# Patient Record
Sex: MALE | Race: WHITE | Employment: OTHER | URBAN - METROPOLITAN AREA
[De-identification: names, ages, dates, MRNs, and addresses within clinical notes are randomized per-mention and may not be internally consistent; named-entity substitution may affect disease eponyms.]

---

## 2017-01-03 DIAGNOSIS — E78.5 HYPERLIPIDEMIA LDL GOAL <70: Primary | ICD-10-CM

## 2017-01-03 RX ORDER — LISINOPRIL 5 MG/1
5 TABLET ORAL DAILY
Qty: 90 TABLET | Refills: 1 | Status: SHIPPED | OUTPATIENT
Start: 2017-01-03 | End: 2018-02-21

## 2017-01-03 RX ORDER — LISINOPRIL 5 MG/1
5 TABLET ORAL DAILY
Qty: 90 TABLET | Refills: 1 | Status: SHIPPED | OUTPATIENT
Start: 2017-01-03 | End: 2017-01-03

## 2017-01-03 NOTE — TELEPHONE ENCOUNTER
Pt says he would like to get his Lisinopril as 90 day supply so he doesn't have to constantly run to the pharmacy. He is going there this afternoon and would like this sent ASAP please.  He says the pharmacy was suppose to contact us yesterday for refill but no record that this was done.    Lisinopril 5 mg tab      Last Written Prescription Date: 10/27/16  Last Fill Quantity: 30, # refills: 1  Last Office Visit with [facility], [facility] or [facility] prescribing provider: 11/7/16       POTASSIUM   Date Value Ref Range Status   11/09/2016 3.9 3.4 - 5.3 mmol/L Final     CREATININE   Date Value Ref Range Status   11/09/2016 1.16 0.66 - 1.25 mg/dL Final   11/05/2005 1.3 0.6 - 1.3 mg/dL      BP Readings from Last 3 Encounters:   12/15/16 148/85   11/15/16 150/75   11/09/16 125/64

## 2017-01-03 NOTE — TELEPHONE ENCOUNTER
Lisinopril      Last Written Prescription Date: 10/27/16  Last Fill Quantity: 30 ,  # refills: 1   Last Office Visit with G, [facility] or [facility] prescribing provider: 11/7/16

## 2017-01-09 ENCOUNTER — OFFICE VISIT (OUTPATIENT)
Dept: FAMILY MEDICINE | Facility: CLINIC | Age: 77
End: 2017-01-09
Payer: COMMERCIAL

## 2017-01-09 ENCOUNTER — RADIANT APPOINTMENT (OUTPATIENT)
Dept: GENERAL RADIOLOGY | Facility: CLINIC | Age: 77
End: 2017-01-09
Attending: NURSE PRACTITIONER
Payer: COMMERCIAL

## 2017-01-09 VITALS
TEMPERATURE: 97.4 F | RESPIRATION RATE: 16 BRPM | BODY MASS INDEX: 29.6 KG/M2 | HEART RATE: 74 BPM | SYSTOLIC BLOOD PRESSURE: 119 MMHG | DIASTOLIC BLOOD PRESSURE: 71 MMHG | WEIGHT: 194.6 LBS

## 2017-01-09 DIAGNOSIS — M25.531 RIGHT WRIST PAIN: ICD-10-CM

## 2017-01-09 DIAGNOSIS — L03.113 CELLULITIS OF RIGHT UPPER EXTREMITY: Primary | ICD-10-CM

## 2017-01-09 PROCEDURE — 99213 OFFICE O/P EST LOW 20 MIN: CPT

## 2017-01-09 PROCEDURE — 73110 X-RAY EXAM OF WRIST: CPT | Mod: RT

## 2017-01-09 RX ORDER — CEPHALEXIN 500 MG/1
500 CAPSULE ORAL 3 TIMES DAILY
Qty: 30 CAPSULE | Refills: 0 | Status: SHIPPED | OUTPATIENT
Start: 2017-01-09 | End: 2017-01-19

## 2017-01-09 NOTE — NURSING NOTE
"Chief Complaint   Patient presents with     Musculoskeletal Problem     Patient heard wrist make a pop sound the day after helping son lift a TV. Had pain on friday after lifting and pain subsided some on Saturday after he heard a pop.       Initial /71 mmHg  Pulse 74  Temp(Src) 97.4  F (36.3  C) (Tympanic)  Resp 16  Wt 194 lb 9.6 oz (88.27 kg) Estimated body mass index is 29.6 kg/(m^2) as calculated from the following:    Height as of 12/15/16: 5' 8\" (1.727 m).    Weight as of this encounter: 194 lb 9.6 oz (88.27 kg).  BP completed using cuff size: regular    "

## 2017-01-09 NOTE — MR AVS SNAPSHOT
After Visit Summary   1/9/2017    Alvaro Horton    MRN: [id number]           Patient Information     Date Of Birth          1940        Visit Information        Provider Department      1/9/2017 1:00 PM KODAK SAME DAY PROVIDER [facility]        Today's Diagnoses     Cellulitis of right upper extremity    -  1     Right wrist pain           Care Instructions    Take antibiotics as directed.    Follow up in 4 days and sooner if needed with primary care provider.    Follow-up with your primary care provider next week and as needed.    Indications for emergent return to emergency department discussed with patient, who verbalized good understanding and agreement.  Patient understands the limitations of today's evaluation.         Cellulitis  Cellulitis is an infection of the deep layers of skin. A break in the skin, such as a cut or scratch, can let bacteria under the skin. If the bacteria get to deep layers of the skin, it can be serious. If not treated, cellulitis can get into the bloodstream and lymph nodes. The infection can then spread throughout the body. This causes serious illness.  Cellulitis causes the affected skin to become red, swollen, warm, and sore. The reddened areas have a visible border. An open sore may leak fluid (pus). You may have a fever, chills, and pain.  Cellulitis is treated with antibiotics taken for 7 to 10 days. An open sore may be cleaned and covered with cool wet gauze. Symptoms should get better 1 to 2 days after treatment is started. Make sure to take all the antibiotics for the full number of days until they are gone. Keep taking the medicine even if your symptoms go away.  Home care  Follow these tips:    Limit the use of the part of your body with cellulitis. Movement can cause the infection to spread.    If the infection is on your leg, walk as little as possible in the first few days of the treatment. Keep your leg raised while sitting.  This will help to reduce swelling.    Take all of the antibiotic medicine exactly as directed until it is gone. Do not miss any doses, especially during the first 7 days. Don t stop taking the medicine when your symptoms get better.    Keep the affected area clean and dry.    Wash your hands with soap and warm water before and after touching your skin. Anyone else who touches your skin should also wash his or her hands. Don't share towels.  Follow-up care  Follow up with your healthcare provider. If your infection does not go away on 1 antibiotic, your healthcare provider will prescribe a different one.  When to seek medical advice  Call your healthcare provider right away if any of these occur:    Red areas that spread    Swelling or pain that gets worse    Fluid leaking from the skin (pus)    Fever higher of 100.4  F (38.0  C) or higher after 2 days on antibiotics    1570-5174 [employer]. [address] [place] [address]. All rights reserved. This information is not intended as a substitute for professional medical care. Always follow your healthcare professional's instructions.              Follow-ups after your visit        Follow-up notes from your care team     See patient instructions section of the AVS Return in about 4 weeks (around 2/6/2017), or if symptoms worsen or fail to improve, for Follow up with your primary care provider.      Who to contact     If you have questions or need follow up information about today's clinic visit or your schedule please contact [facility] directly at [phone].  Normal or non-critical lab and imaging results will be communicated to you by MyChart, letter or phone within 4 business days after the clinic has received the results. If you do not hear from us within 7 days, please contact the clinic through MyChart or phone. If you have a critical or abnormal lab result, we will notify you by phone as soon as possible.  Submit  "refill requests through [facility] or call your pharmacy and they will forward the refill request to us. Please allow 3 business days for your refill to be completed.          Additional Information About Your Visit        Front Desk [employer] Information     DLC lets you send messages to your doctor, view your test results, renew your prescriptions, schedule appointments and more. To sign up, go to [email or web address] . Click on \"Log in\" on the left side of the screen, which will take you to the Welcome page. Then click on \"Sign up Now\" on the right side of the page.     You will be asked to enter the access code listed below, as well as some personal information. Please follow the directions to create your username and password.     Your access code is: [email or web address]  Expires: 3/15/2017  4:02 PM     Your access code will  in 90 days. If you need help or a new code, please call your [place] clinic or [phone].        Care EveryWhere ID     This is your Care EveryWhere ID. This could be used by other organizations to access your [place] medical records  [id number]        Your Vitals Were     Pulse Temperature Respirations             74 97.4  F (36.3  C) (Tympanic) 16          Blood Pressure from Last 3 Encounters:   17 119/71   12/15/16 148/85   11/15/16 150/75    Weight from Last 3 Encounters:   17 194 lb 9.6 oz (88.27 kg)   12/15/16 198 lb (89.812 kg)   16 198 lb (89.812 kg)                 Today's Medication Changes          These changes are accurate as of: 17  1:57 PM.  If you have any questions, ask your nurse or doctor.               Start taking these medicines.        Dose/Directions    cephALEXin 500 MG capsule   Commonly known as:  KEFLEX   Used for:  Cellulitis of right upper extremity        Dose:  500 mg   Take 1 capsule (500 mg) by mouth 3 times daily for 10 days   Quantity:  30 capsule   Refills:  0         These medicines have changed or have updated prescriptions.        " Dose/Directions    cyanocobalamin 500 MCG Tabs   Commonly known as:  BL VITAMIN B-12   This may have changed:  how much to take   Used for:  Memory loss        Dose:  500 mcg   Take 500 mcg by mouth daily.   Quantity:  90 tablet   Refills:  4            Where to get your medicines      These medications were sent to [address] [address]     Phone:  [phone]    - cephALEXin 500 MG capsule             Primary Care Provider Office Phone # Fax #    Be Carreno [phone]       [facility] [address] [place] [address]        Thank you!     Thank you for choosing [facility]  for your care. Our goal is always to provide you with excellent care. Hearing back from our patients is one way we can continue to improve our services. Please take a few minutes to complete the written survey that you may receive in the mail after your visit with us. Thank you!             Your Updated Medication List - Protect others around you: Learn how to safely use, store and throw away your medicines at www.disposemymeds.org.          This list is accurate as of: 1/9/17  1:57 PM.  Always use your most recent med list.                   Brand Name Dispense Instructions for use    albuterol (2.5 MG/3ML) 0.083% neb solution     1 Box    Take 1 vial (2.5 mg) by nebulization every 4 hours as needed for shortness of breath / dyspnea or wheezing       arformoterol 15 MCG/2ML Nebu neb solution    BROVANA    120 mL    Take 2 mLs (15 mcg) by nebulization 2 times daily       aspirin 81 MG tablet      Take by mouth daily       atorvastatin 80 MG tablet    LIPITOR    30 tablet    Take 1 tablet (80 mg) by mouth daily       blood glucose monitoring meter device kit     1 kit    Use to test blood sugars 4 times daily or as directed.       blood glucose monitoring test strip    no brand specified    150  "each    1 strip by In Vitro route 3 times daily Diagnosis:  250.00.       budesonide 0.5 MG/2ML neb solution    PULMICORT    60 ampule    Take 2 mLs (0.5 mg) by nebulization 2 times daily       cephALEXin 500 MG capsule    KEFLEX    30 capsule    Take 1 capsule (500 mg) by mouth 3 times daily for 10 days       cholecalciferol 2000 UNITS tablet     90 tablet    Take 1 tablet by mouth daily.       cyanocobalamin 500 MCG Tabs    BL VITAMIN B-12    90 tablet    Take 500 mcg by mouth daily.       finasteride 5 MG tablet    PROSCAR    60 tablet    Take 1 tablet (5 mg) by mouth daily       fish oil-omega-3 fatty acids 1000 MG capsule     180 capsule    Take 1 capsule by mouth 2 times daily.       insulin aspart 100 UNITS/ML injection    NovoLOG VIAL    3 vial    11 units three times daily       insulin detemir 100 UNIT/ML injection    LEVEMIR VIAL    1 Month    30 units at bedtime       insulin syringe-needle U-100 31G X 5/16\" 1 ML    BD insulin syringe ULTRAFINE    100 each    Use one syringe 4 times daily or as directed.       lisinopril 5 MG tablet    PRINIVIL/ZESTRIL    90 tablet    Take 1 tablet (5 mg) by mouth daily       magnesium oxide 400 MG tablet    MAG-OX     Take 400 mg by mouth daily       metFORMIN 1000 MG tablet    GLUCOPHAGE    60 tablet    Take 1 tablet (1,000 mg) by mouth 2 times daily (with meals)       metoprolol 25 MG tablet    LOPRESSOR    60 tablet    Take 1 tablet (25 mg) by mouth 2 times daily       nitroglycerin 0.4 MG sublingual tablet    NITROSTAT    25 tablet    Place 1 tablet (0.4 mg) under the tongue every 5 minutes as needed for chest pain (CALL 911 IF NOT IMPROVED AFTER THREE CONSECUTIVE DOSES)       omeprazole 20 MG CR capsule    priLOSEC    30 capsule    Take 1 capsule (20 mg) by mouth daily       order for DME      Equipment being ordered: CPAP AIRSENSE 10 11-15 CM H2O QUATTRO AIR SIZE MED # [id number]  DN# [id number]       * order for DME     1 Device    Equipment being ordered: CPAP " machine       * order for DME     1 Box    Equipment being ordered: diabetic shoes       * order for DME     1 Device    Equipment being ordered: Nebulizer       tamsulosin 0.4 MG capsule    FLOMAX    90 capsule    Take 1 capsule (0.4 mg) by mouth daily (Needs follow-up appointment for this medication)       * Notice:  This list has 3 medication(s) that are the same as other medications prescribed for you. Read the directions carefully, and ask your doctor or other care provider to review them with you.

## 2017-01-09 NOTE — PROGRESS NOTES
SUBJECTIVE:                                                    Alvaro Horton is a 76 year old male who presents to clinic today for the following health issues:      Joint Pain     Onset: 3 days     Description:   Location: right wrist  Character: Sharp and Fullness    Intensity: 4-5/10    Progression of Symptoms: improved    Accompanying Signs & Symptoms:  Other symptoms: radiation of pain to hand and forearm   History:   Previous similar pain: no       Precipitating factors:   Trauma or overuse: YES- helped son lift a TV    Alleviating factors:  Improved by: ice       Therapies Tried and outcome: ice, symptoms improved          Problem list and histories reviewed & adjusted, as indicated.  Additional history: as documented    Patient Active Problem List   Diagnosis     Chronic ischemic heart disease     Moderate Persistent Asthma     PSORIASIS     Hypertrophy of prostate without urinary obstruction     OVERACTIVE BLADDER     BAKERS LUNG     Hypertension goal BP (blood pressure) < 140/90     Esophageal reflux     Sensorineural hearing loss, bilateral     Obstructive sleep apnea     Benign paroxysmal vertigo     CVA (cerebral vascular accident) (H)     Atrial fibrillation (H)     Memory loss     CTS (carpal tunnel syndrome)     Dyslexia     Advanced care planning/counseling discussion     Hyperlipidemia LDL goal <70     COPD (chronic obstructive pulmonary disease) (H)     CKD (chronic kidney disease) stage 3, GFR 30-59 ml/min     Health Care Home     Altered mental status     Type 2 diabetes mellitus with diabetic chronic kidney disease (H)     EDEN (obstructive sleep apnea) AHI 11.2     Past Surgical History   Procedure Laterality Date     C anesth,dx arthroscopic proc knee joint  1994, 1998      Left     Surgical history of -   1998     Hernia repair     Surgical history of -   2004     NormalColonoscopy      Surgical history of -   2006     Normal Colonoscopy     Cardiac surgery  11/09     CABG x2 -  LIMA-D1, SVG to OM1, OM2.     Genitourinary surgery  1990     ESWL and percutaneous nephrolithotomy     Orthopedic surgery  8/12     right CTR       Social History   Substance Use Topics     Smoking status: Never Smoker      Smokeless tobacco: Never Used     Alcohol Use: No     Family History   Problem Relation Age of Onset     C.A.D. Father      40s     Hypertension Father      C.A.D. Paternal Grandfather      HEART DISEASE Paternal Grandfather      DIABETES Brother      C.A.D. Brother      HEART DISEASE Brother      Hypertension Brother      Cancer - colorectal No family hx of      GASTROINTESTINAL DISEASE Son      Crohn's disease     GASTROINTESTINAL DISEASE Other      2 grandaughters with Crohn's disease         Current Outpatient Prescriptions   Medication Sig Dispense Refill     cephALEXin (KEFLEX) 500 MG capsule Take 1 capsule (500 mg) by mouth 3 times daily for 10 days 30 capsule 0     lisinopril (PRINIVIL/ZESTRIL) 5 MG tablet Take 1 tablet (5 mg) by mouth daily 90 tablet 1     insulin aspart (NOVOLOG VIAL) 100 UNITS/ML injection 11 units three times daily 3 vial 1     insulin detemir (LEVEMIR VIAL) 100 UNIT/ML injection 30 units at bedtime 1 Month 0     metFORMIN (GLUCOPHAGE) 1000 MG tablet Take 1 tablet (1,000 mg) by mouth 2 times daily (with meals) 60 tablet 0     metoprolol (LOPRESSOR) 25 MG tablet Take 1 tablet (25 mg) by mouth 2 times daily 60 tablet 1     finasteride (PROSCAR) 5 MG tablet Take 1 tablet (5 mg) by mouth daily 60 tablet 3     tamsulosin (FLOMAX) 0.4 MG 24 hr capsule Take 1 capsule (0.4 mg) by mouth daily (Needs follow-up appointment for this medication) 90 capsule 3     atorvastatin (LIPITOR) 80 MG tablet Take 1 tablet (80 mg) by mouth daily 30 tablet 11     omeprazole (PRILOSEC) 20 MG capsule Take 1 capsule (20 mg) by mouth daily 30 capsule 11     order for DME Equipment being ordered: CPAP  AIRSENSE 10  11-15 CM H2O  QUATTRO AIR SIZE MED  SN# [id number]  DN# [id number]       budesonide  "(PULMICORT) 0.5 MG/2ML nebulizer solution Take 2 mLs (0.5 mg) by nebulization 2 times daily 60 ampule 11     blood glucose monitoring (NO BRAND SPECIFIED) test strip 1 strip by In Vitro route 3 times daily Diagnosis:  250.00. 150 each 11     albuterol (2.5 MG/3ML) 0.083% nebulizer solution Take 1 vial (2.5 mg) by nebulization every 4 hours as needed for shortness of breath / dyspnea or wheezing 1 Box 11     order for DME Equipment being ordered: Nebulizer 1 Device 0     blood glucose monitoring (ONE TOUCH ULTRA 2) meter device kit Use to test blood sugars 4 times daily or as directed. 1 kit 0     arformoterol (BROVANA) 15 MCG/2ML NEBU Take 2 mLs (15 mcg) by nebulization 2 times daily 120 mL 11     order for DME Equipment being ordered: diabetic shoes 1 Box 0     nitroglycerin (NITROSTAT) 0.4 MG SL tablet Place 1 tablet (0.4 mg) under the tongue every 5 minutes as needed for chest pain (CALL 911 IF NOT IMPROVED AFTER THREE CONSECUTIVE DOSES) 25 tablet 0     order for DME Equipment being ordered: CPAP machine 1 Device 1     insulin syringe-needle U-100 (BD INSULIN SYRINGE ULTRAFINE) 31G X 5/16\" 1 ML Use one syringe 4 times daily or as directed. 100 each prn     aspirin 81 MG tablet Take by mouth daily       magnesium oxide (MAG-OX) 400 MG tablet Take 400 mg by mouth daily       cholecalciferol 2000 UNITS tablet Take 1 tablet by mouth daily. 90 tablet 3     fish oil-omega-3 fatty acids (FISH OIL) 1000 MG capsule Take 1 capsule by mouth 2 times daily. 180 capsule 3     cyanocobalamin (BL VITAMIN B-12) 500 MCG TABS Take 500 mcg by mouth daily. (Patient taking differently: Take 2,500 mcg by mouth daily ) 90 tablet 4     [DISCONTINUED] metoprolol (TOPROL-XL) 25 MG 24 hr tablet Take 1 tablet (25 mg) by mouth 2 times daily 180 tablet 3     [DISCONTINUED] LANTUS SOLOSTAR SOLN 100 UNIT/ML 26 units at bedtime 3 Month 0     Allergies   Allergen Reactions     Prednisone      Severe interaction with DM     Problem list, Medication " list, Allergies, and Medical/Social/Surgical histories reviewed in [facility] and updated as appropriate.    ROS:  Constitutional, HEENT, cardiovascular, pulmonary, GI, , musculoskeletal, neuro, skin, endocrine and psych systems are negative, except as otherwise noted.    OBJECTIVE:                                                    /71 mmHg  Pulse 74  Temp(Src) 97.4  F (36.3  C) (Tympanic)  Resp 16  Wt 194 lb 9.6 oz (88.27 kg)  Body mass index is 29.6 kg/(m^2).  GENERAL: healthy, alert and no distress, nontoxic in appearance  EYES: Eyes grossly normal to inspection,  conjunctivae and sclerae normal  HENT:normocephalic  NECK: supple with full ROM  MS: right wrist is mildly red and swollen. Has small scab on top of hand from playing with puppy. Has been icing it and it feels better.     Diagnostic Test Results: Degenerative changes. No fx noted. Will follow up with over read as indicated.    No results found for this or any previous visit (from the past 24 hour(s)).     ASSESSMENT/PLAN:                                                      Problem List Items Addressed This Visit     None      Visit Diagnoses     Cellulitis of right upper extremity    -  Primary     Relevant Medications     cephALEXin (KEFLEX) 500 MG capsule     Right wrist pain         Relevant Orders     XR Wrist Right G/E 3 Views                Patient Instructions   Take antibiotics as directed.    Follow up in 4 days and sooner if needed with primary care provider.    Follow-up with your primary care provider next week and as needed.    Indications for emergent return to emergency department discussed with patient, who verbalized good understanding and agreement.  Patient understands the limitations of today's evaluation.         Cellulitis  Cellulitis is an infection of the deep layers of skin. A break in the skin, such as a cut or scratch, can let bacteria under the skin. If the bacteria get to deep layers of the skin, it can be serious.  If not treated, cellulitis can get into the bloodstream and lymph nodes. The infection can then spread throughout the body. This causes serious illness.  Cellulitis causes the affected skin to become red, swollen, warm, and sore. The reddened areas have a visible border. An open sore may leak fluid (pus). You may have a fever, chills, and pain.  Cellulitis is treated with antibiotics taken for 7 to 10 days. An open sore may be cleaned and covered with cool wet gauze. Symptoms should get better 1 to 2 days after treatment is started. Make sure to take all the antibiotics for the full number of days until they are gone. Keep taking the medicine even if your symptoms go away.  Home care  Follow these tips:    Limit the use of the part of your body with cellulitis. Movement can cause the infection to spread.    If the infection is on your leg, walk as little as possible in the first few days of the treatment. Keep your leg raised while sitting. This will help to reduce swelling.    Take all of the antibiotic medicine exactly as directed until it is gone. Do not miss any doses, especially during the first 7 days. Don t stop taking the medicine when your symptoms get better.    Keep the affected area clean and dry.    Wash your hands with soap and warm water before and after touching your skin. Anyone else who touches your skin should also wash his or her hands. Don't share towels.  Follow-up care  Follow up with your healthcare provider. If your infection does not go away on 1 antibiotic, your healthcare provider will prescribe a different one.  When to seek medical advice  Call your healthcare provider right away if any of these occur:    Red areas that spread    Swelling or pain that gets worse    Fluid leaking from the skin (pus)    Fever higher of 100.4  F (38.0  C) or higher after 2 days on antibiotics    8241-3261 [employer]. [address] [place] [address], [place] [address]. All rights reserved. This  information is not intended as a substitute for professional medical care. Always follow your healthcare professional's instructions.            SERGIO Franco SAME DAY PROVIDER  [facility]

## 2017-01-11 DIAGNOSIS — N18.30 TYPE 2 DIABETES MELLITUS WITH STAGE 3 CHRONIC KIDNEY DISEASE, WITH LONG-TERM CURRENT USE OF INSULIN (H): Primary | ICD-10-CM

## 2017-01-11 DIAGNOSIS — E11.22 TYPE 2 DIABETES MELLITUS WITH STAGE 3 CHRONIC KIDNEY DISEASE, WITH LONG-TERM CURRENT USE OF INSULIN (H): Primary | ICD-10-CM

## 2017-01-11 DIAGNOSIS — Z79.4 TYPE 2 DIABETES MELLITUS WITH STAGE 3 CHRONIC KIDNEY DISEASE, WITH LONG-TERM CURRENT USE OF INSULIN (H): Primary | ICD-10-CM

## 2017-01-11 NOTE — TELEPHONE ENCOUNTER
Patient is out and needs refill today -     Metformin         Last Written Prescription Date: 12/12/16  Last Fill Quantity: 60, # refills: 0  Last Office Visit with G, P or [facility] prescribing provider:  11/05/16        BP Readings from Last 3 Encounters:   01/09/17 119/71   12/15/16 148/85   11/15/16 150/75     MICROL       29   11/7/2016  No results found for this basename: microalbumin  CREATININE   Date Value Ref Range Status   11/09/2016 1.16 0.66 - 1.25 mg/dL Final   11/05/2005 1.3 0.6 - 1.3 mg/dL    ]  GFR ESTIMATE   Date Value Ref Range Status   11/09/2016 61 >60 mL/min/1.7m2 Final     Comment:     Non  GFR Calc   11/07/2016 65 >60 mL/min/1.7m2 Final     Comment:     Non  GFR Calc   12/17/2015 58* >60 mL/min/1.7m2 Final     Comment:     Non  GFR Calc     GFR ESTIMATE IF BLACK   Date Value Ref Range Status   11/09/2016 74 >60 mL/min/1.7m2 Final     Comment:      GFR Calc   11/07/2016 78 >60 mL/min/1.7m2 Final     Comment:      GFR Calc   12/17/2015 70 >60 mL/min/1.7m2 Final     Comment:      GFR Calc     CHOL      116   11/7/2016  HDL       40   11/7/2016  LDL       32   11/7/2016  LDL       93   5/5/2014  TRIG      221   11/7/2016  CHOLHDLRATIO      4.0   2/23/2015  AST       24   11/9/2016  ALT       31   11/9/2016  A1C      7.5   11/7/2016  A1C      8.2   4/20/2016  A1C      8.1   9/9/2015  A1C      8.1   1/29/2015  A1C      8.1   9/5/2014  POTASSIUM   Date Value Ref Range Status   11/09/2016 3.9 3.4 - 5.3 mmol/L Final     '

## 2017-01-13 ENCOUNTER — TELEPHONE (OUTPATIENT)
Dept: FAMILY MEDICINE | Facility: CLINIC | Age: 77
End: 2017-01-13

## 2017-01-16 ENCOUNTER — MEDICAL CORRESPONDENCE (OUTPATIENT)
Dept: HEALTH INFORMATION MANAGEMENT | Facility: CLINIC | Age: 77
End: 2017-01-16

## 2017-01-26 ENCOUNTER — OFFICE VISIT (OUTPATIENT)
Dept: FAMILY MEDICINE | Facility: CLINIC | Age: 77
End: 2017-01-26
Payer: COMMERCIAL

## 2017-01-26 VITALS
HEIGHT: 68 IN | SYSTOLIC BLOOD PRESSURE: 122 MMHG | DIASTOLIC BLOOD PRESSURE: 74 MMHG | HEART RATE: 70 BPM | WEIGHT: 194 LBS | TEMPERATURE: 97.2 F | RESPIRATION RATE: 18 BRPM | BODY MASS INDEX: 29.4 KG/M2

## 2017-01-26 DIAGNOSIS — J44.9 CHRONIC OBSTRUCTIVE PULMONARY DISEASE, UNSPECIFIED COPD TYPE (H): ICD-10-CM

## 2017-01-26 DIAGNOSIS — Z79.4 TYPE 2 DIABETES MELLITUS WITH STAGE 3 CHRONIC KIDNEY DISEASE, WITH LONG-TERM CURRENT USE OF INSULIN (H): Primary | ICD-10-CM

## 2017-01-26 DIAGNOSIS — I25.9 CHRONIC ISCHEMIC HEART DISEASE, UNSPECIFIED: ICD-10-CM

## 2017-01-26 DIAGNOSIS — E11.22 TYPE 2 DIABETES MELLITUS WITH STAGE 3 CHRONIC KIDNEY DISEASE, WITH LONG-TERM CURRENT USE OF INSULIN (H): Primary | ICD-10-CM

## 2017-01-26 DIAGNOSIS — N18.30 TYPE 2 DIABETES MELLITUS WITH STAGE 3 CHRONIC KIDNEY DISEASE, WITH LONG-TERM CURRENT USE OF INSULIN (H): Primary | ICD-10-CM

## 2017-01-26 PROCEDURE — 99213 OFFICE O/P EST LOW 20 MIN: CPT | Performed by: FAMILY MEDICINE

## 2017-01-26 RX ORDER — METOPROLOL TARTRATE 25 MG/1
25 TABLET, FILM COATED ORAL 2 TIMES DAILY
Qty: 60 TABLET | Refills: 11 | Status: SHIPPED | OUTPATIENT
Start: 2017-01-26 | End: 2018-02-05

## 2017-01-26 NOTE — MR AVS SNAPSHOT
After Visit Summary   1/26/2017    Alvaro Horton    MRN: [id number]           Patient Information     Date Of Birth          1940        Visit Information        Provider Department      1/26/2017 11:00 AM Be Carreno MD [facility]        Today's Diagnoses     Type 2 diabetes mellitus with stage 3 chronic kidney disease, with long-term current use of insulin (H)    -  1     CORONARY ARTERY DISEASE         Chronic obstructive pulmonary disease, unspecified COPD type (H)           Care Instructions    ASSESSMENT/PLAN:                                                      (E11.22,  N18.3,  Z79.4) Type 2 diabetes mellitus with stage 3 chronic kidney disease, with long-term current use of insulin (H)  (primary encounter diagnosis)  Comment: doing OK  Plan: metFORMIN (GLUCOPHAGE) 1000 MG tablet        REcheck in 3 months .    (I25.9) CORONARY ARTERY DISEASE  Comment: stable  Plan: metoprolol (LOPRESSOR) 25 MG tablet        No change in current treatment plan.     (J44.9) Chronic obstructive pulmonary disease, unspecified COPD type (H)  Comment: stable  Plan: No change in current treatment plan.         Follow-ups after your visit        Who to contact     If you have questions or need follow up information about today's clinic visit or your schedule please contact [facility] directly at [phone].  Normal or non-critical lab and imaging results will be communicated to you by Vir-Sechart, letter or phone within 4 business days after the clinic has received the results. If you do not hear from us within 7 days, please contact the clinic through Vir-Sechart or phone. If you have a critical or abnormal lab result, we will notify you by phone as soon as possible.  Submit refill requests through BlogRadio or call your pharmacy and they will forward the refill request to us. Please allow 3 business days for your refill to be completed.          Additional Information  "About Your Visit        [employer] Information     BasisCode lets you send messages to your doctor, view your test results, renew your prescriptions, schedule appointments and more. To sign up, go to [email or web address] . Click on \"Log in\" on the left side of the screen, which will take you to the Welcome page. Then click on \"Sign up Now\" on the right side of the page.     You will be asked to enter the access code listed below, as well as some personal information. Please follow the directions to create your username and password.     Your access code is: [email or web address]  Expires: 3/15/2017  4:02 PM     Your access code will  in 90 days. If you need help or a new code, please call your [place] clinic or [phone].        Care EveryWhere ID     This is your Care EveryWhere ID. This could be used by other organizations to access your [place] medical records  [id number]        Your Vitals Were     Pulse Temperature Respirations Height BMI (Body Mass Index)       70 97.2  F (36.2  C) (Tympanic) 18 5' 8\" (1.727 m) 29.50 kg/m2        Blood Pressure from Last 3 Encounters:   17 122/74   17 119/71   12/15/16 148/85    Weight from Last 3 Encounters:   17 194 lb (87.998 kg)   17 194 lb 9.6 oz (88.27 kg)   12/15/16 198 lb (89.812 kg)              Today, you had the following     No orders found for display         Today's Medication Changes          These changes are accurate as of: 17 12:13 PM.  If you have any questions, ask your nurse or doctor.               These medicines have changed or have updated prescriptions.        Dose/Directions    cyanocobalamin 500 MCG Tabs   Commonly known as:  BL VITAMIN B-12   This may have changed:  how much to take   Used for:  Memory loss        Dose:  500 mcg   Take 500 mcg by mouth daily.   Quantity:  90 tablet   Refills:  4            Where to get your medicines      These medications were sent to [address]  [address] [facility] [address]     Phone:  [phone]    - metFORMIN 1000 MG tablet  - metoprolol 25 MG tablet             Primary Care Provider Office Phone # Fax #    Be Carreno [phone]       [facility] [address] [facility] [address]        Thank you!     Thank you for choosing [facility]  for your care. Our goal is always to provide you with excellent care. Hearing back from our patients is one way we can continue to improve our services. Please take a few minutes to complete the written survey that you may receive in the mail after your visit with us. Thank you!             Your Updated Medication List - Protect others around you: Learn how to safely use, store and throw away your medicines at www.disposemymeds.org.          This list is accurate as of: 1/26/17 12:13 PM.  Always use your most recent med list.                   Brand Name Dispense Instructions for use    albuterol (2.5 MG/3ML) 0.083% neb solution     1 Box    Take 1 vial (2.5 mg) by nebulization every 4 hours as needed for shortness of breath / dyspnea or wheezing       arformoterol 15 MCG/2ML Nebu neb solution    BROVANA    120 mL    Take 2 mLs (15 mcg) by nebulization 2 times daily       aspirin 81 MG tablet      Take by mouth daily       atorvastatin 80 MG tablet    LIPITOR    30 tablet    Take 1 tablet (80 mg) by mouth daily       blood glucose monitoring meter device kit     1 kit    Use to test blood sugars 4 times daily or as directed.       blood glucose monitoring test strip    no brand specified    150 each    1 strip by In Vitro route 3 times daily Diagnosis:  250.00.       budesonide 0.5 MG/2ML neb solution    PULMICORT    60 ampule    Take 2 mLs (0.5 mg) by nebulization 2 times daily       cholecalciferol 2000 UNITS tablet     90 tablet    Take 1 tablet by mouth daily.       cyanocobalamin 500 MCG Tabs    BL VITAMIN B-12    90 tablet    Take 500  "mcg by mouth daily.       finasteride 5 MG tablet    PROSCAR    60 tablet    Take 1 tablet (5 mg) by mouth daily       fish oil-omega-3 fatty acids 1000 MG capsule     180 capsule    Take 1 capsule by mouth 2 times daily.       insulin aspart 100 UNITS/ML injection    NovoLOG VIAL    3 vial    11 units three times daily       insulin detemir 100 UNIT/ML injection    LEVEMIR VIAL    1 Month    30 units at bedtime       insulin syringe-needle U-100 31G X 5/16\" 1 ML    BD insulin syringe ULTRAFINE    100 each    Use one syringe 4 times daily or as directed.       lisinopril 5 MG tablet    PRINIVIL/ZESTRIL    90 tablet    Take 1 tablet (5 mg) by mouth daily       magnesium oxide 400 MG tablet    MAG-OX     Take 400 mg by mouth daily       metFORMIN 1000 MG tablet    GLUCOPHAGE    60 tablet    Take 1 tablet (1,000 mg) by mouth 2 times daily (with meals)       metoprolol 25 MG tablet    LOPRESSOR    60 tablet    Take 1 tablet (25 mg) by mouth 2 times daily       nitroglycerin 0.4 MG sublingual tablet    NITROSTAT    25 tablet    Place 1 tablet (0.4 mg) under the tongue every 5 minutes as needed for chest pain (CALL 911 IF NOT IMPROVED AFTER THREE CONSECUTIVE DOSES)       omeprazole 20 MG CR capsule    priLOSEC    30 capsule    Take 1 capsule (20 mg) by mouth daily       order for DME      Equipment being ordered: CPAP AIRSENSE 10 11-15 CM H2O QUATTRO AIR SIZE MED # [id number]  DN# [id number]       * order for DME     1 Device    Equipment being ordered: CPAP machine       * order for DME     1 Box    Equipment being ordered: diabetic shoes       * order for DME     1 Device    Equipment being ordered: Nebulizer       tamsulosin 0.4 MG capsule    FLOMAX    90 capsule    Take 1 capsule (0.4 mg) by mouth daily (Needs follow-up appointment for this medication)       * Notice:  This list has 3 medication(s) that are the same as other medications prescribed for you. Read the directions carefully, and ask your doctor or other care " provider to review them with you.

## 2017-01-26 NOTE — PATIENT INSTRUCTIONS
ASSESSMENT/PLAN:                                                      (E11.22,  N18.3,  Z79.4) Type 2 diabetes mellitus with stage 3 chronic kidney disease, with long-term current use of insulin (H)  (primary encounter diagnosis)  Comment: doing OK  Plan: metFORMIN (GLUCOPHAGE) 1000 MG tablet        REcheck in 3 months .    (I25.9) CORONARY ARTERY DISEASE  Comment: stable  Plan: metoprolol (LOPRESSOR) 25 MG tablet        No change in current treatment plan.     (J44.9) Chronic obstructive pulmonary disease, unspecified COPD type (H)  Comment: stable  Plan: No change in current treatment plan.

## 2017-01-26 NOTE — PROGRESS NOTES
"  SUBJECTIVE:                                                    Alvaro Horton is a 76 year old male who presents to clinic today for the following health issues:  Chief Complaint   Patient presents with     Diabetes     02/02/2017 has appointment to get his diabetic shoes. [phone] phone number and [phone] is the FAX.  [facility].      Needs form for diabetic shoes.     Diabetes Follow-up    Patient is checking blood sugars: twice daily.  Results are as follows:Range=39 once (did not eat that day) to 220.  Can be higher when he goes out to eat.          -170       PM: 130-180    Diabetic concerns: None    Taking levemir 20units at night.  Novolog 11 units three times daily.      Symptoms of hypoglycemia (low blood sugar): light headed     Paresthesias (numbness or burning in feet) or sores: numbness and tingling     Date of last diabetic eye exam: 12/2016       Amount of exercise or physical activity: not at this time    Problems taking medications regularly: No    Medication side effects: none    Last clinic visit:4/20/2016  Medication or other changes at last visit:take long acting insulin more regularly.   Weight since last visit? 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 RAST for bakers yeast negative 2002. Has occupational exposures with related worsening asthmatic symptoms. CT 2/08- no fibrosis.       Moderate Persistent Asthma      Priority: High     PFTS 1997 " "- FEV1- 3.39 (64%), ratio 0.74, 24% change jog45-45% with bronchodilators,  TLC 86%, %, DLCO 78%. Methacholine challenge positive at level 7 2001.  PFTS 2004 - FEV1- 1.93 (63%), ratio 0.77. PFTS 4/08 - FEV1- 2.13 (72%), ratio 0.71, no change with bronchodilators,  TLC 78%, RV 96%, DLCO 64%              Chronic ischemic heart disease      Priority: High     atypical chest pain 2001 with GXT echo- decreased LVF, angiogram 2001- nonobstructing 3 vessel disease.   Repeat angio 2004- prox RCA 50-60%,  OM2- 40%, D1 30-40%, LAD 30-40%.   Cardiolite 2005 during hospitalization for SOB in [place] reported to reveal no ischemia.   Adenosine cardiolite 1/08- small slightly reversible inferior defect, most likely consistent with diaphragm attenuation with bowel uptake artifact. USA, Angio 11/09- Severe LM disease, severe subtotal occlusion of a large branch OM1.    S/p CABG x2 11/09- LIMA-D1, SVG to OM1, OM2. GXT 8/10- 5.8 mets, 121 (80%) HR, normal ekg, cardiolite- small fixed inferior/basal defect with small area khurram-infarct ischemia extending into the mid ventricle. EF 67%.  4/25/13:adenosine cardiolyte stress test through [facility] with normal EF and no ischemia.   Problem list name updated by automated process. Provider to review       EDEN (obstructive sleep apnea) AHI 11.2 09/06/2016     Priority: Medium     PSH at [facility] 4/8/2008       Type 2 diabetes mellitus with diabetic chronic kidney disease (H) 10/30/2015     Priority: Medium     Altered mental status 12/31/2014     Priority: Medium     12/27/2014:episode confusion for 20 minutes.  Seen at [facility]/-hospitalized.  Head CT, MRI/MRA all normal.  PET cardiac perfusion stress test normal.  Neurology consult-diagnosis= possible hypoglycemia, migraine variant.  \"Acute ischemic cerebral event was excluded\".       CKD (chronic kidney disease) stage 3, GFR 30-59 ml/min 05/06/2014     Priority: Medium     9/9/2015:He has had two abnormal MAC in the past.        COPD " (chronic obstructive pulmonary disease) (H) 11/18/2013     Priority: Medium     PFT 11/15/13 results:FVC=59%, FEV1=54%, FEV1/FVC=92%.  His DLCO was 58%1. Spirometry: FEV1 moderately reduced. FVC moderately reduced. FEV1/FVC ratio reduced. 2. Bronchodilator Response: A 14% improvement is seen in FEV1 with bronchodilators. 3. Lung Volumes: Total lung capacity normal. Residual volume increased. Residual volume total lung capacity ratio increased. 4. DLCO: Moderately reduced.   INTERPRETATION: Moderate obstructive lung disease. Reversibility with bronchodilators is seen on testing today. Lung volumes show evidence for air trapping. DLCO is reduced, consistent with loss of capillary-alveolar exchange as in emphysema, pulmonary hypertension, chronic pulmonary embolus, pulmonary fibrosis or other pulmonary vascular disease. A concomitant restrictive lung disease process may be suspected on the basis of moderate reductions in FEV1 and FVC, with a fairly well-preserved FEV1/FVC ratio and a borderline low normal total lung capacity.   11/15/13:exercise oximetry did not show significant desaturation below 91%.  PFT February, 2014 results:FVC=54%, FEV1=48%, FEV1/FVC=64%.  DLCO=59%         Hyperlipidemia LDL goal <70 04/17/2013     Priority: Medium     Atrial fibrillation (H) 12/14/2009     Priority: Medium     After CABG 11/09       CVA (cerebral vascular accident) (H) 11/27/2009     Priority: Medium     Mild Broca's aphasia, confusion, right hand dysesthesia after angio. MRA new small area of restricted diffusion in the white matter of the medial right temporal lobe,  consistent with a recent small infarct. Speech problems resolved, still mild right hand problems.         Obstructive sleep apnea 04/14/2008     Priority: Medium     ESS 20/24. Sleep study [facility]: 4/8/08. total sleep time was 423.0 minutes. The sleep latency was normal at 9.7 minutes.  REM sleep latency was 161.5 minutes. Sleep efficiency was normal at 82.7%.  The sleep architecture was disrupted with frequent sleep stage changes and arousals. Snoring: Was reported as moderately loud. Lowest O2 saturation was 87.0%. This study is suggestive of mild sleep apnea, severe during REM sleep (21% TST).  PLM index was 0.0. EKG:  No significant cardiac arrhythmias were noted.  Problem list name updated by automated process. Provider to review       Hypertrophy of prostate without urinary obstruction      Priority: Medium     Elevated PSA. Bx negative 2004.  Problem list name updated by automated process. Provider to review       Health Care Home 08/11/2014     Priority: Low     State Tier Level:    Status:  Unable to re-connect  Care Coordinator:  Kecia Mills    See Letters for [facility] Care Plan  Date:  August 11, 2014             Advanced care planning/counseling discussion 09/24/2012     Priority: Low     Does not have a directive 9/12       Dyslexia 11/11/2010     Priority: Low     essentially unable to read       CTS (carpal tunnel syndrome) 05/12/2010     Priority: Low     EMG 5/10-  median neuropathy at the right wrist, moderate in severity electrically, right ulnar neuropathy localizing to the elbow, mild chronic right C6 radiculopathy without evidence for acute denervation.        Memory loss 05/03/2010     Priority: Low     MMSE 27/30 (0/3 attention recall, ?history of dylexia), PHQ9-16 7/09. Recommended neurocogntive testing, did not complete.  MMSE 23/30 6/10 (attention, county, if/and/buts).Neurocogntive evaluation  1/10- not suspicious for emerging dementia. Felt likely due hearing loss/dyslexia.        Benign paroxysmal vertigo 02/03/2009     Priority: Low     Admit 2/09. MRI/MRA head and neck negative.       Sensorineural hearing loss, bilateral 10/08/2007     Priority: Low     Esophageal reflux 11/06/2006     Priority: Low     PSORIASIS      Priority: Low     OVERACTIVE BLADDER      Priority: Low     PVR 84 2004.       Current Outpatient Prescriptions   Medication  Sig Dispense Refill     metFORMIN (GLUCOPHAGE) 1000 MG tablet Take 1 tablet (1,000 mg) by mouth 2 times daily (with meals) 60 tablet 0     lisinopril (PRINIVIL/ZESTRIL) 5 MG tablet Take 1 tablet (5 mg) by mouth daily 90 tablet 1     insulin aspart (NOVOLOG VIAL) 100 UNITS/ML injection 11 units three times daily 3 vial 1     insulin detemir (LEVEMIR VIAL) 100 UNIT/ML injection 30 units at bedtime 1 Month 0     metoprolol (LOPRESSOR) 25 MG tablet Take 1 tablet (25 mg) by mouth 2 times daily 60 tablet 1     finasteride (PROSCAR) 5 MG tablet Take 1 tablet (5 mg) by mouth daily 60 tablet 3     tamsulosin (FLOMAX) 0.4 MG 24 hr capsule Take 1 capsule (0.4 mg) by mouth daily (Needs follow-up appointment for this medication) 90 capsule 3     atorvastatin (LIPITOR) 80 MG tablet Take 1 tablet (80 mg) by mouth daily 30 tablet 11     omeprazole (PRILOSEC) 20 MG capsule Take 1 capsule (20 mg) by mouth daily 30 capsule 11     order for DME Equipment being ordered: CPAP  AIRSENSE 10  11-15 CM H2O  QUATTRO AIR SIZE MED  SN# [id number]  DN# [id number]       budesonide (PULMICORT) 0.5 MG/2ML nebulizer solution Take 2 mLs (0.5 mg) by nebulization 2 times daily 60 ampule 11     blood glucose monitoring (NO BRAND SPECIFIED) test strip 1 strip by In Vitro route 3 times daily Diagnosis:  250.00. 150 each 11     albuterol (2.5 MG/3ML) 0.083% nebulizer solution Take 1 vial (2.5 mg) by nebulization every 4 hours as needed for shortness of breath / dyspnea or wheezing 1 Box 11     order for DME Equipment being ordered: Nebulizer 1 Device 0     blood glucose monitoring (ONE TOUCH ULTRA 2) meter device kit Use to test blood sugars 4 times daily or as directed. 1 kit 0     arformoterol (BROVANA) 15 MCG/2ML NEBU Take 2 mLs (15 mcg) by nebulization 2 times daily 120 mL 11     order for DME Equipment being ordered: diabetic shoes 1 Box 0     nitroglycerin (NITROSTAT) 0.4 MG SL tablet Place 1 tablet (0.4 mg) under the tongue every 5 minutes as needed for  "chest pain (CALL 911 IF NOT IMPROVED AFTER THREE CONSECUTIVE DOSES) 25 tablet 0     order for DME Equipment being ordered: CPAP machine 1 Device 1     insulin syringe-needle U-100 (BD INSULIN SYRINGE ULTRAFINE) 31G X 5/16\" 1 ML Use one syringe 4 times daily or as directed. 100 each prn     aspirin 81 MG tablet Take by mouth daily       magnesium oxide (MAG-OX) 400 MG tablet Take 400 mg by mouth daily       cholecalciferol 2000 UNITS tablet Take 1 tablet by mouth daily. 90 tablet 3     fish oil-omega-3 fatty acids (FISH OIL) 1000 MG capsule Take 1 capsule by mouth 2 times daily. 180 capsule 3     cyanocobalamin (BL VITAMIN B-12) 500 MCG TABS Take 500 mcg by mouth daily. (Patient taking differently: Take 2,500 mcg by mouth daily ) 90 tablet 4     [DISCONTINUED] metoprolol (TOPROL-XL) 25 MG 24 hr tablet Take 1 tablet (25 mg) by mouth 2 times daily 180 tablet 3     [DISCONTINUED] LANTUS SOLOSTAR SOLN 100 UNIT/ML 26 units at bedtime 3 Month 0     ROS:General: POSITIVE for:,low energy.   Resp: POSITIVE for:, Hx COPD, using combo nebs twice daily I thinks is arformoterol plus budesonide.  Does not use albuteral inhaler very often.   CV: No chest pains or palpitations  GI: No nausea, vomiting,  heartburn, abdominal pain, diarrhea, constipation or change in bowel habits  : No urinary frequency or dysuria, bladder or kidney problems  Musculoskeletal: No significant muscle or joint pains  Psychiatric: No problems with anxiety, depression or mental health  He has had some numbness in both feet.      OBJECTIVE:Blood pressure 122/74, pulse 70, temperature 97.2  F (36.2  C), temperature source Tympanic, resp. rate 18, height 5' 8\" (1.727 m), weight 194 lb (87.998 kg). BMI=Body mass index is 29.5 kg/(m^2).  GENERAL APPEARANCE ADULT: Alert, no acute distress  NECK: No adenopathy,masses or thyromegaly  RESP: lungs clear to auscultation   CV: normal rate, regular rhythm, no murmur or gallop  ABDOMEN: soft, no organomegaly, masses or " tenderness  MS: extremities normal, no peripheral edema   Foot exam:normal DP and PT pulses, no trophic changes or ulcerative lesions.  He reports decreased sensation in toes but can feel the monofilament.  He also reports some numbness feelings.   Hammer toe left foot second toe. onychomycosis Great toe right foot.      A1C      7.5   11/7/2016  LDL       32   11/7/2016  LDL       93   5/5/2014  HDL       40   11/7/2016  TRIG      221   11/7/2016  CHOL      116   11/7/2016  MICROL       29   11/7/2016  No results found for this basename: microalbumin  TSH     1.78   11/7/2016  CR     1.16   11/9/2016  CR      1.3   11/5/2005    ASSESSMENT/PLAN:                                                      (E11.22,  N18.3,  Z79.4) Type 2 diabetes mellitus with stage 3 chronic kidney disease, with long-term current use of insulin (H)  (primary encounter diagnosis)  Comment: doing OK  Plan: metFORMIN (GLUCOPHAGE) 1000 MG tablet        REcheck in 3 months .    (I25.9) CORONARY ARTERY DISEASE  Comment: stable  Plan: metoprolol (LOPRESSOR) 25 MG tablet        No change in current treatment plan.     (J44.9) Chronic obstructive pulmonary disease, unspecified COPD type (H)  Comment: stable  Plan: No change in current treatment plan.        Diabetes Type II, A1c Controlled, insulin dependent   Associated with the following complications    Renal Complications:  CKD

## 2017-01-26 NOTE — NURSING NOTE
"Chief Complaint   Patient presents with     Diabetes     02/02/2017 has appointment to get his diabetic shoes. [phone] phone number and [phone] is the FAX.  [facility].     /74 mmHg  Pulse 70  Temp(Src) 97.2  F (36.2  C) (Tympanic)  Resp 18  Ht 5' 8\" (1.727 m)  Wt 194 lb (87.998 kg)  BMI 29.50 kg/m2 Estimated body mass index is 29.5 kg/(m^2) as calculated from the following:    Height as of this encounter: 5' 8\" (1.727 m).    Weight as of this encounter: 194 lb (87.998 kg).  bp completed using cuff size: regular      Health Maintenance that is potentially due pending provider review:          "

## 2017-02-02 ENCOUNTER — MEDICAL CORRESPONDENCE (OUTPATIENT)
Dept: HEALTH INFORMATION MANAGEMENT | Facility: CLINIC | Age: 77
End: 2017-02-02

## 2017-02-02 ENCOUNTER — TELEPHONE (OUTPATIENT)
Dept: FAMILY MEDICINE | Facility: CLINIC | Age: 77
End: 2017-02-02

## 2017-02-02 NOTE — TELEPHONE ENCOUNTER
Form signed faxed and sent to scanning.  [facility]  [facility] [place]

## 2017-02-06 DIAGNOSIS — J44.1 CHRONIC OBSTRUCTIVE PULMONARY DISEASE WITH ACUTE EXACERBATION (H): ICD-10-CM

## 2017-02-06 DIAGNOSIS — J44.9 CHRONIC OBSTRUCTIVE PULMONARY DISEASE, UNSPECIFIED COPD TYPE (H): ICD-10-CM

## 2017-02-06 DIAGNOSIS — J44.9 COPD (CHRONIC OBSTRUCTIVE PULMONARY DISEASE) (H): Primary | ICD-10-CM

## 2017-02-07 ENCOUNTER — TELEPHONE (OUTPATIENT)
Dept: FAMILY MEDICINE | Facility: CLINIC | Age: 77
End: 2017-02-07

## 2017-02-07 DIAGNOSIS — N18.30 TYPE 2 DIABETES MELLITUS WITH STAGE 3 CHRONIC KIDNEY DISEASE, WITH LONG-TERM CURRENT USE OF INSULIN (H): Primary | ICD-10-CM

## 2017-02-07 DIAGNOSIS — Z79.4 TYPE 2 DIABETES MELLITUS WITH STAGE 3 CHRONIC KIDNEY DISEASE, WITH LONG-TERM CURRENT USE OF INSULIN (H): Primary | ICD-10-CM

## 2017-02-07 DIAGNOSIS — E11.22 TYPE 2 DIABETES MELLITUS WITH STAGE 3 CHRONIC KIDNEY DISEASE, WITH LONG-TERM CURRENT USE OF INSULIN (H): Primary | ICD-10-CM

## 2017-02-07 RX ORDER — ARFORMOTEROL TARTRATE 15 UG/2ML
15 SOLUTION RESPIRATORY (INHALATION) 2 TIMES DAILY
Qty: 120 ML | Refills: 11 | Status: SHIPPED | OUTPATIENT
Start: 2017-02-07 | End: 2018-02-23

## 2017-02-07 NOTE — TELEPHONE ENCOUNTER
[facility]         Last Written Prescription Date: 12/19/2016      Pt says that he would like pens instead of vials.  He will be out of insulin today.  Last Fill Quantity: 1 month, # refills: 0  Last Office Visit with [facility], [facility] or [facility] prescribing provider:  1/26/2017        BP Readings from Last 3 Encounters:   01/26/17 122/74   01/09/17 119/71   12/15/16 148/85     MICROL       29   11/7/2016  No results found for this basename: microalbumin  CREATININE   Date Value Ref Range Status   11/09/2016 1.16 0.66 - 1.25 mg/dL Final   11/05/2005 1.3 0.6 - 1.3 mg/dL    ]  GFR ESTIMATE   Date Value Ref Range Status   11/09/2016 61 >60 mL/min/1.7m2 Final     Comment:     Non  GFR Calc   11/07/2016 65 >60 mL/min/1.7m2 Final     Comment:     Non  GFR Calc   12/17/2015 58* >60 mL/min/1.7m2 Final     Comment:     Non  GFR Calc     GFR ESTIMATE IF BLACK   Date Value Ref Range Status   11/09/2016 74 >60 mL/min/1.7m2 Final     Comment:      GFR Calc   11/07/2016 78 >60 mL/min/1.7m2 Final     Comment:      GFR Calc   12/17/2015 70 >60 mL/min/1.7m2 Final     Comment:      GFR Calc     CHOL      116   11/7/2016  HDL       40   11/7/2016  LDL       32   11/7/2016  LDL       93   5/5/2014  TRIG      221   11/7/2016  CHOLHDLRATIO      4.0   2/23/2015  AST       24   11/9/2016  ALT       31   11/9/2016  A1C      7.5   11/7/2016  A1C      8.2   4/20/2016  A1C      8.1   9/9/2015  A1C      8.1   1/29/2015  A1C      8.1   9/5/2014  POTASSIUM   Date Value Ref Range Status   11/09/2016 3.9 3.4 - 5.3 mmol/L Final

## 2017-02-22 DIAGNOSIS — Z79.4 TYPE 2 DIABETES MELLITUS WITH STAGE 3 CHRONIC KIDNEY DISEASE, WITH LONG-TERM CURRENT USE OF INSULIN (H): ICD-10-CM

## 2017-02-22 DIAGNOSIS — E11.22 TYPE 2 DIABETES MELLITUS WITH STAGE 3 CHRONIC KIDNEY DISEASE, WITH LONG-TERM CURRENT USE OF INSULIN (H): ICD-10-CM

## 2017-02-22 DIAGNOSIS — N18.30 TYPE 2 DIABETES MELLITUS WITH STAGE 3 CHRONIC KIDNEY DISEASE, WITH LONG-TERM CURRENT USE OF INSULIN (H): ICD-10-CM

## 2017-02-22 NOTE — TELEPHONE ENCOUNTER
[facility] called. Patient is asking for his Novalog vials to be changed to Flex Pens. He has changed all of his other ones to pens and wants this changed too. He will need to  tomorrow at the pharamacy.

## 2017-03-01 ENCOUNTER — OFFICE VISIT (OUTPATIENT)
Dept: FAMILY MEDICINE | Facility: CLINIC | Age: 77
End: 2017-03-01
Payer: COMMERCIAL

## 2017-03-01 VITALS
DIASTOLIC BLOOD PRESSURE: 70 MMHG | HEART RATE: 70 BPM | BODY MASS INDEX: 29.7 KG/M2 | HEIGHT: 68 IN | WEIGHT: 196 LBS | SYSTOLIC BLOOD PRESSURE: 130 MMHG | TEMPERATURE: 98.2 F

## 2017-03-01 DIAGNOSIS — E11.22 TYPE 2 DIABETES MELLITUS WITH STAGE 3 CHRONIC KIDNEY DISEASE, WITH LONG-TERM CURRENT USE OF INSULIN (H): ICD-10-CM

## 2017-03-01 DIAGNOSIS — J45.40 MODERATE PERSISTENT ASTHMA WITHOUT COMPLICATION: ICD-10-CM

## 2017-03-01 DIAGNOSIS — N18.30 TYPE 2 DIABETES MELLITUS WITH STAGE 3 CHRONIC KIDNEY DISEASE, WITH LONG-TERM CURRENT USE OF INSULIN (H): ICD-10-CM

## 2017-03-01 DIAGNOSIS — K52.9 CHRONIC DIARRHEA: Primary | ICD-10-CM

## 2017-03-01 DIAGNOSIS — Z79.4 TYPE 2 DIABETES MELLITUS WITH STAGE 3 CHRONIC KIDNEY DISEASE, WITH LONG-TERM CURRENT USE OF INSULIN (H): ICD-10-CM

## 2017-03-01 PROCEDURE — 99213 OFFICE O/P EST LOW 20 MIN: CPT | Performed by: FAMILY MEDICINE

## 2017-03-01 RX ORDER — BUDESONIDE 0.5 MG/2ML
0.5 INHALANT ORAL 2 TIMES DAILY
Qty: 60 AMPULE | Refills: 11 | Status: SHIPPED | OUTPATIENT
Start: 2017-03-01 | End: 2017-05-30

## 2017-03-01 NOTE — NURSING NOTE
"Chief Complaint   Patient presents with     Bowel Problems       Initial /70  Pulse 70  Temp 98.2  F (36.8  C) (Tympanic)  Ht 5' 8\" (1.727 m)  Wt 196 lb (88.9 kg)  BMI 29.8 kg/m2 Estimated body mass index is 29.8 kg/(m^2) as calculated from the following:    Height as of this encounter: 5' 8\" (1.727 m).    Weight as of this encounter: 196 lb (88.9 kg).  Medication Reconciliation: complete    Health Maintenance that is potentially due pending provider review:          "

## 2017-03-01 NOTE — PROGRESS NOTES
"  SUBJECTIVE:                                                    Alvaro Horton is a 76 year old male who presents to clinic today for the following health issues:  Chief Complaint   Patient presents with     Bowel Problems        Concern - Bowel problem     Onset:  On and off for past 1.5 years    Description:   Gets up in the AM may go to have BM up to 5 x in an hour. Will have episodes of stool leakage.     Intensity: mild, moderate    Progression of Symptoms:  worsening    Accompanying Signs & Symptoms:  Dark stools at times.        Previous history of similar problem:   no    Precipitating factors:   Worsened by: ?    Alleviating factors:  Improved by: ?       Therapies Tried and outcome: Pepto ???    Frequent stools every morning.  6-7 stools every day in AM.  Thin and dark colored stools.  Stools can be loose, not often watery.  Can get some stools later in the day as well.  Up to a dozen stools a day. Worries about losing control with passing gas.   This has occurred intermittently until the past 2-3 months when it has been most every day.   No abdominal pain.  Has a \"lot of gas\".  NO abdominal cramps.   No nausea or vomiting.   No blood in stools.  Stools can be dark.   Last colonoscopy in 2006  No foreign travel.  No raw milk.      Patient Active Problem List   Diagnosis     Chronic ischemic heart disease     Moderate Persistent Asthma     PSORIASIS     Hypertrophy of prostate without urinary obstruction     OVERACTIVE BLADDER     BAKERS LUNG     Hypertension goal BP (blood pressure) < 140/90     Esophageal reflux     Sensorineural hearing loss, bilateral     Obstructive sleep apnea     Benign paroxysmal vertigo     CVA (cerebral vascular accident) (H)     Atrial fibrillation (H)     Memory loss     CTS (carpal tunnel syndrome)     Dyslexia     Advanced care planning/counseling discussion     Hyperlipidemia LDL goal <70     COPD (chronic obstructive pulmonary disease) (H)     CKD (chronic kidney " "disease) stage 3, GFR 30-59 ml/min     Health Care Home     Altered mental status     Type 2 diabetes mellitus with diabetic chronic kidney disease (H)     EDEN (obstructive sleep apnea) AHI 11.2      ROS:General: POSITIVE for:, decreased appetite.  Energy has been OK.   Resp: POSITIVE for:, chronic lung disease has not changed.   CV: No chest pains or palpitations  : POSITIVE for:, urinary frequency, unchanged.   Musculoskeletal: POSITIVE  for:, arthritis in hands.   Lab Results   Component Value Date    A1C 7.5 11/07/2016          OBJECTIVE:Blood pressure 130/70, pulse 70, temperature 98.2  F (36.8  C), temperature source Tympanic, height 5' 8\" (1.727 m), weight 196 lb (88.9 kg). BMI=Body mass index is 29.8 kg/(m^2).  GENERAL APPEARANCE ADULT: Alert, no acute distress, overweight  NECK: No adenopathy,masses or thyromegaly  RESP: lungs clear to auscultation   CV: normal rate, regular rhythm, no murmur or gallop  ABDOMEN: soft, no organomegaly, masses or tenderness, bowel sounds normal     ASSESSMENT:   (K52.9) Chronic diarrhea  (primary encounter diagnosis)  Comment: uncertain cause.  An infection is possible from bacterial germ, parasite.  metformin can sometimes cause diarrhea.  There could be some inflammatory bowel problem like colitis.    Plan: Ova and Parasite Exam Routine, Enteric Bacteria        and Virus Panel by PARI Stool, Giardia antigen,         Clostridium difficile toxin B PCR        Check stool tests for infection.   Try stopping the metformin for 7-10 days.   If tests add negative and no change in the diarrhea with stopping metformin, I recommend another colonoscopy to see if there is some tumor or inflammatory condition.  We can help you schedule this exam.   Let me know if diarrhea is continuing in the next 1-2 weeks.   A coule things you can try for bowels;  Sometimes Metamucil can help.  One tablespoon of sugar free powder in liquid once or twice daily.   Try loperamide (Imodium-brand name) 2 " pills at first loose stool, then 1 after each loose stool up to maximum of 8 pills a day for symptom relief.  This over the counter medication is available without a prescription.        (J45.40) Moderate persistent asthma without complication  Comment:   Plan: budesonide (PULMICORT) 0.5 MG/2ML neb solution        refills    (E11.22,  N18.3,  Z79.4) Type 2 diabetes mellitus with stage 3 chronic kidney disease, with long-term current use of insulin (H)  Comment:   Plan: insulin detemir (LEVEMIR FLEXPEN/FLEXTOUCH) 100        UNIT/ML injection        Refills.

## 2017-03-01 NOTE — MR AVS SNAPSHOT
After Visit Summary   3/1/2017    Alvaro Horton    MRN: [id number]           Patient Information     Date Of Birth          1940        Visit Information        Provider Department      3/1/2017 9:40 AM Be Carreno MD [facility]        Today's Diagnoses     Chronic diarrhea    -  1    Moderate persistent asthma without complication        Type 2 diabetes mellitus with stage 3 chronic kidney disease, with long-term current use of insulin (H)          Care Instructions    [facility] [phone] check with insurance to see if they cover colonoscopy there.     ASSESSMENT:   (K52.9) Chronic diarrhea  (primary encounter diagnosis)  Comment: uncertain cause.  An infection is possible from bacterial germ, parasite.  metformin can sometimes cause diarrhea.  There could be some inflammatory bowel problem like colitis.    Plan: Ova and Parasite Exam Routine, Enteric Bacteria        and Virus Panel by PARI Stool, Giardia antigen,         Clostridium difficile toxin B PCR        Check stool tests for infection.   Try stopping the metformin for 7-10 days.   If tests add negative and no change in the diarrhea with stopping metformin, I recommend another colonoscopy to see if there is some tumor or inflammatory condition.  We can help you schedule this exam.   Let me know if diarrhea is continuing in the next 1-2 weeks.   A coule things you can try for bowels;  Sometimes Metamucil can help.  One tablespoon of sugar free powder in liquid once or twice daily.   Try loperamide (Imodium-brand name) 2 pills at first loose stool, then 1 after each loose stool up to maximum of 8 pills a day for symptom relief.  This over the counter medication is available without a prescription.        (J45.40) Moderate persistent asthma without complication  Comment:   Plan: budesonide (PULMICORT) 0.5 MG/2ML neb solution        refills    (E11.22,  N18.3,  Z79.4) Type 2 diabetes mellitus with  "stage 3 chronic kidney disease, with long-term current use of insulin (H)  Comment:   Plan: insulin detemir (LEVEMIR FLEXPEN/FLEXTOUCH) 100        UNIT/ML injection        Refills.         Follow-ups after your visit        Future tests that were ordered for you today     Open Future Orders        Priority Expected Expires Ordered    Ova and Parasite Exam Routine Routine  3/1/2018 3/1/2017    Enteric Bacteria and Virus Panel by PARI Stool Routine  3/1/2018 3/1/2017    Giardia antigen Routine  3/1/2018 3/1/2017    Clostridium difficile toxin B PCR Routine  4/1/2017 3/1/2017            Who to contact     If you have questions or need follow up information about today's clinic visit or your schedule please contact [facility] directly at [phone].  Normal or non-critical lab and imaging results will be communicated to you by MyChart, letter or phone within 4 business days after the clinic has received the results. If you do not hear from us within 7 days, please contact the clinic through mygolahart or phone. If you have a critical or abnormal lab result, we will notify you by phone as soon as possible.  Submit refill requests through Flasma or call your pharmacy and they will forward the refill request to us. Please allow 3 business days for your refill to be completed.          Additional Information About Your Visit        mygolahart Information     Flasma lets you send messages to your doctor, view your test results, renew your prescriptions, schedule appointments and more. To sign up, go to [email or web address] . Click on \"Log in\" on the left side of the screen, which will take you to the Welcome page. Then click on \"Sign up Now\" on the right side of the page.     You will be asked to enter the access code listed below, as well as some personal information. Please follow the directions to create your username and password.     Your access code is: [email or web address]  Expires: 3/15/2017  4:02 PM   " "  Your access code will  in 90 days. If you need help or a new code, please call your [place] clinic or [phone].        Care EveryWhere ID     This is your Care EveryWhere ID. This could be used by other organizations to access your [place] medical records  [id number]        Your Vitals Were     Pulse Temperature Height BMI (Body Mass Index)          70 98.2  F (36.8  C) (Tympanic) 5' 8\" (1.727 m) 29.8 kg/m2         Blood Pressure from Last 3 Encounters:   17 130/70   17 122/74   17 119/71    Weight from Last 3 Encounters:   17 196 lb (88.9 kg)   17 194 lb (88 kg)   17 194 lb 9.6 oz (88.3 kg)                 Today's Medication Changes          These changes are accurate as of: 3/1/17 10:19 AM.  If you have any questions, ask your nurse or doctor.               These medicines have changed or have updated prescriptions.        Dose/Directions    cyanocobalamin 500 MCG Tabs   Commonly known as:  BL VITAMIN B-12   This may have changed:  how much to take   Used for:  Memory loss        Dose:  500 mcg   Take 500 mcg by mouth daily.   Quantity:  90 tablet   Refills:  4         Stop taking these medicines if you haven't already. Please contact your care team if you have questions.     magnesium oxide 400 MG tablet   Commonly known as:  MAG-OX                Where to get your medicines      These medications were sent to [facility] [address] [place] [address] - [address]  [facility] [address]     Phone:  [phone]     budesonide 0.5 MG/2ML neb solution    insulin detemir 100 UNIT/ML injection                Primary Care Provider Office Phone # Fax #    Be Carreno [phone]       [facility] [id number] [facility] [address]        Thank you!     Thank you for choosing [facility]  for your care. Our goal is always to provide you with excellent care. Hearing back from our patients is " one way we can continue to improve our services. Please take a few minutes to complete the written survey that you may receive in the mail after your visit with us. Thank you!             Your Updated Medication List - Protect others around you: Learn how to safely use, store and throw away your medicines at www.disposemymeds.org.          This list is accurate as of: 3/1/17 10:19 AM.  Always use your most recent med list.                   Brand Name Dispense Instructions for use    albuterol (2.5 MG/3ML) 0.083% neb solution     1 Box    Take 1 vial (2.5 mg) by nebulization every 4 hours as needed for shortness of breath / dyspnea or wheezing       arformoterol 15 MCG/2ML Nebu neb solution    BROVANA    120 mL    Take 2 mLs (15 mcg) by nebulization 2 times daily       aspirin 81 MG tablet      Take by mouth daily       atorvastatin 80 MG tablet    LIPITOR    30 tablet    Take 1 tablet (80 mg) by mouth daily       blood glucose monitoring meter device kit     1 kit    Use to test blood sugars 4 times daily or as directed.       blood glucose monitoring test strip    no brand specified    150 each    1 strip by In Vitro route 3 times daily Diagnosis:  250.00.       budesonide 0.5 MG/2ML neb solution    PULMICORT    60 ampule    Take 2 mLs (0.5 mg) by nebulization 2 times daily       cholecalciferol 2000 UNITS tablet     90 tablet    Take 1 tablet by mouth daily.       cyanocobalamin 500 MCG Tabs    BL VITAMIN B-12    90 tablet    Take 500 mcg by mouth daily.       finasteride 5 MG tablet    PROSCAR    60 tablet    Take 1 tablet (5 mg) by mouth daily       fish oil-omega-3 fatty acids 1000 MG capsule     180 capsule    Take 1 capsule by mouth 2 times daily.       insulin aspart 100 UNIT/ML injection    NovoLOG FLEXPEN    30 mL    11 units before breakfast, 11 units before lunch, 11 units before dinner       insulin detemir 100 UNIT/ML injection    LEVEMIR FLEXPEN/FLEXTOUCH    15 mL    Inject 30 Units Subcutaneous At  "Bedtime       insulin syringe-needle U-100 31G X 5/16\" 1 ML    BD insulin syringe ULTRAFINE    100 each    Use one syringe 4 times daily or as directed.       lisinopril 5 MG tablet    PRINIVIL/ZESTRIL    90 tablet    Take 1 tablet (5 mg) by mouth daily       metFORMIN 1000 MG tablet    GLUCOPHAGE    60 tablet    Take 1 tablet (1,000 mg) by mouth 2 times daily (with meals)       metoprolol 25 MG tablet    LOPRESSOR    60 tablet    Take 1 tablet (25 mg) by mouth 2 times daily       nitroglycerin 0.4 MG sublingual tablet    NITROSTAT    25 tablet    Place 1 tablet (0.4 mg) under the tongue every 5 minutes as needed for chest pain (CALL 911 IF NOT IMPROVED AFTER THREE CONSECUTIVE DOSES)       omeprazole 20 MG CR capsule    priLOSEC    30 capsule    Take 1 capsule (20 mg) by mouth daily       order for DME      Equipment being ordered: CPAP AIRSENSE 10 11-15 CM H2O QUATTRO AIR SIZE MED SN# [id number]  DN# [id number]       * order for DME     1 Device    Equipment being ordered: CPAP machine       * order for DME     1 Box    Equipment being ordered: diabetic shoes       * order for DME     1 Device    Equipment being ordered: Nebulizer       tamsulosin 0.4 MG capsule    FLOMAX    90 capsule    Take 1 capsule (0.4 mg) by mouth daily (Needs follow-up appointment for this medication)       * Notice:  This list has 3 medication(s) that are the same as other medications prescribed for you. Read the directions carefully, and ask your doctor or other care provider to review them with you.      "

## 2017-03-01 NOTE — PATIENT INSTRUCTIONS
[facility] [phone] check with insurance to see if they cover colonoscopy there.     ASSESSMENT:   (K52.9) Chronic diarrhea  (primary encounter diagnosis)  Comment: uncertain cause.  An infection is possible from bacterial germ, parasite.  metformin can sometimes cause diarrhea.  There could be some inflammatory bowel problem like colitis.    Plan: Ova and Parasite Exam Routine, Enteric Bacteria        and Virus Panel by PARI Stool, Giardia antigen,         Clostridium difficile toxin B PCR        Check stool tests for infection.   Try stopping the metformin for 7-10 days.   If tests add negative and no change in the diarrhea with stopping metformin, I recommend another colonoscopy to see if there is some tumor or inflammatory condition.  We can help you schedule this exam.   Let me know if diarrhea is continuing in the next 1-2 weeks.   A coule things you can try for bowels;  Sometimes Metamucil can help.  One tablespoon of sugar free powder in liquid once or twice daily.   Try loperamide (Imodium-brand name) 2 pills at first loose stool, then 1 after each loose stool up to maximum of 8 pills a day for symptom relief.  This over the counter medication is available without a prescription.        (J45.40) Moderate persistent asthma without complication  Comment:   Plan: budesonide (PULMICORT) 0.5 MG/2ML neb solution        refills    (E11.22,  N18.3,  Z79.4) Type 2 diabetes mellitus with stage 3 chronic kidney disease, with long-term current use of insulin (H)  Comment:   Plan: insulin detemir (LEVEMIR FLEXPEN/FLEXTOUCH) 100        UNIT/ML injection        Refills.

## 2017-03-02 DIAGNOSIS — K52.9 CHRONIC DIARRHEA: ICD-10-CM

## 2017-03-02 LAB
C DIFF TOX B STL QL: NORMAL
CAMPYLOBACTER GROUP BY NAT: NOT DETECTED
ENTERIC PATHOGEN COMMENT: NORMAL
NOROVIRUS I AND II BY NAT: NOT DETECTED
ROTAVIRUS A BY NAT: NOT DETECTED
SALMONELLA SPECIES BY NAT: NOT DETECTED
SHIGA TOXIN 1 GENE BY NAT: NOT DETECTED
SHIGA TOXIN 2 GENE BY NAT: NOT DETECTED
SHIGELLA SP+EIEC IPAH STL QL NAA+PROBE: NOT DETECTED
SPECIMEN SOURCE: NORMAL
VIBRIO GROUP BY NAT: NOT DETECTED
YERSINIA ENTEROCOLITICA BY NAT: NOT DETECTED

## 2017-03-02 PROCEDURE — 87329 GIARDIA AG IA: CPT | Performed by: FAMILY MEDICINE

## 2017-03-02 PROCEDURE — 87506 IADNA-DNA/RNA PROBE TQ 6-11: CPT | Performed by: FAMILY MEDICINE

## 2017-03-02 PROCEDURE — 87493 C DIFF AMPLIFIED PROBE: CPT | Performed by: FAMILY MEDICINE

## 2017-03-02 PROCEDURE — 87177 OVA AND PARASITES SMEARS: CPT | Performed by: FAMILY MEDICINE

## 2017-03-02 PROCEDURE — 87209 SMEAR COMPLEX STAIN: CPT | Performed by: FAMILY MEDICINE

## 2017-03-02 NOTE — LETTER
[facility]  [address]  [facility] [address]  Phone: [phone]  Fax: [phone]    March 7, 2017    Alvaro Horton  [address] [facility] [place] [address]          Dear Mr. Horton,    The results of your recent lab tests: Tests for infection in bowels are negative.  PLAN: as we discussed in clinic.   Try stopping the metformin for 7-10 days.   If tests are negative and no change in the diarrhea with stopping metformin, I recommend another colonoscopy to see if there is some tumor or inflammatory condition. We can help you schedule this exam. Let me know if diarrhea is continuing in the next 1-2 weeks.   A coule things you can try for bowels;   Sometimes Metamucil can help. One tablespoon of sugar free powder in liquid once or twice daily.   Try loperamide (Imodium-brand name) 2 pills at first loose stool, then 1 after each loose stool up to maximum of 8 pills a day for symptom relief. This over the counter medication is available without a prescription. . Enclosed is a copy of these results.  If you have any further questions or problems, please contact our office.  Results for orders placed or performed in visit on 03/02/17   Ova and Parasite Exam Routine   Result Value Ref Range    Specimen Description Feces     Ova and Parasite Exam       Routine parasitology exam negative  Cryptosporidium, Cyclospora, and Microsporidia are not readily detected by this   method. A single negative specimen does not rule out parasitic infection.      Micro Report Status FINAL 03/03/2017    Enteric Bacteria and Virus Panel by PARI Stool   Result Value Ref Range    Campylobacter group by PARI Not Detected NDET    Salmonella species by PARI Not Detected NDET    Shigella species by PARI Not Detected NDET    Vibrio group by PARI Not Detected NDET    Rotavirus A by PARI Not Detected NDET    Shiga toxin 1 gene by PARI Not Detected NDET    Shiga toxin 2 gene by PARI Not Detected NDET    Norovirus  I and II by PARI Not Detected NDET    Yersinia enterocolitica by PARI Not Detected NDET    Enteric pathogen comment       Testing performed by multiplexed, qualitative PCR using the [employer]   Enteric Pathogens Nucleic Acid Test. Results should not be used as the sole   basis for diagnosis, treatment, or other patient management decisions.   Positive results do not rule out co-infection with other organisms that are not   detected by this test, and may not be the sole or definitive cause of patient   illness.   Negative results in the setting of clinical illness compatible with   gastroenteritis may be due to infection by pathogens that are not detected by   this test or non-infectious causes such as ulcerative colitis, irritable bowel   syndrome, or Crohn's disease.   Note: Shiga toxin producing E. coli (STEC) typically harbor one or both genes   that encode for Shiga toxins 1 and 2.     Giardia antigen   Result Value Ref Range    Specimen Description Feces     Giardia Antigen Test       Negative for Giardia lamblia specific antigen by immunoassay.    Micro Report Status FINAL 03/03/2017    Clostridium difficile toxin B PCR   Result Value Ref Range    Specimen Description Feces     C Diff Toxin B PCR  NEG     Negative  Negative: Clostridium difficile target DNA sequences NOT detected, presumed   negative for Clostridium difficile toxin B or the number of bacteria present   may be below the limit of detection for the test.   FDA approved assay performed using [employer] GeneXpert real-time PCR.   A negative result does not exclude actual disease due to Clostridium difficile   and may be due to improper collection, handling and storage of the specimen or   the number of organisms in the specimen is below the detection limit of the   assay.         Sincerely,      Be Carreno MD/ aldair

## 2017-03-03 LAB
G LAMBLIA AG STL QL IA: NORMAL
MICRO REPORT STATUS: NORMAL
MICRO REPORT STATUS: NORMAL
O+P STL MICRO: NORMAL
SPECIMEN SOURCE: NORMAL
SPECIMEN SOURCE: NORMAL

## 2017-03-03 NOTE — PROGRESS NOTES
Please call.  Tests for infection in bowels are negative.     PLAN: as we discussed.   Try stopping the metformin for 7-10 days.   If tests add negative and no change in the diarrhea with stopping metformin, I recommend another colonoscopy to see if there is some tumor or inflammatory condition. We can help you schedule this exam. Let me know if diarrhea is continuing in the next 1-2 weeks.   A coule things you can try for bowels;  Sometimes Metamucil can help. One tablespoon of sugar free powder in liquid once or twice daily.   Try loperamide (Imodium-brand name) 2 pills at first loose stool, then 1 after each loose stool up to maximum of 8 pills a day for symptom relief. This over the counter medication is available without a prescription.

## 2017-03-07 NOTE — PROGRESS NOTES
Patient called back for results.  Advised patient of results and recommendations.  Patient understood.  Lab results with recommendations mailed to patient.  Shavonne CENTENO MA

## 2017-04-10 ENCOUNTER — OFFICE VISIT (OUTPATIENT)
Dept: FAMILY MEDICINE | Facility: CLINIC | Age: 77
End: 2017-04-10
Payer: COMMERCIAL

## 2017-04-10 VITALS
TEMPERATURE: 97.6 F | BODY MASS INDEX: 29.4 KG/M2 | HEIGHT: 68 IN | DIASTOLIC BLOOD PRESSURE: 70 MMHG | SYSTOLIC BLOOD PRESSURE: 120 MMHG | WEIGHT: 194 LBS | HEART RATE: 60 BPM

## 2017-04-10 DIAGNOSIS — K52.9 CHRONIC DIARRHEA: Primary | ICD-10-CM

## 2017-04-10 PROCEDURE — 99213 OFFICE O/P EST LOW 20 MIN: CPT | Performed by: FAMILY MEDICINE

## 2017-04-10 RX ORDER — METRONIDAZOLE 500 MG/1
500 TABLET ORAL 3 TIMES DAILY
Qty: 30 TABLET | Refills: 0 | Status: SHIPPED | OUTPATIENT
Start: 2017-04-10 | End: 2017-04-20

## 2017-04-10 NOTE — PROGRESS NOTES
SUBJECTIVE:                                                    Alvaro Horton is a 76 year old male who presents to clinic today for the following health issues:  Chief Complaint   Patient presents with     Medication Problem     Medication Reconciliation     No Metformin since 03/01/2017      Last clinic visit: 3/1/2017.  Has questions regarding the medication, Metformin was stopped and hasn't seen much improvement in the BM's still needs depends. Still has a lot of gas.     He notes slight improvement in diarrhea since last visit.   Continues to have 4 stools right away in the AM and perhaps a dozen stools per day.  Can have mucous.    Difficulty holding stools at times.  Wearing Depends.   NO pain or cramps.  No nausea or vomiting   He stopped metformin.  Blood glucose increased.   Metamucil did not help.   He has not tried Imodium.   Onset of symptoms: in the past couple months.  He had intermittent problems in the past year with urgency and sometimes diarrhea.    No blood.    Stools range from formed to watery  He has been on omeprazole daily.     Patient Active Problem List   Diagnosis     Chronic ischemic heart disease     Moderate Persistent Asthma     PSORIASIS     Hypertrophy of prostate without urinary obstruction     OVERACTIVE BLADDER     BAKERS LUNG     Hypertension goal BP (blood pressure) < 140/90     Esophageal reflux     Sensorineural hearing loss, bilateral     Obstructive sleep apnea     Benign paroxysmal vertigo     CVA (cerebral vascular accident) (H)     Atrial fibrillation (H)     Memory loss     CTS (carpal tunnel syndrome)     Dyslexia     Advanced care planning/counseling discussion     Hyperlipidemia LDL goal <70     COPD (chronic obstructive pulmonary disease) (H)     CKD (chronic kidney disease) stage 3, GFR 30-59 ml/min     Health Care Home     Altered mental status     Type 2 diabetes mellitus with diabetic chronic kidney disease (H)     EDEN (obstructive sleep apnea) AHI 11.2  "     OBJECTIVE:Blood pressure 120/70, pulse 60, temperature 97.6  F (36.4  C), height 5' 8\" (1.727 m), weight 194 lb (88 kg). BMI=Body mass index is 29.5 kg/(m^2).  GENERAL APPEARANCE ADULT: Alert, no acute distress  CV: normal rate, regular rhythm, no murmur or gallop  ABDOMEN: soft, no organomegaly, masses or tenderness, bowel sounds normal     ASSESSMENT:   (K52.9) Chronic diarrhea  (primary encounter diagnosis)  Comment: persistent diarrhea.   Stool tests were negative.  Metamucil has not helped.  Doubt other med related cause.  Possible inflammatory bowel but he has not had in the past.   Plan: metroNIDAZOLE (FLAGYL) 500 MG tablet        Try a 10 day course of metronidazole in case there are bacterial C dif germs present.   Restart the metformin at 1000mg twice daily as you were taking in the past.     Try loperamide (Imodium-brand name) 2 pills at first loose stool, then 1 after each loose stool up to maximum of 8 pills a day for symptom relief.  This over the counter medication is available without a prescription.      If stools are not clearing up in the next 2-4 weeks, I would recommend colonoscopy to look at the lining of the colon and to look for infection or inflammatory bowel problem.                 ================================================  Clinic notes from 3/1/2017:  Frequent stools every morning. 6-7 stools every day in AM. Thin and dark colored stools. Stools can be loose, not often watery. Can get some stools later in the day as well. Up to a dozen stools a day. Worries about losing control with passing gas.   This has occurred intermittently until the past 2-3 months when it has been most every day.   No abdominal pain. Has a \"lot of gas\". NO abdominal cramps.   No nausea or vomiting.   No blood in stools. Stools can be dark.   Last colonoscopy in 2006  No foreign travel. No raw milk.     ASSESSMENT:   (K52.9) Chronic diarrhea (primary encounter diagnosis)  Comment: uncertain cause. An " infection is possible from bacterial germ, parasite. metformin can sometimes cause diarrhea. There could be some inflammatory bowel problem like colitis.   Plan: Ova and Parasite Exam Routine, Enteric Bacteria  and Virus Panel by PARI Stool, Giardia antigen,   Clostridium difficile toxin B PCR  Check stool tests for infection.   Try stopping the metformin for 7-10 days.   If tests add negative and no change in the diarrhea with stopping metformin, I recommend another colonoscopy to see if there is some tumor or inflammatory condition. We can help you schedule this exam. Let me know if diarrhea is continuing in the next 1-2 weeks.   A coule things you can try for bowels;  Sometimes Metamucil can help. One tablespoon of sugar free powder in liquid once or twice daily.   Try loperamide (Imodium-brand name) 2 pills at first loose stool, then 1 after each loose stool up to maximum of 8 pills a day for symptom relief. This over the counter medication is available without a prescription.

## 2017-04-10 NOTE — NURSING NOTE
"Chief Complaint   Patient presents with     Medication Problem     Medication Reconciliation     No Metformin since 03/01/2017       Initial /70  Pulse 60  Temp 97.6  F (36.4  C)  Ht 5' 8\" (1.727 m)  Wt 194 lb (88 kg)  BMI 29.5 kg/m2 Estimated body mass index is 29.5 kg/(m^2) as calculated from the following:    Height as of this encounter: 5' 8\" (1.727 m).    Weight as of this encounter: 194 lb (88 kg).  Medication Reconciliation: complete    Health Maintenance that is potentially due pending provider review:          "

## 2017-04-10 NOTE — MR AVS SNAPSHOT
After Visit Summary   4/10/2017    Alvaro Horton    MRN: [id number]           Patient Information     Date Of Birth          1940        Visit Information        Provider Department      4/10/2017 2:00 PM Be Carreno MD [facility]        Today's Diagnoses     Chronic diarrhea    -  1      Care Instructions    ASSESSMENT:   (K52.9) Chronic diarrhea  (primary encounter diagnosis)  Comment: persistent diarrhea.   Stool tests were negative.  Metamucil has not helped.   Plan: metroNIDAZOLE (FLAGYL) 500 MG tablet        Try a 10 day course of metronidazole in case there are bacterial C dif germs present.   Restart the metformin at 1000mg twice daily as you were taking in the past.     Try loperamide (Imodium-brand name) 2 pills at first loose stool, then 1 after each loose stool up to maximum of 8 pills a day for symptom relief.  This over the counter medication is available without a prescription.      If stools are not clearing up in the next 2-4 weeks, I would recommend colonoscopy to look at the lining of the colon and to look for infection or inflammatory bowel problem.         Follow-ups after your visit        Who to contact     If you have questions or need follow up information about today's clinic visit or your schedule please contact [facility] directly at [phone].  Normal or non-critical lab and imaging results will be communicated to you by MyChart, letter or phone within 4 business days after the clinic has received the results. If you do not hear from us within 7 days, please contact the clinic through MyChart or phone. If you have a critical or abnormal lab result, we will notify you by phone as soon as possible.  Submit refill requests through [employer] or call your pharmacy and they will forward the refill request to us. Please allow 3 business days for your refill to be completed.          Additional Information About Your Visit    "     MyChart Information     [employer] lets you send messages to your doctor, view your test results, renew your prescriptions, schedule appointments and more. To sign up, go to [email or web address] . Click on \"Log in\" on the left side of the screen, which will take you to the Welcome page. Then click on \"Sign up Now\" on the right side of the page.     You will be asked to enter the access code listed below, as well as some personal information. Please follow the directions to create your username and password.     Your access code is: [email or web address]  Expires: 2017  2:13 PM     Your access code will  in 90 days. If you need help or a new code, please call your [place] clinic or [phone].        Care EveryWhere ID     This is your Care EveryWhere ID. This could be used by other organizations to access your [place] medical records  [id number]        Your Vitals Were     Pulse Temperature Height BMI (Body Mass Index)          60 97.6  F (36.4  C) 5' 8\" (1.727 m) 29.5 kg/m2         Blood Pressure from Last 3 Encounters:   04/10/17 120/70   17 130/70   17 122/74    Weight from Last 3 Encounters:   04/10/17 194 lb (88 kg)   17 196 lb (88.9 kg)   17 194 lb (88 kg)              Today, you had the following     No orders found for display         Today's Medication Changes          These changes are accurate as of: 4/10/17  2:13 PM.  If you have any questions, ask your nurse or doctor.               Start taking these medicines.        Dose/Directions    metroNIDAZOLE 500 MG tablet   Commonly known as:  FLAGYL   Used for:  Chronic diarrhea   Started by:  Be Carreno MD        Dose:  500 mg   Take 1 tablet (500 mg) by mouth 3 times daily for 10 days   Quantity:  30 tablet   Refills:  0         These medicines have changed or have updated prescriptions.        Dose/Directions    cyanocobalamin 500 MCG Tabs   Commonly known as:  BL VITAMIN B-12   This may have changed:  how much " to take   Used for:  Memory loss        Dose:  500 mcg   Take 500 mcg by mouth daily.   Quantity:  90 tablet   Refills:  4            Where to get your medicines      Some of these will need a paper prescription and others can be bought over the counter.  Ask your nurse if you have questions.     Bring a paper prescription for each of these medications     metroNIDAZOLE 500 MG tablet                Primary Care Provider Office Phone # Fax #    Be Carreno [phone]       [facility] [address] [place] [id number]        Thank you!     Thank you for choosing [facility]  for your care. Our goal is always to provide you with excellent care. Hearing back from our patients is one way we can continue to improve our services. Please take a few minutes to complete the written survey that you may receive in the mail after your visit with us. Thank you!             Your Updated Medication List - Protect others around you: Learn how to safely use, store and throw away your medicines at www.disposemymeds.org.          This list is accurate as of: 4/10/17  2:13 PM.  Always use your most recent med list.                   Brand Name Dispense Instructions for use    albuterol (2.5 MG/3ML) 0.083% neb solution     1 Box    Take 1 vial (2.5 mg) by nebulization every 4 hours as needed for shortness of breath / dyspnea or wheezing       arformoterol 15 MCG/2ML Nebu neb solution    BROVANA    120 mL    Take 2 mLs (15 mcg) by nebulization 2 times daily       aspirin 81 MG tablet      Take by mouth daily       atorvastatin 80 MG tablet    LIPITOR    30 tablet    Take 1 tablet (80 mg) by mouth daily       blood glucose monitoring meter device kit     1 kit    Use to test blood sugars 4 times daily or as directed.       blood glucose monitoring test strip    no brand specified    150 each    1 strip by In Vitro route 3 times daily Diagnosis:  250.00.       budesonide 0.5 MG/2ML  "neb solution    PULMICORT    60 ampule    Take 2 mLs (0.5 mg) by nebulization 2 times daily       cholecalciferol 2000 UNITS tablet     90 tablet    Take 1 tablet by mouth daily.       cyanocobalamin 500 MCG Tabs    BL VITAMIN B-12    90 tablet    Take 500 mcg by mouth daily.       finasteride 5 MG tablet    PROSCAR    60 tablet    Take 1 tablet (5 mg) by mouth daily       fish oil-omega-3 fatty acids 1000 MG capsule     180 capsule    Take 1 capsule by mouth 2 times daily.       insulin aspart 100 UNIT/ML injection    NovoLOG FLEXPEN    30 mL    11 units before breakfast, 11 units before lunch, 11 units before dinner       insulin detemir 100 UNIT/ML injection    LEVEMIR FLEXPEN/FLEXTOUCH    15 mL    Inject 30 Units Subcutaneous At Bedtime       insulin syringe-needle U-100 31G X 5/16\" 1 ML    BD insulin syringe ULTRAFINE    100 each    Use one syringe 4 times daily or as directed.       lisinopril 5 MG tablet    PRINIVIL/ZESTRIL    90 tablet    Take 1 tablet (5 mg) by mouth daily       metoprolol 25 MG tablet    LOPRESSOR    60 tablet    Take 1 tablet (25 mg) by mouth 2 times daily       metroNIDAZOLE 500 MG tablet    FLAGYL    30 tablet    Take 1 tablet (500 mg) by mouth 3 times daily for 10 days       nitroglycerin 0.4 MG sublingual tablet    NITROSTAT    25 tablet    Place 1 tablet (0.4 mg) under the tongue every 5 minutes as needed for chest pain (CALL 911 IF NOT IMPROVED AFTER THREE CONSECUTIVE DOSES)       omeprazole 20 MG CR capsule    priLOSEC    30 capsule    Take 1 capsule (20 mg) by mouth daily       order for DME      Equipment being ordered: CPAP AIRSENSE 10 11-15 CM H2O RibbitO AIR [facility]# [id number]  DN# [id number]       * order for DME     1 Device    Equipment being ordered: CPAP machine       * order for DME     1 Box    Equipment being ordered: diabetic shoes       * order for DME     1 Device    Equipment being ordered: Nebulizer       tamsulosin 0.4 MG capsule    FLOMAX    90 capsule    Take 1 " capsule (0.4 mg) by mouth daily (Needs follow-up appointment for this medication)       * Notice:  This list has 3 medication(s) that are the same as other medications prescribed for you. Read the directions carefully, and ask your doctor or other care provider to review them with you.

## 2017-04-10 NOTE — PATIENT INSTRUCTIONS
ASSESSMENT:   (K52.9) Chronic diarrhea  (primary encounter diagnosis)  Comment: persistent diarrhea.   Stool tests were negative.  Metamucil has not helped.   Plan: metroNIDAZOLE (FLAGYL) 500 MG tablet        Try a 10 day course of metronidazole in case there are bacterial C dif germs present.   Restart the metformin at 1000mg twice daily as you were taking in the past.     Try loperamide (Imodium-brand name) 2 pills at first loose stool, then 1 after each loose stool up to maximum of 8 pills a day for symptom relief.  This over the counter medication is available without a prescription.      If stools are not clearing up in the next 2-4 weeks, I would recommend colonoscopy to look at the lining of the colon and to look for infection or inflammatory bowel problem.

## 2017-04-28 ENCOUNTER — TELEPHONE (OUTPATIENT)
Dept: FAMILY MEDICINE | Facility: CLINIC | Age: 77
End: 2017-04-28

## 2017-05-18 DIAGNOSIS — E11.22 TYPE 2 DIABETES MELLITUS WITH DIABETIC CHRONIC KIDNEY DISEASE (H): ICD-10-CM

## 2017-05-18 DIAGNOSIS — E11.9 TYPE 2 DIABETES, HBA1C GOAL < 8% (H): ICD-10-CM

## 2017-05-18 RX ORDER — BLOOD SUGAR DIAGNOSTIC
1 STRIP MISCELLANEOUS 3 TIMES DAILY
Qty: 90 EACH | Refills: 0 | Status: ON HOLD | OUTPATIENT
Start: 2017-05-18 | End: 2020-01-01

## 2017-05-18 NOTE — TELEPHONE ENCOUNTER
Pt says he needs his One Touch Ultra Blue test strips and also needs the needles that fit on his pens for injectiong Novolog and Levemir. He no longer uses the vials. He injects 3x daily  He wants to get these today.   One Touch Ultra Blue test strips      Last Written Prescription Date: 4/20/16  Last Fill Quantity: 150,  # refills: 11   Last Office Visit with [facility], [facility] or [facility] prescribing provider: 4/10/17                                             Insulin Pen Needles      Last Written Prescription Date: 5/14/14  Last Fill Quantity: 400,  # refills: 4   Last Office Visit with [facility] or [facility] prescribing provider: 4/10/17

## 2017-05-30 DIAGNOSIS — J45.40 MODERATE PERSISTENT ASTHMA WITHOUT COMPLICATION: ICD-10-CM

## 2017-05-30 RX ORDER — BUDESONIDE 0.5 MG/2ML
0.5 INHALANT ORAL 2 TIMES DAILY
Qty: 60 AMPULE | Refills: 11 | Status: SHIPPED | OUTPATIENT
Start: 2017-05-30 | End: 2018-06-28

## 2017-06-09 ENCOUNTER — OFFICE VISIT (OUTPATIENT)
Dept: FAMILY MEDICINE | Facility: CLINIC | Age: 77
End: 2017-06-09
Payer: COMMERCIAL

## 2017-06-09 VITALS
TEMPERATURE: 98 F | WEIGHT: 188 LBS | DIASTOLIC BLOOD PRESSURE: 70 MMHG | HEART RATE: 74 BPM | SYSTOLIC BLOOD PRESSURE: 120 MMHG | BODY MASS INDEX: 28.59 KG/M2

## 2017-06-09 DIAGNOSIS — Z79.4 TYPE 2 DIABETES MELLITUS WITH STAGE 3 CHRONIC KIDNEY DISEASE, WITH LONG-TERM CURRENT USE OF INSULIN (H): Primary | ICD-10-CM

## 2017-06-09 DIAGNOSIS — E11.22 TYPE 2 DIABETES MELLITUS WITH STAGE 3 CHRONIC KIDNEY DISEASE, WITH LONG-TERM CURRENT USE OF INSULIN (H): Primary | ICD-10-CM

## 2017-06-09 DIAGNOSIS — N18.30 TYPE 2 DIABETES MELLITUS WITH STAGE 3 CHRONIC KIDNEY DISEASE, WITH LONG-TERM CURRENT USE OF INSULIN (H): Primary | ICD-10-CM

## 2017-06-09 DIAGNOSIS — K52.9 CHRONIC DIARRHEA: ICD-10-CM

## 2017-06-09 LAB — HBA1C MFR BLD: 7.9 % (ref 4.3–6)

## 2017-06-09 PROCEDURE — 83036 HEMOGLOBIN GLYCOSYLATED A1C: CPT | Performed by: FAMILY MEDICINE

## 2017-06-09 PROCEDURE — 36415 COLL VENOUS BLD VENIPUNCTURE: CPT | Performed by: FAMILY MEDICINE

## 2017-06-09 PROCEDURE — 99214 OFFICE O/P EST MOD 30 MIN: CPT | Performed by: FAMILY MEDICINE

## 2017-06-09 ASSESSMENT — PAIN SCALES - GENERAL: PAINLEVEL: MODERATE PAIN (4)

## 2017-06-09 NOTE — NURSING NOTE
"Chief Complaint   Patient presents with     Diarrhea     Diabetes       Initial /70 (BP Location: Right arm, Patient Position: Chair, Cuff Size: Adult Large)  Pulse 74  Temp 98  F (36.7  C) (Tympanic)  Wt 188 lb (85.3 kg)  BMI 28.59 kg/m2 Estimated body mass index is 28.59 kg/(m^2) as calculated from the following:    Height as of 4/10/17: 5' 8\" (1.727 m).    Weight as of this encounter: 188 lb (85.3 kg).  Medication Reconciliation: complete    Health Maintenance that is potentially due pending provider review:  ACT and diabetes    Possibly completing today per provider review.  Zabrina Churchill,[facility]      "

## 2017-06-09 NOTE — MR AVS SNAPSHOT
After Visit Summary   6/9/2017    Alvaro Horton    MRN: [id number]           Patient Information     Date Of Birth          1940        Visit Information        Provider Department      6/9/2017 11:00 AM Be Carreno MD [facility]        Today's Diagnoses     Type 2 diabetes mellitus with stage 3 chronic kidney disease, with long-term current use of insulin (H)    -  1    Chronic diarrhea          Care Instructions    ASSESSMENT/PLAN:                                                    (E11.22,  N18.3,  Z79.4) Type 2 diabetes mellitus with stage 3 chronic kidney disease, with long-term current use of insulin (H)  (primary encounter diagnosis)  Comment: due for recheck  Plan: Hemoglobin A1c, metFORMIN (GLUCOPHAGE) 1000 MG         tablet, Hemoglobin A1c        Check Hgb A1C today.  IF OK, can recheck in 6 months.  If high or we change medication, recheck in 3 months.     (K52.9) Chronic diarrhea  Comment: uncertain cause.    Plan: GASTROENTEROLOGY ADULT REF CONSULT ONLY        Schedule an appointment with GI consultant.  You likely will need another colonoscopy.   Let us know if you have problems setting up an appointment.           Follow-ups after your visit        Additional Services     GASTROENTEROLOGY ADULT REF CONSULT ONLY       Preferred Location: [facility] [phone]  Chronic diarrhea.  Last colonoscopy in 2006.      Please be aware that coverage of these services is subject to the terms and limitations of your health insurance plan.  Call member services at your health plan with any benefit or coverage questions.  Any procedures must be performed at a [facility] facility OR coordinated by your clinic's referral office.    Please bring the following with you to your appointment:    (1) Any X-Rays, CTs or MRIs which have been performed.  Contact the facility where they were done to arrange for  prior to your scheduled appointment.    (2) List of current  medications   (3) This referral request   (4) Any documents/labs given to you for this referral                  Who to contact     If you have questions or need follow up information about today's clinic visit or your schedule please contact [facility] directly at [phone].  Normal or non-critical lab and imaging results will be communicated to you by MyChart, letter or phone within 4 business days after the clinic has received the results. If you do not hear from us within 7 days, please contact the clinic through MyChart or phone. If you have a critical or abnormal lab result, we will notify you by phone as soon as possible.  Submit refill requests through [employer] or call your pharmacy and they will forward the refill request to us. Please allow 3 business days for your refill to be completed.          Additional Information About Your Visit        Care EveryWhere ID     This is your Care EveryWhere ID. This could be used by other organizations to access your [place] medical records  [id number]        Your Vitals Were     Pulse Temperature BMI (Body Mass Index)             74 98  F (36.7  C) (Tympanic) 28.59 kg/m2          Blood Pressure from Last 3 Encounters:   06/09/17 120/70   04/10/17 120/70   03/01/17 130/70    Weight from Last 3 Encounters:   06/09/17 188 lb (85.3 kg)   04/10/17 194 lb (88 kg)   03/01/17 196 lb (88.9 kg)              We Performed the Following     GASTROENTEROLOGY ADULT REF CONSULT ONLY     Hemoglobin A1c          Today's Medication Changes          These changes are accurate as of: 6/9/17 11:54 AM.  If you have any questions, ask your nurse or doctor.               These medicines have changed or have updated prescriptions.        Dose/Directions    cyanocobalamin 500 MCG Tabs   Commonly known as:  BL VITAMIN B-12   This may have changed:  how much to take   Used for:  Memory loss        Dose:  500 mcg   Take 500 mcg by mouth daily.   Quantity:  90 tablet    Refills:  4                Primary Care Provider Office Phone # Fax #    Be Carreno [phone]       [facility] [address] [address]        Thank you!     Thank you for choosing [facility]  for your care. Our goal is always to provide you with excellent care. Hearing back from our patients is one way we can continue to improve our services. Please take a few minutes to complete the written survey that you may receive in the mail after your visit with us. Thank you!             Your Updated Medication List - Protect others around you: Learn how to safely use, store and throw away your medicines at www.disposemymeds.org.          This list is accurate as of: 6/9/17 11:54 AM.  Always use your most recent med list.                   Brand Name Dispense Instructions for use    albuterol (2.5 MG/3ML) 0.083% neb solution     1 Box    Take 1 vial (2.5 mg) by nebulization every 4 hours as needed for shortness of breath / dyspnea or wheezing       arformoterol 15 MCG/2ML Nebu neb solution    BROVANA    120 mL    Take 2 mLs (15 mcg) by nebulization 2 times daily       aspirin 81 MG tablet      Take by mouth daily       atorvastatin 80 MG tablet    LIPITOR    30 tablet    Take 1 tablet (80 mg) by mouth daily       blood glucose monitoring meter device kit     1 kit    Use to test blood sugars 4 times daily or as directed.       * blood glucose monitoring test strip    no brand specified    100 strip    1 strip by In Vitro route 3 times daily Diagnosis:  250.00.       * ONE TOUCH ULTRA test strip   Generic drug:  blood glucose monitoring     150 strip    USE ONE STRIP IN VITRO ROUTE 3 TIMES DAILY       budesonide 0.5 MG/2ML neb solution    PULMICORT    60 ampule    Take 2 mLs (0.5 mg) by nebulization 2 times daily       cholecalciferol 2000 UNITS tablet     90 tablet    Take 1 tablet by mouth daily.       cyanocobalamin 500 MCG Tabs    BL VITAMIN B-12     "90 tablet    Take 500 mcg by mouth daily.       finasteride 5 MG tablet    PROSCAR    60 tablet    Take 1 tablet (5 mg) by mouth daily       fish oil-omega-3 fatty acids 1000 MG capsule     180 capsule    Take 1 capsule by mouth 2 times daily.       GLUCOPHAGE 1000 MG tablet   Generic drug:  metFORMIN     60 tablet    Take 1 tablet (1,000 mg) by mouth 2 times daily (with meals)       insulin aspart 100 UNIT/ML injection    NovoLOG FLEXPEN    30 mL    11 units before breakfast, 11 units before lunch, 11 units before dinner       insulin detemir 100 UNIT/ML injection    LEVEMIR FLEXPEN/FLEXTOUCH    15 mL    Inject 30 Units Subcutaneous At Bedtime       insulin syringe-needle U-100 31G X 5/16\" 1 ML    BD insulin syringe ULTRAFINE    100 each    Use one syringe 4 times daily or as directed.       lisinopril 5 MG tablet    PRINIVIL/ZESTRIL    90 tablet    Take 1 tablet (5 mg) by mouth daily       metoprolol 25 MG tablet    LOPRESSOR    60 tablet    Take 1 tablet (25 mg) by mouth 2 times daily       nitroglycerin 0.4 MG sublingual tablet    NITROSTAT    25 tablet    Place 1 tablet (0.4 mg) under the tongue every 5 minutes as needed for chest pain (CALL 911 IF NOT IMPROVED AFTER THREE CONSECUTIVE DOSES)       omeprazole 20 MG CR capsule    priLOSEC    30 capsule    Take 1 capsule (20 mg) by mouth daily       order for DME      Equipment being ordered: CPAP AIRSENSE 10 11-15 CM H2O [employer] AIR SIZE MED # [id number]  DN# [id number]       * order for DME     1 Device    Equipment being ordered: CPAP machine       * order for DME     1 Box    Equipment being ordered: diabetic shoes       * order for DME     1 Device    Equipment being ordered: Nebulizer       pen needles 31G X 6 MM Misc     90 each    1 Units 3 times daily For novolog flexpen       tamsulosin 0.4 MG capsule    FLOMAX    90 capsule    Take 1 capsule (0.4 mg) by mouth daily (Needs follow-up appointment for this medication)       * Notice:  This list has 5 " medication(s) that are the same as other medications prescribed for you. Read the directions carefully, and ask your doctor or other care provider to review them with you.

## 2017-06-09 NOTE — PATIENT INSTRUCTIONS
ASSESSMENT/PLAN:                                                    (E11.22,  N18.3,  Z79.4) Type 2 diabetes mellitus with stage 3 chronic kidney disease, with long-term current use of insulin (H)  (primary encounter diagnosis)  Comment: due for recheck  Plan: Hemoglobin A1c, metFORMIN (GLUCOPHAGE) 1000 MG         tablet, Hemoglobin A1c        Check Hgb A1C today.  IF OK, can recheck in 6 months.  If high or we change medication, recheck in 3 months.     (K52.9) Chronic diarrhea  Comment: uncertain cause.    Plan: GASTROENTEROLOGY ADULT REF CONSULT ONLY        Schedule an appointment with GI consultant.  You likely will need another colonoscopy.   Let us know if you have problems setting up an appointment.

## 2017-06-09 NOTE — LETTER
[facility]  [address] [place] [id number]  Phone: [phone]  Fax: [phone]    June 12, 2017    Alvaro DESTINY Sol  [address] [facility] [place] [address]          Dear Mr. Horton,    The results of your recent lab tests: Hgb A1C is OK, just below goal of 8.   PLAN: No change in medications.  Weight loss, regular exercise with goal of 30 minutes most days of the week and eating a prudent diet (lots of fruits and vegetables, limiting unhealthy fats and excessive calories) can help.   Recheck in 6 months. Enclosed is a copy of these results.  If you have any further questions or problems, please contact our office.  Results for orders placed or performed in visit on 06/09/17   Hemoglobin A1c   Result Value Ref Range    Hemoglobin A1C 7.9 (H) 4.3 - 6.0 %       Sincerely,      Be Carreno MD/ aldair

## 2017-06-09 NOTE — PROGRESS NOTES
SUBJECTIVE:                                                    Alvaro Horton is a 76 year old male who presents to clinic today for the following health issues:  Chief Complaint   Patient presents with     Diarrhea     Diabetes      He has tried stopping metformin due to diarrhea but it did not change his symptoms so he restarted.    Diabetes Follow-up    Patient is checking blood sugars: three times daily.   Blood sugar testing frequency justification: Adjustment of medication(s)  Results are as follows:Range=<         am - 150-190         suppertime - depends on the day         bedtime -     Taking Levemir 20 units once daily.  Novolog 11 units three times daily.     Diabetic concerns: None     Symptoms of hypoglycemia (low blood sugar): very rarely     Paresthesias (numbness or burning in feet) or sores: Yes tingling     Date of last diabetic eye exam: due       Amount of exercise or physical activity: 6-7 days/week for an average of 30-45 minutes    Problems taking medications regularly: No    Medication side effects: chronic diarrhea x 1 year. Worse x 4 months    Diet: carbohydrate counting      Diarrhea      Duration: 1 yhear    Description:       Consistency of stool: watery, runny, loose and mucousy       Blood in stool: no        Number of loose stools past 24 hours: 4-6    Intensity:  severe    Accompanying signs and symptoms:       Fever: no        Nausea/vomitting: mild nausea       Abdominal pain: YES- mild       Weight loss: YES- but contributes that because of wife passed away JUly 2016    History (recent antibiotics or travel/ill contacts/med changes/testing done): [place]    Precipitating or alleviating factors: None    Therapies tried and outcome: PeptoBismol   He has had continuing diarrhea.   He has been having 5-7 stools in the AM small amounts.  Stool at Noon and several times later in the day.  Usually 10 stools a day.    He tried a course of metronidazole.  Seemed to help  "temporarily.  Stools were not back to normal.  Typical pattern in the past was once daily.  Metamucil did not help.  Stopping metformin did not help.    Now taking loperamide (Imodium) twice daily which helps some.       Last clinic visit:1/26/2017  Medication or other changes at last visit:none  Weight since last visit? 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 RAST for bakers yeast negative 2002. Has occupational exposures with related worsening asthmatic symptoms. CT 2/08- no fibrosis.       Moderate Persistent Asthma      Priority: High     PFTS 1997 - FEV1- 3.39 (64%), ratio 0.74, 24% change pev89-76% with bronchodilators,  TLC 86%, %, DLCO 78%. Methacholine challenge positive at level 7 2001.  PFTS 2004 - FEV1- 1.93 (63%), ratio 0.77. PFTS 4/08 - FEV1- 2.13 (72%), ratio 0.71, no change with bronchodilators,  TLC 78%, RV 96%, DLCO 64%              Chronic ischemic heart disease      Priority: High     atypical chest pain 2001 with GXT echo- decreased LVF, angiogram 2001- nonobstructing 3 vessel disease.   Repeat angio 2004- prox RCA 50-60%,  OM2- 40%, D1 30-40%, LAD 30-40%.   Cardiolite 2005 during hospitalization for SOB in [place] reported to reveal no ischemia.   Adenosine cardiolite 1/08- small slightly reversible inferior defect, most likely consistent with diaphragm attenuation with bowel uptake artifact. USA, Angio 11/09- Severe LM disease, severe subtotal occlusion of a large branch OM1.    S/p CABG x2 " "11/09- LIMA-D1, SVG to OM1, OM2. GXT 8/10- 5.8 mets, 121 (80%) HR, normal ekg, cardiolite- small fixed inferior/basal defect with small area khurram-infarct ischemia extending into the mid ventricle. EF 67%.  4/25/13:adenosine cardiolyte stress test through [facility] with normal EF and no ischemia.   Problem list name updated by automated process. Provider to review       EDEN (obstructive sleep apnea) AHI 11.2 09/06/2016     Priority: Medium     PSH at [facility] 4/8/2008       Type 2 diabetes mellitus with diabetic chronic kidney disease (H) 10/30/2015     Priority: Medium     Altered mental status 12/31/2014     Priority: Medium     12/27/2014:episode confusion for 20 minutes.  Seen at [facility]/-hospitalized.  Head CT, MRI/MRA all normal.  PET cardiac perfusion stress test normal.  Neurology consult-diagnosis= possible hypoglycemia, migraine variant.  \"Acute ischemic cerebral event was excluded\".       CKD (chronic kidney disease) stage 3, GFR 30-59 ml/min 05/06/2014     Priority: Medium     9/9/2015:He has had two abnormal MAC in the past.        COPD (chronic obstructive pulmonary disease) (H) 11/18/2013     Priority: Medium     PFT 11/15/13 results:FVC=59%, FEV1=54%, FEV1/FVC=92%.  His DLCO was 58%1. Spirometry: FEV1 moderately reduced. FVC moderately reduced. FEV1/FVC ratio reduced. 2. Bronchodilator Response: A 14% improvement is seen in FEV1 with bronchodilators. 3. Lung Volumes: Total lung capacity normal. Residual volume increased. Residual volume total lung capacity ratio increased. 4. DLCO: Moderately reduced.   INTERPRETATION: Moderate obstructive lung disease. Reversibility with bronchodilators is seen on testing today. Lung volumes show evidence for air trapping. DLCO is reduced, consistent with loss of capillary-alveolar exchange as in emphysema, pulmonary hypertension, chronic pulmonary embolus, pulmonary fibrosis or other pulmonary vascular disease. A concomitant restrictive lung disease process may be " suspected on the basis of moderate reductions in FEV1 and FVC, with a fairly well-preserved FEV1/FVC ratio and a borderline low normal total lung capacity.   11/15/13:exercise oximetry did not show significant desaturation below 91%.  PFT February, 2014 results:FVC=54%, FEV1=48%, FEV1/FVC=64%.  DLCO=59%         Hyperlipidemia LDL goal <70 04/17/2013     Priority: Medium     Atrial fibrillation (H) 12/14/2009     Priority: Medium     After CABG 11/09       CVA (cerebral vascular accident) (H) 11/27/2009     Priority: Medium     Mild Broca's aphasia, confusion, right hand dysesthesia after angio. MRA new small area of restricted diffusion in the white matter of the medial right temporal lobe,  consistent with a recent small infarct. Speech problems resolved, still mild right hand problems.         Obstructive sleep apnea 04/14/2008     Priority: Medium     ESS 20/24. Sleep study [facility]: 4/8/08. total sleep time was 423.0 minutes. The sleep latency was normal at 9.7 minutes.  REM sleep latency was 161.5 minutes. Sleep efficiency was normal at 82.7%. The sleep architecture was disrupted with frequent sleep stage changes and arousals. Snoring: Was reported as moderately loud. Lowest O2 saturation was 87.0%. This study is suggestive of mild sleep apnea, severe during REM sleep (21% TST).  PLM index was 0.0. EKG:  No significant cardiac arrhythmias were noted.  Problem list name updated by automated process. Provider to review       Hypertrophy of prostate without urinary obstruction      Priority: Medium     Elevated PSA. Bx negative 2004.  Problem list name updated by automated process. Provider to review       Health Care Home 08/11/2014     Priority: Low     State Tier Level:    Status:  Unable to re-connect  Care Coordinator:  Kecia Mills    See Letters for [facility] Care Plan  Date:  August 11, 2014             Advanced care planning/counseling discussion 09/24/2012     Priority: Low     Does not have a directive  9/12       Dyslexia 11/11/2010     Priority: Low     essentially unable to read       CTS (carpal tunnel syndrome) 05/12/2010     Priority: Low     EMG 5/10-  median neuropathy at the right wrist, moderate in severity electrically, right ulnar neuropathy localizing to the elbow, mild chronic right C6 radiculopathy without evidence for acute denervation.        Memory loss 05/03/2010     Priority: Low     MMSE 27/30 (0/3 attention recall, ?history of dylexia), PHQ9-16 7/09. Recommended neurocogntive testing, did not complete.  MMSE 23/30 6/10 (attention, county, if/and/buts).Neurocogntive evaluation  1/10- not suspicious for emerging dementia. Felt likely due hearing loss/dyslexia.        Benign paroxysmal vertigo 02/03/2009     Priority: Low     Admit 2/09. MRI/MRA head and neck negative.       Sensorineural hearing loss, bilateral 10/08/2007     Priority: Low     Esophageal reflux 11/06/2006     Priority: Low     PSORIASIS      Priority: Low     OVERACTIVE BLADDER      Priority: Low     PVR 84 2004.       Current Outpatient Prescriptions   Medication Sig Dispense Refill     budesonide (PULMICORT) 0.5 MG/2ML neb solution Take 2 mLs (0.5 mg) by nebulization 2 times daily 60 ampule 11     blood glucose monitoring (NO BRAND SPECIFIED) test strip 1 strip by In Vitro route 3 times daily Diagnosis:  250.00. 100 strip 1     Insulin Pen Needle (PEN NEEDLES) 31G X 6 MM MISC 1 Units 3 times daily For novolog flexpen 90 each 0     ONE TOUCH ULTRA test strip USE ONE STRIP IN VITRO ROUTE 3 TIMES DAILY 150 strip 0     insulin detemir (LEVEMIR FLEXPEN/FLEXTOUCH) 100 UNIT/ML injection Inject 30 Units Subcutaneous At Bedtime 15 mL 11     insulin aspart (NOVOLOG FLEXPEN) 100 UNIT/ML injection 11 units before breakfast, 11 units before lunch, 11 units before dinner 30 mL 3     arformoterol (BROVANA) 15 MCG/2ML NEBU neb solution Take 2 mLs (15 mcg) by nebulization 2 times daily 120 mL 11     metoprolol (LOPRESSOR) 25 MG tablet Take 1  "tablet (25 mg) by mouth 2 times daily 60 tablet 11     lisinopril (PRINIVIL/ZESTRIL) 5 MG tablet Take 1 tablet (5 mg) by mouth daily 90 tablet 1     finasteride (PROSCAR) 5 MG tablet Take 1 tablet (5 mg) by mouth daily 60 tablet 3     tamsulosin (FLOMAX) 0.4 MG 24 hr capsule Take 1 capsule (0.4 mg) by mouth daily (Needs follow-up appointment for this medication) 90 capsule 3     atorvastatin (LIPITOR) 80 MG tablet Take 1 tablet (80 mg) by mouth daily 30 tablet 11     omeprazole (PRILOSEC) 20 MG capsule Take 1 capsule (20 mg) by mouth daily 30 capsule 11     order for DME Equipment being ordered: CPAP  AIRSENSE 10  11-15 CM H2O  QUATTRO AIR SIZE MED  SN# [id number]  DN# [id number]       albuterol (2.5 MG/3ML) 0.083% nebulizer solution Take 1 vial (2.5 mg) by nebulization every 4 hours as needed for shortness of breath / dyspnea or wheezing 1 Box 11     order for DME Equipment being ordered: Nebulizer 1 Device 0     blood glucose monitoring (ONE TOUCH ULTRA 2) meter device kit Use to test blood sugars 4 times daily or as directed. 1 kit 0     order for DME Equipment being ordered: diabetic shoes 1 Box 0     order for DME Equipment being ordered: CPAP machine 1 Device 1     insulin syringe-needle U-100 (BD INSULIN SYRINGE ULTRAFINE) 31G X 5/16\" 1 ML Use one syringe 4 times daily or as directed. 100 each prn     aspirin 81 MG tablet Take by mouth daily       cholecalciferol 2000 UNITS tablet Take 1 tablet by mouth daily. 90 tablet 3     fish oil-omega-3 fatty acids (FISH OIL) 1000 MG capsule Take 1 capsule by mouth 2 times daily. 180 capsule 3     cyanocobalamin (BL VITAMIN B-12) 500 MCG TABS Take 500 mcg by mouth daily. (Patient taking differently: Take 2,500 mcg by mouth daily ) 90 tablet 4     nitroglycerin (NITROSTAT) 0.4 MG SL tablet Place 1 tablet (0.4 mg) under the tongue every 5 minutes as needed for chest pain (CALL 911 IF NOT IMPROVED AFTER THREE CONSECUTIVE DOSES) 25 tablet 0     [DISCONTINUED] metoprolol " (TOPROL-XL) 25 MG 24 hr tablet Take 1 tablet (25 mg) by mouth 2 times daily 180 tablet 3     [DISCONTINUED] LANTUS SOLOSTAR SOLN 100 UNIT/ML 26 units at bedtime 3 Month 0     ROS:General: POSITIVE for:, poor appetite.  Low energy.   Resp: POSITIVE for:, Hx asthma.  He feels lungs have been oK.  Taking Pulmicort and Brovana nebs twice daily.  Uses albuteral inhaler once in a while   CV: No chest pains or palpitations  GI: Negative for nausea, vomiting, bloody stools, melena  : positive for urinary frequency.  Musculoskeletal: POSITIVE  for:, aches in fingers.  Gets cramps in hands.   Psychiatric: No problems with anxiety, depression or mental health    OBJECTIVE:Blood pressure 120/70, pulse 74, temperature 98  F (36.7  C), temperature source Tympanic, weight 188 lb (85.3 kg). BMI=Body mass index is 28.59 kg/(m^2).  GENERAL APPEARANCE ADULT: Alert, no acute distress  NECK: No adenopathy,masses or thyromegaly  CV: normal rate, regular rhythm, no murmur or gallop  ABDOMEN: soft, no organomegaly, masses or tenderness  MS: extremities normal, no peripheral edema   Foot exam:normal DP and PT pulses, no trophic changes or ulcerative lesions, normal monofilament exam and hammer toe right second    ASSESSMENT/PLAN:                                                    (E11.22,  N18.3,  Z79.4) Type 2 diabetes mellitus with stage 3 chronic kidney disease, with long-term current use of insulin (H)  (primary encounter diagnosis)  Comment: due for recheck  Plan: Hemoglobin A1c, metFORMIN (GLUCOPHAGE) 1000 MG         tablet, Hemoglobin A1c        Check Hgb A1C today.  IF OK, can recheck in 6 months.  If high or we change medication, recheck in 3 months.     (K52.9) Chronic diarrhea  Comment: uncertain cause.    Plan: GASTROENTEROLOGY ADULT REF CONSULT ONLY        Schedule an appointment with GI consultant.  You likely will need another colonoscopy.   Let us know if you have problems setting up an appointment.        Diabetes Type  II, A1c Controlled, insulin dependent   Associated with the following complications    Renal Complications:  CKD    Ophthalmologic Complications: None    Neurologic Complications: None    Peripheral Vascular Complications:  ASCVD

## 2017-06-09 NOTE — PROGRESS NOTES
Please call.  Hgb A1C is OK, just below goal of 8.  PLAN: No change in medications.  Weight loss, regular exercise with goal of 30 minutes most days of the week and eating a prudent diet (lots of fruits and vegetables, limiting unhealthy fats and excessive calories) can help.   Recheck in 6 months.

## 2017-06-10 ASSESSMENT — ASTHMA QUESTIONNAIRES: ACT_TOTALSCORE: 21

## 2017-06-13 DIAGNOSIS — E78.5 HYPERLIPIDEMIA LDL GOAL <70: ICD-10-CM

## 2017-06-13 RX ORDER — ATORVASTATIN CALCIUM 80 MG/1
80 TABLET, FILM COATED ORAL DAILY
Qty: 30 TABLET | Refills: 5 | Status: SHIPPED | OUTPATIENT
Start: 2017-06-13 | End: 2018-02-05

## 2017-06-13 NOTE — TELEPHONE ENCOUNTER
Atorvastatin     Last Written Prescription Date: 9/28/2016  Last Fill Quantity: 30, # refills: 11  Last Office Visit with [facility] [place] prescribing provider: 6/9/2017       Lab Results   Component Value Date    CHOL 116 11/07/2016     Lab Results   Component Value Date    HDL 40 11/07/2016     Lab Results   Component Value Date    LDL 32 11/07/2016     Lab Results   Component Value Date    TRIG 221 11/07/2016     Lab Results   Component Value Date    CHOLHDLRATIO 4.0 02/23/2015       Per pharmacy:  Insurance would like 90 days for cholesterol ,eds, please send new Rx for 90 days

## 2017-06-22 ENCOUNTER — APPOINTMENT (OUTPATIENT)
Dept: GENERAL RADIOLOGY | Facility: CLINIC | Age: 77
End: 2017-06-22
Attending: PHYSICIAN ASSISTANT
Payer: MEDICARE

## 2017-06-22 ENCOUNTER — HOSPITAL ENCOUNTER (EMERGENCY)
Facility: CLINIC | Age: 77
Discharge: HOME | End: 2017-06-22
Attending: PHYSICIAN ASSISTANT | Admitting: PHYSICIAN ASSISTANT
Payer: MEDICARE

## 2017-06-22 VITALS
TEMPERATURE: 98 F | HEIGHT: 68 IN | WEIGHT: 180 LBS | OXYGEN SATURATION: 95 % | DIASTOLIC BLOOD PRESSURE: 77 MMHG | SYSTOLIC BLOOD PRESSURE: 163 MMHG | HEART RATE: 84 BPM | RESPIRATION RATE: 16 BRPM | BODY MASS INDEX: 27.28 KG/M2

## 2017-06-22 DIAGNOSIS — M25.532 LEFT WRIST PAIN: ICD-10-CM

## 2017-06-22 LAB
BASOPHILS # BLD AUTO: 0 10E9/L (ref 0–0.2)
BASOPHILS NFR BLD AUTO: 0.4 %
CRP SERPL-MCNC: 7.7 MG/L (ref 0–8)
DIFFERENTIAL METHOD BLD: ABNORMAL
EOSINOPHIL # BLD AUTO: 0.1 10E9/L (ref 0–0.7)
EOSINOPHIL NFR BLD AUTO: 0.6 %
ERYTHROCYTE [DISTWIDTH] IN BLOOD BY AUTOMATED COUNT: 13.3 % (ref 10–15)
HCT VFR BLD AUTO: 39.8 % (ref 40–53)
HGB BLD-MCNC: 13.7 G/DL (ref 13.3–17.7)
IMM GRANULOCYTES # BLD: 0 10E9/L (ref 0–0.4)
IMM GRANULOCYTES NFR BLD: 0.2 %
LYMPHOCYTES # BLD AUTO: 2.8 10E9/L (ref 0.8–5.3)
LYMPHOCYTES NFR BLD AUTO: 26.1 %
MCH RBC QN AUTO: 29.8 PG (ref 26.5–33)
MCHC RBC AUTO-ENTMCNC: 34.4 G/DL (ref 31.5–36.5)
MCV RBC AUTO: 87 FL (ref 78–100)
MONOCYTES # BLD AUTO: 1 10E9/L (ref 0–1.3)
MONOCYTES NFR BLD AUTO: 9.4 %
NEUTROPHILS # BLD AUTO: 6.9 10E9/L (ref 1.6–8.3)
NEUTROPHILS NFR BLD AUTO: 63.3 %
PLATELET # BLD AUTO: 194 10E9/L (ref 150–450)
RBC # BLD AUTO: 4.59 10E12/L (ref 4.4–5.9)
URATE SERPL-MCNC: 5 MG/DL (ref 3.5–7.2)
WBC # BLD AUTO: 10.8 10E9/L (ref 4–11)

## 2017-06-22 PROCEDURE — 99214 OFFICE O/P EST MOD 30 MIN: CPT

## 2017-06-22 PROCEDURE — 86618 LYME DISEASE ANTIBODY: CPT | Performed by: PHYSICIAN ASSISTANT

## 2017-06-22 PROCEDURE — 25000132 ZZH RX MED GY IP 250 OP 250 PS 637: Mod: GY | Performed by: PHYSICIAN ASSISTANT

## 2017-06-22 PROCEDURE — 99213 OFFICE O/P EST LOW 20 MIN: CPT | Performed by: PHYSICIAN ASSISTANT

## 2017-06-22 PROCEDURE — 85025 COMPLETE CBC W/AUTO DIFF WBC: CPT | Performed by: PHYSICIAN ASSISTANT

## 2017-06-22 PROCEDURE — A9270 NON-COVERED ITEM OR SERVICE: HCPCS | Mod: GY | Performed by: PHYSICIAN ASSISTANT

## 2017-06-22 PROCEDURE — 73110 X-RAY EXAM OF WRIST: CPT | Mod: LT

## 2017-06-22 PROCEDURE — 86140 C-REACTIVE PROTEIN: CPT | Performed by: PHYSICIAN ASSISTANT

## 2017-06-22 PROCEDURE — 84550 ASSAY OF BLOOD/URIC ACID: CPT | Performed by: PHYSICIAN ASSISTANT

## 2017-06-22 RX ORDER — ACETAMINOPHEN 325 MG/1
650 TABLET ORAL ONCE
Status: COMPLETED | OUTPATIENT
Start: 2017-06-22 | End: 2017-06-22

## 2017-06-22 RX ADMIN — ACETAMINOPHEN 650 MG: 325 TABLET, FILM COATED ORAL at 18:53

## 2017-06-22 NOTE — ED AVS SNAPSHOT
[facility]    [address] [facility] [address]    Phone:  [phone]    Fax:  [phone]                                       Alvaro Horton   MRN: [id number]    Department:  [facility]   Date of Visit:  6/22/2017           After Visit Summary Signature Page     I have received my discharge instructions, and my questions have been answered. I have discussed any challenges I see with this plan with the nurse or doctor.    ..........................................................................................................................................  Patient/Patient Representative Signature      ..........................................................................................................................................  Patient Representative Print Name and Relationship to Patient    ..................................................               ................................................  Date                                            Time    ..........................................................................................................................................  Reviewed by Signature/Title    ...................................................              ..............................................  Date                                                            Time

## 2017-06-22 NOTE — ED PROVIDER NOTES
"  History     Chief Complaint   Patient presents with     Wrist Pain     Pt c/o left wrist pain and swelling - states has hx of chronic diarrhea that he is being treated for - also c/o cramps in both his hands - states, \"[place] am going [place] tomorrow and I don't want this to take too long\" - is seeing Dr Carreno for chronic issues - here for eval of left wrist redness and pain     HPI  Alvaro Horton is a 76 year old male who claims of left wrist pain, swelling which he first noticed upon waking this morning.  He denies any instigating injury.  He also states that he has had ongoing pain in his hands bilaterally, neck, upper extremities for several months.  He has been evaluated several times for that pain previously by his primary care provider.  He denies any fever, chills, myalgias, cough, dyspnea, wheezing, numbness or paresthesias in his hands.  He has not attempted any OTC treatments.  He states concern that he is supposed to go out of town for a fishing trip tomorrow and doesn't know how he is going to be able to cast without pain.      I have reviewed the Medications, Allergies, Past Medical and Surgical History, and Social History in the Epic system.    Allergies:   Allergies   Allergen Reactions     Prednisone      Severe interaction with DM       No current facility-administered medications on file prior to encounter.   Current Outpatient Prescriptions on File Prior to Encounter:  atorvastatin (LIPITOR) 80 MG tablet Take 1 tablet (80 mg) by mouth daily   metFORMIN (GLUCOPHAGE) 1000 MG tablet Take 1 tablet (1,000 mg) by mouth 2 times daily (with meals)   budesonide (PULMICORT) 0.5 MG/2ML neb solution Take 2 mLs (0.5 mg) by nebulization 2 times daily   blood glucose monitoring (NO BRAND SPECIFIED) test strip 1 strip by In Vitro route 3 times daily Diagnosis:  250.00.   Insulin Pen Needle (PEN NEEDLES) 31G X 6 MM MISC 1 Units 3 times daily For novolog flexpen   ONE TOUCH ULTRA test strip USE ONE STRIP IN " "VITRO ROUTE 3 TIMES DAILY   insulin detemir (LEVEMIR FLEXPEN/FLEXTOUCH) 100 UNIT/ML injection Inject 30 Units Subcutaneous At Bedtime   insulin aspart (NOVOLOG FLEXPEN) 100 UNIT/ML injection 11 units before breakfast, 11 units before lunch, 11 units before dinner   arformoterol (BROVANA) 15 MCG/2ML NEBU neb solution Take 2 mLs (15 mcg) by nebulization 2 times daily   metoprolol (LOPRESSOR) 25 MG tablet Take 1 tablet (25 mg) by mouth 2 times daily   lisinopril (PRINIVIL/ZESTRIL) 5 MG tablet Take 1 tablet (5 mg) by mouth daily   finasteride (PROSCAR) 5 MG tablet Take 1 tablet (5 mg) by mouth daily   tamsulosin (FLOMAX) 0.4 MG 24 hr capsule Take 1 capsule (0.4 mg) by mouth daily (Needs follow-up appointment for this medication)   omeprazole (PRILOSEC) 20 MG capsule Take 1 capsule (20 mg) by mouth daily   order for DME Equipment being ordered: CPAPAIRSENSE 1011-15 CM [id number] AIR SIZE MEDSN# [id number]  DN# [id number]   albuterol (2.5 MG/3ML) 0.083% nebulizer solution Take 1 vial (2.5 mg) by nebulization every 4 hours as needed for shortness of breath / dyspnea or wheezing   order for DME Equipment being ordered: Nebulizer   blood glucose monitoring (ONE TOUCH ULTRA 2) meter device kit Use to test blood sugars 4 times daily or as directed.   order for DME Equipment being ordered: diabetic shoes   nitroglycerin (NITROSTAT) 0.4 MG SL tablet Place 1 tablet (0.4 mg) under the tongue every 5 minutes as needed for chest pain (CALL 911 IF NOT IMPROVED AFTER THREE CONSECUTIVE DOSES)   order for DME Equipment being ordered: CPAP machine   insulin syringe-needle U-100 (BD INSULIN SYRINGE ULTRAFINE) 31G X 5/16\" 1 ML Use one syringe 4 times daily or as directed.   aspirin 81 MG tablet Take by mouth daily   [DISCONTINUED] metoprolol (TOPROL-XL) 25 MG 24 hr tablet Take 1 tablet (25 mg) by mouth 2 times daily   [DISCONTINUED] LANTUS SOLOSTAR SOLN 100 UNIT/ML 26 units at bedtime   cholecalciferol 2000 UNITS tablet Take 1 tablet by mouth " daily.   fish oil-omega-3 fatty acids (FISH OIL) 1000 MG capsule Take 1 capsule by mouth 2 times daily.   cyanocobalamin (BL VITAMIN B-12) 500 MCG TABS Take 500 mcg by mouth daily. (Patient taking differently: Take 2,500 mcg by mouth daily )       Patient Active Problem List   Diagnosis     Chronic ischemic heart disease     Moderate Persistent Asthma     PSORIASIS     Hypertrophy of prostate without urinary obstruction     OVERACTIVE BLADDER     BAKERS LUNG     Hypertension goal BP (blood pressure) < 140/90     Esophageal reflux     Sensorineural hearing loss, bilateral     Obstructive sleep apnea     Benign paroxysmal vertigo     CVA (cerebral vascular accident) (H)     Atrial fibrillation (H)     Memory loss     CTS (carpal tunnel syndrome)     Dyslexia     Advanced care planning/counseling discussion     Hyperlipidemia LDL goal <70     COPD (chronic obstructive pulmonary disease) (H)     CKD (chronic kidney disease) stage 3, GFR 30-59 ml/min     Health Care Home     Altered mental status     Type 2 diabetes mellitus with diabetic chronic kidney disease (H)     EDEN (obstructive sleep apnea) AHI 11.2       Past Surgical History:   Procedure Laterality Date     C ANESTH,DX ARTHROSCOPIC PROC KNEE JOINT  1994, 1998     Left     CARDIAC SURGERY  11/09    CABG x2 - LIMA-D1, SVG to OM1, OM2.     GENITOURINARY SURGERY  1990    ESWL and percutaneous nephrolithotomy     ORTHOPEDIC SURGERY  8/12    right CTR     SURGICAL HISTORY OF -   1998    Hernia repair     SURGICAL HISTORY OF -   2004    NormalColonoscopy      SURGICAL HISTORY OF -   2006    Normal Colonoscopy       Social History   Substance Use Topics     Smoking status: Never Smoker     Smokeless tobacco: Never Used     Alcohol use No       Most Recent Immunizations   Administered Date(s) Administered     Hepatitis A Vac Ped/Adol-2 Dose 09/04/2008     Hepatitis B 09/04/2008     Influenza (High Dose) 3 valent vaccine 11/30/2016     Influenza (IIV3) 10/13/2014      "Pneumococcal (PCV 13) 09/14/2015     Pneumococcal 23 valent 04/25/2008     TD (ADULT, 7+) 10/09/2006     TDAP Vaccine (Adacel) 09/04/2012     BMI: Estimated body mass index is 27.37 kg/(m^2) as calculated from the following:    Height as of this encounter: 1.727 m (5' 8\").    Weight as of this encounter: 81.6 kg (180 lb).    Review of Systems  CONSTITUTIONAL:NEGATIVE for fever, chills, change in weight  INTEGUMENTARY/SKIN: NEGATIVE for worrisome rashes, erythema, lacerations, abrasions or ecchymosis   RESP:NEGATIVE for significant cough or SOB  MUSCULOSKELETAL: POSITIVE  for left wrist/hand pain and numerous chronic arthralgia, myalgias   NEURO: NEGATIVE for numbness or paresthesias   Physical Exam   /77  Pulse 84  Temp 98  F (36.7  C) (Oral)  Resp 16  Ht 1.727 m (5' 8\")  Wt 81.6 kg (180 lb)  SpO2 95%  BMI 27.37 kg/m2  Physical Exam   Constitutional: He is oriented to person, place, and time. He appears well-developed and well-nourished. No distress.   Cardiovascular:   Pulses:       Radial pulses are 2+ on the left side.   Musculoskeletal:        Left elbow: Normal.        Left wrist: He exhibits tenderness. He exhibits normal range of motion, no bony tenderness, no swelling, no effusion, no crepitus, no deformity and no laceration.        Left hand: He exhibits decreased range of motion (of left thumb actively due to pain) and tenderness. He exhibits no bony tenderness, normal capillary refill, no deformity, no laceration and no swelling. Normal sensation noted.   Neurological: He is alert and oriented to person, place, and time. No sensory deficit.   Skin: Skin is warm and dry. No abrasion, no ecchymosis, no laceration and no rash noted. No erythema.     ED Course     ED Course     Procedures        Critical Care time:  none            Labs Ordered and Resulted from Time of ED Arrival Up to the Time of Departure from the ED   CBC WITH PLATELETS DIFFERENTIAL - Abnormal; Notable for the following:      "   Result Value    Hematocrit 39.8 (*)     All other components within normal limits   CRP INFLAMMATION   URIC ACID     Results for orders placed or performed during the hospital encounter of 06/22/17   Wrist XR, G/E 3 views, left    Narrative    LEFT WRIST THREE OR MORE VIEWS   6/22/2017 7:13 PM     HISTORY: Pain, swelling, no known injury.    COMPARISON: None.    FINDINGS: There are degenerative changes in the radial aspect of the  left wrist involving carpocarpal and carpometacarpal articulations.  Tiny benign cystic area in the lunate. Small amount of  chondrocalcinosis. Arterial calcification. No fracture or acute  abnormality.      Impression    IMPRESSION: Degenerative changes left wrist as described. Nothing  clearly acute.     MARCIANO REEVES MD     Assessments & Plan (with Medical Decision Making)     I have reviewed the nursing notes.    I have reviewed the findings, diagnosis, plan and need for follow up with the patient.       Discharge Medication List as of 6/22/2017  7:37 PM        Final diagnoses:   Left wrist pain     6-year-old male with numerous chronic arthralgias presents to the urgent care concerns over left wrist pain and swelling eating earlier today.  He was tachycardic upon arrival, remainder of vital signs within normal limits.  Physical exam findings as described above.  As part of evaluation he did have x-ray of the left wrist which was negative for acute sugar, dislocation.  There was degenerative changes in the radial aspect of the left wrist involving the carpal and carpometacarpal articulations, cystic area of the lunate, small amount of chondrocalcinosis and artrial calcification.  Laboratory evaluation including CBC, CRP, uric acid were done concerning, testing for lyme disease pending at time of discharge.  differential for her pain includes osteoarthritis, tendonitis.  There was no evidence of cellulitis, septic arthritis or other infectious process.  I do not suspect upper  extremity DVT at this time.  He was  discharged home stable with thumb spica splint for comfort.  He was instructed follow up with primary care provider for recheck if no improvement within the next 7-10 days.  Worrisome reasons to return to the ER/UC or seek medical care sooner discussed    Disclaimer: This note consists of symbols derived from keyboarding, dictation, and/or voice recognition software. As a result, there may be errors in the script that have gone undetected.  Please consider this when interpreting information found in the chart.    6/22/2017   [facility] EMERGENCY DEPARTMENT     Tati Vera PA-C  06/27/17 1156

## 2017-06-22 NOTE — ED AVS SNAPSHOT
[facility]    [address] [facility] [address]    Phone:  [phone]    Fax:  [phone]                                       Alvaro Horton   MRN: [id number]    Department:  [facility]   Date of Visit:  6/22/2017           Patient Information     Date Of Birth          1940        Your diagnoses for this visit were:     Left wrist pain        You were seen by Tati Vera PA-C.      Follow-up Information     Follow up with Be Carreno MD Today.    Specialty:  Family Practice    Why:  As needed, If symptoms worsen    Contact information:    [facility]  [address] [facility] [address]  [phone]          Discharge Instructions         Arthralgia    Arthralgia is the term for pain in or around the joint. It is a symptom, not a disease. This pain may involve one or more joints. In some cases, the pain moves from joint to joint.  There are many causes for joint pain. These include:    Injury    Osteoarthritis (wearing out of the joint surface)    Gout (inflammation of the joint due to crystals in the joint fluid)    Infection inside the joint      Bursitis (inflammation of the fluid-filled sacs around the joint)    Autoimmune disorders such as rheumatoid arthritis or lupus    Tendonitis (inflamation of chords that attach muscle to bone)  Home care    Rest the involved joint(s) until your symptoms improve.     You may be prescribed pain medication. If none is prescribed, you may use acetaminophen or ibuprofen to control pain and inflammation.  Follow up  Follow up with your healthcare provider or our staff as advised.  When to seek medical care  Contact your healthcare provider right away if any of the following occurs:    Pain, swelling, or redness of joint increases    Pain worsens or recurs after a period of improvement    Pain moves to other joints    You cannot bear weight on the affected joint     You cannot  move the affected joint    Joint appears deformed    New rash appears    Fever of 101 F (38.8 C) or higher, or as directed by your healthcare provider  Date Last Reviewed: 4/26/2015 2000-2017 [employer]. [address] [place] [address], [place] [address]. All rights reserved. This information is not intended as a substitute for professional medical care. Always follow your healthcare professional's instructions.          24 Hour Appointment Hotline       To make an appointment at any [facility], call [phone] ([phone]). If you don't have a family doctor or clinic, we will help you find one. [facility] are conveniently located to serve the needs of you and your family.          ED Discharge Orders     Titan Wrist/Thumb Universal                    Review of your medicines      Our records show that you are taking the medicines listed below. If these are incorrect, please call your family doctor or clinic.        Dose / Directions Last dose taken    albuterol (2.5 MG/3ML) 0.083% neb solution   Dose:  1 vial   Quantity:  1 Box        Take 1 vial (2.5 mg) by nebulization every 4 hours as needed for shortness of breath / dyspnea or wheezing   Refills:  11        arformoterol 15 MCG/2ML Nebu neb solution   Commonly known as:  BROVANA   Dose:  15 mcg   Quantity:  120 mL        Take 2 mLs (15 mcg) by nebulization 2 times daily   Refills:  11        aspirin 81 MG tablet        Take by mouth daily   Refills:  0        atorvastatin 80 MG tablet   Commonly known as:  LIPITOR   Dose:  80 mg   Quantity:  30 tablet        Take 1 tablet (80 mg) by mouth daily   Refills:  5        blood glucose monitoring meter device kit   Quantity:  1 kit        Use to test blood sugars 4 times daily or as directed.   Refills:  0        * blood glucose monitoring test strip   Commonly known as:  no brand specified   Dose:  1 strip   Quantity:  100 strip        1 strip by In Vitro route 3 times daily Diagnosis:   "250.00.   Refills:  1        * ONE TOUCH ULTRA test strip   Quantity:  150 strip   Generic drug:  blood glucose monitoring        USE ONE STRIP IN VITRO ROUTE 3 TIMES DAILY   Refills:  0        budesonide 0.5 MG/2ML neb solution   Commonly known as:  PULMICORT   Dose:  0.5 mg   Quantity:  60 ampule        Take 2 mLs (0.5 mg) by nebulization 2 times daily   Refills:  11        cholecalciferol 2000 UNITS tablet   Dose:  1 tablet   Quantity:  90 tablet        Take 1 tablet by mouth daily.   Refills:  3        cyanocobalamin 500 MCG Tabs   Commonly known as:  BL VITAMIN B-12   Dose:  500 mcg   Quantity:  90 tablet        Take 500 mcg by mouth daily.   Refills:  4        finasteride 5 MG tablet   Commonly known as:  PROSCAR   Dose:  5 mg   Quantity:  60 tablet        Take 1 tablet (5 mg) by mouth daily   Refills:  3        fish oil-omega-3 fatty acids 1000 MG capsule   Dose:  1 g   Quantity:  180 capsule        Take 1 capsule by mouth 2 times daily.   Refills:  3        GLUCOPHAGE 1000 MG tablet   Dose:  1000 mg   Quantity:  60 tablet   Generic drug:  metFORMIN        Take 1 tablet (1,000 mg) by mouth 2 times daily (with meals)   Refills:  0        insulin aspart 100 UNIT/ML injection   Commonly known as:  NovoLOG FLEXPEN   Quantity:  30 mL        11 units before breakfast, 11 units before lunch, 11 units before dinner   Refills:  3        insulin detemir 100 UNIT/ML injection   Commonly known as:  LEVEMIR FLEXPEN/FLEXTOUCH   Dose:  30 Units   Quantity:  15 mL        Inject 30 Units Subcutaneous At Bedtime   Refills:  11        insulin syringe-needle U-100 31G X 5/16\" 1 ML   Commonly known as:  BD insulin syringe ULTRAFINE   Quantity:  100 each        Use one syringe 4 times daily or as directed.   Refills:  prn        lisinopril 5 MG tablet   Commonly known as:  PRINIVIL/ZESTRIL   Dose:  5 mg   Quantity:  90 tablet        Take 1 tablet (5 mg) by mouth daily   Refills:  1        metoprolol 25 MG tablet   Commonly known " as:  LOPRESSOR   Dose:  25 mg   Quantity:  60 tablet        Take 1 tablet (25 mg) by mouth 2 times daily   Refills:  11        nitroglycerin 0.4 MG sublingual tablet   Commonly known as:  NITROSTAT   Dose:  0.4 mg   Quantity:  25 tablet        Place 1 tablet (0.4 mg) under the tongue every 5 minutes as needed for chest pain (CALL 911 IF NOT IMPROVED AFTER THREE CONSECUTIVE DOSES)   Refills:  0        omeprazole 20 MG CR capsule   Commonly known as:  priLOSEC   Dose:  20 mg   Quantity:  30 capsule        Take 1 capsule (20 mg) by mouth daily   Refills:  11        order for DME        Equipment being ordered: CPAP AIRSENSE 10 11-15 CM H2O QUATTRO AIR SIZE MED SN# [id number]  DN# [id number]   Refills:  0        * order for DME   Quantity:  1 Device        Equipment being ordered: CPAP machine   Refills:  1        * order for DME   Quantity:  1 Box        Equipment being ordered: diabetic shoes   Refills:  0        * order for DME   Quantity:  1 Device        Equipment being ordered: Nebulizer   Refills:  0        pen needles 31G X 6 MM Misc   Dose:  1 Units   Quantity:  90 each        1 Units 3 times daily For novolog flexpen   Refills:  0        tamsulosin 0.4 MG capsule   Commonly known as:  FLOMAX   Dose:  0.4 mg   Quantity:  90 capsule        Take 1 capsule (0.4 mg) by mouth daily (Needs follow-up appointment for this medication)   Refills:  3        * Notice:  This list has 5 medication(s) that are the same as other medications prescribed for you. Read the directions carefully, and ask your doctor or other care provider to review them with you.            Procedures and tests performed during your visit     CBC with platelets, differential    CRP Inflammation    Lyme Disease Radha with reflex to WB Serum    Uric acid    Wrist XR, G/E 3 views, left      Orders Needing Specimen Collection     None      Pending Results     Date and Time Order Name Status Description    6/22/2017 1845 Lyme Disease Radha with reflex to WB Serum  In process             Pending Culture Results     No orders found from 6/20/2017 to 6/23/2017.            Pending Results Instructions     If you had any lab results that were not finalized at the time of your Discharge, you can call the ED Lab Result RN at [phone]. You will be contacted by this team for any positive Lab results or changes in treatment. The nurses are available 7 days a week from 10A to 6:30P.  You can leave a message 24 hours per day and they will return your call.        Test Results From Your Hospital Stay        6/22/2017  7:26 PM      Component Results     Component Value Ref Range & Units Status    WBC 10.8 4.0 - 11.0 10e9/L Final    RBC Count 4.59 4.4 - 5.9 10e12/L Final    Hemoglobin 13.7 13.3 - 17.7 g/dL Final    Hematocrit 39.8 (L) 40.0 - 53.0 % Final    MCV 87 78 - 100 fl Final    MCH 29.8 26.5 - 33.0 pg Final    MCHC 34.4 31.5 - 36.5 g/dL Final    RDW 13.3 10.0 - 15.0 % Final    Platelet Count 194 150 - 450 10e9/L Final    Diff Method Automated Method  Final    % Neutrophils 63.3 % Final    % Lymphocytes 26.1 % Final    % Monocytes 9.4 % Final    % Eosinophils 0.6 % Final    % Basophils 0.4 % Final    % Immature Granulocytes 0.2 % Final    Absolute Neutrophil 6.9 1.6 - 8.3 10e9/L Final    Absolute Lymphocytes 2.8 0.8 - 5.3 10e9/L Final    Absolute Monocytes 1.0 0.0 - 1.3 10e9/L Final    Absolute Eosinophils 0.1 0.0 - 0.7 10e9/L Final    Absolute Basophils 0.0 0.0 - 0.2 10e9/L Final    Abs Immature Granulocytes 0.0 0 - 0.4 10e9/L Final         6/22/2017  7:31 PM      Component Results     Component Value Ref Range & Units Status    CRP Inflammation 7.7 0.0 - 8.0 mg/L Final         6/22/2017  7:09 PM         6/22/2017  7:31 PM      Component Results     Component Value Ref Range & Units Status    Uric Acid 5.0 3.5 - 7.2 mg/dL Final         6/22/2017  7:26 PM      Narrative     LEFT WRIST THREE OR MORE VIEWS   6/22/2017 7:13 PM     HISTORY: Pain, swelling, no known  injury.    COMPARISON: None.    FINDINGS: There are degenerative changes in the radial aspect of the  left wrist involving carpocarpal and carpometacarpal articulations.  Tiny benign cystic area in the lunate. Small amount of  chondrocalcinosis. Arterial calcification. No fracture or acute  abnormality.        Impression     IMPRESSION: Degenerative changes left wrist as described. Nothing  clearly acute.     MARCIANO REEVES MD                Thank you for choosing [facility]       Thank you for choosing [facility] for your care. Our goal is always to provide you with excellent care. Hearing back from our patients is one way we can continue to improve our services. Please take a few minutes to complete the written survey that you may receive in the mail after you visit with us. Thank you!        Care EveryWhere ID     This is your Care EveryWhere ID. This could be used by other organizations to access your [facility] medical records  [id number]        Equal Access to Services     AUGUSTIN SCHWAB : Nel Lin, coty enamorado, ruth reyes, tory draper. So [facility] [phone].    ATENCIÓN: Si habla español, tiene a rahman disposición servicios gratuitos de asistencia lingüística. Llame al [phone].    We comply with applicable federal civil rights laws and [place] laws. We do not discriminate on the basis of race, color, national origin, age, disability sex, sexual orientation or gender identity.            After Visit Summary       This is your record. Keep this with you and show to your community pharmacist(s) and doctor(s) at your next visit.

## 2017-06-23 LAB — B BURGDOR IGG+IGM SER QL: NORMAL (ref 0–0.89)

## 2017-06-23 NOTE — DISCHARGE INSTRUCTIONS
Arthralgia    Arthralgia is the term for pain in or around the joint. It is a symptom, not a disease. This pain may involve one or more joints. In some cases, the pain moves from joint to joint.  There are many causes for joint pain. These include:    Injury    Osteoarthritis (wearing out of the joint surface)    Gout (inflammation of the joint due to crystals in the joint fluid)    Infection inside the joint      Bursitis (inflammation of the fluid-filled sacs around the joint)    Autoimmune disorders such as rheumatoid arthritis or lupus    Tendonitis (inflamation of chords that attach muscle to bone)  Home care    Rest the involved joint(s) until your symptoms improve.     You may be prescribed pain medication. If none is prescribed, you may use acetaminophen or ibuprofen to control pain and inflammation.  Follow up  Follow up with your healthcare provider or our staff as advised.  When to seek medical care  Contact your healthcare provider right away if any of the following occurs:    Pain, swelling, or redness of joint increases    Pain worsens or recurs after a period of improvement    Pain moves to other joints    You cannot bear weight on the affected joint     You cannot move the affected joint    Joint appears deformed    New rash appears    Fever of 101 F (38.8 C) or higher, or as directed by your healthcare provider  Date Last Reviewed: 4/26/2015 2000-2017 [employer]. [address] [place] [address], [place] [address]. All rights reserved. This information is not intended as a substitute for professional medical care. Always follow your healthcare professional's instructions.

## 2017-06-26 DIAGNOSIS — E11.22 TYPE 2 DIABETES MELLITUS WITH DIABETIC CHRONIC KIDNEY DISEASE (H): ICD-10-CM

## 2017-06-26 NOTE — TELEPHONE ENCOUNTER
ONE TOUCH ULTRA test strip      Last Written Prescription Date: 05/18/2017  Last Fill Quantity: 150 strip,  # refills: 0   Last Office Visit with G, [facility] or [facility] prescribing provider: 06/09/2017

## 2017-06-29 DIAGNOSIS — E78.5 HYPERLIPIDEMIA LDL GOAL <70: ICD-10-CM

## 2017-06-29 RX ORDER — LISINOPRIL 5 MG/1
TABLET ORAL
Qty: 90 TABLET | Refills: 3 | Status: SHIPPED | OUTPATIENT
Start: 2017-06-29 | End: 2018-06-26

## 2017-06-29 NOTE — TELEPHONE ENCOUNTER
Lisinopril 5 mg      Last Written Prescription Date: 1/3/17  Last Fill Quantity: 90, # refills: 1  Last Office Visit with G, [facility] or [facility] prescribing provider: 6/9/17       Potassium   Date Value Ref Range Status   11/09/2016 3.9 3.4 - 5.3 mmol/L Final     Creatinine   Date Value Ref Range Status   11/09/2016 1.16 0.66 - 1.25 mg/dL Final     BP Readings from Last 3 Encounters:   06/22/17 163/77   06/09/17 120/70   04/10/17 120/70

## 2017-07-11 DIAGNOSIS — R33.9 URINARY RETENTION: ICD-10-CM

## 2017-07-11 RX ORDER — FINASTERIDE 5 MG/1
5 TABLET, FILM COATED ORAL DAILY
Qty: 60 TABLET | Refills: 3 | Status: SHIPPED | OUTPATIENT
Start: 2017-07-11 | End: 2017-12-01

## 2017-07-11 NOTE — TELEPHONE ENCOUNTER
Called pt to get him scheduled for a follow up office visit and he reports that the medication is working very well and having no problems.  He does not feel it is necessary to make an office visit at this time.  I advised Franklin that he will need to see Dr. Stevens annually and his last ov was in Nov 2016 therefore will be due Nov 2017.    Gabrielle Chaidez  [facility] RN

## 2017-07-11 NOTE — TELEPHONE ENCOUNTER
Mario           Last Written Prescription Date: 11-15-16  Last Fill Quantity: 60, # refills: 3    Last Office Visit with G, P or [facility] prescribing provider:  06-09-17   Future Office Visit:  Not scheduled.  At last ov with Dr. Stevens on 11-15-16 he advised to schedule next follow up in 1 month.    BP Readings from Last 3 Encounters:   06/22/17 163/77   06/09/17 120/70   04/10/17 120/70     Gabrielle Chaidez  [facility] RN

## 2017-07-13 ENCOUNTER — TRANSFERRED RECORDS (OUTPATIENT)
Dept: HEALTH INFORMATION MANAGEMENT | Facility: CLINIC | Age: 77
End: 2017-07-13

## 2017-07-24 ENCOUNTER — OFFICE VISIT (OUTPATIENT)
Dept: FAMILY MEDICINE | Facility: CLINIC | Age: 77
End: 2017-07-24
Payer: COMMERCIAL

## 2017-07-24 VITALS
WEIGHT: 185.4 LBS | SYSTOLIC BLOOD PRESSURE: 130 MMHG | DIASTOLIC BLOOD PRESSURE: 75 MMHG | BODY MASS INDEX: 28.1 KG/M2 | HEART RATE: 71 BPM | TEMPERATURE: 97.9 F | HEIGHT: 68 IN | OXYGEN SATURATION: 98 %

## 2017-07-24 DIAGNOSIS — J44.9 CHRONIC OBSTRUCTIVE PULMONARY DISEASE, UNSPECIFIED COPD TYPE (H): ICD-10-CM

## 2017-07-24 DIAGNOSIS — N18.30 TYPE 2 DIABETES MELLITUS WITH STAGE 3 CHRONIC KIDNEY DISEASE, WITH LONG-TERM CURRENT USE OF INSULIN (H): ICD-10-CM

## 2017-07-24 DIAGNOSIS — I25.9 CHRONIC ISCHEMIC HEART DISEASE: ICD-10-CM

## 2017-07-24 DIAGNOSIS — Z01.818 PREOP GENERAL PHYSICAL EXAM: Primary | ICD-10-CM

## 2017-07-24 DIAGNOSIS — N18.30 CKD (CHRONIC KIDNEY DISEASE) STAGE 3, GFR 30-59 ML/MIN (H): ICD-10-CM

## 2017-07-24 DIAGNOSIS — E11.22 TYPE 2 DIABETES MELLITUS WITH STAGE 3 CHRONIC KIDNEY DISEASE, WITH LONG-TERM CURRENT USE OF INSULIN (H): ICD-10-CM

## 2017-07-24 DIAGNOSIS — Z79.4 TYPE 2 DIABETES MELLITUS WITH STAGE 3 CHRONIC KIDNEY DISEASE, WITH LONG-TERM CURRENT USE OF INSULIN (H): ICD-10-CM

## 2017-07-24 LAB
ANION GAP SERPL CALCULATED.3IONS-SCNC: 5 MMOL/L (ref 3–14)
BUN SERPL-MCNC: 17 MG/DL (ref 7–30)
CALCIUM SERPL-MCNC: 7.8 MG/DL (ref 8.5–10.1)
CHLORIDE SERPL-SCNC: 101 MMOL/L (ref 94–109)
CO2 SERPL-SCNC: 29 MMOL/L (ref 20–32)
CREAT SERPL-MCNC: 0.94 MG/DL (ref 0.66–1.25)
GFR SERPL CREATININE-BSD FRML MDRD: 78 ML/MIN/1.7M2
GLUCOSE SERPL-MCNC: 203 MG/DL (ref 70–99)
POTASSIUM SERPL-SCNC: 4 MMOL/L (ref 3.4–5.3)
SODIUM SERPL-SCNC: 135 MMOL/L (ref 133–144)

## 2017-07-24 PROCEDURE — 80048 BASIC METABOLIC PNL TOTAL CA: CPT | Performed by: FAMILY MEDICINE

## 2017-07-24 PROCEDURE — 99214 OFFICE O/P EST MOD 30 MIN: CPT | Performed by: FAMILY MEDICINE

## 2017-07-24 PROCEDURE — 93000 ELECTROCARDIOGRAM COMPLETE: CPT | Performed by: FAMILY MEDICINE

## 2017-07-24 PROCEDURE — 36415 COLL VENOUS BLD VENIPUNCTURE: CPT | Performed by: FAMILY MEDICINE

## 2017-07-24 NOTE — MR AVS SNAPSHOT
After Visit Summary   7/24/2017    Alvaro Horton    MRN: [id number]           Patient Information     Date Of Birth          1940        Visit Information        Provider Department      7/24/2017 9:00 AM Be Carreno MD [facility]        Today's Diagnoses     Preop general physical exam    -  1    Chronic ischemic heart disease        Chronic obstructive pulmonary disease, unspecified COPD type (H)        CKD (chronic kidney disease) stage 3, GFR 30-59 ml/min        Type 2 diabetes mellitus with stage 3 chronic kidney disease, with long-term current use of insulin (H)          Care Instructions      Before Your Surgery      Call your surgeon if there is any change in your health. This includes signs of a cold or flu (such as a sore throat, runny nose, cough, rash or fever).    Do not smoke, drink alcohol or take over the counter medicine (unless your surgeon or primary care doctor tells you to) for the 24 hours before and after surgery.    If you take prescribed drugs: Follow your doctor s orders about which medicines to take and which to stop until after surgery.    Eating and drinking prior to surgery: follow the instructions from your surgeon    Take a shower or bath the night before surgery. Use the soap your surgeon gave you to gently clean your skin. If you do not have soap from your surgeon, use your regular soap. Do not shave or scrub the surgery site.  Wear clean pajamas and have clean sheets on your bed.           Follow-ups after your visit        Who to contact     If you have questions or need follow up information about today's clinic visit or your schedule please contact [facility] directly at [phone].  Normal or non-critical lab and imaging results will be communicated to you by MyChart, letter or phone within 4 business days after the clinic has received the results. If you do not hear from us within 7 days, please contact  "the clinic through [employer] or phone. If you have a critical or abnormal lab result, we will notify you by phone as soon as possible.  Submit refill requests through [employer] or call your pharmacy and they will forward the refill request to us. Please allow 3 business days for your refill to be completed.          Additional Information About Your Visit        Care EveryWhere ID     This is your Care EveryWhere ID. This could be used by other organizations to access your [place] medical records  [id number]        Your Vitals Were     Pulse Temperature Height Pulse Oximetry BMI (Body Mass Index)       71 97.9  F (36.6  C) (Tympanic) 5' 8\" (1.727 m) 98% 28.19 kg/m2        Blood Pressure from Last 3 Encounters:   07/24/17 130/75   06/22/17 163/77   06/09/17 120/70    Weight from Last 3 Encounters:   07/24/17 185 lb 6.4 oz (84.1 kg)   06/22/17 180 lb (81.6 kg)   06/09/17 188 lb (85.3 kg)              We Performed the Following     EKG 12-lead complete w/read - Clinics          Today's Medication Changes          These changes are accurate as of: 7/24/17 10:17 AM.  If you have any questions, ask your nurse or doctor.               These medicines have changed or have updated prescriptions.        Dose/Directions    cyanocobalamin 500 MCG Tabs   Commonly known as:  BL VITAMIN B-12   This may have changed:  how much to take   Used for:  Memory loss        Dose:  500 mcg   Take 500 mcg by mouth daily.   Quantity:  90 tablet   Refills:  4                Primary Care Provider Office Phone # Fax #    Be Carreno [phone]       [facility] [id number] [facility] [address]        Equal Access to Services     [facility] : Hadii radha Lin, coty enamorado, qatoshia solaresaltory hale. So [facility] [phone].    ATENCIÓN: Si habla español, tiene a rahman disposición servicios gratuitos de asistencia lingüística. Llame al " [phone].    We comply with applicable federal civil rights laws and [place] laws. We do not discriminate on the basis of race, color, national origin, age, disability sex, sexual orientation or gender identity.            Thank you!     Thank you for choosing [facility]  for your care. Our goal is always to provide you with excellent care. Hearing back from our patients is one way we can continue to improve our services. Please take a few minutes to complete the written survey that you may receive in the mail after your visit with us. Thank you!             Your Updated Medication List - Protect others around you: Learn how to safely use, store and throw away your medicines at www.disposemymeds.org.          This list is accurate as of: 7/24/17 10:17 AM.  Always use your most recent med list.                   Brand Name Dispense Instructions for use Diagnosis    albuterol (2.5 MG/3ML) 0.083% neb solution     1 Box    Take 1 vial (2.5 mg) by nebulization every 4 hours as needed for shortness of breath / dyspnea or wheezing    Moderate persistent asthma without complication       arformoterol 15 MCG/2ML Nebu neb solution    BROVANA    120 mL    Take 2 mLs (15 mcg) by nebulization 2 times daily    Chronic obstructive pulmonary disease, unspecified COPD type (H)       aspirin 81 MG tablet      Take by mouth daily        atorvastatin 80 MG tablet    LIPITOR    30 tablet    Take 1 tablet (80 mg) by mouth daily    Hyperlipidemia LDL goal <70       blood glucose monitoring meter device kit     1 kit    Use to test blood sugars 4 times daily or as directed.    Type 2 diabetes mellitus with diabetic chronic kidney disease (H)       * blood glucose monitoring test strip    no brand specified    100 strip    1 strip by In Vitro route 3 times daily Diagnosis:  250.00.    Type 2 diabetes, HbA1C goal < 8% (H)       * ONE TOUCH ULTRA test strip   Generic drug:  blood glucose monitoring     150 strip     "USE ONE STRIP TO CHECK GLUCOSE THREE TIMES DAILY    Type 2 diabetes mellitus with diabetic chronic kidney disease (H)       budesonide 0.5 MG/2ML neb solution    PULMICORT    60 ampule    Take 2 mLs (0.5 mg) by nebulization 2 times daily    Moderate persistent asthma without complication       cholecalciferol 2000 UNITS tablet     90 tablet    Take 1 tablet by mouth daily.    Memory loss       cyanocobalamin 500 MCG Tabs    BL VITAMIN B-12    90 tablet    Take 500 mcg by mouth daily.    Memory loss       finasteride 5 MG tablet    PROSCAR    60 tablet    Take 1 tablet (5 mg) by mouth daily    Urinary retention       fish oil-omega-3 fatty acids 1000 MG capsule     180 capsule    Take 1 capsule by mouth 2 times daily.    Hyperlipidemia LDL goal <100       GLUCOPHAGE 1000 MG tablet   Generic drug:  metFORMIN     60 tablet    Take 1 tablet (1,000 mg) by mouth 2 times daily (with meals)    Type 2 diabetes mellitus with stage 3 chronic kidney disease, with long-term current use of insulin (H)       insulin aspart 100 UNIT/ML injection    NovoLOG FLEXPEN    30 mL    11 units before breakfast, 11 units before lunch, 11 units before dinner    Type 2 diabetes mellitus with stage 3 chronic kidney disease, with long-term current use of insulin (H)       insulin detemir 100 UNIT/ML injection    LEVEMIR FLEXPEN/FLEXTOUCH    15 mL    Inject 30 Units Subcutaneous At Bedtime    Type 2 diabetes mellitus with stage 3 chronic kidney disease, with long-term current use of insulin (H)       insulin syringe-needle U-100 31G X 5/16\" 1 ML    BD insulin syringe ULTRAFINE    100 each    Use one syringe 4 times daily or as directed.    Type 2 diabetes, HbA1C goal < 8% (H)       * lisinopril 5 MG tablet    PRINIVIL/ZESTRIL    90 tablet    Take 1 tablet (5 mg) by mouth daily    Hyperlipidemia LDL goal <70       * lisinopril 5 MG tablet    PRINIVIL/ZESTRIL    90 tablet    TAKE ONE TABLET BY MOUTH ONCE DAILY    Hyperlipidemia LDL goal <70       " metoprolol 25 MG tablet    LOPRESSOR    60 tablet    Take 1 tablet (25 mg) by mouth 2 times daily    Chronic ischemic heart disease, unspecified       nitroGLYcerin 0.4 MG sublingual tablet    NITROSTAT    25 tablet    Place 1 tablet (0.4 mg) under the tongue every 5 minutes as needed for chest pain (CALL 911 IF NOT IMPROVED AFTER THREE CONSECUTIVE DOSES)        omeprazole 20 MG CR capsule    priLOSEC    30 capsule    Take 1 capsule (20 mg) by mouth daily    Gastroesophageal reflux disease without esophagitis       order for DME      Equipment being ordered: CPAP AIRSENSE 10 11-15 CM H2O QUATTRO AIR SIZE MED SN# [id number]  DN# [id number]        * order for DME     1 Device    Equipment being ordered: CPAP machine    Obstructive sleep apnea (adult) (pediatric)       * order for DME     1 Box    Equipment being ordered: diabetic shoes    Type 2 diabetes mellitus with diabetic chronic kidney disease (H)       * order for DME     1 Device    Equipment being ordered: Nebulizer        pen needles 31G X 6 MM Misc     90 each    1 Units 3 times daily For novolog flexpen    Type 2 diabetes, HbA1C goal < 8% (H)       tamsulosin 0.4 MG capsule    FLOMAX    90 capsule    Take 1 capsule (0.4 mg) by mouth daily (Needs follow-up appointment for this medication)    Benign non-nodular prostatic hyperplasia with lower urinary tract symptoms       * Notice:  This list has 7 medication(s) that are the same as other medications prescribed for you. Read the directions carefully, and ask your doctor or other care provider to review them with you.

## 2017-07-24 NOTE — PROGRESS NOTES
[facility]  [address] [place] [id number]  [phone]  Dept: [phone]    PRE-OP EVALUATION:  Today's date: 2017    Alvaro Horton (: 1940) presents for pre-operative evaluation assessment as requested by Dr. Thornton.  He requires evaluation and anesthesia risk assessment prior to undergoing surgery/procedure for treatment of colonoscopy .  Proposed procedure: Colonoscopy     Date of Surgery/ Procedure: 2017  Time of Surgery/ Procedure: 2:30pm  Hospital/Surgical Facility: [facility]  Fax number for surgical facility: [phone]  Primary Physician: Be Carreno  Type of Anesthesia Anticipated: General    Patient has a Health Care Directive or Living Will:  YES     1. YES - Do you have a history of heart attack, stroke, stent, bypass or surgery on an artery in the head, neck, heart or legs?  2. NO - Do you ever have any pain or discomfort in your chest?  3. YES - Do you have a history of  Heart Failure?  4. YES - Are you troubled by shortness of breath when: walking on the level, up a slight hill or at night?  5. NO - Do you currently have a cold, bronchitis or other respiratory infection?  6. YES - Do you have a cough, shortness of breath or wheezing?  7. NO - Do you sometimes get pains in the calves of your legs when you walk?  8. NO - Do you or anyone in your family have previous history of blood clots?  9. NO - Do you or does anyone in your family have a serious bleeding problem such as prolonged bleeding following surgeries or cuts?  10. NO - Have you ever had problems with anemia or been told to take iron pills?  11. NO - Have you had any abnormal blood loss such as black, tarry or bloody stools, or abnormal vaginal bleeding?  12. NO - Have you ever had a blood transfusion?  13. NO - Have you or any of your relatives ever had problems with anesthesia?  14. YES - Do you have sleep apnea, excessive snoring or daytime  "drowsiness?  15. NO - Do you have any prosthetic heart valves?  16. NO - Do you have prosthetic joints?  17. N/A - Is there any chance that you may be pregnant?        HPI:                                                    He has had diarrhea for over a year.  Last colonoscopy was November, 2006.    He has consulted GI regarding the diarrhea and is now scheduled for a colonoscopy.    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 RAST for bakers yeast negative 2002. Has occupational exposures with related worsening asthmatic symptoms. CT 2/08- no fibrosis.       Moderate Persistent Asthma      Priority: High     PFTS 1997 - FEV1- 3.39 (64%), ratio 0.74, 24% change ejh21-60% with bronchodilators,  TLC 86%, %, DLCO 78%. Methacholine challenge positive at level 7 2001.  PFTS 2004 - FEV1- 1.93 (63%), ratio 0.77. PFTS 4/08 - FEV1- 2.13 (72%), ratio 0.71, no change with bronchodilators,  TLC 78%, RV 96%, DLCO 64%              Chronic ischemic heart disease      Priority: High     atypical chest pain 2001 with GXT echo- decreased LVF, angiogram 2001- nonobstructing 3 vessel disease.   Repeat angio 2004- prox RCA 50-60%,  OM2- 40%, D1 30-40%, LAD 30-40%.   Cardiolite 2005 during hospitalization for SOB in [place] reported to reveal no ischemia.   Adenosine cardiolite 1/08- small slightly reversible inferior defect, most likely consistent with diaphragm attenuation with bowel uptake artifact. USA, Angio 11/09- Severe LM disease, severe subtotal occlusion of a large branch OM1.    S/p CABG x2 11/09- LIMA-D1, SVG to OM1, OM2. GXT 8/10- 5.8 mets, 121 (80%) HR, normal ekg, cardiolite- small fixed inferior/basal defect with small area khurram-infarct ischemia extending into the mid " "ventricle. EF 67%.  4/25/13:adenosine cardiolyte stress test through [facility] with normal EF and no ischemia.   1/16/2016 nuclear stress test:1.  Myocardial perfusion imaging using single isotope technique demonstrated no evidence for myocardial ischemia. 2. Gated images demonstrated normal wall motion with a calculated ejection fraction of 61%.  3. Compared to the prior study from 2011 there are no significant changes .           Type 2 diabetes mellitus with diabetic chronic kidney disease (H) 10/30/2015     Priority: Medium     Altered mental status 12/31/2014     Priority: Medium     12/27/2014:episode confusion for 20 minutes.  Seen at [facility]/-hospitalized.  Head CT, MRI/MRA all normal.  PET cardiac perfusion stress test normal.  Neurology consult-diagnosis= possible hypoglycemia, migraine variant.  \"Acute ischemic cerebral event was excluded\".       CKD (chronic kidney disease) stage 3, GFR 30-59 ml/min 05/06/2014     Priority: Medium     9/9/2015:He has had two abnormal MAC in the past.        COPD (chronic obstructive pulmonary disease) (H) 11/18/2013     Priority: Medium     PFT 11/15/13 results:FVC=59%, FEV1=54%, FEV1/FVC=92%.  His DLCO was 58%1. Spirometry: FEV1 moderately reduced. FVC moderately reduced. FEV1/FVC ratio reduced. 2. Bronchodilator Response: A 14% improvement is seen in FEV1 with bronchodilators. 3. Lung Volumes: Total lung capacity normal. Residual volume increased. Residual volume total lung capacity ratio increased. 4. DLCO: Moderately reduced.   INTERPRETATION: Moderate obstructive lung disease. Reversibility with bronchodilators is seen on testing today. Lung volumes show evidence for air trapping. DLCO is reduced, consistent with loss of capillary-alveolar exchange as in emphysema, pulmonary hypertension, chronic pulmonary embolus, pulmonary fibrosis or other pulmonary vascular disease. A concomitant restrictive lung disease process may be suspected on the basis of moderate reductions " in FEV1 and FVC, with a fairly well-preserved FEV1/FVC ratio and a borderline low normal total lung capacity.   11/15/13:exercise oximetry did not show significant desaturation below 91%.  PFT February, 2014 results:FVC=54%, FEV1=48%, FEV1/FVC=64%.  DLCO=59%         Hyperlipidemia LDL goal <70 04/17/2013     Priority: Medium     Atrial fibrillation (H) 12/14/2009     Priority: Medium     After CABG 11/09       CVA (cerebral vascular accident) (H) 11/27/2009     Priority: Medium     Mild Broca's aphasia, confusion, right hand dysesthesia after angio. MRA new small area of restricted diffusion in the white matter of the medial right temporal lobe,  consistent with a recent small infarct. Speech problems resolved, still mild right hand problems.         Obstructive sleep apnea 04/14/2008     Priority: Medium     ESS 20/24. Sleep study [facility]: 4/8/08. total sleep time was 423.0 minutes. The sleep latency was normal at 9.7 minutes.  REM sleep latency was 161.5 minutes. Sleep efficiency was normal at 82.7%. The sleep architecture was disrupted with frequent sleep stage changes and arousals. Snoring: Was reported as moderately loud. Lowest O2 saturation was 87.0%. This study is suggestive of mild sleep apnea, severe during REM sleep (21% TST).  PLM index was 0.0. EKG:  No significant cardiac arrhythmias were noted.         Hypertrophy of prostate without urinary obstruction      Priority: Medium     Elevated PSA. Bx negative 2004.  Problem list name updated by automated process. Provider to review       Health Care Home 08/11/2014     Priority: Low     State Tier Level:    Status:  Unable to re-connect  Care Coordinator:  Kecia Mills    See Letters for [facility] Care Plan  Date:  August 11, 2014             Advanced care planning/counseling discussion 09/24/2012     Priority: Low     Does not have a directive 9/12       Dyslexia 11/11/2010     Priority: Low     essentially unable to read       CTS (carpal tunnel  syndrome) 05/12/2010     Priority: Low     EMG 5/10-  median neuropathy at the right wrist, moderate in severity electrically, right ulnar neuropathy localizing to the elbow, mild chronic right C6 radiculopathy without evidence for acute denervation.        Memory loss 05/03/2010     Priority: Low     MMSE 27/30 (0/3 attention recall, ?history of dylexia), PHQ9-16 7/09. Recommended neurocogntive testing, did not complete.  MMSE 23/30 6/10 (attention, county, if/and/buts).Neurocogntive evaluation  1/10- not suspicious for emerging dementia. Felt likely due hearing loss/dyslexia.        Benign paroxysmal vertigo 02/03/2009     Priority: Low     Admit 2/09. MRI/MRA head and neck negative.       Sensorineural hearing loss, bilateral 10/08/2007     Priority: Low     Esophageal reflux 11/06/2006     Priority: Low     PSORIASIS      Priority: Low     OVERACTIVE BLADDER      Priority: Low     PVR 84 2004.        Past Medical History:   Diagnosis Date     Calculus of kidney     1990. s/p ESWL and percutaneous nephrolithotomy     Depression     Hospitalized 1979     Left hand weakness 12/14/2009    Afterbypass surgery, CVA, improved.      PNEUMONIA, ORGANISM NOS 2/14/2008    CT 2/08-  Pulmonary atelectasis and infiltrate in the posterior left lower lung base.      Sensorineural hearing loss, unspecified 9/30/2005     Past Surgical History:   Procedure Laterality Date     C ANESTH,DX ARTHROSCOPIC PROC KNEE JOINT  1994, 1998     Left     CARDIAC SURGERY  11/09    CABG x2 - LIMA-D1, SVG to OM1, OM2.     GENITOURINARY SURGERY  1990    ESWL and percutaneous nephrolithotomy     ORTHOPEDIC SURGERY  8/12    right CTR     SURGICAL HISTORY OF -   1998    Hernia repair     SURGICAL HISTORY OF -   2004    NormalColonoscopy      SURGICAL HISTORY OF -   2006    Normal Colonoscopy     Current Outpatient Prescriptions   Medication Sig Dispense Refill     finasteride (PROSCAR) 5 MG tablet Take 1 tablet (5 mg) by mouth daily 60 tablet 3      lisinopril (PRINIVIL/ZESTRIL) 5 MG tablet TAKE ONE TABLET BY MOUTH ONCE DAILY 90 tablet 3     ONE TOUCH ULTRA test strip USE ONE STRIP TO CHECK GLUCOSE THREE TIMES DAILY 150 strip 0     atorvastatin (LIPITOR) 80 MG tablet Take 1 tablet (80 mg) by mouth daily 30 tablet 5     metFORMIN (GLUCOPHAGE) 1000 MG tablet Take 1 tablet (1,000 mg) by mouth 2 times daily (with meals) 60 tablet 0     budesonide (PULMICORT) 0.5 MG/2ML neb solution Take 2 mLs (0.5 mg) by nebulization 2 times daily 60 ampule 11     blood glucose monitoring (NO BRAND SPECIFIED) test strip 1 strip by In Vitro route 3 times daily Diagnosis:  250.00. 100 strip 1     Insulin Pen Needle (PEN NEEDLES) 31G X 6 MM MISC 1 Units 3 times daily For novolog flexpen 90 each 0     insulin detemir (LEVEMIR FLEXPEN/FLEXTOUCH) 100 UNIT/ML injection Inject 30 Units Subcutaneous At Bedtime 15 mL 11     insulin aspart (NOVOLOG FLEXPEN) 100 UNIT/ML injection 11 units before breakfast, 11 units before lunch, 11 units before dinner 30 mL 3     arformoterol (BROVANA) 15 MCG/2ML NEBU neb solution Take 2 mLs (15 mcg) by nebulization 2 times daily 120 mL 11     metoprolol (LOPRESSOR) 25 MG tablet Take 1 tablet (25 mg) by mouth 2 times daily 60 tablet 11     lisinopril (PRINIVIL/ZESTRIL) 5 MG tablet Take 1 tablet (5 mg) by mouth daily 90 tablet 1     tamsulosin (FLOMAX) 0.4 MG 24 hr capsule Take 1 capsule (0.4 mg) by mouth daily (Needs follow-up appointment for this medication) 90 capsule 3     omeprazole (PRILOSEC) 20 MG capsule Take 1 capsule (20 mg) by mouth daily 30 capsule 11     order for DME Equipment being ordered: CPAP  AIRSENSE 10  11-15 CM H2O  [employer] AIR SIZE MED  SN# [id number]  DN# [id number]       albuterol (2.5 MG/3ML) 0.083% nebulizer solution Take 1 vial (2.5 mg) by nebulization every 4 hours as needed for shortness of breath / dyspnea or wheezing 1 Box 11     order for DME Equipment being ordered: Nebulizer 1 Device 0     blood glucose monitoring (ONE TOUCH ULTRA 2)  "meter device kit Use to test blood sugars 4 times daily or as directed. 1 kit 0     order for DME Equipment being ordered: diabetic shoes 1 Box 0     nitroglycerin (NITROSTAT) 0.4 MG SL tablet Place 1 tablet (0.4 mg) under the tongue every 5 minutes as needed for chest pain (CALL 911 IF NOT IMPROVED AFTER THREE CONSECUTIVE DOSES) 25 tablet 0     order for DME Equipment being ordered: CPAP machine 1 Device 1     insulin syringe-needle U-100 (BD INSULIN SYRINGE ULTRAFINE) 31G X 5/16\" 1 ML Use one syringe 4 times daily or as directed. 100 each prn     aspirin 81 MG tablet Take by mouth daily       [DISCONTINUED] metoprolol (TOPROL-XL) 25 MG 24 hr tablet Take 1 tablet (25 mg) by mouth 2 times daily 180 tablet 3     [DISCONTINUED] LANTUS SOLOSTAR SOLN 100 UNIT/ML 26 units at bedtime 3 Month 0     cholecalciferol 2000 UNITS tablet Take 1 tablet by mouth daily. 90 tablet 3     fish oil-omega-3 fatty acids (FISH OIL) 1000 MG capsule Take 1 capsule by mouth 2 times daily. 180 capsule 3     cyanocobalamin (BL VITAMIN B-12) 500 MCG TABS Take 500 mcg by mouth daily. (Patient taking differently: Take 2,500 mcg by mouth daily ) 90 tablet 4     OTC products: lomotil    Allergies   Allergen Reactions     Prednisone      Severe interaction with DM      Latex Allergy: NO    Social History   Substance Use Topics     Smoking status: Never Smoker     Smokeless tobacco: Never Used     Alcohol use No     History   Drug Use No       REVIEW OF SYSTEMS:                                                    ROS:  General: POSITIVE for:, loss of weight (11# in the past year) and poor appetite since wife .   Eyes: Negative for vision changes or eye problems, he has eye appointment in August.   ENT: POSITIVE for:, hearing loss, hearing aids bilateral.   Resp: POSITIVE for:, SOB/dyspnea, doing OK lately.  Can walk up a flight of stairs without stopping.   CV: No chest pains or palpitations  GI: POSITIVE for:, diarrhea  : POSITIVE for:, urinary " "frequency, in the AM.  Some urgency.   Musculoskeletal: POSITIVE  for:, neck pain  Neurologic: No headaches, numbness, tingling, weakness, problems with balance or coordination  Psychiatric: POSITIVE for:, depression, doing well.  Heme/immune/allergy: No history of bleeding or clotting problems or anemia.  No allergies or immune system problems  Endocrine: POSITIVE for:, diabetes mellitus.    Lab Results   Component Value Date    A1C 7.9 06/09/2017     Skin: No rashes,worrisome lesions or skin problems     EXAM:                                                    OBJECTIVE:Blood pressure 130/75, pulse 71, temperature 97.9  F (36.6  C), temperature source Tympanic, height 5' 8\" (1.727 m), weight 185 lb 6.4 oz (84.1 kg), SpO2 98 %. BMI=Body mass index is 28.19 kg/(m^2).  GENERAL APPEARANCE ADULT: Alert, no acute distress  EYES: PERRL, EOM normal, conjunctiva and lids normal  HENT: Ears and TMs normal, oral mucosa and posterior oropharynx normal, hearing aids bilateral.   NECK: No adenopathy,masses or thyromegaly  RESP: lungs clear to auscultation   CV: normal rate, regular rhythm, no murmur or gallop  ABDOMEN: soft, no organomegaly, masses or tenderness  MS: extremities normal, no peripheral edema     DIAGNOSTICS:                                                    EKG: appears normal, NSR, first degree AV block, normal axis, normal intervals, no acute ST/T changes c/w ischemia, no LVH by voltage criteria, Right Bundle Branch Block, occasional PVC.  unchanged from previous tracings  Chem8 pending.     Recent Labs   Lab Test  06/22/17   1859  06/09/17   1204  11/09/16   0635  11/07/16   1634   12/26/14   1535   HGB  13.7   --   14.3  13.3   < >  14.2   PLT  194   --   181  163   < >  193   INR   --    --   0.94   --    --   0.91   NA   --    --   141  141   < >  140   POTASSIUM   --    --   3.9  3.9   < >  4.0   CR   --    --   1.16  1.10   < >  1.32*   A1C   --   7.9*   --   7.5*   < >   --     < > = values in this " interval not displayed.        IMPRESSION:                                                    Reason for surgery/procedure: colonoscopy for diarrhea    The proposed surgical procedure is considered LOW risk.    REVISED CARDIAC RISK INDEX  The patient has the following serious cardiovascular risks for perioperative complications such as (MI, PE, VFib and 3  AV Block):  Coronary Artery Disease (MI, positive stress test, angina, Qs on EKG) which has been stable  INTERPRETATION: 1 risks: Class II (low risk - 0.9% complication rate)    The patient has the following additional risks for perioperative complications:  No identified additional risks      ICD-10-CM    1. Preop general physical exam Z01.818    2. Chronic ischemic heart disease I25.9    3. Chronic obstructive pulmonary disease, unspecified COPD type (H) J44.9    4. CKD (chronic kidney disease) stage 3, GFR 30-59 ml/min N18.3    5. Type 2 diabetes mellitus with stage 3 chronic kidney disease, with long-term current use of insulin (H) E11.22     N18.3     Z79.4        RECOMMENDATIONS:                                                        Cardiovascular Risk  Performs 4 METs exercise without symptoms (Climb a flight of stairs) .   Patient is already on a Beta Blocker. Continue Betablocker therapy after surgery, using Beta blocker order set as necessary for NPO status.        --Patient is to take all scheduled medications on the day of surgery EXCEPT for modifications listed below.    Diabetes Medication Use  -----Hold usual  oral diabetic meds (e.g. Metformin, Actos, Glipizide) while NPO.   -----Take 80% of long acting insulin (e.g. Lantus, NPH) while NPO (fasting)  -----Hold short acting insulin (e.g. Novolog, Humalog) while NPO (fasting)      Levemir insulin dose the night before surgery-decrease to 24 units.   No metformin the day of surgery.  You can restart when eating and drinking normally.   No short acting insulin (Novolog) the morning of surgery.  You  can resume Novolog when eating and drinking normally.     APPROVAL GIVEN to proceed with proposed procedure, without further diagnostic evaluation       Signed Electronically by: Be Carreno MD    Copy of this evaluation report is provided to requesting physician.    [place] Preop Guidelines

## 2017-07-24 NOTE — NURSING NOTE
"Chief Complaint   Patient presents with     Pre-Op Exam       Initial LMP 02/24/2017 (Exact Date) Estimated body mass index is 23.49 kg/(m^2) as calculated from the following:    Height as of 4/4/17: 5' 7\" (1.702 m).    Weight as of 4/4/17: 150 lb (68 kg).  Medication Reconciliation: complete     Shea Jerry CMA   "

## 2017-08-01 ENCOUNTER — TRANSFERRED RECORDS (OUTPATIENT)
Dept: HEALTH INFORMATION MANAGEMENT | Facility: CLINIC | Age: 77
End: 2017-08-01

## 2017-09-02 DIAGNOSIS — E11.22 TYPE 2 DIABETES MELLITUS WITH DIABETIC CHRONIC KIDNEY DISEASE (H): ICD-10-CM

## 2017-09-03 NOTE — TELEPHONE ENCOUNTER
ONE TOUCH ULTRA test strip      Last Written Prescription Date: 6/26/17  Last Fill Quantity: 150,  # refills: 0   Last Office Visit with G, P or [facility] prescribing provider: 7/24/17                                           Brenda BRUMFIELD)

## 2017-09-12 ENCOUNTER — TRANSFERRED RECORDS (OUTPATIENT)
Dept: HEALTH INFORMATION MANAGEMENT | Facility: CLINIC | Age: 77
End: 2017-09-12

## 2017-09-25 ENCOUNTER — OFFICE VISIT (OUTPATIENT)
Dept: FAMILY MEDICINE | Facility: CLINIC | Age: 77
End: 2017-09-25
Payer: COMMERCIAL

## 2017-09-25 VITALS
BODY MASS INDEX: 28.04 KG/M2 | WEIGHT: 185 LBS | TEMPERATURE: 97.6 F | DIASTOLIC BLOOD PRESSURE: 70 MMHG | HEART RATE: 72 BPM | SYSTOLIC BLOOD PRESSURE: 120 MMHG | HEIGHT: 68 IN | RESPIRATION RATE: 16 BRPM

## 2017-09-25 DIAGNOSIS — M54.2 NECK PAIN: Primary | ICD-10-CM

## 2017-09-25 PROCEDURE — 99213 OFFICE O/P EST LOW 20 MIN: CPT | Performed by: FAMILY MEDICINE

## 2017-09-25 NOTE — PROGRESS NOTES
SUBJECTIVE:   Alvaro Horton is a 77 year old male who presents to clinic today for the following health issues:  Chief Complaint   Patient presents with     Neck Pain      Neck Pain      Duration: Electric sensation 1/2 a year  Old injury 20 years ago.    Description (location/character/radiation): Electric sensation down from neck into the left arm.    Intensity:  moderate    Accompanying signs and symptoms: see above    History (similar episodes/previous evaluation): yes but years ago    Precipitating or alleviating factors: None    Therapies tried and outcome: Ibuprofen occ helps some     Gets electric pains often base of left posterior neck to anterior upper left chest and upper left arm not below elbow.   Some neck pain for 1 and 1/2 years.   Neck feels tired after bending head forward like reading a book.   Some ache.   Some numbness and weakness in both hands he feels is from arthritis.   No shoulder pain.   No headaches but pain can go up to head.   Alleviating factors: ibuprofen helps some.     Work injury 20 years ago with intermittent pain since.     Patient Active Problem List   Diagnosis     Chronic ischemic heart disease     Moderate Persistent Asthma     PSORIASIS     Hypertrophy of prostate without urinary obstruction     OVERACTIVE BLADDER     BAKERS LUNG     Hypertension goal BP (blood pressure) < 140/90     Esophageal reflux     Sensorineural hearing loss, bilateral     Obstructive sleep apnea     Benign paroxysmal vertigo     CVA (cerebral vascular accident) (H)     Atrial fibrillation (H)     Memory loss     CTS (carpal tunnel syndrome)     Dyslexia     Advanced care planning/counseling discussion     Hyperlipidemia LDL goal <70     COPD (chronic obstructive pulmonary disease) (H)     CKD (chronic kidney disease) stage 3, GFR 30-59 ml/min     Health Care Home     Altered mental status     Type 2 diabetes mellitus with diabetic chronic kidney disease (H)      OBJECTIVE:Blood pressure  "120/70, pulse 72, temperature 97.6  F (36.4  C), temperature source Tympanic, resp. rate 16, height 5' 8\" (1.727 m), weight 185 lb (83.9 kg). BMI=Body mass index is 28.13 kg/(m^2).  GENERAL APPEARANCE ADULT: Alert, no acute distress  NECK: No adenopathy,masses or thyromegaly  MS: neck exam: appears to move neck normally, normal neck posture, range of motion: good flexion and extension.  He has pain with lateral flexion and rotation to either side, normal neurological exam of arms; normal DTR's, motor, sensory exam     ASSESSMENT:   (M54.2) Neck pain  (primary encounter diagnosis)  Comment: muscular-ligamentous   Plan: PHYSICAL THERAPY REFERRAL          Heat or ice is OK.   Schedule an appointment with physical therapy.   OK for acetaminophen.     Acetaminophen (Tylenol) may be taken up to 4000mg daily which would be 350mg, 2 pills every 4 hours or 500mg (extra strength) 2 pills 4 times a day.   Watch out for acetaminophen in other OTC or prescription medications.    Too much acetaminophen can lead to serious liver damage.     Limit use of ibuprofen and Aleve as they can interfere with kidney functioning.     We could try muscle relaxer medication like Flexeril if you want to try something else.            "

## 2017-09-25 NOTE — PATIENT INSTRUCTIONS
ASSESSMENT:   (M54.2) Neck pain  (primary encounter diagnosis)  Comment: muscular-ligamentous   Plan: PHYSICAL THERAPY REFERRAL          Heat or ice is OK.   Schedule an appointment with physical therapy.   OK for acetaminophen.     Acetaminophen (Tylenol) may be taken up to 4000mg daily which would be 350mg, 2 pills every 4 hours or 500mg (extra strength) 2 pills 4 times a day.   Watch out for acetaminophen in other OTC or prescription medications.    Too much acetaminophen can lead to serious liver damage.     Limit use of ibuprofen and Aleve as they can interfere with kidney functioning.     We could try muscle relaxer medication like Flexeril if you want to try something else.

## 2017-09-25 NOTE — NURSING NOTE
"Chief Complaint   Patient presents with     Neck Pain       Initial /70  Pulse 72  Temp 97.6  F (36.4  C) (Tympanic)  Resp 16  Ht 5' 8\" (1.727 m)  Wt 185 lb (83.9 kg)  BMI 28.13 kg/m2 Estimated body mass index is 28.13 kg/(m^2) as calculated from the following:    Height as of this encounter: 5' 8\" (1.727 m).    Weight as of this encounter: 185 lb (83.9 kg).  Medication Reconciliation: complete    Health Maintenance that is potentially due pending provider review:          Is there anyone who you would like to be able to receive your results?   If yes have patient fill out THE[facility]    "

## 2017-09-25 NOTE — MR AVS SNAPSHOT
After Visit Summary   9/25/2017    Alvaro Horton    MRN: [id number]           Patient Information     Date Of Birth          1940        Visit Information        Provider Department      9/25/2017 10:00 AM Be Carreno MD [facility]        Today's Diagnoses     Neck pain    -  1      Care Instructions    ASSESSMENT:   (M54.2) Neck pain  (primary encounter diagnosis)  Comment: muscular-ligamentous   Plan: PHYSICAL THERAPY REFERRAL          Heat or ice is OK.   Schedule an appointment with physical therapy.   OK for acetaminophen.     Acetaminophen (Tylenol) may be taken up to 4000mg daily which would be 350mg, 2 pills every 4 hours or 500mg (extra strength) 2 pills 4 times a day.   Watch out for acetaminophen in other OTC or prescription medications.    Too much acetaminophen can lead to serious liver damage.     Limit use of ibuprofen and Aleve as they can interfere with kidney functioning.     We could try muscle relaxer medication like Flexeril if you want to try something else.          Follow-ups after your visit        Additional Services     PHYSICAL THERAPY REFERRAL       *This therapy referral will be filtered to a centralized scheduling office at [facility] and the patient will receive a call to schedule an appointment at a [place] location most convenient for them. *     [facility] provides Physical Therapy evaluation and treatment and many specialty services across the [place] system.  If requesting a specialty program, please choose from the list below.    If you have not heard from the scheduling office within 2 business days, please call [phone] for all locations, with the exception of [place], please call [phone].  Treatment: Evaluation & Treatment  Special Instructions/Modalities:   Special Programs:   Persistent neck pain.     Please be aware that coverage of these services is subject to the terms  "and limitations of your health insurance plan.  Call member services at your health plan with any benefit or coverage questions.      **Note to Provider:  If you are referring outside of [place] for the therapy appointment, please list the name of the location in the \"special instructions\" above, print the referral and give to the patient to schedule the appointment.                  Who to contact     If you have questions or need follow up information about today's clinic visit or your schedule please contact [facility] directly at [phone].  Normal or non-critical lab and imaging results will be communicated to you by shopphart, letter or phone within 4 business days after the clinic has received the results. If you do not hear from us within 7 days, please contact the clinic through shopphart or phone. If you have a critical or abnormal lab result, we will notify you by phone as soon as possible.  Submit refill requests through Schoo or call your pharmacy and they will forward the refill request to us. Please allow 3 business days for your refill to be completed.          Additional Information About Your Visit        Schoo Information     Schoo lets you send messages to your doctor, view your test results, renew your prescriptions, schedule appointments and more. To sign up, go to [email or web address] . Click on \"Log in\" on the left side of the screen, which will take you to the Welcome page. Then click on \"Sign up Now\" on the right side of the page.     You will be asked to enter the access code listed below, as well as some personal information. Please follow the directions to create your username and password.     Your access code is: [email or web address]  Expires: 2017 11:16 AM     Your access code will  in 90 days. If you need help or a new code, please call your [facility] or [phone].        Care EveryWhere ID     This is your Care EveryWhere ID. This could be " "used by other organizations to access your [facility] medical records  [id number]        Your Vitals Were     Pulse Temperature Respirations Height BMI (Body Mass Index)       72 97.6  F (36.4  C) (Tympanic) 16 5' 8\" (1.727 m) 28.13 kg/m2        Blood Pressure from Last 3 Encounters:   09/25/17 120/70   07/24/17 130/75   06/22/17 163/77    Weight from Last 3 Encounters:   09/25/17 185 lb (83.9 kg)   07/24/17 185 lb 6.4 oz (84.1 kg)   06/22/17 180 lb (81.6 kg)              We Performed the Following     Colonoscopy - HIM Scan     PHYSICAL THERAPY REFERRAL          Today's Medication Changes          These changes are accurate as of: 9/25/17 11:16 AM.  If you have any questions, ask your nurse or doctor.               These medicines have changed or have updated prescriptions.        Dose/Directions    cyanocobalamin 500 MCG Tabs   Commonly known as:  BL VITAMIN B-12   This may have changed:  how much to take   Used for:  Memory loss        Dose:  500 mcg   Take 500 mcg by mouth daily.   Quantity:  90 tablet   Refills:  4                Primary Care Provider Office Phone # Fax #    Be Carreno [phone] [address] [place], [place] [address]        Equal Access to Services     AUGUSTIN SCHWAB : Nel haddado Sothais, waaxda luqadaha, qaybta kaalmada adeegyada, tory draper. So [facility] [phone].    ATENCIÓN: Si habla español, tiene a rahman disposición servicios gratuitos de asistencia lingüística. Llame al [phone].    We comply with applicable federal civil rights laws and [place] laws. We do not discriminate on the basis of race, color, national origin, age, disability sex, sexual orientation or gender identity.            Thank you!     Thank you for choosing [facility]  for your care. Our goal is always to provide you with excellent care. Hearing back from our patients is one way we can continue to improve our services. Please " take a few minutes to complete the written survey that you may receive in the mail after your visit with us. Thank you!             Your Updated Medication List - Protect others around you: Learn how to safely use, store and throw away your medicines at www.disposemymeds.org.          This list is accurate as of: 9/25/17 11:16 AM.  Always use your most recent med list.                   Brand Name Dispense Instructions for use Diagnosis    albuterol (2.5 MG/3ML) 0.083% neb solution     1 Box    Take 1 vial (2.5 mg) by nebulization every 4 hours as needed for shortness of breath / dyspnea or wheezing    Moderate persistent asthma without complication       arformoterol 15 MCG/2ML Nebu neb solution    BROVANA    120 mL    Take 2 mLs (15 mcg) by nebulization 2 times daily    Chronic obstructive pulmonary disease, unspecified COPD type (H)       aspirin 81 MG tablet      Take by mouth daily        atorvastatin 80 MG tablet    LIPITOR    30 tablet    Take 1 tablet (80 mg) by mouth daily    Hyperlipidemia LDL goal <70       blood glucose monitoring meter device kit     1 kit    Use to test blood sugars 4 times daily or as directed.    Type 2 diabetes mellitus with diabetic chronic kidney disease (H)       * blood glucose monitoring test strip    no brand specified    100 strip    1 strip by In Vitro route 3 times daily Diagnosis:  250.00.    Type 2 diabetes, HbA1C goal < 8% (H)       * ONE TOUCH ULTRA test strip   Generic drug:  blood glucose monitoring     300 strip    USE ONE STRIP TO CHECK GLUCOSE THREE TIMES DAILY    Type 2 diabetes mellitus with diabetic chronic kidney disease (H)       budesonide 0.5 MG/2ML neb solution    PULMICORT    60 ampule    Take 2 mLs (0.5 mg) by nebulization 2 times daily    Moderate persistent asthma without complication       cholecalciferol 2000 UNITS tablet     90 tablet    Take 1 tablet by mouth daily.    Memory loss       cyanocobalamin 500 MCG Tabs    BL VITAMIN B-12    90 tablet     "Take 500 mcg by mouth daily.    Memory loss       finasteride 5 MG tablet    PROSCAR    60 tablet    Take 1 tablet (5 mg) by mouth daily    Urinary retention       fish oil-omega-3 fatty acids 1000 MG capsule     180 capsule    Take 1 capsule by mouth 2 times daily.    Hyperlipidemia LDL goal <100       GLUCOPHAGE 1000 MG tablet   Generic drug:  metFORMIN     60 tablet    Take 1 tablet (1,000 mg) by mouth 2 times daily (with meals)    Type 2 diabetes mellitus with stage 3 chronic kidney disease, with long-term current use of insulin (H)       insulin aspart 100 UNIT/ML injection    NovoLOG FLEXPEN    30 mL    11 units before breakfast, 11 units before lunch, 11 units before dinner    Type 2 diabetes mellitus with stage 3 chronic kidney disease, with long-term current use of insulin (H)       insulin detemir 100 UNIT/ML injection    LEVEMIR FLEXPEN/FLEXTOUCH    15 mL    Inject 30 Units Subcutaneous At Bedtime    Type 2 diabetes mellitus with stage 3 chronic kidney disease, with long-term current use of insulin (H)       insulin syringe-needle U-100 31G X 5/16\" 1 ML    BD insulin syringe ULTRAFINE    100 each    Use one syringe 4 times daily or as directed.    Type 2 diabetes, HbA1C goal < 8% (H)       * lisinopril 5 MG tablet    PRINIVIL/ZESTRIL    90 tablet    Take 1 tablet (5 mg) by mouth daily    Hyperlipidemia LDL goal <70       * lisinopril 5 MG tablet    PRINIVIL/ZESTRIL    90 tablet    TAKE ONE TABLET BY MOUTH ONCE DAILY    Hyperlipidemia LDL goal <70       metoprolol 25 MG tablet    LOPRESSOR    60 tablet    Take 1 tablet (25 mg) by mouth 2 times daily    Chronic ischemic heart disease, unspecified       nitroGLYcerin 0.4 MG sublingual tablet    NITROSTAT    25 tablet    Place 1 tablet (0.4 mg) under the tongue every 5 minutes as needed for chest pain (CALL 911 IF NOT IMPROVED AFTER THREE CONSECUTIVE DOSES)        omeprazole 20 MG CR capsule    priLOSEC    30 capsule    Take 1 capsule (20 mg) by mouth daily "    Gastroesophageal reflux disease without esophagitis       order for DME      Equipment being ordered: CPAP AIRSENSE 10 11-15 CM H2O QUATTRO AIR SIZE MED SN# [id number]  DN# [id number]        * order for DME     1 Device    Equipment being ordered: CPAP machine    Obstructive sleep apnea (adult) (pediatric)       * order for DME     1 Box    Equipment being ordered: diabetic shoes    Type 2 diabetes mellitus with diabetic chronic kidney disease (H)       * order for DME     1 Device    Equipment being ordered: Nebulizer        pen needles 31G X 6 MM Misc     90 each    1 Units 3 times daily For novolog flexpen    Type 2 diabetes, HbA1C goal < 8% (H)       tamsulosin 0.4 MG capsule    FLOMAX    90 capsule    Take 1 capsule (0.4 mg) by mouth daily (Needs follow-up appointment for this medication)    Benign non-nodular prostatic hyperplasia with lower urinary tract symptoms       * Notice:  This list has 7 medication(s) that are the same as other medications prescribed for you. Read the directions carefully, and ask your doctor or other care provider to review them with you.

## 2017-09-29 DIAGNOSIS — K21.9 GASTROESOPHAGEAL REFLUX DISEASE WITHOUT ESOPHAGITIS: ICD-10-CM

## 2017-09-29 NOTE — TELEPHONE ENCOUNTER
omeprazole (PRILOSEC) 20 MG capsule      Last Written Prescription Date: 09/20/2016  Last Fill Quantity: 30 capsule,  # refills: 11   Last Office Visit with [facility], [facility] or [facility] prescribing provider: 09/25/2017

## 2017-10-04 ENCOUNTER — HOSPITAL ENCOUNTER (OUTPATIENT)
Dept: PHYSICAL THERAPY | Facility: CLINIC | Age: 77
Setting detail: THERAPIES SERIES
End: 2017-10-04
Attending: FAMILY MEDICINE
Payer: MEDICARE

## 2017-10-04 PROCEDURE — 97162 PT EVAL MOD COMPLEX 30 MIN: CPT | Mod: GP

## 2017-10-04 PROCEDURE — 97110 THERAPEUTIC EXERCISES: CPT | Mod: GP

## 2017-10-04 PROCEDURE — G8981 BODY POS CURRENT STATUS: HCPCS | Mod: GP,CJ

## 2017-10-04 PROCEDURE — 40000718 ZZHC STATISTIC PT DEPARTMENT ORTHO VISIT

## 2017-10-04 PROCEDURE — G8982 BODY POS GOAL STATUS: HCPCS | Mod: GP,CI

## 2017-10-04 NOTE — PROGRESS NOTES
[facility]          OUTPATIENT PHYSICAL THERAPY ORTHOPEDIC EVALUATION  PLAN OF TREATMENT FOR OUTPATIENT REHABILITATION  (COMPLETE FOR INITIAL CLAIMS ONLY)  Patient's Last Name, First Name, M.I.  YOB: 1940  Alvaro Horton    Provider s Name:  [facility]   Medical Record No.  [id number]   Start of Care Date:  10/04/17   Onset Date:  04/04/17   Type:     _X__PT   ___OT   ___SLP Medical Diagnosis:  Neck pain     PT Diagnosis:  Cervical radiculopathy   Visits from SOC:  1      _________________________________________________________________________________  Plan of Treatment/Functional Goals:  joint mobilization, manual therapy, neuromuscular re-education, ROM, strengthening, stretching     Traction, Ultrasound, TENS, Hot packs, Cryotherapy     Goals  Goal Identifier: driving with LUE  Goal Description: Following PT interventions, pt will maintain good posture for age during PT tx session for less pain when driving with LUE  Target Date: 10/18/17    Goal Identifier: reading  Goal Description: Following PT interventions, pt will increase cervical L SB to 15* to have less pain when reading.  Target Date: 11/01/17    Goal Identifier:  going back to sleep after waking  Goal Description: Following PT interventions, pt will increase L shoulder ER MMT to 4/5 grade for ease of  going back to sleep after waking  Target Date: 11/15/17    Goal Identifier: fishing more  Goal Description: Following PT interventions, pt will score 20% on NDI to decrease disability from moderate to minimal and go fishing more often.  Target Date: 11/29/17                              Therapy Frequency:  2 times/Week  Predicted Duration of Therapy Intervention:  8 weeks    Thomas Clemente, PT                 I CERTIFY THE NEED FOR THESE SERVICES FURNISHED UNDER        THIS PLAN OF TREATMENT AND WHILE UNDER MY CARE     (Physician co-signature of this document indicates review and  certification of the therapy plan).                         Certification Date From:  10/04/17   Certification Date To:  11/29/17    Referring Provider:  Be Carreno MD     Initial Assessment        See Epic Evaluation Start of Care Date: 10/04/17             Thank You,    Thomas Clemente, PT, DPT  Doctor of Physical Therapy  [facility]  [phone]
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
## 2017-10-06 DIAGNOSIS — I25.9 CHRONIC ISCHEMIC HEART DISEASE: ICD-10-CM

## 2017-10-06 RX ORDER — FUROSEMIDE 20 MG
TABLET ORAL
Qty: 30 TABLET | Refills: 11 | Status: SHIPPED | OUTPATIENT
Start: 2017-10-06 | End: 2018-09-12 | Stop reason: SINTOL

## 2017-10-06 NOTE — TELEPHONE ENCOUNTER
Furosemide 20 mg      Last Written Prescription Date: 9/20/16  Last Fill Quantity: 30, # refills: 11  Last Office Visit with [facility], [facility] or [facility] prescribing provider: 9/25/17       Potassium   Date Value Ref [place] Status   07/24/2017 4.0 3.4 - 5.3 mmol/L Final     Creatinine   Date Value Ref [place] Status   07/24/2017 0.94 0.66 - 1.25 mg/dL Final     BP Readings from Last 3 Encounters:   09/25/17 120/70   07/24/17 130/75   06/22/17 163/77

## 2017-10-10 ENCOUNTER — HOSPITAL ENCOUNTER (OUTPATIENT)
Dept: PHYSICAL THERAPY | Facility: CLINIC | Age: 77
Setting detail: THERAPIES SERIES
End: 2017-10-10
Attending: FAMILY MEDICINE
Payer: MEDICARE

## 2017-10-10 PROCEDURE — 40000718 ZZHC STATISTIC PT DEPARTMENT ORTHO VISIT

## 2017-10-10 PROCEDURE — 97140 MANUAL THERAPY 1/> REGIONS: CPT | Mod: GP

## 2017-10-10 PROCEDURE — 97110 THERAPEUTIC EXERCISES: CPT | Mod: GP

## 2017-10-12 ENCOUNTER — HOSPITAL ENCOUNTER (OUTPATIENT)
Dept: PHYSICAL THERAPY | Facility: CLINIC | Age: 77
Setting detail: THERAPIES SERIES
End: 2017-10-12
Attending: FAMILY MEDICINE
Payer: MEDICARE

## 2017-10-12 PROCEDURE — 40000718 ZZHC STATISTIC PT DEPARTMENT ORTHO VISIT

## 2017-10-12 PROCEDURE — 97140 MANUAL THERAPY 1/> REGIONS: CPT | Mod: GP

## 2017-11-09 ENCOUNTER — TELEPHONE (OUTPATIENT)
Dept: FAMILY MEDICINE | Facility: CLINIC | Age: 77
End: 2017-11-09

## 2017-11-09 DIAGNOSIS — E11.9 TYPE 2 DIABETES, HBA1C GOAL < 8% (H): ICD-10-CM

## 2017-11-09 NOTE — TELEPHONE ENCOUNTER
Franklin says his One Touch Ultra meter broke so is asking if we could sent a Rx for a new one to [facility]. He would like to pick it up this afternoon.

## 2017-12-01 DIAGNOSIS — R33.9 URINARY RETENTION: ICD-10-CM

## 2017-12-01 RX ORDER — FINASTERIDE 5 MG/1
5 TABLET, FILM COATED ORAL DAILY
Qty: 60 TABLET | Refills: 1 | Status: SHIPPED | OUTPATIENT
Start: 2017-12-01 | End: 2018-01-09

## 2017-12-04 DIAGNOSIS — N40.1 BENIGN NON-NODULAR PROSTATIC HYPERPLASIA WITH LOWER URINARY TRACT SYMPTOMS: ICD-10-CM

## 2017-12-04 RX ORDER — TAMSULOSIN HYDROCHLORIDE 0.4 MG/1
0.4 CAPSULE ORAL DAILY
Qty: 90 CAPSULE | Refills: 0 | Status: SHIPPED | OUTPATIENT
Start: 2017-12-04 | End: 2018-01-09

## 2017-12-04 NOTE — TELEPHONE ENCOUNTER
Pt calling and inquiring about his refill request for Flomax.  He has a future appt on 01-19-18 with Dr. Stevens.  Will authorize 1 rasheed refill.    Gabrielle Chaidez  [facility] RN

## 2017-12-04 NOTE — TELEPHONE ENCOUNTER
Flomax           Last Written Prescription Date: 11-15-16  Last Fill Quantity: 90, # refills: 3    Last Office Visit with [facility], [facility] or [facility] prescribing provider:  11-15-16   Future Office Visit:    Next 5 appointments (look out 90 days)     Jan 09, 2018 11:30 AM CST   [facility]   [facility]    [address] [address]   [phone]                  BP Readings from Last 3 Encounters:   09/25/17 120/70   07/24/17 130/75   06/22/17 163/77     Gabrielle Chaidez  [facility] RN

## 2017-12-14 NOTE — DISCHARGE SUMMARY
Outpatient Physical Therapy Discharge Note     Patient: Alvaro Horton  : 1940    Beginning/End Dates of Reporting Period:  10/4/17 to 10/12/2017    Referring Provider: Be Carreno MD     Therapy Diagnosis: Cervical radiculopathy     Client Self Report: Pt not seeing benefit of PT at this point and would like to DC to [facility].    Objective Measurements:  Objective Measure: Cervical ROM  Details: Pre tx: flex=ctc, ext=31, R rot=47, L rot=39, RSB=20, LSB=20. ERSL C3-2, C5 FRSR, C6 FRSL. Post tx: flex=ctc, ext=37, R rot=50, L rot=40, RSB=22, LSB=23  Objective Measure: Mobility  Details:  hypomobile R SCJ            Outcome Measures (most recent score):  Patient did not attend follow up visit, status unknown at this time.      Goals:  Goal Identifier driving with LUE   Goal Description Following PT interventions, pt will maintain good posture for age during PT tx session for less pain when driving with LUE   Target Date 10/18/17   Date Met   (chin tucks)   Progress:     Goal Identifier reading   Goal Description Following PT interventions, pt will increase cervical L SB to 15* to have less pain when reading.   Target Date 17   Date Met   (manual traction)   Progress:     Goal Identifier  going back to sleep after waking   Goal Description Following PT interventions, pt will increase L shoulder ER MMT to 4/5 grade for ease of  going back to sleep after waking   Target Date 11/15/17   Date Met   (GreenTB)   Progress:     Goal Identifier fishing more   Goal Description Following PT interventions, pt will score 20% on NDI to decrease disability from moderate to minimal and go fishing more often.   Target Date 17   Date Met      Progress:     Goal Identifier     Goal Description     Target Date     Date Met      Progress:     Goal Identifier     Goal Description     Target Date     Date Met      Progress:     Goal Identifier     Goal Description     Target Date     Date Met      Progress:      Goal Identifier     Goal Description     Target Date     Date Met      Progress:     Progress Toward Goals:   Progress this reporting period: Patient participated in 3 skilled PT treatment sessions over 3 weeks with progress made towards 75% of goals, progressed resistance training, decreased pain with manual traction. Minimal progress noted by pt. Pt was provided the option to return to skilled PT within 30 days if flair up occurred.     Patient did not return for follow up treatments as directed. Last skilled PT treatment visit was more than 30 days ago. Goal status and current objective information is therefore unknown.  Discharge from PT services at this time for this episode of treatment. Please see attached documentation under this episode of care for further information including dates of service, start of care date, referring physician, diagnosis, treatment plan, treatments, etc.    Please contact me with any questions or concerns.    Thank you for your referral.    Thomas Clemente, PT, DPT  Doctor of Physical Therapy  [facility]  [phone]              Plan:  Discharge from therapy.    Discharge:    Reason for Discharge: No further expectation of progress, per pt report    Equipment Issued: t-band    Discharge Plan: Patient to continue home program.

## 2018-01-03 DIAGNOSIS — E11.22 TYPE 2 DIABETES MELLITUS WITH DIABETIC CHRONIC KIDNEY DISEASE (H): ICD-10-CM

## 2018-01-09 ENCOUNTER — OFFICE VISIT (OUTPATIENT)
Dept: UROLOGY | Facility: CLINIC | Age: 78
End: 2018-01-09
Payer: COMMERCIAL

## 2018-01-09 VITALS — RESPIRATION RATE: 18 BRPM | HEART RATE: 68 BPM | DIASTOLIC BLOOD PRESSURE: 78 MMHG | SYSTOLIC BLOOD PRESSURE: 147 MMHG

## 2018-01-09 DIAGNOSIS — R33.9 URINARY RETENTION: ICD-10-CM

## 2018-01-09 DIAGNOSIS — N40.1 BENIGN NON-NODULAR PROSTATIC HYPERPLASIA WITH LOWER URINARY TRACT SYMPTOMS: ICD-10-CM

## 2018-01-09 PROCEDURE — 99213 OFFICE O/P EST LOW 20 MIN: CPT | Mod: 25 | Performed by: UROLOGY

## 2018-01-09 PROCEDURE — 51798 US URINE CAPACITY MEASURE: CPT | Performed by: UROLOGY

## 2018-01-09 PROCEDURE — 87086 URINE CULTURE/COLONY COUNT: CPT | Performed by: UROLOGY

## 2018-01-09 RX ORDER — TAMSULOSIN HYDROCHLORIDE 0.4 MG/1
0.4 CAPSULE ORAL DAILY
Qty: 90 CAPSULE | Refills: 3 | Status: SHIPPED | OUTPATIENT
Start: 2018-01-09 | End: 2019-03-13

## 2018-01-09 RX ORDER — FINASTERIDE 5 MG/1
5 TABLET, FILM COATED ORAL DAILY
Qty: 90 TABLET | Refills: 1 | Status: SHIPPED | OUTPATIENT
Start: 2018-01-09 | End: 2019-01-31

## 2018-01-09 NOTE — NURSING NOTE
Active order to obtain bladder scan? Yes   Name of ordering provider:  Dr Stevens  Bladder scan preformed post void Yes.  Bladder scan reveled 99ML  Provider notified?  Yes    Anais England CMA

## 2018-01-09 NOTE — MR AVS SNAPSHOT
"              After Visit Summary   2018    Alvaro Horton    MRN: [id number]           Patient Information     Date Of Birth          1940        Visit Information        Provider Department      2018 11:30 AM TARAH Stevens MD [facility]        Today's Diagnoses     Benign non-nodular prostatic hyperplasia with lower urinary tract symptoms        Urinary retention          Care Instructions    Per Physician's instructions            Follow-ups after your visit        Who to contact     If you have questions or need follow up information about today's clinic visit or your schedule please contact [facility] directly at [phone].  Normal or non-critical lab and imaging results will be communicated to you by MyChart, letter or phone within 4 business days after the clinic has received the results. If you do not hear from us within 7 days, please contact the clinic through MyChart or phone. If you have a critical or abnormal lab result, we will notify you by phone as soon as possible.  Submit refill requests through [employer] or call your pharmacy and they will forward the refill request to us. Please allow 3 business days for your refill to be completed.          Additional Information About Your Visit        MyChart Information     [employer] lets you send messages to your doctor, view your test results, renew your prescriptions, schedule appointments and more. To sign up, go to [email or web address] . Click on \"Log in\" on the left side of the screen, which will take you to the Welcome page. Then click on \"Sign up Now\" on the right side of the page.     You will be asked to enter the access code listed below, as well as some personal information. Please follow the directions to create your username and password.     Your access code is: [email or web address]  Expires: 4/10/2018 12:54 PM     Your access code will  in 90 days. If you need help or a new code, please call your " [facility] or [phone].        Care EveryWhere ID     This is your Care EveryWhere ID. This could be used by other organizations to access your [facility] medical records  [id number]        Your Vitals Were     Pulse Respirations                68 18           Blood Pressure from Last 3 Encounters:   01/09/18 147/78   09/25/17 120/70   07/24/17 130/75    Weight from Last 3 Encounters:   09/25/17 83.9 kg (185 lb)   07/24/17 84.1 kg (185 lb 6.4 oz)   06/22/17 81.6 kg (180 lb)              We Performed the Following     MEASURE POST-VOID RESIDUAL URINE/BLADDER CAPACITY, US NON-IMAGING     Urine Culture Aerobic Bacterial          Today's Medication Changes          These changes are accurate as of: 1/9/18 11:59 PM.  If you have any questions, ask your nurse or doctor.               These medicines have changed or have updated prescriptions.        Dose/Directions    cyanocobalamin 500 MCG Tabs   Commonly known as:  BL VITAMIN B-12   This may have changed:  how much to take   Used for:  Memory loss        Dose:  500 mcg   Take 500 mcg by mouth daily.   Quantity:  90 tablet   Refills:  4            Where to get your medicines      These medications were sent to [facility] [address]  [address] [facility] [address]     Phone:  [phone]     finasteride 5 MG tablet    tamsulosin 0.4 MG capsule                Primary Care Provider Office Phone # Fax #    Be Carreno [phone] [address] [place] [address]        Equal Access to Services     [facility] : Hadii radha haddado Sothais, waaxda luqadaha, qaybta kaalmada eric, tory draper. So [facility] [phone].    ATENCIÓN: Si habla español, tiene a rahman disposición servicios gratuitos de asistencia lingüística. Llame al [phone].    We comply with applicable federal civil rights laws and [place] laws. We do not discriminate on the basis of  race, color, national origin, age, disability, sex, sexual orientation, or gender identity.            Thank you!     Thank you for choosing [facility]  for your care. Our goal is always to provide you with excellent care. Hearing back from our patients is one way we can continue to improve our services. Please take a few minutes to complete the written survey that you may receive in the mail after your visit with us. Thank you!             Your Updated Medication List - Protect others around you: Learn how to safely use, store and throw away your medicines at www.disposemymeds.org.          This list is accurate as of: 1/9/18 11:59 PM.  Always use your most recent med list.                   Brand Name Dispense Instructions for use Diagnosis    albuterol (2.5 MG/3ML) 0.083% neb solution     1 Box    Take 1 vial (2.5 mg) by nebulization every 4 hours as needed for shortness of breath / dyspnea or wheezing    Moderate persistent asthma without complication       arformoterol 15 MCG/2ML Nebu neb solution    BROVANA    120 mL    Take 2 mLs (15 mcg) by nebulization 2 times daily    Chronic obstructive pulmonary disease, unspecified COPD type (H)       aspirin 81 MG tablet      Take by mouth daily        atorvastatin 80 MG tablet    LIPITOR    30 tablet    Take 1 tablet (80 mg) by mouth daily    Hyperlipidemia LDL goal <70       * blood glucose monitoring meter device kit     1 kit    Use to test blood sugars 4 times daily or as directed.    Type 2 diabetes mellitus with diabetic chronic kidney disease (H)       * blood glucose monitoring meter device kit    no brand specified    1 kit    Use to test blood sugar 3 times daily or as directed.    Type 2 diabetes, HbA1C goal < 8% (H)       budesonide 0.5 MG/2ML neb solution    PULMICORT    60 ampule    Take 2 mLs (0.5 mg) by nebulization 2 times daily    Moderate persistent asthma without complication       cholecalciferol 2000 UNITS tablet     90 tablet     "Take 1 tablet by mouth daily.    Memory loss       cyanocobalamin 500 MCG Tabs    BL VITAMIN B-12    90 tablet    Take 500 mcg by mouth daily.    Memory loss       finasteride 5 MG tablet    PROSCAR    90 tablet    Take 1 tablet (5 mg) by mouth daily MUST KEEP UPCOMING APPT FOR FURTHER REFILLS    Urinary retention       fish oil-omega-3 fatty acids 1000 MG capsule     180 capsule    Take 1 capsule by mouth 2 times daily.    Hyperlipidemia LDL goal <100       furosemide 20 MG tablet    LASIX    30 tablet    TAKE ONE TABLET BY MOUTH ONCE DAILY IN THE MORNING    Chronic ischemic heart disease       GLUCOPHAGE 1000 MG tablet   Generic drug:  metFORMIN     60 tablet    Take 1 tablet (1,000 mg) by mouth 2 times daily (with meals)    Type 2 diabetes mellitus with stage 3 chronic kidney disease, with long-term current use of insulin (H)       insulin aspart 100 UNIT/ML injection    NovoLOG FLEXPEN    30 mL    11 units before breakfast, 11 units before lunch, 11 units before dinner    Type 2 diabetes mellitus with stage 3 chronic kidney disease, with long-term current use of insulin (H)       insulin detemir 100 UNIT/ML injection    LEVEMIR FLEXPEN/FLEXTOUCH    15 mL    Inject 30 Units Subcutaneous At Bedtime    Type 2 diabetes mellitus with stage 3 chronic kidney disease, with long-term current use of insulin (H)       insulin syringe-needle U-100 31G X 5/16\" 1 ML    BD insulin syringe ULTRAFINE    100 each    Use one syringe 4 times daily or as directed.    Type 2 diabetes, HbA1C goal < 8% (H)       * lisinopril 5 MG tablet    PRINIVIL/ZESTRIL    90 tablet    Take 1 tablet (5 mg) by mouth daily    Hyperlipidemia LDL goal <70       * lisinopril 5 MG tablet    PRINIVIL/ZESTRIL    90 tablet    TAKE ONE TABLET BY MOUTH ONCE DAILY    Hyperlipidemia LDL goal <70       metoprolol 25 MG tablet    LOPRESSOR    60 tablet    Take 1 tablet (25 mg) by mouth 2 times daily    Chronic ischemic heart disease, unspecified       " nitroGLYcerin 0.4 MG sublingual tablet    NITROSTAT    25 tablet    Place 1 tablet (0.4 mg) under the tongue every 5 minutes as needed for chest pain (CALL 911 IF NOT IMPROVED AFTER THREE CONSECUTIVE DOSES)        omeprazole 20 MG CR capsule    priLOSEC    30 capsule    TAKE ONE CAPSULE BY MOUTH ONCE DAILY    Gastroesophageal reflux disease without esophagitis       ONETOUCH ULTRA test strip   Generic drug:  blood glucose monitoring     300 strip    USE  TO CHECK GLUCOSE THREE TIMES DAILY    Type 2 diabetes mellitus with diabetic chronic kidney disease (H)       order for DME      Equipment being ordered: CPAP AIRSENSE 10 11-15 CM H2O QUATTRO AIR SIZE MED SN# [id number]  DN# [id number]        * order for DME     1 Device    Equipment being ordered: CPAP machine    Obstructive sleep apnea (adult) (pediatric)       * order for DME     1 Box    Equipment being ordered: diabetic shoes    Type 2 diabetes mellitus with diabetic chronic kidney disease (H)       * order for DME     1 Device    Equipment being ordered: Nebulizer        pen needles 31G X 6 MM Misc     90 each    1 Units 3 times daily For novolog flexpen    Type 2 diabetes, HbA1C goal < 8% (H)       tamsulosin 0.4 MG capsule    FLOMAX    90 capsule    Take 1 capsule (0.4 mg) by mouth daily (Needs follow-up appointment for this medication)    Benign non-nodular prostatic hyperplasia with lower urinary tract symptoms       * Notice:  This list has 7 medication(s) that are the same as other medications prescribed for you. Read the directions carefully, and ask your doctor or other care provider to review them with you.

## 2018-01-09 NOTE — NURSING NOTE
"Chief Complaint   Patient presents with     RECHECK     Retention      Refill Request     tamsulosin, finasteride        Initial /78 (BP Location: Right arm, Patient Position: Chair, Cuff Size: Adult Regular)  Pulse 68  Resp 18 Estimated body mass index is 28.13 kg/(m^2) as calculated from the following:    Height as of 9/25/17: 1.727 m (5' 8\").    Weight as of 9/25/17: 83.9 kg (185 lb).  BP completed using cuff size: regular hedy England CMA     "

## 2018-01-10 LAB
BACTERIA SPEC CULT: NO GROWTH
Lab: NORMAL
SPECIMEN SOURCE: NORMAL

## 2018-01-10 NOTE — PROGRESS NOTES
Appointment source: Established Patient  Patient name: Alvaro Horton  Urology Staff: Victorino Stevens MD    Subjective: This is a 77 year old year old male returning for follow up of symptoms of bladder outlet obstruction.    Objective:  Doing very well. Happy with his pattern of urination.    Taking finasteride and tamsulosin.    Prostate benign to palpation    Assessment:  Doing very well.    Plan:  Continue tamsulosin and finasteride and return in one year.    Total time 15 minutes, counseling 10 minutes discussing BPH

## 2018-02-05 ENCOUNTER — OFFICE VISIT (OUTPATIENT)
Dept: FAMILY MEDICINE | Facility: CLINIC | Age: 78
End: 2018-02-05
Payer: COMMERCIAL

## 2018-02-05 VITALS
WEIGHT: 191 LBS | HEIGHT: 68 IN | DIASTOLIC BLOOD PRESSURE: 70 MMHG | BODY MASS INDEX: 28.95 KG/M2 | TEMPERATURE: 98.2 F | HEART RATE: 70 BPM | RESPIRATION RATE: 18 BRPM | SYSTOLIC BLOOD PRESSURE: 120 MMHG

## 2018-02-05 DIAGNOSIS — I25.9 CHRONIC ISCHEMIC HEART DISEASE: ICD-10-CM

## 2018-02-05 DIAGNOSIS — N40.0 HYPERTROPHY OF PROSTATE WITHOUT URINARY OBSTRUCTION: ICD-10-CM

## 2018-02-05 DIAGNOSIS — Z79.4 TYPE 2 DIABETES MELLITUS WITH STAGE 3 CHRONIC KIDNEY DISEASE, WITH LONG-TERM CURRENT USE OF INSULIN (H): Primary | ICD-10-CM

## 2018-02-05 DIAGNOSIS — J44.9 CHRONIC OBSTRUCTIVE PULMONARY DISEASE, UNSPECIFIED COPD TYPE (H): ICD-10-CM

## 2018-02-05 DIAGNOSIS — J45.40 MODERATE PERSISTENT ASTHMA WITHOUT COMPLICATION: ICD-10-CM

## 2018-02-05 DIAGNOSIS — E11.22 TYPE 2 DIABETES MELLITUS WITH STAGE 3 CHRONIC KIDNEY DISEASE, WITH LONG-TERM CURRENT USE OF INSULIN (H): Primary | ICD-10-CM

## 2018-02-05 DIAGNOSIS — E78.5 HYPERLIPIDEMIA LDL GOAL <70: ICD-10-CM

## 2018-02-05 DIAGNOSIS — N18.30 TYPE 2 DIABETES MELLITUS WITH STAGE 3 CHRONIC KIDNEY DISEASE, WITH LONG-TERM CURRENT USE OF INSULIN (H): Primary | ICD-10-CM

## 2018-02-05 DIAGNOSIS — Z13.89 SCREENING FOR DIABETIC PERIPHERAL NEUROPATHY: ICD-10-CM

## 2018-02-05 LAB — HBA1C MFR BLD: 7.5 % (ref 4.3–6)

## 2018-02-05 PROCEDURE — 36415 COLL VENOUS BLD VENIPUNCTURE: CPT | Performed by: FAMILY MEDICINE

## 2018-02-05 PROCEDURE — 99207 C FOOT EXAM  NO CHARGE: CPT | Performed by: FAMILY MEDICINE

## 2018-02-05 PROCEDURE — 82043 UR ALBUMIN QUANTITATIVE: CPT | Performed by: FAMILY MEDICINE

## 2018-02-05 PROCEDURE — 99214 OFFICE O/P EST MOD 30 MIN: CPT | Performed by: FAMILY MEDICINE

## 2018-02-05 PROCEDURE — 80061 LIPID PANEL: CPT | Performed by: FAMILY MEDICINE

## 2018-02-05 PROCEDURE — 83036 HEMOGLOBIN GLYCOSYLATED A1C: CPT | Performed by: FAMILY MEDICINE

## 2018-02-05 RX ORDER — METOPROLOL TARTRATE 25 MG/1
TABLET, FILM COATED ORAL
Qty: 60 TABLET | Refills: 0 | Status: SHIPPED | OUTPATIENT
Start: 2018-02-05 | End: 2018-02-05

## 2018-02-05 RX ORDER — METOPROLOL TARTRATE 25 MG/1
25 TABLET, FILM COATED ORAL 2 TIMES DAILY
Qty: 60 TABLET | Refills: 11 | Status: SHIPPED | OUTPATIENT
Start: 2018-02-05 | End: 2019-02-14

## 2018-02-05 RX ORDER — ATORVASTATIN CALCIUM 80 MG/1
80 TABLET, FILM COATED ORAL DAILY
Qty: 30 TABLET | Refills: 11 | Status: SHIPPED | OUTPATIENT
Start: 2018-02-05 | End: 2019-01-31

## 2018-02-05 RX ORDER — NITROGLYCERIN 0.4 MG/1
0.4 TABLET SUBLINGUAL EVERY 5 MIN PRN
Qty: 25 TABLET | Refills: 0 | Status: SHIPPED | OUTPATIENT
Start: 2018-02-05 | End: 2020-03-06

## 2018-02-05 RX ORDER — BUDESONIDE 0.5 MG/2ML
0.5 INHALANT ORAL 2 TIMES DAILY
Qty: 60 AMPULE | Refills: 11 | Status: CANCELLED | OUTPATIENT
Start: 2018-02-05

## 2018-02-05 NOTE — TELEPHONE ENCOUNTER
"Requested Prescriptions   Pending Prescriptions Disp Refills     metoprolol tartrate (LOPRESSOR) 25 MG tablet [Pharmacy Med Name: METOPROL TAR 25MG   TAB] 60 tablet 11     Sig: TAKE ONE TABLET BY MOUTH TWICE DAILY    Beta-Blockers Protocol Failed    2/5/2018  9:24 AM       Failed - Blood pressure under 140/90    BP Readings from Last 3 Encounters:   01/09/18 147/78   09/25/17 120/70   07/24/17 130/75                Passed - Patient is age 6 or older       Passed - Recent or future visit with authorizing provider's specialty    Patient had office visit in the last year or has a visit in the next 30 days with authorizing provider.  See \"Patient Info\" tab in inbasket, or \"Choose Columns\" in Meds & Orders section of the refill encounter.             metoprolol (LOPRESSOR) 25 MG tablet  Last Written Prescription Date:  01/26/2017  Last Fill Quantity: 60 tablet,  # refills: 11   [place] [facility] [place]:  09/25/2017   Future Office Visit:    Next 5 appointments (look out 90 days)     Feb 05, 2018  1:40 PM CST   SHORT with Be Carreno MD   [facility]    [address] [place] [id number] [phone]                 Edwige LEVY (R) (M)    "

## 2018-02-05 NOTE — MR AVS SNAPSHOT
After Visit Summary   2/5/2018    Alvaro Horton    MRN: [id number]           Patient Information     Date Of Birth          1940        Visit Information        Provider Department      2/5/2018 1:40 PM Be Carreno MD [facility]        Today's Diagnoses     Type 2 diabetes mellitus with stage 3 chronic kidney disease, with long-term current use of insulin (H)    -  1    Screening for diabetic peripheral neuropathy        Hyperlipidemia LDL goal <70        Moderate persistent asthma without complication        Chronic ischemic heart disease        Hypertrophy of prostate without urinary obstruction        Chronic obstructive pulmonary disease, unspecified COPD type (H)          Care Instructions    ASSESSMENT/PLAN:                                                    Lifestyle recommendations:regular exercise, at least 150 minutes per week (average 30 minutes 5 times a week)  weight loss recommended (ideal BMI-body mass index is <25)  Diabetic recommendations:-return for follow-up visit in 6 month(s)    (E11.22,  N18.3,  Z79.4) Type 2 diabetes mellitus with stage 3 chronic kidney disease, with long-term current use of insulin (H)  (primary encounter diagnosis)  Comment: doing well  Plan: insulin aspart (NOVOLOG FLEXPEN) 100 UNIT/ML         injection, insulin detemir (LEVEMIR         FLEXPEN/FLEXTOUCH) 100 UNIT/ML injection        No change in current treatment plan.     (Z13.89) Screening for diabetic peripheral neuropathy  Comment:   Plan: FOOT EXAM  NO CHARGE [29839.114]        Forms for diabetic shoes.     (E78.5) Hyperlipidemia LDL goal <70  Comment:   Plan: Lipid panel reflex to direct LDL Non-fasting,         atorvastatin (LIPITOR) 80 MG tablet          (J45.40) Moderate persistent asthma without complication  Comment: stable  Plan: No change in current treatment plan.     (I25.9) Chronic ischemic heart disease  Comment: stable  Plan: metoprolol tartrate  "(LOPRESSOR) 25 MG tablet,         nitroGLYcerin (NITROSTAT) 0.4 MG sublingual         tablet        No change in current treatment plan.     (N40.0) Hypertrophy of prostate without urinary obstruction  Comment: doing OK  Plan: No change in current treatment plan.     (J44.9) Chronic obstructive pulmonary disease, unspecified COPD type (H)  Comment:   Plan: Asthma Action Plan (AAP)          You can wear the gloves or get hands in warm water to help for cramps.            Follow-ups after your visit        Who to contact     If you have questions or need follow up information about today's clinic visit or your schedule please contact [facility] directly at [phone].  Normal or non-critical lab and imaging results will be communicated to you by [employer], letter or phone within 4 business days after the clinic has received the results. If you do not hear from us within 7 days, please contact the clinic through [employer] or phone. If you have a critical or abnormal lab result, we will notify you by phone as soon as possible.  Submit refill requests through [employer] or call your pharmacy and they will forward the refill request to us. Please allow 3 business days for your refill to be completed.          Additional Information About Your Visit        [employer] Information     [employer] lets you send messages to your doctor, view your test results, renew your prescriptions, schedule appointments and more. To sign up, go to [email or web address] . Click on \"Log in\" on the left side of the screen, which will take you to the Welcome page. Then click on \"Sign up Now\" on the right side of the page.     You will be asked to enter the access code listed below, as well as some personal information. Please follow the directions to create your username and password.     Your access code is: [email or web address]  Expires: 4/10/2018 12:54 PM     Your access code will  in 90 days. If you need help or a new code, please " "call your [place] clinic or [phone].        Care EveryWhere ID     This is your Care EveryWhere ID. This could be used by other organizations to access your [place] medical records  [id number]        Your Vitals Were     Pulse Temperature Respirations Height BMI (Body Mass Index)       70 98.2  F (36.8  C) (Tympanic) 18 5' 8\" (1.727 m) 29.04 kg/m2        Blood Pressure from Last 3 Encounters:   02/05/18 120/70   01/09/18 147/78   09/25/17 120/70    Weight from Last 3 Encounters:   02/05/18 191 lb (86.6 kg)   09/25/17 185 lb (83.9 kg)   07/24/17 185 lb 6.4 oz (84.1 kg)              We Performed the Following     Albumin Random Urine Quantitative with Creat Ratio     Asthma Action Plan (AAP)     FOOT EXAM  NO CHARGE [56234.114]     HEMOGLOBIN A1C     Lipid panel reflex to direct LDL Non-fasting          Today's Medication Changes          These changes are accurate as of 2/5/18  2:23 PM.  If you have any questions, ask your nurse or doctor.               Start taking these medicines.        Dose/Directions    metoprolol tartrate 25 MG tablet   Commonly known as:  LOPRESSOR   Used for:  Chronic ischemic heart disease   Started by:  Be Carreno MD        Dose:  25 mg   Take 1 tablet (25 mg) by mouth 2 times daily   Quantity:  60 tablet   Refills:  11         These medicines have changed or have updated prescriptions.        Dose/Directions    cyanocobalamin 500 MCG Tabs   Commonly known as:  BL VITAMIN B-12   This may have changed:  how much to take   Used for:  Memory loss        Dose:  500 mcg   Take 500 mcg by mouth daily.   Quantity:  90 tablet   Refills:  4            Where to get your medicines      These medications were sent to [facility] [address] [place] [address] - [address]   [address] [facility] [address]     Phone:  [phone]     atorvastatin 80 MG tablet    insulin aspart 100 UNIT/ML injection    insulin detemir 100 UNIT/ML injection    metoprolol tartrate 25 MG tablet    " nitroGLYcerin 0.4 MG sublingual tablet                Primary Care Provider Office Phone # Fax #    Be Carreno [phone] [address] [place] [address]        Equal Access to Services     AUGUSTIN SCHWAB : Nel garcia kaylin john Sothais, waaxda luqadaha, qaybta kaalmada ademichi, tory hewitt laRobertoradha draper. So [facility] [phone].    ATENCIÓN: Si habla español, tiene a rahman disposición servicios gratuitos de asistencia lingüística. Llame al [phone].    We comply with applicable federal civil rights laws and [place] laws. We do not discriminate on the basis of race, color, national origin, age, disability, sex, sexual orientation, or gender identity.            Thank you!     Thank you for choosing [facility]  for your care. Our goal is always to provide you with excellent care. Hearing back from our patients is one way we can continue to improve our services. Please take a few minutes to complete the written survey that you may receive in the mail after your visit with us. Thank you!             Your Updated Medication List - Protect others around you: Learn how to safely use, store and throw away your medicines at www.disposemymeds.org.          This list is accurate as of 2/5/18  2:23 PM.  Always use your most recent med list.                   Brand Name Dispense Instructions for use Diagnosis    albuterol (2.5 MG/3ML) 0.083% neb solution     1 Box    Take 1 vial (2.5 mg) by nebulization every 4 hours as needed for shortness of breath / dyspnea or wheezing    Moderate persistent asthma without complication       arformoterol 15 MCG/2ML Nebu neb solution    BROVANA    120 mL    Take 2 mLs (15 mcg) by nebulization 2 times daily    Chronic obstructive pulmonary disease, unspecified COPD type (H)       aspirin 81 MG tablet      Take by mouth daily        atorvastatin 80 MG tablet    LIPITOR    30 tablet    Take 1 tablet (80 mg) by mouth daily     Hyperlipidemia LDL goal <70       * blood glucose monitoring meter device kit     1 kit    Use to test blood sugars 4 times daily or as directed.    Type 2 diabetes mellitus with diabetic chronic kidney disease (H)       * blood glucose monitoring meter device kit    no brand specified    1 kit    Use to test blood sugar 3 times daily or as directed.    Type 2 diabetes, HbA1C goal < 8% (H)       budesonide 0.5 MG/2ML neb solution    PULMICORT    60 ampule    Take 2 mLs (0.5 mg) by nebulization 2 times daily    Moderate persistent asthma without complication       cholecalciferol 2000 UNITS tablet     90 tablet    Take 1 tablet by mouth daily.    Memory loss       cyanocobalamin 500 MCG Tabs    BL VITAMIN B-12    90 tablet    Take 500 mcg by mouth daily.    Memory loss       finasteride 5 MG tablet    PROSCAR    90 tablet    Take 1 tablet (5 mg) by mouth daily MUST KEEP UPCOMING APPT FOR FURTHER REFILLS    Urinary retention       fish oil-omega-3 fatty acids 1000 MG capsule     180 capsule    Take 1 capsule by mouth 2 times daily.    Hyperlipidemia LDL goal <100       furosemide 20 MG tablet    LASIX    30 tablet    TAKE ONE TABLET BY MOUTH ONCE DAILY IN THE MORNING    Chronic ischemic heart disease       GLUCOPHAGE 1000 MG tablet   Generic drug:  metFORMIN     60 tablet    Take 1 tablet (1,000 mg) by mouth 2 times daily (with meals)    Type 2 diabetes mellitus with stage 3 chronic kidney disease, with long-term current use of insulin (H)       insulin aspart 100 UNIT/ML injection    NovoLOG FLEXPEN    30 mL    11 units before breakfast, 11 units before lunch, 11 units before dinner    Type 2 diabetes mellitus with stage 3 chronic kidney disease, with long-term current use of insulin (H)       insulin detemir 100 UNIT/ML injection    LEVEMIR FLEXPEN/FLEXTOUCH    15 mL    Inject 30 Units Subcutaneous At Bedtime    Type 2 diabetes mellitus with stage 3 chronic kidney disease, with long-term current use of insulin (H)  "      insulin syringe-needle U-100 31G X 5/16\" 1 ML    BD insulin syringe ULTRAFINE    100 each    Use one syringe 4 times daily or as directed.    Type 2 diabetes, HbA1C goal < 8% (H)       * lisinopril 5 MG tablet    PRINIVIL/ZESTRIL    90 tablet    Take 1 tablet (5 mg) by mouth daily    Hyperlipidemia LDL goal <70       * lisinopril 5 MG tablet    PRINIVIL/ZESTRIL    90 tablet    TAKE ONE TABLET BY MOUTH ONCE DAILY    Hyperlipidemia LDL goal <70       metoprolol tartrate 25 MG tablet    LOPRESSOR    60 tablet    Take 1 tablet (25 mg) by mouth 2 times daily    Chronic ischemic heart disease       nitroGLYcerin 0.4 MG sublingual tablet    NITROSTAT    25 tablet    Place 1 tablet (0.4 mg) under the tongue every 5 minutes as needed for chest pain (CALL 911 IF NOT IMPROVED AFTER THREE CONSECUTIVE DOSES)    Chronic ischemic heart disease       omeprazole 20 MG CR capsule    priLOSEC    30 capsule    TAKE ONE CAPSULE BY MOUTH ONCE DAILY    Gastroesophageal reflux disease without esophagitis       ONETOUCH ULTRA test strip   Generic drug:  blood glucose monitoring     300 strip    USE  TO CHECK GLUCOSE THREE TIMES DAILY    Type 2 diabetes mellitus with diabetic chronic kidney disease (H)       order for DME      Equipment being ordered: CPAP AIRSENSE 10 11-15 CM H2O QUATTRO AIR SIZE MED SN# [id number]  DN# [id number]        * order for DME     1 Device    Equipment being ordered: CPAP machine    Obstructive sleep apnea (adult) (pediatric)       * order for DME     1 Box    Equipment being ordered: diabetic shoes    Type 2 diabetes mellitus with diabetic chronic kidney disease (H)       * order for DME     1 Device    Equipment being ordered: Nebulizer        pen needles 31G X 6 MM Misc     90 each    1 Units 3 times daily For novolog flexpen    Type 2 diabetes, HbA1C goal < 8% (H)       tamsulosin 0.4 MG capsule    FLOMAX    90 capsule    Take 1 capsule (0.4 mg) by mouth daily (Needs follow-up appointment for this medication) "    Benign non-nodular prostatic hyperplasia with lower urinary tract symptoms       * Notice:  This list has 7 medication(s) that are the same as other medications prescribed for you. Read the directions carefully, and ask your doctor or other care provider to review them with you.

## 2018-02-05 NOTE — NURSING NOTE
"Chief Complaint   Patient presents with     Diabetes     Medication Reconciliation     Levemir 20 units at bedtime.       Initial /70  Pulse 70  Temp 98.2  F (36.8  C) (Tympanic)  Resp 18  Ht 5' 8\" (1.727 m)  Wt 191 lb (86.6 kg)  BMI 29.04 kg/m2 Estimated body mass index is 29.04 kg/(m^2) as calculated from the following:    Height as of this encounter: 5' 8\" (1.727 m).    Weight as of this encounter: 191 lb (86.6 kg).      Health Maintenance that is potentially due pending provider review:          Is there anyone who you would like to be able to receive your results? If yes have patient fill out THE[facility]    "

## 2018-02-05 NOTE — PATIENT INSTRUCTIONS
ASSESSMENT/PLAN:                                                    Lifestyle recommendations:regular exercise, at least 150 minutes per week (average 30 minutes 5 times a week)  weight loss recommended (ideal BMI-body mass index is <25)  Diabetic recommendations:-return for follow-up visit in 6 month(s)    (E11.22,  N18.3,  Z79.4) Type 2 diabetes mellitus with stage 3 chronic kidney disease, with long-term current use of insulin (H)  (primary encounter diagnosis)  Comment: doing well  Plan: insulin aspart (NOVOLOG FLEXPEN) 100 UNIT/ML         injection, insulin detemir (LEVEMIR         FLEXPEN/FLEXTOUCH) 100 UNIT/ML injection        No change in current treatment plan.     (Z13.89) Screening for diabetic peripheral neuropathy  Comment:   Plan: FOOT EXAM  NO CHARGE [67976.114]        Forms for diabetic shoes.     (E78.5) Hyperlipidemia LDL goal <70  Comment:   Plan: Lipid panel reflex to direct LDL Non-fasting,         atorvastatin (LIPITOR) 80 MG tablet          (J45.40) Moderate persistent asthma without complication  Comment: stable  Plan: No change in current treatment plan.     (I25.9) Chronic ischemic heart disease  Comment: stable  Plan: metoprolol tartrate (LOPRESSOR) 25 MG tablet,         nitroGLYcerin (NITROSTAT) 0.4 MG sublingual         tablet        No change in current treatment plan.     (N40.0) Hypertrophy of prostate without urinary obstruction  Comment: doing OK  Plan: No change in current treatment plan.     (J44.9) Chronic obstructive pulmonary disease, unspecified COPD type (H)  Comment:   Plan: Asthma Action Plan (AAP)          You can wear the gloves or get hands in warm water to help for cramps.

## 2018-02-05 NOTE — PROGRESS NOTES
SUBJECTIVE:   Alvaro Horton is a 77 year old male who presents to clinic today for the following health issues:  Chief Complaint   Patient presents with     Diabetes     Medication Reconciliation     Levemir 20 units at bedtime.      Seen for neck pain in September and pre-op in July.     He has been getting cramping in hands at night, sitting and relaxing.   Wearing gloves seems to help.  His hands feel cold.  Location: thumb CMC and MCP joints and index MCP.  Has not taken medication.  No swelling or Derm Physical Exam  erythema     Diabetes Follow-up    Patient is checking blood sugars: three times daily.  Range has been   Blood sugar testing frequency justification: Uncontrolled diabetes  Results are as follows:    Three times daily he states he varies    Diabetic concerns: None    HE has been taking detemir insulin 20 units daily.  Taking 11 units three times daily short acting insulin-Novolog, metformin 1000mg twice daily.      Symptoms of hypoglycemia (low blood sugar): hot sensation     Paresthesias (numbness or burning in feet) or sores: none     Date of last diabetic eye exam: up coming next week    BP Readings from Last 2 Encounters:   02/05/18 120/70   01/09/18 147/78     Hemoglobin A1C (%)   Date Value   02/05/2018 7.5 (H)   06/09/2017 7.9 (H)     LDL Cholesterol Calculated (mg/dL)   Date Value   11/07/2016 32   02/23/2015 72       Amount of exercise or physical activity: not much in the winter    Problems taking medications regularly: No    Medication side effects: none    Bilateral hand cramping, worse at night.    Last clinic visit:6/9/2017  Medication or other changes at last visit:none  Weight since last visit?   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 RAST for bakers yeast negative 2002. Has occupational exposures with related worsening asthmatic symptoms. CT 2/08- no fibrosis.       Moderate Persistent Asthma      Priority: High     PFTS 1997 - FEV1- 3.39 (64%), ratio 0.74, 24% change dbh48-65% with bronchodilators,  TLC 86%, %, DLCO 78%. Methacholine challenge positive at level 7 2001.  PFTS 2004 - FEV1- 1.93 (63%), ratio 0.77. PFTS 4/08 - FEV1- 2.13 (72%), ratio 0.71, no change with bronchodilators,  TLC 78%, RV 96%, DLCO 64%              Chronic ischemic heart disease      Priority: High     atypical chest pain 2001 with GXT echo- decreased LVF, angiogram 2001- nonobstructing 3 vessel disease.   Repeat angio 2004- prox RCA 50-60%,  OM2- 40%, D1 30-40%, LAD 30-40%.   Cardiolite 2005 during hospitalization for SOB in [place] reported to reveal no ischemia.   Adenosine cardiolite 1/08- small slightly reversible inferior defect, most likely consistent with diaphragm attenuation with bowel uptake artifact. USA, Angio 11/09- Severe LM disease, severe subtotal occlusion of a large branch OM1.    S/p CABG x2 11/09- LIMA-D1, SVG to OM1, OM2. GXT 8/10- 5.8 mets, 121 (80%) HR, normal ekg, cardiolite- small fixed inferior/basal defect with small area khurram-infarct ischemia extending into the mid ventricle. EF 67%.  4/25/13:adenosine cardiolyte stress test through [facility] with normal EF and no ischemia.   1/16/2016 nuclear stress test:1.  Myocardial perfusion imaging using single isotope technique demonstrated no evidence for myocardial ischemia. 2. Gated images demonstrated normal wall motion with a calculated ejection fraction of 61%.  3. Compared to the prior study from 2011 there are no significant changes " ".           Type 2 diabetes mellitus with diabetic chronic kidney disease (H) 10/30/2015     Priority: Medium     Altered mental status 12/31/2014     Priority: Medium     12/27/2014:episode confusion for 20 minutes.  Seen at [facility]/-hospitalized.  Head CT, MRI/MRA all normal.  PET cardiac perfusion stress test normal.  Neurology consult-diagnosis= possible hypoglycemia, migraine variant.  \"Acute ischemic cerebral event was excluded\".       CKD (chronic kidney disease) stage 3, GFR 30-59 ml/min 05/06/2014     Priority: Medium     9/9/2015:He has had two abnormal MAC in the past.        COPD (chronic obstructive pulmonary disease) (H) 11/18/2013     Priority: Medium     PFT 11/15/13 results:FVC=59%, FEV1=54%, FEV1/FVC=92%.  His DLCO was 58%1. Spirometry: FEV1 moderately reduced. FVC moderately reduced. FEV1/FVC ratio reduced. 2. Bronchodilator Response: A 14% improvement is seen in FEV1 with bronchodilators. 3. Lung Volumes: Total lung capacity normal. Residual volume increased. Residual volume total lung capacity ratio increased. 4. DLCO: Moderately reduced.   INTERPRETATION: Moderate obstructive lung disease. Reversibility with bronchodilators is seen on testing today. Lung volumes show evidence for air trapping. DLCO is reduced, consistent with loss of capillary-alveolar exchange as in emphysema, pulmonary hypertension, chronic pulmonary embolus, pulmonary fibrosis or other pulmonary vascular disease. A concomitant restrictive lung disease process may be suspected on the basis of moderate reductions in FEV1 and FVC, with a fairly well-preserved FEV1/FVC ratio and a borderline low normal total lung capacity.   11/15/13:exercise oximetry did not show significant desaturation below 91%.  PFT February, 2014 results:FVC=54%, FEV1=48%, FEV1/FVC=64%.  DLCO=59%         Hyperlipidemia LDL goal <70 04/17/2013     Priority: Medium     Atrial fibrillation (H) 12/14/2009     Priority: Medium     After CABG 11/09       CVA " (cerebral vascular accident) (H) 11/27/2009     Priority: Medium     Mild Broca's aphasia, confusion, right hand dysesthesia after angio. MRA new small area of restricted diffusion in the white matter of the medial right temporal lobe,  consistent with a recent small infarct. Speech problems resolved, still mild right hand problems.         Obstructive sleep apnea 04/14/2008     Priority: Medium     ESS 20/24. Sleep study [facility]: 4/8/08. total sleep time was 423.0 minutes. The sleep latency was normal at 9.7 minutes.  REM sleep latency was 161.5 minutes. Sleep efficiency was normal at 82.7%. The sleep architecture was disrupted with frequent sleep stage changes and arousals. Snoring: Was reported as moderately loud. Lowest O2 saturation was 87.0%. This study is suggestive of mild sleep apnea, severe during REM sleep (21% TST).  PLM index was 0.0. EKG:  No significant cardiac arrhythmias were noted.         Hypertrophy of prostate without urinary obstruction      Priority: Medium     Elevated PSA. Bx negative 2004.       Health Care Home 08/11/2014     Priority: Low     State Tier Level:    Status:  Unable to re-connect  Care Coordinator:  Kecia Mills    See Letters for [facility] Care Plan  Date:  August 11, 2014             Advanced care planning/counseling discussion 09/24/2012     Priority: Low     Does not have a directive 9/12       Dyslexia 11/11/2010     Priority: Low     essentially unable to read       CTS (carpal tunnel syndrome) 05/12/2010     Priority: Low     EMG 5/10-  median neuropathy at the right wrist, moderate in severity electrically, right ulnar neuropathy localizing to the elbow, mild chronic right C6 radiculopathy without evidence for acute denervation.        Memory loss 05/03/2010     Priority: Low     MMSE 27/30 (0/3 attention recall, ?history of dylexia), PHQ9-16 7/09. Recommended neurocogntive testing, did not complete.  MMSE 23/30 6/10 (attention, county, if/and/buts).Neurocogntive  evaluation  1/10- not suspicious for emerging dementia. Felt likely due hearing loss/dyslexia.        Benign paroxysmal vertigo 02/03/2009     Priority: Low     Admit 2/09. MRI/MRA head and neck negative.       Sensorineural hearing loss, bilateral 10/08/2007     Priority: Low     Esophageal reflux 11/06/2006     Priority: Low     PSORIASIS      Priority: Low     OVERACTIVE BLADDER      Priority: Low     PVR 84 2004.       Current Outpatient Prescriptions   Medication Sig Dispense Refill     metoprolol tartrate (LOPRESSOR) 25 MG tablet TAKE ONE TABLET BY MOUTH TWICE DAILY 60 tablet 0     tamsulosin (FLOMAX) 0.4 MG capsule Take 1 capsule (0.4 mg) by mouth daily (Needs follow-up appointment for this medication) 90 capsule 3     finasteride (PROSCAR) 5 MG tablet Take 1 tablet (5 mg) by mouth daily MUST KEEP UPCOMING APPT FOR FURTHER REFILLS 90 tablet 1     ONETOUCH ULTRA test strip USE  TO CHECK GLUCOSE THREE TIMES DAILY 300 strip 1     blood glucose monitoring (NO BRAND SPECIFIED) meter device kit Use to test blood sugar 3 times daily or as directed. 1 kit 0     furosemide (LASIX) 20 MG tablet TAKE ONE TABLET BY MOUTH ONCE DAILY IN THE MORNING 30 tablet 11     omeprazole (PRILOSEC) 20 MG CR capsule TAKE ONE CAPSULE BY MOUTH ONCE DAILY 30 capsule 11     lisinopril (PRINIVIL/ZESTRIL) 5 MG tablet TAKE ONE TABLET BY MOUTH ONCE DAILY 90 tablet 3     atorvastatin (LIPITOR) 80 MG tablet Take 1 tablet (80 mg) by mouth daily 30 tablet 5     metFORMIN (GLUCOPHAGE) 1000 MG tablet Take 1 tablet (1,000 mg) by mouth 2 times daily (with meals) 60 tablet 0     budesonide (PULMICORT) 0.5 MG/2ML neb solution Take 2 mLs (0.5 mg) by nebulization 2 times daily 60 ampule 11     Insulin Pen Needle (PEN NEEDLES) 31G X 6 MM MISC 1 Units 3 times daily For novolog flexpen 90 each 0     insulin detemir (LEVEMIR FLEXPEN/FLEXTOUCH) 100 UNIT/ML injection Inject 30 Units Subcutaneous At Bedtime 15 mL 11     insulin aspart (NOVOLOG FLEXPEN) 100  "UNIT/ML injection 11 units before breakfast, 11 units before lunch, 11 units before dinner 30 mL 3     arformoterol (BROVANA) 15 MCG/2ML NEBU neb solution Take 2 mLs (15 mcg) by nebulization 2 times daily 120 mL 11     lisinopril (PRINIVIL/ZESTRIL) 5 MG tablet Take 1 tablet (5 mg) by mouth daily 90 tablet 1     order for DME Equipment being ordered: CPAP  AIRSENSE 10  11-15 CM H2O  QUATTRO AIR SIZE MED  # [id number]  DN# [id number]       order for DME Equipment being ordered: Nebulizer 1 Device 0     blood glucose monitoring (ONE TOUCH ULTRA 2) meter device kit Use to test blood sugars 4 times daily or as directed. 1 kit 0     order for DME Equipment being ordered: diabetic shoes 1 Box 0     nitroglycerin (NITROSTAT) 0.4 MG SL tablet Place 1 tablet (0.4 mg) under the tongue every 5 minutes as needed for chest pain (CALL 911 IF NOT IMPROVED AFTER THREE CONSECUTIVE DOSES) 25 tablet 0     order for DME Equipment being ordered: CPAP machine 1 Device 1     insulin syringe-needle U-100 (BD INSULIN SYRINGE ULTRAFINE) 31G X 5/16\" 1 ML Use one syringe 4 times daily or as directed. 100 each prn     aspirin 81 MG tablet Take by mouth daily       cholecalciferol 2000 UNITS tablet Take 1 tablet by mouth daily. 90 tablet 3     fish oil-omega-3 fatty acids (FISH OIL) 1000 MG capsule Take 1 capsule by mouth 2 times daily. 180 capsule 3     cyanocobalamin (BL VITAMIN B-12) 500 MCG TABS Take 500 mcg by mouth daily. (Patient taking differently: Take 2,500 mcg by mouth daily ) 90 tablet 4     [DISCONTINUED] metoprolol (LOPRESSOR) 25 MG tablet Take 1 tablet (25 mg) by mouth 2 times daily 60 tablet 11     albuterol (2.5 MG/3ML) 0.083% nebulizer solution Take 1 vial (2.5 mg) by nebulization every 4 hours as needed for shortness of breath / dyspnea or wheezing 1 Box 11     [DISCONTINUED] metoprolol (TOPROL-XL) 25 MG 24 hr tablet Take 1 tablet (25 mg) by mouth 2 times daily 180 tablet 3     [DISCONTINUED] LANTUS SOLOSTAR SOLN 100 UNIT/ML 26 " "units at bedtime 3 Month 0     ROS:General: No change in weight, sleep or appetite.  Normal energy.  No fever or chills.  Using CPAP at night.   Resp: POSITIVE for:, Hx COPD, not using albuteral inhaler.  He has been doing pulmicort and arformoterol nebs twice daily on a regular basis.   CV: No chest pains or palpitations  GI: POSITIVE for:, diarrhea, several stools in the AM.  No blood.   : POSITIVE for:, urinary frequency.  ON two meds for prostate.  NocturiaX3  Musculoskeletal: POSITIVE  for:, pain in hands.   Psychiatric: No problems with anxiety, depression or mental health    OBJECTIVE:Blood pressure 120/70, pulse 70, temperature 98.2  F (36.8  C), temperature source Tympanic, resp. rate 18, height 5' 8\" (1.727 m), weight 191 lb (86.6 kg). BMI=Body mass index is 29.04 kg/(m^2).  GENERAL APPEARANCE ADULT: Alert, no acute distress  MS: extremities normal, no peripheral edema   Foot exam:normal DP and PT pulses, no trophic changes or ulcerative lesions, normal monofilament exam and hammer toe left second toe.     ASSESSMENT/PLAN:                                                    Lifestyle recommendations:regular exercise, at least 150 minutes per week (average 30 minutes 5 times a week)  weight loss recommended (ideal BMI-body mass index is <25)  Diabetic recommendations:-return for follow-up visit in 6 month(s)    (E11.22,  N18.3,  Z79.4) Type 2 diabetes mellitus with stage 3 chronic kidney disease, with long-term current use of insulin (H)  (primary encounter diagnosis)  Comment: doing well  Plan: insulin aspart (NOVOLOG FLEXPEN) 100 UNIT/ML         injection, insulin detemir (LEVEMIR         FLEXPEN/FLEXTOUCH) 100 UNIT/ML injection        No change in current treatment plan.     (Z13.89) Screening for diabetic peripheral neuropathy  Comment:   Plan: FOOT EXAM  NO CHARGE [01083.114]        Forms for diabetic shoes.     (E78.5) Hyperlipidemia LDL goal <70  Comment:   Plan: Lipid panel reflex to direct LDL " Non-fasting,         atorvastatin (LIPITOR) 80 MG tablet          (J45.40) Moderate persistent asthma without complication  Comment: stable  Plan: No change in current treatment plan.     (I25.9) Chronic ischemic heart disease  Comment: stable  Plan: metoprolol tartrate (LOPRESSOR) 25 MG tablet,         nitroGLYcerin (NITROSTAT) 0.4 MG sublingual         tablet        No change in current treatment plan.     (N40.0) Hypertrophy of prostate without urinary obstruction  Comment: doing OK  Plan: No change in current treatment plan.     (J44.9) Chronic obstructive pulmonary disease, unspecified COPD type (H)  Comment:   Plan: Asthma Action Plan (AAP)          You can wear the gloves or get hands in warm water to help for cramps.      Lab Results   Component Value Date    A1C 7.5 02/05/2018        Diabetes Type II, A1c Controlled, insulin dependent   Associated with the following complications    Renal Complications:  CKD    Ophthalmologic Complications: None    Neurologic Complications: None    Peripheral Vascular Complications:  ASCVD

## 2018-02-06 LAB
CHOLEST SERPL-MCNC: 143 MG/DL
CREAT UR-MCNC: 105 MG/DL
HDLC SERPL-MCNC: 45 MG/DL
LDLC SERPL CALC-MCNC: 50 MG/DL
MICROALBUMIN UR-MCNC: 64 MG/L
MICROALBUMIN/CREAT UR: 60.86 MG/G CR (ref 0–17)
NONHDLC SERPL-MCNC: 98 MG/DL
TRIGL SERPL-MCNC: 238 MG/DL

## 2018-02-06 ASSESSMENT — ASTHMA QUESTIONNAIRES: ACT_TOTALSCORE: 25

## 2018-02-06 NOTE — PROGRESS NOTES
Please call.  Urine tests show positive MAC (microalbumin/creatinine ratio) meaning protein leaking from the kidneys into the urine. This is a sign of some kidney damage from the diabetes mellitus.  This has not changed.   Lipids normal except triglycerides high.  Overall OK.   HgbA1C is OK indicating reasonable diabetic control.   PLAN: No new changes in treatment recommended.

## 2018-02-18 DIAGNOSIS — Z79.4 TYPE 2 DIABETES MELLITUS WITH STAGE 3 CHRONIC KIDNEY DISEASE, WITH LONG-TERM CURRENT USE OF INSULIN (H): ICD-10-CM

## 2018-02-18 DIAGNOSIS — E11.22 TYPE 2 DIABETES MELLITUS WITH STAGE 3 CHRONIC KIDNEY DISEASE, WITH LONG-TERM CURRENT USE OF INSULIN (H): ICD-10-CM

## 2018-02-18 DIAGNOSIS — N18.30 TYPE 2 DIABETES MELLITUS WITH STAGE 3 CHRONIC KIDNEY DISEASE, WITH LONG-TERM CURRENT USE OF INSULIN (H): ICD-10-CM

## 2018-02-19 NOTE — TELEPHONE ENCOUNTER
"Requested Prescriptions   Pending Prescriptions Disp Refills     metFORMIN (GLUCOPHAGE) 1000 MG tablet [Pharmacy Med Name: METFORMIN 1000MG TAB] 60 tablet 11     Sig: TAKE ONE TABLET BY MOUTH TWICE DAILY WITH MEALS    Biguanide Agents Passed    2/18/2018  3:44 PM       Passed - Blood pressure less than 140/90 in past 6 months    BP Readings from Last 3 Encounters:   02/05/18 120/70   01/09/18 147/78   09/25/17 120/70                Passed - Patient has documented LDL within the past 12 mos.    Recent Labs   Lab Test  02/05/18   1313   LDL  50            Passed - Patient has had a Microalbumin in the past 12 mos.    Recent Labs   Lab Test  02/05/18   1323   MICROL  64   UMALCR  60.86*            Passed - Patient is age 10 or older       Passed - Patient has documented A1c within the specified period of time.    Recent Labs   Lab Test  02/05/18   1313   A1C  7.5*            Passed - Patient's CR is NOT>1.4 OR Patient's EGFR is NOT<45 within past 12 mos.    Recent Labs   Lab Test  07/24/17   0930   GFRESTIMATED  78   GFRESTBLACK  >90   GFR Calc         Recent Labs   Lab Test  07/24/17   0930   CR  0.94            Passed - Patient does NOT have a diagnosis of CHF.       Passed - Recent (6 mos) or future visit with authorizing provider's specialty    Patient had office visit in the last 6 months or has a visit in the next 30 days with authorizing provider.  See \"Patient Info\" tab in inbasket, or \"Choose Columns\" in Meds & Orders section of the refill encounter.            metFORMIN (GLUCOPHAGE) 1000 MG tablet  Last Written Prescription Date:  06/09/2017  Last Fill Quantity: 60 tablet,  # refills: 0   Last office visit: 2/5/2018 with prescribing provider:  02/05/2018 RUBY Villegas   Future Office Visit:   Next 5 appointments (look out 90 days)     Feb 21, 2018  9:00 AM CST   Pre-Op physical with Be Carreno MD   [facility] ([facility])    [address] [place] [address] " [facility] [id number]   [phone]                 Edwige LEVY (R) (M)

## 2018-02-21 ENCOUNTER — OFFICE VISIT (OUTPATIENT)
Dept: FAMILY MEDICINE | Facility: CLINIC | Age: 78
End: 2018-02-21
Payer: COMMERCIAL

## 2018-02-21 VITALS
SYSTOLIC BLOOD PRESSURE: 118 MMHG | WEIGHT: 191 LBS | RESPIRATION RATE: 18 BRPM | HEIGHT: 68 IN | BODY MASS INDEX: 28.95 KG/M2 | OXYGEN SATURATION: 96 % | DIASTOLIC BLOOD PRESSURE: 72 MMHG | HEART RATE: 78 BPM | TEMPERATURE: 97.5 F

## 2018-02-21 DIAGNOSIS — I10 HYPERTENSION GOAL BP (BLOOD PRESSURE) < 140/90: ICD-10-CM

## 2018-02-21 DIAGNOSIS — G47.33 OBSTRUCTIVE SLEEP APNEA: ICD-10-CM

## 2018-02-21 DIAGNOSIS — I25.9 CHRONIC ISCHEMIC HEART DISEASE: ICD-10-CM

## 2018-02-21 DIAGNOSIS — J44.9 CHRONIC OBSTRUCTIVE PULMONARY DISEASE, UNSPECIFIED COPD TYPE (H): ICD-10-CM

## 2018-02-21 DIAGNOSIS — N18.30 CKD (CHRONIC KIDNEY DISEASE) STAGE 3, GFR 30-59 ML/MIN (H): ICD-10-CM

## 2018-02-21 DIAGNOSIS — Z01.818 PREOP GENERAL PHYSICAL EXAM: Primary | ICD-10-CM

## 2018-02-21 PROCEDURE — 99215 OFFICE O/P EST HI 40 MIN: CPT | Performed by: FAMILY MEDICINE

## 2018-02-21 ASSESSMENT — PAIN SCALES - GENERAL: PAINLEVEL: NO PAIN (0)

## 2018-02-21 NOTE — NURSING NOTE
"Chief Complaint   Patient presents with     Pre-Op Exam       Initial /72  Pulse 78  Temp 97.5  F (36.4  C) (Tympanic)  Resp 18  Ht 5' 8\" (1.727 m)  Wt 191 lb (86.6 kg)  SpO2 96%  BMI 29.04 kg/m2 Estimated body mass index is 29.04 kg/(m^2) as calculated from the following:    Height as of this encounter: 5' 8\" (1.727 m).    Weight as of this encounter: 191 lb (86.6 kg).      Health Maintenance that is potentially due pending provider review:  NONE    n/a    Is there anyone who you would like to be able to receive your results? No  If yes have patient fill out THE[facility]A    "

## 2018-02-21 NOTE — PATIENT INSTRUCTIONS
Before Your Surgery      Call your surgeon if there is any change in your health. This includes signs of a cold or flu (such as a sore throat, runny nose, cough, rash or fever).    Do not smoke, drink alcohol or take over the counter medicine (unless your surgeon or primary care doctor tells you to) for the 24 hours before and after surgery.    If you take prescribed drugs: Follow your doctor s orders about which medicines to take and which to stop until after surgery.    Eating and drinking prior to surgery: follow the instructions from your surgeon    Take a shower or bath the night before surgery. Use the soap your surgeon gave you to gently clean your skin. If you do not have soap from your surgeon, use your regular soap. Do not shave or scrub the surgery site.  Wear clean pajamas and have clean sheets on your bed.     Before Your Surgery      Call your surgeon if there is any change in your health. This includes signs of a cold or flu (such as a sore throat, runny nose, cough, rash or fever).    Do not smoke, drink alcohol or take over the counter medicine (unless your surgeon or primary care doctor tells you to) for the 24 hours before and after surgery.    If you take prescribed drugs: Follow your doctor s orders about which medicines to take and which to stop until after surgery.    Eating and drinking prior to surgery: follow the instructions from your surgeon    Take a shower or bath the night before surgery. Use the soap your surgeon gave you to gently clean your skin. If you do not have soap from your surgeon, use your regular soap. Do not shave or scrub the surgery site.  Wear clean pajamas and have clean sheets on your bed.     RECOMMENDATIONS:     --Patient is to take all scheduled medications on the day of surgery EXCEPT for modifications listed below.    Diabetes Medication Use  -----Hold usual oral and non-insulin diabetic meds (e.g. Metformin, Actos, Glipizide) while NPO.   -----Take 80% of long  acting insulin (e.g. Lantus, NPH) while NPO (fasting)  -----Hold short acting insulin (e.g. Novolog, Humalog) while NPO (fasting)    Take only 24 units detemir long acting insulin the night before surgery.   No Novolog the morning of surgery  No metformin the morning of surgery.    OK to take all your other medications, particularly inhalers the morning of surgery.     APPROVAL GIVEN to proceed with proposed procedure, without further diagnostic evaluation

## 2018-02-21 NOTE — PROGRESS NOTES
[facility]  [address] [place] [id number]  [phone]  Dept: [phone]    PRE-OP EVALUATION:  Today's date: 2018    Alvaro Horton (: 1940) presents for pre-operative evaluation assessment as requested by Dr. Yee.  He requires evaluation and anesthesia risk assessment prior to undergoing surgery/procedure for treatment of Laser Treatment to bilateral eyes .    Proposed Surgery/ Procedure: Eye laser procedure  Date of Surgery/ Procedure: 2018   Second date 2018 approx  Time of Surgery/ Procedure:   Hospital/Surgical Facility: [facility]  Fax number for surgical facility: [phone]  Primary Physician: Be Carreno  Type of Anesthesia Anticipated: to be determined    Patient has a Health Care Directive or Living Will:  At home    1. yes- Do you have a history of heart attack, stroke, stent, bypass or surgery on an artery in the head, neck, heart or legs?  2. NO - Do you ever have any pain or discomfort in your chest?  3. NO - Do you have a history of  Heart Failure?  4. yes- Are you troubled by shortness of breath when: walking on the level, up a slight hill or at night?  5. NO - Do you currently have a cold, bronchitis or other respiratory infection?  6. NO - Do you have a cough, shortness of breath or wheezing?  7. NO - Do you sometimes get pains in the calves of your legs when you walk?  8. NO - Do you or anyone in your family have previous history of blood clots?  9. NO - Do you or does anyone in your family have a serious bleeding problem such as prolonged bleeding following surgeries or cuts?  10. NO - Have you ever had problems with anemia or been told to take iron pills?  11. NO - Have you had any abnormal blood loss such as black, tarry or bloody stools, or abnormal vaginal bleeding?  12. NO - Have you ever had a blood transfusion?  13. NO - Have you or any of your relatives ever had problems with  "anesthesia?  14.yes - Do you have sleep apnea, excessive snoring or daytime drowsiness?  15.? - Do you have any prosthetic heart valves?  16. NO - Do you have prosthetic joints?  17. NO - Is there any chance that you may be pregnant?      HPI:   He has noted decreased visual acuity for the past couple years.  Difficulty seeing at night and with bright lights and bright sun.  He has bilateral cataracts.   He has seen Ophthalmology and is having cataract removal in both eyes, starting with one eye and two weeks later for the other eye.     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 RAST for bakers yeast negative 2002. Has occupational exposures with related worsening asthmatic symptoms. CT 2/08- no fibrosis.       Moderate Persistent Asthma      Priority: High     PFTS 1997 - FEV1- 3.39 (64%), ratio 0.74, 24% change [id number]% with bronchodilators,  TLC 86%, %, DLCO 78%. Methacholine challenge positive at level 7 2001.  PFTS 2004 - FEV1- 1.93 (63%), ratio 0.77. PFTS 4/08 - FEV1- 2.13 (72%), ratio 0.71, no change with bronchodilators,  TLC 78%, RV 96%, DLCO 64%              Chronic ischemic heart disease      Priority: High     atypical chest pain 2001 with GXT echo- decreased LVF, angiogram 2001- nonobstructing 3 vessel disease.   Repeat angio 2004- prox RCA 50-60%,  OM2- 40%, D1 30-40%, LAD 30-40%.   Cardiolite 2005 during hospitalization for SOB in [place] reported to reveal no ischemia.   Adenosine cardiolite 1/08- small slightly reversible inferior defect, most likely consistent with diaphragm attenuation with bowel uptake artifact. USA, Angio 11/09- Severe LM disease, severe subtotal occlusion of a large branch OM1.    S/p CABG x2 11/09- LIMA-D1, SVG to OM1, OM2. GXT 8/10- 5.8 mets, 121 (80%) HR, normal ekg, " "cardiolite- small fixed inferior/basal defect with small area khurram-infarct ischemia extending into the mid ventricle. EF 67%.  4/25/13:adenosine cardiolyte stress test through [facility] with normal EF and no ischemia.   1/16/2016 nuclear stress test:1.  Myocardial perfusion imaging using single isotope technique demonstrated no evidence for myocardial ischemia. 2. Gated images demonstrated normal wall motion with a calculated ejection fraction of 61%.  3. Compared to the prior study from 2011 there are no significant changes .           Type 2 diabetes mellitus with diabetic chronic kidney disease (H) 10/30/2015     Priority: Medium     Altered mental status 12/31/2014     Priority: Medium     12/27/2014:episode confusion for 20 minutes.  Seen at [facility]/-hospitalized.  Head CT, MRI/MRA all normal.  PET cardiac perfusion stress test normal.  Neurology consult-diagnosis= possible hypoglycemia, migraine variant.  \"Acute ischemic cerebral event was excluded\".       CKD (chronic kidney disease) stage 3, GFR 30-59 ml/min 05/06/2014     Priority: Medium     9/9/2015:He has had two abnormal MAC in the past.        COPD (chronic obstructive pulmonary disease) (H) 11/18/2013     Priority: Medium     PFT 11/15/13 results:FVC=59%, FEV1=54%, FEV1/FVC=92%.  His DLCO was 58%1. Spirometry: FEV1 moderately reduced. FVC moderately reduced. FEV1/FVC ratio reduced. 2. Bronchodilator Response: A 14% improvement is seen in FEV1 with bronchodilators. 3. Lung Volumes: Total lung capacity normal. Residual volume increased. Residual volume total lung capacity ratio increased. 4. DLCO: Moderately reduced.   INTERPRETATION: Moderate obstructive lung disease. Reversibility with bronchodilators is seen on testing today. Lung volumes show evidence for air trapping. DLCO is reduced, consistent with loss of capillary-alveolar exchange as in emphysema, pulmonary hypertension, chronic pulmonary embolus, pulmonary fibrosis or other pulmonary vascular " disease. A concomitant restrictive lung disease process may be suspected on the basis of moderate reductions in FEV1 and FVC, with a fairly well-preserved FEV1/FVC ratio and a borderline low normal total lung capacity.   11/15/13:exercise oximetry did not show significant desaturation below 91%.  PFT February, 2014 results:FVC=54%, FEV1=48%, FEV1/FVC=64%.  DLCO=59%         Hyperlipidemia LDL goal <70 04/17/2013     Priority: Medium     Atrial fibrillation (H) 12/14/2009     Priority: Medium     After CABG 11/09       CVA (cerebral vascular accident) (H) 11/27/2009     Priority: Medium     Mild Broca's aphasia, confusion, right hand dysesthesia after angio. MRA new small area of restricted diffusion in the white matter of the medial right temporal lobe,  consistent with a recent small infarct. Speech problems resolved, still mild right hand problems.         Obstructive sleep apnea 04/14/2008     Priority: Medium     ESS 20/24. Sleep study [facility]: 4/8/08. total sleep time was 423.0 minutes. The sleep latency was normal at 9.7 minutes.  REM sleep latency was 161.5 minutes. Sleep efficiency was normal at 82.7%. The sleep architecture was disrupted with frequent sleep stage changes and arousals. Snoring: Was reported as moderately loud. Lowest O2 saturation was 87.0%. This study is suggestive of mild sleep apnea, severe during REM sleep (21% TST).  PLM index was 0.0. EKG:  No significant cardiac arrhythmias were noted.         Hypertrophy of prostate without urinary obstruction      Priority: Medium     Elevated PSA. Bx negative 2004.       Health Care Home 08/11/2014     Priority: Low     State Tier Level:    Status:  Unable to re-connect  Care Coordinator:  Kecia Mills    See Letters for [facility] Care Plan  Date:  August 11, 2014             Advanced care planning/counseling discussion 09/24/2012     Priority: Low     Does not have a directive 9/12       Dyslexia 11/11/2010     Priority: Low     essentially unable  to read       CTS (carpal tunnel syndrome) 05/12/2010     Priority: Low     EMG 5/10-  median neuropathy at the right wrist, moderate in severity electrically, right ulnar neuropathy localizing to the elbow, mild chronic right C6 radiculopathy without evidence for acute denervation.        Memory loss 05/03/2010     Priority: Low     MMSE 27/30 (0/3 attention recall, ?history of dylexia), PHQ9-16 7/09. Recommended neurocogntive testing, did not complete.  MMSE 23/30 6/10 (attention, county, if/and/buts).Neurocogntive evaluation  1/10- not suspicious for emerging dementia. Felt likely due hearing loss/dyslexia.        Benign paroxysmal vertigo 02/03/2009     Priority: Low     Admit 2/09. MRI/MRA head and neck negative.       Sensorineural hearing loss, bilateral 10/08/2007     Priority: Low     Esophageal reflux 11/06/2006     Priority: Low     PSORIASIS      Priority: Low     OVERACTIVE BLADDER      Priority: Low     PVR 84 2004.        Past Medical History:   Diagnosis Date     Calculus of kidney     1990. s/p ESWL and percutaneous nephrolithotomy     Depression     Hospitalized 1979     Left hand weakness 12/14/2009    Afterbypass surgery, CVA, improved.      PNEUMONIA, ORGANISM NOS 2/14/2008    CT 2/08-  Pulmonary atelectasis and infiltrate in the posterior left lower lung base.      Past Surgical History:   Procedure Laterality Date     C ANESTH,DX ARTHROSCOPIC PROC KNEE JOINT  1994, 1998     Left     CARDIAC SURGERY  11/09    CABG x2 - LIMA-D1, SVG to OM1, OM2.     GENITOURINARY SURGERY  1990    ESWL and percutaneous nephrolithotomy     ORTHOPEDIC SURGERY  8/12    right CTR     SURGICAL HISTORY OF -   1998    Hernia repair     SURGICAL HISTORY OF -   2004    NormalColonoscopy      SURGICAL HISTORY OF -   2006    Normal Colonoscopy     Current Outpatient Prescriptions   Medication Sig Dispense Refill     metFORMIN (GLUCOPHAGE) 1000 MG tablet TAKE ONE TABLET BY MOUTH TWICE DAILY WITH MEALS 60 tablet 11      atorvastatin (LIPITOR) 80 MG tablet Take 1 tablet (80 mg) by mouth daily 30 tablet 11     insulin aspart (NOVOLOG FLEXPEN) 100 UNIT/ML injection 11 units before breakfast, 11 units before lunch, 11 units before dinner 30 mL 11     insulin detemir (LEVEMIR FLEXPEN/FLEXTOUCH) 100 UNIT/ML injection Inject 30 Units Subcutaneous At Bedtime 15 mL 11     metoprolol tartrate (LOPRESSOR) 25 MG tablet Take 1 tablet (25 mg) by mouth 2 times daily 60 tablet 11     nitroGLYcerin (NITROSTAT) 0.4 MG sublingual tablet Place 1 tablet (0.4 mg) under the tongue every 5 minutes as needed for chest pain (CALL 911 IF NOT IMPROVED AFTER THREE CONSECUTIVE DOSES) 25 tablet 0     tamsulosin (FLOMAX) 0.4 MG capsule Take 1 capsule (0.4 mg) by mouth daily (Needs follow-up appointment for this medication) 90 capsule 3     finasteride (PROSCAR) 5 MG tablet Take 1 tablet (5 mg) by mouth daily MUST KEEP UPCOMING APPT FOR FURTHER REFILLS 90 tablet 1     blood glucose monitoring (NO BRAND SPECIFIED) meter device kit Use to test blood sugar 3 times daily or as directed. 1 kit 0     furosemide (LASIX) 20 MG tablet TAKE ONE TABLET BY MOUTH ONCE DAILY IN THE MORNING 30 tablet 11     omeprazole (PRILOSEC) 20 MG CR capsule TAKE ONE CAPSULE BY MOUTH ONCE DAILY 30 capsule 11     lisinopril (PRINIVIL/ZESTRIL) 5 MG tablet TAKE ONE TABLET BY MOUTH ONCE DAILY 90 tablet 3     budesonide (PULMICORT) 0.5 MG/2ML neb solution Take 2 mLs (0.5 mg) by nebulization 2 times daily 60 ampule 11     Insulin Pen Needle (PEN NEEDLES) 31G X 6 MM MISC 1 Units 3 times daily For novolog flexpen 90 each 0     arformoterol (BROVANA) 15 MCG/2ML NEBU neb solution Take 2 mLs (15 mcg) by nebulization 2 times daily 120 mL 11     albuterol (2.5 MG/3ML) 0.083% nebulizer solution Take 1 vial (2.5 mg) by nebulization every 4 hours as needed for shortness of breath / dyspnea or wheezing 1 Box 11     blood glucose monitoring (ONE TOUCH ULTRA 2) meter device kit Use to test blood sugars 4 times  "daily or as directed. 1 kit 0     insulin syringe-needle U-100 (BD INSULIN SYRINGE ULTRAFINE) 31G X 5/16\" 1 ML Use one syringe 4 times daily or as directed. 100 each prn     aspirin 81 MG tablet Take by mouth daily       cholecalciferol 2000 UNITS tablet Take 1 tablet by mouth daily. 90 tablet 3     fish oil-omega-3 fatty acids (FISH OIL) 1000 MG capsule Take 1 capsule by mouth 2 times daily. 180 capsule 3     cyanocobalamin (BL VITAMIN B-12) 500 MCG TABS Take 500 mcg by mouth daily. (Patient taking differently: Take 2,500 mcg by mouth daily ) 90 tablet 4     ONETOUCH ULTRA test strip USE  TO CHECK GLUCOSE THREE TIMES DAILY 300 strip 1     [DISCONTINUED] metFORMIN (GLUCOPHAGE) 1000 MG tablet Take 1 tablet (1,000 mg) by mouth 2 times daily (with meals) 60 tablet 0     [DISCONTINUED] lisinopril (PRINIVIL/ZESTRIL) 5 MG tablet Take 1 tablet (5 mg) by mouth daily 90 tablet 1     order for DME Equipment being ordered: CPAP  AIRSENSE 10  11-15 CM H2O  QUATTR[employer] AIR SIZE MED  SN# [id number]  DN# [id number]       order for DME Equipment being ordered: Nebulizer 1 Device 0     order for DME Equipment being ordered: diabetic shoes 1 Box 0     order for DME Equipment being ordered: CPAP machine 1 Device 1     [DISCONTINUED] metoprolol (TOPROL-XL) 25 MG 24 hr tablet Take 1 tablet (25 mg) by mouth 2 times daily 180 tablet 3     [DISCONTINUED] LANTUS SOLOSTAR SOLN 100 UNIT/ML 26 units at bedtime 3 Month 0     OTC products: None, except as noted above    Allergies   Allergen Reactions     Prednisone      Severe interaction with DM      Latex Allergy: NO    Social History   Substance Use Topics     Smoking status: Never Smoker     Smokeless tobacco: Never Used     Alcohol use No     History   Drug Use No     Family History :  ============  No family history of bleeding or anesthesia problems.     REVIEW OF SYSTEMS:   ROS:  General: No change in weight, sleep or appetite.  Normal energy.  No fever or chills  Eyes: POSITIVE for:, vision " "changes see above   ENT: No problems with ears, nose or throat.  No difficulty swallowing.  Resp: POSITIVE for:, Hx COPD, has been stable.   CV: No chest pains or palpitations, POSITIVE for:, history of heart disease has been stable.  Never uses nitro pills.   GI: POSITIVE for:, diarrhea, currently doing OK.   : No urinary frequency or dysuria, bladder or kidney problems  Musculoskeletal: POSITIVE  for:, pain arthritis in hands.   Neurologic: No headaches, numbness, tingling, weakness, problems with balance or coordination  Psychiatric: No problems with anxiety, depression or mental health  Heme/immune/allergy: No history of bleeding or clotting problems or anemia.  No allergies or immune system problems  Endocrine: POSITIVE for:, diabetes mellitus.   Glucometers have been checked three times daily.  Range has been 105-220 in the past month.   Lab Results   Component Value Date    A1C 7.5 02/05/2018       Skin: No rashes,worrisome lesions or skin problems     EXAM:   OBJECTIVE:Blood pressure 118/72, pulse 78, temperature 97.5  F (36.4  C), temperature source Tympanic, resp. rate 18, height 5' 8\" (1.727 m), weight 191 lb (86.6 kg), SpO2 96 %. BMI=Body mass index is 29.04 kg/(m^2).  GENERAL APPEARANCE ADULT: Alert, no acute distress  EYES: PERRL, EOM normal, conjunctiva and lids normal, cataract present bilateral  HENT: right TM not visualized secondary to cerumen, left TM normal, throat/mouth:normal, mucous membranes moist  NECK: No adenopathy,masses or thyromegaly  RESP: lungs clear to auscultation   CV: normal rate, regular rhythm, no murmur or gallop  ABDOMEN: soft, no organomegaly, masses or tenderness  MS: extremities normal, no peripheral edema     DIAGNOSTICS:   EKG done on 7/24/2017: appears normal, NSR, first degree AV block, normal axis, normal intervals, no acute ST/T changes c/w ischemia, no LVH by voltage criteria, Right Bundle Branch Block, unchanged from previous tracings    Recent Labs   Lab Test  " 02/05/18   1313  07/24/17   0930  06/22/17   1859  06/09/17   1204  11/09/16   0635   12/26/14   1535   HGB   --    --   13.7   --   14.3   < >  14.2   PLT   --    --   194   --   181   < >  193   INR   --    --    --    --   0.94   --   0.91   NA   --   135   --    --   141   < >  140   POTASSIUM   --   4.0   --    --   3.9   < >  4.0   CR   --   0.94   --    --   1.16   < >  1.32*   A1C  7.5*   --    --   7.9*   --    < >   --     < > = values in this interval not displayed.        IMPRESSION:   Reason for surgery/procedure: bilateral eye cataracts    The proposed surgical procedure is considered LOW risk.    REVISED CARDIAC RISK INDEX  The patient has the following serious cardiovascular risks for perioperative complications such as (MI, PE, VFib and 3  AV Block):  Coronary Artery Disease (MI, positive stress test, angina, Qs on EKG).  This has been stable.   INTERPRETATION: 1 risks: Class II (low risk - 0.9% complication rate)    The patient has the following additional risks for perioperative complications:  No identified additional risks      ICD-10-CM    1. Preop general physical exam Z01.818    2. Chronic ischemic heart disease I25.9    3. Hypertension goal BP (blood pressure) < 140/90 I10    4. Chronic obstructive pulmonary disease, unspecified COPD type (H) J44.9    5. Obstructive sleep apnea G47.33    6. CKD (chronic kidney disease) stage 3, GFR 30-59 ml/min N18.3      His chronic illnesses have been stable.     RECOMMENDATIONS:     --Patient is to take all scheduled medications on the day of surgery EXCEPT for modifications listed below.    Diabetes Medication Use  -----Hold usual oral and non-insulin diabetic meds (e.g. Metformin, Actos, Glipizide) while NPO.   -----Take 80% of long acting insulin (e.g. Lantus, NPH) while NPO (fasting)  -----Hold short acting insulin (e.g. Novolog, Humalog) while NPO (fasting)    Take only 24 units detemir long acting insulin the night before surgery.   No Novolog the  morning of surgery  No metformin the morning of surgery.    OK to take all your other medications, particularly inhalers the morning of surgery.     APPROVAL GIVEN to proceed with proposed procedure, without further diagnostic evaluation       Signed Electronically by: Be Carreno MD    Copy of this evaluation report is provided to requesting physician.    [place] Preop Guidelines

## 2018-02-21 NOTE — MR AVS SNAPSHOT
After Visit Summary   2/21/2018    Alvaro Horton    MRN: [id number]           Patient Information     Date Of Birth          1940        Visit Information        Provider Department      2/21/2018 9:00 AM Be Carreno MD [facility]        Today's Diagnoses     Preop general physical exam    -  1    Chronic ischemic heart disease        Hypertension goal BP (blood pressure) < 140/90        Chronic obstructive pulmonary disease, unspecified COPD type (H)        Obstructive sleep apnea        CKD (chronic kidney disease) stage 3, GFR 30-59 ml/min          Care Instructions      Before Your Surgery      Call your surgeon if there is any change in your health. This includes signs of a cold or flu (such as a sore throat, runny nose, cough, rash or fever).    Do not smoke, drink alcohol or take over the counter medicine (unless your surgeon or primary care doctor tells you to) for the 24 hours before and after surgery.    If you take prescribed drugs: Follow your doctor s orders about which medicines to take and which to stop until after surgery.    Eating and drinking prior to surgery: follow the instructions from your surgeon    Take a shower or bath the night before surgery. Use the soap your surgeon gave you to gently clean your skin. If you do not have soap from your surgeon, use your regular soap. Do not shave or scrub the surgery site.  Wear clean pajamas and have clean sheets on your bed.     Before Your Surgery      Call your surgeon if there is any change in your health. This includes signs of a cold or flu (such as a sore throat, runny nose, cough, rash or fever).    Do not smoke, drink alcohol or take over the counter medicine (unless your surgeon or primary care doctor tells you to) for the 24 hours before and after surgery.    If you take prescribed drugs: Follow your doctor s orders about which medicines to take and which to stop until after  surgery.    Eating and drinking prior to surgery: follow the instructions from your surgeon    Take a shower or bath the night before surgery. Use the soap your surgeon gave you to gently clean your skin. If you do not have soap from your surgeon, use your regular soap. Do not shave or scrub the surgery site.  Wear clean pajamas and have clean sheets on your bed.     RECOMMENDATIONS:     --Patient is to take all scheduled medications on the day of surgery EXCEPT for modifications listed below.    Diabetes Medication Use  -----Hold usual oral and non-insulin diabetic meds (e.g. Metformin, Actos, Glipizide) while NPO.   -----Take 80% of long acting insulin (e.g. Lantus, NPH) while NPO (fasting)  -----Hold short acting insulin (e.g. Novolog, Humalog) while NPO (fasting)    Take only 24 units detemir long acting insulin the night before surgery.   No Novolog the morning of surgery  No metformin the morning of surgery.    OK to take all your other medications, particularly inhalers the morning of surgery.     APPROVAL GIVEN to proceed with proposed procedure, without further diagnostic evaluation          Follow-ups after your visit        Who to contact     If you have questions or need follow up information about today's clinic visit or your schedule please contact [facility] directly at [phone].  Normal or non-critical lab and imaging results will be communicated to you by [employer], letter or phone within 4 business days after the clinic has received the results. If you do not hear from us within 7 days, please contact the clinic through [employer] or phone. If you have a critical or abnormal lab result, we will notify you by phone as soon as possible.  Submit refill requests through [employer] or call your pharmacy and they will forward the refill request to us. Please allow 3 business days for your refill to be completed.          Additional Information About Your Visit        [employer] Information   "   Pump! lets you send messages to your doctor, view your test results, renew your prescriptions, schedule appointments and more. To sign up, go to [email or web address] . Click on \"Log in\" on the left side of the screen, which will take you to the Welcome page. Then click on \"Sign up Now\" on the right side of the page.     You will be asked to enter the access code listed below, as well as some personal information. Please follow the directions to create your username and password.     Your access code is: [email or web address]  Expires: 4/10/2018 12:54 PM     Your access code will  in 90 days. If you need help or a new code, please call your [place] [facility] clinic or [phone].        Care EveryWhere ID     This is your Care EveryWhere ID. This could be used by other organizations to access your [facility] medical records  [id number]        Your Vitals Were     Pulse Temperature Respirations Height Pulse Oximetry BMI (Body Mass Index)    78 97.5  F (36.4  C) (Tympanic) 18 5' 8\" (1.727 m) 96% 29.04 kg/m2       Blood Pressure from Last 3 Encounters:   18 118/72   18 120/70   18 147/78    Weight from Last 3 Encounters:   18 191 lb (86.6 kg)   18 191 lb (86.6 kg)   17 185 lb (83.9 kg)              Today, you had the following     No orders found for display         Today's Medication Changes          These changes are accurate as of 18 10:09 AM.  If you have any questions, ask your nurse or doctor.               These medicines have changed or have updated prescriptions.        Dose/Directions    cyanocobalamin 500 MCG Tabs   Commonly known as:  BL VITAMIN B-12   This may have changed:  how much to take   Used for:  Memory loss        Dose:  500 mcg   Take 500 mcg by mouth daily.   Quantity:  90 tablet   Refills:  4                Primary Care Provider Office Phone # Fax #    Be Carreno [phone] [address] [place] [address]        Equal " Access to Services     [facility] CenterJESSICA : Hadii radha ewing john Lin, wasladeda luqadaha, qaybta kamagdalenatory mcmillan. So [facility] [phone].    ATENCIÓN: Si habla español, tiene a rahman disposición servicios gratuitos de asistencia lingüística. Llame al [phone].    We comply with applicable federal civil rights laws and [place] laws. We do not discriminate on the basis of race, color, national origin, age, disability, sex, sexual orientation, or gender identity.            Thank you!     Thank you for choosing [facility]  for your care. Our goal is always to provide you with excellent care. Hearing back from our patients is one way we can continue to improve our services. Please take a few minutes to complete the written survey that you may receive in the mail after your visit with us. Thank you!             Your Updated Medication List - Protect others around you: Learn how to safely use, store and throw away your medicines at www.disposemymeds.org.          This list is accurate as of 2/21/18 10:09 AM.  Always use your most recent med list.                   Brand Name Dispense Instructions for use Diagnosis    albuterol (2.5 MG/3ML) 0.083% neb solution     1 Box    Take 1 vial (2.5 mg) by nebulization every 4 hours as needed for shortness of breath / dyspnea or wheezing    Moderate persistent asthma without complication       arformoterol 15 MCG/2ML Nebu neb solution    BROVANA    120 mL    Take 2 mLs (15 mcg) by nebulization 2 times daily    Chronic obstructive pulmonary disease, unspecified COPD type (H)       aspirin 81 MG tablet      Take by mouth daily        atorvastatin 80 MG tablet    LIPITOR    30 tablet    Take 1 tablet (80 mg) by mouth daily    Hyperlipidemia LDL goal <70       * blood glucose monitoring meter device kit     1 kit    Use to test blood sugars 4 times daily or as directed.    Type 2 diabetes mellitus with diabetic chronic kidney  "disease (H)       * blood glucose monitoring meter device kit    no brand specified    1 kit    Use to test blood sugar 3 times daily or as directed.    Type 2 diabetes, HbA1C goal < 8% (H)       budesonide 0.5 MG/2ML neb solution    PULMICORT    60 ampule    Take 2 mLs (0.5 mg) by nebulization 2 times daily    Moderate persistent asthma without complication       cholecalciferol 2000 UNITS tablet     90 tablet    Take 1 tablet by mouth daily.    Memory loss       cyanocobalamin 500 MCG Tabs    BL VITAMIN B-12    90 tablet    Take 500 mcg by mouth daily.    Memory loss       finasteride 5 MG tablet    PROSCAR    90 tablet    Take 1 tablet (5 mg) by mouth daily MUST KEEP UPCOMING APPT FOR FURTHER REFILLS    Urinary retention       fish oil-omega-3 fatty acids 1000 MG capsule     180 capsule    Take 1 capsule by mouth 2 times daily.    Hyperlipidemia LDL goal <100       furosemide 20 MG tablet    LASIX    30 tablet    TAKE ONE TABLET BY MOUTH ONCE DAILY IN THE MORNING    Chronic ischemic heart disease       insulin aspart 100 UNIT/ML injection    NovoLOG FLEXPEN    30 mL    11 units before breakfast, 11 units before lunch, 11 units before dinner    Type 2 diabetes mellitus with stage 3 chronic kidney disease, with long-term current use of insulin (H)       insulin detemir 100 UNIT/ML injection    LEVEMIR FLEXPEN/FLEXTOUCH    15 mL    Inject 30 Units Subcutaneous At Bedtime    Type 2 diabetes mellitus with stage 3 chronic kidney disease, with long-term current use of insulin (H)       insulin syringe-needle U-100 31G X 5/16\" 1 ML    BD insulin syringe ULTRAFINE    100 each    Use one syringe 4 times daily or as directed.    Type 2 diabetes, HbA1C goal < 8% (H)       lisinopril 5 MG tablet    PRINIVIL/ZESTRIL    90 tablet    TAKE ONE TABLET BY MOUTH ONCE DAILY    Hyperlipidemia LDL goal <70       metFORMIN 1000 MG tablet    GLUCOPHAGE    60 tablet    TAKE ONE TABLET BY MOUTH TWICE DAILY WITH MEALS    Type 2 diabetes " mellitus with stage 3 chronic kidney disease, with long-term current use of insulin (H)       metoprolol tartrate 25 MG tablet    LOPRESSOR    60 tablet    Take 1 tablet (25 mg) by mouth 2 times daily    Chronic ischemic heart disease       nitroGLYcerin 0.4 MG sublingual tablet    NITROSTAT    25 tablet    Place 1 tablet (0.4 mg) under the tongue every 5 minutes as needed for chest pain (CALL 911 IF NOT IMPROVED AFTER THREE CONSECUTIVE DOSES)    Chronic ischemic heart disease       omeprazole 20 MG CR capsule    priLOSEC    30 capsule    TAKE ONE CAPSULE BY MOUTH ONCE DAILY    Gastroesophageal reflux disease without esophagitis       ONETOUCH ULTRA test strip   Generic drug:  blood glucose monitoring     300 strip    USE  TO CHECK GLUCOSE THREE TIMES DAILY    Type 2 diabetes mellitus with diabetic chronic kidney disease (H)       order for DME      Equipment being ordered: CPAP AIRSENSE 10 11-15 CM H2O QUATTRO AIR SIZE MED SN# [id number]  DN# [id number]        * order for DME     1 Device    Equipment being ordered: CPAP machine    Obstructive sleep apnea (adult) (pediatric)       * order for DME     1 Box    Equipment being ordered: diabetic shoes    Type 2 diabetes mellitus with diabetic chronic kidney disease (H)       * order for DME     1 Device    Equipment being ordered: Nebulizer        pen needles 31G X 6 MM Misc     90 each    1 Units 3 times daily For novolog flexpen    Type 2 diabetes, HbA1C goal < 8% (H)       tamsulosin 0.4 MG capsule    FLOMAX    90 capsule    Take 1 capsule (0.4 mg) by mouth daily (Needs follow-up appointment for this medication)    Benign non-nodular prostatic hyperplasia with lower urinary tract symptoms       * Notice:  This list has 5 medication(s) that are the same as other medications prescribed for you. Read the directions carefully, and ask your doctor or other care provider to review them with you.

## 2018-02-23 DIAGNOSIS — J44.9 CHRONIC OBSTRUCTIVE PULMONARY DISEASE, UNSPECIFIED COPD TYPE (H): ICD-10-CM

## 2018-02-24 ENCOUNTER — NURSE TRIAGE (OUTPATIENT)
Dept: NURSING | Facility: CLINIC | Age: 78
End: 2018-02-24

## 2018-02-24 RX ORDER — ARFORMOTEROL TARTRATE 15 UG/2ML
15 SOLUTION RESPIRATORY (INHALATION) 2 TIMES DAILY
Qty: 120 ML | Refills: 3 | Status: SHIPPED | OUTPATIENT
Start: 2018-02-24 | End: 2018-06-28

## 2018-02-24 NOTE — TELEPHONE ENCOUNTER
Alvaro is requesting a refill for Mateusz antione.  Alvaro will be out Sunday and is requesting to speak with on call MD.  FNA paged on call pulmonologist MD Amira Price to   Phone FNA back at 10:58am.  FNA repaged at 11:14am through page .  MD ordered medication and FNA phoned patient back.

## 2018-02-26 ENCOUNTER — TELEPHONE (OUTPATIENT)
Dept: FAMILY MEDICINE | Facility: CLINIC | Age: 78
End: 2018-02-26

## 2018-02-26 DIAGNOSIS — Z79.4 TYPE 2 DIABETES MELLITUS WITH STAGE 3 CHRONIC KIDNEY DISEASE, WITH LONG-TERM CURRENT USE OF INSULIN (H): Primary | ICD-10-CM

## 2018-02-26 DIAGNOSIS — N18.30 TYPE 2 DIABETES MELLITUS WITH STAGE 3 CHRONIC KIDNEY DISEASE, WITH LONG-TERM CURRENT USE OF INSULIN (H): Primary | ICD-10-CM

## 2018-02-26 DIAGNOSIS — E11.22 TYPE 2 DIABETES MELLITUS WITH STAGE 3 CHRONIC KIDNEY DISEASE, WITH LONG-TERM CURRENT USE OF INSULIN (H): Primary | ICD-10-CM

## 2018-02-26 NOTE — TELEPHONE ENCOUNTER
[facility]   Requesting Order 1) one pair Diabetic Shoes, 2) 3 pairs custom diabetic inserts.  Fax [phone]  [facility]

## 2018-02-27 DIAGNOSIS — J44.9 CHRONIC OBSTRUCTIVE PULMONARY DISEASE, UNSPECIFIED COPD TYPE (H): ICD-10-CM

## 2018-02-27 RX ORDER — ARFORMOTEROL TARTRATE 15 UG/2ML
15 SOLUTION RESPIRATORY (INHALATION) 2 TIMES DAILY
Qty: 120 ML | Refills: 3 | Status: CANCELLED | OUTPATIENT
Start: 2018-02-27

## 2018-02-27 NOTE — TELEPHONE ENCOUNTER
Faxed refill request received from [facility].   Mediation Requested:   arformoterol (BROVANA) 15 MCG/2ML NEBU neb solution 120 mL 3 2/24/2018  No      Sig: Take 2 mLs (15 mcg) by nebulization 2 times daily         Last Office Visit: 9/6/2016  Next Appointment Scheduled for: PRABHAKAR

## 2018-02-27 NOTE — TELEPHONE ENCOUNTER
"Per Dr. Casanova's LOV note on 9/6/16: \"His pulmonary function testing has been stable for years now as his symptoms.  No need for further in Pulmonary Clinic. He can follow up as needed. He is agreeable with this plan.\" Called  Pharmacy in [place] to tell them to forward refill request to patient's PCP, as pt does not have a follow up scheduled at this time. Pharmacy staff member stated that his new provider has already responded to the refill request. Rx refill request no longer needed.     Kelsea GA, RN, BSN  Care Coordinator       "

## 2018-03-28 ENCOUNTER — TELEPHONE (OUTPATIENT)
Dept: PULMONOLOGY | Facility: CLINIC | Age: 78
End: 2018-03-28

## 2018-03-28 DIAGNOSIS — J44.9 CHRONIC OBSTRUCTIVE PULMONARY DISEASE, UNSPECIFIED COPD TYPE (H): Primary | ICD-10-CM

## 2018-03-28 NOTE — TELEPHONE ENCOUNTER
Reason for call:  Order   Order or referral being requested: PFT order  Reason for request: appt in May with Jocelyne, needs PFT  Date needed: as soon as possible  Has the patient been seen by the PCP for this problem? YES    Additional comments: will need PFT, none available in . Patient stated he would do that in [place].  Need order from [facility] for PFT.  Not scheduled yet.     Phone number to reach patient:  Cell number on file:    Telephone Information:   Mobile [phone]       Best Time:  any    Can we leave a detailed message on this number?  YES

## 2018-04-02 ENCOUNTER — OFFICE VISIT (OUTPATIENT)
Dept: FAMILY MEDICINE | Facility: CLINIC | Age: 78
End: 2018-04-02
Payer: COMMERCIAL

## 2018-04-02 VITALS
BODY MASS INDEX: 28.74 KG/M2 | DIASTOLIC BLOOD PRESSURE: 74 MMHG | HEART RATE: 77 BPM | WEIGHT: 189 LBS | SYSTOLIC BLOOD PRESSURE: 136 MMHG | RESPIRATION RATE: 28 BRPM | TEMPERATURE: 98.6 F | OXYGEN SATURATION: 94 %

## 2018-04-02 DIAGNOSIS — J06.9 UPPER RESPIRATORY TRACT INFECTION, UNSPECIFIED TYPE: Primary | ICD-10-CM

## 2018-04-02 DIAGNOSIS — E11.22 TYPE 2 DIABETES MELLITUS WITH STAGE 3 CHRONIC KIDNEY DISEASE, WITH LONG-TERM CURRENT USE OF INSULIN (H): ICD-10-CM

## 2018-04-02 DIAGNOSIS — J45.40 MODERATE PERSISTENT ASTHMA WITHOUT COMPLICATION: ICD-10-CM

## 2018-04-02 DIAGNOSIS — N18.30 TYPE 2 DIABETES MELLITUS WITH STAGE 3 CHRONIC KIDNEY DISEASE, WITH LONG-TERM CURRENT USE OF INSULIN (H): ICD-10-CM

## 2018-04-02 DIAGNOSIS — Z79.4 TYPE 2 DIABETES MELLITUS WITH STAGE 3 CHRONIC KIDNEY DISEASE, WITH LONG-TERM CURRENT USE OF INSULIN (H): ICD-10-CM

## 2018-04-02 PROCEDURE — 99213 OFFICE O/P EST LOW 20 MIN: CPT | Performed by: FAMILY MEDICINE

## 2018-04-02 RX ORDER — PREDNISONE 20 MG/1
20 TABLET ORAL DAILY
Qty: 6 TABLET | Refills: 0 | Status: SHIPPED | OUTPATIENT
Start: 2018-04-02 | End: 2018-04-08

## 2018-04-02 ASSESSMENT — PAIN SCALES - GENERAL: PAINLEVEL: NO PAIN (0)

## 2018-04-02 NOTE — MR AVS SNAPSHOT
After Visit Summary   4/2/2018    Alvaro Horton    MRN: [id number]           Patient Information     Date Of Birth          1940        Visit Information        Provider Department      4/2/2018 1:20 PM Be Carreno MD [facility]        Today's Diagnoses     Upper respiratory tract infection, unspecified type    -  1    Moderate persistent asthma without complication        Type 2 diabetes mellitus with stage 3 chronic kidney disease, with long-term current use of insulin (H)          Care Instructions    ASSESSMENT:     ICD-10-CM    1. Upper respiratory tract infection, unspecified type J06.9 predniSONE (DELTASONE) 20 MG tablet   2. Moderate persistent asthma without complication J45.40 predniSONE (DELTASONE) 20 MG tablet   3. Type 2 diabetes mellitus with stage 3 chronic kidney disease, with long-term current use of insulin (H) E11.22 EYE EXAM (SIMPLE-NONBILLABLE)    N18.3     Z79.4       I think you have a viral upper respiratory infection.    This has triggered your asthma.   There is no sign of pneumonia at this time.     Continue the Brovana plus pulmicort nebs twice daily as you have been.   Albuteral inhaler can be used taking 2 inhalations every 4 hours as needed for coughing, wheezing or shortness of breath.   Add prednisone 20mg once daily for 6 days.   If you are not better in 2 days, call my staff.   We would like to know if increasing shortness of breath, fever or feeling worse.           Follow-ups after your visit        Your next 10 appointments already scheduled     Apr 05, 2018  9:00 AM CDT   Pulmonary Function with [place] [facility]MON[facility] FUNCTION   [facility] ([facility])    [address] [facility] [address]   [phone]           No Inhalers for 6 hours prior to test No Smoking 2 hours prior to test            May 15, 2018  4:30 PM CDT   Return Visit with Digna Casanova MD   [facility]  [facility] ([facility])    [address] [address] [place] [id number] [phone]              Who to contact     If you have questions or need follow up information about today's clinic visit or your schedule please contact [facility] directly at [phone].  Normal or non-critical lab and imaging results will be communicated to you by MyChart, letter or phone within 4 business days after the clinic has received the results. If you do not hear from us within 7 days, please contact the clinic through MyChart or phone. If you have a critical or abnormal lab result, we will notify you by phone as soon as possible.  Submit refill requests through [employer] or call your pharmacy and they will forward the refill request to us. Please allow 3 business days for your refill to be completed.          Additional Information About Your Visit        Care EveryWhere ID     This is your Care EveryWhere ID. This could be used by other organizations to access your [facility] medical records  [id number]        Your Vitals Were     Pulse Temperature Respirations Pulse Oximetry BMI (Body Mass Index)       77 98.6  F (37  C) (Tympanic) 28 94% 28.74 kg/m2        Blood Pressure from Last 3 Encounters:   04/02/18 136/74   02/21/18 118/72   02/05/18 120/70    Weight from Last 3 Encounters:   04/02/18 189 lb (85.7 kg)   02/21/18 191 lb (86.6 kg)   02/05/18 191 lb (86.6 kg)              We Performed the Following     EYE EXAM (SIMPLE-NONBILLABLE)          Today's Medication Changes          These changes are accurate as of 4/2/18  1:43 PM.  If you have any questions, ask your nurse or doctor.               Start taking these medicines.        Dose/Directions    predniSONE 20 MG tablet   Commonly known as:  DELTASONE   Used for:  Moderate persistent asthma without complication, Upper respiratory tract infection, unspecified type   Started by:  Be Carreno MD        Dose:  20 mg   Take 1 tablet  (20 mg) by mouth daily for 6 days Take prednisone 20mg 2 pills daily for 6 days, then 1 pill daily for another 4 days.   Quantity:  6 tablet   Refills:  0         These medicines have changed or have updated prescriptions.        Dose/Directions    cyanocobalamin 500 MCG Tabs   Commonly known as:  BL VITAMIN B-12   This may have changed:  how much to take   Used for:  Memory loss        Dose:  500 mcg   Take 500 mcg by mouth daily.   Quantity:  90 tablet   Refills:  4       * order for DME   This may have changed:  Another medication with the same name was removed. Continue taking this medication, and follow the directions you see here.   Changed by:  Be Carreno MD        Equipment being ordered: Nebulizer   Quantity:  1 Device   Refills:  0       * order for DME   This may have changed:  Another medication with the same name was removed. Continue taking this medication, and follow the directions you see here.   Used for:  Type 2 diabetes mellitus with stage 3 chronic kidney disease, with long-term current use of insulin (H)   Changed by:  Be Carreno MD        Equipment being ordered: diabetic shoes   Quantity:  1 Package   Refills:  0       * order for DME   This may have changed:  Another medication with the same name was removed. Continue taking this medication, and follow the directions you see here.   Used for:  Type 2 diabetes mellitus with stage 3 chronic kidney disease, with long-term current use of insulin (H)   Changed by:  Be Carreno MD        Equipment being ordered: 3 pair of custome shoe inserts   Quantity:  3 Package   Refills:  0       * Notice:  This list has 3 medication(s) that are the same as other medications prescribed for you. Read the directions carefully, and ask your doctor or other care provider to review them with you.         Where to get your medicines      These medications were sent to [facility] [id number]  [address] [place] [address]     Phone:  [phone]     predniSONE 20 MG tablet                Primary Care Provider Office Phone # Fax #    Be Carreno [phone] [address] [place] [address]        Equal Access to Services     AUGUSTIN SCHWAB : Nel ewing hadasho Soomaali, waaxda luqadaha, qaybta kaalmada adeegyada, waxary rene triciashamika holttimchencho draper. So [facility] [phone].    ATENCIÓN: Si habla español, tiene a rahman disposición servicios gratuitos de asistencia lingüística. Ll[facility] [phone].    We comply with applicable federal civil rights laws and [place] laws. We do not discriminate on the basis of race, color, national origin, age, disability, sex, sexual orientation, or gender identity.            Thank you!     Thank you for choosing [facility]  for your care. Our goal is always to provide you with excellent care. Hearing back from our patients is one way we can continue to improve our services. Please take a few minutes to complete the written survey that you may receive in the mail after your visit with us. Thank you!             Your Updated Medication List - Protect others around you: Learn how to safely use, store and throw away your medicines at www.disposemymeds.org.          This list is accurate as of 4/2/18  1:43 PM.  Always use your most recent med list.                   Brand Name Dispense Instructions for use Diagnosis    albuterol (2.5 MG/3ML) 0.083% neb solution     1 Box    Take 1 vial (2.5 mg) by nebulization every 4 hours as needed for shortness of breath / dyspnea or wheezing    Moderate persistent asthma without complication       arformoterol 15 MCG/2ML Nebu neb solution    BROVANA    120 mL    Take 2 mLs (15 mcg) by nebulization 2 times daily    Chronic obstructive pulmonary disease, unspecified COPD type (H)       aspirin 81 MG tablet      Take by mouth daily        atorvastatin 80 MG tablet    LIPITOR    30  "tablet    Take 1 tablet (80 mg) by mouth daily    Hyperlipidemia LDL goal <70       * blood glucose monitoring meter device kit     1 kit    Use to test blood sugars 4 times daily or as directed.    Type 2 diabetes mellitus with diabetic chronic kidney disease (H)       * blood glucose monitoring meter device kit    no brand specified    1 kit    Use to test blood sugar 3 times daily or as directed.    Type 2 diabetes, HbA1C goal < 8% (H)       budesonide 0.5 MG/2ML neb solution    PULMICORT    60 ampule    Take 2 mLs (0.5 mg) by nebulization 2 times daily    Moderate persistent asthma without complication       cholecalciferol 2000 UNITS tablet     90 tablet    Take 1 tablet by mouth daily.    Memory loss       cyanocobalamin 500 MCG Tabs    BL VITAMIN B-12    90 tablet    Take 500 mcg by mouth daily.    Memory loss       finasteride 5 MG tablet    PROSCAR    90 tablet    Take 1 tablet (5 mg) by mouth daily MUST KEEP UPCOMING APPT FOR FURTHER REFILLS    Urinary retention       fish oil-omega-3 fatty acids 1000 MG capsule     180 capsule    Take 1 capsule by mouth 2 times daily.    Hyperlipidemia LDL goal <100       furosemide 20 MG tablet    LASIX    30 tablet    TAKE ONE TABLET BY MOUTH ONCE DAILY IN THE MORNING    Chronic ischemic heart disease       insulin aspart 100 UNIT/ML injection    NovoLOG FLEXPEN    30 mL    11 units before breakfast, 11 units before lunch, 11 units before dinner    Type 2 diabetes mellitus with stage 3 chronic kidney disease, with long-term current use of insulin (H)       insulin detemir 100 UNIT/ML injection    LEVEMIR FLEXPEN/FLEXTOUCH    15 mL    Inject 30 Units Subcutaneous At Bedtime    Type 2 diabetes mellitus with stage 3 chronic kidney disease, with long-term current use of insulin (H)       insulin syringe-needle U-100 31G X 5/16\" 1 ML    BD insulin syringe ULTRAFINE    100 each    Use one syringe 4 times daily or as directed.    Type 2 diabetes, HbA1C goal < 8% (H)       " lisinopril 5 MG tablet    PRINIVIL/ZESTRIL    90 tablet    TAKE ONE TABLET BY MOUTH ONCE DAILY    Hyperlipidemia LDL goal <70       metFORMIN 1000 MG tablet    GLUCOPHAGE    60 tablet    TAKE ONE TABLET BY MOUTH TWICE DAILY WITH MEALS    Type 2 diabetes mellitus with stage 3 chronic kidney disease, with long-term current use of insulin (H)       metoprolol tartrate 25 MG tablet    LOPRESSOR    60 tablet    Take 1 tablet (25 mg) by mouth 2 times daily    Chronic ischemic heart disease       nitroGLYcerin 0.4 MG sublingual tablet    NITROSTAT    25 tablet    Place 1 tablet (0.4 mg) under the tongue every 5 minutes as needed for chest pain (CALL 911 IF NOT IMPROVED AFTER THREE CONSECUTIVE DOSES)    Chronic ischemic heart disease       omeprazole 20 MG CR capsule    priLOSEC    30 capsule    TAKE ONE CAPSULE BY MOUTH ONCE DAILY    Gastroesophageal reflux disease without esophagitis       ONETOUCH ULTRA test strip   Generic drug:  blood glucose monitoring     300 strip    USE  TO CHECK GLUCOSE THREE TIMES DAILY    Type 2 diabetes mellitus with diabetic chronic kidney disease (H)       order for DME      Equipment being ordered: CPAP AIRSENSE 10 11-15 CM H2O QUATTRO AIR SIZE MED SN# [id number]  DN# [id number]        order for DME     1 Device    Equipment being ordered: CPAP machine    Obstructive sleep apnea (adult) (pediatric)       * order for DME     1 Device    Equipment being ordered: Nebulizer        * order for DME     1 Package    Equipment being ordered: diabetic shoes    Type 2 diabetes mellitus with stage 3 chronic kidney disease, with long-term current use of insulin (H)       * order for DME     3 Package    Equipment being ordered: 3 pair of custome shoe inserts    Type 2 diabetes mellitus with stage 3 chronic kidney disease, with long-term current use of insulin (H)       pen needles 31G X 6 MM Misc     90 each    1 Units 3 times daily For novolog flexpen    Type 2 diabetes, HbA1C goal < 8% (H)       predniSONE  20 MG tablet    DELTASONE    6 tablet    Take 1 tablet (20 mg) by mouth daily for 6 days Take prednisone 20mg 2 pills daily for 6 days, then 1 pill daily for another 4 days.    Moderate persistent asthma without complication, Upper respiratory tract infection, unspecified type       tamsulosin 0.4 MG capsule    FLOMAX    90 capsule    Take 1 capsule (0.4 mg) by mouth daily (Needs follow-up appointment for this medication)    Benign non-nodular prostatic hyperplasia with lower urinary tract symptoms       * Notice:  This list has 5 medication(s) that are the same as other medications prescribed for you. Read the directions carefully, and ask your doctor or other care provider to review them with you.

## 2018-04-02 NOTE — PROGRESS NOTES
"  SUBJECTIVE:   Alvaro Horton is a 77 year old male who presents to clinic today for the following health issues:  Chief Complaint   Patient presents with     URI     3 days- also has COPD.      RESPIRATORY SYMPTOMS      Duration: 3 days    Description  rhinorrhea, sore throat, cough, wheezing, chills and sweats,  headache, fatigue/malaise, hoarse voice and poor appetite    Severity: severe last night    Accompanying signs and symptoms: HX of COPD    History (predisposing factors):  asthma and COPD    Precipitating or alleviating factors: None    Therapies tried and outcome:  Inhalers/ nebs for COPD and asthma    Onset of symptoms: \"a little cold\" symptoms starting on 3/30.    Worse on 3/31.  Today with cough.  Lots of phlegm-worse in AM, yellow.   Possible fever last night-sweats.    Breathing \"lousy\".  Feeling shortness of breath walking up stairs.   No chest pain.   He has been on twice daily nebs with Brovana plus pulmicort.   Using albuteral inhaler a couple times in the past couple days.  Usually not needing.     Patient Active Problem List   Diagnosis     Chronic ischemic heart disease     Moderate Persistent Asthma     PSORIASIS     Hypertrophy of prostate without urinary obstruction     OVERACTIVE BLADDER     BAKERS LUNG     Hypertension goal BP (blood pressure) < 140/90     Esophageal reflux     Sensorineural hearing loss, bilateral     Obstructive sleep apnea     Benign paroxysmal vertigo     CVA (cerebral vascular accident) (H)     Atrial fibrillation (H)     Memory loss     CTS (carpal tunnel syndrome)     Dyslexia     Advanced care planning/counseling discussion     Hyperlipidemia LDL goal <70     COPD (chronic obstructive pulmonary disease) (H)     CKD (chronic kidney disease) stage 3, GFR 30-59 ml/min     Health Care Home     Altered mental status     Type 2 diabetes mellitus with diabetic chronic kidney disease (H)      ROS:General: POSITIVE for:, low appetite and low energy.   ENT: POSITIVE " for:, nasal congestion, rhinorrhea, some sore throat this AM.  Ears OK.   GI: POSITIVE for:, frequent stools from his colitis.   No aching.     OBJECTIVE:Blood pressure 136/74, pulse 77, temperature 98.6  F (37  C), temperature source Tympanic, resp. rate 28, weight 189 lb (85.7 kg), SpO2 94 %. BMI=Body mass index is 28.74 kg/(m^2).  GENERAL APPEARANCE ADULT: Alert, no acute distress  EYES: PERRL, EOM normal, conjunctiva and lids normal  HENT: right TM not visualized secondary to cerumen, left TM not visualized secondary to cerumen, oral mucous membranes moist, oropharynx clear, hearing aids bilateral.   NECK: No adenopathy,masses or thyromegaly  RESP: lungs clear to auscultation   CV: normal rate, regular rhythm, no murmur or gallop    ASSESSMENT:     ICD-10-CM    1. Upper respiratory tract infection, unspecified type J06.9 predniSONE (DELTASONE) 20 MG tablet   2. Moderate persistent asthma without complication J45.40 predniSONE (DELTASONE) 20 MG tablet   3. Type 2 diabetes mellitus with stage 3 chronic kidney disease, with long-term current use of insulin (H) E11.22 EYE EXAM (SIMPLE-NONBILLABLE)    N18.3     Z79.4       I think you have a viral upper respiratory infection.    This has triggered your asthma.   There is no sign of pneumonia at this time.     Continue the Brovana plus pulmicort nebs twice daily as you have been.   Albuteral inhaler can be used taking 2 inhalations every 4 hours as needed for coughing, wheezing or shortness of breath.   Add prednisone 20mg once daily for 6 days.   If you are not better in 2 days, call my staff.   We would like to know if increasing shortness of breath, fever or feeling worse.

## 2018-04-02 NOTE — PATIENT INSTRUCTIONS
ASSESSMENT:     ICD-10-CM    1. Upper respiratory tract infection, unspecified type J06.9 predniSONE (DELTASONE) 20 MG tablet   2. Moderate persistent asthma without complication J45.40 predniSONE (DELTASONE) 20 MG tablet   3. Type 2 diabetes mellitus with stage 3 chronic kidney disease, with long-term current use of insulin (H) E11.22 EYE EXAM (SIMPLE-NONBILLABLE)    N18.3     Z79.4       I think you have a viral upper respiratory infection.    This has triggered your asthma.   There is no sign of pneumonia at this time.     Continue the Brovana plus pulmicort nebs twice daily as you have been.   Albuteral inhaler can be used taking 2 inhalations every 4 hours as needed for coughing, wheezing or shortness of breath.   Add prednisone 20mg once daily for 6 days.   If you are not better in 2 days, call my staff.   We would like to know if increasing shortness of breath, fever or feeling worse.

## 2018-04-23 ENCOUNTER — HOSPITAL ENCOUNTER (OUTPATIENT)
Dept: RESPIRATORY THERAPY | Facility: CLINIC | Age: 78
Discharge: HOME | End: 2018-04-23
Attending: FAMILY MEDICINE | Admitting: FAMILY MEDICINE
Payer: MEDICARE

## 2018-04-23 DIAGNOSIS — J44.9 CHRONIC OBSTRUCTIVE PULMONARY DISEASE, UNSPECIFIED COPD TYPE (H): ICD-10-CM

## 2018-04-23 PROCEDURE — 94729 DIFFUSING CAPACITY: CPT

## 2018-04-23 PROCEDURE — 94010 BREATHING CAPACITY TEST: CPT | Mod: 26 | Performed by: INTERNAL MEDICINE

## 2018-04-23 PROCEDURE — 94729 DIFFUSING CAPACITY: CPT | Mod: 26 | Performed by: INTERNAL MEDICINE

## 2018-04-23 PROCEDURE — 94375 RESPIRATORY FLOW VOLUME LOOP: CPT

## 2018-04-24 LAB
DLCOUNC-%PRED-PRE: 55 %
DLCOUNC-PRE: 12.82 ML/MIN/MMHG
DLCOUNC-PRED: 23.09 ML/MIN/MMHG
ERV-%PRED-PRE: 106 %
ERV-PRE: 0.7 L
ERV-PRED: 0.66 L
EXPTIME-PRE: 6.61 SEC
FEF2575-%PRED-PRE: 34 %
FEF2575-PRE: 0.66 L/SEC
FEF2575-PRED: 1.95 L/SEC
FEFMAX-%PRED-PRE: 60 %
FEFMAX-PRE: 4.14 L/SEC
FEFMAX-PRED: 6.83 L/SEC
FEV1-%PRED-PRE: 47 %
FEV1-PRE: 1.27 L
FEV1FEV6-PRE: 66 %
FEV1FEV6-PRED: 76 %
FEV1FVC-PRE: 65 %
FEV1FVC-PRED: 72 %
FEV1SVC-PRE: 63 %
FEV1SVC-PRED: 65 %
FIFMAX-PRE: 4.05 L/SEC
FVC-%PRED-PRE: 54 %
FVC-PRE: 1.96 L
FVC-PRED: 3.6 L
IC-%PRED-PRE: 37 %
IC-PRE: 1.31 L
IC-PRED: 3.46 L
VA-%PRED-PRE: 56 %
VA-PRE: 3.55 L
VC-%PRED-PRE: 48 %
VC-PRE: 2.01 L
VC-PRED: 4.12 L

## 2018-05-15 ENCOUNTER — TELEPHONE (OUTPATIENT)
Dept: FAMILY MEDICINE | Facility: CLINIC | Age: 78
End: 2018-05-15

## 2018-05-15 DIAGNOSIS — J45.40 MODERATE PERSISTENT ASTHMA WITHOUT COMPLICATION: Primary | ICD-10-CM

## 2018-05-15 DIAGNOSIS — J44.9 CHRONIC OBSTRUCTIVE PULMONARY DISEASE, UNSPECIFIED COPD TYPE (H): ICD-10-CM

## 2018-05-15 NOTE — TELEPHONE ENCOUNTER
Reason for Call: Request for an order or referral:    Order or referral being requested: Alvaro says he has been seeing Digna Casanova MD , Pulmonologist at [facility] for several years but she is leaving and they don't have a replacement for her. He did have an appointment with her today but says his son took his car to go fishing so now he can't go there. He wonders if Dr Carreno knows of another lung specialist he can see.     Has the patient been seen by the PCP for this problem? YES    Additional comments: Alvaro is aware that Dr Carreno is not in the office today    Phone number Patient can be reached at:  Home number on file [phone] (home)    Best Time:  anytime    Can we leave a detailed message on this number?  YES    Call taken on 5/15/2018 at 9:17 AM by Gabriela Jean-Baptiste

## 2018-05-16 NOTE — TELEPHONE ENCOUNTER
Please call.  There are pulmonologists at the [facility] and some who go to [facility].  They are all affiliated with the [facility].    We can do referral for him and get phone numbers.    It often takes a couple months to get in.   OWEN AMATO MD

## 2018-05-17 ENCOUNTER — OFFICE VISIT (OUTPATIENT)
Dept: FAMILY MEDICINE | Facility: CLINIC | Age: 78
End: 2018-05-17
Payer: COMMERCIAL

## 2018-05-17 ENCOUNTER — RADIANT APPOINTMENT (OUTPATIENT)
Dept: GENERAL RADIOLOGY | Facility: CLINIC | Age: 78
End: 2018-05-17
Attending: FAMILY MEDICINE
Payer: COMMERCIAL

## 2018-05-17 ENCOUNTER — TELEPHONE (OUTPATIENT)
Dept: FAMILY MEDICINE | Facility: CLINIC | Age: 78
End: 2018-05-17

## 2018-05-17 VITALS
SYSTOLIC BLOOD PRESSURE: 120 MMHG | OXYGEN SATURATION: 97 % | HEART RATE: 72 BPM | BODY MASS INDEX: 28.64 KG/M2 | TEMPERATURE: 98.3 F | HEIGHT: 68 IN | DIASTOLIC BLOOD PRESSURE: 70 MMHG | RESPIRATION RATE: 20 BRPM | WEIGHT: 189 LBS

## 2018-05-17 DIAGNOSIS — J44.9 CHRONIC OBSTRUCTIVE PULMONARY DISEASE, UNSPECIFIED COPD TYPE (H): ICD-10-CM

## 2018-05-17 DIAGNOSIS — Z79.4 TYPE 2 DIABETES MELLITUS WITH STAGE 3 CHRONIC KIDNEY DISEASE, WITH LONG-TERM CURRENT USE OF INSULIN (H): ICD-10-CM

## 2018-05-17 DIAGNOSIS — J44.9 CHRONIC OBSTRUCTIVE PULMONARY DISEASE, UNSPECIFIED COPD TYPE (H): Primary | ICD-10-CM

## 2018-05-17 DIAGNOSIS — E11.22 TYPE 2 DIABETES MELLITUS WITH STAGE 3 CHRONIC KIDNEY DISEASE, WITH LONG-TERM CURRENT USE OF INSULIN (H): ICD-10-CM

## 2018-05-17 DIAGNOSIS — I10 HYPERTENSION GOAL BP (BLOOD PRESSURE) < 140/90: ICD-10-CM

## 2018-05-17 DIAGNOSIS — N18.30 TYPE 2 DIABETES MELLITUS WITH STAGE 3 CHRONIC KIDNEY DISEASE, WITH LONG-TERM CURRENT USE OF INSULIN (H): ICD-10-CM

## 2018-05-17 DIAGNOSIS — R42 DIZZINESS: ICD-10-CM

## 2018-05-17 DIAGNOSIS — E83.51 HYPOCALCEMIA: ICD-10-CM

## 2018-05-17 DIAGNOSIS — N18.30 CKD (CHRONIC KIDNEY DISEASE) STAGE 3, GFR 30-59 ML/MIN (H): ICD-10-CM

## 2018-05-17 DIAGNOSIS — R53.83 FATIGUE, UNSPECIFIED TYPE: Primary | ICD-10-CM

## 2018-05-17 LAB
ALBUMIN SERPL-MCNC: 3.6 G/DL (ref 3.4–5)
ALP SERPL-CCNC: 99 U/L (ref 40–150)
ALT SERPL W P-5'-P-CCNC: 25 U/L (ref 0–70)
ANION GAP SERPL CALCULATED.3IONS-SCNC: 8 MMOL/L (ref 3–14)
AST SERPL W P-5'-P-CCNC: 17 U/L (ref 0–45)
BILIRUB SERPL-MCNC: 0.6 MG/DL (ref 0.2–1.3)
BUN SERPL-MCNC: 18 MG/DL (ref 7–30)
CALCIUM SERPL-MCNC: 6.6 MG/DL (ref 8.5–10.1)
CHLORIDE SERPL-SCNC: 100 MMOL/L (ref 94–109)
CO2 SERPL-SCNC: 30 MMOL/L (ref 20–32)
CREAT SERPL-MCNC: 1.09 MG/DL (ref 0.66–1.25)
ERYTHROCYTE [DISTWIDTH] IN BLOOD BY AUTOMATED COUNT: 14.2 % (ref 10–15)
GFR SERPL CREATININE-BSD FRML MDRD: 65 ML/MIN/1.7M2
GLUCOSE SERPL-MCNC: 118 MG/DL (ref 70–99)
HBA1C MFR BLD: 8 % (ref 0–5.6)
HCT VFR BLD AUTO: 39.7 % (ref 40–53)
HGB BLD-MCNC: 13.6 G/DL (ref 13.3–17.7)
MCH RBC QN AUTO: 30.3 PG (ref 26.5–33)
MCHC RBC AUTO-ENTMCNC: 34.3 G/DL (ref 31.5–36.5)
MCV RBC AUTO: 88 FL (ref 78–100)
PLATELET # BLD AUTO: 204 10E9/L (ref 150–450)
POTASSIUM SERPL-SCNC: 3.7 MMOL/L (ref 3.4–5.3)
PROT SERPL-MCNC: 7.3 G/DL (ref 6.8–8.8)
RBC # BLD AUTO: 4.49 10E12/L (ref 4.4–5.9)
SODIUM SERPL-SCNC: 138 MMOL/L (ref 133–144)
WBC # BLD AUTO: 10.9 10E9/L (ref 4–11)

## 2018-05-17 PROCEDURE — 99214 OFFICE O/P EST MOD 30 MIN: CPT | Performed by: FAMILY MEDICINE

## 2018-05-17 PROCEDURE — 84443 ASSAY THYROID STIM HORMONE: CPT | Performed by: FAMILY MEDICINE

## 2018-05-17 PROCEDURE — 85027 COMPLETE CBC AUTOMATED: CPT | Performed by: FAMILY MEDICINE

## 2018-05-17 PROCEDURE — 36415 COLL VENOUS BLD VENIPUNCTURE: CPT | Performed by: FAMILY MEDICINE

## 2018-05-17 PROCEDURE — 83036 HEMOGLOBIN GLYCOSYLATED A1C: CPT | Performed by: FAMILY MEDICINE

## 2018-05-17 PROCEDURE — 71046 X-RAY EXAM CHEST 2 VIEWS: CPT | Mod: FY

## 2018-05-17 PROCEDURE — 80053 COMPREHEN METABOLIC PANEL: CPT | Performed by: FAMILY MEDICINE

## 2018-05-17 RX ORDER — TIOTROPIUM BROMIDE 18 UG/1
CAPSULE ORAL; RESPIRATORY (INHALATION)
Qty: 30 CAPSULE | Refills: 11 | Status: SHIPPED | OUTPATIENT
Start: 2018-05-17 | End: 2018-05-17

## 2018-05-17 ASSESSMENT — PAIN SCALES - GENERAL: PAINLEVEL: NO PAIN (0)

## 2018-05-17 NOTE — NURSING NOTE
"Chief Complaint   Patient presents with     Fatigue       Initial /70  Pulse 72  Temp 98.3  F (36.8  C) (Tympanic)  Resp 20  Ht 5' 8\" (1.727 m)  Wt 189 lb (85.7 kg)  SpO2 97%  BMI 28.74 kg/m2 Estimated body mass index is 28.74 kg/(m^2) as calculated from the following:    Height as of this encounter: 5' 8\" (1.727 m).    Weight as of this encounter: 189 lb (85.7 kg).      Health Maintenance that is potentially due pending provider review:          Is there anyone who you would like to be able to receive your results? f yes have patient fill out [facility]    "

## 2018-05-17 NOTE — PROGRESS NOTES
SUBJECTIVE:   Alvaro Horton is a 77 year old male who presents to clinic today for the following health issues:  Chief Complaint   Patient presents with     Fatigue      Fatigue and SOB      Duration: Months    Description (location/character/radiation): Doesn't feel well, fatigue and SOB. Feels as if he could pass out. Feels light headed and has noted a shaky sensation.    Intensity:  moderate    Accompanying signs and symptoms: see above   Denies fever or chills.    History (similar episodes/previous evaluation): Yes with breathing    Precipitating or alleviating factors:     Therapies tried and outcome: Albuterol Neb, Bovana , Pulmicort helps some with the breathing.      Not feeling well for most of the winter-several months.  Course of symptoms over time: worse over time.   Feeling faint.   Seems most of the time.  Aggravating factors: bending over, getting in bed at night.  Some problems getting up.  Sometimes can get vertigo sensation.    Energy is low.  Plans on not putting in his garden this year.   Gets tired walking a mile.   Sometimes feels lightheaded just sitting in hs reclliner.   Hands can get shaky.  Can get cramping in hands when relaxing.     His pulmonologist is moving.  Was going to [place] to see her.   He has shortness of breath off and on.  Seems worse over time.   Using Brovana nebs twice daily. Pulmicort nebs twice daily.  Uses albuteral infrequently.  He does not have albuteral inhaler.  Never used it much.   HE had pulmonary function tests 4/23/2018:PFT  results:FVC=54%, FEV1=47%, FEV1/FVC=65%, DLOC=55%.  IMPRESSION:  Severe Airflow Obstruction   The decrease in the FVC may represent restrictive lung physiology v. air trapping.  Lung volumes would be necessary to confirm if clinically required.  Moderate diffusion defect.  The diffusing capacity was not corrected for the patient's hemoglobin.  Compared with testing from 9/6/2016 there has been no significant change in the FEV1  "or FVC.    Mood OK, denies depression.  No loss of interest.   Appetite decreased.  Weight unchanged.     Patient Active Problem List   Diagnosis     Chronic ischemic heart disease     Moderate persistent asthma     PSORIASIS     Hypertrophy of prostate without urinary obstruction     OVERACTIVE BLADDER     BAKERS LUNG     Hypertension goal BP (blood pressure) < 140/90     Esophageal reflux     Sensorineural hearing loss, bilateral     Obstructive sleep apnea     Benign paroxysmal vertigo     CVA (cerebral vascular accident) (H)     Atrial fibrillation (H)     Memory loss     CTS (carpal tunnel syndrome)     Dyslexia     Advanced care planning/counseling discussion     Hyperlipidemia LDL goal <70     COPD (chronic obstructive pulmonary disease) (H)     CKD (chronic kidney disease) stage 3, GFR 30-59 ml/min     Health Care Home     Altered mental status     Type 2 diabetes mellitus with diabetic chronic kidney disease (H)      ROS:CV: No chest pains or palpitations  GI: POSITIVE for:, diarrhea, Some stool urgency.  About 6 stools a day.  Sometimes pains in stomach he thinks is from colitis.   : POSITIVE for:, urinary frequency, no dysuria.   Musculoskeletal: POSITIVE  for:, pain cramps in hands and feet, more often at night.   Neurologic: No headaches, numbness, tingling, weakness, problems with balance or coordination  Endocrine: POSITIVE for:, diabetes mellitus, has been doing well.    Psychiatric: see above     OBJECTIVE:Blood pressure 120/70, pulse 72, temperature 98.3  F (36.8  C), temperature source Tympanic, resp. rate 20, height 5' 8\" (1.727 m), weight 189 lb (85.7 kg), SpO2 97 %. BMI=Body mass index is 28.74 kg/(m^2).  GENERAL APPEARANCE ADULT: Alert, no acute distress  EYES: PERRL, EOM normal, conjunctiva and lids normal  NECK: No adenopathy,masses or thyromegaly  RESP: lungs clear to auscultation , diminished breath sounds left base  CV: normal rate, regular rhythm, no murmur or gallop  ABDOMEN: soft, no " organomegaly, masses or tenderness  MS: extremities normal, no peripheral edema   CXR: Sutures in sternum from old surgery, no acute infiltrate and unchanged by my interpretation, I independently visualized the xrays.  He was unable to lie down to do orthostatics-felt too dizzy.  There were no changes sitting to standing.     ASSESSMENT:     ICD-10-CM    1. Fatigue, unspecified type R53.83 CBC with platelets     Comprehensive metabolic panel (BMP + Alb, Alk Phos, ALT, AST, Total. Bili, TP)     TSH   2. Dizziness R42    3. Chronic obstructive pulmonary disease, unspecified COPD type (H) J44.9 XR Chest 2 Views     tiotropium (SPIRIVA HANDIHALER) 18 MCG capsule   4. Hypertension goal BP (blood pressure) < 140/90 I10    5. CKD (chronic kidney disease) stage 3, GFR 30-59 ml/min N18.3    6. Type 2 diabetes mellitus with stage 3 chronic kidney disease, with long-term current use of insulin (H) E11.22 Hemoglobin A1c    N18.3     Z79.4       Start Spiriva inhaler once daily for breathing.   Blood tests today.   I may cut back on blood pressure meds depending upon lab reports.

## 2018-05-17 NOTE — MR AVS SNAPSHOT
After Visit Summary   5/17/2018    Alvaro Horton    MRN: [id number]           Patient Information     Date Of Birth          1940        Visit Information        Provider Department      5/17/2018 11:20 AM Be Carreno MD [facility]        Today's Diagnoses     Fatigue, unspecified type    -  1    Dizziness        Chronic obstructive pulmonary disease, unspecified COPD type (H)        Hypertension goal BP (blood pressure) < 140/90        CKD (chronic kidney disease) stage 3, GFR 30-59 ml/min        Type 2 diabetes mellitus with stage 3 chronic kidney disease, with long-term current use of insulin (H)          Care Instructions    ASSESSMENT:     ICD-10-CM    1. Fatigue, unspecified type R53.83 CBC with platelets     Comprehensive metabolic panel (BMP + Alb, Alk Phos, ALT, AST, Total. Bili, TP)     TSH   2. Dizziness R42    3. Chronic obstructive pulmonary disease, unspecified COPD type (H) J44.9 XR Chest 2 Views     tiotropium (SPIRIVA HANDIHALER) 18 MCG capsule   4. Hypertension goal BP (blood pressure) < 140/90 I10    5. CKD (chronic kidney disease) stage 3, GFR 30-59 ml/min N18.3    6. Type 2 diabetes mellitus with stage 3 chronic kidney disease, with long-term current use of insulin (H) E11.22 Hemoglobin A1c    N18.3     Z79.4       Start Spiriva inhaler once daily for breathing.   Blood tests today.   I may cut back on blood pressure meds depending upon lab reports.           Follow-ups after your visit        Who to contact     If you have questions or need follow up information about today's clinic visit or your schedule please contact [facility] directly at [phone].  Normal or non-critical lab and imaging results will be communicated to you by MyChart, letter or phone within 4 business days after the clinic has received the results. If you do not hear from us within 7 days, please contact the clinic through MyChart or phone. If you  "have a critical or abnormal lab result, we will notify you by phone as soon as possible.  Submit refill requests through [employer] or call your pharmacy and they will forward the refill request to us. Please allow 3 business days for your refill to be completed.          Additional Information About Your Visit        Your Vitals Were     Pulse Temperature Respirations Height Pulse Oximetry BMI (Body Mass Index)    72 98.3  F (36.8  C) (Tympanic) 20 5' 8\" (1.727 m) 97% 28.74 kg/m2       Blood Pressure from Last 3 Encounters:   05/17/18 120/70   04/02/18 136/74   02/21/18 118/72    Weight from Last 3 Encounters:   05/17/18 189 lb (85.7 kg)   04/02/18 189 lb (85.7 kg)   02/21/18 191 lb (86.6 kg)              We Performed the Following     CBC with platelets     Comprehensive metabolic panel (BMP + Alb, Alk Phos, ALT, AST, Total. Bili, TP)     Hemoglobin A1c     TSH          Today's Medication Changes          These changes are accurate as of 5/17/18 12:59 PM.  If you have any questions, ask your nurse or doctor.               Start taking these medicines.        Dose/Directions    tiotropium 18 MCG capsule   Commonly known as:  SPIRIVA HANDIHALER   Used for:  Chronic obstructive pulmonary disease, unspecified COPD type (H)   Started by:  Be Carreno MD        Inhale contents of one capsule daily.   Quantity:  30 capsule   Refills:  11         These medicines have changed or have updated prescriptions.        Dose/Directions    cyanocobalamin 500 MCG Tabs   Commonly known as:  BL VITAMIN B-12   This may have changed:  how much to take   Used for:  Memory loss        Dose:  500 mcg   Take 500 mcg by mouth daily.   Quantity:  90 tablet   Refills:  4            Where to get your medicines      These medications were sent to [facility] [address] [place] [id number]     Phone:  [phone]     tiotropium 18 MCG capsule                Primary Care " Provider Office Phone # Fax #    Be Carreno [phone] [address] [place] [address]        Equal Access to Services     AUGUSTIN SCHWAB : Nel radha ewing catielionel Annathais, coty mercedsharon, yaritzata kanirmal parvezmichi, tory clarkeshamika hannonkalani hewitt lajustinshamika draper. [facility] [phone].    ATENCIÓN: Si habla español, tiene a rahman disposición servicios gratuitos de asistencia lingüística. Llame al [phone].    We comply with applicable federal civil rights laws and [place] laws. We do not discriminate on the basis of race, color, national origin, age, disability, sex, sexual orientation, or gender identity.            Thank you!     Thank you for choosing [facility]  for your care. Our goal is always to provide you with excellent care. Hearing back from our patients is one way we can continue to improve our services. Please take a few minutes to complete the written survey that you may receive in the mail after your visit with us. Thank you!             Your Updated Medication List - Protect others around you: Learn how to safely use, store and throw away your medicines at www.disposemymeds.org.          This list is accurate as of 5/17/18 12:59 PM.  Always use your most recent med list.                   Brand Name Dispense Instructions for use Diagnosis    albuterol (2.5 MG/3ML) 0.083% neb solution     1 Box    Take 1 vial (2.5 mg) by nebulization every 4 hours as needed for shortness of breath / dyspnea or wheezing    Moderate persistent asthma without complication       arformoterol 15 MCG/2ML Nebu neb solution    BROVANA    120 mL    Take 2 mLs (15 mcg) by nebulization 2 times daily    Chronic obstructive pulmonary disease, unspecified COPD type (H)       aspirin 81 MG tablet      Take by mouth daily        atorvastatin 80 MG tablet    LIPITOR    30 tablet    Take 1 tablet (80 mg) by mouth daily    Hyperlipidemia LDL goal <70       * blood glucose monitoring meter  "device kit     1 kit    Use to test blood sugars 4 times daily or as directed.    Type 2 diabetes mellitus with diabetic chronic kidney disease (H)       * blood glucose monitoring meter device kit    no brand specified    1 kit    Use to test blood sugar 3 times daily or as directed.    Type 2 diabetes, HbA1C goal < 8% (H)       budesonide 0.5 MG/2ML neb solution    PULMICORT    60 ampule    Take 2 mLs (0.5 mg) by nebulization 2 times daily    Moderate persistent asthma without complication       cholecalciferol 2000 units tablet     90 tablet    Take 1 tablet by mouth daily.    Memory loss       cyanocobalamin 500 MCG Tabs    BL VITAMIN B-12    90 tablet    Take 500 mcg by mouth daily.    Memory loss       finasteride 5 MG tablet    PROSCAR    90 tablet    Take 1 tablet (5 mg) by mouth daily MUST KEEP UPCOMING APPT FOR FURTHER REFILLS    Urinary retention       fish oil-omega-3 fatty acids 1000 MG capsule     180 capsule    Take 1 capsule by mouth 2 times daily.    Hyperlipidemia LDL goal <100       furosemide 20 MG tablet    LASIX    30 tablet    TAKE ONE TABLET BY MOUTH ONCE DAILY IN THE MORNING    Chronic ischemic heart disease       insulin aspart 100 UNIT/ML injection    NovoLOG FLEXPEN    30 mL    11 units before breakfast, 11 units before lunch, 11 units before dinner    Type 2 diabetes mellitus with stage 3 chronic kidney disease, with long-term current use of insulin (H)       insulin detemir 100 UNIT/ML injection    LEVEMIR FLEXPEN/FLEXTOUCH    15 mL    Inject 30 Units Subcutaneous At Bedtime    Type 2 diabetes mellitus with stage 3 chronic kidney disease, with long-term current use of insulin (H)       insulin syringe-needle U-100 31G X 5/16\" 1 ML    BD insulin syringe ULTRAFINE    100 each    Use one syringe 4 times daily or as directed.    Type 2 diabetes, HbA1C goal < 8% (H)       lisinopril 5 MG tablet    PRINIVIL/ZESTRIL    90 tablet    TAKE ONE TABLET BY MOUTH ONCE DAILY    Hyperlipidemia LDL goal " <70       metFORMIN 1000 MG tablet    GLUCOPHAGE    60 tablet    TAKE ONE TABLET BY MOUTH TWICE DAILY WITH MEALS    Type 2 diabetes mellitus with stage 3 chronic kidney disease, with long-term current use of insulin (H)       metoprolol tartrate 25 MG tablet    LOPRESSOR    60 tablet    Take 1 tablet (25 mg) by mouth 2 times daily    Chronic ischemic heart disease       nitroGLYcerin 0.4 MG sublingual tablet    NITROSTAT    25 tablet    Place 1 tablet (0.4 mg) under the tongue every 5 minutes as needed for chest pain (CALL 911 IF NOT IMPROVED AFTER THREE CONSECUTIVE DOSES)    Chronic ischemic heart disease       omeprazole 20 MG CR capsule    priLOSEC    30 capsule    TAKE ONE CAPSULE BY MOUTH ONCE DAILY    Gastroesophageal reflux disease without esophagitis       ONETOUCH ULTRA test strip   Generic drug:  blood glucose monitoring     300 strip    USE  TO CHECK GLUCOSE THREE TIMES DAILY    Type 2 diabetes mellitus with diabetic chronic kidney disease (H)       order for DME      Equipment being ordered: CPAP AIRSENSE 10 11-15 CM H2O QUATTRO AIR SIZE MED SN# [id number]  DN# [id number]        pen needles 31G X 6 MM Misc     90 each    1 Units 3 times daily For novolog flexpen    Type 2 diabetes, HbA1C goal < 8% (H)       tamsulosin 0.4 MG capsule    FLOMAX    90 capsule    Take 1 capsule (0.4 mg) by mouth daily (Needs follow-up appointment for this medication)    Benign non-nodular prostatic hyperplasia with lower urinary tract symptoms       tiotropium 18 MCG capsule    SPIRIVA HANDIHALER    30 capsule    Inhale contents of one capsule daily.    Chronic obstructive pulmonary disease, unspecified COPD type (H)       * Notice:  This list has 2 medication(s) that are the same as other medications prescribed for you. Read the directions carefully, and ask your doctor or other care provider to review them with you.

## 2018-05-17 NOTE — PATIENT INSTRUCTIONS
ASSESSMENT:     ICD-10-CM    1. Fatigue, unspecified type R53.83 CBC with platelets     Comprehensive metabolic panel (BMP + Alb, Alk Phos, ALT, AST, Total. Bili, TP)     TSH   2. Dizziness R42    3. Chronic obstructive pulmonary disease, unspecified COPD type (H) J44.9 XR Chest 2 Views     tiotropium (SPIRIVA HANDIHALER) 18 MCG capsule   4. Hypertension goal BP (blood pressure) < 140/90 I10    5. CKD (chronic kidney disease) stage 3, GFR 30-59 ml/min N18.3    6. Type 2 diabetes mellitus with stage 3 chronic kidney disease, with long-term current use of insulin (H) E11.22 Hemoglobin A1c    N18.3     Z79.4       Start Spiriva inhaler once daily for breathing.   Blood tests today.   I may cut back on blood pressure meds depending upon lab reports.

## 2018-05-17 NOTE — TELEPHONE ENCOUNTER
Per refill authorization protocol - therapeutic substitution.     Medication: Spiriva is not covered by insurance.  . Reviewed pharmacy records, and dicussed therapeutic option with patient.     Sent new prescription for Incruse.      Routing to the prescriber as an  FYI to inform of change.      Updated medication list in EPIC.    Sukhdeep Jaquez, Pharm D  [facility]  [phone]

## 2018-05-18 LAB — TSH SERPL DL<=0.005 MIU/L-ACNC: 1.64 MU/L (ref 0.4–4)

## 2018-05-21 DIAGNOSIS — E11.22 TYPE 2 DIABETES MELLITUS WITH STAGE 3 CHRONIC KIDNEY DISEASE, WITH LONG-TERM CURRENT USE OF INSULIN (H): Primary | ICD-10-CM

## 2018-05-21 DIAGNOSIS — N18.30 TYPE 2 DIABETES MELLITUS WITH STAGE 3 CHRONIC KIDNEY DISEASE, WITH LONG-TERM CURRENT USE OF INSULIN (H): Primary | ICD-10-CM

## 2018-05-21 DIAGNOSIS — Z79.4 TYPE 2 DIABETES MELLITUS WITH STAGE 3 CHRONIC KIDNEY DISEASE, WITH LONG-TERM CURRENT USE OF INSULIN (H): Primary | ICD-10-CM

## 2018-05-21 NOTE — PROGRESS NOTES
Please call.  TSH is normal indicating that thyroid function is normal.   Glucose is high.  Calcium is low.  Other liver and kidney tests are all OK.   Low calcium could be contributing to not feeling well.   Hgb A1C has increased suggesting diabetes mellitus not as well controlled as it could be.   Your blood counts including the white blood cells, red blood cells and platelets are all normal.    PLAN: Come in for additional blood tests;  Repeat chem8, PTH, 25-OH Vit D and magnesium levels.    Bring in glucometers to see diabetic educator for adjusting insulin.   I ordered labs.  Please place diabetic educator order.     OWEN AMATO MD

## 2018-05-29 ENCOUNTER — TELEPHONE (OUTPATIENT)
Dept: EDUCATION SERVICES | Facility: CLINIC | Age: 78
End: 2018-05-29

## 2018-05-29 NOTE — TELEPHONE ENCOUNTER
"CDE called patient: \"My blood sugars are rarely above 200.  I think my blood sugars are good and I don't think there's anything that I can learn at diabetes education because I know it's the food.  I wouldn't gain a thing from coming in.\"      TAYLOR discussed with patient that we wouldn't necessarily try to change his eating habits more help him adjust his insulin doses to limit high blood sugars which can cause further fatigue.    Patient declined, plans to discuss with PCP or call if he continues to feel tired and BGs remain elevated    Kelley Watkins MS, RD, LD, CDE    "

## 2018-05-29 NOTE — TELEPHONE ENCOUNTER
Diabetes Education Scheduling Outreach #1:    Call to patient to schedule. Patient declined to schedule.    Sandra Trevizo, [facility]  Diabetes and Nutrition Scheduling

## 2018-06-04 DIAGNOSIS — E83.51 HYPOCALCEMIA: ICD-10-CM

## 2018-06-04 LAB
ANION GAP SERPL CALCULATED.3IONS-SCNC: 9 MMOL/L (ref 3–14)
BUN SERPL-MCNC: 19 MG/DL (ref 7–30)
CALCIUM SERPL-MCNC: 6.2 MG/DL (ref 8.5–10.1)
CHLORIDE SERPL-SCNC: 102 MMOL/L (ref 94–109)
CO2 SERPL-SCNC: 29 MMOL/L (ref 20–32)
CREAT SERPL-MCNC: 1.1 MG/DL (ref 0.66–1.25)
DEPRECATED CALCIDIOL+CALCIFEROL SERPL-MC: 47 UG/L (ref 20–75)
GFR SERPL CREATININE-BSD FRML MDRD: 65 ML/MIN/1.7M2
GLUCOSE SERPL-MCNC: 240 MG/DL (ref 70–99)
MAGNESIUM SERPL-MCNC: 0.7 MG/DL (ref 1.6–2.3)
POTASSIUM SERPL-SCNC: 3.8 MMOL/L (ref 3.4–5.3)
PTH-INTACT SERPL-MCNC: 26 PG/ML (ref 18–80)
SODIUM SERPL-SCNC: 140 MMOL/L (ref 133–144)

## 2018-06-04 PROCEDURE — 82306 VITAMIN D 25 HYDROXY: CPT | Performed by: FAMILY MEDICINE

## 2018-06-04 PROCEDURE — 83970 ASSAY OF PARATHORMONE: CPT | Performed by: FAMILY MEDICINE

## 2018-06-04 PROCEDURE — 80048 BASIC METABOLIC PNL TOTAL CA: CPT | Performed by: FAMILY MEDICINE

## 2018-06-04 PROCEDURE — 83735 ASSAY OF MAGNESIUM: CPT | Performed by: FAMILY MEDICINE

## 2018-06-04 PROCEDURE — 36415 COLL VENOUS BLD VENIPUNCTURE: CPT | Performed by: FAMILY MEDICINE

## 2018-06-05 DIAGNOSIS — E83.51 LOW CALCIUM LEVELS: Primary | ICD-10-CM

## 2018-06-05 DIAGNOSIS — R79.0 LOW MAGNESIUM LEVEL: ICD-10-CM

## 2018-06-12 DIAGNOSIS — E83.51 LOW CALCIUM LEVELS: Primary | ICD-10-CM

## 2018-06-12 DIAGNOSIS — R79.0 LOW MAGNESIUM LEVELS: ICD-10-CM

## 2018-06-15 DIAGNOSIS — R79.0 LOW MAGNESIUM LEVELS: ICD-10-CM

## 2018-06-15 DIAGNOSIS — R79.0 LOW MAGNESIUM LEVEL: ICD-10-CM

## 2018-06-15 DIAGNOSIS — E83.51 LOW CALCIUM LEVELS: ICD-10-CM

## 2018-06-15 LAB
ANION GAP SERPL CALCULATED.3IONS-SCNC: 6 MMOL/L (ref 3–14)
BUN SERPL-MCNC: 20 MG/DL (ref 7–30)
CALCIUM SERPL-MCNC: 9 MG/DL (ref 8.5–10.1)
CHLORIDE SERPL-SCNC: 101 MMOL/L (ref 94–109)
CO2 SERPL-SCNC: 31 MMOL/L (ref 20–32)
CREAT SERPL-MCNC: 1.03 MG/DL (ref 0.66–1.25)
GFR SERPL CREATININE-BSD FRML MDRD: 70 ML/MIN/1.7M2
GLUCOSE SERPL-MCNC: 219 MG/DL (ref 70–99)
MAGNESIUM SERPL-MCNC: 1.9 MG/DL (ref 1.6–2.3)
PHOSPHATE SERPL-MCNC: 2.7 MG/DL (ref 2.5–4.5)
POTASSIUM SERPL-SCNC: 4.4 MMOL/L (ref 3.4–5.3)
SODIUM SERPL-SCNC: 138 MMOL/L (ref 133–144)

## 2018-06-15 PROCEDURE — 80048 BASIC METABOLIC PNL TOTAL CA: CPT | Performed by: FAMILY MEDICINE

## 2018-06-15 PROCEDURE — 83735 ASSAY OF MAGNESIUM: CPT | Performed by: FAMILY MEDICINE

## 2018-06-15 PROCEDURE — 36415 COLL VENOUS BLD VENIPUNCTURE: CPT | Performed by: FAMILY MEDICINE

## 2018-06-15 PROCEDURE — 84100 ASSAY OF PHOSPHORUS: CPT | Performed by: FAMILY MEDICINE

## 2018-06-15 NOTE — PROGRESS NOTES
Please call.  Calcium is back to normal.  Phosphorus and magnesium also normal.   He has been off omeprazole and furosemide.  One or both of these medications likely caused the problems.  PLAN: I suggest staying off both meds.  If he has problems with being off, we will need to look for alternatives.   OWEN AMATO MD

## 2018-06-19 NOTE — PROGRESS NOTES
Please call.  Yes the Tums are OK to continue as needed  Up to two tabs, several times a day if needed.   OWEN AMATO MD

## 2018-06-26 DIAGNOSIS — E78.5 HYPERLIPIDEMIA LDL GOAL <70: ICD-10-CM

## 2018-06-26 RX ORDER — LISINOPRIL 5 MG/1
TABLET ORAL
Qty: 90 TABLET | Refills: 3 | Status: SHIPPED | OUTPATIENT
Start: 2018-06-26 | End: 2019-06-13

## 2018-06-28 DIAGNOSIS — J44.9 CHRONIC OBSTRUCTIVE PULMONARY DISEASE, UNSPECIFIED COPD TYPE (H): ICD-10-CM

## 2018-06-28 DIAGNOSIS — J45.40 MODERATE PERSISTENT ASTHMA WITHOUT COMPLICATION: ICD-10-CM

## 2018-06-28 RX ORDER — ARFORMOTEROL TARTRATE 15 UG/2ML
15 SOLUTION RESPIRATORY (INHALATION) 2 TIMES DAILY
Qty: 120 ML | Refills: 3 | Status: SHIPPED | OUTPATIENT
Start: 2018-06-28 | End: 2018-11-26

## 2018-06-28 RX ORDER — BUDESONIDE 0.5 MG/2ML
0.5 INHALANT ORAL 2 TIMES DAILY
Qty: 60 AMPULE | Refills: 11 | Status: SHIPPED | OUTPATIENT
Start: 2018-06-28 | End: 2019-07-12

## 2018-06-28 NOTE — TELEPHONE ENCOUNTER
Thank you,  Marleni Fung - Pharmacy Technician  [facility]  [phone]

## 2018-06-28 NOTE — TELEPHONE ENCOUNTER
"Alvaro says his lung specialist has moved so is asking if Dr Carreno can approve these refills.    Requested Prescriptions   Pending Prescriptions Disp Refills     budesonide (PULMICORT) 0.5 MG/2ML neb solution 60 ampule 11     Sig: Take 2 mLs (0.5 mg) by nebulization 2 times daily    Inhaled Steroids Protocol Passed    6/28/2018 10:49 AM       Passed - Patient is age 12 or older       Passed - Asthma control assessment score within normal limits in last 6 months    Please review ACT score.          Passed - Recent (6 mo) or future (30 days) visit within the authorizing provider's specialty    Patient had office visit in the last 6 months or has a visit in the next 30 days with authorizing provider or within the authorizing provider's specialty.  See \"Patient Info\" tab in inbasket, or \"Choose Columns\" in Meds & Orders section of the refill encounter.            arformoterol (BROVANA) 15 MCG/2ML NEBU neb solution 120 mL 3     Sig: Take 2 mLs (15 mcg) by nebulization 2 times daily    There is no refill protocol information for this order        Pulmicort 0.5      Last Written Prescription Date:  8/30/17  Last Fill Quantity: 60,   # refills: 11  Last Office Visit: 5/17/18  Dr Carreno  Future Office visit:       Brovana 15mcg/2ml      Last Written Prescription Date:  2/24/18  Last Fill Quantity: 120 ml,   # refills: 3  Last Office Visit: 5/17/18  Dr Carerno  Future Office visit:         "

## 2018-07-18 ENCOUNTER — HOSPITAL ENCOUNTER (EMERGENCY)
Facility: CLINIC | Age: 78
Discharge: HOME | End: 2018-07-18
Attending: EMERGENCY MEDICINE | Admitting: EMERGENCY MEDICINE
Payer: MEDICARE

## 2018-07-18 ENCOUNTER — APPOINTMENT (OUTPATIENT)
Dept: GENERAL RADIOLOGY | Facility: CLINIC | Age: 78
End: 2018-07-18
Attending: EMERGENCY MEDICINE
Payer: MEDICARE

## 2018-07-18 VITALS
OXYGEN SATURATION: 96 % | SYSTOLIC BLOOD PRESSURE: 170 MMHG | TEMPERATURE: 98.3 F | DIASTOLIC BLOOD PRESSURE: 90 MMHG | RESPIRATION RATE: 13 BRPM

## 2018-07-18 DIAGNOSIS — Z87.09 HISTORY OF CHRONIC OBSTRUCTIVE PULMONARY DISEASE: ICD-10-CM

## 2018-07-18 DIAGNOSIS — R06.02 SOB (SHORTNESS OF BREATH): ICD-10-CM

## 2018-07-18 LAB
ANION GAP SERPL CALCULATED.3IONS-SCNC: 8 MMOL/L (ref 3–14)
BASE EXCESS BLDV CALC-SCNC: 2.8 MMOL/L
BASOPHILS # BLD AUTO: 0.1 10E9/L (ref 0–0.2)
BASOPHILS NFR BLD AUTO: 0.5 %
BUN SERPL-MCNC: 26 MG/DL (ref 7–30)
CALCIUM SERPL-MCNC: 9.5 MG/DL (ref 8.5–10.1)
CHLORIDE SERPL-SCNC: 101 MMOL/L (ref 94–109)
CO2 SERPL-SCNC: 27 MMOL/L (ref 20–32)
CREAT SERPL-MCNC: 1.22 MG/DL (ref 0.66–1.25)
DIFFERENTIAL METHOD BLD: NORMAL
EOSINOPHIL # BLD AUTO: 0 10E9/L (ref 0–0.7)
EOSINOPHIL NFR BLD AUTO: 0.4 %
ERYTHROCYTE [DISTWIDTH] IN BLOOD BY AUTOMATED COUNT: 13.2 % (ref 10–15)
GFR SERPL CREATININE-BSD FRML MDRD: 57 ML/MIN/1.7M2
GLUCOSE SERPL-MCNC: 163 MG/DL (ref 70–99)
HCO3 BLDV-SCNC: 27 MMOL/L (ref 21–28)
HCT VFR BLD AUTO: 42.3 % (ref 40–53)
HGB BLD-MCNC: 14.4 G/DL (ref 13.3–17.7)
IMM GRANULOCYTES # BLD: 0 10E9/L (ref 0–0.4)
IMM GRANULOCYTES NFR BLD: 0.4 %
LYMPHOCYTES # BLD AUTO: 2.8 10E9/L (ref 0.8–5.3)
LYMPHOCYTES NFR BLD AUTO: 26.1 %
MCH RBC QN AUTO: 30.7 PG (ref 26.5–33)
MCHC RBC AUTO-ENTMCNC: 34 G/DL (ref 31.5–36.5)
MCV RBC AUTO: 90 FL (ref 78–100)
MONOCYTES # BLD AUTO: 0.5 10E9/L (ref 0–1.3)
MONOCYTES NFR BLD AUTO: 4.9 %
NEUTROPHILS # BLD AUTO: 7.4 10E9/L (ref 1.6–8.3)
NEUTROPHILS NFR BLD AUTO: 67.7 %
NRBC # BLD AUTO: 0 10*3/UL
NRBC BLD AUTO-RTO: 0 /100
NT-PROBNP SERPL-MCNC: 728 PG/ML (ref 0–1800)
PCO2 BLDV: 41 MM HG (ref 40–50)
PH BLDV: 7.43 PH (ref 7.32–7.43)
PLATELET # BLD AUTO: 184 10E9/L (ref 150–450)
PO2 BLDV: 37 MM HG (ref 25–47)
POTASSIUM SERPL-SCNC: 4.4 MMOL/L (ref 3.4–5.3)
RBC # BLD AUTO: 4.69 10E12/L (ref 4.4–5.9)
SODIUM SERPL-SCNC: 136 MMOL/L (ref 133–144)
TROPONIN I SERPL-MCNC: <0.015 UG/L (ref 0–0.04)
WBC # BLD AUTO: 10.9 10E9/L (ref 4–11)

## 2018-07-18 PROCEDURE — 93010 ELECTROCARDIOGRAM REPORT: CPT | Mod: Z6 | Performed by: EMERGENCY MEDICINE

## 2018-07-18 PROCEDURE — 25000132 ZZH RX MED GY IP 250 OP 250 PS 637: Mod: GY | Performed by: EMERGENCY MEDICINE

## 2018-07-18 PROCEDURE — 84484 ASSAY OF TROPONIN QUANT: CPT | Performed by: EMERGENCY MEDICINE

## 2018-07-18 PROCEDURE — 82803 BLOOD GASES ANY COMBINATION: CPT | Performed by: EMERGENCY MEDICINE

## 2018-07-18 PROCEDURE — 99285 EMERGENCY DEPT VISIT HI MDM: CPT | Mod: 25 | Performed by: EMERGENCY MEDICINE

## 2018-07-18 PROCEDURE — 85025 COMPLETE CBC W/AUTO DIFF WBC: CPT | Performed by: EMERGENCY MEDICINE

## 2018-07-18 PROCEDURE — 93005 ELECTROCARDIOGRAM TRACING: CPT | Performed by: EMERGENCY MEDICINE

## 2018-07-18 PROCEDURE — 25000125 ZZHC RX 250: Performed by: EMERGENCY MEDICINE

## 2018-07-18 PROCEDURE — 83880 ASSAY OF NATRIURETIC PEPTIDE: CPT | Performed by: EMERGENCY MEDICINE

## 2018-07-18 PROCEDURE — 71046 X-RAY EXAM CHEST 2 VIEWS: CPT

## 2018-07-18 PROCEDURE — 80048 BASIC METABOLIC PNL TOTAL CA: CPT | Performed by: EMERGENCY MEDICINE

## 2018-07-18 PROCEDURE — 94640 AIRWAY INHALATION TREATMENT: CPT

## 2018-07-18 PROCEDURE — A9270 NON-COVERED ITEM OR SERVICE: HCPCS | Mod: GY | Performed by: EMERGENCY MEDICINE

## 2018-07-18 RX ORDER — ALBUTEROL SULFATE 90 UG/1
2 AEROSOL, METERED RESPIRATORY (INHALATION) EVERY 6 HOURS PRN
Qty: 1 INHALER | Refills: 1 | Status: SHIPPED | OUTPATIENT
Start: 2018-07-18 | End: 2019-09-25

## 2018-07-18 RX ORDER — ACETAMINOPHEN 500 MG
1000 TABLET ORAL ONCE
Status: COMPLETED | OUTPATIENT
Start: 2018-07-18 | End: 2018-07-18

## 2018-07-18 RX ORDER — IPRATROPIUM BROMIDE AND ALBUTEROL SULFATE 2.5; .5 MG/3ML; MG/3ML
3 SOLUTION RESPIRATORY (INHALATION)
Status: DISCONTINUED | OUTPATIENT
Start: 2018-07-18 | End: 2018-07-18 | Stop reason: HOSPADM

## 2018-07-18 RX ADMIN — ACETAMINOPHEN 1000 MG: 500 TABLET ORAL at 19:57

## 2018-07-18 RX ADMIN — IPRATROPIUM BROMIDE AND ALBUTEROL SULFATE 3 ML: .5; 3 SOLUTION RESPIRATORY (INHALATION) at 18:56

## 2018-07-18 ASSESSMENT — ENCOUNTER SYMPTOMS
ALLERGIC/IMMUNOLOGIC NEGATIVE: 1
HEMATOLOGIC/LYMPHATIC NEGATIVE: 1
CARDIOVASCULAR NEGATIVE: 1
EYES NEGATIVE: 1
MUSCULOSKELETAL NEGATIVE: 1
PSYCHIATRIC NEGATIVE: 1
CONSTITUTIONAL NEGATIVE: 1
GASTROINTESTINAL NEGATIVE: 1
SHORTNESS OF BREATH: 1
NEUROLOGICAL NEGATIVE: 1
ENDOCRINE NEGATIVE: 1

## 2018-07-18 NOTE — ED AVS SNAPSHOT
[facility] Emergency Department    [address] [address]    Phone:  [phone]    Fax:  [phone]                                       Alvaro Horton   MRN: [id number]    Department:  [facility] Emergency Department   Date of Visit:  7/18/2018           After Visit Summary Signature Page     I have received my discharge instructions, and my questions have been answered. I have discussed any challenges I see with this plan with the nurse or doctor.    ..........................................................................................................................................  Patient/Patient Representative Signature      ..........................................................................................................................................  Patient Representative Print Name and Relationship to Patient    ..................................................               ................................................  Date                                            Time    ..........................................................................................................................................  Reviewed by Signature/Title    ...................................................              ..............................................  Date                                                            Time

## 2018-07-18 NOTE — ED AVS SNAPSHOT
[facility] Emergency Department    [address] [facility] [address]    Phone:  [phone]    Fax:  [phone]                                       Alvaro Horton   MRN: [id number]    Department:  [facility] Emergency Department   Date of Visit:  7/18/2018           Patient Information     Date Of Birth          1940        Your diagnoses for this visit were:     History of chronic obstructive pulmonary disease     SOB (shortness of breath) Since fishing yesterday on July 17, 2018.  The exact cause of your symptoms are not clear       You were seen by Dylan Dennison MD.      Follow-up Information     Follow up with [facility] Emergency Department.    Specialty:  EMERGENCY MEDICINE    Why:  The exact cause of your symptoms today are not clear.  You have requested a rescue inhaler which may help your symptoms of feeling short of breath.  Please return if you continue to feel worse    Contact information:    [address] [place] [id number] [phone]    Additional information:    The medical center is located at   [address] [place] [address] (between [facility] and   [facility] in [place], [place] miles north   of [place]).        Follow up with Be Carreno MD.    Specialty:  Family Practice    Why:  You may check in with your doctor in the next 2-3 days for follow-up on your symptoms    Contact information:    [address] [place] [id number] [phone]        Discharge References/Attachments     SHORTNESS OF BREATH (DYSPNEA) (ENGLISH)    (S) VENTOLIN HFA INHALER (ENGLISH)      24 Hour Appointment Hotline       To make an appointment at any [facility], call [phone] ([phone]). If you don't have a family doctor or clinic, we will help you find one. [place] clinics are conveniently located to serve the needs of you and your family.             Review of your medicines      START taking        Dose / Directions Last dose taken     albuterol 108 (90 Base) MCG/ACT Inhaler   Commonly known as:  PROAIR HFA/PROVENTIL HFA/VENTOLIN HFA   Dose:  2 puff   Quantity:  1 Inhaler   Replaces:  albuterol (2.5 MG/3ML) 0.083% neb solution        Inhale 2 puffs into the lungs every 6 hours as needed for shortness of breath / dyspnea or wheezing   Refills:  1          Our records show that you are taking the medicines listed below. If these are incorrect, please call your family doctor or clinic.        Dose / Directions Last dose taken    arformoterol 15 MCG/2ML Nebu neb solution   Commonly known as:  BROVANA   Dose:  15 mcg   Quantity:  120 mL        Take 2 mLs (15 mcg) by nebulization 2 times daily   Refills:  3        aspirin 81 MG tablet        Take by mouth daily   Refills:  0        atorvastatin 80 MG tablet   Commonly known as:  LIPITOR   Dose:  80 mg   Quantity:  30 tablet        Take 1 tablet (80 mg) by mouth daily   Refills:  11        * blood glucose monitoring meter device kit   Quantity:  1 kit        Use to test blood sugars 4 times daily or as directed.   Refills:  0        * blood glucose monitoring meter device kit   Commonly known as:  no brand specified   Quantity:  1 kit        Use to test blood sugar 3 times daily or as directed.   Refills:  0        budesonide 0.5 MG/2ML neb solution   Commonly known as:  PULMICORT   Dose:  0.5 mg   Quantity:  60 ampule        Take 2 mLs (0.5 mg) by nebulization 2 times daily   Refills:  11        cholecalciferol 2000 units tablet   Dose:  1 tablet   Quantity:  90 tablet        Take 1 tablet by mouth daily.   Refills:  3        cyanocobalamin 500 MCG Tabs   Commonly known as:  BL VITAMIN B-12   Dose:  500 mcg   Quantity:  90 tablet        Take 500 mcg by mouth daily.   Refills:  4        finasteride 5 MG tablet   Commonly known as:  PROSCAR   Dose:  5 mg   Quantity:  90 tablet        Take 1 tablet (5 mg) by mouth daily MUST KEEP UPCOMING APPT FOR FURTHER REFILLS   Refills:  1        fish oil-omega-3 fatty  "acids 1000 MG capsule   Dose:  1 g   Quantity:  180 capsule        Take 1 capsule by mouth 2 times daily.   Refills:  3        furosemide 20 MG tablet   Commonly known as:  LASIX   Quantity:  30 tablet        TAKE ONE TABLET BY MOUTH ONCE DAILY IN THE MORNING   Refills:  11        insulin aspart 100 UNIT/ML injection   Commonly known as:  NovoLOG FLEXPEN   Quantity:  30 mL        11 units before breakfast, 11 units before lunch, 11 units before dinner   Refills:  11        insulin detemir 100 UNIT/ML injection   Commonly known as:  LEVEMIR FLEXPEN/FLEXTOUCH   Dose:  30 Units   Quantity:  15 mL        Inject 30 Units Subcutaneous At Bedtime   Refills:  11        insulin syringe-needle U-100 31G X 5/16\" 1 ML   Commonly known as:  BD insulin syringe ULTRAFINE   Quantity:  100 each        Use one syringe 4 times daily or as directed.   Refills:  prn        lisinopril 5 MG tablet   Commonly known as:  PRINIVIL/ZESTRIL   Quantity:  90 tablet        TAKE ONE TABLET BY MOUTH ONCE DAILY   Refills:  3        metFORMIN 1000 MG tablet   Commonly known as:  GLUCOPHAGE   Quantity:  60 tablet        TAKE ONE TABLET BY MOUTH TWICE DAILY WITH MEALS   Refills:  11        metoprolol tartrate 25 MG tablet   Commonly known as:  LOPRESSOR   Dose:  25 mg   Quantity:  60 tablet        Take 1 tablet (25 mg) by mouth 2 times daily   Refills:  11        nitroGLYcerin 0.4 MG sublingual tablet   Commonly known as:  NITROSTAT   Dose:  0.4 mg   Quantity:  25 tablet        Place 1 tablet (0.4 mg) under the tongue every 5 minutes as needed for chest pain (CALL 911 IF NOT IMPROVED AFTER THREE CONSECUTIVE DOSES)   Refills:  0        omeprazole 20 MG CR capsule   Commonly known as:  priLOSEC   Quantity:  30 capsule        TAKE ONE CAPSULE BY MOUTH ONCE DAILY   Refills:  11        ONETOUCH ULTRA test strip   Quantity:  300 strip   Generic drug:  blood glucose monitoring        USE  TO CHECK GLUCOSE THREE TIMES DAILY   Refills:  1        order for DME "        Equipment being ordered: CPAP AIRSENSE 10 11-15 CM H2O QUATTRO AIR SIZE MED SN# [id number]  DN# [id number]   Refills:  0        pen needles 31G X 6 MM Misc   Dose:  1 Units   Quantity:  90 each        1 Units 3 times daily For novolog flexpen   Refills:  0        tamsulosin 0.4 MG capsule   Commonly known as:  FLOMAX   Dose:  0.4 mg   Quantity:  90 capsule        Take 1 capsule (0.4 mg) by mouth daily (Needs follow-up appointment for this medication)   Refills:  3        umeclidinium 62.5 MCG/INH oral inhaler   Commonly known as:  INCRUSE ELLIPTA   Dose:  1 puff   Quantity:  1 Inhaler        Inhale 1 puff into the lungs daily   Refills:  11        * Notice:  This list has 2 medication(s) that are the same as other medications prescribed for you. Read the directions carefully, and ask your doctor or other care provider to review them with you.      STOP taking        Dose Reason for stopping Comments    albuterol (2.5 MG/3ML) 0.083% neb solution   Replaced by:  albuterol 108 (90 Base) MCG/ACT Inhaler                      Prescriptions were sent or printed at these locations (1 Prescription)                   [facility] [address] [facility]   [address] [facility] [address]    Telephone:  [phone]   Fax:  [phone]   Hours:                  E-Prescribed (1 of 1)         albuterol (PROAIR HFA/PROVENTIL HFA/VENTOLIN HFA) 108 (90 Base) MCG/ACT Inhaler                Procedures and tests performed during your visit     Basic metabolic panel    Blood gas venous    CBC with platelets differential    Chest XR,  PA & LAT    EKG 12-lead, tracing only    Nt probnp inpatient (BNP)    Saline Lock IV    Troponin I      Orders Needing Specimen Collection     None      Pending Results     No orders found from 7/16/2018 to 7/19/2018.            Pending Culture Results     No orders found from 7/16/2018 to 7/19/2018.            Pending Results Instructions     If you had any lab results that  were not finalized at the time of your Discharge, you can call the ED Lab Result RN at [phone]. You will be contacted by this team for any positive Lab results or changes in treatment. The nurses are available 7 days a week from 10A to 6:30P.  You can leave a message 24 hours per day and they will return your call.        Test Results From Your Hospital Stay        7/18/2018  6:57 PM      Component Results     Component Value Ref Range & Units Status    WBC 10.9 4.0 - 11.0 10e9/L Final    RBC Count 4.69 4.4 - 5.9 10e12/L Final    Hemoglobin 14.4 13.3 - 17.7 g/dL Final    Hematocrit 42.3 40.0 - 53.0 % Final    MCV 90 78 - 100 fl Final    MCH 30.7 26.5 - 33.0 pg Final    MCHC 34.0 31.5 - 36.5 g/dL Final    RDW 13.2 10.0 - 15.0 % Final    Platelet Count 184 150 - 450 10e9/L Final    Diff Method Automated Method  Final    % Neutrophils 67.7 % Final    % Lymphocytes 26.1 % Final    % Monocytes 4.9 % Final    % Eosinophils 0.4 % Final    % Basophils 0.5 % Final    % Immature Granulocytes 0.4 % Final    Nucleated RBCs 0 0 /100 Final    Absolute Neutrophil 7.4 1.6 - 8.3 10e9/L Final    Absolute Lymphocytes 2.8 0.8 - 5.3 10e9/L Final    Absolute Monocytes 0.5 0.0 - 1.3 10e9/L Final    Absolute Eosinophils 0.0 0.0 - 0.7 10e9/L Final    Absolute Basophils 0.1 0.0 - 0.2 10e9/L Final    Abs Immature Granulocytes 0.0 0 - 0.4 10e9/L Final    Absolute Nucleated RBC 0.0  Final         7/18/2018  7:09 PM      Component Results     Component Value Ref Range & Units Status    Sodium 136 133 - 144 mmol/L Final    Potassium 4.4 3.4 - 5.3 mmol/L Final    Chloride 101 94 - 109 mmol/L Final    Carbon Dioxide 27 20 - 32 mmol/L Final    Anion Gap 8 3 - 14 mmol/L Final    Glucose 163 (H) 70 - 99 mg/dL Final    Urea Nitrogen 26 7 - 30 mg/dL Final    Creatinine 1.22 0.66 - 1.25 mg/dL Final    GFR Estimate 57 (L) >60 mL/min/1.7m2 Final    Non  GFR Calc    GFR Estimate If Black 69 >60 mL/min/1.7m2 Final    African American GFR  Calc    Calcium 9.5 8.5 - 10.1 mg/dL Final         7/18/2018  7:09 PM      Component Results     Component Value Ref Range & Units Status    N-Terminal Pro BNP Inpatient 728 0 - 1800 pg/mL Final       Reference range shown and results flagged as abnormal are suggested inpatient   cut points for confirming diagnosis if CHF in an acute setting. Establishing a   baseline value for each individual patient is useful for follow-up. An   inpatient or emergency department NT-proPBNP <300 pg/mL effectively rules out   acute CHF, with 99% negative predictive value.  The outpatient non-acute reference range for ruling out CHF is:   0-125 pg/mL (age 18 to less than 75)   0-450 pg/mL (age 75 yrs and older)           7/18/2018  6:55 PM      Component Results     Component Value Ref Range & Units Status    Ph Venous 7.43 7.32 - 7.43 pH Final    PCO2 Venous 41 40 - 50 mm Hg Final    PO2 Venous 37 25 - 47 mm Hg Final    Bicarbonate Venous 27 21 - 28 mmol/L Final    Base Excess Venous 2.8 mmol/L Final    Abnormal Result, Ref range: -7.7 to 1.9         7/18/2018  7:50 PM      Narrative     CHEST TWO VIEWS   7/18/2018 7:27 PM     HISTORY: History of chronic obstructive pulmonary disease. Shortness  of breath.    COMPARISON: 5/17/2018.    FINDINGS: Hypoinflated lungs. Linear opacities in bilateral lung bases  likely represent atelectasis or scarring. The lungs are otherwise  clear. Normal-sized cardiac silhouette. Prior median sternotomy.        Impression     IMPRESSION: No evidence of active cardiopulmonary disease.    CLARISA CASTRO MD         7/18/2018  7:09 PM      Component Results     Component Value Ref Range & Units Status    Troponin I ES <0.015 0.000 - 0.045 ug/L Final    The 99th percentile for upper reference range is 0.045 ug/L.  Troponin values   in the range of 0.045 - 0.120 ug/L may be associated with risks of adverse   clinical events.                  Thank you for choosing [facility]       Thank you for choosing  "[facility] for your care. Our goal is always to provide you with excellent care. Hearing back from our patients is one way we can continue to improve our services. Please take a few minutes to complete the written survey that you may receive in the mail after you visit with us. Thank you!        [facility] Information     [facility] lets you send messages to your doctor, view your test results, renew your prescriptions, schedule appointments and more. To sign up, go to [email or web address] . Click on \"Log in\" on the left side of the screen, which will take you to the Welcome page. Then click on \"Sign up Now\" on the right side of the page.     You will be asked to enter the access code listed below, as well as some personal information. Please follow the directions to create your username and password.     Your access code is: [email or web address]  Expires: 10/16/2018  8:36 PM     Your access code will  in 90 days. If you need help or a new code, please call your [facility] clinic or [phone].        Equal Access to Services     [facility]: Nel Lni, waamy enamorado, qatoshia reyes, tory terry . So [facility] [phone].    ATENCIÓN: Si habla español, tiene a rahman disposición servicios gratuitos de asistencia lingüística. Llame al [phone].    We comply with applicable federal civil rights laws and [place] laws. We do not discriminate on the basis of race, color, national origin, age, disability, sex, sexual orientation, or gender identity.            After Visit Summary       This is your record. Keep this with you and show to your community pharmacist(s) and doctor(s) at your next visit.                  "

## 2018-07-18 NOTE — ED NOTES
"Patient presents to ED with son c/o SOB. Denies pain. States he used an inhaler more than prescribed and \"wants to make sure that was okay\".   "

## 2018-07-18 NOTE — ED PROVIDER NOTES
"  History     Chief Complaint   Patient presents with     Shortness of Breath     has COPD, increased SOA today, was outside in heat all day yesterday     HPI  Alvaro Horton is a 78 year old male who with a history of COPD, not on home oxygen, hypertension, chronic ischemic heart disease, history of CABG, type 2 diabetes, chronic kidney disease, hyperlipidemia, atrial fibrillation, obstructive sleep apnea benign paroxysmal vertigo presented for evaluation for shortness of breath after finishing yesterday with his son.  Patient reports he is uses home inhalers without significant relief in his symptoms.  He does not report any chest pain or pressure.  He reports no palpitations.  No dyspnea on exertion and no lower extremity swelling.  He has had no fever no chills no cough.  Because his continue to feel short of breath after finishing hot and humid weather yesterday he is here with his son for further care and evaluation    Problem List:    Patient Active Problem List    Diagnosis Date Noted     Hypertension goal BP (blood pressure) < 140/90 10/09/2006     Priority: High     BAKERS LUNG      Priority: High     Dx possible \"Baker's Lung\" 2001 [facility]. RAST for bakers yeast negative 2002. Has occupational exposures with related worsening asthmatic symptoms. CT 2/08- no fibrosis.       Moderate persistent asthma      Priority: High     PFTS 1997 - FEV1- 3.39 (64%), ratio 0.74, 24% change xir92-33% with bronchodilators,  TLC 86%, %, DLCO 78%. Methacholine challenge positive at level 7 2001.  PFTS 2004 - FEV1- 1.93 (63%), ratio 0.77. PFTS 4/08 - FEV1- 2.13 (72%), ratio 0.71, no change with bronchodilators,  TLC 78%, RV 96%, DLCO 64%          Chronic ischemic heart disease      Priority: High     atypical chest pain 2001 with GXT echo- decreased LVF, angiogram 2001- nonobstructing 3 vessel disease.   Repeat angio 2004- prox RCA 50-60%,  OM2- 40%, D1 30-40%, LAD 30-40%.   Cardiolite 2005 during " "hospitalization for SOB in [place] reported to reveal no ischemia.   Adenosine cardiolite 1/08- small slightly reversible inferior defect, most likely consistent with diaphragm attenuation with bowel uptake artifact. USA, Angio 11/09- Severe LM disease, severe subtotal occlusion of a large branch OM1.    S/p CABG x2 11/09- LIMA-D1, SVG to OM1, OM2. GXT 8/10- 5.8 mets, 121 (80%) HR, normal ekg, cardiolite- small fixed inferior/basal defect with small area khurram-infarct ischemia extending into the mid ventricle. EF 67%.  4/25/13:adenosine cardiolyte stress test through [facility] with normal EF and no ischemia.   1/16/2016 nuclear stress test:1.  Myocardial perfusion imaging using single isotope technique demonstrated no evidence for myocardial ischemia. 2. Gated images demonstrated normal wall motion with a calculated ejection fraction of 61%.  3. Compared to the prior study from 2011 there are no significant changes .           Type 2 diabetes mellitus with diabetic chronic kidney disease (H) 10/30/2015     Priority: Medium     Altered mental status 12/31/2014     Priority: Medium     12/27/2014:episode confusion for 20 minutes.  Seen at [facility]/-hospitalized.  Head CT, MRI/MRA all normal.  PET cardiac perfusion stress test normal.  Neurology consult-diagnosis= possible hypoglycemia, migraine variant.  \"Acute ischemic cerebral event was excluded\".       Health Care Home 08/11/2014     Priority: Medium     State Tier Level:    Status:  Unable to re-connect  Care Coordinator:  Kecia Mills    See Letters for [facility] Care Plan  Date:  August 11, 2014             CKD (chronic kidney disease) stage 3, GFR 30-59 ml/min 05/06/2014     Priority: Medium     9/9/2015:He has had two abnormal MAC in the past.        COPD (chronic obstructive pulmonary disease) (H) 11/18/2013     Priority: Medium     PFT 11/15/13 results:FVC=59%, FEV1=54%, FEV1/FVC=92%.  His DLCO was 58%1. Spirometry: FEV1 moderately reduced. FVC moderately reduced. " FEV1/FVC ratio reduced. 2. Bronchodilator Response: A 14% improvement is seen in FEV1 with bronchodilators. 3. Lung Volumes: Total lung capacity normal. Residual volume increased. Residual volume total lung capacity ratio increased. 4. DLCO: Moderately reduced.   INTERPRETATION: Moderate obstructive lung disease. Reversibility with bronchodilators is seen on testing today. Lung volumes show evidence for air trapping. DLCO is reduced, consistent with loss of capillary-alveolar exchange as in emphysema, pulmonary hypertension, chronic pulmonary embolus, pulmonary fibrosis or other pulmonary vascular disease. A concomitant restrictive lung disease process may be suspected on the basis of moderate reductions in FEV1 and FVC, with a fairly well-preserved FEV1/FVC ratio and a borderline low normal total lung capacity.   11/15/13:exercise oximetry did not show significant desaturation below 91%.  PFT February, 2014 results:FVC=54%, FEV1=48%, FEV1/FVC=64%.  DLCO=59%         Hyperlipidemia LDL goal <70 04/17/2013     Priority: Medium     Atrial fibrillation (H) 12/14/2009     Priority: Medium     After CABG 11/09       CVA (cerebral vascular accident) (H) 11/27/2009     Priority: Medium     Mild Broca's aphasia, confusion, right hand dysesthesia after angio. MRA new small area of restricted diffusion in the white matter of the medial right temporal lobe,  consistent with a recent small infarct. Speech problems resolved, still mild right hand problems.         Obstructive sleep apnea 04/14/2008     Priority: Medium     ESS 20/24. Sleep study [facility]: 4/8/08. total sleep time was 423.0 minutes. The sleep latency was normal at 9.7 minutes.  REM sleep latency was 161.5 minutes. Sleep efficiency was normal at 82.7%. The sleep architecture was disrupted with frequent sleep stage changes and arousals. Snoring: Was reported as moderately loud. Lowest O2 saturation was 87.0%. This study is suggestive of mild sleep apnea, severe  during REM sleep (21% TST).  PLM index was 0.0. EKG:  No significant cardiac arrhythmias were noted.         Hypertrophy of prostate without urinary obstruction      Priority: Medium     Elevated PSA. Bx negative 2004.       Advanced care planning/counseling discussion 09/24/2012     Priority: Low     Does not have a directive 9/12       Dyslexia 11/11/2010     Priority: Low     essentially unable to read       CTS (carpal tunnel syndrome) 05/12/2010     Priority: Low     EMG 5/10-  median neuropathy at the right wrist, moderate in severity electrically, right ulnar neuropathy localizing to the elbow, mild chronic right C6 radiculopathy without evidence for acute denervation.        Memory loss 05/03/2010     Priority: Low     MMSE 27/30 (0/3 attention recall, ?history of dylexia), PHQ9-16 7/09. Recommended neurocogntive testing, did not complete.  MMSE 23/30 6/10 (attention, county, if/and/buts).Neurocogntive evaluation  1/10- not suspicious for emerging dementia. Felt likely due hearing loss/dyslexia.        Benign paroxysmal vertigo 02/03/2009     Priority: Low     Admit 2/09. MRI/MRA head and neck negative.       Sensorineural hearing loss, bilateral 10/08/2007     Priority: Low     Esophageal reflux 11/06/2006     Priority: Low     PSORIASIS      Priority: Low     OVERACTIVE BLADDER      Priority: Low     PVR 84 2004.          Past Medical History:    Past Medical History:   Diagnosis Date     Calculus of kidney      Depression      Left hand weakness 12/14/2009     PNEUMONIA, ORGANISM NOS 2/14/2008       Past Surgical History:    Past Surgical History:   Procedure Laterality Date     C ANESTH,DX ARTHROSCOPIC PROC KNEE JOINT  1994, 1998     Left     CARDIAC SURGERY  11/09    CABG x2 - LIMA-D1, SVG to OM1, OM2.     GENITOURINARY SURGERY  1990    ESWL and percutaneous nephrolithotomy     ORTHOPEDIC SURGERY  8/12    right CTR     SURGICAL HISTORY OF -   1998    Hernia repair     SURGICAL HISTORY OF -   2004     "NormalColonoscopy      SURGICAL HISTORY OF -   2006    Normal Colonoscopy       Family History:    Family History   Problem Relation Age of Onset     C.A.VIRI. Father      40s     Hypertension Father      C.A.D. Paternal Grandfather      HEART DISEASE Paternal Grandfather      Diabetes Brother      C.A.D. Brother      HEART DISEASE Brother      Hypertension Brother      GASTROINTESTINAL DISEASE Son      Crohn's disease     GASTROINTESTINAL DISEASE Other      2 grandaughters with Crohn's disease     Cancer - colorectal No family hx of        Social History:  Marital Status:   [5]  Social History   Substance Use Topics     Smoking status: Never Smoker     Smokeless tobacco: Never Used     Alcohol use No        Medications:      albuterol (PROAIR HFA/PROVENTIL HFA/VENTOLIN HFA) 108 (90 Base) MCG/ACT Inhaler   arformoterol (BROVANA) 15 MCG/2ML NEBU neb solution   aspirin 81 MG tablet   atorvastatin (LIPITOR) 80 MG tablet   blood glucose monitoring (NO BRAND SPECIFIED) meter device kit   blood glucose monitoring (ONE TOUCH ULTRA 2) meter device kit   budesonide (PULMICORT) 0.5 MG/2ML neb solution   cholecalciferol 2000 UNITS tablet   cyanocobalamin (BL VITAMIN B-12) 500 MCG TABS   finasteride (PROSCAR) 5 MG tablet   fish oil-omega-3 fatty acids (FISH OIL) 1000 MG capsule   furosemide (LASIX) 20 MG tablet   insulin aspart (NOVOLOG FLEXPEN) 100 UNIT/ML injection   insulin detemir (LEVEMIR FLEXPEN/FLEXTOUCH) 100 UNIT/ML injection   Insulin Pen Needle (PEN NEEDLES) 31G X 6 MM MISC   insulin syringe-needle U-100 (BD INSULIN SYRINGE ULTRAFINE) 31G X 5/16\" 1 ML   lisinopril (PRINIVIL/ZESTRIL) 5 MG tablet   metFORMIN (GLUCOPHAGE) 1000 MG tablet   metoprolol tartrate (LOPRESSOR) 25 MG tablet   nitroGLYcerin (NITROSTAT) 0.4 MG sublingual tablet   omeprazole (PRILOSEC) 20 MG CR capsule   ONETOUCH ULTRA test strip   order for DME   tamsulosin (FLOMAX) 0.4 MG capsule   umeclidinium (INCRUSE ELLIPTA) 62.5 MCG/INH oral inhaler "   [DISCONTINUED] albuterol (2.5 MG/3ML) 0.083% nebulizer solution   [DISCONTINUED] LANTUS SOLOSTAR SOLN 100 UNIT/ML   [DISCONTINUED] metoprolol (TOPROL-XL) 25 MG 24 hr tablet         Review of Systems   Constitutional: Negative.    HENT: Negative.    Eyes: Negative.    Respiratory: Positive for shortness of breath.    Cardiovascular: Negative.    Gastrointestinal: Negative.    Endocrine: Negative.    Genitourinary: Negative.    Musculoskeletal: Negative.    Skin: Negative.    Allergic/Immunologic: Negative.    Neurological: Negative.    Hematological: Negative.    Psychiatric/Behavioral: Negative.    All other systems reviewed and are negative.      Physical Exam   BP: (!) 161/91  Heart Rate: 78  Temp: 98.3  F (36.8  C)  Resp: 24  SpO2: 95 %      Physical Exam   Constitutional: He is oriented to person, place, and time. He appears well-developed and well-nourished. No distress.   HENT:   Head: Normocephalic.   Eyes: Conjunctivae and EOM are normal. Pupils are equal, round, and reactive to light. Right eye exhibits no discharge. No scleral icterus.   Neck: Normal range of motion. Neck supple. No JVD present. No tracheal deviation present. No thyromegaly present.   Cardiovascular: Normal rate and regular rhythm.  Exam reveals no gallop and no friction rub.    Murmur heard.  Pulmonary/Chest: Effort normal. No stridor. No respiratory distress. He has no wheezes.   Lymphadenopathy:     He has no cervical adenopathy.   Neurological: He is alert and oriented to person, place, and time.   Skin: No rash noted. He is not diaphoretic. No erythema. No pallor.   Psychiatric: He has a normal mood and affect. His behavior is normal. Judgment and thought content normal.       ED Course     ED Course     Procedures               EKG Interpretation:      Interpreted by Dylan Dennison  Time reviewed: 18:55  Symptoms at time of EKG: shortness of breath   Rhythm: normal sinus   Rate: Normal  Axis: Normal  Ectopy:  none  Conduction: right bundle branch block (complete)  ST Segments/ T Waves: Non-specific ST-T wave changes  Q Waves: nonspecific  Comparison to prior: when compared with EKG dated July 24, 2017 no acute changes appreciated per    Clinical Impression: no acute changes          Critical Care time:  none                   ED medications:  Medications   ipratropium - albuterol 0.5 mg/2.5 mg/3 mL (DUONEB) neb solution 3 mL (3 mLs Nebulization Given 7/18/18 2846)   acetaminophen (TYLENOL) tablet 1,000 mg (1,000 mg Oral Given 7/18/18 1957)     ED labs and imaging:  Results for orders placed or performed during the hospital encounter of 07/18/18   Chest XR,  PA & LAT    Narrative    CHEST TWO VIEWS   7/18/2018 7:27 PM     HISTORY: History of chronic obstructive pulmonary disease. Shortness  of breath.    COMPARISON: 5/17/2018.    FINDINGS: Hypoinflated lungs. Linear opacities in bilateral lung bases  likely represent atelectasis or scarring. The lungs are otherwise  clear. Normal-sized cardiac silhouette. Prior median sternotomy.      Impression    IMPRESSION: No evidence of active cardiopulmonary disease.    CLARISA CASTRO MD   CBC with platelets differential   Result Value Ref Range    WBC 10.9 4.0 - 11.0 10e9/L    RBC Count 4.69 4.4 - 5.9 10e12/L    Hemoglobin 14.4 13.3 - 17.7 g/dL    Hematocrit 42.3 40.0 - 53.0 %    MCV 90 78 - 100 fl    MCH 30.7 26.5 - 33.0 pg    MCHC 34.0 31.5 - 36.5 g/dL    RDW 13.2 10.0 - 15.0 %    Platelet Count 184 150 - 450 10e9/L    Diff Method Automated Method     % Neutrophils 67.7 %    % Lymphocytes 26.1 %    % Monocytes 4.9 %    % Eosinophils 0.4 %    % Basophils 0.5 %    % Immature Granulocytes 0.4 %    Nucleated RBCs 0 0 /100    Absolute Neutrophil 7.4 1.6 - 8.3 10e9/L    Absolute Lymphocytes 2.8 0.8 - 5.3 10e9/L    Absolute Monocytes 0.5 0.0 - 1.3 10e9/L    Absolute Eosinophils 0.0 0.0 - 0.7 10e9/L    Absolute Basophils 0.1 0.0 - 0.2 10e9/L    Abs Immature Granulocytes 0.0 0 - 0.4  10e9/L    Absolute Nucleated RBC 0.0    Basic metabolic panel   Result Value Ref Range    Sodium 136 133 - 144 mmol/L    Potassium 4.4 3.4 - 5.3 mmol/L    Chloride 101 94 - 109 mmol/L    Carbon Dioxide 27 20 - 32 mmol/L    Anion Gap 8 3 - 14 mmol/L    Glucose 163 (H) 70 - 99 mg/dL    Urea Nitrogen 26 7 - 30 mg/dL    Creatinine 1.22 0.66 - 1.25 mg/dL    GFR Estimate 57 (L) >60 mL/min/1.7m2    GFR Estimate If Black 69 >60 mL/min/1.7m2    Calcium 9.5 8.5 - 10.1 mg/dL   Nt probnp inpatient (BNP)   Result Value Ref Range    N-Terminal Pro BNP Inpatient 728 0 - 1800 pg/mL   Blood gas venous   Result Value Ref Range    Ph Venous 7.43 7.32 - 7.43 pH    PCO2 Venous 41 40 - 50 mm Hg    PO2 Venous 37 25 - 47 mm Hg    Bicarbonate Venous 27 21 - 28 mmol/L    Base Excess Venous 2.8 mmol/L   Troponin I   Result Value Ref Range    Troponin I ES <0.015 0.000 - 0.045 ug/L         ED Vitals:  Vitals:    07/18/18 1856 07/18/18 1900 07/18/18 1945 07/18/18 2000   BP:  (!) 150/92 (!) 181/94    Resp:  12  13   Temp:       TempSrc:       SpO2: 95% 94%  96%     Assessments & Plan (with Medical Decision Making)   Clinical impression:78 year old male with a history of COPD by report who is not oxygen dependent, history of sleep apnea with CPAP at home who presented by private car with his son for evaluation for shortness of breath.  Exact cause of his shortness of breath is unclear.  He had elevated blood pressure readings during his course of care in the emergency department.    Patient reports he was fishing with his son yesterday in the heat and humid weather.  Over the course of last 24 hours he has had progressive shortness of breath at rest.  He reports no chest pressure or heaviness and no chest discomfort. He also reports no palpitations.  Patient reports a history of cardiac disease with a CABG over 10 years ago at [facility].  He is not reporting any lower extremity swelling.  Because of progressive shortness of breath and  "discomfort he arrived by private car for further evaluation   On my exam he is in no obvious distress.  He is 95% on room air.  Blood pressure was 140 61/91.  He has some crackles and rhonchi in the bases.  He has a murmur.      ED course and Plan:  EKG was obtained given underlying history of coronary disease with CABG and shortness of breath at rest.  He has a known right bundle branch block with T-wave changes  which is unchanged from comparison EKG from July 24, 2017.He was monitored on telemetry.  He did not require supplemental oxygen.  Chest x-ray was obtained as well as blood work.  His blood work is normal today specifically normal BNP.  Normal troponin.  He is not acidotic or retaining CO2.  Chest x-ray on my independent review did not show any acute cardiothoracic process.  Please see the interpreting radiologist report above.     Patient was r-eexamined after x-ray imaging and blood work.  Patient was eager to be discharged from the emergency department. \"get me the hell out of here and give me a rescue inhaler\".  During his course of care in the emergency department he did not show any signs of respiratory distress.  Although the cause of his shortness of breath is not very clear it does not appear to be due to COPD exacerbation.  Patient requested a rescue inhaler. he was given albuterol inhaler to use as needed for home.  I discussed and reviewed reasons to return to the emergency department for care with both the patient and son who expressed understanding.  I considered a pulmonary embolism as a cause for shortness of breath.  Patient is not tachycardic.  He also does not have any abnormality on telemetry monitoring, and his risk for pulmonary embolism based on his clinical presentation is low.  Patient was hypertensive during his course of care in the ED. Low likelihood of hypertensive emergency is low.  Patient does have an underlying history of hypertension.        Disclaimer: This note consists " of symbols derived from keyboarding, dictation and/or voice recognition software. As a result, there may be errors in the script that have gone undetected. Please consider this when interpreting information found in this chart.  I have reviewed the nursing notes.    I have reviewed the findings, diagnosis, plan and need for follow up with the patient.       New Prescriptions    ALBUTEROL (PROAIR HFA/PROVENTIL HFA/VENTOLIN HFA) 108 (90 BASE) MCG/ACT INHALER    Inhale 2 puffs into the lungs every 6 hours as needed for shortness of breath / dyspnea or wheezing       Final diagnoses:   History of chronic obstructive pulmonary disease   SOB (shortness of breath) - Since fishing yesterday on July 17, 2018.  The exact cause of your symptoms are not clear       7/18/2018   [facility] EMERGENCY DEPARTMENT     Dylan Dennison MD  07/18/18 4492

## 2018-07-19 NOTE — ED NOTES
"Patient back from x-ray requesting water. Intermittently asks for the inhaler so \"I can get out of here\". Able to redirect him for a short time. Sitting in wheelchair as he feels more comfortable there.  "

## 2018-07-24 ENCOUNTER — APPOINTMENT (OUTPATIENT)
Dept: GENERAL RADIOLOGY | Facility: CLINIC | Age: 78
End: 2018-07-24
Attending: EMERGENCY MEDICINE
Payer: MEDICARE

## 2018-07-24 ENCOUNTER — HOSPITAL ENCOUNTER (EMERGENCY)
Facility: CLINIC | Age: 78
Discharge: HOME | End: 2018-07-24
Attending: EMERGENCY MEDICINE | Admitting: EMERGENCY MEDICINE
Payer: MEDICARE

## 2018-07-24 VITALS
TEMPERATURE: 97.6 F | BODY MASS INDEX: 27.37 KG/M2 | SYSTOLIC BLOOD PRESSURE: 167 MMHG | OXYGEN SATURATION: 97 % | DIASTOLIC BLOOD PRESSURE: 81 MMHG | RESPIRATION RATE: 22 BRPM | WEIGHT: 180 LBS

## 2018-07-24 DIAGNOSIS — R06.02 SOB (SHORTNESS OF BREATH): ICD-10-CM

## 2018-07-24 DIAGNOSIS — G47.33 OBSTRUCTIVE SLEEP APNEA: ICD-10-CM

## 2018-07-24 DIAGNOSIS — N18.30 CKD (CHRONIC KIDNEY DISEASE) STAGE 3, GFR 30-59 ML/MIN (H): ICD-10-CM

## 2018-07-24 DIAGNOSIS — I10 HYPERTENSION GOAL BP (BLOOD PRESSURE) < 140/90: ICD-10-CM

## 2018-07-24 LAB
ANION GAP SERPL CALCULATED.3IONS-SCNC: 6 MMOL/L (ref 3–14)
BASE EXCESS BLDV CALC-SCNC: 4.7 MMOL/L
BASOPHILS # BLD AUTO: 0.1 10E9/L (ref 0–0.2)
BASOPHILS NFR BLD AUTO: 0.5 %
BUN SERPL-MCNC: 24 MG/DL (ref 7–30)
CALCIUM SERPL-MCNC: 8.8 MG/DL (ref 8.5–10.1)
CHLORIDE SERPL-SCNC: 105 MMOL/L (ref 94–109)
CO2 SERPL-SCNC: 28 MMOL/L (ref 20–32)
CREAT SERPL-MCNC: 1.28 MG/DL (ref 0.66–1.25)
DIFFERENTIAL METHOD BLD: NORMAL
EOSINOPHIL # BLD AUTO: 0.1 10E9/L (ref 0–0.7)
EOSINOPHIL NFR BLD AUTO: 0.6 %
ERYTHROCYTE [DISTWIDTH] IN BLOOD BY AUTOMATED COUNT: 13.1 % (ref 10–15)
GFR SERPL CREATININE-BSD FRML MDRD: 54 ML/MIN/1.7M2
GLUCOSE SERPL-MCNC: 102 MG/DL (ref 70–99)
HCO3 BLDV-SCNC: 30 MMOL/L (ref 21–28)
HCT VFR BLD AUTO: 41.1 % (ref 40–53)
HGB BLD-MCNC: 13.9 G/DL (ref 13.3–17.7)
IMM GRANULOCYTES # BLD: 0 10E9/L (ref 0–0.4)
IMM GRANULOCYTES NFR BLD: 0.3 %
LYMPHOCYTES # BLD AUTO: 2.7 10E9/L (ref 0.8–5.3)
LYMPHOCYTES NFR BLD AUTO: 27.7 %
MCH RBC QN AUTO: 30.6 PG (ref 26.5–33)
MCHC RBC AUTO-ENTMCNC: 33.8 G/DL (ref 31.5–36.5)
MCV RBC AUTO: 91 FL (ref 78–100)
MONOCYTES # BLD AUTO: 0.7 10E9/L (ref 0–1.3)
MONOCYTES NFR BLD AUTO: 6.8 %
NEUTROPHILS # BLD AUTO: 6.1 10E9/L (ref 1.6–8.3)
NEUTROPHILS NFR BLD AUTO: 64.1 %
NRBC # BLD AUTO: 0 10*3/UL
NRBC BLD AUTO-RTO: 0 /100
NT-PROBNP SERPL-MCNC: 575 PG/ML (ref 0–1800)
PCO2 BLDV: 46 MM HG (ref 40–50)
PH BLDV: 7.43 PH (ref 7.32–7.43)
PLATELET # BLD AUTO: 173 10E9/L (ref 150–450)
PO2 BLDV: 26 MM HG (ref 25–47)
POTASSIUM SERPL-SCNC: 4.1 MMOL/L (ref 3.4–5.3)
RBC # BLD AUTO: 4.54 10E12/L (ref 4.4–5.9)
SODIUM SERPL-SCNC: 139 MMOL/L (ref 133–144)
TROPONIN I SERPL-MCNC: <0.015 UG/L (ref 0–0.04)
WBC # BLD AUTO: 9.6 10E9/L (ref 4–11)

## 2018-07-24 PROCEDURE — 80048 BASIC METABOLIC PNL TOTAL CA: CPT | Performed by: EMERGENCY MEDICINE

## 2018-07-24 PROCEDURE — 25000125 ZZHC RX 250: Performed by: EMERGENCY MEDICINE

## 2018-07-24 PROCEDURE — 99285 EMERGENCY DEPT VISIT HI MDM: CPT | Mod: 25 | Performed by: EMERGENCY MEDICINE

## 2018-07-24 PROCEDURE — A9270 NON-COVERED ITEM OR SERVICE: HCPCS | Mod: GY | Performed by: EMERGENCY MEDICINE

## 2018-07-24 PROCEDURE — 84484 ASSAY OF TROPONIN QUANT: CPT | Performed by: EMERGENCY MEDICINE

## 2018-07-24 PROCEDURE — 25000132 ZZH RX MED GY IP 250 OP 250 PS 637: Mod: GY | Performed by: EMERGENCY MEDICINE

## 2018-07-24 PROCEDURE — 71046 X-RAY EXAM CHEST 2 VIEWS: CPT

## 2018-07-24 PROCEDURE — 85025 COMPLETE CBC W/AUTO DIFF WBC: CPT | Performed by: EMERGENCY MEDICINE

## 2018-07-24 PROCEDURE — 83880 ASSAY OF NATRIURETIC PEPTIDE: CPT | Performed by: EMERGENCY MEDICINE

## 2018-07-24 PROCEDURE — 82803 BLOOD GASES ANY COMBINATION: CPT | Performed by: EMERGENCY MEDICINE

## 2018-07-24 PROCEDURE — 93010 ELECTROCARDIOGRAM REPORT: CPT | Mod: Z6 | Performed by: EMERGENCY MEDICINE

## 2018-07-24 PROCEDURE — 94640 AIRWAY INHALATION TREATMENT: CPT

## 2018-07-24 PROCEDURE — 93005 ELECTROCARDIOGRAM TRACING: CPT | Performed by: EMERGENCY MEDICINE

## 2018-07-24 RX ORDER — IPRATROPIUM BROMIDE AND ALBUTEROL SULFATE 2.5; .5 MG/3ML; MG/3ML
3 SOLUTION RESPIRATORY (INHALATION) ONCE
Status: COMPLETED | OUTPATIENT
Start: 2018-07-24 | End: 2018-07-24

## 2018-07-24 RX ORDER — ACETAMINOPHEN 500 MG
1000 TABLET ORAL ONCE
Status: COMPLETED | OUTPATIENT
Start: 2018-07-24 | End: 2018-07-24

## 2018-07-24 RX ADMIN — IPRATROPIUM BROMIDE AND ALBUTEROL SULFATE 3 ML: .5; 3 SOLUTION RESPIRATORY (INHALATION) at 02:08

## 2018-07-24 RX ADMIN — ACETAMINOPHEN 1000 MG: 500 TABLET ORAL at 02:27

## 2018-07-24 ASSESSMENT — ENCOUNTER SYMPTOMS
ABDOMINAL PAIN: 0
SHORTNESS OF BREATH: 1
FEVER: 0

## 2018-07-24 NOTE — DISCHARGE INSTRUCTIONS
Your labs were normal.   Follow up with primary doctor as needed.        Shortness of Breath (Dyspnea)  Shortness of breath is the feeling that you can't catch your breath or get enough air. It is also known as dyspnea.  Dyspnea can be caused by many different conditions. They include:    Acute asthma attack    Worsening of chronic lung diseases such as chronic bronchitis and emphysema    Heart failure. This is when weak heart muscle allows extra fluid to collect in the lungs.    Panic attacks or anxiety. Fear can cause rapid breathing (hyperventilation).    Pneumonia, or an infection in the lung tissue    Exposure to toxic substances, fumes, smoke, or certain medicines    Blood clot in the lung (pulmonary embolism). This is often from a piece of blood clot in a deep vein of the leg (deep vein thrombosis) that breaks off and travels to the lungs.    Heart attack or heart-related chest pain (angina)    Anemia    Collapsed lung (pneumothorax)    Dehydration    Pregnancy  Based on your visit today, the exact cause of your shortness of breath is not certain. Your tests don t show any of the serious causes of dyspnea. You may need other tests to find out if you have a serious problem. It s important to watch for any new symptoms or symptoms that get worse. Follow up with your healthcare provider as directed.  Home care  Follow these tips to take care of yourself at home:    When your symptoms are better, go back to your usual activities.    If you smoke, you should stop. Join a quit-smoking program or ask your healthcare provider for help.    Eat a healthy diet and get plenty of sleep.    Get regular exercise. Talk with your healthcare provider before starting to exercise, especially if you have other medical problems.    Cut down on the amount of caffeine and stimulants you consume.  Follow-up care  Follow up with your healthcare provider, or as advised.  If tests were done, you will be told if your treatment needs to  be changed. You can call as directed for the results.  If an X-ray was taken, a specialist will review it. You will be notified of any new findings that may affect your care.  Call 911  Shortness of breath may be a sign of a serious medical problem. For example, it may be a problem with your heart or lungs. Call 911 if you have worsening shortness of breath or trouble breathing, especially with any of the symptoms below:    You are confused or it s difficult to wake you.    You faint or lose consciousness.    You have a fast heartbeat, or your heartbeat is irregular.    You are coughing up blood.    You have pain in your chest, arm, shoulder, neck, or upper back.    You break out in a sweat.  When to seek medical advice  Call your healthcare provider right away if any of these occur:    Slight shortness of breath or wheezing    Redness, pain or swelling in your leg, arm, or other body area    Swelling in both legs or ankles    Fast weight gain    Dizziness or weakness    Fever of 100.4 F (38 C) or higher, or as directed by your healthcare provider  Date Last Reviewed: 9/13/2015 2000-2017 [employer]. [address] [place] [address], [place] [address]. All rights reserved. This information is not intended as a substitute for professional medical care. Always follow your healthcare professional's instructions.

## 2018-07-24 NOTE — ED AVS SNAPSHOT
[facility]    [address] [facility] [address]    Phone:  [phone]    Fax:  [phone]                                       Alvaro Horton   MRN: [id number]    Department:  [facility]   Date of Visit:  7/24/2018           After Visit Summary Signature Page     I have received my discharge instructions, and my questions have been answered. I have discussed any challenges I see with this plan with the nurse or doctor.    ..........................................................................................................................................  Patient/Patient Representative Signature      ..........................................................................................................................................  Patient Representative Print Name and Relationship to Patient    ..................................................               ................................................  Date                                            Time    ..........................................................................................................................................  Reviewed by Signature/Title    ...................................................              ..............................................  Date                                                            Time

## 2018-07-24 NOTE — ED AVS SNAPSHOT
[facility] Emergency Department    [address] [facility] [address]    Phone:  [phone]    Fax:  [phone]                                       Alvaro Horton   MRN: [id number]    Department:  [facility] Emergency Department   Date of Visit:  7/24/2018           Patient Information     Date Of Birth          1940        Your diagnoses for this visit were:     SOB (shortness of breath)     Hypertension goal BP (blood pressure) < 140/90     CKD (chronic kidney disease) stage 3, GFR 30-59 ml/min     Obstructive sleep apnea        You were seen by Burt Stevenson MD.        Discharge Instructions       Your labs were normal.   Follow up with primary doctor as needed.        Shortness of Breath (Dyspnea)  Shortness of breath is the feeling that you can't catch your breath or get enough air. It is also known as dyspnea.  Dyspnea can be caused by many different conditions. They include:    Acute asthma attack    Worsening of chronic lung diseases such as chronic bronchitis and emphysema    Heart failure. This is when weak heart muscle allows extra fluid to collect in the lungs.    Panic attacks or anxiety. Fear can cause rapid breathing (hyperventilation).    Pneumonia, or an infection in the lung tissue    Exposure to toxic substances, fumes, smoke, or certain medicines    Blood clot in the lung (pulmonary embolism). This is often from a piece of blood clot in a deep vein of the leg (deep vein thrombosis) that breaks off and travels to the lungs.    Heart attack or heart-related chest pain (angina)    Anemia    Collapsed lung (pneumothorax)    Dehydration    Pregnancy  Based on your visit today, the exact cause of your shortness of breath is not certain. Your tests don t show any of the serious causes of dyspnea. You may need other tests to find out if you have a serious problem. It s important to watch for any new symptoms or symptoms that get worse. Follow up with your  healthcare provider as directed.  Home care  Follow these tips to take care of yourself at home:    When your symptoms are better, go back to your usual activities.    If you smoke, you should stop. Join a quit-smoking program or ask your healthcare provider for help.    Eat a healthy diet and get plenty of sleep.    Get regular exercise. Talk with your healthcare provider before starting to exercise, especially if you have other medical problems.    Cut down on the amount of caffeine and stimulants you consume.  Follow-up care  Follow up with your healthcare provider, or as advised.  If tests were done, you will be told if your treatment needs to be changed. You can call as directed for the results.  If an X-ray was taken, a specialist will review it. You will be notified of any new findings that may affect your care.  Call 911  Shortness of breath may be a sign of a serious medical problem. For example, it may be a problem with your heart or lungs. Call 911 if you have worsening shortness of breath or trouble breathing, especially with any of the symptoms below:    You are confused or it s difficult to wake you.    You faint or lose consciousness.    You have a fast heartbeat, or your heartbeat is irregular.    You are coughing up blood.    You have pain in your chest, arm, shoulder, neck, or upper back.    You break out in a sweat.  When to seek medical advice  Call your healthcare provider right away if any of these occur:    Slight shortness of breath or wheezing    Redness, pain or swelling in your leg, arm, or other body area    Swelling in both legs or ankles    Fast weight gain    Dizziness or weakness    Fever of 100.4 F (38 C) or higher, or as directed by your healthcare provider  Date Last Reviewed: 9/13/2015 2000-2017 [employer]. [address] [place] [address], [place] [address]. All rights reserved. This information is not intended as a substitute for professional medical care. Always follow  your healthcare professional's instructions.          24 Hour Appointment Hotline       To make an appointment at any [facility], call [phone] ([phone]). If you don't have a family doctor or clinic, we will help you find one. [facility] clinics are conveniently located to serve the needs of you and your family.             Review of your medicines      Our records show that you are taking the medicines listed below. If these are incorrect, please call your family doctor or clinic.        Dose / Directions Last dose taken    albuterol 108 (90 Base) MCG/ACT Inhaler   Commonly known as:  PROAIR HFA/PROVENTIL HFA/VENTOLIN HFA   Dose:  2 puff   Quantity:  1 Inhaler        Inhale 2 puffs into the lungs every 6 hours as needed for shortness of breath / dyspnea or wheezing   Refills:  1        arformoterol 15 MCG/2ML Nebu neb solution   Commonly known as:  BROVANA   Dose:  15 mcg   Quantity:  120 mL        Take 2 mLs (15 mcg) by nebulization 2 times daily   Refills:  3        aspirin 81 MG tablet        Take by mouth daily   Refills:  0        atorvastatin 80 MG tablet   Commonly known as:  LIPITOR   Dose:  80 mg   Quantity:  30 tablet        Take 1 tablet (80 mg) by mouth daily   Refills:  11        * blood glucose monitoring meter device kit   Quantity:  1 kit        Use to test blood sugars 4 times daily or as directed.   Refills:  0        * blood glucose monitoring meter device kit   Commonly known as:  no brand specified   Quantity:  1 kit        Use to test blood sugar 3 times daily or as directed.   Refills:  0        budesonide 0.5 MG/2ML neb solution   Commonly known as:  PULMICORT   Dose:  0.5 mg   Quantity:  60 ampule        Take 2 mLs (0.5 mg) by nebulization 2 times daily   Refills:  11        cholecalciferol 2000 units tablet   Dose:  1 tablet   Quantity:  90 tablet        Take 1 tablet by mouth daily.   Refills:  3        cyanocobalamin 500 MCG Tabs   Commonly known as:  BL VITAMIN B-12  "  Dose:  500 mcg   Quantity:  90 tablet        Take 500 mcg by mouth daily.   Refills:  4        finasteride 5 MG tablet   Commonly known as:  PROSCAR   Dose:  5 mg   Quantity:  90 tablet        Take 1 tablet (5 mg) by mouth daily MUST KEEP UPCOMING APPT FOR FURTHER REFILLS   Refills:  1        fish oil-omega-3 fatty acids 1000 MG capsule   Dose:  1 g   Quantity:  180 capsule        Take 1 capsule by mouth 2 times daily.   Refills:  3        furosemide 20 MG tablet   Commonly known as:  LASIX   Quantity:  30 tablet        TAKE ONE TABLET BY MOUTH ONCE DAILY IN THE MORNING   Refills:  11        insulin aspart 100 UNIT/ML injection   Commonly known as:  NovoLOG FLEXPEN   Quantity:  30 mL        11 units before breakfast, 11 units before lunch, 11 units before dinner   Refills:  11        insulin detemir 100 UNIT/ML injection   Commonly known as:  LEVEMIR FLEXPEN/FLEXTOUCH   Dose:  30 Units   Quantity:  15 mL        Inject 30 Units Subcutaneous At Bedtime   Refills:  11        insulin syringe-needle U-100 31G X 5/16\" 1 ML   Commonly known as:  BD insulin syringe ULTRAFINE   Quantity:  100 each        Use one syringe 4 times daily or as directed.   Refills:  prn        lisinopril 5 MG tablet   Commonly known as:  PRINIVIL/ZESTRIL   Quantity:  90 tablet        TAKE ONE TABLET BY MOUTH ONCE DAILY   Refills:  3        metFORMIN 1000 MG tablet   Commonly known as:  GLUCOPHAGE   Quantity:  60 tablet        TAKE ONE TABLET BY MOUTH TWICE DAILY WITH MEALS   Refills:  11        metoprolol tartrate 25 MG tablet   Commonly known as:  LOPRESSOR   Dose:  25 mg   Quantity:  60 tablet        Take 1 tablet (25 mg) by mouth 2 times daily   Refills:  11        nitroGLYcerin 0.4 MG sublingual tablet   Commonly known as:  NITROSTAT   Dose:  0.4 mg   Quantity:  25 tablet        Place 1 tablet (0.4 mg) under the tongue every 5 minutes as needed for chest pain (CALL 911 IF NOT IMPROVED AFTER THREE CONSECUTIVE DOSES)   Refills:  0        " omeprazole 20 MG CR capsule   Commonly known as:  priLOSEC   Quantity:  30 capsule        TAKE ONE CAPSULE BY MOUTH ONCE DAILY   Refills:  11        ONETOUCH ULTRA test strip   Quantity:  300 strip   Generic drug:  blood glucose monitoring        USE  TO CHECK GLUCOSE THREE TIMES DAILY   Refills:  1        order for DME        Equipment being ordered: CPAP AIRSENSE 10 11-15 CM H2O QUATTRO AIR SIZE MED SN# [id number]  DN# [id number]   Refills:  0        pen needles 31G X 6 MM Misc   Dose:  1 Units   Quantity:  90 each        1 Units 3 times daily For novolog flexpen   Refills:  0        tamsulosin 0.4 MG capsule   Commonly known as:  FLOMAX   Dose:  0.4 mg   Quantity:  90 capsule        Take 1 capsule (0.4 mg) by mouth daily (Needs follow-up appointment for this medication)   Refills:  3        umeclidinium 62.5 MCG/INH oral inhaler   Commonly known as:  INCRUSE ELLIPTA   Dose:  1 puff   Quantity:  1 Inhaler        Inhale 1 puff into the lungs daily   Refills:  11        * Notice:  This list has 2 medication(s) that are the same as other medications prescribed for you. Read the directions carefully, and ask your doctor or other care provider to review them with you.            Procedures and tests performed during your visit     Basic metabolic panel    Blood gas venous    CBC with platelets differential    Chest XR,  PA & LAT    EKG 12-lead, tracing only    Nt probnp inpatient (BNP)    Peripheral IV catheter    Troponin I      Orders Needing Specimen Collection     None      Pending Results     No orders found from 7/22/2018 to 7/25/2018.            Pending Culture Results     No orders found from 7/22/2018 to 7/25/2018.            Pending Results Instructions     If you had any lab results that were not finalized at the time of your Discharge, you can call the ED Lab Result RN at [phone]. You will be contacted by this team for any positive Lab results or changes in treatment. The nurses are available 7 days a week  from 10A to 6:30P.  You can leave a message 24 hours per day and they will return your call.        Test Results From Your Hospital Stay        7/24/2018  2:10 AM      Component Results     Component Value Ref Range & Units Status    WBC 9.6 4.0 - 11.0 10e9/L Final    RBC Count 4.54 4.4 - 5.9 10e12/L Final    Hemoglobin 13.9 13.3 - 17.7 g/dL Final    Hematocrit 41.1 40.0 - 53.0 % Final    MCV 91 78 - 100 fl Final    MCH 30.6 26.5 - 33.0 pg Final    MCHC 33.8 31.5 - 36.5 g/dL Final    RDW 13.1 10.0 - 15.0 % Final    Platelet Count 173 150 - 450 10e9/L Final    Diff Method Automated Method  Final    % Neutrophils 64.1 % Final    % Lymphocytes 27.7 % Final    % Monocytes 6.8 % Final    % Eosinophils 0.6 % Final    % Basophils 0.5 % Final    % Immature Granulocytes 0.3 % Final    Nucleated RBCs 0 0 /100 Final    Absolute Neutrophil 6.1 1.6 - 8.3 10e9/L Final    Absolute Lymphocytes 2.7 0.8 - 5.3 10e9/L Final    Absolute Monocytes 0.7 0.0 - 1.3 10e9/L Final    Absolute Eosinophils 0.1 0.0 - 0.7 10e9/L Final    Absolute Basophils 0.1 0.0 - 0.2 10e9/L Final    Abs Immature Granulocytes 0.0 0 - 0.4 10e9/L Final    Absolute Nucleated RBC 0.0  Final         7/24/2018  2:26 AM      Component Results     Component Value Ref Range & Units Status    Sodium 139 133 - 144 mmol/L Final    Potassium 4.1 3.4 - 5.3 mmol/L Final    Chloride 105 94 - 109 mmol/L Final    Carbon Dioxide 28 20 - 32 mmol/L Final    Anion Gap 6 3 - 14 mmol/L Final    Glucose 102 (H) 70 - 99 mg/dL Final    Urea Nitrogen 24 7 - 30 mg/dL Final    Creatinine 1.28 (H) 0.66 - 1.25 mg/dL Final    GFR Estimate 54 (L) >60 mL/min/1.7m2 Final    Non  GFR Calc    GFR Estimate If Black 66 >60 mL/min/1.7m2 Final    African American GFR Calc    Calcium 8.8 8.5 - 10.1 mg/dL Final         7/24/2018  2:26 AM      Component Results     Component Value Ref Range & Units Status    Troponin I ES <0.015 0.000 - 0.045 ug/L Final    The 99th percentile for upper  reference range is 0.045 ug/L.  Troponin values   in the range of 0.045 - 0.120 ug/L may be associated with risks of adverse   clinical events.           7/24/2018  2:26 AM      Component Results     Component Value Ref Range & Units Status    N-Terminal Pro BNP Inpatient 575 0 - 1800 pg/mL Final       Reference range shown and results flagged as abnormal are suggested inpatient   cut points for confirming diagnosis if CHF in an acute setting. Establishing a   baseline value for each individual patient is useful for follow-up. An   inpatient or emergency department NT-proPBNP <300 pg/mL effectively rules out   acute CHF, with 99% negative predictive value.  The outpatient non-acute reference range for ruling out CHF is:   0-125 pg/mL (age 18 to less than 75)   0-450 pg/mL (age 75 yrs and older)           7/24/2018  2:21 AM      Component Results     Component Value Ref Range & Units Status    Ph Venous 7.43 7.32 - 7.43 pH Final    PCO2 Venous 46 40 - 50 mm Hg Final    PO2 Venous 26 25 - 47 mm Hg Final    Bicarbonate Venous 30 (H) 21 - 28 mmol/L Final    Base Excess Venous 4.7 mmol/L Final    Abnormal Result, Ref range: -7.7 to 1.9         7/24/2018  2:46 AM      Narrative     CHEST 2 VIEWS  7/24/2018 2:38 AM     HISTORY: Shortness of breath.    COMPARISON: 7/18/2018.    FINDINGS: Hypoinflated lungs. A few linear opacities in bilateral lung  bases likely represent scarring or atelectasis. The lungs are  otherwise clear. Normal-sized cardiac silhouette. Prior median  sternotomy.        Impression     IMPRESSION: No evidence of active cardiopulmonary disease.    CLARISA CASTRO MD                Thank you for choosing [facility]       Thank you for choosing [facility] for your care. Our goal is always to provide you with excellent care. Hearing back from our patients is one way we can continue to improve our services. Please take a few minutes to complete the written survey that you may receive in the mail after you visit  "with us. Thank you!        [employer] Information     [employer] lets you send messages to your doctor, view your test results, renew your prescriptions, schedule appointments and more. To sign up, go to [email or web address] . Click on \"Log in\" on the left side of the screen, which will take you to the Welcome page. Then click on \"Sign up Now\" on the right side of the page.     You will be asked to enter the access code listed below, as well as some personal information. Please follow the directions to create your username and password.     Your access code is: [email or web address]  Expires: 10/16/2018  8:36 PM     Your access code will  in 90 days. If you need help or a new code, please call your [facility] clinic or [phone].        Equal Access to Services     [facility]AN [facility]ESSICA : Nel Lin, coty enamorado, ruth reyes, tory terry . So [facility] [phone].    ATENCIÓN: Si habla español, tiene a rahman disposición servicios gratuitos de asistencia lingüística. Llame al [phone].    We comply with applicable federal civil rights laws and [place] laws. We do not discriminate on the basis of race, color, national origin, age, disability, sex, sexual orientation, or gender identity.            After Visit Summary       This is your record. Keep this with you and show to your community pharmacist(s) and doctor(s) at your next visit.                  "

## 2018-07-24 NOTE — ED PROVIDER NOTES
"  History     Chief Complaint   Patient presents with     Shortness of Breath     Seen 4 days ago- worse this afternoon     HPI  Alvaro Horton is a 78 year old male who has past medical history significant for multiple medical issues including history of COPD, sleep apnea with CPAP use, coronary artery disease status post CABG, presenting to the emergency department with complaints of ongoing shortness of breath.  Patient states he awoke this evening feeling symptoms of shortness of breath.  Denied any chest pain.  No cough, or fever.  No abdominal pain.  Patient was seen in the emergency department 4 days ago with similar types of symptoms.  He had extensive laboratory workup that showed no acute findings, and eventually was discharged home.  Patient presents with similar types of symptoms.  He states he  felt like he was unable to get full deep breaths, and he did not want to die at home this evening.  He denies any other changes in his health.  No lower extremity swelling.      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 RAST for bakers yeast negative 2002. Has occupational exposures with related worsening asthmatic symptoms. CT 2/08- no fibrosis.       Moderate persistent asthma      Priority: High     PFTS 1997 - FEV1- 3.39 (64%), ratio 0.74, 24% change jeg01-52% with bronchodilators,  TLC 86%, %, DLCO 78%. Methacholine challenge positive at level 7 2001.  PFTS 2004 - FEV1- 1.93 (63%), ratio 0.77. PFTS 4/08 - FEV1- 2.13 (72%), ratio 0.71, no change with bronchodilators,  TLC 78%, RV 96%, DLCO 64%          Chronic ischemic heart disease      Priority: High     atypical chest pain 2001 with GXT echo- decreased LVF, angiogram 2001- nonobstructing 3 vessel disease.   Repeat angio 2004- prox RCA 50-60%,  OM2- 40%, " "D1 30-40%, LAD 30-40%.   Cardiolite 2005 during hospitalization for SOB in [place] reported to reveal no ischemia.   Adenosine cardiolite 1/08- small slightly reversible inferior defect, most likely consistent with diaphragm attenuation with bowel uptake artifact. USA, Angio 11/09- Severe LM disease, severe subtotal occlusion of a large branch OM1.    S/p CABG x2 11/09- LIMA-D1, SVG to OM1, OM2. GXT 8/10- 5.8 mets, 121 (80%) HR, normal ekg, cardiolite- small fixed inferior/basal defect with small area khurram-infarct ischemia extending into the mid ventricle. EF 67%.  4/25/13:adenosine cardiolyte stress test through [facility] with normal EF and no ischemia.   1/16/2016 nuclear stress test:1.  Myocardial perfusion imaging using single isotope technique demonstrated no evidence for myocardial ischemia. 2. Gated images demonstrated normal wall motion with a calculated ejection fraction of 61%.  3. Compared to the prior study from 2011 there are no significant changes .           Type 2 diabetes mellitus with diabetic chronic kidney disease (H) 10/30/2015     Priority: Medium     Altered mental status 12/31/2014     Priority: Medium     12/27/2014:episode confusion for 20 minutes.  Seen at [facility]/-hospitalized.  Head CT, MRI/MRA all normal.  PET cardiac perfusion stress test normal.  Neurology consult-diagnosis= possible hypoglycemia, migraine variant.  \"Acute ischemic cerebral event was excluded\".       Health Care Home 08/11/2014     Priority: Medium     State Tier Level:    Status:  Unable to re-connect  Care Coordinator:  Kecia Mills    See Letters for [facility] Care Plan  Date:  August 11, 2014             CKD (chronic kidney disease) stage 3, GFR 30-59 ml/min 05/06/2014     Priority: Medium     9/9/2015:He has had two abnormal MAC in the past.        COPD (chronic obstructive pulmonary disease) (H) 11/18/2013     Priority: Medium     PFT 11/15/13 results:FVC=59%, FEV1=54%, FEV1/FVC=92%.  His DLCO was 58%1. Spirometry: " FEV1 moderately reduced. FVC moderately reduced. FEV1/FVC ratio reduced. 2. Bronchodilator Response: A 14% improvement is seen in FEV1 with bronchodilators. 3. Lung Volumes: Total lung capacity normal. Residual volume increased. Residual volume total lung capacity ratio increased. 4. DLCO: Moderately reduced.   INTERPRETATION: Moderate obstructive lung disease. Reversibility with bronchodilators is seen on testing today. Lung volumes show evidence for air trapping. DLCO is reduced, consistent with loss of capillary-alveolar exchange as in emphysema, pulmonary hypertension, chronic pulmonary embolus, pulmonary fibrosis or other pulmonary vascular disease. A concomitant restrictive lung disease process may be suspected on the basis of moderate reductions in FEV1 and FVC, with a fairly well-preserved FEV1/FVC ratio and a borderline low normal total lung capacity.   11/15/13:exercise oximetry did not show significant desaturation below 91%.  PFT February, 2014 results:FVC=54%, FEV1=48%, FEV1/FVC=64%.  DLCO=59%         Hyperlipidemia LDL goal <70 04/17/2013     Priority: Medium     Atrial fibrillation (H) 12/14/2009     Priority: Medium     After CABG 11/09       CVA (cerebral vascular accident) (H) 11/27/2009     Priority: Medium     Mild Broca's aphasia, confusion, right hand dysesthesia after angio. MRA new small area of restricted diffusion in the white matter of the medial right temporal lobe,  consistent with a recent small infarct. Speech problems resolved, still mild right hand problems.         Obstructive sleep apnea 04/14/2008     Priority: Medium     ESS 20/24. Sleep study [facility]: 4/8/08. total sleep time was 423.0 minutes. The sleep latency was normal at 9.7 minutes.  REM sleep latency was 161.5 minutes. Sleep efficiency was normal at 82.7%. The sleep architecture was disrupted with frequent sleep stage changes and arousals. Snoring: Was reported as moderately loud. Lowest O2 saturation was 87.0%. This  study is suggestive of mild sleep apnea, severe during REM sleep (21% TST).  PLM index was 0.0. EKG:  No significant cardiac arrhythmias were noted.         Hypertrophy of prostate without urinary obstruction      Priority: Medium     Elevated PSA. Bx negative 2004.       Advanced care planning/counseling discussion 09/24/2012     Priority: Low     Does not have a directive 9/12       Dyslexia 11/11/2010     Priority: Low     essentially unable to read       CTS (carpal tunnel syndrome) 05/12/2010     Priority: Low     EMG 5/10-  median neuropathy at the right wrist, moderate in severity electrically, right ulnar neuropathy localizing to the elbow, mild chronic right C6 radiculopathy without evidence for acute denervation.        Memory loss 05/03/2010     Priority: Low     MMSE 27/30 (0/3 attention recall, ?history of dylexia), PHQ9-16 7/09. Recommended neurocogntive testing, did not complete.  MMSE 23/30 6/10 (attention, county, if/and/buts).Neurocogntive evaluation  1/10- not suspicious for emerging dementia. Felt likely due hearing loss/dyslexia.        Benign paroxysmal vertigo 02/03/2009     Priority: Low     Admit 2/09. MRI/MRA head and neck negative.       Sensorineural hearing loss, bilateral 10/08/2007     Priority: Low     Esophageal reflux 11/06/2006     Priority: Low     PSORIASIS      Priority: Low     OVERACTIVE BLADDER      Priority: Low     PVR 84 2004.          Past Medical History:    Past Medical History:   Diagnosis Date     Calculus of kidney      Depression      Left hand weakness 12/14/2009     PNEUMONIA, ORGANISM NOS 2/14/2008       Past Surgical History:    Past Surgical History:   Procedure Laterality Date     C ANESTH,DX ARTHROSCOPIC PROC KNEE JOINT  1994, 1998     Left     CARDIAC SURGERY  11/09    CABG x2 - LIMA-D1, SVG to OM1, OM2.     GENITOURINARY SURGERY  1990    ESWL and percutaneous nephrolithotomy     ORTHOPEDIC SURGERY  8/12    right CTR     SURGICAL HISTORY OF -   1998     "Hernia repair     SURGICAL HISTORY OF -   2004    NormalColonoscopy      SURGICAL HISTORY OF -   2006    Normal Colonoscopy       Family History:    Family History   Problem Relation Age of Onset     C.A.VIRI. Father      40s     Hypertension Father      C.A.D. Paternal Grandfather      HEART DISEASE Paternal Grandfather      Diabetes Brother      C.A.VIRI. Brother      HEART DISEASE Brother      Hypertension Brother      GASTROINTESTINAL DISEASE Son      Crohn's disease     GASTROINTESTINAL DISEASE Other      2 grandaughters with Crohn's disease     Cancer - colorectal No family hx of        Social History:  Marital Status:   [5]  Social History   Substance Use Topics     Smoking status: Never Smoker     Smokeless tobacco: Never Used     Alcohol use No        Medications:      albuterol (PROAIR HFA/PROVENTIL HFA/VENTOLIN HFA) 108 (90 Base) MCG/ACT Inhaler   arformoterol (BROVANA) 15 MCG/2ML NEBU neb solution   aspirin 81 MG tablet   atorvastatin (LIPITOR) 80 MG tablet   blood glucose monitoring (NO BRAND SPECIFIED) meter device kit   blood glucose monitoring (ONE TOUCH ULTRA 2) meter device kit   budesonide (PULMICORT) 0.5 MG/2ML neb solution   cholecalciferol 2000 UNITS tablet   cyanocobalamin (BL VITAMIN B-12) 500 MCG TABS   finasteride (PROSCAR) 5 MG tablet   fish oil-omega-3 fatty acids (FISH OIL) 1000 MG capsule   furosemide (LASIX) 20 MG tablet   insulin aspart (NOVOLOG FLEXPEN) 100 UNIT/ML injection   insulin detemir (LEVEMIR FLEXPEN/FLEXTOUCH) 100 UNIT/ML injection   Insulin Pen Needle (PEN NEEDLES) 31G X 6 MM MISC   insulin syringe-needle U-100 (BD INSULIN SYRINGE ULTRAFINE) 31G X 5/16\" 1 ML   lisinopril (PRINIVIL/ZESTRIL) 5 MG tablet   metFORMIN (GLUCOPHAGE) 1000 MG tablet   metoprolol tartrate (LOPRESSOR) 25 MG tablet   nitroGLYcerin (NITROSTAT) 0.4 MG sublingual tablet   omeprazole (PRILOSEC) 20 MG CR capsule   ONETOUCH ULTRA test strip   order for DME   tamsulosin (FLOMAX) 0.4 MG capsule "   umeclidinium (INCRUSE ELLIPTA) 62.5 MCG/INH oral inhaler   [DISCONTINUED] LANTUS SOLOSTAR SOLN 100 UNIT/ML   [DISCONTINUED] metoprolol (TOPROL-XL) 25 MG 24 hr tablet         Review of Systems   Constitutional: Negative for fever.   Respiratory: Positive for shortness of breath.    Cardiovascular: Negative for chest pain.   Gastrointestinal: Negative for abdominal pain.   All other systems reviewed and are negative.      Physical Exam   BP: (!) 171/133  Heart Rate: 77  Temp: 97.6  F (36.4  C)  Resp: 22  Weight: 81.6 kg (180 lb)  SpO2: 97 %      Physical Exam  /81  Temp 97.6  F (36.4  C) (Oral)  Resp 22  Wt 81.6 kg (180 lb)  SpO2 97%  BMI 27.37 kg/m2  General: alert, interactive, in no apparent distress  Head: atraumatic  Nose: no rhinorrhea or epistaxis  Ears: no external auditory canal discharge or bleeding.    Eyes: Sclera nonicteric. Conjunctiva noninjected. PERRL, EOMI  Mouth: no tonsillar erythema, edema, or exudate  Neck: supple, no palp LAD  Lungs: CTAB  CV: RRR, S1/S2; peripheral pulses palpable and symmetric  Abdomen: soft, nt, nd, no guarding or rebound. Positive bowel sounds  Extremities: no cyanosis or edema  Skin: no rash or diaphoresis  Neuro: CN II-XII grossly intact, strength 5/5 in UE and LEs bilaterally, sensation intact to light touch in UE and LEs bilaterally;       ED Course     ED Course     Procedures               EKG Interpretation:      Interpreted by Burt Stevenson  Time reviewed: 0150  Symptoms at time of EKG: sob   Rhythm: normal sinus   Rate: Normal  Axis: Left Axis Deviation  Ectopy: none  Conduction: right bundle branch block (complete)  ST Segments/ T Waves: No acute ischemic changes  Comparison to prior: Unchanged from 7/24/17    Clinical Impression: no acute changes                Critical Care time:  none               Results for orders placed or performed during the hospital encounter of 07/24/18 (from the past 24 hour(s))   CBC with platelets differential    Result Value Ref Range    WBC 9.6 4.0 - 11.0 10e9/L    RBC Count 4.54 4.4 - 5.9 10e12/L    Hemoglobin 13.9 13.3 - 17.7 g/dL    Hematocrit 41.1 40.0 - 53.0 %    MCV 91 78 - 100 fl    MCH 30.6 26.5 - 33.0 pg    MCHC 33.8 31.5 - 36.5 g/dL    RDW 13.1 10.0 - 15.0 %    Platelet Count 173 150 - 450 10e9/L    Diff Method Automated Method     % Neutrophils 64.1 %    % Lymphocytes 27.7 %    % Monocytes 6.8 %    % Eosinophils 0.6 %    % Basophils 0.5 %    % Immature Granulocytes 0.3 %    Nucleated RBCs 0 0 /100    Absolute Neutrophil 6.1 1.6 - 8.3 10e9/L    Absolute Lymphocytes 2.7 0.8 - 5.3 10e9/L    Absolute Monocytes 0.7 0.0 - 1.3 10e9/L    Absolute Eosinophils 0.1 0.0 - 0.7 10e9/L    Absolute Basophils 0.1 0.0 - 0.2 10e9/L    Abs Immature Granulocytes 0.0 0 - 0.4 10e9/L    Absolute Nucleated RBC 0.0    Basic metabolic panel   Result Value Ref Range    Sodium 139 133 - 144 mmol/L    Potassium 4.1 3.4 - 5.3 mmol/L    Chloride 105 94 - 109 mmol/L    Carbon Dioxide 28 20 - 32 mmol/L    Anion Gap 6 3 - 14 mmol/L    Glucose 102 (H) 70 - 99 mg/dL    Urea Nitrogen 24 7 - 30 mg/dL    Creatinine 1.28 (H) 0.66 - 1.25 mg/dL    GFR Estimate 54 (L) >60 mL/min/1.7m2    GFR Estimate If Black 66 >60 mL/min/1.7m2    Calcium 8.8 8.5 - 10.1 mg/dL   Troponin I   Result Value Ref Range    Troponin I ES <0.015 0.000 - 0.045 ug/L   Nt probnp inpatient (BNP)   Result Value Ref Range    N-Terminal Pro BNP Inpatient 575 0 - 1800 pg/mL   Blood gas venous   Result Value Ref Range    Ph Venous 7.43 7.32 - 7.43 pH    PCO2 Venous 46 40 - 50 mm Hg    PO2 Venous 26 25 - 47 mm Hg    Bicarbonate Venous 30 (H) 21 - 28 mmol/L    Base Excess Venous 4.7 mmol/L   Chest XR,  PA & LAT    Narrative    CHEST 2 VIEWS  7/24/2018 2:38 AM     HISTORY: Shortness of breath.    COMPARISON: 7/18/2018.    FINDINGS: Hypoinflated lungs. A few linear opacities in bilateral lung  bases likely represent scarring or atelectasis. The lungs are  otherwise clear. Normal-sized  cardiac silhouette. Prior median  sternotomy.      Impression    IMPRESSION: No evidence of active cardiopulmonary disease.    CLARISA CASTRO MD       Medications   ipratropium - albuterol 0.5 mg/2.5 mg/3 mL (DUONEB) neb solution 3 mL (3 mLs Nebulization Given 7/24/18 0208)   acetaminophen (TYLENOL) tablet 1,000 mg (1,000 mg Oral Given 7/24/18 0227)       Assessments & Plan (with Medical Decision Making)  78 year old male, with past medical history significant for multiple medical issues including insulin-dependent diabetes, coronary artery disease status post CABG, hypertension, hyperlipidemia, COPD, sleep apnea with home CPAP use, presenting to the emergency department with complaints of shortness of breath.  I reviewed patient's medical record, and patient had recent ED visit 4 days ago for similar types of symptoms.  He was noted to be short of breath at that time, and had negative laboratory workup.  Patient arrives afebrile, slightly hypertensive, and otherwise normal vitals upon arrival.  He has normal oxygen saturation.  He has no adventitious lung sounds on examination and remainder of physical examination is normal.  EKG showing no acute ischemic appearing changes.  Laboratory workup with CBC normal, BMP with slight elevation of creatinine at 1.28 in patient with known chronic kidney disease.  Troponin is negative, and BNP and VBG are normal.  Chest x-ray without acute findings, reviewed by myself, in addition to radiology.    At this point, patient does seem to have significant amounts of anxiety, which I feel is contributing to his symptoms.  Based on review of his chart, and visit from 4 days ago, I feel his symptoms are similar, with no acute findings that warrant further workup or hospitalization at this point.  Patient was given duo nebulizer to see if this helped with his comfort.  Does not appear to have fluid overload.  I feel PE is very unlikely.  At this point, exact cause of his symptoms is  unknown, and patient encouraged to follow-up with primary care provider.      I have reviewed the nursing notes.    I have reviewed the findings, diagnosis, plan and need for follow up with the patient.       Discharge Medication List as of 7/24/2018  3:01 AM          Final diagnoses:   SOB (shortness of breath)   Hypertension goal BP (blood pressure) < 140/90   CKD (chronic kidney disease) stage 3, GFR 30-59 ml/min   Obstructive sleep apnea       7/24/2018   [facility] EMERGENCY DEPARTMENT     Burt Stevenson MD  07/24/18 0347

## 2018-07-25 ENCOUNTER — OFFICE VISIT (OUTPATIENT)
Dept: FAMILY MEDICINE | Facility: CLINIC | Age: 78
End: 2018-07-25
Payer: COMMERCIAL

## 2018-07-25 VITALS
TEMPERATURE: 98.4 F | HEART RATE: 74 BPM | WEIGHT: 191 LBS | BODY MASS INDEX: 28.95 KG/M2 | DIASTOLIC BLOOD PRESSURE: 70 MMHG | OXYGEN SATURATION: 95 % | HEIGHT: 68 IN | SYSTOLIC BLOOD PRESSURE: 120 MMHG | RESPIRATION RATE: 24 BRPM

## 2018-07-25 DIAGNOSIS — R06.00 DYSPNEA, UNSPECIFIED TYPE: Primary | ICD-10-CM

## 2018-07-25 DIAGNOSIS — K21.9 GASTROESOPHAGEAL REFLUX DISEASE WITHOUT ESOPHAGITIS: ICD-10-CM

## 2018-07-25 DIAGNOSIS — J44.9 CHRONIC OBSTRUCTIVE PULMONARY DISEASE, UNSPECIFIED COPD TYPE (H): ICD-10-CM

## 2018-07-25 DIAGNOSIS — I25.9 CHRONIC ISCHEMIC HEART DISEASE: ICD-10-CM

## 2018-07-25 PROCEDURE — 99213 OFFICE O/P EST LOW 20 MIN: CPT | Performed by: FAMILY MEDICINE

## 2018-07-25 ASSESSMENT — PAIN SCALES - GENERAL: PAINLEVEL: SEVERE PAIN (7)

## 2018-07-25 NOTE — PATIENT INSTRUCTIONS
ASSESSMENT:     ICD-10-CM    1. Dyspnea, unspecified type R06.00    2. Chronic obstructive pulmonary disease, unspecified COPD type (H) J44.9    3. Chronic ischemic heart disease I25.9    4. Gastroesophageal reflux disease without esophagitis K21.9 ranitidine (ZANTAC) 300 MG tablet      You have had a couple chest xrays and many lab tests.  No sign of lung infection.  No sign of congestive heart failure.  The shortness of breath could be from the chronic COPD.  Stomach acid might be contributing as he has been off omeprazole.   Also off furosemide but BNP OK and no edema today.    He has had problems with prednisone in the past causing cognitive changes.   Doubt anxiety as cause for the breathing symptoms but may be a result of the breathing symptoms.    PLAN:   Continue both the pulmicort and arformoterol  Nebs twice daily as you have been.   Start back on the umeclidinium (Incruse ellipta) inhaler once daily-this is anti-inflammatory medication and should help breathing.    Also Start ranitidine 300mg at bedtime for heartburn which could be contributing to breathing.    Let me know if not improving in the next couple days.

## 2018-07-25 NOTE — MR AVS SNAPSHOT
After Visit Summary   7/25/2018    Alvaro Horton    MRN: [id number]           Patient Information     Date Of Birth          1940        Visit Information        Provider Department      7/25/2018 9:20 AM Be Carreno MD [facility]        Today's Diagnoses     Dyspnea, unspecified type    -  1    Chronic obstructive pulmonary disease, unspecified COPD type (H)        Chronic ischemic heart disease        Gastroesophageal reflux disease without esophagitis          Care Instructions    ASSESSMENT:     ICD-10-CM    1. Dyspnea, unspecified type R06.00    2. Chronic obstructive pulmonary disease, unspecified COPD type (H) J44.9    3. Chronic ischemic heart disease I25.9    4. Gastroesophageal reflux disease without esophagitis K21.9 ranitidine (ZANTAC) 300 MG tablet      You have had a couple chest xrays and many lab tests.  No sign of lung infection.  No sign of congestive heart failure.  The shortness of breath could be from the chronic COPD.  Stomach acid might be contributing as he has been off omeprazole.   Also off furosemide but BNP OK and no edema today.    He has had problems with prednisone in the past causing cognitive changes.   Doubt anxiety as cause for the breathing symptoms but may be a result of the breathing symptoms.    PLAN:   Continue both the pulmicort and arformoterol  Nebs twice daily as you have been.   Start back on the umeclidinium (Incruse ellipta) inhaler once daily-this is anti-inflammatory medication and should help breathing.    Also Start ranitidine 300mg at bedtime for heartburn which could be contributing to breathing.    Let me know if not improving in the next couple days.             Follow-ups after your visit        Who to contact     If you have questions or need follow up information about today's clinic visit or your schedule please contact [facility] directly at [phone].  Normal or non-critical lab and  "imaging results will be communicated to you by MyChart, letter or phone within 4 business days after the clinic has received the results. If you do not hear from us within 7 days, please contact the clinic through [employer] or phone. If you have a critical or abnormal lab result, we will notify you by phone as soon as possible.  Submit refill requests through [employer] or call your pharmacy and they will forward the refill request to us. Please allow 3 business days for your refill to be completed.          Additional Information About Your Visit        [employer] Information     [employer] lets you send messages to your doctor, view your test results, renew your prescriptions, schedule appointments and more. To sign up, go to [email or web address] . Click on \"Log in\" on the left side of the screen, which will take you to the Welcome page. Then click on \"Sign up Now\" on the right side of the page.     You will be asked to enter the access code listed below, as well as some personal information. Please follow the directions to create your username and password.     Your access code is: [email or web address]  Expires: 10/16/2018  8:36 PM     Your access code will  in 90 days. If you need help or a new code, please call your [facility] clinic or [phone].        Your Vitals Were     Pulse Temperature Respirations Height Pulse Oximetry BMI (Body Mass Index)    74 98.4  F (36.9  C) (Tympanic) 24 5' 8\" (1.727 m) 95% 29.04 kg/m2       Blood Pressure from Last 3 Encounters:   18 120/70   18 167/81   18 170/90    Weight from Last 3 Encounters:   18 191 lb (86.6 kg)   18 180 lb (81.6 kg)   18 189 lb (85.7 kg)              Today, you had the following     No orders found for display         Today's Medication Changes          These changes are accurate as of 18 10:12 AM.  If you have any questions, ask your nurse or doctor.               Start taking these medicines.        Dose/Directions    " ranitidine 300 MG tablet   Commonly known as:  ZANTAC   Used for:  Gastroesophageal reflux disease without esophagitis   Started by:  Be Carreno MD        Dose:  300 mg   Take 1 tablet (300 mg) by mouth At Bedtime   Quantity:  30 tablet   Refills:  1         These medicines have changed or have updated prescriptions.        Dose/Directions    cyanocobalamin 500 MCG Tabs   Commonly known as:  BL VITAMIN B-12   This may have changed:  how much to take   Used for:  Memory loss        Dose:  500 mcg   Take 500 mcg by mouth daily.   Quantity:  90 tablet   Refills:  4         Stop taking these medicines if you haven't already. Please contact your care team if you have questions.     omeprazole 20 MG CR capsule   Commonly known as:  priLOSEC   Stopped by:  Be Carreno MD                Where to get your medicines      These medications were sent to [facility] [place], [place] - [address]   [address] [facility] [address]     Phone:  [phone]     ranitidine 300 MG tablet                Primary Care Provider Office Phone # Fax #    Be Carreno [phone] [address] [address]        Equal Access to Services     [facility]: Hadii radha ewing hadasho Sothais, waaxda luqadaha, qaybta kaalmada adeegyada, tory terry . [place] [phone].    ATENCIÓN: Si habla español, tiene a rahman disposición servicios gratuitos de asistencia lingüística. Llame al [phone].    We comply with applicable federal civil rights laws and [place] laws. We do not discriminate on the basis of race, color, national origin, age, disability, sex, sexual orientation, or gender identity.            Thank you!     Thank you for choosing [facility]  for your care. Our goal is always to provide you with excellent care. Hearing back from our patients is one way we can continue to improve our services. Please take a few  minutes to complete the written survey that you may receive in the mail after your visit with us. Thank you!             Your Updated Medication List - Protect others around you: Learn how to safely use, store and throw away your medicines at www.disposemymeds.org.          This list is accurate as of 7/25/18 10:12 AM.  Always use your most recent med list.                   Brand Name Dispense Instructions for use Diagnosis    albuterol 108 (90 Base) MCG/ACT Inhaler    PROAIR HFA/PROVENTIL HFA/VENTOLIN HFA    1 Inhaler    Inhale 2 puffs into the lungs every 6 hours as needed for shortness of breath / dyspnea or wheezing        arformoterol 15 MCG/2ML Nebu neb solution    BROVANA    120 mL    Take 2 mLs (15 mcg) by nebulization 2 times daily    Chronic obstructive pulmonary disease, unspecified COPD type (H)       aspirin 81 MG tablet      Take by mouth daily        atorvastatin 80 MG tablet    LIPITOR    30 tablet    Take 1 tablet (80 mg) by mouth daily    Hyperlipidemia LDL goal <70       * blood glucose monitoring meter device kit     1 kit    Use to test blood sugars 4 times daily or as directed.    Type 2 diabetes mellitus with diabetic chronic kidney disease (H)       * blood glucose monitoring meter device kit    no brand specified    1 kit    Use to test blood sugar 3 times daily or as directed.    Type 2 diabetes, HbA1C goal < 8% (H)       budesonide 0.5 MG/2ML neb solution    PULMICORT    60 ampule    Take 2 mLs (0.5 mg) by nebulization 2 times daily    Moderate persistent asthma without complication       cholecalciferol 2000 units tablet     90 tablet    Take 1 tablet by mouth daily.    Memory loss       cyanocobalamin 500 MCG Tabs    BL VITAMIN B-12    90 tablet    Take 500 mcg by mouth daily.    Memory loss       finasteride 5 MG tablet    PROSCAR    90 tablet    Take 1 tablet (5 mg) by mouth daily MUST KEEP UPCOMING APPT FOR FURTHER REFILLS    Urinary retention       fish oil-omega-3 fatty acids 1000  "MG capsule     180 capsule    Take 1 capsule by mouth 2 times daily.    Hyperlipidemia LDL goal <100       furosemide 20 MG tablet    LASIX    30 tablet    TAKE ONE TABLET BY MOUTH ONCE DAILY IN THE MORNING    Chronic ischemic heart disease       insulin aspart 100 UNIT/ML injection    NovoLOG FLEXPEN    30 mL    11 units before breakfast, 11 units before lunch, 11 units before dinner    Type 2 diabetes mellitus with stage 3 chronic kidney disease, with long-term current use of insulin (H)       insulin detemir 100 UNIT/ML injection    LEVEMIR FLEXPEN/FLEXTOUCH    15 mL    Inject 30 Units Subcutaneous At Bedtime    Type 2 diabetes mellitus with stage 3 chronic kidney disease, with long-term current use of insulin (H)       insulin syringe-needle U-100 31G X 5/16\" 1 ML    BD insulin syringe ULTRAFINE    100 each    Use one syringe 4 times daily or as directed.    Type 2 diabetes, HbA1C goal < 8% (H)       lisinopril 5 MG tablet    PRINIVIL/ZESTRIL    90 tablet    TAKE ONE TABLET BY MOUTH ONCE DAILY    Hyperlipidemia LDL goal <70       metFORMIN 1000 MG tablet    GLUCOPHAGE    60 tablet    TAKE ONE TABLET BY MOUTH TWICE DAILY WITH MEALS    Type 2 diabetes mellitus with stage 3 chronic kidney disease, with long-term current use of insulin (H)       metoprolol tartrate 25 MG tablet    LOPRESSOR    60 tablet    Take 1 tablet (25 mg) by mouth 2 times daily    Chronic ischemic heart disease       nitroGLYcerin 0.4 MG sublingual tablet    NITROSTAT    25 tablet    Place 1 tablet (0.4 mg) under the tongue every 5 minutes as needed for chest pain (CALL 911 IF NOT IMPROVED AFTER THREE CONSECUTIVE DOSES)    Chronic ischemic heart disease       ONETOUCH ULTRA test strip   Generic drug:  blood glucose monitoring     300 strip    USE  TO CHECK GLUCOSE THREE TIMES DAILY    Type 2 diabetes mellitus with diabetic chronic kidney disease (H)       order for DME      Equipment being ordered: CPAP AIRSENSE 10 11-15 CM H2O QUATTRO AIR " SIZE MED SN# [id number]  DN# [id number]        pen needles 31G X 6 MM Misc     90 each    1 Units 3 times daily For novolog flexpen    Type 2 diabetes, HbA1C goal < 8% (H)       ranitidine 300 MG tablet    ZANTAC    30 tablet    Take 1 tablet (300 mg) by mouth At Bedtime    Gastroesophageal reflux disease without esophagitis       tamsulosin 0.4 MG capsule    FLOMAX    90 capsule    Take 1 capsule (0.4 mg) by mouth daily (Needs follow-up appointment for this medication)    Benign non-nodular prostatic hyperplasia with lower urinary tract symptoms       umeclidinium 62.5 MCG/INH oral inhaler    INCRUSE ELLIPTA    1 Inhaler    Inhale 1 puff into the lungs daily    Chronic obstructive pulmonary disease, unspecified COPD type (H)       * Notice:  This list has 2 medication(s) that are the same as other medications prescribed for you. Read the directions carefully, and ask your doctor or other care provider to review them with you.

## 2018-07-25 NOTE — PROGRESS NOTES
SUBJECTIVE:   Alvaro Horton is a 78 year old male who presents to clinic today for the following health issues:  Chief Complaint   Patient presents with     ER F/U      ED/UC Followup:    Facility:  [facility]  Date of visit: 07/24/2018  Reason for visit: Breathing problem  Current Status: Feeling terrible today, headache and didn't sleep well last night.  Had to get up and sit in a chair at times.     Seen in ED on 7/18 and again yesterday.   He has had increasing shortness of breath.    On 7/18 he had EKG, troponins, BNP venous blood gas and chest x-ray.     IMPRESSION: No evidence of active cardiopulmonary disease.    Seen again 7/24 in ED.  Same labs done.   His chest x-ray did not show any changes.  Again, oxygen sats were normal.     He has noted shortness of breath chronically.  More difficulty sleeping, seems harder to breath.  Had to sit up last night after waking up short of breath.    No cough.    Some pressure in chest.   More like difficulty getting air in.  Has tried using more pillows at night.  Not sure if it helped.   He has noted dyspnea on exertion.  He can still walk a mile without stopping (not moving very fast).  Walks dog daily for a mile.   No exertional chest pains.   He has been taking arformoterol nebs twice daily, budesonide nebs twice daily.   He was prescribed albuteral inhaler.  NOt sure if it helps.   HE has been using umeclidinium (Incruse ellipta) only 1-2 times a month.        Chronic rhinorrhea, unchanged.  No congestion or postnasal drainage.   Minimal sneezing.      Stopped taking omeprazole and furosemide due to hypomagnesemia in early June.  He was having cramps in hands.  They improved after stopping those medications.     Heartburn worse since stopping omeprazole.  Taking Tums about 4 per day.  He has not noted much at night.     Patient Active Problem List   Diagnosis     Chronic ischemic heart disease     Moderate persistent asthma     PSORIASIS     Hypertrophy of  "prostate without urinary obstruction     OVERACTIVE BLADDER     BAKERS LUNG     Hypertension goal BP (blood pressure) < 140/90     Esophageal reflux     Sensorineural hearing loss, bilateral     Obstructive sleep apnea     Benign paroxysmal vertigo     CVA (cerebral vascular accident) (H)     Atrial fibrillation (H)     Memory loss     CTS (carpal tunnel syndrome)     Dyslexia     Advanced care planning/counseling discussion     Hyperlipidemia LDL goal <70     COPD (chronic obstructive pulmonary disease) (H)     CKD (chronic kidney disease) stage 3, GFR 30-59 ml/min     Health Care Home     Altered mental status     Type 2 diabetes mellitus with diabetic chronic kidney disease (H)      ROS:General: POSITIVE for:, low energy.  NO fever.   GI: No nausea, vomiting,  heartburn, abdominal pain, diarrhea, constipation or change in bowel habits  : No urinary frequency or dysuria, bladder or kidney problems  Musculoskeletal: No significant muscle or joint pains  Psychiatric: POSITIVE for:, anxiety, depressed mood, when having difficulty breathing.   No ankle edema.   Glucometers:RAnge has been 84 to >200.      OBJECTIVE:Blood pressure 120/70, pulse 74, temperature 98.4  F (36.9  C), temperature source Tympanic, resp. rate 24, height 5' 8\" (1.727 m), weight 191 lb (86.6 kg), SpO2 95 %. BMI=Body mass index is 29.04 kg/(m^2).  GENERAL APPEARANCE ADULT: Alert, no acute distress  EYES: PERRL, EOM normal, conjunctiva and lids normal  NECK: No adenopathy,masses or thyromegaly  RESP: lungs clear to auscultation.  HE has decreased breath sounds left lung base.    CV: normal rate, regular rhythm, no murmur or gallop  ABDOMEN: soft, no organomegaly, masses or tenderness  MS: extremities normal, no peripheral edema ;    ASSESSMENT:     ICD-10-CM    1. Dyspnea, unspecified type R06.00    2. Chronic obstructive pulmonary disease, unspecified COPD type (H) J44.9    3. Chronic ischemic heart disease I25.9    4. Gastroesophageal reflux " disease without esophagitis K21.9 ranitidine (ZANTAC) 300 MG tablet      You have had a couple chest xrays and many lab tests.  No sign of lung infection.  No sign of congestive heart failure.  The shortness of breath could be from the chronic COPD.  Stomach acid might be contributing as he has been off omeprazole.   Also off furosemide but BNP OK and no edema today.    He has had problems with prednisone in the past causing cognitive changes.   Doubt anxiety as cause for the breathing symptoms but may be a result of the breathing symptoms.    PLAN:   Continue both the pulmicort and arformoterol  Nebs twice daily as you have been.   Start back on the umeclidinium (Incruse ellipta) inhaler once daily-this is anti-inflammatory medication and should help breathing.    Also Start ranitidine 300mg at bedtime for heartburn which could be contributing to breathing.    Let me know if not improving in the next couple days.

## 2018-07-25 NOTE — NURSING NOTE
"Chief Complaint   Patient presents with     ER F/U       Initial /70  Pulse 74  Temp 98.4  F (36.9  C) (Tympanic)  Resp 24  Ht 5' 8\" (1.727 m)  Wt 191 lb (86.6 kg)  SpO2 95%  BMI 29.04 kg/m2 Estimated body mass index is 29.04 kg/(m^2) as calculated from the following:    Height as of this encounter: 5' 8\" (1.727 m).    Weight as of this encounter: 191 lb (86.6 kg).      Health Maintenance that is potentially due pending provider review:          Is there anyone who you would like to be able to receive your results? If yes have patient fill out [facility]    "

## 2018-09-03 ENCOUNTER — APPOINTMENT (OUTPATIENT)
Dept: GENERAL RADIOLOGY | Facility: CLINIC | Age: 78
End: 2018-09-03
Attending: EMERGENCY MEDICINE
Payer: MEDICARE

## 2018-09-03 ENCOUNTER — APPOINTMENT (OUTPATIENT)
Dept: CT IMAGING | Facility: CLINIC | Age: 78
End: 2018-09-03
Attending: EMERGENCY MEDICINE
Payer: MEDICARE

## 2018-09-03 ENCOUNTER — HOSPITAL ENCOUNTER (OUTPATIENT)
Facility: CLINIC | Age: 78
Setting detail: OBSERVATION
Discharge: HOME | End: 2018-09-04
Attending: EMERGENCY MEDICINE
Payer: MEDICARE

## 2018-09-03 DIAGNOSIS — R41.82 ALTERED MENTAL STATUS: ICD-10-CM

## 2018-09-03 DIAGNOSIS — J44.1 COPD EXACERBATION (H): ICD-10-CM

## 2018-09-03 DIAGNOSIS — R42 DIZZINESS AND GIDDINESS: ICD-10-CM

## 2018-09-03 DIAGNOSIS — G93.40 ENCEPHALOPATHY: Primary | ICD-10-CM

## 2018-09-03 DIAGNOSIS — R06.2 WHEEZING: ICD-10-CM

## 2018-09-03 DIAGNOSIS — R41.82 ALTERED MENTAL STATUS, UNSPECIFIED ALTERED MENTAL STATUS TYPE: ICD-10-CM

## 2018-09-03 LAB
ALBUMIN SERPL-MCNC: 4.1 G/DL (ref 3.4–5)
ALBUMIN UR-MCNC: NEGATIVE MG/DL
ALP SERPL-CCNC: 125 U/L (ref 40–150)
ALT SERPL W P-5'-P-CCNC: 32 U/L (ref 0–70)
ANION GAP SERPL CALCULATED.3IONS-SCNC: 8 MMOL/L (ref 3–14)
APPEARANCE UR: CLEAR
AST SERPL W P-5'-P-CCNC: 23 U/L (ref 0–45)
BASE DEFICIT BLDV-SCNC: 9.4 MMOL/L
BASE EXCESS BLDV CALC-SCNC: 1.7 MMOL/L
BASOPHILS # BLD AUTO: 0.1 10E9/L (ref 0–0.2)
BASOPHILS NFR BLD AUTO: 0.4 %
BILIRUB SERPL-MCNC: 0.5 MG/DL (ref 0.2–1.3)
BILIRUB UR QL STRIP: NEGATIVE
BUN SERPL-MCNC: 20 MG/DL (ref 7–30)
CALCIUM SERPL-MCNC: 9.2 MG/DL (ref 8.5–10.1)
CHLORIDE SERPL-SCNC: 103 MMOL/L (ref 94–109)
CO2 SERPL-SCNC: 28 MMOL/L (ref 20–32)
COLOR UR AUTO: ABNORMAL
CREAT SERPL-MCNC: 1.22 MG/DL (ref 0.66–1.25)
DIFFERENTIAL METHOD BLD: ABNORMAL
DIFFERENTIAL METHOD BLD: NORMAL
EOSINOPHIL # BLD AUTO: 0 10E9/L (ref 0–0.7)
EOSINOPHIL NFR BLD AUTO: 0.2 %
ERYTHROCYTE [DISTWIDTH] IN BLOOD BY AUTOMATED COUNT: 13 % (ref 10–15)
ERYTHROCYTE [DISTWIDTH] IN BLOOD BY AUTOMATED COUNT: NORMAL % (ref 10–15)
GFR SERPL CREATININE-BSD FRML MDRD: 57 ML/MIN/1.7M2
GLUCOSE BLDC GLUCOMTR-MCNC: 142 MG/DL (ref 70–99)
GLUCOSE BLDC GLUCOMTR-MCNC: 87 MG/DL (ref 70–99)
GLUCOSE BLDC GLUCOMTR-MCNC: 90 MG/DL (ref 70–99)
GLUCOSE SERPL-MCNC: 85 MG/DL (ref 70–99)
GLUCOSE UR STRIP-MCNC: NEGATIVE MG/DL
HCO3 BLDV-SCNC: 16 MMOL/L (ref 21–28)
HCO3 BLDV-SCNC: 27 MMOL/L (ref 21–28)
HCT VFR BLD AUTO: 41 % (ref 40–53)
HCT VFR BLD AUTO: NORMAL % (ref 40–53)
HGB BLD-MCNC: 13.7 G/DL (ref 13.3–17.7)
HGB BLD-MCNC: NORMAL G/DL (ref 13.3–17.7)
HGB UR QL STRIP: ABNORMAL
IMM GRANULOCYTES # BLD: 0.1 10E9/L (ref 0–0.4)
IMM GRANULOCYTES NFR BLD: 0.5 %
INR PPP: 0.9 (ref 0.86–1.14)
KETONES UR STRIP-MCNC: NEGATIVE MG/DL
LACTATE BLD-SCNC: 1.5 MMOL/L (ref 0.7–2)
LEUKOCYTE ESTERASE UR QL STRIP: NEGATIVE
LYMPHOCYTES # BLD AUTO: 2.3 10E9/L (ref 0.8–5.3)
LYMPHOCYTES NFR BLD AUTO: 19.3 %
MCH RBC QN AUTO: 30 PG (ref 26.5–33)
MCH RBC QN AUTO: NORMAL PG (ref 26.5–33)
MCHC RBC AUTO-ENTMCNC: 33.4 G/DL (ref 31.5–36.5)
MCHC RBC AUTO-ENTMCNC: NORMAL G/DL (ref 31.5–36.5)
MCV RBC AUTO: 90 FL (ref 78–100)
MCV RBC AUTO: NORMAL FL (ref 78–100)
MONOCYTES # BLD AUTO: 0.9 10E9/L (ref 0–1.3)
MONOCYTES NFR BLD AUTO: 7.2 %
NEUTROPHILS # BLD AUTO: 8.7 10E9/L (ref 1.6–8.3)
NEUTROPHILS NFR BLD AUTO: 72.4 %
NITRATE UR QL: NEGATIVE
NRBC # BLD AUTO: 0 10*3/UL
NRBC BLD AUTO-RTO: 0 /100
NT-PROBNP SERPL-MCNC: 953 PG/ML (ref 0–1800)
PCO2 BLDV: 34 MM HG (ref 40–50)
PCO2 BLDV: 45 MM HG (ref 40–50)
PH BLDV: 7.29 PH (ref 7.32–7.43)
PH BLDV: 7.39 PH (ref 7.32–7.43)
PH UR STRIP: 7 PH (ref 5–7)
PLATELET # BLD AUTO: 182 10E9/L (ref 150–450)
PLATELET # BLD AUTO: NORMAL 10E9/L (ref 150–450)
PO2 BLDV: 28 MM HG (ref 25–47)
PO2 BLDV: 32 MM HG (ref 25–47)
POTASSIUM SERPL-SCNC: 4 MMOL/L (ref 3.4–5.3)
PROT SERPL-MCNC: 7.8 G/DL (ref 6.8–8.8)
RBC # BLD AUTO: 4.56 10E12/L (ref 4.4–5.9)
RBC # BLD AUTO: NORMAL 10E12/L (ref 4.4–5.9)
RBC #/AREA URNS AUTO: 0 /HPF (ref 0–2)
SODIUM SERPL-SCNC: 139 MMOL/L (ref 133–144)
SOURCE: ABNORMAL
SP GR UR STRIP: 1.01 (ref 1–1.03)
TROPONIN I SERPL-MCNC: <0.015 UG/L (ref 0–0.04)
UROBILINOGEN UR STRIP-MCNC: 0 MG/DL (ref 0–2)
WBC # BLD AUTO: 12 10E9/L (ref 4–11)
WBC # BLD AUTO: NORMAL 10E9/L (ref 4–11)
WBC #/AREA URNS AUTO: 1 /HPF (ref 0–5)

## 2018-09-03 PROCEDURE — 71046 X-RAY EXAM CHEST 2 VIEWS: CPT

## 2018-09-03 PROCEDURE — 70496 CT ANGIOGRAPHY HEAD: CPT

## 2018-09-03 PROCEDURE — 99285 EMERGENCY DEPT VISIT HI MDM: CPT | Mod: 25 | Performed by: EMERGENCY MEDICINE

## 2018-09-03 PROCEDURE — 81001 URINALYSIS AUTO W/SCOPE: CPT | Performed by: EMERGENCY MEDICINE

## 2018-09-03 PROCEDURE — 25000128 H RX IP 250 OP 636: Performed by: EMERGENCY MEDICINE

## 2018-09-03 PROCEDURE — 70450 CT HEAD/BRAIN W/O DYE: CPT

## 2018-09-03 PROCEDURE — 82803 BLOOD GASES ANY COMBINATION: CPT | Mod: 91 | Performed by: EMERGENCY MEDICINE

## 2018-09-03 PROCEDURE — G0378 HOSPITAL OBSERVATION PER HR: HCPCS

## 2018-09-03 PROCEDURE — 99220 ZZC INITIAL OBSERVATION CARE,LEVL III: CPT | Performed by: PHYSICIAN ASSISTANT

## 2018-09-03 PROCEDURE — 80053 COMPREHEN METABOLIC PANEL: CPT | Performed by: EMERGENCY MEDICINE

## 2018-09-03 PROCEDURE — 85025 COMPLETE CBC W/AUTO DIFF WBC: CPT | Performed by: EMERGENCY MEDICINE

## 2018-09-03 PROCEDURE — 83605 ASSAY OF LACTIC ACID: CPT | Performed by: EMERGENCY MEDICINE

## 2018-09-03 PROCEDURE — 93005 ELECTROCARDIOGRAM TRACING: CPT | Performed by: EMERGENCY MEDICINE

## 2018-09-03 PROCEDURE — 84484 ASSAY OF TROPONIN QUANT: CPT | Performed by: EMERGENCY MEDICINE

## 2018-09-03 PROCEDURE — 25000125 ZZHC RX 250: Performed by: EMERGENCY MEDICINE

## 2018-09-03 PROCEDURE — 00000146 ZZHCL STATISTIC GLUCOSE BY METER IP

## 2018-09-03 PROCEDURE — 94640 AIRWAY INHALATION TREATMENT: CPT

## 2018-09-03 PROCEDURE — 99207 ZZC CDG-CODE CATEGORY CHANGED: CPT | Performed by: PHYSICIAN ASSISTANT

## 2018-09-03 PROCEDURE — 85610 PROTHROMBIN TIME: CPT | Performed by: EMERGENCY MEDICINE

## 2018-09-03 PROCEDURE — 40000275 ZZH STATISTIC RCP TIME EA 10 MIN

## 2018-09-03 PROCEDURE — 93010 ELECTROCARDIOGRAM REPORT: CPT | Mod: Z6 | Performed by: EMERGENCY MEDICINE

## 2018-09-03 PROCEDURE — 83880 ASSAY OF NATRIURETIC PEPTIDE: CPT | Performed by: EMERGENCY MEDICINE

## 2018-09-03 RX ORDER — PROCHLORPERAZINE MALEATE 5 MG
5 TABLET ORAL EVERY 6 HOURS PRN
Status: DISCONTINUED | OUTPATIENT
Start: 2018-09-03 | End: 2018-09-04 | Stop reason: HOSPADM

## 2018-09-03 RX ORDER — IOPAMIDOL 755 MG/ML
70 INJECTION, SOLUTION INTRAVASCULAR ONCE
Status: COMPLETED | OUTPATIENT
Start: 2018-09-03 | End: 2018-09-03

## 2018-09-03 RX ORDER — HYDROMORPHONE HCL/0.9% NACL/PF 0.2MG/0.2
0.2 SYRINGE (ML) INTRAVENOUS EVERY 4 HOURS PRN
Status: DISCONTINUED | OUTPATIENT
Start: 2018-09-03 | End: 2018-09-04 | Stop reason: HOSPADM

## 2018-09-03 RX ORDER — FINASTERIDE 5 MG/1
5 TABLET, FILM COATED ORAL DAILY
Status: DISCONTINUED | OUTPATIENT
Start: 2018-09-04 | End: 2018-09-04 | Stop reason: HOSPADM

## 2018-09-03 RX ORDER — TAMSULOSIN HYDROCHLORIDE 0.4 MG/1
0.4 CAPSULE ORAL DAILY
Status: DISCONTINUED | OUTPATIENT
Start: 2018-09-04 | End: 2018-09-04 | Stop reason: HOSPADM

## 2018-09-03 RX ORDER — ACETAMINOPHEN 325 MG/1
650 TABLET ORAL EVERY 4 HOURS PRN
Status: DISCONTINUED | OUTPATIENT
Start: 2018-09-03 | End: 2018-09-04 | Stop reason: HOSPADM

## 2018-09-03 RX ORDER — NALOXONE HYDROCHLORIDE 0.4 MG/ML
.1-.4 INJECTION, SOLUTION INTRAMUSCULAR; INTRAVENOUS; SUBCUTANEOUS
Status: DISCONTINUED | OUTPATIENT
Start: 2018-09-03 | End: 2018-09-04 | Stop reason: HOSPADM

## 2018-09-03 RX ORDER — ONDANSETRON 2 MG/ML
4 INJECTION INTRAMUSCULAR; INTRAVENOUS EVERY 6 HOURS PRN
Status: DISCONTINUED | OUTPATIENT
Start: 2018-09-03 | End: 2018-09-04 | Stop reason: HOSPADM

## 2018-09-03 RX ORDER — DEXTROSE MONOHYDRATE 25 G/50ML
25-50 INJECTION, SOLUTION INTRAVENOUS
Status: DISCONTINUED | OUTPATIENT
Start: 2018-09-03 | End: 2018-09-04 | Stop reason: HOSPADM

## 2018-09-03 RX ORDER — PROCHLORPERAZINE 25 MG
12.5 SUPPOSITORY, RECTAL RECTAL EVERY 12 HOURS PRN
Status: DISCONTINUED | OUTPATIENT
Start: 2018-09-03 | End: 2018-09-04 | Stop reason: HOSPADM

## 2018-09-03 RX ORDER — NICOTINE POLACRILEX 4 MG
15-30 LOZENGE BUCCAL
Status: DISCONTINUED | OUTPATIENT
Start: 2018-09-03 | End: 2018-09-04 | Stop reason: HOSPADM

## 2018-09-03 RX ORDER — ATORVASTATIN CALCIUM 80 MG/1
80 TABLET, FILM COATED ORAL DAILY
Status: DISCONTINUED | OUTPATIENT
Start: 2018-09-04 | End: 2018-09-04 | Stop reason: HOSPADM

## 2018-09-03 RX ORDER — HYDROCODONE BITARTRATE AND ACETAMINOPHEN 5; 325 MG/1; MG/1
1-2 TABLET ORAL EVERY 4 HOURS PRN
Status: DISCONTINUED | OUTPATIENT
Start: 2018-09-03 | End: 2018-09-04 | Stop reason: HOSPADM

## 2018-09-03 RX ORDER — METOPROLOL TARTRATE 25 MG/1
25 TABLET, FILM COATED ORAL 2 TIMES DAILY
Status: DISCONTINUED | OUTPATIENT
Start: 2018-09-04 | End: 2018-09-04 | Stop reason: HOSPADM

## 2018-09-03 RX ORDER — LISINOPRIL 5 MG/1
5 TABLET ORAL DAILY
Status: DISCONTINUED | OUTPATIENT
Start: 2018-09-04 | End: 2018-09-04 | Stop reason: HOSPADM

## 2018-09-03 RX ORDER — IBUPROFEN 600 MG/1
600 TABLET, FILM COATED ORAL EVERY 6 HOURS PRN
Status: DISCONTINUED | OUTPATIENT
Start: 2018-09-03 | End: 2018-09-04 | Stop reason: HOSPADM

## 2018-09-03 RX ORDER — ACETAMINOPHEN 650 MG/1
650 SUPPOSITORY RECTAL EVERY 4 HOURS PRN
Status: DISCONTINUED | OUTPATIENT
Start: 2018-09-03 | End: 2018-09-04 | Stop reason: HOSPADM

## 2018-09-03 RX ORDER — BUDESONIDE 0.5 MG/2ML
0.5 INHALANT ORAL 2 TIMES DAILY
Status: DISCONTINUED | OUTPATIENT
Start: 2018-09-04 | End: 2018-09-04 | Stop reason: HOSPADM

## 2018-09-03 RX ORDER — ONDANSETRON 4 MG/1
4 TABLET, ORALLY DISINTEGRATING ORAL EVERY 6 HOURS PRN
Status: DISCONTINUED | OUTPATIENT
Start: 2018-09-03 | End: 2018-09-04 | Stop reason: HOSPADM

## 2018-09-03 RX ORDER — IPRATROPIUM BROMIDE AND ALBUTEROL SULFATE 2.5; .5 MG/3ML; MG/3ML
3 SOLUTION RESPIRATORY (INHALATION) ONCE
Status: COMPLETED | OUTPATIENT
Start: 2018-09-03 | End: 2018-09-03

## 2018-09-03 RX ORDER — IPRATROPIUM BROMIDE AND ALBUTEROL SULFATE 2.5; .5 MG/3ML; MG/3ML
3 SOLUTION RESPIRATORY (INHALATION)
Status: DISCONTINUED | OUTPATIENT
Start: 2018-09-03 | End: 2018-09-04 | Stop reason: HOSPADM

## 2018-09-03 RX ORDER — SODIUM CHLORIDE 9 MG/ML
1000 INJECTION, SOLUTION INTRAVENOUS CONTINUOUS
Status: DISCONTINUED | OUTPATIENT
Start: 2018-09-03 | End: 2018-09-03

## 2018-09-03 RX ORDER — ASPIRIN 81 MG/1
81 TABLET, CHEWABLE ORAL DAILY
Status: DISCONTINUED | OUTPATIENT
Start: 2018-09-04 | End: 2018-09-04 | Stop reason: HOSPADM

## 2018-09-03 RX ORDER — SODIUM CHLORIDE 9 MG/ML
INJECTION, SOLUTION INTRAVENOUS CONTINUOUS
Status: DISCONTINUED | OUTPATIENT
Start: 2018-09-03 | End: 2018-09-04 | Stop reason: HOSPADM

## 2018-09-03 RX ORDER — ARFORMOTEROL TARTRATE 15 UG/2ML
15 SOLUTION RESPIRATORY (INHALATION) 2 TIMES DAILY
Status: DISCONTINUED | OUTPATIENT
Start: 2018-09-04 | End: 2018-09-04 | Stop reason: HOSPADM

## 2018-09-03 RX ORDER — FUROSEMIDE 20 MG
20 TABLET ORAL DAILY
Status: DISCONTINUED | OUTPATIENT
Start: 2018-09-04 | End: 2018-09-04 | Stop reason: HOSPADM

## 2018-09-03 RX ADMIN — SODIUM CHLORIDE 500 ML: 9 INJECTION, SOLUTION INTRAVENOUS at 20:16

## 2018-09-03 RX ADMIN — SODIUM CHLORIDE 100 ML: 9 INJECTION, SOLUTION INTRAVENOUS at 19:01

## 2018-09-03 RX ADMIN — IOPAMIDOL 70 ML: 755 INJECTION, SOLUTION INTRAVENOUS at 19:01

## 2018-09-03 RX ADMIN — IPRATROPIUM BROMIDE AND ALBUTEROL SULFATE 3 ML: .5; 3 SOLUTION RESPIRATORY (INHALATION) at 18:43

## 2018-09-03 ASSESSMENT — ENCOUNTER SYMPTOMS
WEAKNESS: 0
NAUSEA: 0
DYSURIA: 0
NECK PAIN: 0
TROUBLE SWALLOWING: 0
BRUISES/BLEEDS EASILY: 0
CHILLS: 0
ABDOMINAL PAIN: 0
CHEST TIGHTNESS: 0
LIGHT-HEADEDNESS: 1
FATIGUE: 1
SHORTNESS OF BREATH: 1
COUGH: 0
HEADACHES: 0
APPETITE CHANGE: 0
BACK PAIN: 0
VOMITING: 0
CONFUSION: 1
ACTIVITY CHANGE: 1
FEVER: 0
DIZZINESS: 0
WHEEZING: 1

## 2018-09-03 ASSESSMENT — VISUAL ACUITY: OU: NORMAL ACUITY

## 2018-09-03 NOTE — IP AVS SNAPSHOT
"                  MRN:[id number]                      After Visit Summary   9/3/2018    Alvaro Horton    MRN: [id number]           Thank you!     Thank you for choosing [facility] for your care. Our goal is always to provide you with excellent care. Hearing back from our patients is one way we can continue to improve our services. Please take a few minutes to complete the written survey that you may receive in the mail after you visit with us. Thank you!        Patient Information     Date Of Birth          1940        About your hospital stay     You were admitted on:  September 3, 2018 You last received care in the:  [facility]    You were discharged on:  September 4, 2018        Reason for your hospital stay       You had an episode yesterday during which you had difficulties thinking and weren't making sense to your son, which brought you into the hospital.  Your symptoms gradually went away on their own, and you're now feeling \"normal\".  You tell me that you'd prefer to go home and follow-up with a neurologist as an outpatient rather than continue evaluation here in-house, so we'll let you go home, but I do recommend you follow-up on this.                  Who to Call     For medical emergencies, please call 911.  For non-urgent questions about your medical care, please call your primary care provider or clinic, [phone]          Attending Provider     Provider Specialty    Burt Michael MD Emergency Medicine    Jordan, Thomas Limon MD Pulmonary       Primary Care Provider Office Phone # Fax #    Be Carreno [phone]       When to contact your care team       Call your primary doctor if you have any of the following: Additional episodes of confusion.                  After Care Instructions     Activity       Your activity upon discharge: activity as tolerated, but no driving today please.            Diet       Follow this diet upon discharge: " Orders Placed This Encounter      Consistent Carbohydrate Diet 1280-8087 Calories: Moderate Consistent CHO (4-6 CHO units/meal)                  Follow-up Appointments     Follow-up and recommended labs and tests        Follow up with primary care provider, Be Carreno, within 7 days for hospital follow- up.  No specific follow up labs or test are needed.                  Your next 10 appointments already scheduled     Sep 12, 2018  2:00 PM CDT   SHORT with Be Carreno MD   [facility]    [address] [place] [id number]   [phone]            Nov 19, 2018 11:00 AM CST   New Visit with Estelita Mace MD   [facility] ([facility]    [address] [facility] [address] [phone]              Additional Services     NEUROLOGY ADULT REFERRAL       Your provider has referred you for the following:   Consult at [facility]: [facility] [phone]   [email or web address]    REASON FOR CONSULTATION: Episode of confusion associated with scintillating scotomata on the left, symptoms resolved spontaneously within a few hours of onset, wonder if this could be complex migraine vs partial seizure?  Thanks.    Please be aware that coverage of these services is subject to the terms and limitations of your health insurance plan.  Call member services at your health plan with any benefit or coverage questions.      Please bring the following with you to your appointment:    (1) Any X-Rays, CTs or MRIs which have been performed.  Contact the facility where they were done to arrange for  prior to your scheduled appointment.    (2) List of current medications  (3) This referral request   (4) Any documents/labs given to you for this referral                  Pending Results     No orders found for last 3 day(s).            Statement of Approval     Ordered     "      18 0838  I have reviewed and agree with all the recommendations and orders detailed in this document.  EFFECTIVE NOW     Approved and electronically signed by:  Thomas England MD             Admission Information     Date & Time Provider Department Dept. Phone    9/3/2018 Thomas England MD [facility] [phone]      Your Vitals Were     Blood Pressure Pulse Temperature Respirations Height Weight    128/67 (BP Location: Left arm) 64 98.9  F (37.2  C) (Oral) 18 1.753 m (5' 9\") 86 kg (189 lb 9.5 oz)    Pulse Oximetry BMI (Body Mass Index)                94% 28 kg/m2          MyChart Information     [employer] lets you send messages to your doctor, view your test results, renew your prescriptions, schedule appointments and more. To sign up, go to [email or web address] . Click on \"Log in\" on the left side of the screen, which will take you to the Welcome page. Then click on \"Sign up Now\" on the right side of the page.     You will be asked to enter the access code listed below, as well as some personal information. Please follow the directions to create your username and password.     Your access code is: [email or web address]  Expires: 10/16/2018  8:36 PM     Your access code will  in 90 days. If you need help or a new code, please call your [facility] clinic or [phone].        Equal Access to Services     [facility]: Hadii radha haddado Sothais, waaxda luqadaha, qaybta kaalmada adeegyada, tory terry . So [facility] [phone].    ATENCIÓN: Si habla español, tiene a rahman disposición servicios gratuitos de asistencia lingüística. Llame al [phone].    We comply with applicable federal civil rights laws and [place] laws. We do not discriminate on the basis of race, color, national origin, age, disability, sex, sexual orientation, or gender identity.               Review of your medicines      CONTINUE these medicines which may have CHANGED, " or have new prescriptions. If we are uncertain of the size of tablets/capsules you have at home, strength may be listed as something that might have changed.        Dose / Directions    cyanocobalamin 500 MCG Tabs   Commonly known as:  BL VITAMIN B-12   This may have changed:  how much to take   Used for:  Memory loss        Dose:  500 mcg   Take 500 mcg by mouth daily.   Quantity:  90 tablet   Refills:  4       insulin aspart 100 UNIT/ML injection   Commonly known as:  NovoLOG FLEXPEN   This may have changed:    - how much to take  - how to take this  - when to take this  - additional instructions   Used for:  Type 2 diabetes mellitus with stage 3 chronic kidney disease, with long-term current use of insulin (H)        11 units before breakfast, 11 units before lunch, 11 units before dinner   Quantity:  30 mL   Refills:  11         CONTINUE these medicines which have NOT CHANGED        Dose / Directions    albuterol 108 (90 Base) MCG/ACT inhaler   Commonly known as:  PROAIR HFA/PROVENTIL HFA/VENTOLIN HFA        Dose:  2 puff   Inhale 2 puffs into the lungs every 6 hours as needed for shortness of breath / dyspnea or wheezing   Quantity:  1 Inhaler   Refills:  1       arformoterol 15 MCG/2ML Nebu neb solution   Commonly known as:  BROVANA   Used for:  Chronic obstructive pulmonary disease, unspecified COPD type (H)        Dose:  15 mcg   Take 2 mLs (15 mcg) by nebulization 2 times daily   Quantity:  120 mL   Refills:  3       aspirin 81 MG tablet        Dose:  81 mg   Take 81 mg by mouth daily   Refills:  0       atorvastatin 80 MG tablet   Commonly known as:  LIPITOR   Used for:  Hyperlipidemia LDL goal <70        Dose:  80 mg   Take 1 tablet (80 mg) by mouth daily   Quantity:  30 tablet   Refills:  11       * blood glucose monitoring meter device kit   Used for:  Type 2 diabetes mellitus with diabetic chronic kidney disease (H)        Use to test blood sugars 4 times daily or as directed.   Quantity:  1 kit  "  Refills:  0       * blood glucose monitoring meter device kit   Commonly known as:  no brand specified   Used for:  Type 2 diabetes, HbA1C goal < 8% (H)        Use to test blood sugar 3 times daily or as directed.   Quantity:  1 kit   Refills:  0       budesonide 0.5 MG/2ML neb solution   Commonly known as:  PULMICORT   Used for:  Moderate persistent asthma without complication        Dose:  0.5 mg   Take 2 mLs (0.5 mg) by nebulization 2 times daily   Quantity:  60 ampule   Refills:  11       cholecalciferol 2000 units tablet   Used for:  Memory loss        Dose:  1 tablet   Take 1 tablet by mouth daily.   Quantity:  90 tablet   Refills:  3       finasteride 5 MG tablet   Commonly known as:  PROSCAR   Used for:  Urinary retention        Dose:  5 mg   Take 1 tablet (5 mg) by mouth daily MUST KEEP UPCOMING APPT FOR FURTHER REFILLS   Quantity:  90 tablet   Refills:  1       fish oil-omega-3 fatty acids 1000 MG capsule   Used for:  Hyperlipidemia LDL goal <100        Dose:  1 g   Take 1 capsule by mouth 2 times daily.   Quantity:  180 capsule   Refills:  3       furosemide 20 MG tablet   Commonly known as:  LASIX   Used for:  Chronic ischemic heart disease        TAKE ONE TABLET BY MOUTH ONCE DAILY IN THE MORNING   Quantity:  30 tablet   Refills:  11       insulin detemir 100 UNIT/ML injection   Commonly known as:  LEVEMIR FLEXPEN/FLEXTOUCH   Used for:  Type 2 diabetes mellitus with stage 3 chronic kidney disease, with long-term current use of insulin (H)        Dose:  30 Units   Inject 30 Units Subcutaneous At Bedtime   Quantity:  15 mL   Refills:  11       insulin syringe-needle U-100 31G X 5/16\" 1 ML   Commonly known as:  BD insulin syringe ULTRAFINE   Used for:  Type 2 diabetes, HbA1C goal < 8% (H)        Use one syringe 4 times daily or as directed.   Quantity:  100 each   Refills:  prn       lisinopril 5 MG tablet   Commonly known as:  PRINIVIL/ZESTRIL   Used for:  Hyperlipidemia LDL goal <70        TAKE ONE " TABLET BY MOUTH ONCE DAILY   Quantity:  90 tablet   Refills:  3       metFORMIN 1000 MG tablet   Commonly known as:  GLUCOPHAGE   Used for:  Type 2 diabetes mellitus with stage 3 chronic kidney disease, with long-term current use of insulin (H)        TAKE ONE TABLET BY MOUTH TWICE DAILY WITH MEALS   Quantity:  60 tablet   Refills:  11       metoprolol tartrate 25 MG tablet   Commonly known as:  LOPRESSOR   Used for:  Chronic ischemic heart disease        Dose:  25 mg   Take 1 tablet (25 mg) by mouth 2 times daily   Quantity:  60 tablet   Refills:  11       nitroGLYcerin 0.4 MG sublingual tablet   Commonly known as:  NITROSTAT   Used for:  Chronic ischemic heart disease        Dose:  0.4 mg   Place 1 tablet (0.4 mg) under the tongue every 5 minutes as needed for chest pain (CALL 911 IF NOT IMPROVED AFTER THREE CONSECUTIVE DOSES)   Quantity:  25 tablet   Refills:  0       ONETOUCH ULTRA test strip   Used for:  Type 2 diabetes mellitus with diabetic chronic kidney disease (H)   Generic drug:  blood glucose monitoring        USE  TO CHECK GLUCOSE THREE TIMES DAILY   Quantity:  300 strip   Refills:  1       order for DME        Equipment being ordered: CPAP AIRSENSE 10 11-15 CM H2O QUATTRO AIR SIZE MED SN# [id number]  DN# [id number]   Refills:  0       pen needles 31G X 6 MM Misc   Used for:  Type 2 diabetes, HbA1C goal < 8% (H)        Dose:  1 Units   1 Units 3 times daily For novolog flexpen   Quantity:  90 each   Refills:  0       ranitidine 300 MG tablet   Commonly known as:  ZANTAC   Used for:  Gastroesophageal reflux disease without esophagitis        Dose:  300 mg   Take 1 tablet (300 mg) by mouth At Bedtime   Quantity:  30 tablet   Refills:  1       tamsulosin 0.4 MG capsule   Commonly known as:  FLOMAX   Used for:  Benign non-nodular prostatic hyperplasia with lower urinary tract symptoms        Dose:  0.4 mg   Take 1 capsule (0.4 mg) by mouth daily (Needs follow-up appointment for this medication)   Quantity:  90  capsule   Refills:  3       umeclidinium 62.5 MCG/INH inhaler   Commonly known as:  INCRUSE ELLIPTA   Used for:  Chronic obstructive pulmonary disease, unspecified COPD type (H)        Dose:  1 puff   Inhale 1 puff into the lungs daily   Quantity:  1 Inhaler   Refills:  11       * Notice:  This list has 2 medication(s) that are the same as other medications prescribed for you. Read the directions carefully, and ask your doctor or other care provider to review them with you.             Protect others around you: Learn how to safely use, store and throw away your medicines at [email or web address].             Medication List: This is a list of all your medications and when to take them. Check marks below indicate your daily home schedule. Keep this list as a reference.      Medications           Morning Afternoon Evening Bedtime As Needed    albuterol 108 (90 Base) MCG/ACT inhaler   Commonly known as:  PROAIR HFA/PROVENTIL HFA/VENTOLIN HFA   Inhale 2 puffs into the lungs every 6 hours as needed for shortness of breath / dyspnea or wheezing                                   arformoterol 15 MCG/2ML Nebu neb solution   Commonly known as:  BROVANA   Take 2 mLs (15 mcg) by nebulization 2 times daily   Last time this was given:  15 mcg on 9/4/2018  8:58 AM                                      aspirin 81 MG tablet   Take 81 mg by mouth daily                                   atorvastatin 80 MG tablet   Commonly known as:  LIPITOR   Take 1 tablet (80 mg) by mouth daily   Last time this was given:  80 mg on 9/4/2018  9:22 AM                                   * blood glucose monitoring meter device kit   Use to test blood sugars 4 times daily or as directed.                                            * blood glucose monitoring meter device kit   Commonly known as:  no brand specified   Use to test blood sugar 3 times daily or as directed.                                budesonide 0.5 MG/2ML neb solution   Commonly known as:   "PULMICORT   Take 2 mLs (0.5 mg) by nebulization 2 times daily   Last time this was given:  0.5 mg on 9/4/2018  8:58 AM                                      cholecalciferol 2000 units tablet   Take 1 tablet by mouth daily.                                   cyanocobalamin 500 MCG Tabs   Commonly known as:  BL VITAMIN B-12   Take 500 mcg by mouth daily.                                   finasteride 5 MG tablet   Commonly known as:  PROSCAR   Take 1 tablet (5 mg) by mouth daily MUST KEEP UPCOMING APPT FOR FURTHER REFILLS   Last time this was given:  5 mg on 9/4/2018  9:22 AM                                   fish oil-omega-3 fatty acids 1000 MG capsule   Take 1 capsule by mouth 2 times daily.                                      furosemide 20 MG tablet   Commonly known as:  LASIX   TAKE ONE TABLET BY MOUTH ONCE DAILY IN THE MORNING   Last time this was given:  20 mg on 9/4/2018  9:23 AM                                   insulin aspart 100 UNIT/ML injection   Commonly known as:  NovoLOG FLEXPEN   11 units before breakfast, 11 units before lunch, 11 units before dinner                                         insulin detemir 100 UNIT/ML injection   Commonly known as:  LEVEMIR FLEXPEN/FLEXTOUCH   Inject 30 Units Subcutaneous At Bedtime   Last time this was given:  20 Units on 9/4/2018  1:23 AM                                   insulin syringe-needle U-100 31G X 5/16\" 1 ML   Commonly known as:  BD insulin syringe ULTRAFINE   Use one syringe 4 times daily or as directed.                                lisinopril 5 MG tablet   Commonly known as:  PRINIVIL/ZESTRIL   TAKE ONE TABLET BY MOUTH ONCE DAILY   Last time this was given:  5 mg on 9/4/2018  9:22 AM                                   metFORMIN 1000 MG tablet   Commonly known as:  GLUCOPHAGE   TAKE ONE TABLET BY MOUTH TWICE DAILY WITH MEALS   Last time this was given:  1,000 mg on 9/4/2018  9:22 AM                                      metoprolol tartrate 25 MG tablet "   Commonly known as:  LOPRESSOR   Take 1 tablet (25 mg) by mouth 2 times daily   Last time this was given:  25 mg on 9/4/2018  9:22 AM                                      nitroGLYcerin 0.4 MG sublingual tablet   Commonly known as:  NITROSTAT   Place 1 tablet (0.4 mg) under the tongue every 5 minutes as needed for chest pain (CALL 911 IF NOT IMPROVED AFTER THREE CONSECUTIVE DOSES)                                   ONETOUCH ULTRA test strip   USE  TO CHECK GLUCOSE THREE TIMES DAILY   Generic drug:  blood glucose monitoring                                order for DME   Equipment being ordered: CPAP AIRSENSE 10 11-15 CM H2O QUATTRO AIR SIZE MED SN# [id number]  DN# [id number]                                pen needles 31G X 6 MM Misc   1 Units 3 times daily For novolog flexpen                                ranitidine 300 MG tablet   Commonly known as:  ZANTAC   Take 1 tablet (300 mg) by mouth At Bedtime   Last time this was given:  300 mg on 9/4/2018  1:21 AM                                   tamsulosin 0.4 MG capsule   Commonly known as:  FLOMAX   Take 1 capsule (0.4 mg) by mouth daily (Needs follow-up appointment for this medication)   Last time this was given:  0.4 mg on 9/4/2018  9:22 AM                                   umeclidinium 62.5 MCG/INH inhaler   Commonly known as:  INCRUSE ELLIPTA   Inhale 1 puff into the lungs daily                                   * Notice:  This list has 2 medication(s) that are the same as other medications prescribed for you. Read the directions carefully, and ask your doctor or other care provider to review them with you.

## 2018-09-03 NOTE — ED PROVIDER NOTES
"  History     Chief Complaint   Patient presents with     Altered Mental Status     acute confusion, sob feels exhausted. diabetic     HPI  Alvaro Horton is a 78 year old male who has a history of cerebral vascular accident, COPD, hypertension, atrial fibrillation, altered mental status, and chronic ischemic heart disease that presents to the ED with his son for evaluation of altered mental status. The patient states that he began feeling short of breath and confused approximately 3 hours ago. He states that he couldn't remember what channel his tv was on. He states that he began to feel light headed, difficulty recalling information, and that his head did not feel 100%. He states that he knows where he is, but could not go into any other details. He know that today is Monday, but does not know what month it is. The patient's son states that the patient became noticeably confused an hour prior to arrival and that all day the patient seemed fine and was able to go about his day just fine. His son states that the patient's diabetic numbers were high for the day. The patient is unsure if he used his inhaler today or not.    The patient denies any chest pain.    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 RAST for bakers yeast negative 2002. Has occupational exposures with related worsening asthmatic symptoms. CT 2/08- no fibrosis.       Moderate persistent asthma      Priority: High     PFTS 1997 - FEV1- 3.39 (64%), ratio 0.74, 24% change [id number]% with bronchodilators,  TLC 86%, %, DLCO 78%. Methacholine challenge positive at level 7 2001.  PFTS 2004 - FEV1- 1.93 (63%), ratio 0.77. PFTS 4/08 - FEV1- 2.13 (72%), ratio 0.71, no change with bronchodilators,  TLC 78%, RV 96%, DLCO 64%          Chronic ischemic heart " "disease      Priority: High     atypical chest pain 2001 with GXT echo- decreased LVF, angiogram 2001- nonobstructing 3 vessel disease.   Repeat angio 2004- prox RCA 50-60%,  OM2- 40%, D1 30-40%, LAD 30-40%.   Cardiolite 2005 during hospitalization for SOB in [place] reported to reveal no ischemia.   Adenosine cardiolite 1/08- small slightly reversible inferior defect, most likely consistent with diaphragm attenuation with bowel uptake artifact. USA, Angio 11/09- Severe LM disease, severe subtotal occlusion of a large branch OM1.    S/p CABG x2 11/09- LIMA-D1, SVG to OM1, OM2. GXT 8/10- 5.8 mets, 121 (80%) HR, normal ekg, cardiolite- small fixed inferior/basal defect with small area khurram-infarct ischemia extending into the mid ventricle. EF 67%.  4/25/13:adenosine cardiolyte stress test through [facility] with normal EF and no ischemia.   1/16/2016 nuclear stress test:1.  Myocardial perfusion imaging using single isotope technique demonstrated no evidence for myocardial ischemia. 2. Gated images demonstrated normal wall motion with a calculated ejection fraction of 61%.  3. Compared to the prior study from 2011 there are no significant changes .           Type 2 diabetes mellitus with diabetic chronic kidney disease (H) 10/30/2015     Priority: Medium     Altered mental status 12/31/2014     Priority: Medium     12/27/2014:episode confusion for 20 minutes.  Seen at [facility]/-hospitalized.  Head CT, MRI/MRA all normal.  PET cardiac perfusion stress test normal.  Neurology consult-diagnosis= possible hypoglycemia, migraine variant.  \"Acute ischemic cerebral event was excluded\".       Health Care Home 08/11/2014     Priority: Medium     State Tier Level:    Status:  Unable to re-connect  Care Coordinator:  Kecia Mills    See Letters for [facility] Care Plan  Date:  August 11, 2014             CKD (chronic kidney disease) stage 3, GFR 30-59 ml/min 05/06/2014     Priority: Medium     9/9/2015:He has had two abnormal MAC in the " past.        COPD (chronic obstructive pulmonary disease) (H) 11/18/2013     Priority: Medium     PFT 11/15/13 results:FVC=59%, FEV1=54%, FEV1/FVC=92%.  His DLCO was 58%1. Spirometry: FEV1 moderately reduced. FVC moderately reduced. FEV1/FVC ratio reduced. 2. Bronchodilator Response: A 14% improvement is seen in FEV1 with bronchodilators. 3. Lung Volumes: Total lung capacity normal. Residual volume increased. Residual volume total lung capacity ratio increased. 4. DLCO: Moderately reduced.   INTERPRETATION: Moderate obstructive lung disease. Reversibility with bronchodilators is seen on testing today. Lung volumes show evidence for air trapping. DLCO is reduced, consistent with loss of capillary-alveolar exchange as in emphysema, pulmonary hypertension, chronic pulmonary embolus, pulmonary fibrosis or other pulmonary vascular disease. A concomitant restrictive lung disease process may be suspected on the basis of moderate reductions in FEV1 and FVC, with a fairly well-preserved FEV1/FVC ratio and a borderline low normal total lung capacity.   11/15/13:exercise oximetry did not show significant desaturation below 91%.  PFT February, 2014 results:FVC=54%, FEV1=48%, FEV1/FVC=64%.  DLCO=59%         Hyperlipidemia LDL goal <70 04/17/2013     Priority: Medium     Atrial fibrillation (H) 12/14/2009     Priority: Medium     After CABG 11/09       CVA (cerebral vascular accident) (H) 11/27/2009     Priority: Medium     Mild Broca's aphasia, confusion, right hand dysesthesia after angio. MRA new small area of restricted diffusion in the white matter of the medial right temporal lobe,  consistent with a recent small infarct. Speech problems resolved, still mild right hand problems.         Obstructive sleep apnea 04/14/2008     Priority: Medium     ESS 20/24. Sleep study [facility]: 4/8/08. total sleep time was 423.0 minutes. The sleep latency was normal at 9.7 minutes.  REM sleep latency was 161.5 minutes. Sleep efficiency  was normal at 82.7%. The sleep architecture was disrupted with frequent sleep stage changes and arousals. Snoring: Was reported as moderately loud. Lowest O2 saturation was 87.0%. This study is suggestive of mild sleep apnea, severe during REM sleep (21% TST).  PLM index was 0.0. EKG:  No significant cardiac arrhythmias were noted.         Hypertrophy of prostate without urinary obstruction      Priority: Medium     Elevated PSA. Bx negative 2004.       Advanced care planning/counseling discussion 09/24/2012     Priority: Low     Does not have a directive 9/12       Dyslexia 11/11/2010     Priority: Low     essentially unable to read       CTS (carpal tunnel syndrome) 05/12/2010     Priority: Low     EMG 5/10-  median neuropathy at the right wrist, moderate in severity electrically, right ulnar neuropathy localizing to the elbow, mild chronic right C6 radiculopathy without evidence for acute denervation.        Memory loss 05/03/2010     Priority: Low     MMSE 27/30 (0/3 attention recall, ?history of dylexia), PHQ9-16 7/09. Recommended neurocogntive testing, did not complete.  MMSE 23/30 6/10 (attention, county, if/and/buts).Neurocogntive evaluation  1/10- not suspicious for emerging dementia. Felt likely due hearing loss/dyslexia.        Benign paroxysmal vertigo 02/03/2009     Priority: Low     Admit 2/09. MRI/MRA head and neck negative.       Sensorineural hearing loss, bilateral 10/08/2007     Priority: Low     Esophageal reflux 11/06/2006     Priority: Low     PSORIASIS      Priority: Low     OVERACTIVE BLADDER      Priority: Low     PVR 84 2004.          Past Medical History:    Past Medical History:   Diagnosis Date     Calculus of kidney      Depression      Left hand weakness 12/14/2009     PNEUMONIA, ORGANISM NOS 2/14/2008       Past Surgical History:    Past Surgical History:   Procedure Laterality Date     C ANESTH,DX ARTHROSCOPIC PROC KNEE JOINT  1994, 1998     Left     CARDIAC SURGERY  11/09    CABG x2  "- LIMA-D1, SVG to OM1, OM2.     GENITOURINARY SURGERY  1990    ESWL and percutaneous nephrolithotomy     ORTHOPEDIC SURGERY  8/12    right CTR     SURGICAL HISTORY OF -   1998    Hernia repair     SURGICAL HISTORY OF -   2004    NormalColonoscopy      SURGICAL HISTORY OF -   2006    Normal Colonoscopy       Family History:    Family History   Problem Relation Age of Onset     C.A.D. Father      40s     Hypertension Father      C.A.D. Paternal Grandfather      HEART DISEASE Paternal Grandfather      Diabetes Brother      C.A.D. Brother      HEART DISEASE Brother      Hypertension Brother      GASTROINTESTINAL DISEASE Son      Crohn's disease     GASTROINTESTINAL DISEASE Other      2 grandaughters with Crohn's disease     Cancer - colorectal No family hx of        Social History:  Marital Status:   [5]  Social History   Substance Use Topics     Smoking status: Never Smoker     Smokeless tobacco: Never Used     Alcohol use No        Medications:      albuterol (PROAIR HFA/PROVENTIL HFA/VENTOLIN HFA) 108 (90 Base) MCG/ACT Inhaler   arformoterol (BROVANA) 15 MCG/2ML NEBU neb solution   aspirin 81 MG tablet   atorvastatin (LIPITOR) 80 MG tablet   blood glucose monitoring (NO BRAND SPECIFIED) meter device kit   blood glucose monitoring (ONE TOUCH ULTRA 2) meter device kit   budesonide (PULMICORT) 0.5 MG/2ML neb solution   cholecalciferol 2000 UNITS tablet   cyanocobalamin (BL VITAMIN B-12) 500 MCG TABS   finasteride (PROSCAR) 5 MG tablet   fish oil-omega-3 fatty acids (FISH OIL) 1000 MG capsule   furosemide (LASIX) 20 MG tablet   insulin aspart (NOVOLOG FLEXPEN) 100 UNIT/ML injection   insulin detemir (LEVEMIR FLEXPEN/FLEXTOUCH) 100 UNIT/ML injection   Insulin Pen Needle (PEN NEEDLES) 31G X 6 MM MISC   insulin syringe-needle U-100 (BD INSULIN SYRINGE ULTRAFINE) 31G X 5/16\" 1 ML   lisinopril (PRINIVIL/ZESTRIL) 5 MG tablet   metFORMIN (GLUCOPHAGE) 1000 MG tablet   metoprolol tartrate (LOPRESSOR) 25 MG tablet "   nitroGLYcerin (NITROSTAT) 0.4 MG sublingual tablet   ONETOUCH ULTRA test strip   order for DME   ranitidine (ZANTAC) 300 MG tablet   tamsulosin (FLOMAX) 0.4 MG capsule   umeclidinium (INCRUSE ELLIPTA) 62.5 MCG/INH oral inhaler   [DISCONTINUED] LANTUS SOLOSTAR SOLN 100 UNIT/ML   [DISCONTINUED] metoprolol (TOPROL-XL) 25 MG 24 hr tablet         Review of Systems   Unable to perform ROS: Mental status change   Constitutional: Positive for activity change and fatigue. Negative for appetite change, chills and fever.   HENT: Negative for congestion and trouble swallowing.    Eyes: Negative for visual disturbance.   Respiratory: Positive for shortness of breath and wheezing. Negative for cough and chest tightness.    Cardiovascular: Negative for chest pain.   Gastrointestinal: Negative for abdominal pain, nausea and vomiting.   Genitourinary: Negative for decreased urine volume and dysuria.   Musculoskeletal: Negative for back pain and neck pain.   Skin: Negative for rash.   Neurological: Positive for light-headedness. Negative for dizziness, weakness and headaches.   Hematological: Does not bruise/bleed easily.   Psychiatric/Behavioral: Positive for confusion.       Physical Exam   BP: (!) 186/96  Pulse: 78  Temp: 97.9  F (36.6  C)  Resp: 18  Weight: 86.2 kg (190 lb)  SpO2: 96 %      Physical Exam   Constitutional: He appears well-developed and well-nourished. He appears distressed.   HENT:   Head: Normocephalic.   TMs clear bilaterally oral moist posterior pharynx no erythema edema.   Eyes: Conjunctivae and EOM are normal. Pupils are equal, round, and reactive to light.   Psychiatric:   Patient appears anxious   Nursing note and vitals reviewed.    HENT: TMs clear bilaterally oral mucosa moist. No lesions.  Neck: Supple, no JVD.  Pulmonary/Chest: Lungs faint upper wheeze noted with breath sounds decreased at bases.  Cardiovascular: Heart is regular rate and rhythm. No murmur.  Abdomen: Soft, non-distended, non-tender.    Musculoskeletal: Moving all extremities well. No peripheral edema.  Pulses and sensation are symmetrical.  Neurological: Alert patient answers questions appropriately but at times struggles answering them. no focal neurologic deficit. Cranial nerves intact. No drift in upper and lower extremities.  Reflexes symmetrical.  Skin: No rash.  National Institutes [employer] Stroke Scale  Exam Interval: Baseline   Score    Level of consciousness: (0)   Alert, keenly responsive    LOC questions: (1)   Answers one question correctly    LOC commands: (0)   Performs both tasks correctly    Best gaze: (0)   Normal    Visual: (0)   No visual loss    Facial palsy: (0)   Normal symmetrical movements    Motor arm (left): (0)   No drift    Motor arm (right): (0)   No drift    Motor leg (left): (0)   No drift    Motor leg (right): (0)   No drift    Limb ataxia: (0)   Absent    Sensory: (0)   Normal- no sensory loss    Best language: (0)   Normal- no aphasia    Dysarthria: (0)   Normal    Extinction and inattention: (0)   No abnormality        Total Score:  1     ED Course     ED Course     Procedures               EKG Interpretation:      Interpreted by Burt Michael  Rhythm: sinus rhythm with premature atrial beat vs. Atrial fibrillation  Rate: Normal  Axis: Left Axis Deviation  Ectopy: premature atrial contraction: possible  Conduction: bifasicular  ST Segments/ T Waves: Non-specific ST-T wave changes  Q Waves: none  Comparison to prior: no significant change from 7/18/18    Clinical Impression: sinus rhythm with rate variation       Critical Care time:  none            Results for orders placed or performed during the hospital encounter of 09/03/18 (from the past 24 hour(s))   UA reflex to Microscopic   Result Value Ref Range    Color Urine Straw     Appearance Urine Clear     Glucose Urine Negative NEG^Negative mg/dL    Bilirubin Urine Negative NEG^Negative    Ketones Urine Negative NEG^Negative mg/dL    Specific Gravity  Urine 1.009 1.003 - 1.035    Blood Urine Moderate (A) NEG^Negative    pH Urine 7.0 5.0 - 7.0 pH    Protein Albumin Urine Negative NEG^Negative mg/dL    Urobilinogen mg/dL 0.0 0.0 - 2.0 mg/dL    Nitrite Urine Negative NEG^Negative    Leukocyte Esterase Urine Negative NEG^Negative    Source Midstream Urine     RBC Urine 0 0 - 2 /HPF    WBC Urine 1 0 - 5 /HPF   Glucose by meter   Result Value Ref Range    Glucose 90 70 - 99 mg/dL   CBC with platelets differential   Result Value Ref Range    WBC Canceled, Test credited 4.0 - 11.0 10e9/L    RBC Count Canceled, Test credited 4.4 - 5.9 10e12/L    Hemoglobin Canceled, Test credited 13.3 - 17.7 g/dL    Hematocrit Canceled, Test credited 40.0 - 53.0 %    MCV Canceled, Test credited 78 - 100 fl    MCH Canceled, Test credited 26.5 - 33.0 pg    MCHC Canceled, Test credited 31.5 - 36.5 g/dL    RDW Canceled, Test credited 10.0 - 15.0 %    Platelet Count Canceled, Test credited 150 - 450 10e9/L    Diff Method Canceled, Test credited    Comprehensive metabolic panel   Result Value Ref Range    Sodium 139 133 - 144 mmol/L    Potassium 4.0 3.4 - 5.3 mmol/L    Chloride 103 94 - 109 mmol/L    Carbon Dioxide 28 20 - 32 mmol/L    Anion Gap 8 3 - 14 mmol/L    Glucose 85 70 - 99 mg/dL    Urea Nitrogen 20 7 - 30 mg/dL    Creatinine 1.22 0.66 - 1.25 mg/dL    GFR Estimate 57 (L) >60 mL/min/1.7m2    GFR Estimate If Black 69 >60 mL/min/1.7m2    Calcium 9.2 8.5 - 10.1 mg/dL    Bilirubin Total 0.5 0.2 - 1.3 mg/dL    Albumin 4.1 3.4 - 5.0 g/dL    Protein Total 7.8 6.8 - 8.8 g/dL    Alkaline Phosphatase 125 40 - 150 U/L    ALT 32 0 - 70 U/L    AST 23 0 - 45 U/L   INR   Result Value Ref Range    INR 0.90 0.86 - 1.14   Troponin I   Result Value Ref Range    Troponin I ES <0.015 0.000 - 0.045 ug/L   NT pro BNP   Result Value Ref Range    N-Terminal Pro BNP Inpatient 953 0 - 1800 pg/mL   CT Head w/o Contrast    Narrative    CT SCAN OF THE HEAD WITHOUT CONTRAST   9/3/2018 7:20 PM     HISTORY: confusion;      TECHNIQUE:  Axial images of the head and coronal reformations without  IV contrast material. Radiation dose for this scan was reduced using  automated exposure control, adjustment of the mA and/or kV according  to patient size, or iterative reconstruction technique.    COMPARISON: None.    FINDINGS:  There is generalized atrophy of the brain.  White matter  changes are present in the cerebral hemispheres that are consistent  with small vessel ischemic disease in this age patient. There is no  evidence of intracranial hemorrhage, mass, acute infarct or anomaly.  The visualized portions of the sinuses and mastoids appear normal.  There is no evidence of trauma.      Impression    IMPRESSION: No acute pathology. No bleed, mass, or infarcts are seen.      ROBIN NDIAYE MD   CTA Head Neck with Contrast    Narrative    CTA HEAD NECK WITH CONTRAST  9/3/2018 7:24 PM     HISTORY: confusion;;     TECHNIQUE:  Precontrast localizing scans were followed by CT  angiography with an injection of 70 ml isovue 370 IV contrast with  scans through the head and neck.  Images were transferred to a  separate 3-D workstation where multiplanar reformations and 3-D images  were created.  Estimates of carotid stenoses are made relative to the  distal internal carotid artery diameters except as noted. Radiation  dose for this scan was reduced using automated exposure control,  adjustment of the mA and/or kV according to patient size, or iterative  reconstruction technique.    COMPARISON: CT angiogram Exam dated 12/26/2014    FINDINGS: Estimates of stenosis at the carotid bifurcations are  relative to the distal internal carotids.    Arch: Calcified atherosclerotic plaque is seen in the arch of the  aorta.    Neck:  Right Carotid:  The right common carotid artery is normal.  Calcified  atherosclerotic plaque is seen at the right carotid bifurcation. No  significant.  The right internal carotid artery is negative.     Left Carotid:  The left  common carotid artery is unremarkable.   Calcified atherosclerotic plaque left carotid bifurcation. No  significant stenosis..  The left internal carotid is negative.      Vertebrals:  The vertebral arteries are normal.    Head:  Right Carotid:No occluded vessels are seen. .    Left Carotid:  No occluded vessels are identified. .    Basilar:  The basilar artery and its branches appear normal. There is  a fetal origin of the right posterior cerebral. This is a normal  variant.    Miscellaneous: None.      Impression    IMPRESSION:   1. No stenosis is seen at either carotid bifurcation.  2. No arterial dissection is identified.  3. No high-grade intracranial vascular stenosis is identified.  4. Venous sinuses appear patent    ROBIN NDIAYE MD   Blood gas venous   Result Value Ref Range    Ph Venous 7.29 (L) 7.32 - 7.43 pH    PCO2 Venous 34 (L) 40 - 50 mm Hg    PO2 Venous 28 25 - 47 mm Hg    Bicarbonate Venous 16 (L) 21 - 28 mmol/L    Base Deficit Venous 9.4 mmol/L   Lactic acid whole blood   Result Value Ref Range    Lactic Acid 1.5 0.7 - 2.0 mmol/L   Chest XR,  PA & LAT    Narrative    CHEST TWO VIEWS    9/3/2018 8:12 PM     HISTORY: Shortness of breath.     COMPARISON: 7/24/2018.      Impression    IMPRESSION: No acute cardiopulmonary disease. Hypoinflated lungs and  bibasilar atelectasis appears stable. Stable cardiac silhouette.     STEVIE MADERA MD   Blood gas venous   Result Value Ref Range    Ph Venous 7.39 7.32 - 7.43 pH    PCO2 Venous 45 40 - 50 mm Hg    PO2 Venous 32 25 - 47 mm Hg    Bicarbonate Venous 27 21 - 28 mmol/L    Base Excess Venous 1.7 mmol/L   CBC with platelets, differential   Result Value Ref Range    WBC 12.0 (H) 4.0 - 11.0 10e9/L    RBC Count 4.56 4.4 - 5.9 10e12/L    Hemoglobin 13.7 13.3 - 17.7 g/dL    Hematocrit 41.0 40.0 - 53.0 %    MCV 90 78 - 100 fl    MCH 30.0 26.5 - 33.0 pg    MCHC 33.4 31.5 - 36.5 g/dL    RDW 13.0 10.0 - 15.0 %    Platelet Count 182 150 - 450 10e9/L    Diff Method Automated  Method     % Neutrophils 72.4 %    % Lymphocytes 19.3 %    % Monocytes 7.2 %    % Eosinophils 0.2 %    % Basophils 0.4 %    % Immature Granulocytes 0.5 %    Nucleated RBCs 0 0 /100    Absolute Neutrophil 8.7 (H) 1.6 - 8.3 10e9/L    Absolute Lymphocytes 2.3 0.8 - 5.3 10e9/L    Absolute Monocytes 0.9 0.0 - 1.3 10e9/L    Absolute Eosinophils 0.0 0.0 - 0.7 10e9/L    Absolute Basophils 0.1 0.0 - 0.2 10e9/L    Abs Immature Granulocytes 0.1 0 - 0.4 10e9/L    Absolute Nucleated RBC 0.0        Medications   sodium chloride 0.9% infusion (not administered)   ipratropium - albuterol 0.5 mg/2.5 mg/3 mL (DUONEB) neb solution 3 mL (3 mLs Nebulization Given 9/3/18 1843)   iopamidol (ISOVUE-370) solution 70 mL (70 mLs Intravenous Given 9/3/18 1901)   sodium chloride 0.9 % bag 500mL for CT scan flush use (100 mLs Intravenous Given 9/3/18 1901)   0.9% sodium chloride BOLUS (500 mLs Intravenous New Bag 9/3/18 2016)     6:12 PM Patient assessed.  Assessments & Plan (with Medical Decision Making) records were reviewed.  Due to patient's confusion a CT scan of the head and CTA of the head and neck was ordered.  I did not call a stroke code as his symptoms are very mild and seemed to be improving per son.  He was also given a nebulizer treatment as he was wheezing.  Labs were obtained.  EKG was obtained.  EKG revealed a questionable atrial fibrillation versus sinus rhythm with premature atrial contraction no significant ST-T wave change present.  White count was slightly elevated 12.0 hemoglobin 13.7 neutrophil count 8.7.  Initial venous blood gas was low at 7.29 with a CO2 of 34 and a bicarbonate 16.  Comprehensive metabolic panel had a bicarbonate 28 no other acute abnormality.  Troponin was negative.  Lactic acid was 1.5.  ProBNP was 953 UA with moderate blood otherwise unremarkable.  CT scan of the head with no acute pathology no bleed mass or infarcts are seen.  CT a of the head and neck no stenosis is seen no arterial dissection no  high-grade intracranial vascular stenosis is identified.  Patient's chest x-ray revealed no acute cardiopulmonary disease.  Lungs were hypo-inflated with bibasilar atelectatic changes which appears stable.  Findings were discussed with patient and throughout the hospital stay.  I discussed the findings with Dr. England with stroke team at [facility].  He did not feel TPA was warranted and felt advanced imaging would be warranted but did not need to be done tonight therefore patient would be able to stay at [facility].  Patient's lungs are clear to auscultation on recheck and steroids were not given.  Repeat gas was a 7.39 pH CO2 45 O2 of 32 and bicarbonate 27.  I feel the initial venous blood gas may have been erroneous.  Findings discussed with Triny gates NP hospitalist service at [facility] and she is agree with admitting the patient hospital.  Findings again were discussed with patient in detail and he is comfortable with the plan.     I have reviewed the nursing notes.    I have reviewed the findings, diagnosis, plan and need for follow up with the patient.       New Prescriptions    No medications on file       Final diagnoses:   Altered mental status   COPD exacerbation (H)     This document serves as a record of the services and decisions personally performed and made by Burt Michael MD. It was created on his behalf by Nesha Mendez, a trained medical scribe. The creation of this document is based the provider's statements to the medical scribe.  Nesha Mendez 6:11 PM 9/3/2018    Provider:   The information in this document, created by the medical scribe for me, accurately reflects the services I personally performed and the decisions made by me. I have reviewed and approved this document for accuracy prior to leaving the patient care area.  Burt Michael MD 6:11 PM 9/3/2018    9/3/2018   [facility] EMERGENCY DEPARTMENT     Burt Michael,  MD  09/04/18 [id number]

## 2018-09-03 NOTE — IP AVS SNAPSHOT
[facility]    [address] [address]    Phone:  [phone]    Fax:  [phone]                                       After Visit Summary   9/3/2018    Alvaro Horton    MRN: [id number]           After Visit Summary Signature Page     I have received my discharge instructions, and my questions have been answered. I have discussed any challenges I see with this plan with the nurse or doctor.    ..........................................................................................................................................  Patient/Patient Representative Signature      ..........................................................................................................................................  Patient Representative Print Name and Relationship to Patient    ..................................................               ................................................  Date                                            Time    ..........................................................................................................................................  Reviewed by Signature/Title    ...................................................              ..............................................  Date                                                            Time          22EPIC Rev 08/18

## 2018-09-04 ENCOUNTER — NURSE TRIAGE (OUTPATIENT)
Dept: NURSING | Facility: CLINIC | Age: 78
End: 2018-09-04

## 2018-09-04 VITALS
HEIGHT: 69 IN | BODY MASS INDEX: 28.08 KG/M2 | OXYGEN SATURATION: 94 % | WEIGHT: 189.6 LBS | TEMPERATURE: 98.9 F | DIASTOLIC BLOOD PRESSURE: 67 MMHG | RESPIRATION RATE: 18 BRPM | HEART RATE: 68 BPM | SYSTOLIC BLOOD PRESSURE: 128 MMHG

## 2018-09-04 PROBLEM — R79.81: Status: ACTIVE | Noted: 2018-09-04

## 2018-09-04 LAB
ANION GAP SERPL CALCULATED.3IONS-SCNC: 7 MMOL/L (ref 3–14)
BASOPHILS # BLD AUTO: 0 10E9/L (ref 0–0.2)
BASOPHILS NFR BLD AUTO: 0.5 %
BUN SERPL-MCNC: 15 MG/DL (ref 7–30)
CALCIUM SERPL-MCNC: 8.3 MG/DL (ref 8.5–10.1)
CHLORIDE SERPL-SCNC: 105 MMOL/L (ref 94–109)
CO2 SERPL-SCNC: 27 MMOL/L (ref 20–32)
CREAT SERPL-MCNC: 1.15 MG/DL (ref 0.66–1.25)
DIFFERENTIAL METHOD BLD: ABNORMAL
EOSINOPHIL # BLD AUTO: 0.1 10E9/L (ref 0–0.7)
EOSINOPHIL NFR BLD AUTO: 0.6 %
ERYTHROCYTE [DISTWIDTH] IN BLOOD BY AUTOMATED COUNT: 13 % (ref 10–15)
GFR SERPL CREATININE-BSD FRML MDRD: 61 ML/MIN/1.7M2
GLUCOSE BLDC GLUCOMTR-MCNC: 165 MG/DL (ref 70–99)
GLUCOSE BLDC GLUCOMTR-MCNC: 218 MG/DL (ref 70–99)
GLUCOSE SERPL-MCNC: 125 MG/DL (ref 70–99)
HCT VFR BLD AUTO: 38.3 % (ref 40–53)
HGB BLD-MCNC: 13.1 G/DL (ref 13.3–17.7)
IMM GRANULOCYTES # BLD: 0 10E9/L (ref 0–0.4)
IMM GRANULOCYTES NFR BLD: 0.4 %
LYMPHOCYTES # BLD AUTO: 3.2 10E9/L (ref 0.8–5.3)
LYMPHOCYTES NFR BLD AUTO: 38.9 %
MCH RBC QN AUTO: 30.5 PG (ref 26.5–33)
MCHC RBC AUTO-ENTMCNC: 34.2 G/DL (ref 31.5–36.5)
MCV RBC AUTO: 89 FL (ref 78–100)
MONOCYTES # BLD AUTO: 0.6 10E9/L (ref 0–1.3)
MONOCYTES NFR BLD AUTO: 7.1 %
NEUTROPHILS # BLD AUTO: 4.4 10E9/L (ref 1.6–8.3)
NEUTROPHILS NFR BLD AUTO: 52.5 %
NRBC # BLD AUTO: 0 10*3/UL
NRBC BLD AUTO-RTO: 0 /100
PLATELET # BLD AUTO: 160 10E9/L (ref 150–450)
POTASSIUM SERPL-SCNC: 4 MMOL/L (ref 3.4–5.3)
RBC # BLD AUTO: 4.29 10E12/L (ref 4.4–5.9)
SODIUM SERPL-SCNC: 139 MMOL/L (ref 133–144)
WBC # BLD AUTO: 8.3 10E9/L (ref 4–11)

## 2018-09-04 PROCEDURE — A9270 NON-COVERED ITEM OR SERVICE: HCPCS | Mod: GY | Performed by: PHYSICIAN ASSISTANT

## 2018-09-04 PROCEDURE — 00000146 ZZHCL STATISTIC GLUCOSE BY METER IP

## 2018-09-04 PROCEDURE — 36415 COLL VENOUS BLD VENIPUNCTURE: CPT | Performed by: PHYSICIAN ASSISTANT

## 2018-09-04 PROCEDURE — 96372 THER/PROPH/DIAG INJ SC/IM: CPT

## 2018-09-04 PROCEDURE — 99217 ZZC OBSERVATION CARE DISCHARGE: CPT

## 2018-09-04 PROCEDURE — 25000132 ZZH RX MED GY IP 250 OP 250 PS 637: Mod: GY | Performed by: PHYSICIAN ASSISTANT

## 2018-09-04 PROCEDURE — 80048 BASIC METABOLIC PNL TOTAL CA: CPT | Performed by: PHYSICIAN ASSISTANT

## 2018-09-04 PROCEDURE — 94640 AIRWAY INHALATION TREATMENT: CPT

## 2018-09-04 PROCEDURE — 25000125 ZZHC RX 250: Performed by: PHYSICIAN ASSISTANT

## 2018-09-04 PROCEDURE — 85025 COMPLETE CBC W/AUTO DIFF WBC: CPT | Performed by: PHYSICIAN ASSISTANT

## 2018-09-04 PROCEDURE — 94640 AIRWAY INHALATION TREATMENT: CPT | Mod: 76

## 2018-09-04 PROCEDURE — G0378 HOSPITAL OBSERVATION PER HR: HCPCS

## 2018-09-04 PROCEDURE — 40000275 ZZH STATISTIC RCP TIME EA 10 MIN

## 2018-09-04 PROCEDURE — 25000131 ZZH RX MED GY IP 250 OP 636 PS 637: Mod: GY | Performed by: PHYSICIAN ASSISTANT

## 2018-09-04 RX ORDER — IPRATROPIUM BROMIDE AND ALBUTEROL SULFATE 2.5; .5 MG/3ML; MG/3ML
3 SOLUTION RESPIRATORY (INHALATION)
Status: DISCONTINUED | OUTPATIENT
Start: 2018-09-04 | End: 2018-09-04 | Stop reason: HOSPADM

## 2018-09-04 RX ORDER — ALBUTEROL SULFATE 0.83 MG/ML
2.5 SOLUTION RESPIRATORY (INHALATION) EVERY 6 HOURS PRN
Status: DISCONTINUED | OUTPATIENT
Start: 2018-09-04 | End: 2018-09-04 | Stop reason: HOSPADM

## 2018-09-04 RX ADMIN — FINASTERIDE 5 MG: 5 TABLET, FILM COATED ORAL at 09:22

## 2018-09-04 RX ADMIN — ALBUTEROL SULFATE 2.5 MG: 2.5 SOLUTION RESPIRATORY (INHALATION) at 01:02

## 2018-09-04 RX ADMIN — ARFORMOTEROL TARTRATE 15 MCG: 15 SOLUTION RESPIRATORY (INHALATION) at 01:03

## 2018-09-04 RX ADMIN — METFORMIN HYDROCHLORIDE 1000 MG: 500 TABLET, FILM COATED ORAL at 09:22

## 2018-09-04 RX ADMIN — LISINOPRIL 5 MG: 5 TABLET ORAL at 09:22

## 2018-09-04 RX ADMIN — BUDESONIDE 0.5 MG: 0.5 INHALANT RESPIRATORY (INHALATION) at 01:03

## 2018-09-04 RX ADMIN — RANITIDINE 300 MG: 150 TABLET ORAL at 01:21

## 2018-09-04 RX ADMIN — INSULIN DETEMIR 20 UNITS: 100 INJECTION, SOLUTION SUBCUTANEOUS at 01:23

## 2018-09-04 RX ADMIN — FUROSEMIDE 20 MG: 20 TABLET ORAL at 09:23

## 2018-09-04 RX ADMIN — ATORVASTATIN CALCIUM 80 MG: 80 TABLET, FILM COATED ORAL at 09:22

## 2018-09-04 RX ADMIN — METOPROLOL TARTRATE 25 MG: 25 TABLET, FILM COATED ORAL at 01:21

## 2018-09-04 RX ADMIN — TAMSULOSIN HYDROCHLORIDE 0.4 MG: 0.4 CAPSULE ORAL at 09:22

## 2018-09-04 RX ADMIN — IPRATROPIUM BROMIDE AND ALBUTEROL SULFATE 3 ML: .5; 2.5 SOLUTION RESPIRATORY (INHALATION) at 08:57

## 2018-09-04 RX ADMIN — BUDESONIDE 0.5 MG: 0.5 INHALANT RESPIRATORY (INHALATION) at 08:58

## 2018-09-04 RX ADMIN — ASPIRIN 81 MG 81 MG: 81 TABLET ORAL at 09:23

## 2018-09-04 RX ADMIN — ACETAMINOPHEN 650 MG: 325 TABLET, FILM COATED ORAL at 01:21

## 2018-09-04 RX ADMIN — METOPROLOL TARTRATE 25 MG: 25 TABLET, FILM COATED ORAL at 09:22

## 2018-09-04 RX ADMIN — ARFORMOTEROL TARTRATE 15 MCG: 15 SOLUTION RESPIRATORY (INHALATION) at 08:58

## 2018-09-04 NOTE — PLAN OF CARE
Problem: Patient Care Overview  Goal: Plan of Care/Patient Progress Review  Outcome: Improving  Patient alert to self and date and he knows he is in the hospital but thinks he is at [facility]. Reoriented him X 2 that he was at [facility] in [place]. He feels that he is more clear now then when he came in. Neuro checks within normal limits. Patient had slight headache and Tylenol was given, patient felt it was from the uncomfortable beds here. Improved with Tylenol. Patient up every 1-2 hours during night to void.   Steady on feet, but put bed alarm on because he is in a different place.  He does not use call light.   Wearing tele monitor.   Wants to go home today.

## 2018-09-04 NOTE — PROGRESS NOTES
[place] [facility] DISCHARGE NOTE    Patient discharged to home at 10:20 AM via ambulation. Cab voucher provided to patient.  Accompanied by staff. Discharge instructions reviewed with patient, opportunity offered to ask questions. Prescriptions - None ordered for discharge. All belongings sent with patient.    Aleida Gunderson RN

## 2018-09-04 NOTE — H&P
[facility]    History and Physical  Hospital Medicine       Date of Admission:  9/3/2018  Date of Service: 9/3/2018     CC: altered mental status    Assessment & Plan   Alvaro Horton is a 78 year old male with a past medical history significant for coronary artery disease s/p CABG, paroxysmal atrial fibrillation, hx CVA, type 2 diabetes mellitus, BPH, GERD, CKD, and obstructive sleep apnea who presents on 9/3/2018 with altered mental status.      Encephalopathy, now resolved  Altered mental status  Unclear cause, most likely metabolic encephalopathy vs TIA. Symptoms lasted 2-3 hours, now resolved. Per chart review, history of previous AMS episode in 2014 thought to possibly be due to hypoglycemia. Admit VBG showing partially compensated metabolic acidosis (see below). Head CT negative for acute intracranial abnormality, CTA head and neck without stenosis or dissection. Case discussed between emergency department provider and neuro stroke team who did not think MRI was needed tonight, recommended watching overnight and possible MRI in morning.  - Takes aspirin prior to admission, continue  - Telemetry  - Consider MRI in the morning - patient is refusing due to claustrophobia, no MRI ordered  - Address other problems as below      Partially compensated metabolic acidosis, non-anion gap  Admit VBG with pH 7.29, pCO2 34, pO2 28, bicarb 16 - partially compensated metabolic acidosis. VBG in 1 month ago was normal, as was the recheck VBG in the emergency department. Anion gap normal. Unclear cause of metabolic acidosis - no evidence of GI losses, no known ingestions. Possible posthypocapnia vs RTA?  - VBG recheck was normal, no further interventions      Type 2 diabetes mellitus with diabetic chronic kidney disease (H)  HgbA1c pending. Admit glucose = 85. Taking Levemir 20 U qhs, Novolog 11 U tid, and metformin prior to admission. Transient hypoglycemia as possible etiology for  "encephalopathy.  - Continue Levemir 20 U qhs  - Continue Novolog 11 U tid with meals  - Continue prior to admission metformin  - Medium dose sliding scale insulin available  - Hyper/hypoglycemia protocol    Moderate persistent asthma  COPD (chronic obstructive pulmonary disease) (H)  Wheezing but not hypoxic upon admission. See above regarding admit VBG. Received DuoNeb in emergency department with improvement. Takes Brovana, Pulmicort, Incruse, and albuterol prior to admission.  - Continue prior to admission Brovana and Pulmicort  - Hold prior to admission Incruse  - DuoNebs q 4 h while awake  - Prn albuterol nebs in place of inhaler      Chronic ischemic heart disease  Hyperlipidemia LDL goal <70  History of CABG in 2009. No current chest pain. Admit troponin negative. EKG with RBBB, no obvious ST changes.  - Takes aspirin, Lipitor, and metoprolol prior to admission, continue  - Hold prn nitroglycerine      Atrial fibrillation (H), paroxysmal  Developed 2009 after CABG. Admit EKG difficult to interpret whether p-waves are present, but possibly in atrial fibrillation. Rate controlled with metoprolol. Not on anticoagulation.  - Continue prior to admission metoprolol  - Continue prior to admission aspirin       CVA (cerebral vascular accident) (H)  Occurred in 2009 after CABG. Has minimal right sided hand problems, occasional \"clumsiness\" at baseline.  - Takes aspirin and Lipitor prior to admission, continue      Hypertension goal BP (blood pressure) < 140/90  Pressures reviewed, initially elevated but improving.   - Takes lasix, lisinopril, and metoprolol prior to admission, continue      CKD (chronic kidney disease) stage 3, GFR 30-59 ml/min  Admit creatinine = 1.22 (baseline between 1.0 - 1.2), GFR 57 (baseline 55 - 70). Minimally worse than baseline.  - Monitor      Hypertrophy of prostate without urinary obstruction  Takes Proscar and Flomax prior to admission, continue.      Obstructive sleep apnea  Uses CPAP " "at home, did not bring his machine with.  - Continue CPAP with home settings if able        Fluids: Oral  Electrolytes: Monitor  Nutrition: Consistent carbohydrate    DVT Prophylaxis: Low Risk/Ambulatory with no VTE prophylaxis indicated  Code Status: Full Code - discussed directly with patient    Lines: Peripheral  Olivo catheter: Not indicated    Disposition: Anticipate discharge in 1-2 day(s). Appropriate for observation care.    Discussion: Assessment & plan discussed with Dr. Thomas Leong PA-C  [facility]  Pager: [phone]          Primary Care Physician   Be Carreno [phone]    History is obtained from the patient and review of old records via the EMR.    Past Medical History      Past Medical History:   Diagnosis Date     Calculus of kidney     1990. s/p ESWL and percutaneous nephrolithotomy     Depression     Hospitalized 1979     Left hand weakness 12/14/2009    Afterbypass surgery, CVA, improved.      PNEUMONIA, ORGANISM NOS 2/14/2008    CT 2/08-  Pulmonary atelectasis and infiltrate in the posterior left lower lung base.          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 RAST for bakers yeast negative 2002. Has occupational exposures with related worsening asthmatic symptoms. CT 2/08- no fibrosis.       Moderate persistent asthma      Priority: High     PFTS 1997 - FEV1- 3.39 (64%), ratio 0.74, 24% change vcs74-14% with bronchodilators,  TLC 86%, %, DLCO 78%. Methacholine challenge positive at level 7 2001.  PFTS 2004 - FEV1- 1.93 (63%), ratio 0.77. PFTS 4/08 - FEV1- 2.13 (72%), ratio 0.71, no change with bronchodilators,  TLC 78%, RV 96%, DLCO 64%          Chronic ischemic heart disease      Priority: High     atypical chest pain 2001 with GXT echo- decreased LVF, angiogram 2001- nonobstructing " "3 vessel disease.   Repeat angio 2004- prox RCA 50-60%,  OM2- 40%, D1 30-40%, LAD 30-40%.   Cardiolite 2005 during hospitalization for SOB in [place] reported to reveal no ischemia.   Adenosine cardiolite 1/08- small slightly reversible inferior defect, most likely consistent with diaphragm attenuation with bowel uptake artifact. USA, Angio 11/09- Severe LM disease, severe subtotal occlusion of a large branch OM1.    S/p CABG x2 11/09- LIMA-D1, SVG to OM1, OM2. GXT 8/10- 5.8 mets, 121 (80%) HR, normal ekg, cardiolite- small fixed inferior/basal defect with small area khurram-infarct ischemia extending into the mid ventricle. EF 67%.  4/25/13:adenosine cardiolyte stress test through [facility] with normal EF and no ischemia.   1/16/2016 nuclear stress test:1.  Myocardial perfusion imaging using single isotope technique demonstrated no evidence for myocardial ischemia. 2. Gated images demonstrated normal wall motion with a calculated ejection fraction of 61%.  3. Compared to the prior study from 2011 there are no significant changes .           Type 2 diabetes mellitus with diabetic chronic kidney disease (H) 10/30/2015     Priority: Medium     Altered mental status 12/31/2014     Priority: Medium     12/27/2014:episode confusion for 20 minutes.  Seen at [facility]/-hospitalized.  Head CT, MRI/MRA all normal.  PET cardiac perfusion stress test normal.  Neurology consult-diagnosis= possible hypoglycemia, migraine variant.  \"Acute ischemic cerebral event was excluded\".       CKD (chronic kidney disease) stage 3, GFR 30-59 ml/min 05/06/2014     Priority: Medium     9/9/2015:He has had two abnormal MAC in the past.        COPD (chronic obstructive pulmonary disease) (H) 11/18/2013     Priority: Medium     PFT 11/15/13 results:FVC=59%, FEV1=54%, FEV1/FVC=92%.  His DLCO was 58%1. Spirometry: FEV1 moderately reduced. FVC moderately reduced. FEV1/FVC ratio reduced. 2. Bronchodilator Response: A 14% improvement is seen in FEV1 with " bronchodilators. 3. Lung Volumes: Total lung capacity normal. Residual volume increased. Residual volume total lung capacity ratio increased. 4. DLCO: Moderately reduced.   INTERPRETATION: Moderate obstructive lung disease. Reversibility with bronchodilators is seen on testing today. Lung volumes show evidence for air trapping. DLCO is reduced, consistent with loss of capillary-alveolar exchange as in emphysema, pulmonary hypertension, chronic pulmonary embolus, pulmonary fibrosis or other pulmonary vascular disease. A concomitant restrictive lung disease process may be suspected on the basis of moderate reductions in FEV1 and FVC, with a fairly well-preserved FEV1/FVC ratio and a borderline low normal total lung capacity.   11/15/13:exercise oximetry did not show significant desaturation below 91%.  PFT February, 2014 results:FVC=54%, FEV1=48%, FEV1/FVC=64%.  DLCO=59%         Hyperlipidemia LDL goal <70 04/17/2013     Priority: Medium     Atrial fibrillation (H) 12/14/2009     Priority: Medium     After CABG 11/09       CVA (cerebral vascular accident) (H) 11/27/2009     Priority: Medium     Mild Broca's aphasia, confusion, right hand dysesthesia after angio. MRA new small area of restricted diffusion in the white matter of the medial right temporal lobe,  consistent with a recent small infarct. Speech problems resolved, still mild right hand problems.         Obstructive sleep apnea 04/14/2008     Priority: Medium     ESS 20/24. Sleep study [facility]: 4/8/08. total sleep time was 423.0 minutes. The sleep latency was normal at 9.7 minutes.  REM sleep latency was 161.5 minutes. Sleep efficiency was normal at 82.7%. The sleep architecture was disrupted with frequent sleep stage changes and arousals. Snoring: Was reported as moderately loud. Lowest O2 saturation was 87.0%. This study is suggestive of mild sleep apnea, severe during REM sleep (21% TST).  PLM index was 0.0. EKG:  No significant cardiac arrhythmias were  noted.         Hypertrophy of prostate without urinary obstruction      Priority: Medium     Elevated PSA. Bx negative 2004.       Advanced care planning/counseling discussion 09/24/2012     Priority: Low     Does not have a directive 9/12       Dyslexia 11/11/2010     Priority: Low     essentially unable to read       CTS (carpal tunnel syndrome) 05/12/2010     Priority: Low     EMG 5/10-  median neuropathy at the right wrist, moderate in severity electrically, right ulnar neuropathy localizing to the elbow, mild chronic right C6 radiculopathy without evidence for acute denervation.        Memory loss 05/03/2010     Priority: Low     MMSE 27/30 (0/3 attention recall, ?history of dylexia), PHQ9-16 7/09. Recommended neurocogntive testing, did not complete.  MMSE 23/30 6/10 (attention, county, if/and/buts).Neurocogntive evaluation  1/10- not suspicious for emerging dementia. Felt likely due hearing loss/dyslexia.        Benign paroxysmal vertigo 02/03/2009     Priority: Low     Admit 2/09. MRI/MRA head and neck negative.       Sensorineural hearing loss, bilateral 10/08/2007     Priority: Low     Esophageal reflux 11/06/2006     Priority: Low     PSORIASIS      Priority: Low     OVERACTIVE BLADDER      Priority: Low     PVR 84 2004.          Past Surgical History     Past Surgical History:   Procedure Laterality Date     C ANESTH,DX ARTHROSCOPIC PROC KNEE JOINT  1994, 1998     Left     CARDIAC SURGERY  11/09    CABG x2 - LIMA-D1, SVG to OM1, OM2.     GENITOURINARY SURGERY  1990    ESWL and percutaneous nephrolithotomy     ORTHOPEDIC SURGERY  8/12    right CTR     SURGICAL HISTORY OF -   1998    Hernia repair     SURGICAL HISTORY OF -   2004    NormalColonoscopy      SURGICAL HISTORY OF -   2006    Normal Colonoscopy        History of Present Illness   Alvaro Horton is a 78 year old male with the above past medical history now presents on 9/3/2018 with ems / encephalopathy. Patient went for a 1 mile walk with his  "dog around 10 am and after felt very fatigued and had trouble catching his breath. He felt fine the rest of the day until after dinner.     He was sitting watching TV when he noticed a \"flicker\" in his left eye impairing his vision. He describes it as flickering spots in his left eye. This lasted for about 1 hour and has since self resolved and not recurred. At the same time, he began to have memory troubles, saying \"I couldn't remember how to work the TV remote.\" Unclear whether he had any speech changes at this time.    He notified his son that he wasn't feeling quite right, and his son brought him to the emergency department for evaluation. As he was in the car on the way to the emergency department he noticed his breathing was \"very deep\" but cannot remember if it was shallow or slow breathing.    No focal deficits were noticed once he got to the emergency department. No slurred speech, no confusion, no physical deficits. All symptoms had resolved and he currently feels back to baseline.    No recent falls, head trauma. No associated numbness, tingling, focal weakness, or headache.     On review of systems, he mentions having a decreased appetite and unintentional 30 pound weight loss over the past year. He has occasional diarrhea, but no more than usual lately. He has a faint rash on his left forearm that has been present for 6 months. The remainder review of systems is negative.        Prior to Admission Medications   Prior to Admission Medications   Prescriptions Last Dose Informant Patient Reported? Taking?   Insulin Pen Needle (PEN NEEDLES) 31G X 6 MM MISC  Self No No   Si Units 3 times daily For novolog flexpen   ONETOUCH ULTRA test strip 9/3/2018 at afternoon Self No Yes   Sig: USE  TO CHECK GLUCOSE THREE TIMES DAILY   albuterol (PROAIR HFA/PROVENTIL HFA/VENTOLIN HFA) 108 (90 Base) MCG/ACT Inhaler 9/3/2018 at am Self No Yes   Sig: Inhale 2 puffs into the lungs every 6 hours as needed for shortness of " breath / dyspnea or wheezing   arformoterol (BROVANA) 15 MCG/2ML NEBU neb solution 9/3/2018 at am Self No Yes   Sig: Take 2 mLs (15 mcg) by nebulization 2 times daily   aspirin 81 MG tablet 9/3/2018 at am Self Yes Yes   Sig: Take 81 mg by mouth daily    atorvastatin (LIPITOR) 80 MG tablet 9/3/2018 at am Self No Yes   Sig: Take 1 tablet (80 mg) by mouth daily   blood glucose monitoring (NO BRAND SPECIFIED) meter device kit  Self No No   Sig: Use to test blood sugar 3 times daily or as directed.   blood glucose monitoring (ONE TOUCH ULTRA 2) meter device kit  Self No No   Sig: Use to test blood sugars 4 times daily or as directed.   budesonide (PULMICORT) 0.5 MG/2ML neb solution 9/3/2018 at am Self No Yes   Sig: Take 2 mLs (0.5 mg) by nebulization 2 times daily   cholecalciferol 2000 UNITS tablet 9/3/2018 at am Self No Yes   Sig: Take 1 tablet by mouth daily.   cyanocobalamin (BL VITAMIN B-12) 500 MCG TABS 9/3/2018 at maybe Self No Yes   Sig: Take 500 mcg by mouth daily.   Patient taking differently: Take 2,500 mcg by mouth daily    finasteride (PROSCAR) 5 MG tablet 9/3/2018 at am Self No Yes   Sig: Take 1 tablet (5 mg) by mouth daily MUST KEEP UPCOMING APPT FOR FURTHER REFILLS   fish oil-omega-3 fatty acids (FISH OIL) 1000 MG capsule 9/3/2018 at am Self No Yes   Sig: Take 1 capsule by mouth 2 times daily.   furosemide (LASIX) 20 MG tablet 9/3/2018 at am Self No Yes   Sig: TAKE ONE TABLET BY MOUTH ONCE DAILY IN THE MORNING   insulin aspart (NOVOLOG FLEXPEN) 100 UNIT/ML injection 9/3/2018 at lunch Self No Yes   Si units before breakfast, 11 units before lunch, 11 units before dinner   Patient taking differently: Inject 11 Units Subcutaneous 3 times daily (before meals) 11 units before breakfast, 11 units before lunch, 11 units before dinner   insulin detemir (LEVEMIR FLEXPEN/FLEXTOUCH) 100 UNIT/ML injection 2018 at hs Self No Yes   Sig: Inject 30 Units Subcutaneous At Bedtime   insulin syringe-needle U-100 (BD  "INSULIN SYRINGE ULTRAFINE) 31G X 5/16\" 1 ML  Self No No   Sig: Use one syringe 4 times daily or as directed.   lisinopril (PRINIVIL/ZESTRIL) 5 MG tablet 9/3/2018 at am Self No Yes   Sig: TAKE ONE TABLET BY MOUTH ONCE DAILY   metFORMIN (GLUCOPHAGE) 1000 MG tablet 9/3/2018 at am Self No Yes   Sig: TAKE ONE TABLET BY MOUTH TWICE DAILY WITH MEALS   metoprolol tartrate (LOPRESSOR) 25 MG tablet 9/3/2018 at am Self No Yes   Sig: Take 1 tablet (25 mg) by mouth 2 times daily   nitroGLYcerin (NITROSTAT) 0.4 MG sublingual tablet More than a month at Unknown time Self No No   Sig: Place 1 tablet (0.4 mg) under the tongue every 5 minutes as needed for chest pain (CALL 911 IF NOT IMPROVED AFTER THREE CONSECUTIVE DOSES)   order for DME 9/2/2018 at hs Self Yes Yes   Sig: Equipment being ordered: CPAP  AIRSENSE 10  11-15 CM H2O  [employer] AIR SIZE MED  SN# [id number]  DN# [id number]   ranitidine (ZANTAC) 300 MG tablet 9/2/2018 at hs Self No Yes   Sig: Take 1 tablet (300 mg) by mouth At Bedtime   tamsulosin (FLOMAX) 0.4 MG capsule 9/3/2018 at am Self No Yes   Sig: Take 1 capsule (0.4 mg) by mouth daily (Needs follow-up appointment for this medication)   umeclidinium (INCRUSE ELLIPTA) 62.5 MCG/INH oral inhaler Past Week at Unknown time Self No Yes   Sig: Inhale 1 puff into the lungs daily      Facility-Administered Medications: None     Allergies   Allergies   Allergen Reactions     Prednisone Other (See Comments)     Severe interaction with DM       Family History    Family History   Problem Relation Age of Onset     C.A.D. Father      40s     Hypertension Father      C.A.D. Paternal Grandfather      HEART DISEASE Paternal Grandfather      Diabetes Brother      C.A.D. Brother      HEART DISEASE Brother      Hypertension Brother      GASTROINTESTINAL DISEASE Son      Crohn's disease     GASTROINTESTINAL DISEASE Other      2 grandaughters with Crohn's disease     Cancer - colorectal No family hx of        Social History   Social History " "    Social History     Marital status:      Spouse name: N/A     Number of children: N/A     Years of education: N/A     Occupational History      Retired           Social History Main Topics     Smoking status: Never Smoker     Smokeless tobacco: Never Used     Alcohol use No     Drug use: No     Sexual activity: No     Other Topics Concern     Parent/Sibling W/ Cabg, Mi Or Angioplasty Before 65f 55m? No     Social History Narrative       Review of Systems     A complete 10 point review of systems was negative except for items noted in the HPI/subjective.      Physical Exam   /61 (BP Location: Left arm)  Pulse 78  Temp 98.2  F (36.8  C) (Oral)  Resp 20  Ht 1.753 m (5' 9\")  Wt 86 kg (189 lb 9.5 oz)  SpO2 97%  BMI 28 kg/m2     Weight: 189 lbs 9.53 oz Body mass index is 28 kg/(m^2).     Constitutional: Alert, oriented, cooperative, no apparent distress, appears nontoxic, speaking in full sentences.     Eyes: Eyes are clear, pupils are reactive. No scleral icterus. Extroccular movements intact.     HEENT: Oropharynx is clear and moist, no lesions. Normocephalic, no evidence of cranial trauma.      Cardiovascular: Regular rhythm and rate, normal S1 and S2. No murmur, rubs, or gallops. Peripheral pulses in tact bilaterally. No lower extremity edema.    Respiratory: Lung sounds are clear to auscultation bilaterally without wheezes, rhonchi, or crackles.    GI: Soft, non-distended. Non-tender, no rebound or guarding. No hepatosplenomegaly or masses appreciated. Normal bowel sounds.     Musculoskeletal: Without obvious deformity, normal range of motion. Normal muscle bulk and tone.     Skin: Warm and dry, no rashes or ecchymoses. No mottling of skin.     Neurologic: Patient moves all extremities. Cranial nerves are grossly intact.  is symmetric. Gross strength and sensation are equal bilaterally.    Genitourinary: Deferred    Data   Data reviewed today:     Recent " Labs  Lab 09/03/18  2119 09/03/18  1810   WBC 12.0* Canceled, Test credited   HGB 13.7 Canceled, Test credited   MCV 90 Canceled, Test credited    Canceled, Test credited   INR  --  0.90   NA  --  139   POTASSIUM  --  4.0   CHLORIDE  --  103   CO2  --  28   BUN  --  20   CR  --  1.22   ANIONGAP  --  8   EVELINA  --  9.2   GLC  --  85   ALBUMIN  --  4.1   PROTTOTAL  --  7.8   BILITOTAL  --  0.5   ALKPHOS  --  125   ALT  --  32   AST  --  23   TROPI  --  <0.015       Recent Results (from the past 24 hour(s))   CT Head w/o Contrast    Narrative    CT SCAN OF THE HEAD WITHOUT CONTRAST   9/3/2018 7:20 PM     HISTORY: confusion;     TECHNIQUE:  Axial images of the head and coronal reformations without  IV contrast material. Radiation dose for this scan was reduced using  automated exposure control, adjustment of the mA and/or kV according  to patient size, or iterative reconstruction technique.    COMPARISON: None.    FINDINGS:  There is generalized atrophy of the brain.  White matter  changes are present in the cerebral hemispheres that are consistent  with small vessel ischemic disease in this age patient. There is no  evidence of intracranial hemorrhage, mass, acute infarct or anomaly.  The visualized portions of the sinuses and mastoids appear normal.  There is no evidence of trauma.      Impression    IMPRESSION: No acute pathology. No bleed, mass, or infarcts are seen.      ROBIN NDIAYE MD   CTA Head Neck with Contrast    Narrative    CTA HEAD NECK WITH CONTRAST  9/3/2018 7:24 PM     HISTORY: confusion;;     TECHNIQUE:  Precontrast localizing scans were followed by CT  angiography with an injection of 70 ml isovue 370 IV contrast with  scans through the head and neck.  Images were transferred to a  separate 3-D workstation where multiplanar reformations and 3-D images  were created.  Estimates of carotid stenoses are made relative to the  distal internal carotid artery diameters except as noted. Radiation  dose for  this scan was reduced using automated exposure control,  adjustment of the mA and/or kV according to patient size, or iterative  reconstruction technique.    COMPARISON: CT angiogram Exam dated 12/26/2014    FINDINGS: Estimates of stenosis at the carotid bifurcations are  relative to the distal internal carotids.    Arch: Calcified atherosclerotic plaque is seen in the arch of the  aorta.    Neck:  Right Carotid:  The right common carotid artery is normal.  Calcified  atherosclerotic plaque is seen at the right carotid bifurcation. No  significant.  The right internal carotid artery is negative.     Left Carotid:  The left common carotid artery is unremarkable.   Calcified atherosclerotic plaque left carotid bifurcation. No  significant stenosis..  The left internal carotid is negative.      Vertebrals:  The vertebral arteries are normal.    Head:  Right Carotid:No occluded vessels are seen. .    Left Carotid:  No occluded vessels are identified. .    Basilar:  The basilar artery and its branches appear normal. There is  a fetal origin of the right posterior cerebral. This is a normal  variant.    Miscellaneous: None.      Impression    IMPRESSION:   1. No stenosis is seen at either carotid bifurcation.  2. No arterial dissection is identified.  3. No high-grade intracranial vascular stenosis is identified.  4. Venous sinuses appear patent    ROBIN NDIAYE MD   Chest XR,  PA & LAT    Narrative    CHEST TWO VIEWS    9/3/2018 8:12 PM     HISTORY: Shortness of breath.     COMPARISON: 7/24/2018.      Impression    IMPRESSION: No acute cardiopulmonary disease. Hypoinflated lungs and  bibasilar atelectasis appears stable. Stable cardiac silhouette.     STEVIE MADERA MD       I personally reviewed the EKG tracing showing unclear if sinus rhythm vs afib - difficult to assess p-waves. RBBB present.    Xi Leong PA-C  [facility]  Pager: [phone]

## 2018-09-04 NOTE — ED NOTES
"Patient has  [facility] to Observation  order. Patient has been given the Observation brochure -  What does Observation mean to me.\"  Patient has been given the opportunity to ask questions about observation status and their plan of care.      Farhana Decker  "

## 2018-09-04 NOTE — PROGRESS NOTES
"[facility] [place] ADMISSION NOTE    Patient admitted to room 2308 at approximately 2220 via cart from emergency room. Patient was accompanied by transport tech.     Verbal SBAR report received from Francia prior to patient arrival.     Patient ambulated to bed independently. Patient alert and oriented X 2. The patient is not having any pain.  . Admission vital signs: Blood pressure 158/66, pulse 78, temperature 97.9  F (36.6  C), temperature source Oral, resp. rate 20, height 1.753 m (5' 9\"), weight 86 kg (189 lb 9.5 oz), SpO2 95 %. Patient was oriented to plan of care, call light, bed controls, tv, telephone, bathroom and visiting hours.     Risk Assessment    The following safety risks were identified during admission: none. Yellow risk band applied: NO.     Skin Initial Assessment    This writer admitted this patient and completed a full skin assessment and Ricardo score in the Adult PCS flowsheet. Appropriate interventions initiated as needed.     Secondary skin check refused by patient  .    Skin  Inspection of bony prominences: Full  Skin WDL: WDL  Skin Temperature: warm  Skin Moisture: dry  Skin Integrity: rash(es), scab(s), bruise(s) (left arm has bumpy area per patient )    Ricardo Risk Assessment  Sensory Perception: 4-->no impairment  Moisture: 4-->rarely moist  Activity: 4-->walks frequently  Mobility: 4-->no limitation  Nutrition: 3-->adequate  Friction and Shear: 3-->no apparent problem  Ricardo Score: 22  Bed Support Surface: Atmos Air mattress    Liza Lau    "

## 2018-09-04 NOTE — DISCHARGE SUMMARY
"[facility]    Discharge Summary  Hospital Medicine    Date of Admission:  9/3/2018  Date of Discharge:  9/4/2018   Discharging Provider: Thomas England  Date of Service: 9/4/2018      Primary Care     Be Carreno  [address] [place] [id number]      Identification and Chief Compaint: Alvaro Horton is a 78 year old male with a past medical history significant for coronary artery disease s/p CABG, paroxysmal atrial fibrillation, hx CVA, type 2 diabetes mellitus, BPH, GERD, CKD, and obstructive sleep apnea who presents on 9/3/2018 with altered mental status.    Discharge Diagnoses       Encephalopathy    Chronic ischemic heart disease    Moderate persistent asthma    Hypertension goal BP (blood pressure) < 140/90    Hypertrophy of prostate without urinary obstruction    Obstructive sleep apnea    CVA (cerebral vascular accident) (H)    Atrial fibrillation (H)    Hyperlipidemia LDL goal <70    COPD (chronic obstructive pulmonary disease) (H)    CKD (chronic kidney disease) stage 3, GFR 30-59 ml/min    Type 2 diabetes mellitus with diabetic chronic kidney disease (H)    Abnormal blood gas measurement    Discharge Disposition   Discharged to home    Discharge Orders     NEUROLOGY ADULT REFERRAL     Reason for your hospital stay   You had an episode yesterday during which you had difficulties thinking and weren't making sense to your son, which brought you into the hospital.  Your symptoms gradually went away on their own, and you're now feeling \"normal\".  You tell me that you'd prefer to go home and follow-up with a neurologist as an outpatient rather than continue evaluation here in-house, so we'll let you go home, but I do recommend you follow-up on this.     Follow-up and recommended labs and tests    Follow up with primary care provider, Be Carreno, within 7 days for hospital follow- up.  No specific follow up labs or test are needed.     Activity   Your activity " upon discharge: activity as tolerated, but no driving today please.     When to contact your care team   Call your primary doctor if you have any of the following: Additional episodes of confusion.     Full Code     Diet   Follow this diet upon discharge: Orders Placed This Encounter     Consistent Carbohydrate Diet [id number] Calories: Moderate Consistent CHO (4-6 CHO units/meal)          Discharge Medications   Current Discharge Medication List      CONTINUE these medications which have NOT CHANGED    Details   albuterol (PROAIR HFA/PROVENTIL HFA/VENTOLIN HFA) 108 (90 Base) MCG/ACT Inhaler Inhale 2 puffs into the lungs every 6 hours as needed for shortness of breath / dyspnea or wheezing  Qty: 1 Inhaler, Refills: 1      arformoterol (BROVANA) 15 MCG/2ML NEBU neb solution Take 2 mLs (15 mcg) by nebulization 2 times daily  Qty: 120 mL, Refills: 3    Associated Diagnoses: Chronic obstructive pulmonary disease, unspecified COPD type (H)      aspirin 81 MG tablet Take 81 mg by mouth daily       atorvastatin (LIPITOR) 80 MG tablet Take 1 tablet (80 mg) by mouth daily  Qty: 30 tablet, Refills: 11    Comments: Do not fill at this time, patient will call.  Associated Diagnoses: Hyperlipidemia LDL goal <70      budesonide (PULMICORT) 0.5 MG/2ML neb solution Take 2 mLs (0.5 mg) by nebulization 2 times daily  Qty: 60 ampule, Refills: 11    Associated Diagnoses: Moderate persistent asthma without complication      cholecalciferol 2000 UNITS tablet Take 1 tablet by mouth daily.  Qty: 90 tablet, Refills: 3    Associated Diagnoses: Memory loss      cyanocobalamin (BL VITAMIN B-12) 500 MCG TABS Take 500 mcg by mouth daily.  Qty: 90 tablet, Refills: 4    Associated Diagnoses: Memory loss      finasteride (PROSCAR) 5 MG tablet Take 1 tablet (5 mg) by mouth daily MUST KEEP UPCOMING APPT FOR FURTHER REFILLS  Qty: 90 tablet, Refills: 1    Associated Diagnoses: Urinary retention      fish oil-omega-3 fatty acids (FISH OIL) 1000 MG  capsule Take 1 capsule by mouth 2 times daily.  Qty: 180 capsule, Refills: 3    Associated Diagnoses: Hyperlipidemia LDL goal <100      furosemide (LASIX) 20 MG tablet TAKE ONE TABLET BY MOUTH ONCE DAILY IN THE MORNING  Qty: 30 tablet, Refills: 11    Associated Diagnoses: Chronic ischemic heart disease      insulin aspart (NOVOLOG FLEXPEN) 100 UNIT/ML injection 11 units before breakfast, 11 units before lunch, 11 units before dinner  Qty: 30 mL, Refills: 11    Comments: Do not fill at this time, patient will call.  Associated Diagnoses: Type 2 diabetes mellitus with stage 3 chronic kidney disease, with long-term current use of insulin (H)      insulin detemir (LEVEMIR FLEXPEN/FLEXTOUCH) 100 UNIT/ML injection Inject 30 Units Subcutaneous At Bedtime  Qty: 15 mL, Refills: 11    Comments: Do not fill at this time, patient will call.  Associated Diagnoses: Type 2 diabetes mellitus with stage 3 chronic kidney disease, with long-term current use of insulin (H)      lisinopril (PRINIVIL/ZESTRIL) 5 MG tablet TAKE ONE TABLET BY MOUTH ONCE DAILY  Qty: 90 tablet, Refills: 3    Associated Diagnoses: Hyperlipidemia LDL goal <70      metFORMIN (GLUCOPHAGE) 1000 MG tablet TAKE ONE TABLET BY MOUTH TWICE DAILY WITH MEALS  Qty: 60 tablet, Refills: 11    Comments: Please consider 90 day supplies to promote better adherence  Associated Diagnoses: Type 2 diabetes mellitus with stage 3 chronic kidney disease, with long-term current use of insulin (H)      metoprolol tartrate (LOPRESSOR) 25 MG tablet Take 1 tablet (25 mg) by mouth 2 times daily  Qty: 60 tablet, Refills: 11    Comments: Do not fill at this time, patient will call.  Associated Diagnoses: Chronic ischemic heart disease      ONETOUCH ULTRA test strip USE  TO CHECK GLUCOSE THREE TIMES DAILY  Qty: 300 strip, Refills: 1    Associated Diagnoses: Type 2 diabetes mellitus with diabetic chronic kidney disease (H)      order for DME Equipment being ordered: CPAP  AIRSENSE 10  11-15 CM  "H2O  QUATTRO AIR SIZE MED  SN# [id number]  DN# [id number]      ranitidine (ZANTAC) 300 MG tablet Take 1 tablet (300 mg) by mouth At Bedtime  Qty: 30 tablet, Refills: 1    Associated Diagnoses: Gastroesophageal reflux disease without esophagitis      tamsulosin (FLOMAX) 0.4 MG capsule Take 1 capsule (0.4 mg) by mouth daily (Needs follow-up appointment for this medication)  Qty: 90 capsule, Refills: 3    Associated Diagnoses: Benign non-nodular prostatic hyperplasia with lower urinary tract symptoms      umeclidinium (INCRUSE ELLIPTA) 62.5 MCG/INH oral inhaler Inhale 1 puff into the lungs daily  Qty: 1 Inhaler, Refills: 11    Comments: Replaces Spiriva due to insurance  Associated Diagnoses: Chronic obstructive pulmonary disease, unspecified COPD type (H)      blood glucose monitoring (NO BRAND SPECIFIED) meter device kit Use to test blood sugar 3 times daily or as directed.  Qty: 1 kit, Refills: 0    Comments: He would like a new one touch monitor if covered  Associated Diagnoses: Type 2 diabetes, HbA1C goal < 8% (H)      blood glucose monitoring (ONE TOUCH ULTRA 2) meter device kit Use to test blood sugars 4 times daily or as directed.  Qty: 1 kit, Refills: 0    Associated Diagnoses: Type 2 diabetes mellitus with diabetic chronic kidney disease (H)      Insulin Pen Needle (PEN NEEDLES) 31G X 6 MM MISC 1 Units 3 times daily For novolog flexpen  Qty: 90 each, Refills: 0    Comments: For use with Novolog and Levemir Flexpens  Associated Diagnoses: Type 2 diabetes, HbA1C goal < 8% (H)      insulin syringe-needle U-100 (BD INSULIN SYRINGE ULTRAFINE) 31G X 5/16\" 1 ML Use one syringe 4 times daily or as directed.  Qty: 100 each, Refills: prn    Comments: Uses Levemir 30 units at HS and Novolog 11 units before breakfast, lunch and dinner  Associated Diagnoses: Type 2 diabetes, HbA1C goal < 8% (H)      nitroGLYcerin (NITROSTAT) 0.4 MG sublingual tablet Place 1 tablet (0.4 mg) under the tongue every 5 minutes as needed for chest " pain (CALL 911 IF NOT IMPROVED AFTER THREE CONSECUTIVE DOSES)  Qty: 25 tablet, Refills: 0    Comments: Do not fill at this time, patient will call.  Associated Diagnoses: Chronic ischemic heart disease           Allergies   Allergies   Allergen Reactions     Prednisone Other (See Comments)     Severe interaction with DM       Consultations This Hospital Stay:    NUTRITION SERVICES ADULT IP CONSULT    Significant Results and Procedures   Procedures    None.    Data   Results for orders placed or performed during the hospital encounter of 09/03/18   CT Head w/o Contrast    Narrative    CT SCAN OF THE HEAD WITHOUT CONTRAST   9/3/2018 7:20 PM     HISTORY: confusion;     TECHNIQUE:  Axial images of the head and coronal reformations without  IV contrast material. Radiation dose for this scan was reduced using  automated exposure control, adjustment of the mA and/or kV according  to patient size, or iterative reconstruction technique.    COMPARISON: None.    FINDINGS:  There is generalized atrophy of the brain.  White matter  changes are present in the cerebral hemispheres that are consistent  with small vessel ischemic disease in this age patient. There is no  evidence of intracranial hemorrhage, mass, acute infarct or anomaly.  The visualized portions of the sinuses and mastoids appear normal.  There is no evidence of trauma.      Impression    IMPRESSION: No acute pathology. No bleed, mass, or infarcts are seen.      ROBIN NDIAYE MD   CTA Head Neck with Contrast    Narrative    CTA HEAD NECK WITH CONTRAST  9/3/2018 7:24 PM     HISTORY: confusion;;     TECHNIQUE:  Precontrast localizing scans were followed by CT  angiography with an injection of 70 ml isovue 370 IV contrast with  scans through the head and neck.  Images were transferred to a  separate 3-D workstation where multiplanar reformations and 3-D images  were created.  Estimates of carotid stenoses are made relative to the  distal internal carotid artery diameters  except as noted. Radiation  dose for this scan was reduced using automated exposure control,  adjustment of the mA and/or kV according to patient size, or iterative  reconstruction technique.    COMPARISON: CT angiogram Exam dated 12/26/2014    FINDINGS: Estimates of stenosis at the carotid bifurcations are  relative to the distal internal carotids.    Arch: Calcified atherosclerotic plaque is seen in the arch of the  aorta.    Neck:  Right Carotid:  The right common carotid artery is normal.  Calcified  atherosclerotic plaque is seen at the right carotid bifurcation. No  significant.  The right internal carotid artery is negative.     Left Carotid:  The left common carotid artery is unremarkable.   Calcified atherosclerotic plaque left carotid bifurcation. No  significant stenosis..  The left internal carotid is negative.      Vertebrals:  The vertebral arteries are normal.    Head:  Right Carotid:No occluded vessels are seen. .    Left Carotid:  No occluded vessels are identified. .    Basilar:  The basilar artery and its branches appear normal. There is  a fetal origin of the right posterior cerebral. This is a normal  variant.    Miscellaneous: None.      Impression    IMPRESSION:   1. No stenosis is seen at either carotid bifurcation.  2. No arterial dissection is identified.  3. No high-grade intracranial vascular stenosis is identified.  4. Venous sinuses appear patent    ROBIN NDIAYE MD   Chest XR,  PA & LAT    Narrative    CHEST TWO VIEWS    9/3/2018 8:12 PM     HISTORY: Shortness of breath.     COMPARISON: 7/24/2018.      Impression    IMPRESSION: No acute cardiopulmonary disease. Hypoinflated lungs and  bibasilar atelectasis appears stable. Stable cardiac silhouette.     STEVIE MADERA MD       History of Present Illness   (From H&P) Alvaro Horton is a 78 year old male with the above past medical history now presents on 9/3/2018 with ems / encephalopathy. Patient went for a 1 mile walk with his dog  "around 10 am and after felt very fatigued and had trouble catching his breath. He felt fine the rest of the day until after dinner.      He was sitting watching TV when he noticed a \"flicker\" in his left eye impairing his vision. He describes it as flickering spots in his left eye. This lasted for about 1 hour and has since self resolved and not recurred. At the same time, he began to have memory troubles, saying \"I couldn't remember how to work the TV remote.\" Unclear whether he had any speech changes at this time.     He notified his son that he wasn't feeling quite right, and his son brought him to the emergency department for evaluation. As he was in the car on the way to the emergency department he noticed his breathing was \"very deep\" but cannot remember if it was shallow or slow breathing.     No focal deficits were noticed once he got to the emergency department. No slurred speech, no confusion, no physical deficits. All symptoms had resolved and he currently feels back to baseline.   No recent falls, head trauma. No associated numbness, tingling, focal weakness, or headache.      On review of systems, he mentions having a decreased appetite and unintentional 30 pound weight loss over the past year. He has occasional diarrhea, but no more than usual lately. He has a faint rash on his left forearm that has been present for 6 months. The remainder review of systems is negative.    Hospital Course   Alvaro Horton was admitted on 9/3/2018.  The following problems were addressed during his hospitalization:    Encephalopathy, now resolved  Altered mental status  Unclear cause, most notable characteristic is that signs/symptoms resolved spontaneously within a few hours of onset, and patient has no residual.  Bright \"flashes of light\" in the left visual field (which he has had before in the past) suggest the possibility of complex migraine, though he has no associated headache.  Given that he has had a " "previous stroke, he could have a seizure focus, making this episode a possible complex/partial seizure.  There were no significant metabolic derangements during the episode.  Rhythm was NSR.  Head CT negative for acute intracranial abnormality, CTA head and neck without stenosis or dissection.  Discussed the possibility of doing a brain MRI, but patient declines to do that \"now or ever\" due to claustrophobia, despite the offer of sedation.  Discussed inpatient EEG to screen for seizure, but he declines.  He feels \"normal\" this morning, and wants to go home, but agrees to Neurology follow-up as outpatient.  - Will discharge home today without additional workup.  - Outpatient Neurology referral made.  - Takes aspirin prior to admission, continue.  - Advised patient to not drive today, and to report any additional episodes of confusion to PCP should they occur.        Abnormal blood gas measurement  Admit VBG showed pH 7.29, pCO2 34 suggesting metabolic acidosis with respiratory compensation, though at the same time a metabolic profile was drawn which was normal, showing no metabolic acidosis.   I think the metabolic profile is the more reliable of the two labs, and in my opinion he did not have a significant acid/base derangement on admission.    - No additional workup required.        Type 2 diabetes mellitus with diabetic chronic kidney disease (H)  HgbA1c pending.  Taking Levemir 20 U qhs, Novolog 11 U tid, and metformin prior to admission.  Glucose control variable since admit, 87 - 218, but no hypoglycemia to explain encephalopathy.  - Continue Levemir 20 U qhs  - Continue Novolog 11 U tid with meals  - Continue prior to admission metformin.     Moderate persistent asthma  COPD (chronic obstructive pulmonary disease) (H)  Wheezing but not hypoxic upon admission.  Received DuoNeb in emergency department with improvement. Taking Brovana, Pulmicort, Incruse, and albuterol prior to admission.  Present exam suggests " "moderate COPD not in exacerbation.  - Continue prior to admission inhaled Rx unchanged.        Chronic ischemic heart disease  Hyperlipidemia LDL goal <70  History of CABG in 2009. No current chest pain. Admit troponin negative. EKG with RBBB, no obvious ST changes.  - Takes aspirin, Lipitor, and metoprolol prior to admission, continue        Atrial fibrillation (H), paroxysmal  Developed 2009 after CABG. Admit EKG difficult to interpret whether P waves are present, but possibly in atrial fibrillation. Rate has been well-controlled here with metoprolol. Not on anticoagulation.  - Continue prior to admission metoprolol  - Continue prior to admission aspirin         CVA (cerebral vascular accident) (H)  Occurred in 2009 after CABG. Has minimal right sided hand problems, occasional \"clumsiness\" at baseline.  - Takes aspirin and Lipitor prior to admission, continue        Hypertension goal BP (blood pressure) < 140/90  Pressures reviewed, initially elevated but improving.   - Takes lasix, lisinopril, and metoprolol prior to admission, continue.        CKD (chronic kidney disease) stage 2  Baseline GFR since 2016 60 - 70, current GFR 61, at or near baseline.   - Monitor intermittently as outpatient.        Hypertrophy of prostate without urinary obstruction  Takes Proscar and Flomax prior to admission, continue.        Obstructive sleep apnea  Uses CPAP at home, did not bring his machine with.  - Resume home CPAP.       Pending Results   Unresulted Labs Ordered in the Past 30 Days of this Admission     No orders found for last 61 day(s).          Physical Exam   Temp:  [97.9  F (36.6  C)-98.9  F (37.2  C)] 98.9  F (37.2  C)  Pulse:  [64-78] 64  Heart Rate:  [67-84] 71  Resp:  [14-20] 18  BP: (128-186)/() 128/67  SpO2:  [88 %-99 %] 94 %  Vitals:    09/03/18 1804 09/03/18 2225   Weight: 86.2 kg (190 lb) 86 kg (189 lb 9.5 oz)       GENERAL: Pleasant, animated man, looks well.  EYES: Eyes grossly normal to inspection, " extraocular movements intact  HENT: Nares patent bilaterally.  Nasal mucosa normal, no discharge.   NECK: Trachea midline, no stridor.    RESP: No accessory muscle use.  Symmetrically reduced breath sounds to a moderate degree.  Lungs distant but clear throughout on inspiration and expiration.  Expiration moderately prolonged, no wheeze.  CV: Regular rate and rhythm, non-tachycardic.  Normal S1 S2, no murmur heard, no extra sound.  No lower extremity edema.  ABDOMEN: Soft, non-tender, no guarding.  Liver and spleen not enlarged, no masses palpable.  Bowel sounds positive.  MS: No bony deformities noted.  No red or inflamed joints.  SKIN: Warm and dry, no rashes.  NEURO: Alert, oriented, conversant.  Fluent speech, no dysarthria Cranial nerves III - XII grossly intact aside from wearing hearing aids.  Tone normal, no tremor.  No gross motor or sensory deficits.    PSYCH: Calm, alert, conversant.  Able to articulate logical thoughts, no tangential thoughts, no hallucinations or delusions.  Affect normal.        The discharge plan was discussed with the patient, who expresses agreement.    Total time on this discharge was 40 minutes.    Thomas England MD

## 2018-09-05 ENCOUNTER — TELEPHONE (OUTPATIENT)
Dept: FAMILY MEDICINE | Facility: CLINIC | Age: 78
End: 2018-09-05

## 2018-09-05 NOTE — TELEPHONE ENCOUNTER
Caller states she is pt's daughter. She does not know his . Pt is not present w/ her. She is in another city. Not sure of reason for her call. Before we could discuss this another call came in on her call waiting and she said it was her brother and she needed to take the call.  Sara Amador RN/FNA

## 2018-09-05 NOTE — TELEPHONE ENCOUNTER
ED/UC/IP follow up phone call: [facility] discharge 9/4/18  Altered mental status, Encephalopathy    RN please call to follow up.    Number of ED visits in past 12 months = 2

## 2018-09-05 NOTE — TELEPHONE ENCOUNTER
"ED/Discharge Protocol    \"Hi, my name is Zabrina Silva, a registered nurse, and I am calling on behalf of Dr. Carreno's office at [facility].  I am calling to follow up and see how things are going for you after your recent visit.\"    \"I see that you were in the (ER/UC/IP) on 9-3-18.    How are you doing now that you are home?\" fine he says    Is patient experiencing symptoms that may require a hospital visit?  no    Discharge Instructions    \"Let's review your discharge instructions.  What is/are the follow-up recommendations?  Pt. Response: not sure    \"Were you instructed to make a follow-up appointment?\"  Pt. Response: he did not know if he had an appointment so I told him he does    \"When you see the provider, I would recommend that you bring your discharge instructions with you.    Medications    \"How many new medications are you on since your hospitalization/ED visit?\"    0-1  \"How many of your current medicines changed (dose, timing, name, etc.) while you were in the hospital/ED visit?\"   0-1  \"Do you have questions about your medications?\"   No  \"Were you newly diagnosed with heart failure, COPD, diabetes or did you have a heart attack?\"   No  For patients on insulin: \"Did you start on insulin in the hospital or did you have your insulin dose changed?\"   No    Medication reconciliation completed? No    Was MTM referral placed (*Make sure to put transitions as reason for referral)?   No    Call Summary    \"Do you have any questions or concerns about your condition or care plan at the moment?\"    No  Triage nurse advice given: call if questions    Patient was in ER 2 in the past year (assess appropriateness of ER visits.)      \"If you have questions or things don't continue to improve, we encourage you contact us through the main clinic number,  [phone].  Even if the clinic is not open, triage nurses are available 24/7 to help you.     We would like you to know that our clinic has extended hours (provide " "information).  We also have urgent care (provide details on closest location and hours/contact info)\"      \"Thank you for your time and take care!\"        "

## 2018-09-12 ENCOUNTER — OFFICE VISIT (OUTPATIENT)
Dept: FAMILY MEDICINE | Facility: CLINIC | Age: 78
End: 2018-09-12
Payer: COMMERCIAL

## 2018-09-12 VITALS
WEIGHT: 191 LBS | TEMPERATURE: 98.1 F | BODY MASS INDEX: 28.21 KG/M2 | OXYGEN SATURATION: 95 % | HEART RATE: 73 BPM | SYSTOLIC BLOOD PRESSURE: 110 MMHG | RESPIRATION RATE: 20 BRPM | DIASTOLIC BLOOD PRESSURE: 64 MMHG

## 2018-09-12 DIAGNOSIS — R41.82 ALTERED MENTAL STATUS, UNSPECIFIED ALTERED MENTAL STATUS TYPE: Primary | ICD-10-CM

## 2018-09-12 PROCEDURE — 99207 ZZC NO BILLABLE SERVICE THIS VISIT: CPT | Performed by: FAMILY MEDICINE

## 2018-09-12 ASSESSMENT — PAIN SCALES - GENERAL: PAINLEVEL: NO PAIN (0)

## 2018-09-12 NOTE — NURSING NOTE
"Chief Complaint   Patient presents with     Hospital F/U     Medication Reconciliation     Lasix on med list but states has not taken since July. Hand cramps have gone away now. Medication discontinued.  Also, not taking Ellipta daily- please adress.       Initial /64 (BP Location: Right arm, Patient Position: Chair, Cuff Size: Adult Large)  Pulse 73  Temp 98.1  F (36.7  C) (Tympanic)  Resp 20  Wt 191 lb (86.6 kg)  SpO2 95%  BMI 28.21 kg/m2 Estimated body mass index is 28.21 kg/(m^2) as calculated from the following:    Height as of 9/3/18: 5' 9\" (1.753 m).    Weight as of this encounter: 191 lb (86.6 kg).    Patient presents to the clinic using No DME    Health Maintenance that is potentially due pending provider review:  NONE    n/a    Is there anyone who you would like to be able to receive your results? No  If yes have patient fill out [facility]  Zabrina Churchill CMA    "

## 2018-09-12 NOTE — PROGRESS NOTES
SUBJECTIVE:   Alvaro Horton is a 78 year old male who presents to clinic today for the following health issues:  Chief Complaint   Patient presents with     Hospital F/U     Medication Reconciliation     Lasix on med list but states has not taken since July. Hand cramps have gone away now. Medication discontinued.  Also, not taking Ellipta daily- please adress.      Hospital Follow-up Visit:    Hospital/Nursing Home/IP Rehab Facility: [facility]  Date of Admission: 9/3/2018  Date of Discharge: 9/4/2018  Reason(s) for Admission: altered mental state- encephalopathy            Problems taking medications regularly:  None       Medication changes since discharge: None       Problems adhering to non-medication therapy:  None    Summary of hospitalization:  [facility] discharge summary reviewed  See abstracted notes from recent hospital stay below.   Diagnostic Tests/Treatments reviewed.  Follow up needed: none  Other Healthcare Providers Involved in Patient s Care:         neurology  Update since discharge: stable.     Post Discharge Medication Reconciliation: discharge medications reconciled, continue medications without change.  Plan of care communicated with patient     Coding guidelines for this visit:  Type of Medical   Decision Making Face-to-Face Visit       within 7 Days of discharge Face-to-Face Visit        within 14 days of discharge   Moderate Complexity [id number]   High Complexity [id number]        Franklin left today without being seen in clinic.  While sitting in the exam room he realized he had needed to get home right away to pay a workman for a septic system.  He was thinking he really did not feel like he needed a follow-up anyway.  I did contact him later in the day by phone to apologize for being late and he apologized to me for leaving abruptly.  She reports he is doing well and had no further mental status changes or problems with confusion like he had in the hospital.   He does note some memory problems and he is checking with her neurologist in November.  He reported no particular change in his breathing.  Her blood sugars have been doing reasonably well.  His last A1c in May was 8.0 .  Plan.  I do not think he needs another visit since he has been feeling well.  I do recommend follow-up for diabetes and review other chronic problems in November.  Contact me or recheck if he is having other problems in the interval.    =========================================  [facility]     Discharge Summary  Hospital Medicine     Date of Admission:  9/3/2018  Date of Discharge:  9/4/2018     Identification and Chief Compaint: Alvaro Horton is a 78 year old male with a past medical history significant for coronary artery disease s/p CABG, paroxysmal atrial fibrillation, hx CVA, type 2 diabetes mellitus, BPH, GERD, CKD, and obstructive sleep apnea who presents on 9/3/2018 with altered mental status.        Discharge Diagnoses           Encephalopathy    Chronic ischemic heart disease    Moderate persistent asthma    Hypertension goal BP (blood pressure) < 140/90    Hypertrophy of prostate without urinary obstruction    Obstructive sleep apnea    CVA (cerebral vascular accident) (H)    Atrial fibrillation (H)    Hyperlipidemia LDL goal <70    COPD (chronic obstructive pulmonary disease) (H)    CKD (chronic kidney disease) stage 3, GFR 30-59 ml/min    Type 2 diabetes mellitus with diabetic chronic kidney disease (H)    Abnormal blood gas measurement    History of Present Illness     (From H&P)               Alvaro Horton is a 78 year old male with the above past medical history now presents on 9/3/2018 with ems / encephalopathy. Patient went for a 1 mile walk with his dog around 10 am and after felt very fatigued and had trouble catching his breath. He felt fine the rest of the day until after dinner.       He was sitting watching TV when he noticed a  "\"flicker\" in his left eye impairing his vision. He describes it as flickering spots in his left eye. This lasted for about 1 hour and has since self resolved and not recurred. At the same time, he began to have memory troubles, saying \"I couldn't remember how to work the TV remote.\" Unclear whether he had any speech changes at this time.      He notified his son that he wasn't feeling quite right, and his son brought him to the emergency department for evaluation. As he was in the car on the way to the emergency department he noticed his breathing was \"very deep\" but cannot remember if it was shallow or slow breathing.      No focal deficits were noticed once he got to the emergency department. No slurred speech, no confusion, no physical deficits. All symptoms had resolved and he currently feels back to baseline.   No recent falls, head trauma. No associated numbness, tingling, focal weakness, or headache.       On review of systems, he mentions having a decreased appetite and unintentional 30 pound weight loss over the past year. He has occasional diarrhea, but no more than usual lately. He has a faint rash on his left forearm that has been present for 6 months. The remainder review of systems is negative.    Hospital Course     Alvaro Horton was admitted on 9/3/2018.  The following problems were addressed during his hospitalization:     Encephalopathy, now resolved  Altered mental status  Unclear cause, most notable characteristic is that signs/symptoms resolved spontaneously within a few hours of onset, and patient has no residual.  Bright \"flashes of light\" in the left visual field (which he has had before in the past) suggest the possibility of complex migraine, though he has no associated headache.  Given that he has had a previous stroke, he could have a seizure focus, making this episode a possible complex/partial seizure.  There were no significant metabolic derangements during the episode.  Rhythm " "was NSR.  Head CT negative for acute intracranial abnormality, CTA head and neck without stenosis or dissection.  Discussed the possibility of doing a brain MRI, but patient declines to do that \"now or ever\" due to claustrophobia, despite the offer of sedation.  Discussed inpatient EEG to screen for seizure, but he declines.  He feels \"normal\" this morning, and wants to go home, but agrees to Neurology follow-up as outpatient.  - Will discharge home today without additional workup.  - Outpatient Neurology referral made.  - Takes aspirin prior to admission, continue.  - Advised patient to not drive today, and to report any additional episodes of confusion to PCP should they occur.          Abnormal blood gas measurement  Admit VBG showed pH 7.29, pCO2 34 suggesting metabolic acidosis with respiratory compensation, though at the same time a metabolic profile was drawn which was normal, showing no metabolic acidosis.   I think the metabolic profile is the more reliable of the two labs, and in my opinion he did not have a significant acid/base derangement on admission.    - No additional workup required.          Type 2 diabetes mellitus with diabetic chronic kidney disease (H)  HgbA1c pending.  Taking Levemir 20 U qhs, Novolog 11 U tid, and metformin prior to admission.  Glucose control variable since admit, 87 - 218, but no hypoglycemia to explain encephalopathy.  - Continue Levemir 20 U qhs  - Continue Novolog 11 U tid with meals  - Continue prior to admission metformin.      Moderate persistent asthma  COPD (chronic obstructive pulmonary disease) (H)  Wheezing but not hypoxic upon admission.  Received DuoNeb in emergency department with improvement. Taking Brovana, Pulmicort, Incruse, and albuterol prior to admission.  Present exam suggests moderate COPD not in exacerbation.  - Continue prior to admission inhaled Rx unchanged.          Chronic ischemic heart disease  Hyperlipidemia LDL goal <70  History of CABG in " "2009. No current chest pain. Admit troponin negative. EKG with RBBB, no obvious ST changes.  - Takes aspirin, Lipitor, and metoprolol prior to admission, continue          Atrial fibrillation (H), paroxysmal  Developed 2009 after CABG. Admit EKG difficult to interpret whether P waves are present, but possibly in atrial fibrillation. Rate has been well-controlled here with metoprolol. Not on anticoagulation.  - Continue prior to admission metoprolol  - Continue prior to admission aspirin           CVA (cerebral vascular accident) (H)  Occurred in 2009 after CABG. Has minimal right sided hand problems, occasional \"clumsiness\" at baseline.  - Takes aspirin and Lipitor prior to admission, continue          Hypertension goal BP (blood pressure) < 140/90  Pressures reviewed, initially elevated but improving.   - Takes lasix, lisinopril, and metoprolol prior to admission, continue.          CKD (chronic kidney disease) stage 2  Baseline GFR since 2016 60 - 70, current GFR 61, at or near baseline.   - Monitor intermittently as outpatient.          Hypertrophy of prostate without urinary obstruction  Takes Proscar and Flomax prior to admission, continue.          Obstructive sleep apnea  Uses CPAP at home, did not bring his machine with.  - Resume home CPAP.    "

## 2018-09-12 NOTE — MR AVS SNAPSHOT
After Visit Summary   9/12/2018    Alvaro Horton    MRN: [id number]           Patient Information     Date Of Birth          1940        Visit Information        Provider Department      9/12/2018 2:00 PM Be Carreno MD [facility]        Today's Diagnoses     Altered mental status, unspecified altered mental status type    -  1       Follow-ups after your visit        Your next 10 appointments already scheduled     Nov 19, 2018 11:00 AM CST   New Visit with Estelita Mace MD   [facility]    [address] [address]   [phone]              Who to contact     If you have questions or need follow up information about today's clinic visit or your schedule please contact [facility] directly at [phone].  Normal or non-critical lab and imaging results will be communicated to you by MyChart, letter or phone within 4 business days after the clinic has received the results. If you do not hear from us within 7 days, please contact the clinic through MyChart or phone. If you have a critical or abnormal lab result, we will notify you by phone as soon as possible.  Submit refill requests through [employer] or call your pharmacy and they will forward the refill request to us. Please allow 3 business days for your refill to be completed.          Additional Information About Your Visit        Your Vitals Were     Pulse Temperature Respirations Pulse Oximetry BMI (Body Mass Index)       73 98.1  F (36.7  C) (Tympanic) 20 95% 28.21 kg/m2        Blood Pressure from Last 3 Encounters:   09/12/18 110/64   09/04/18 128/67   07/25/18 120/70    Weight from Last 3 Encounters:   09/12/18 191 lb (86.6 kg)   09/03/18 189 lb 9.5 oz (86 kg)   07/25/18 191 lb (86.6 kg)              Today, you had the following     No orders found for display         Today's Medication Changes          These  changes are accurate as of 9/12/18  5:45 PM.  If you have any questions, ask your nurse or doctor.               These medicines have changed or have updated prescriptions.        Dose/Directions    cyanocobalamin 500 MCG Tabs   Commonly known as:  BL VITAMIN B-12   This may have changed:  how much to take   Used for:  Memory loss        Dose:  500 mcg   Take 500 mcg by mouth daily.   Quantity:  90 tablet   Refills:  4       insulin aspart 100 UNIT/ML injection   Commonly known as:  NovoLOG FLEXPEN   This may have changed:    - how much to take  - how to take this  - when to take this  - additional instructions   Used for:  Type 2 diabetes mellitus with stage 3 chronic kidney disease, with long-term current use of insulin (H)        11 units before breakfast, 11 units before lunch, 11 units before dinner   Quantity:  30 mL   Refills:  11       umeclidinium 62.5 MCG/INH inhaler   Commonly known as:  INCRUSE ELLIPTA   This may have changed:  additional instructions   Used for:  Chronic obstructive pulmonary disease, unspecified COPD type (H)        Dose:  1 puff   Inhale 1 puff into the lungs daily   Quantity:  1 Inhaler   Refills:  11         Stop taking these medicines if you haven't already. Please contact your care team if you have questions.     furosemide 20 MG tablet   Commonly known as:  LASIX   Stopped by:  Be Carreno MD                    Primary Care Provider Office Phone # Fax #    Be Carreno [phone] [address] [place] [id number]        Equal Access to Services     [facility]: Hadii radha Lin, waaxda luqadaha, qaybta kaalmaanton reyes, tory terry . So [facility] [phone].    ATENCIÓN: Si habla español, tiene a rahman disposición servicios gratuitos de asistencia lingüística. Llame al [phone].    We comply with applicable federal civil rights laws and [place] laws. We do not discriminate on the basis of race,  color, national origin, age, disability, sex, sexual orientation, or gender identity.            Thank you!     Thank you for choosing [facility]  for your care. Our goal is always to provide you with excellent care. Hearing back from our patients is one way we can continue to improve our services. Please take a few minutes to complete the written survey that you may receive in the mail after your visit with us. Thank you!             Your Updated Medication List - Protect others around you: Learn how to safely use, store and throw away your medicines at www.disposemymeds.org.          This list is accurate as of 9/12/18  5:45 PM.  Always use your most recent med list.                   Brand Name Dispense Instructions for use Diagnosis    albuterol 108 (90 Base) MCG/ACT inhaler    PROAIR HFA/PROVENTIL HFA/VENTOLIN HFA    1 Inhaler    Inhale 2 puffs into the lungs every 6 hours as needed for shortness of breath / dyspnea or wheezing        arformoterol 15 MCG/2ML Nebu neb solution    BROVANA    120 mL    Take 2 mLs (15 mcg) by nebulization 2 times daily    Chronic obstructive pulmonary disease, unspecified COPD type (H)       aspirin 81 MG tablet      Take 81 mg by mouth daily        atorvastatin 80 MG tablet    LIPITOR    30 tablet    Take 1 tablet (80 mg) by mouth daily    Hyperlipidemia LDL goal <70       * blood glucose monitoring meter device kit     1 kit    Use to test blood sugars 4 times daily or as directed.    Type 2 diabetes mellitus with diabetic chronic kidney disease (H)       * blood glucose monitoring meter device kit    no brand specified    1 kit    Use to test blood sugar 3 times daily or as directed.    Type 2 diabetes, HbA1C goal < 8% (H)       budesonide 0.5 MG/2ML neb solution    PULMICORT    60 ampule    Take 2 mLs (0.5 mg) by nebulization 2 times daily    Moderate persistent asthma without complication       cholecalciferol 2000 units tablet     90 tablet    Take 1 tablet  "by mouth daily.    Memory loss       cyanocobalamin 500 MCG Tabs    BL VITAMIN B-12    90 tablet    Take 500 mcg by mouth daily.    Memory loss       finasteride 5 MG tablet    PROSCAR    90 tablet    Take 1 tablet (5 mg) by mouth daily MUST KEEP UPCOMING APPT FOR FURTHER REFILLS    Urinary retention       fish oil-omega-3 fatty acids 1000 MG capsule     180 capsule    Take 1 capsule by mouth 2 times daily.    Hyperlipidemia LDL goal <100       insulin aspart 100 UNIT/ML injection    NovoLOG FLEXPEN    30 mL    11 units before breakfast, 11 units before lunch, 11 units before dinner    Type 2 diabetes mellitus with stage 3 chronic kidney disease, with long-term current use of insulin (H)       insulin detemir 100 UNIT/ML injection    LEVEMIR FLEXPEN/FLEXTOUCH    15 mL    Inject 30 Units Subcutaneous At Bedtime    Type 2 diabetes mellitus with stage 3 chronic kidney disease, with long-term current use of insulin (H)       insulin syringe-needle U-100 31G X 5/16\" 1 ML    BD insulin syringe ULTRAFINE    100 each    Use one syringe 4 times daily or as directed.    Type 2 diabetes, HbA1C goal < 8% (H)       lisinopril 5 MG tablet    PRINIVIL/ZESTRIL    90 tablet    TAKE ONE TABLET BY MOUTH ONCE DAILY    Hyperlipidemia LDL goal <70       metFORMIN 1000 MG tablet    GLUCOPHAGE    60 tablet    TAKE ONE TABLET BY MOUTH TWICE DAILY WITH MEALS    Type 2 diabetes mellitus with stage 3 chronic kidney disease, with long-term current use of insulin (H)       metoprolol tartrate 25 MG tablet    LOPRESSOR    60 tablet    Take 1 tablet (25 mg) by mouth 2 times daily    Chronic ischemic heart disease       nitroGLYcerin 0.4 MG sublingual tablet    NITROSTAT    25 tablet    Place 1 tablet (0.4 mg) under the tongue every 5 minutes as needed for chest pain (CALL 911 IF NOT IMPROVED AFTER THREE CONSECUTIVE DOSES)    Chronic ischemic heart disease       ONETOUCH ULTRA test strip   Generic drug:  blood glucose monitoring     300 strip    " USE  TO CHECK GLUCOSE THREE TIMES DAILY    Type 2 diabetes mellitus with diabetic chronic kidney disease (H)       order for DME      Equipment being ordered: CPAP AIRSENSE 10 11-15 CM H2O QUATTRO AIR SIZE MED SN# [id number]  DN# [id number]        pen needles 31G X 6 MM Misc     90 each    1 Units 3 times daily For novolog flexpen    Type 2 diabetes, HbA1C goal < 8% (H)       ranitidine 300 MG tablet    ZANTAC    30 tablet    Take 1 tablet (300 mg) by mouth At Bedtime    Gastroesophageal reflux disease without esophagitis       tamsulosin 0.4 MG capsule    FLOMAX    90 capsule    Take 1 capsule (0.4 mg) by mouth daily (Needs follow-up appointment for this medication)    Benign non-nodular prostatic hyperplasia with lower urinary tract symptoms       umeclidinium 62.5 MCG/INH inhaler    INCRUSE ELLIPTA    1 Inhaler    Inhale 1 puff into the lungs daily    Chronic obstructive pulmonary disease, unspecified COPD type (H)       * Notice:  This list has 2 medication(s) that are the same as other medications prescribed for you. Read the directions carefully, and ask your doctor or other care provider to review them with you.

## 2018-09-13 ASSESSMENT — ASTHMA QUESTIONNAIRES: ACT_TOTALSCORE: 21

## 2018-09-25 DIAGNOSIS — K21.9 GASTROESOPHAGEAL REFLUX DISEASE WITHOUT ESOPHAGITIS: ICD-10-CM

## 2018-10-08 ENCOUNTER — TRANSFERRED RECORDS (OUTPATIENT)
Dept: HEALTH INFORMATION MANAGEMENT | Facility: CLINIC | Age: 78
End: 2018-10-08

## 2018-10-10 ENCOUNTER — TRANSFERRED RECORDS (OUTPATIENT)
Dept: HEALTH INFORMATION MANAGEMENT | Facility: CLINIC | Age: 78
End: 2018-10-10

## 2018-10-17 DIAGNOSIS — E11.22 TYPE 2 DIABETES MELLITUS WITH DIABETIC CHRONIC KIDNEY DISEASE (H): ICD-10-CM

## 2018-10-29 ENCOUNTER — OFFICE VISIT (OUTPATIENT)
Dept: FAMILY MEDICINE | Facility: CLINIC | Age: 78
End: 2018-10-29
Payer: COMMERCIAL

## 2018-10-29 VITALS
WEIGHT: 193 LBS | SYSTOLIC BLOOD PRESSURE: 120 MMHG | RESPIRATION RATE: 18 BRPM | DIASTOLIC BLOOD PRESSURE: 70 MMHG | TEMPERATURE: 97.4 F | HEART RATE: 72 BPM | HEIGHT: 69 IN | BODY MASS INDEX: 28.58 KG/M2

## 2018-10-29 DIAGNOSIS — R41.3 MEMORY LOSS: Primary | ICD-10-CM

## 2018-10-29 PROCEDURE — 99213 OFFICE O/P EST LOW 20 MIN: CPT | Performed by: FAMILY MEDICINE

## 2018-10-29 RX ORDER — DONEPEZIL HYDROCHLORIDE 5 MG/1
5 TABLET, FILM COATED ORAL DAILY
COMMUNITY
Start: 2018-10-10 | End: 2019-03-02

## 2018-10-29 RX ORDER — DONEPEZIL HYDROCHLORIDE 5 MG/1
5 TABLET, FILM COATED ORAL AT BEDTIME
Qty: 30 TABLET | Refills: 1 | COMMUNITY
Start: 2018-10-29 | End: 2019-03-02

## 2018-10-29 NOTE — MR AVS SNAPSHOT
After Visit Summary   10/29/2018    Alvaro Horton    MRN: [id number]           Patient Information     Date Of Birth          1940        Visit Information        Provider Department      10/29/2018 9:20 AM Be Carreno MD [facility]        Today's Diagnoses     Memory loss    -  1      Care Instructions    ASSESSMENT:   (R41.3) Memory loss  (primary encounter diagnosis)  Comment: Recent Alzheimer's dementia diagnosis through [facility].  I have not seen records of this visit.     Plan:   Let's send for your [facility] records.   You need neurology follow-up.  You have appointments both for [facility] 12/7/2018 and at [facility] on 11/19/2018.  Choose one neurologist to follow-up with.  You have lung specialist follow-up.   You were recommended driving evaluation which I think is a good idea.   Continue the donepezil at 5mg daily as you have been.   Complete a Health Care Directive.           Follow-ups after your visit        Follow-up notes from your care team     Return in about 2 months (around 12/29/2018).      Your next 10 appointments already scheduled     Nov 19, 2018 11:00 AM CST   New Visit with Estelita Mace MD   [facility]    [address] [address] [phone]              Who to contact     If you have questions or need follow up information about today's clinic visit or your schedule please contact [facility] directly at [phone].  Normal or non-critical lab and imaging results will be communicated to you by MyChart, letter or phone within 4 business days after the clinic has received the results. If you do not hear from us within 7 days, please contact the clinic through MyChart or phone. If you have a critical or abnormal lab result, we will notify you by phone as soon as possible.  Submit refill requests through [employer] or call  "your pharmacy and they will forward the refill request to us. Please allow 3 business days for your refill to be completed.          Additional Information About Your Visit        Your Vitals Were     Pulse Temperature Respirations Height BMI (Body Mass Index)       72 97.4  F (36.3  C) (Tympanic) 18 5' 9\" (1.753 m) 28.5 kg/m2        Blood Pressure from Last 3 Encounters:   10/29/18 120/70   09/12/18 110/64   09/04/18 128/67    Weight from Last 3 Encounters:   10/29/18 193 lb (87.5 kg)   09/12/18 191 lb (86.6 kg)   09/03/18 189 lb 9.5 oz (86 kg)              Today, you had the following     No orders found for display         Today's Medication Changes          These changes are accurate as of 10/29/18 10:29 AM.  If you have any questions, ask your nurse or doctor.               These medicines have changed or have updated prescriptions.        Dose/Directions    cyanocobalamin 500 MCG Tabs   Commonly known as:  BL VITAMIN B-12   This may have changed:  how much to take   Used for:  Memory loss        Dose:  500 mcg   Take 500 mcg by mouth daily.   Quantity:  90 tablet   Refills:  4       insulin aspart 100 UNIT/ML injection   Commonly known as:  NovoLOG FLEXPEN   This may have changed:    - how much to take  - how to take this  - when to take this  - additional instructions   Used for:  Type 2 diabetes mellitus with stage 3 chronic kidney disease, with long-term current use of insulin (H)        11 units before breakfast, 11 units before lunch, 11 units before dinner   Quantity:  30 mL   Refills:  11       umeclidinium 62.5 MCG/INH inhaler   Commonly known as:  INCRUSE ELLIPTA   This may have changed:  additional instructions   Used for:  Chronic obstructive pulmonary disease, unspecified COPD type (H)        Dose:  1 puff   Inhale 1 puff into the lungs daily   Quantity:  1 Inhaler   Refills:  11                Primary Care Provider Office Phone # Fax #    Be Carreno [phone] [facility] [address]        Equal Access to Services     AUGUSTIN SCHWAB : Hadvelia aad ku hadcalderonlionel Lin, wasladeanton enamorado, qachaseanusha cazaresmatory mcmillantimchencho draper. So [facility] [phone].    ATENCIÓN: Si habla español, tiene a rahman disposición servicios gratuitos de asistencia lingüística. Rolo al [phone].    We comply with applicable federal civil rights laws and [place] laws. We do not discriminate on the basis of race, color, national origin, age, disability, sex, sexual orientation, or gender identity.            Thank you!     Thank you for choosing [facility]  for your care. Our goal is always to provide you with excellent care. Hearing back from our patients is one way we can continue to improve our services. Please take a few minutes to complete the written survey that you may receive in the mail after your visit with us. Thank you!             Your Updated Medication List - Protect others around you: Learn how to safely use, store and throw away your medicines at www.disposemymeds.org.          This list is accurate as of 10/29/18 10:29 AM.  Always use your most recent med list.                   Brand Name Dispense Instructions for use Diagnosis    albuterol 108 (90 Base) MCG/ACT inhaler    PROAIR HFA/PROVENTIL HFA/VENTOLIN HFA    1 Inhaler    Inhale 2 puffs into the lungs every 6 hours as needed for shortness of breath / dyspnea or wheezing        arformoterol 15 MCG/2ML Nebu neb solution    BROVANA    120 mL    Take 2 mLs (15 mcg) by nebulization 2 times daily    Chronic obstructive pulmonary disease, unspecified COPD type (H)       aspirin 81 MG tablet      Take 81 mg by mouth daily        atorvastatin 80 MG tablet    LIPITOR    30 tablet    Take 1 tablet (80 mg) by mouth daily    Hyperlipidemia LDL goal <70       * blood glucose monitoring meter device kit     1 kit    Use to test blood sugars 4 times daily or as directed.    Type 2 diabetes  "mellitus with diabetic chronic kidney disease (H)       * blood glucose monitoring meter device kit    no brand specified    1 kit    Use to test blood sugar 3 times daily or as directed.    Type 2 diabetes, HbA1C goal < 8% (H)       blood glucose monitoring test strip    ONETOUCH ULTRA    300 strip    USE  TO CHECK GLUCOSE THREE TIMES DAILY    Type 2 diabetes mellitus with diabetic chronic kidney disease (H)       budesonide 0.5 MG/2ML neb solution    PULMICORT    60 ampule    Take 2 mLs (0.5 mg) by nebulization 2 times daily    Moderate persistent asthma without complication       cholecalciferol 2000 units tablet     90 tablet    Take 1 tablet by mouth daily.    Memory loss       cyanocobalamin 500 MCG Tabs    BL VITAMIN B-12    90 tablet    Take 500 mcg by mouth daily.    Memory loss       * donepezil 5 MG tablet    ARIcept     Take 5 mg by mouth daily        * ARICEPT 5 MG tablet   Generic drug:  donepezil     30 tablet    Take 1 tablet (5 mg) by mouth At Bedtime        finasteride 5 MG tablet    PROSCAR    90 tablet    Take 1 tablet (5 mg) by mouth daily MUST KEEP UPCOMING APPT FOR FURTHER REFILLS    Urinary retention       fish oil-omega-3 fatty acids 1000 MG capsule     180 capsule    Take 1 capsule by mouth 2 times daily.    Hyperlipidemia LDL goal <100       insulin aspart 100 UNIT/ML injection    NovoLOG FLEXPEN    30 mL    11 units before breakfast, 11 units before lunch, 11 units before dinner    Type 2 diabetes mellitus with stage 3 chronic kidney disease, with long-term current use of insulin (H)       insulin detemir 100 UNIT/ML injection    LEVEMIR FLEXPEN/FLEXTOUCH    15 mL    Inject 30 Units Subcutaneous At Bedtime    Type 2 diabetes mellitus with stage 3 chronic kidney disease, with long-term current use of insulin (H)       insulin syringe-needle U-100 31G X 5/16\" 1 ML    BD insulin syringe ULTRAFINE    100 each    Use one syringe 4 times daily or as directed.    Type 2 diabetes, HbA1C goal < 8% " (H)       lisinopril 5 MG tablet    PRINIVIL/ZESTRIL    90 tablet    TAKE ONE TABLET BY MOUTH ONCE DAILY    Hyperlipidemia LDL goal <70       metFORMIN 1000 MG tablet    GLUCOPHAGE    60 tablet    TAKE ONE TABLET BY MOUTH TWICE DAILY WITH MEALS    Type 2 diabetes mellitus with stage 3 chronic kidney disease, with long-term current use of insulin (H)       metoprolol tartrate 25 MG tablet    LOPRESSOR    60 tablet    Take 1 tablet (25 mg) by mouth 2 times daily    Chronic ischemic heart disease       nitroGLYcerin 0.4 MG sublingual tablet    NITROSTAT    25 tablet    Place 1 tablet (0.4 mg) under the tongue every 5 minutes as needed for chest pain (CALL 911 IF NOT IMPROVED AFTER THREE CONSECUTIVE DOSES)    Chronic ischemic heart disease       order for DME      Equipment being ordered: CPAP AIRSENSE 10 11-15 CM H2O QUATTRO AIR SIZE MED SN# [id number]  DN# [id number]        pen needles 31G X 6 MM Misc     90 each    1 Units 3 times daily For novolog flexpen    Type 2 diabetes, HbA1C goal < 8% (H)       ranitidine 300 MG tablet    ZANTAC    30 tablet    TAKE 1 TABLET BY MOUTH AT BEDTIME    Gastroesophageal reflux disease without esophagitis       tamsulosin 0.4 MG capsule    FLOMAX    90 capsule    Take 1 capsule (0.4 mg) by mouth daily (Needs follow-up appointment for this medication)    Benign non-nodular prostatic hyperplasia with lower urinary tract symptoms       umeclidinium 62.5 MCG/INH inhaler    INCRUSE ELLIPTA    1 Inhaler    Inhale 1 puff into the lungs daily    Chronic obstructive pulmonary disease, unspecified COPD type (H)       * Notice:  This list has 4 medication(s) that are the same as other medications prescribed for you. Read the directions carefully, and ask your doctor or other care provider to review them with you.

## 2018-10-29 NOTE — NURSING NOTE
"Chief Complaint   Patient presents with     Consult       Initial /70  Pulse 72  Temp 97.4  F (36.3  C) (Tympanic)  Resp 18  Ht 5' 9\" (1.753 m)  Wt 193 lb (87.5 kg)  BMI 28.5 kg/m2 Estimated body mass index is 28.5 kg/(m^2) as calculated from the following:    Height as of this encounter: 5' 9\" (1.753 m).    Weight as of this encounter: 193 lb (87.5 kg).    Patient presents to the clinic using     Health Maintenance that is potentially due pending provider review:          Is there anyone who you would like to be able to receive your results? If yes have patient fill out THEODORA    "

## 2018-10-29 NOTE — PROGRESS NOTES
SUBJECTIVE:   Alvaro Horton is a 78 year old male who presents to clinic today for the following health issues:  Chief Complaint   Patient presents with     Consult     Franklin was seen at [facility] for Sleep EEG for confusion. Was told he has early onset Alzheimer Disease.      Franklin was hospitalized on 9 3/2018 For some sudden memory problems.  He had difficulty working the television remote had seen some flashes of light in his left visual field.  He was diagnosed with an altered mental status was recommended EEG and neurology follow-up.  Consult   He was seen at [facility] clinic 10/10/2018.  Dr. Oksana Munoz.   He had an EEG.   Diagnosed with Alzheimer's dementia.  I have not seen neurology clinic visits.    Was recommended:  1. pulmonology consultation.  His last visit was 9/8/2016 Dr. Casanova at [place].   2. Mame Correa driving assessment.   3. follow-up 8 weeks.  12/7/2018  (He has appointment for neurology on 11/19 at [facility] with neurology).    Lives in [place].  .  Lives in own home.  Lives adjacent to [place] who has crohn's.  He has three children and 14 grandchildren.     He has noted memory problems for a long time.  Forgets where he puts things.   Has not been lost.   He continues to drive.   Son and daughter do his finances.    He makes his own food.    Sometimes difficulty with remembering medications.     Patient Active Problem List   Diagnosis     Chronic ischemic heart disease     Moderate persistent asthma     PSORIASIS     Hypertrophy of prostate without urinary obstruction     OVERACTIVE BLADDER     BAKERS LUNG     Hypertension goal BP (blood pressure) < 140/90     Esophageal reflux     Sensorineural hearing loss, bilateral     Obstructive sleep apnea     Benign paroxysmal vertigo     CVA (cerebral vascular accident) (H)     Atrial fibrillation (H)     Memory loss     CTS (carpal tunnel syndrome)     Dyslexia     Advanced care  "planning/counseling discussion     Hyperlipidemia LDL goal <70     COPD (chronic obstructive pulmonary disease) (H)     Chronic kidney disease, stage 2 (mild)     Health Care Home     Altered mental status     Type 2 diabetes mellitus with diabetic chronic kidney disease (H)     Encephalopathy     Abnormal blood-gas measurement      OBJECTIVE:Blood pressure 120/70, pulse 72, temperature 97.4  F (36.3  C), temperature source Tympanic, resp. rate 18, height 5' 9\" (1.753 m), weight 193 lb (87.5 kg). BMI=Body mass index is 28.5 kg/(m^2).  GENERAL APPEARANCE ADULT: Alert, no acute distress  PSYCH: mentation appears normal., affect and mood normal     ASSESSMENT:   (R41.3) Memory loss  (primary encounter diagnosis)  Comment: Recent Alzheimer's dementia diagnosis through [facility].  I have not seen records of this visit.     Plan:   Let's send for your [facility] records.   You need neurology follow-up.  You have appointments both for [facility] 12/7/2018 and at [facility] on 11/19/2018.  Choose one neurologist to follow-up with.  You have lung specialist follow-up.   You were recommended driving evaluation which I think is a good idea.   Continue the donepezil at 5mg daily as you have been.   Complete a Health Care Directive.              "

## 2018-10-29 NOTE — PATIENT INSTRUCTIONS
ASSESSMENT:   (R41.3) Memory loss  (primary encounter diagnosis)  Comment: Recent Alzheimer's dementia diagnosis through [facility].  I have not seen records of this visit.     Plan:   Let's send for your [facility] records.   You need neurology follow-up.  You have appointments both for [facility] 12/7/2018 and at [facility] on 11/19/2018.  Choose one neurologist to follow-up with.  You have lung specialist follow-up.   You were recommended driving evaluation which I think is a good idea.   Continue the donepezil at 5mg daily as you have been.   Complete a Health Care Directive.

## 2018-11-26 DIAGNOSIS — J44.9 CHRONIC OBSTRUCTIVE PULMONARY DISEASE, UNSPECIFIED COPD TYPE (H): ICD-10-CM

## 2018-11-26 DIAGNOSIS — K21.9 GASTROESOPHAGEAL REFLUX DISEASE WITHOUT ESOPHAGITIS: ICD-10-CM

## 2018-11-26 RX ORDER — ARFORMOTEROL TARTRATE 15 UG/2ML
SOLUTION RESPIRATORY (INHALATION)
Qty: 120 ML | Refills: 3 | Status: SHIPPED | OUTPATIENT
Start: 2018-11-26 | End: 2019-09-16

## 2018-11-26 NOTE — TELEPHONE ENCOUNTER
"Requested Prescriptions   Pending Prescriptions Disp Refills     ranitidine (ZANTAC) 300 MG tablet [Pharmacy Med Name: RANITIDINE 300MG    TAB] 30 tablet 1     Sig: TAKE 1 TABLET BY MOUTH AT BEDTIME    H2 Blockers Protocol Passed    11/26/2018  9:50 AM       Passed - Patient is age 12 or older       Passed - Recent (12 mo) or future (30 days) visit within the authorizing provider's specialty    Patient had office visit in the last 12 months or has a visit in the next 30 days with authorizing provider or within the authorizing provider's specialty.  See \"Patient Info\" tab in inbasket, or \"Choose Columns\" in Meds & Orders section of the refill encounter.      Last Written Prescription Date:  9/25/18  Last Fill Quantity: 30,  # refills: 1   Last office visit: 10/29/2018 with prescribing provider:     Future Office Visit:                "

## 2018-11-26 NOTE — TELEPHONE ENCOUNTER
Requested Prescriptions   Pending Prescriptions Disp Refills     BROVANA 15 MCG/2ML NEBU neb solution [Pharmacy Med Name: BROVANA 15MCG/2ML NEBU] 120 mL 3     Sig: INHALE ONE VIAL VIA NEBULIZER BY MOUTH TWO TIMES A DAY    There is no refill protocol information for this order        Last Written Prescription Date:  6/28/18  Last Fill Quantity: 120,  # refills: 3   Last office visit: 10/29/2018 with prescribing provider:     Future Office Visit:

## 2019-01-11 ENCOUNTER — OFFICE VISIT (OUTPATIENT)
Dept: FAMILY MEDICINE | Facility: CLINIC | Age: 79
End: 2019-01-11
Payer: COMMERCIAL

## 2019-01-11 VITALS
WEIGHT: 190.6 LBS | TEMPERATURE: 97.8 F | OXYGEN SATURATION: 96 % | RESPIRATION RATE: 20 BRPM | HEART RATE: 72 BPM | DIASTOLIC BLOOD PRESSURE: 70 MMHG | BODY MASS INDEX: 28.15 KG/M2 | SYSTOLIC BLOOD PRESSURE: 140 MMHG

## 2019-01-11 DIAGNOSIS — J44.9 CHRONIC OBSTRUCTIVE PULMONARY DISEASE, UNSPECIFIED COPD TYPE (H): ICD-10-CM

## 2019-01-11 PROCEDURE — 99213 OFFICE O/P EST LOW 20 MIN: CPT | Performed by: FAMILY MEDICINE

## 2019-01-11 NOTE — PROGRESS NOTES
SUBJECTIVE:   Alvaro Horton is a 78 year old male who presents to clinic today for the following health issues:  Chief Complaint   Patient presents with     COPD        COPD Follow-Up    Symptoms are currently: slightly worsened.  Gets out of breath when he bends over and his heart was beating really fast he thought he was having a heart attack.  This has happened many times.  Balance is unstable.    Current fatigue or dyspnea with ambulation: stable but some shortness of breath.    Shortness of breath: slightly worsened    Increased or change in Cough/Sputum: Yes-  Alot    Fever(s): No    Baseline ambulation without stopping to rest: Doesn't know. He is not walking in the winter. Able to walk up: Doesn't know how many flights of stairs without stopping to rest.  Any ER/UC or hospital admissions since your last visit? No     He has had 3-4 different episodes of shortness of breath associated with bending to  chicken eggs.  Duration: a couple minutes.   Breathing worse than in the past.    Inhaled medication not helping.   Onset of symptoms: worse in the past year.   He had seen pulmonology in the past.   He thinks he can walk a mile at a slow pace walking his dog.   dyspnea on exertion with walking fast, can only walk about a half flight of stairs.   Recent URI.  He has had cough with URI.  Some phlegm which was yellow, now more clear.   Uses albuteral inhaler very infrequently.   Doing Brovana nebs twice daily, pulmicort nebs twice daily.  Not using umeclidinium (Incruse ellipta).  Uses only a few times and did not notice a difference.     Chest pain once in a great while. Sharp pain lasting seconds.  It has not seemed exertional, might be related to breathing.        History   Smoking Status     Never Smoker   Smokeless Tobacco     Never Used     No results found for: FEV1, [id number]    Amount of exercise or physical activity: 6-7 days/week for an average of less than 15 minutes.  Does a little every  day feeding chickens    Problems taking medications regularly: No    Medication side effects: none    Diet: regular (no restrictions)      Balance not good.     Patient Active Problem List   Diagnosis     Chronic ischemic heart disease     Moderate persistent asthma     PSORIASIS     Hypertrophy of prostate without urinary obstruction     OVERACTIVE BLADDER     BAKERS LUNG     Hypertension goal BP (blood pressure) < 140/90     Esophageal reflux     Sensorineural hearing loss, bilateral     Obstructive sleep apnea     Benign paroxysmal vertigo     CVA (cerebral vascular accident) (H)     Atrial fibrillation (H)     Memory loss     CTS (carpal tunnel syndrome)     Dyslexia     Advanced care planning/counseling discussion     Hyperlipidemia LDL goal <70     COPD (chronic obstructive pulmonary disease) (H)     Chronic kidney disease, stage 2 (mild)     Health Care Home     Altered mental status     Type 2 diabetes mellitus with diabetic chronic kidney disease (H)     Encephalopathy     Abnormal blood-gas measurement        OBJECTIVE:Blood pressure 140/70, pulse 72, temperature 97.8  F (36.6  C), temperature source Tympanic, resp. rate 20, weight 86.5 kg (190 lb 9.6 oz), SpO2 96 %. BMI=Body mass index is 28.15 kg/m .  GENERAL APPEARANCE ADULT: Alert, no acute distress  NECK: No adenopathy,masses or thyromegaly  RESP: lungs clear to auscultation, no respiratory distress, no cough.  CV: normal rate, regular rhythm, no murmur or gallop  ABDOMEN: soft, no organomegaly, masses or tenderness  MS: extremities normal, no peripheral edema     ASSESSMENT:   (J44.9) Chronic obstructive pulmonary disease, unspecified COPD type (H)  Comment:   Plan:   Continue the Brovana and Pulmocort nebs twice daily as you have been doing.   Restart the umeclidinium (Incruse ellipta) inhaler once daily on a regular basis.  You will not notice a difference each time you do the inhalation but if you are using it every day for a month, you should  "notice your breathing is better.  You should also have an albuteral inhaler.  Albuteral inhaler can be used taking 2 inhalations every 4 hours as needed for coughing, wheezing or shortness of breath.  This inhaler works quickly and is considered a \"rescue inhaler\".  Use when your breathing is short.     IF the above is not helping, we could switch to Duonebs 3-4 times a day which is a combination of albuteral plus ipratropium an anti-inflammatory medication.      Consider pulmonary rehab for help with breathing and lung efficiency.          "

## 2019-01-11 NOTE — NURSING NOTE
"Chief Complaint   Patient presents with     COPD       Initial /70 (BP Location: Right arm, Patient Position: Sitting, Cuff Size: Adult Regular)   Pulse 72   Temp 97.8  F (36.6  C) (Tympanic)   Resp 20   Wt 86.5 kg (190 lb 9.6 oz)   SpO2 96%   BMI 28.15 kg/m   Estimated body mass index is 28.15 kg/m  as calculated from the following:    Height as of 10/29/18: 1.753 m (5' 9\").    Weight as of this encounter: 86.5 kg (190 lb 9.6 oz).    Patient presents to the clinic using No DME    Health Maintenance that is potentially due pending provider review:  NONE    n/a    Is there anyone who you would like to be able to receive your results? Yes  Son & Daughter  If yes have patient fill out THEODORA    Renetta Velazquez CMA    "

## 2019-01-11 NOTE — PATIENT INSTRUCTIONS
"ASSESSMENT:   (J44.9) Chronic obstructive pulmonary disease, unspecified COPD type (H)  Comment:   Plan:   Continue the Brovana and Pulmocort nebs twice daily as you have been doing.   Restart the umeclidinium (Incruse ellipta) inhaler once daily on a regular basis.  You will not notice a difference each time you do the inhalation but if you are using it every day for a month, you should notice your breathing is better.  You should also have an albuteral inhaler.  Albuteral inhaler can be used taking 2 inhalations every 4 hours as needed for coughing, wheezing or shortness of breath.  This inhaler works quickly and is considered a \"rescue inhaler\".  Use when your breathing is short.     IF the above is not helping, we could switch to Duonebs 3-4 times a day which is a combination of albuteral plus ipratropium an anti-inflammatory medication.      Consider pulmonary rehab for help with breathing and lung efficiency.     "

## 2019-01-31 DIAGNOSIS — E78.5 HYPERLIPIDEMIA LDL GOAL <70: ICD-10-CM

## 2019-01-31 DIAGNOSIS — R33.9 URINARY RETENTION: ICD-10-CM

## 2019-01-31 RX ORDER — FINASTERIDE 5 MG/1
5 TABLET, FILM COATED ORAL DAILY
Qty: 90 TABLET | Refills: 0 | Status: SHIPPED | OUTPATIENT
Start: 2019-01-31 | End: 2019-03-02

## 2019-01-31 RX ORDER — ATORVASTATIN CALCIUM 80 MG/1
80 TABLET, FILM COATED ORAL DAILY
Qty: 30 TABLET | Refills: 1 | Status: SHIPPED | OUTPATIENT
Start: 2019-01-31 | End: 2019-05-16

## 2019-01-31 NOTE — TELEPHONE ENCOUNTER
Reason for Call:  Medication or medication refill:    Do you use a [facility]?  Name of the pharmacy and phone number for the current request:  [facility] [phone]    Name of the medication requested: Proscar    Other request:   LAST REFILL: 10/31/2018  LOV: 01/09/2018    Can we leave a detailed message on this number? Not Applicable    Phone number patient can be reached at: Home number on file [phone] (home)    Best Time: NA    Call taken on 1/31/2019 at 10:39 AM by Denise Behrendt

## 2019-01-31 NOTE — TELEPHONE ENCOUNTER
"Requested Prescriptions   Pending Prescriptions Disp Refills     atorvastatin (LIPITOR) 80 MG tablet 30 tablet 11     Sig: Take 1 tablet (80 mg) by mouth daily    Statins Protocol Passed - 1/31/2019  9:47 AM       Passed - LDL on file in past 12 months    Recent Labs   Lab Test 02/05/18  1313   LDL 50            Passed - No abnormal creatine kinase in past 12 months    No lab results found.            Passed - Recent (12 mo) or future (30 days) visit within the authorizing provider's specialty    Patient had office visit in the last 12 months or has a visit in the next 30 days with authorizing provider or within the authorizing provider's specialty.  See \"Patient Info\" tab in inbasket, or \"Choose Columns\" in Meds & Orders section of the refill encounter.             Passed - Medication is active on med list       Passed - Patient is age 18 or older        Lipitor  Last Written Prescription Date:  02/05/18  Last Fill Quantity: 30,  # refills: 11   Last office visit: 1/11/2019 with prescribing provider:  DR. Carreno.     Pt wants #90  Future Office Visit:    "

## 2019-02-06 ENCOUNTER — TELEPHONE (OUTPATIENT)
Dept: FAMILY MEDICINE | Facility: CLINIC | Age: 79
End: 2019-02-06

## 2019-02-06 DIAGNOSIS — E11.22 TYPE 2 DIABETES MELLITUS WITH STAGE 3 CHRONIC KIDNEY DISEASE, WITH LONG-TERM CURRENT USE OF INSULIN (H): Primary | ICD-10-CM

## 2019-02-06 DIAGNOSIS — N18.30 TYPE 2 DIABETES MELLITUS WITH STAGE 3 CHRONIC KIDNEY DISEASE, WITH LONG-TERM CURRENT USE OF INSULIN (H): Primary | ICD-10-CM

## 2019-02-06 DIAGNOSIS — Z79.4 TYPE 2 DIABETES MELLITUS WITH STAGE 3 CHRONIC KIDNEY DISEASE, WITH LONG-TERM CURRENT USE OF INSULIN (H): Primary | ICD-10-CM

## 2019-02-06 NOTE — TELEPHONE ENCOUNTER
Patient called stating his insurance changed from [employer] to [employer]. He states he went to  his test strips, which usually didn't cost him much, and he had to pay $67 for a copay. I called [facility] and they said that his insurance doesn't cover the brand he has and they prefer AccuChek or True Metrix.     Could someone please send a new script to [facility] in [place] for a Glucose machine, test strips, lancet device, and lancets (kind of a generic script) for whatever patient's insurance covers? Patient knows this will be done and will go to  the new machine/test strips in a day or two. (No need to contact him when done).    Justa [facility]-Station

## 2019-02-13 ENCOUNTER — ALLIED HEALTH/NURSE VISIT (OUTPATIENT)
Dept: FAMILY MEDICINE | Facility: CLINIC | Age: 79
End: 2019-02-13
Payer: COMMERCIAL

## 2019-02-13 DIAGNOSIS — Z79.4 TYPE 2 DIABETES MELLITUS WITH STAGE 3 CHRONIC KIDNEY DISEASE, WITH LONG-TERM CURRENT USE OF INSULIN (H): Primary | ICD-10-CM

## 2019-02-13 DIAGNOSIS — N18.30 TYPE 2 DIABETES MELLITUS WITH STAGE 3 CHRONIC KIDNEY DISEASE, WITH LONG-TERM CURRENT USE OF INSULIN (H): Primary | ICD-10-CM

## 2019-02-13 DIAGNOSIS — E11.22 TYPE 2 DIABETES MELLITUS WITH STAGE 3 CHRONIC KIDNEY DISEASE, WITH LONG-TERM CURRENT USE OF INSULIN (H): Primary | ICD-10-CM

## 2019-02-13 PROCEDURE — 99207 ZZC NO CHARGE NURSE ONLY: CPT

## 2019-02-13 NOTE — PROGRESS NOTES
Patient given instruction on how to use his new glucometer.  He left clinic with good understanding of its use  Zabrina Silva RN

## 2019-02-14 DIAGNOSIS — Z79.4 TYPE 2 DIABETES MELLITUS WITH STAGE 3 CHRONIC KIDNEY DISEASE, WITH LONG-TERM CURRENT USE OF INSULIN (H): ICD-10-CM

## 2019-02-14 DIAGNOSIS — N18.30 TYPE 2 DIABETES MELLITUS WITH STAGE 3 CHRONIC KIDNEY DISEASE, WITH LONG-TERM CURRENT USE OF INSULIN (H): ICD-10-CM

## 2019-02-14 DIAGNOSIS — E11.22 TYPE 2 DIABETES MELLITUS WITH STAGE 3 CHRONIC KIDNEY DISEASE, WITH LONG-TERM CURRENT USE OF INSULIN (H): ICD-10-CM

## 2019-02-14 DIAGNOSIS — I25.9 CHRONIC ISCHEMIC HEART DISEASE: ICD-10-CM

## 2019-02-14 RX ORDER — INSULIN DETEMIR 100 [IU]/ML
INJECTION, SOLUTION SUBCUTANEOUS
Qty: 3 ML | Refills: 1 | Status: SHIPPED | OUTPATIENT
Start: 2019-02-14 | End: 2019-03-11

## 2019-02-14 RX ORDER — METOPROLOL TARTRATE 25 MG/1
TABLET, FILM COATED ORAL
Qty: 60 TABLET | Refills: 1 | Status: SHIPPED | OUTPATIENT
Start: 2019-02-14 | End: 2019-04-19

## 2019-02-14 NOTE — TELEPHONE ENCOUNTER
"Requested Prescriptions   Pending Prescriptions Disp Refills     metoprolol tartrate (LOPRESSOR) 25 MG tablet [Pharmacy Med Name: METOPROL TAR 25MG   TAB] 60 tablet 11     Sig: TAKE 1 TABLET BY MOUTH TWICE DAILY    Beta-Blockers Protocol Failed - 2/14/2019  9:15 AM       Failed - Blood pressure under 140/90 in past 12 months    BP Readings from Last 3 Encounters:   01/11/19 140/70   10/29/18 120/70   09/12/18 110/64                Passed - Patient is age 6 or older       Passed - Recent (12 mo) or future (30 days) visit within the authorizing provider's specialty    Patient had office visit in the last 12 months or has a visit in the next 30 days with authorizing provider or within the authorizing provider's specialty.  See \"Patient Info\" tab in inbasket, or \"Choose Columns\" in Meds & Orders section of the refill encounter.             Passed - Medication is active on med list       Last Written Prescription Date: 2/5/18  Last Fill Quantity: 60,  # refills: 11   Last office visit: 2/13/2019 with prescribing provider:  Dr. Carreno   Future Office Visit:         LEVEMIR FLEXTOUCH 100 UNIT/ML pen [Pharmacy Med Name: LEVEMIR FLEXTOUCH    INJ]  11     Sig: INJECT 30 UNITS SUBCUTANEOUSLY AT BEDTIME    Long Acting Insulin Protocol Failed - 2/14/2019  9:15 AM       Failed - Blood pressure less than 140/90 in past 6 months    BP Readings from Last 3 Encounters:   01/11/19 140/70   10/29/18 120/70   09/12/18 110/64                Failed - LDL on file in past 12 months    Recent Labs   Lab Test 02/05/18  1313   LDL 50            Failed - HgbA1C in past 3 or 6 months    If HgbA1C is 8 or greater, it needs to be on file within the past 3 months.  If less than 8, must be on file within the past 6 months.     Recent Labs   Lab Test 05/17/18  1300   A1C 8.0*            Passed - Microalbumin on file in past 12 months    Recent Labs   Lab Test 02/05/18  1323   MICROL 64   UMALCR 60.86*            Passed - Serum creatinine on file in " "past 12 months    Recent Labs   Lab Test 09/04/18  0608   CR 1.15            Passed - Medication is active on med list       Passed - Patient is age 18 or older       Passed - Recent (6 mo) or future (30 days) visit within the authorizing provider's specialty    Patient had office visit in the last 6 months or has a visit in the next 30 days with authorizing provider or within the authorizing provider's specialty.  See \"Patient Info\" tab in inbasket, or \"Choose Columns\" in Meds & Orders section of the refill encounter.           Last Written Prescription Date:  2/5/18  Last Fill Quantity: 15 ml ,  # refills: 11   Last office visit: 2/13/2019 with prescribing provider:  Dr. Carreno   Future Office Visit:        "

## 2019-02-14 NOTE — LETTER
[facility]  [address] [place] [id number]  [phone]        February 14, 2019  Alvaro Horton  [address]  [facility] [address]    Dear Alvaro,    I care about your health and have reviewed your health plan. I have reviewed your medical conditions, medication list, and lab results and am making recommendations based on this review, to better manage your health.    You are in particular need of attention regarding:  -Diabetes    I am recommending that you:  -schedule a FOLLOWUP OFFICE APPOINTMENT with me.     Please come fasting for blood work: no food or drink other than water for at least 10-12 hours prior.    Here is a list of Health Maintenance topics that are due now or due soon:  Health Maintenance Due   Topic Date Due     COPD ACTION PLAN Q1 YR  1940     ZOSTER IMMUNIZATION (1 of 2) 07/17/1989     ADVANCE DIRECTIVE PLANNING Q5 YRS  09/24/2017     A1C Q6 MO  11/17/2018     FOOT EXAM Q1 YEAR  02/05/2019     LIPID MONITORING Q1 YEAR  02/05/2019     MICROALBUMIN Q1 YEAR  02/05/2019     ASTHMA ACTION PLAN Q1 YR  02/05/2019     EYE EXAM Q1 YEAR  02/20/2019     ASTHMA CONTROL TEST Q6 MOS  03/12/2019       Please call us at [phone] (or use Buz) to address the above recommendations.     Thank you for trusting [facility] and we appreciate the opportunity to serve you.  We look forward to supporting your healthcare needs in the future.    Healthy Regards,    Dr. Carreno/Deb VALLE RN

## 2019-02-14 NOTE — TELEPHONE ENCOUNTER
Prescriptions approved per [facility] Refill Protocol.  Letter sent to pt that he is due for diabetic OV.    Deb VALLE RN

## 2019-02-21 ENCOUNTER — TRANSFERRED RECORDS (OUTPATIENT)
Dept: HEALTH INFORMATION MANAGEMENT | Facility: CLINIC | Age: 79
End: 2019-02-21

## 2019-02-25 ENCOUNTER — TELEPHONE (OUTPATIENT)
Dept: FAMILY MEDICINE | Facility: CLINIC | Age: 79
End: 2019-02-25

## 2019-02-25 DIAGNOSIS — E11.22 TYPE 2 DIABETES MELLITUS WITH STAGE 3 CHRONIC KIDNEY DISEASE, WITH LONG-TERM CURRENT USE OF INSULIN (H): Primary | ICD-10-CM

## 2019-02-25 DIAGNOSIS — Z79.4 TYPE 2 DIABETES MELLITUS WITH STAGE 3 CHRONIC KIDNEY DISEASE, WITH LONG-TERM CURRENT USE OF INSULIN (H): Primary | ICD-10-CM

## 2019-02-25 DIAGNOSIS — N18.30 TYPE 2 DIABETES MELLITUS WITH STAGE 3 CHRONIC KIDNEY DISEASE, WITH LONG-TERM CURRENT USE OF INSULIN (H): Primary | ICD-10-CM

## 2019-02-25 NOTE — TELEPHONE ENCOUNTER
Prior Authorization Retail Medication Request    Medication/Dose: Brovana 15mcg/2ml neb solution  ICD code (if different than what is on RX):  J45.40  Previously Tried and Failed:    Rationale:      Insurance Name:  Patient can only bill [employer] now  Insurance ID:  [id number]  Insurance Phone: [phone]      Pharmacy Information (if different than what is on RX)  Name:    Phone:      Amira Judd  Certified Pharmacy Technician  [facility]  [phone]

## 2019-03-01 ENCOUNTER — TELEPHONE (OUTPATIENT)
Dept: FAMILY MEDICINE | Facility: CLINIC | Age: 79
End: 2019-03-01

## 2019-03-02 ENCOUNTER — APPOINTMENT (OUTPATIENT)
Dept: CT IMAGING | Facility: CLINIC | Age: 79
DRG: 419 | End: 2019-03-02
Attending: EMERGENCY MEDICINE
Payer: COMMERCIAL

## 2019-03-02 ENCOUNTER — APPOINTMENT (OUTPATIENT)
Dept: ULTRASOUND IMAGING | Facility: CLINIC | Age: 79
DRG: 419 | End: 2019-03-02
Attending: EMERGENCY MEDICINE
Payer: COMMERCIAL

## 2019-03-02 ENCOUNTER — HOSPITAL ENCOUNTER (INPATIENT)
Facility: CLINIC | Age: 79
LOS: 2 days | Discharge: HOME | DRG: 419 | End: 2019-03-04
Attending: EMERGENCY MEDICINE | Admitting: FAMILY MEDICINE
Payer: COMMERCIAL

## 2019-03-02 DIAGNOSIS — I44.0 FIRST DEGREE ATRIOVENTRICULAR BLOCK: ICD-10-CM

## 2019-03-02 DIAGNOSIS — I48.0 PAROXYSMAL ATRIAL FIBRILLATION (H): ICD-10-CM

## 2019-03-02 DIAGNOSIS — I63.9 CEREBROVASCULAR ACCIDENT (CVA), UNSPECIFIED MECHANISM (H): ICD-10-CM

## 2019-03-02 DIAGNOSIS — N18.30 TYPE 2 DIABETES MELLITUS WITH STAGE 3 CHRONIC KIDNEY DISEASE, WITH LONG-TERM CURRENT USE OF INSULIN (H): ICD-10-CM

## 2019-03-02 DIAGNOSIS — Z76.89 HEALTH CARE HOME: ICD-10-CM

## 2019-03-02 DIAGNOSIS — D72.829 LEUKOCYTOSIS, UNSPECIFIED TYPE: ICD-10-CM

## 2019-03-02 DIAGNOSIS — J45.40 MODERATE PERSISTENT ASTHMA WITHOUT COMPLICATION: ICD-10-CM

## 2019-03-02 DIAGNOSIS — J44.9 CHRONIC OBSTRUCTIVE PULMONARY DISEASE, UNSPECIFIED COPD TYPE (H): ICD-10-CM

## 2019-03-02 DIAGNOSIS — K57.30 DIVERTICULOSIS OF LARGE INTESTINE WITHOUT DIVERTICULITIS: ICD-10-CM

## 2019-03-02 DIAGNOSIS — Z79.4 TYPE 2 DIABETES MELLITUS WITH STAGE 3 CHRONIC KIDNEY DISEASE, WITH LONG-TERM CURRENT USE OF INSULIN (H): ICD-10-CM

## 2019-03-02 DIAGNOSIS — N20.0 CALCULUS OF KIDNEY: ICD-10-CM

## 2019-03-02 DIAGNOSIS — E11.22 TYPE 2 DIABETES MELLITUS WITH STAGE 3 CHRONIC KIDNEY DISEASE, WITH LONG-TERM CURRENT USE OF INSULIN (H): ICD-10-CM

## 2019-03-02 DIAGNOSIS — K80.00 ACUTE CALCULOUS CHOLECYSTITIS: Primary | ICD-10-CM

## 2019-03-02 DIAGNOSIS — R74.01 ELEVATED LEVELS OF TRANSAMINASE & LACTIC ACID DEHYDROGENASE: ICD-10-CM

## 2019-03-02 DIAGNOSIS — R41.3 MEMORY LOSS: ICD-10-CM

## 2019-03-02 DIAGNOSIS — R74.02 ELEVATED LEVELS OF TRANSAMINASE & LACTIC ACID DEHYDROGENASE: ICD-10-CM

## 2019-03-02 DIAGNOSIS — I45.10 RIGHT BUNDLE BRANCH BLOCK: ICD-10-CM

## 2019-03-02 DIAGNOSIS — I10 HYPERTENSION GOAL BP (BLOOD PRESSURE) < 140/90: ICD-10-CM

## 2019-03-02 DIAGNOSIS — K80.20 GALLSTONES: ICD-10-CM

## 2019-03-02 PROBLEM — K81.9 CHOLECYSTITIS: Status: ACTIVE | Noted: 2019-03-02

## 2019-03-02 LAB
ALBUMIN SERPL-MCNC: 3.6 G/DL (ref 3.4–5)
ALBUMIN UR-MCNC: 100 MG/DL
ALP SERPL-CCNC: 111 U/L (ref 40–150)
ALT SERPL W P-5'-P-CCNC: 37 U/L (ref 0–70)
ANION GAP SERPL CALCULATED.3IONS-SCNC: 8 MMOL/L (ref 3–14)
APPEARANCE UR: CLEAR
AST SERPL W P-5'-P-CCNC: 22 U/L (ref 0–45)
BASOPHILS # BLD AUTO: 0 10E9/L (ref 0–0.2)
BASOPHILS NFR BLD AUTO: 0.2 %
BILIRUB SERPL-MCNC: 1.1 MG/DL (ref 0.2–1.3)
BILIRUB UR QL STRIP: NEGATIVE
BUN SERPL-MCNC: 22 MG/DL (ref 7–30)
CALCIUM SERPL-MCNC: 9.4 MG/DL (ref 8.5–10.1)
CHLORIDE SERPL-SCNC: 97 MMOL/L (ref 94–109)
CO2 SERPL-SCNC: 26 MMOL/L (ref 20–32)
COLOR UR AUTO: YELLOW
CREAT SERPL-MCNC: 1.09 MG/DL (ref 0.66–1.25)
DIFFERENTIAL METHOD BLD: ABNORMAL
EOSINOPHIL # BLD AUTO: 0 10E9/L (ref 0–0.7)
EOSINOPHIL NFR BLD AUTO: 0 %
ERYTHROCYTE [DISTWIDTH] IN BLOOD BY AUTOMATED COUNT: 13.4 % (ref 10–15)
GFR SERPL CREATININE-BSD FRML MDRD: 64 ML/MIN/{1.73_M2}
GLUCOSE BLDC GLUCOMTR-MCNC: 253 MG/DL (ref 70–99)
GLUCOSE SERPL-MCNC: 273 MG/DL (ref 70–99)
GLUCOSE UR STRIP-MCNC: 50 MG/DL
HCT VFR BLD AUTO: 42.5 % (ref 40–53)
HGB BLD-MCNC: 15.1 G/DL (ref 13.3–17.7)
HGB UR QL STRIP: ABNORMAL
HYALINE CASTS #/AREA URNS LPF: 1 /LPF (ref 0–2)
IMM GRANULOCYTES # BLD: 0.1 10E9/L (ref 0–0.4)
IMM GRANULOCYTES NFR BLD: 0.6 %
KETONES UR STRIP-MCNC: NEGATIVE MG/DL
LACTATE BLD-SCNC: 1.6 MMOL/L (ref 0.7–2)
LACTATE BLD-SCNC: 2.7 MMOL/L (ref 0.7–2)
LACTATE BLD-SCNC: 2.8 MMOL/L (ref 0.7–2)
LEUKOCYTE ESTERASE UR QL STRIP: NEGATIVE
LIPASE SERPL-CCNC: 117 U/L (ref 73–393)
LYMPHOCYTES # BLD AUTO: 1.3 10E9/L (ref 0.8–5.3)
LYMPHOCYTES NFR BLD AUTO: 7.2 %
MCH RBC QN AUTO: 31.2 PG (ref 26.5–33)
MCHC RBC AUTO-ENTMCNC: 35.5 G/DL (ref 31.5–36.5)
MCV RBC AUTO: 88 FL (ref 78–100)
MONOCYTES # BLD AUTO: 1.7 10E9/L (ref 0–1.3)
MONOCYTES NFR BLD AUTO: 9.9 %
MUCOUS THREADS #/AREA URNS LPF: PRESENT /LPF
NEUTROPHILS # BLD AUTO: 14.4 10E9/L (ref 1.6–8.3)
NEUTROPHILS NFR BLD AUTO: 82.1 %
NITRATE UR QL: NEGATIVE
NRBC # BLD AUTO: 0 10*3/UL
NRBC BLD AUTO-RTO: 0 /100
PH UR STRIP: 5 PH (ref 5–7)
PLATELET # BLD AUTO: 168 10E9/L (ref 150–450)
POTASSIUM SERPL-SCNC: 4.6 MMOL/L (ref 3.4–5.3)
PROT SERPL-MCNC: 7.2 G/DL (ref 6.8–8.8)
RBC # BLD AUTO: 4.84 10E12/L (ref 4.4–5.9)
RBC #/AREA URNS AUTO: <1 /HPF (ref 0–2)
SODIUM SERPL-SCNC: 131 MMOL/L (ref 133–144)
SOURCE: ABNORMAL
SP GR UR STRIP: 1.02 (ref 1–1.03)
SQUAMOUS #/AREA URNS AUTO: 2 /HPF (ref 0–1)
TROPONIN I SERPL-MCNC: <0.015 UG/L (ref 0–0.04)
UROBILINOGEN UR STRIP-MCNC: 0 MG/DL (ref 0–2)
WBC # BLD AUTO: 17.5 10E9/L (ref 4–11)
WBC #/AREA URNS AUTO: 2 /HPF (ref 0–5)

## 2019-03-02 PROCEDURE — 12000000 ZZH R&B MED SURG/OB

## 2019-03-02 PROCEDURE — 93010 ELECTROCARDIOGRAM REPORT: CPT | Mod: Z6 | Performed by: EMERGENCY MEDICINE

## 2019-03-02 PROCEDURE — 94640 AIRWAY INHALATION TREATMENT: CPT | Mod: 76

## 2019-03-02 PROCEDURE — 25000131 ZZH RX MED GY IP 250 OP 636 PS 637: Performed by: FAMILY MEDICINE

## 2019-03-02 PROCEDURE — 40000275 ZZH STATISTIC RCP TIME EA 10 MIN

## 2019-03-02 PROCEDURE — 00000146 ZZHCL STATISTIC GLUCOSE BY METER IP

## 2019-03-02 PROCEDURE — 25000125 ZZHC RX 250: Performed by: FAMILY MEDICINE

## 2019-03-02 PROCEDURE — 25000128 H RX IP 250 OP 636: Performed by: EMERGENCY MEDICINE

## 2019-03-02 PROCEDURE — 76705 ECHO EXAM OF ABDOMEN: CPT

## 2019-03-02 PROCEDURE — 25000132 ZZH RX MED GY IP 250 OP 250 PS 637: Performed by: FAMILY MEDICINE

## 2019-03-02 PROCEDURE — 99285 EMERGENCY DEPT VISIT HI MDM: CPT | Mod: 25 | Performed by: EMERGENCY MEDICINE

## 2019-03-02 PROCEDURE — 80053 COMPREHEN METABOLIC PANEL: CPT | Performed by: EMERGENCY MEDICINE

## 2019-03-02 PROCEDURE — 85025 COMPLETE CBC W/AUTO DIFF WBC: CPT | Performed by: EMERGENCY MEDICINE

## 2019-03-02 PROCEDURE — 84484 ASSAY OF TROPONIN QUANT: CPT | Performed by: EMERGENCY MEDICINE

## 2019-03-02 PROCEDURE — 83690 ASSAY OF LIPASE: CPT | Performed by: EMERGENCY MEDICINE

## 2019-03-02 PROCEDURE — 96361 HYDRATE IV INFUSION ADD-ON: CPT | Performed by: EMERGENCY MEDICINE

## 2019-03-02 PROCEDURE — 93005 ELECTROCARDIOGRAM TRACING: CPT | Performed by: EMERGENCY MEDICINE

## 2019-03-02 PROCEDURE — 36415 COLL VENOUS BLD VENIPUNCTURE: CPT | Performed by: FAMILY MEDICINE

## 2019-03-02 PROCEDURE — 83605 ASSAY OF LACTIC ACID: CPT | Performed by: EMERGENCY MEDICINE

## 2019-03-02 PROCEDURE — 94640 AIRWAY INHALATION TREATMENT: CPT

## 2019-03-02 PROCEDURE — 25800030 ZZH RX IP 258 OP 636: Performed by: EMERGENCY MEDICINE

## 2019-03-02 PROCEDURE — 96365 THER/PROPH/DIAG IV INF INIT: CPT | Performed by: EMERGENCY MEDICINE

## 2019-03-02 PROCEDURE — 99223 1ST HOSP IP/OBS HIGH 75: CPT | Mod: AI | Performed by: FAMILY MEDICINE

## 2019-03-02 PROCEDURE — 83605 ASSAY OF LACTIC ACID: CPT | Performed by: FAMILY MEDICINE

## 2019-03-02 PROCEDURE — 81001 URINALYSIS AUTO W/SCOPE: CPT | Performed by: EMERGENCY MEDICINE

## 2019-03-02 PROCEDURE — 74176 CT ABD & PELVIS W/O CONTRAST: CPT

## 2019-03-02 RX ORDER — POLYETHYLENE GLYCOL 3350 17 G/17G
17 POWDER, FOR SOLUTION ORAL DAILY PRN
Status: DISCONTINUED | OUTPATIENT
Start: 2019-03-02 | End: 2019-03-04 | Stop reason: HOSPADM

## 2019-03-02 RX ORDER — FUROSEMIDE 20 MG
20 TABLET ORAL DAILY
Status: DISCONTINUED | OUTPATIENT
Start: 2019-03-03 | End: 2019-03-04 | Stop reason: HOSPADM

## 2019-03-02 RX ORDER — AMOXICILLIN 250 MG
2 CAPSULE ORAL 2 TIMES DAILY
Status: DISCONTINUED | OUTPATIENT
Start: 2019-03-02 | End: 2019-03-04 | Stop reason: HOSPADM

## 2019-03-02 RX ORDER — MORPHINE SULFATE 2 MG/ML
2-4 INJECTION, SOLUTION INTRAMUSCULAR; INTRAVENOUS
Status: DISCONTINUED | OUTPATIENT
Start: 2019-03-02 | End: 2019-03-04 | Stop reason: HOSPADM

## 2019-03-02 RX ORDER — CALCIUM CARBONATE 500 MG/1
500 TABLET, CHEWABLE ORAL DAILY PRN
Status: DISCONTINUED | OUTPATIENT
Start: 2019-03-02 | End: 2019-03-03

## 2019-03-02 RX ORDER — ONDANSETRON 2 MG/ML
4 INJECTION INTRAMUSCULAR; INTRAVENOUS EVERY 6 HOURS PRN
Status: DISCONTINUED | OUTPATIENT
Start: 2019-03-02 | End: 2019-03-02

## 2019-03-02 RX ORDER — NITROGLYCERIN 0.4 MG/1
0.4 TABLET SUBLINGUAL EVERY 5 MIN PRN
Status: DISCONTINUED | OUTPATIENT
Start: 2019-03-02 | End: 2019-03-04 | Stop reason: HOSPADM

## 2019-03-02 RX ORDER — ARFORMOTEROL TARTRATE 15 UG/2ML
15 SOLUTION RESPIRATORY (INHALATION) 2 TIMES DAILY
Status: DISCONTINUED | OUTPATIENT
Start: 2019-03-02 | End: 2019-03-04 | Stop reason: HOSPADM

## 2019-03-02 RX ORDER — ONDANSETRON 2 MG/ML
4 INJECTION INTRAMUSCULAR; INTRAVENOUS EVERY 6 HOURS PRN
Status: DISCONTINUED | OUTPATIENT
Start: 2019-03-02 | End: 2019-03-04 | Stop reason: HOSPADM

## 2019-03-02 RX ORDER — ONDANSETRON 4 MG/1
4 TABLET, ORALLY DISINTEGRATING ORAL EVERY 6 HOURS PRN
Status: DISCONTINUED | OUTPATIENT
Start: 2019-03-02 | End: 2019-03-02

## 2019-03-02 RX ORDER — NALOXONE HYDROCHLORIDE 0.4 MG/ML
.1-.4 INJECTION, SOLUTION INTRAMUSCULAR; INTRAVENOUS; SUBCUTANEOUS
Status: DISCONTINUED | OUTPATIENT
Start: 2019-03-02 | End: 2019-03-04 | Stop reason: HOSPADM

## 2019-03-02 RX ORDER — TAMSULOSIN HYDROCHLORIDE 0.4 MG/1
0.4 CAPSULE ORAL DAILY
Status: DISCONTINUED | OUTPATIENT
Start: 2019-03-03 | End: 2019-03-04 | Stop reason: HOSPADM

## 2019-03-02 RX ORDER — AMOXICILLIN 250 MG
2 CAPSULE ORAL 2 TIMES DAILY PRN
Status: DISCONTINUED | OUTPATIENT
Start: 2019-03-02 | End: 2019-03-04 | Stop reason: HOSPADM

## 2019-03-02 RX ORDER — METOPROLOL TARTRATE 25 MG/1
25 TABLET, FILM COATED ORAL 2 TIMES DAILY
Status: DISCONTINUED | OUTPATIENT
Start: 2019-03-02 | End: 2019-03-04 | Stop reason: HOSPADM

## 2019-03-02 RX ORDER — SODIUM CHLORIDE, SODIUM LACTATE, POTASSIUM CHLORIDE, CALCIUM CHLORIDE 600; 310; 30; 20 MG/100ML; MG/100ML; MG/100ML; MG/100ML
INJECTION, SOLUTION INTRAVENOUS CONTINUOUS
Status: DISCONTINUED | OUTPATIENT
Start: 2019-03-02 | End: 2019-03-03 | Stop reason: CLARIF

## 2019-03-02 RX ORDER — FUROSEMIDE 20 MG
20 TABLET ORAL DAILY
Status: ON HOLD | COMMUNITY
Start: 2018-06-04 | End: 2020-01-01

## 2019-03-02 RX ORDER — ATORVASTATIN CALCIUM 80 MG/1
80 TABLET, FILM COATED ORAL DAILY
Status: DISCONTINUED | OUTPATIENT
Start: 2019-03-02 | End: 2019-03-04 | Stop reason: HOSPADM

## 2019-03-02 RX ORDER — AMOXICILLIN 250 MG
1 CAPSULE ORAL 2 TIMES DAILY PRN
Status: DISCONTINUED | OUTPATIENT
Start: 2019-03-02 | End: 2019-03-04 | Stop reason: HOSPADM

## 2019-03-02 RX ORDER — AMOXICILLIN 250 MG
1 CAPSULE ORAL 2 TIMES DAILY
Status: DISCONTINUED | OUTPATIENT
Start: 2019-03-02 | End: 2019-03-04 | Stop reason: HOSPADM

## 2019-03-02 RX ORDER — ALBUTEROL SULFATE 90 UG/1
2 AEROSOL, METERED RESPIRATORY (INHALATION) EVERY 6 HOURS PRN
Status: DISCONTINUED | OUTPATIENT
Start: 2019-03-02 | End: 2019-03-04 | Stop reason: HOSPADM

## 2019-03-02 RX ORDER — BUDESONIDE 0.5 MG/2ML
0.5 INHALANT ORAL 2 TIMES DAILY
Status: DISCONTINUED | OUTPATIENT
Start: 2019-03-02 | End: 2019-03-04 | Stop reason: HOSPADM

## 2019-03-02 RX ORDER — NALOXONE HYDROCHLORIDE 0.4 MG/ML
.1-.4 INJECTION, SOLUTION INTRAMUSCULAR; INTRAVENOUS; SUBCUTANEOUS
Status: DISCONTINUED | OUTPATIENT
Start: 2019-03-02 | End: 2019-03-02

## 2019-03-02 RX ORDER — LISINOPRIL 5 MG/1
5 TABLET ORAL DAILY
Status: DISCONTINUED | OUTPATIENT
Start: 2019-03-02 | End: 2019-03-04 | Stop reason: HOSPADM

## 2019-03-02 RX ORDER — ASPIRIN 81 MG/1
81 TABLET, CHEWABLE ORAL DAILY
Status: DISCONTINUED | OUTPATIENT
Start: 2019-03-03 | End: 2019-03-03 | Stop reason: CLARIF

## 2019-03-02 RX ADMIN — TAZOBACTAM SODIUM AND PIPERACILLIN SODIUM 3.38 G: 375; 3 INJECTION, SOLUTION INTRAVENOUS at 16:48

## 2019-03-02 RX ADMIN — SODIUM CHLORIDE 500 ML: 9 INJECTION, SOLUTION INTRAVENOUS at 16:00

## 2019-03-02 RX ADMIN — METOPROLOL TARTRATE 25 MG: 25 TABLET ORAL at 21:27

## 2019-03-02 RX ADMIN — BUDESONIDE 0.5 MG: 0.5 INHALANT RESPIRATORY (INHALATION) at 22:10

## 2019-03-02 RX ADMIN — SODIUM CHLORIDE, POTASSIUM CHLORIDE, SODIUM LACTATE AND CALCIUM CHLORIDE: 600; 310; 30; 20 INJECTION, SOLUTION INTRAVENOUS at 22:27

## 2019-03-02 RX ADMIN — LISINOPRIL 5 MG: 5 TABLET ORAL at 21:27

## 2019-03-02 RX ADMIN — ATORVASTATIN CALCIUM 80 MG: 80 TABLET, FILM COATED ORAL at 21:28

## 2019-03-02 RX ADMIN — TAZOBACTAM SODIUM AND PIPERACILLIN SODIUM 3.38 G: 375; 3 INJECTION, SOLUTION INTRAVENOUS at 22:35

## 2019-03-02 RX ADMIN — CALCIUM CARBONATE (ANTACID) CHEW TAB 500 MG 500 MG: 500 CHEW TAB at 23:00

## 2019-03-02 RX ADMIN — ARFORMOTEROL TARTRATE 15 MCG: 15 SOLUTION RESPIRATORY (INHALATION) at 22:10

## 2019-03-02 RX ADMIN — INSULIN DETEMIR 24 UNITS: 100 INJECTION, SOLUTION SUBCUTANEOUS at 22:27

## 2019-03-02 ASSESSMENT — ACTIVITIES OF DAILY LIVING (ADL)
RETIRED_EATING: 0-->INDEPENDENT
DRESS: 0-->INDEPENDENT
NUMBER_OF_TIMES_PATIENT_HAS_FALLEN_WITHIN_LAST_SIX_MONTHS: 5
COGNITION: 1 - ATTENTION OR MEMORY DEFICITS
BATHING: 0-->INDEPENDENT
RETIRED_COMMUNICATION: 0-->UNDERSTANDS/COMMUNICATES WITHOUT DIFFICULTY
TOILETING: 0-->INDEPENDENT
ADLS_ACUITY_SCORE: 19
SWALLOWING: 0-->SWALLOWS FOODS/LIQUIDS WITHOUT DIFFICULTY
TRANSFERRING: 1-->ASSISTIVE EQUIPMENT
FALL_HISTORY_WITHIN_LAST_SIX_MONTHS: YES
AMBULATION: 1-->ASSISTIVE EQUIPMENT

## 2019-03-02 ASSESSMENT — MIFFLIN-ST. JEOR
SCORE: 1536.5
SCORE: 1460.61

## 2019-03-02 NOTE — LETTER
Transition Communication Hand-off for Care Transitions to Next Level of Care Provider    Name: Alvaro Horton  : 1940  MRN #: [id number]  Primary Care Provider: Be Carreno  Primary Care MD Name: Dr. Carreno  Primary Clinic: [address] [place] [id number]  Primary Care Clinic Name: [facility]  Reason for Hospitalization:  Cholecystitis [K81.9]  Admit Date/Time: 3/2/2019  2:44 PM  Discharge Date: 3/4/2019  Payor Source: Payor: [employer] / Plan: [employer] MEDICARE ADVANTAGE / Product Type: Medicare /     Readmission Assessment Measure (KAUR) Risk Score/category: average         Reason for Communication Hand-off Referral: Other Cholecystitis, surgery    Discharge Plan: Home with [facility] ([phone] Fax: [phone])     Concern for non-adherence with plan of care:   Y/N no  Follow-up plan:  No future appointments.    Key Recommendations:  Patient will DC with [facility], patient refused TCU at time of discharge.    Velia Archibald    AVS/Discharge Summary is the source of truth; this is a helpful guide for improved communication of patient story

## 2019-03-02 NOTE — TELEPHONE ENCOUNTER
Prior Authorization Retail Medication Request    Medication/Dose: brovana 15mcg/2ml  ICD code (if different than what is on RX):  COPD (chronic obstructive pulmonary disease) (H) [J44.9]    Previously Tried and Failed:  unknown  Rationale:  Pt has been stable on this medication since 2014    Insurance Name: [employer]  Insurance ID:  [id number]      Pharmacy Information (if different than what is on RX)  Name:    Phone:

## 2019-03-02 NOTE — ED PROVIDER NOTES
"  History     Chief Complaint   Patient presents with     Vomiting     Abdominal Pain     Shortness of Breath     HPI  Alvaro Horton is a 78 year old male who presents to the ED for multiple complaints. History was difficult to obtain as the patient is a poor historian. The patient states that he experienced severe abdominal pain and \"gas pain\" last night with nausea with an episode of emesis. Abdominal pain is now somewhat improved. The patient also notes generalized weakness, and was unable to stand up from his recliner chair earlier today. He states that he has felt more tired lately as well, and that he usually sleeps about 8 hours per night but kept falling asleep in his recliner throughout yesterday. He adds that he fell in his room yesterday and eventually got up by himself because his son recently had surgery and was unable to help him, denies injury with fall. The patient notes a non-productive cough and \"breathing problems\". He currently has chickens, and becomes short of air if he bends over for a long period of time. He states that he used to see a pulmonologist at [facility] and took Brovana regularly, however his pulmonologist has left that location and his insurance changed so he is unable to afford Brovana anymore. The patient acknowledges that his bowel movements \"weren't working for awhile\", but are now formed and brown in color. The patient notes that he has not eaten for the past two days secondary to abdominal pain and nausea. Denies acute urinary changes or body aches. No past abdominal surgeries. He received an influenza vaccination this year. His fever in the ED is 100.2 F.      Allergies:  Allergies   Allergen Reactions     Prednisone Other (See Comments)     Severe interaction with DM       Problem List:    Patient Active Problem List    Diagnosis Date Noted     Hypertension goal BP (blood pressure) < 140/90 10/09/2006     Priority: High     BAKERS LUNG      Priority: High     " "Dx possible \"Baker's Lung\" 2001 [place] [facility]. RAST for bakers yeast negative 2002. Has occupational exposures with related worsening asthmatic symptoms. CT 2/08- no fibrosis.       Moderate persistent asthma      Priority: High     PFTS 1997 - FEV1- 3.39 (64%), ratio 0.74, 24% change irw75-58% with bronchodilators,  TLC 86%, %, DLCO 78%. Methacholine challenge positive at level 7 2001.  PFTS 2004 - FEV1- 1.93 (63%), ratio 0.77. PFTS 4/08 - FEV1- 2.13 (72%), ratio 0.71, no change with bronchodilators,  TLC 78%, RV 96%, DLCO 64%          Chronic ischemic heart disease      Priority: High     atypical chest pain 2001 with GXT echo- decreased LVF, angiogram 2001- nonobstructing 3 vessel disease.   Repeat angio 2004- prox RCA 50-60%,  OM2- 40%, D1 30-40%, LAD 30-40%.   Cardiolite 2005 during hospitalization for SOB in [place] reported to reveal no ischemia.   Adenosine cardiolite 1/08- small slightly reversible inferior defect, most likely consistent with diaphragm attenuation with bowel uptake artifact. USA, Angio 11/09- Severe LM disease, severe subtotal occlusion of a large branch OM1.    S/p CABG x2 11/09- LIMA-D1, SVG to OM1, OM2. GXT 8/10- 5.8 mets, 121 (80%) HR, normal ekg, cardiolite- small fixed inferior/basal defect with small area khurram-infarct ischemia extending into the mid ventricle. EF 67%.  4/25/13:adenosine cardiolyte stress test through [facility] with normal EF and no ischemia.   1/16/2016 nuclear stress test:1.  Myocardial perfusion imaging using single isotope technique demonstrated no evidence for myocardial ischemia. 2. Gated images demonstrated normal wall motion with a calculated ejection fraction of 61%.  3. Compared to the prior study from 2011 there are no significant changes .           Cholecystitis 03/02/2019     Priority: Medium     Acute calculous cholecystitis 03/02/2019     Priority: Medium     Abnormal blood-gas measurement 09/04/2018     Priority: Medium     Encephalopathy " "09/03/2018     Priority: Medium     Type 2 diabetes mellitus with diabetic chronic kidney disease (H) 10/30/2015     Priority: Medium     Altered mental status 12/31/2014     Priority: Medium     12/27/2014:episode confusion for 20 minutes.  Seen at [facility]/-hospitalized.  Head CT, MRI/MRA all normal.  PET cardiac perfusion stress test normal.  Neurology consult-diagnosis= possible hypoglycemia, migraine variant.  \"Acute ischemic cerebral event was excluded\".       Health Care Home 08/11/2014     Priority: Medium     State Tier Level:    Status:  Unable to re-connect  Care Coordinator:  Kecia Mills    See Letters for [facility] Care Plan  Date:  August 11, 2014             Chronic kidney disease, stage 2 (mild) 05/06/2014     Priority: Medium     9/9/2015:He has had two abnormal MAC in the past.        COPD (chronic obstructive pulmonary disease) (H) 11/18/2013     Priority: Medium     PFT 11/15/13 results:FVC=59%, FEV1=54%, FEV1/FVC=92%.  His DLCO was 58%1. Spirometry: FEV1 moderately reduced. FVC moderately reduced. FEV1/FVC ratio reduced. 2. Bronchodilator Response: A 14% improvement is seen in FEV1 with bronchodilators. 3. Lung Volumes: Total lung capacity normal. Residual volume increased. Residual volume total lung capacity ratio increased. 4. DLCO: Moderately reduced.   INTERPRETATION: Moderate obstructive lung disease. Reversibility with bronchodilators is seen on testing today. Lung volumes show evidence for air trapping. DLCO is reduced, consistent with loss of capillary-alveolar exchange as in emphysema, pulmonary hypertension, chronic pulmonary embolus, pulmonary fibrosis or other pulmonary vascular disease. A concomitant restrictive lung disease process may be suspected on the basis of moderate reductions in FEV1 and FVC, with a fairly well-preserved FEV1/FVC ratio and a borderline low normal total lung capacity.   11/15/13:exercise oximetry did not show significant desaturation below 91%.  PFT February, " 2014 results:FVC=54%, FEV1=48%, FEV1/FVC=64%.  DLCO=59%  9/6/2016 pulmonary consult:COPD with restrictional components and diffusion defect, history Baker's lung.          Hyperlipidemia LDL goal <70 04/17/2013     Priority: Medium     Atrial fibrillation (H) 12/14/2009     Priority: Medium     After CABG 11/09       CVA (cerebral vascular accident) (H) 11/27/2009     Priority: Medium     Mild Broca's aphasia, confusion, right hand dysesthesia after angio. MRA new small area of restricted diffusion in the white matter of the medial right temporal lobe,  consistent with a recent small infarct. Speech problems resolved, still mild right hand problems.         Obstructive sleep apnea 04/14/2008     Priority: Medium     ESS 20/24. Sleep study [facility]: 4/8/08. total sleep time was 423.0 minutes. The sleep latency was normal at 9.7 minutes.  REM sleep latency was 161.5 minutes. Sleep efficiency was normal at 82.7%. The sleep architecture was disrupted with frequent sleep stage changes and arousals. Snoring: Was reported as moderately loud. Lowest O2 saturation was 87.0%. This study is suggestive of mild sleep apnea, severe during REM sleep (21% TST).  PLM index was 0.0. EKG:  No significant cardiac arrhythmias were noted.         Hypertrophy of prostate without urinary obstruction      Priority: Medium     Elevated PSA. Bx negative 2004.       Advanced care planning/counseling discussion 09/24/2012     Priority: Low     Does not have a directive 9/12       Dyslexia 11/11/2010     Priority: Low     essentially unable to read       CTS (carpal tunnel syndrome) 05/12/2010     Priority: Low     EMG 5/10-  median neuropathy at the right wrist, moderate in severity electrically, right ulnar neuropathy localizing to the elbow, mild chronic right C6 radiculopathy without evidence for acute denervation.        Memory loss 05/03/2010     Priority: Low     MMSE 27/30 (0/3 attention recall, ?history of dylexia), PHQ9-16 7/09.  Recommended neurocogntive testing, did not complete.  MMSE 23/30 6/10 (attention, county, if/and/buts).Neurocogntive evaluation  1/10- not suspicious for emerging dementia. Felt likely due hearing loss/dyslexia.   10/29/2018:He was seen at [facility] Neurology clinic 10/10/2018.  Dr. Oksana Munoz.   He had an EEG.   Diagnosed with Alzheimer's dementia.   Records not yet reviewed.        Benign paroxysmal vertigo 02/03/2009     Priority: Low     Admit 2/09. MRI/MRA head and neck negative.       Sensorineural hearing loss, bilateral 10/08/2007     Priority: Low     Esophageal reflux 11/06/2006     Priority: Low     PSORIASIS      Priority: Low     OVERACTIVE BLADDER      Priority: Low     PVR 84 2004.          Past Medical History:    Past Medical History:   Diagnosis Date     Calculus of kidney      Depression      Left hand weakness 12/14/2009     PNEUMONIA, ORGANISM NOS 2/14/2008       Past Surgical History:    Past Surgical History:   Procedure Laterality Date     C ANESTH,DX ARTHROSCOPIC PROC KNEE JOINT  1994, 1998     Left     CARDIAC SURGERY  11/09    CABG x2 - LIMA-D1, SVG to OM1, OM2.     GENITOURINARY SURGERY  1990    ESWL and percutaneous nephrolithotomy     ORTHOPEDIC SURGERY  8/12    right CTR     SURGICAL HISTORY OF -   1998    Hernia repair     SURGICAL HISTORY OF -   2004    NormalColonoscopy      SURGICAL HISTORY OF -   2006    Normal Colonoscopy       Family History:    Family History   Problem Relation Age of Onset     C.A.D. Father         40s     Hypertension Father      C.A.D. Paternal Grandfather      Heart Disease Paternal Grandfather      Diabetes Brother      C.A.D. Brother      Heart Disease Brother      Hypertension Brother      Gastrointestinal Disease Son         Crohn's disease     Gastrointestinal Disease Other         2 grandaughters with Crohn's disease     Cancer - colorectal No family hx of        Social History:  Marital Status:   [5]  Social History     Tobacco Use     Smoking  "status: Never Smoker     Smokeless tobacco: Never Used   Substance Use Topics     Alcohol use: No     Alcohol/week: 0.0 oz     Drug use: No        Medications:      No current outpatient medications on file.      Review of Systems    All other systems are reviewed and are negative.      Physical Exam   BP: 136/75  Pulse: 70  Temp: 100.2  F (37.9  C)  Resp: 16  Height: 172.7 cm (5' 8\")  Weight: 77.1 kg (170 lb)  SpO2: 94 %      Physical Exam  Nontoxic appearing no respiratory distress alert and oriented ×3, hard of hearing  Head atraumatic normocephalic  Conjunctiva noninjected, oropharynx moist without lesions or erythema  No cervical adenopathy   Neck supple full active painless range of motion  Lungs diffusely poor air movement without rales rhonchi or wheezes  Heart regular no murmur  Abdomen soft moderate tenderness right mid right upper quadrant without guarding or rebound bowel sounds positive no masses or HSM  Strength and sensation grossly intact throughout the extremities, gait and station normal  Speech is fluent, good eye contact, thought processes are rational  Lower extremities without swelling, redness or tenderness  Pedal pulses symmetrical and strong    ED Course   EKG, normal sinus rhythm, frequent PACs, first-degree AV block, right bundle branch block, no acute ST-T wave changes, unchanged from previous, read by Dr. Harshil Yee     Procedures                      Results for orders placed or performed during the hospital encounter of 03/02/19 (from the past 24 hour(s))   CBC with platelets differential   Result Value Ref Range    WBC 17.5 (H) 4.0 - 11.0 10e9/L    RBC Count 4.84 4.4 - 5.9 10e12/L    Hemoglobin 15.1 13.3 - 17.7 g/dL    Hematocrit 42.5 40.0 - 53.0 %    MCV 88 78 - 100 fl    MCH 31.2 26.5 - 33.0 pg    MCHC 35.5 31.5 - 36.5 g/dL    RDW 13.4 10.0 - 15.0 %    Platelet Count 168 150 - 450 10e9/L    Diff Method Automated Method     % Neutrophils 82.1 %    % Lymphocytes 7.2 %    % " Monocytes 9.9 %    % Eosinophils 0.0 %    % Basophils 0.2 %    % Immature Granulocytes 0.6 %    Nucleated RBCs 0 0 /100    Absolute Neutrophil 14.4 (H) 1.6 - 8.3 10e9/L    Absolute Lymphocytes 1.3 0.8 - 5.3 10e9/L    Absolute Monocytes 1.7 (H) 0.0 - 1.3 10e9/L    Absolute Eosinophils 0.0 0.0 - 0.7 10e9/L    Absolute Basophils 0.0 0.0 - 0.2 10e9/L    Abs Immature Granulocytes 0.1 0 - 0.4 10e9/L    Absolute Nucleated RBC 0.0    Comprehensive metabolic panel   Result Value Ref Range    Sodium 131 (L) 133 - 144 mmol/L    Potassium 4.6 3.4 - 5.3 mmol/L    Chloride 97 94 - 109 mmol/L    Carbon Dioxide 26 20 - 32 mmol/L    Anion Gap 8 3 - 14 mmol/L    Glucose 273 (H) 70 - 99 mg/dL    Urea Nitrogen 22 7 - 30 mg/dL    Creatinine 1.09 0.66 - 1.25 mg/dL    GFR Estimate 64 >60 mL/min/[1.73_m2]    GFR Estimate If Black 74 >60 mL/min/[1.73_m2]    Calcium 9.4 8.5 - 10.1 mg/dL    Bilirubin Total 1.1 0.2 - 1.3 mg/dL    Albumin 3.6 3.4 - 5.0 g/dL    Protein Total 7.2 6.8 - 8.8 g/dL    Alkaline Phosphatase 111 40 - 150 U/L    ALT 37 0 - 70 U/L    AST 22 0 - 45 U/L   Lipase   Result Value Ref Range    Lipase 117 73 - 393 U/L   Troponin I   Result Value Ref Range    Troponin I ES <0.015 0.000 - 0.045 ug/L   Lactic acid whole blood   Result Value Ref Range    Lactic Acid 2.7 (H) 0.7 - 2.0 mmol/L   Abd/pelvis CT - no contrast - Stone Protocol    Narrative    CT ABDOMEN PELVIS WITHOUT CONTRAST   3/2/2019 3:44 PM     HISTORY: Abdominal pain.    TECHNIQUE: Volumetric helical sections were acquired from the lung  bases through the ischial tuberosities without IV contrast. Coronal  images were also reconstructed. Radiation dose for this scan was  reduced using automated exposure control, adjustment of the mA and/or  kV according to patient size, or iterative reconstruction technique.    COMPARISON: CT of the abdomen and pelvis performed 11/9/2016.    FINDINGS:  No bowel obstruction. Scattered colonic diverticulosis,  without convincing  evidence for diverticulitis. Unremarkable appendix.  No free fluid in the pelvis. There is moderate to marked prostatic  enlargement. Bladder trabeculation is likely related to the prostatic  enlargement. Mild atherosclerotic aortoiliac calcification. Multiple  gallstones are noted within the gallbladder. The gallbladder is mildly  distended, and the gallbladder wall appears mildly thickened.  Scattered calcified granulomas in the spleen. Nonobstructing 0.4 cm  stone in the lower pole of the left kidney is unchanged. The liver,  spleen, adrenal glands, pancreas, and kidneys have otherwise  unremarkable noncontrast appearances. No hydronephrosis. No  intra-abdominal fluid collections are identified. No free  intraperitoneal air. Coronary artery calcification. Prior midline  sternotomy. The visualized lung bases are clear. Degenerative changes  are noted throughout the visualized thoracolumbar spine.      Impression    IMPRESSION:   1. Cholelithiasis, mild gallbladder distention, and probable  gallbladder wall thickening. Acute cholecystitis could have this  appearance. Gallbladder ultrasound may be helpful for further  characterization.  2. Moderate to marked prostatic enlargement.  3. Scattered colonic diverticulosis, without convincing evidence for  diverticulitis.   4. Nonobstructing 0.4 cm stone in the lower pole of the left kidney.      ARIELA CHAIREZ MD   UA reflex to Microscopic   Result Value Ref Range    Color Urine Yellow     Appearance Urine Clear     Glucose Urine 50 (A) NEG^Negative mg/dL    Bilirubin Urine Negative NEG^Negative    Ketones Urine Negative NEG^Negative mg/dL    Specific Gravity Urine 1.018 1.003 - 1.035    Blood Urine Small (A) NEG^Negative    pH Urine 5.0 5.0 - 7.0 pH    Protein Albumin Urine 100 (A) NEG^Negative mg/dL    Urobilinogen mg/dL 0.0 0.0 - 2.0 mg/dL    Nitrite Urine Negative NEG^Negative    Leukocyte Esterase Urine Negative NEG^Negative    Source Midstream Urine     RBC Urine  <1 0 - 2 /HPF    WBC Urine 2 0 - 5 /HPF    Squamous Epithelial /HPF Urine 2 (H) 0 - 1 /HPF    Mucous Urine Present (A) NEG^Negative /LPF    Hyaline Casts 1 0 - 2 /LPF   Abdomen US, limited (RUQ only)    Narrative    RIGHT UPPER QUADRANT ULTRASOUND 3/2/2019 4:19 PM    HISTORY:  Fever, abdominal pain, question cholecystitis on CT scan  abdomen pelvis.     COMPARISON: 3/2/2019 CT abdomen and pelvis.    FINDINGS:    Pancreas: Not visualized, completely obscured.  Liver: Poorly visualized, left lobe predominantly obscured. No focal  liver lesion where seen.  Gallbladder: Multiple gallstones. Mild gallbladder wall thickening 0.4  cm. Sonographic Escobar's sign reported as negative. No pericholecystic  fluid.  Biliary: No biliary duct dilatation.  Right kidney: Grossly normal, 13.9 cm in length, suboptimally  visualized.      Impression    IMPRESSION:  Limited exam. Cholelithiasis and mild gallbladder wall  thickening which is nonspecific but can be seen with acute  cholecystitis. If clinically indicated, nuclear medicine hepatobiliary  scan could be considered.       NEENA LINDSAY MD   Lactic acid whole blood   Result Value Ref Range    Lactic Acid 2.8 (H) 0.7 - 2.0 mmol/L       Medications   piperacillin-tazobactam (ZOSYN) infusion 3.375 g (0 g Intravenous Stopped 3/2/19 1730)   lactated ringers infusion (not administered)   ondansetron (ZOFRAN) injection 4 mg (not administered)   albuterol (PROAIR HFA/PROVENTIL HFA/VENTOLIN HFA) 108 (90 Base) MCG/ACT inhaler 2 puff (not administered)   aspirin (ASA) chewable tablet 81 mg (not administered)   atorvastatin (LIPITOR) tablet 80 mg (not administered)   arformoterol (BROVANA) neb solution 15 mcg (not administered)   budesonide (PULMICORT) neb solution 0.5 mg (not administered)   furosemide (LASIX) tablet 20 mg (not administered)   lisinopril (PRINIVIL/ZESTRIL) tablet 5 mg (not administered)   metoprolol tartrate (LOPRESSOR) tablet 25 mg (not administered)   nitroGLYcerin  (NITROSTAT) sublingual tablet 0.4 mg (not administered)   tamsulosin (FLOMAX) capsule 0.4 mg (not administered)   naloxone (NARCAN) injection 0.1-0.4 mg (not administered)   melatonin tablet 1 mg (not administered)   morphine (PF) injection 2-4 mg (not administered)   polyethylene glycol (MIRALAX/GLYCOLAX) Packet 17 g (not administered)   senna-docusate (SENOKOT-S/PERICOLACE) 8.6-50 MG per tablet 1 tablet (not administered)     Or   senna-docusate (SENOKOT-S/PERICOLACE) 8.6-50 MG per tablet 2 tablet (not administered)   senna-docusate (SENOKOT-S/PERICOLACE) 8.6-50 MG per tablet 1 tablet (not administered)     Or   senna-docusate (SENOKOT-S/PERICOLACE) 8.6-50 MG per tablet 2 tablet (not administered)   insulin detemir (LEVEMIR PEN) injection 24 Units (not administered)   0.9% sodium chloride BOLUS (0 mLs Intravenous Stopped 3/2/19 1706)          15:00 Patient assessed.      Assessments & Plan (with Medical Decision Making)  78-year-old male presents with vomiting abdominal pain, right-sided abdominal tenderness.  CT scan consistent with large gallbladder with stones, mildly thickened gallbladder wall, ultrasound same, no evidence for khurram-Cholecystic fluid, common bile duct is not enlarged.  Patient has elevated white count mild elevation of lactate.  Treated with LR fluid bolus 2 L in ED.  Findings consistent with cholecystitis.  Patient be admitted to hospital, syn every 6 hours, reviewed with  accepts for hospitalist service, also reviewed with Dr. Mendieta and written for surgical consult.     I have reviewed the nursing notes.    I have reviewed the findings, diagnosis, plan and need for follow up with the patient.          Medication List      There are no discharge medications for this visit.         Final diagnoses:   Hypertension goal BP (blood pressure) < 140/90   Acute calculous cholecystitis   Moderate persistent asthma without complication   Type 2 diabetes mellitus with stage 3 chronic kidney  disease, with long-term current use of insulin (H)   Chronic obstructive pulmonary disease, unspecified COPD type (H)   Memory loss   Paroxysmal atrial fibrillation (H)   Cerebrovascular accident (CVA), unspecified mechanism (H)       This document serves as a record of the services and decisions personally performed and made by Harshil Yee MD. It was created on HIS/HER behalf by Amira Epps, a trained medical scribe. The creation of this document is based the provider's statements to the medical scribe.  Amira Epps 3:05 PM 3/2/2019    Provider:   The information in this document, created by the medical scribe for me, accurately reflects the services I personally performed and the decisions made by me. I have reviewed and approved this document for accuracy prior to leaving the patient care area.  Harshil Yee MD 3:05 PM 3/2/2019    3/2/2019   [facility] EMERGENCY DEPARTMENT     Harshil Yee MD  03/02/19 2104

## 2019-03-02 NOTE — ED NOTES
Pt comes in with concerns of Abd pain last evening, that results in N/V, ongoing acute on chronic breathing issues. Pt denies pain at present. Denies n/v. Breathing rate of 14-18 breaths per minute, NAD. Talking in full sentences. Pt in room, oriented to room and call light. Call light within reach.

## 2019-03-03 LAB
ALBUMIN SERPL-MCNC: 2.9 G/DL (ref 3.4–5)
ALP SERPL-CCNC: 90 U/L (ref 40–150)
ALT SERPL W P-5'-P-CCNC: 28 U/L (ref 0–70)
ANION GAP SERPL CALCULATED.3IONS-SCNC: 8 MMOL/L (ref 3–14)
AST SERPL W P-5'-P-CCNC: 9 U/L (ref 0–45)
BASOPHILS # BLD AUTO: 0 10E9/L (ref 0–0.2)
BASOPHILS NFR BLD AUTO: 0.2 %
BILIRUB SERPL-MCNC: 1.1 MG/DL (ref 0.2–1.3)
BUN SERPL-MCNC: 18 MG/DL (ref 7–30)
CALCIUM SERPL-MCNC: 8.6 MG/DL (ref 8.5–10.1)
CHLORIDE SERPL-SCNC: 100 MMOL/L (ref 94–109)
CO2 SERPL-SCNC: 29 MMOL/L (ref 20–32)
CREAT SERPL-MCNC: 1.16 MG/DL (ref 0.66–1.25)
DIFFERENTIAL METHOD BLD: ABNORMAL
EOSINOPHIL # BLD AUTO: 0 10E9/L (ref 0–0.7)
EOSINOPHIL NFR BLD AUTO: 0.1 %
ERYTHROCYTE [DISTWIDTH] IN BLOOD BY AUTOMATED COUNT: 13.5 % (ref 10–15)
GFR SERPL CREATININE-BSD FRML MDRD: 59 ML/MIN/{1.73_M2}
GLUCOSE BLDC GLUCOMTR-MCNC: 189 MG/DL (ref 70–99)
GLUCOSE BLDC GLUCOMTR-MCNC: 204 MG/DL (ref 70–99)
GLUCOSE BLDC GLUCOMTR-MCNC: 205 MG/DL (ref 70–99)
GLUCOSE BLDC GLUCOMTR-MCNC: 250 MG/DL (ref 70–99)
GLUCOSE SERPL-MCNC: 192 MG/DL (ref 70–99)
HCT VFR BLD AUTO: 40 % (ref 40–53)
HGB BLD-MCNC: 13.7 G/DL (ref 13.3–17.7)
IMM GRANULOCYTES # BLD: 0.1 10E9/L (ref 0–0.4)
IMM GRANULOCYTES NFR BLD: 0.6 %
LYMPHOCYTES # BLD AUTO: 1.5 10E9/L (ref 0.8–5.3)
LYMPHOCYTES NFR BLD AUTO: 9.1 %
MCH RBC QN AUTO: 31.1 PG (ref 26.5–33)
MCHC RBC AUTO-ENTMCNC: 34.3 G/DL (ref 31.5–36.5)
MCV RBC AUTO: 91 FL (ref 78–100)
MONOCYTES # BLD AUTO: 1.7 10E9/L (ref 0–1.3)
MONOCYTES NFR BLD AUTO: 10.7 %
NEUTROPHILS # BLD AUTO: 12.8 10E9/L (ref 1.6–8.3)
NEUTROPHILS NFR BLD AUTO: 79.3 %
NRBC # BLD AUTO: 0 10*3/UL
NRBC BLD AUTO-RTO: 0 /100
PLATELET # BLD AUTO: 156 10E9/L (ref 150–450)
POTASSIUM SERPL-SCNC: 3.8 MMOL/L (ref 3.4–5.3)
PROT SERPL-MCNC: 6.2 G/DL (ref 6.8–8.8)
RBC # BLD AUTO: 4.41 10E12/L (ref 4.4–5.9)
SODIUM SERPL-SCNC: 137 MMOL/L (ref 133–144)
WBC # BLD AUTO: 16.1 10E9/L (ref 4–11)

## 2019-03-03 PROCEDURE — 99207 ZZC MOONLIGHTING INDICATOR: CPT | Performed by: FAMILY MEDICINE

## 2019-03-03 PROCEDURE — 25800030 ZZH RX IP 258 OP 636: Performed by: PHYSICIAN ASSISTANT

## 2019-03-03 PROCEDURE — 36415 COLL VENOUS BLD VENIPUNCTURE: CPT | Performed by: FAMILY MEDICINE

## 2019-03-03 PROCEDURE — 80053 COMPREHEN METABOLIC PANEL: CPT | Performed by: FAMILY MEDICINE

## 2019-03-03 PROCEDURE — 99232 SBSQ HOSP IP/OBS MODERATE 35: CPT | Performed by: FAMILY MEDICINE

## 2019-03-03 PROCEDURE — 25000132 ZZH RX MED GY IP 250 OP 250 PS 637: Performed by: FAMILY MEDICINE

## 2019-03-03 PROCEDURE — 40000275 ZZH STATISTIC RCP TIME EA 10 MIN

## 2019-03-03 PROCEDURE — 85025 COMPLETE CBC W/AUTO DIFF WBC: CPT | Performed by: FAMILY MEDICINE

## 2019-03-03 PROCEDURE — 25000125 ZZHC RX 250: Performed by: FAMILY MEDICINE

## 2019-03-03 PROCEDURE — 25000132 ZZH RX MED GY IP 250 OP 250 PS 637: Performed by: PHYSICIAN ASSISTANT

## 2019-03-03 PROCEDURE — 25800030 ZZH RX IP 258 OP 636: Performed by: EMERGENCY MEDICINE

## 2019-03-03 PROCEDURE — 94640 AIRWAY INHALATION TREATMENT: CPT | Mod: 76

## 2019-03-03 PROCEDURE — 12000000 ZZH R&B MED SURG/OB

## 2019-03-03 PROCEDURE — 25000128 H RX IP 250 OP 636: Performed by: EMERGENCY MEDICINE

## 2019-03-03 PROCEDURE — 94640 AIRWAY INHALATION TREATMENT: CPT

## 2019-03-03 PROCEDURE — 00000146 ZZHCL STATISTIC GLUCOSE BY METER IP

## 2019-03-03 RX ORDER — INDOCYANINE GREEN AND WATER 25 MG
2.5 KIT INJECTION
Status: COMPLETED | OUTPATIENT
Start: 2019-03-04 | End: 2019-03-04

## 2019-03-03 RX ORDER — CALCIUM CARBONATE 500 MG/1
500 TABLET, CHEWABLE ORAL 3 TIMES DAILY PRN
Status: DISCONTINUED | OUTPATIENT
Start: 2019-03-03 | End: 2019-03-04 | Stop reason: HOSPADM

## 2019-03-03 RX ORDER — ALUMINA, MAGNESIA, AND SIMETHICONE 2400; 2400; 240 MG/30ML; MG/30ML; MG/30ML
30 SUSPENSION ORAL EVERY 4 HOURS PRN
Status: DISCONTINUED | OUTPATIENT
Start: 2019-03-03 | End: 2019-03-04 | Stop reason: HOSPADM

## 2019-03-03 RX ORDER — SODIUM CHLORIDE, SODIUM LACTATE, POTASSIUM CHLORIDE, CALCIUM CHLORIDE 600; 310; 30; 20 MG/100ML; MG/100ML; MG/100ML; MG/100ML
INJECTION, SOLUTION INTRAVENOUS CONTINUOUS
Status: DISCONTINUED | OUTPATIENT
Start: 2019-03-03 | End: 2019-03-04 | Stop reason: HOSPADM

## 2019-03-03 RX ORDER — INDOCYANINE GREEN AND WATER 25 MG
2.5 KIT INJECTION ONCE
Status: DISCONTINUED | OUTPATIENT
Start: 2019-03-03 | End: 2019-03-03

## 2019-03-03 RX ADMIN — TAZOBACTAM SODIUM AND PIPERACILLIN SODIUM 3.38 G: 375; 3 INJECTION, SOLUTION INTRAVENOUS at 04:25

## 2019-03-03 RX ADMIN — SODIUM CHLORIDE, POTASSIUM CHLORIDE, SODIUM LACTATE AND CALCIUM CHLORIDE: 600; 310; 30; 20 INJECTION, SOLUTION INTRAVENOUS at 00:31

## 2019-03-03 RX ADMIN — SODIUM CHLORIDE, POTASSIUM CHLORIDE, SODIUM LACTATE AND CALCIUM CHLORIDE: 600; 310; 30; 20 INJECTION, SOLUTION INTRAVENOUS at 22:38

## 2019-03-03 RX ADMIN — ARFORMOTEROL TARTRATE 15 MCG: 15 SOLUTION RESPIRATORY (INHALATION) at 19:48

## 2019-03-03 RX ADMIN — LISINOPRIL 5 MG: 5 TABLET ORAL at 08:22

## 2019-03-03 RX ADMIN — SODIUM CHLORIDE, POTASSIUM CHLORIDE, SODIUM LACTATE AND CALCIUM CHLORIDE: 600; 310; 30; 20 INJECTION, SOLUTION INTRAVENOUS at 05:49

## 2019-03-03 RX ADMIN — TAZOBACTAM SODIUM AND PIPERACILLIN SODIUM 3.38 G: 375; 3 INJECTION, SOLUTION INTRAVENOUS at 11:04

## 2019-03-03 RX ADMIN — TAZOBACTAM SODIUM AND PIPERACILLIN SODIUM 3.38 G: 375; 3 INJECTION, SOLUTION INTRAVENOUS at 22:52

## 2019-03-03 RX ADMIN — CALCIUM CARBONATE (ANTACID) CHEW TAB 500 MG 500 MG: 500 CHEW TAB at 16:40

## 2019-03-03 RX ADMIN — SODIUM CHLORIDE, POTASSIUM CHLORIDE, SODIUM LACTATE AND CALCIUM CHLORIDE: 600; 310; 30; 20 INJECTION, SOLUTION INTRAVENOUS at 11:04

## 2019-03-03 RX ADMIN — METOPROLOL TARTRATE 25 MG: 25 TABLET ORAL at 19:35

## 2019-03-03 RX ADMIN — CALCIUM CARBONATE (ANTACID) CHEW TAB 500 MG 500 MG: 500 CHEW TAB at 21:17

## 2019-03-03 RX ADMIN — ARFORMOTEROL TARTRATE 15 MCG: 15 SOLUTION RESPIRATORY (INHALATION) at 08:38

## 2019-03-03 RX ADMIN — TAMSULOSIN HYDROCHLORIDE 0.4 MG: 0.4 CAPSULE ORAL at 08:22

## 2019-03-03 RX ADMIN — METOPROLOL TARTRATE 25 MG: 25 TABLET ORAL at 08:22

## 2019-03-03 RX ADMIN — BUDESONIDE 0.5 MG: 0.5 INHALANT RESPIRATORY (INHALATION) at 08:39

## 2019-03-03 RX ADMIN — BUDESONIDE 0.5 MG: 0.5 INHALANT RESPIRATORY (INHALATION) at 19:48

## 2019-03-03 RX ADMIN — ATORVASTATIN CALCIUM 80 MG: 80 TABLET, FILM COATED ORAL at 08:22

## 2019-03-03 RX ADMIN — INSULIN DETEMIR 24 UNITS: 100 INJECTION, SOLUTION SUBCUTANEOUS at 22:52

## 2019-03-03 RX ADMIN — FUROSEMIDE 20 MG: 20 TABLET ORAL at 08:22

## 2019-03-03 RX ADMIN — TAZOBACTAM SODIUM AND PIPERACILLIN SODIUM 3.38 G: 375; 3 INJECTION, SOLUTION INTRAVENOUS at 17:03

## 2019-03-03 ASSESSMENT — ACTIVITIES OF DAILY LIVING (ADL)
ADLS_ACUITY_SCORE: 16

## 2019-03-03 NOTE — H&P
[facility]    History and Physical - Hospitalist Service       Date of Admission:  3/2/2019    Assessment & Plan   Alvaro Horton is a 78 year old male admitted on 3/2/2019.       Acute calculous cholecystitis  78 yr old male who presented with abdominal pain, nausea and vomiting . CT showed cholelithiasis ,gall bladder distension, and probable gall bladder wall thickening , patient also had elevated wbc ,elevated lactate, general surgery consulted and he will be seen tomorrow.  - IV antibiotics Zosyn q 6 hrs  - IV fluids - LR at 100 ml's/ hour  - Recheck Lactate levels  - Possible surgery in AM to be determined by General surgeon.        Chronic obstructive pulmonary disease, unspecified copd type (h)   Moderate persistent asthma without complication  No new events , continue home medication .   - Continue nebs as needed  - Continue with Brovana and Pulmicort  - Oxygen saturations was 95 % on admission        Hypertension goal bp (blood pressure) < 140/90  BP slightly elevated on admission  - Continue with home medication         Type 2 diabetes mellitus with stage 3 chronic kidney disease, with long-term current use of insulin (h)  Last A1C was 8.0 as at May 2018   - Continue with long acting insulin  - Sliding scale insulin         Memory loss   Mild, follows with neurology         Paroxysmal atrial fibrillation (h)  This was developed after his CABG , his rate appears controlled. He is on rate control medication and aspirin   - Continue with beta blocker and aspirin         History of CABG  No acute events   - EKG reviewed no new findings as compared to previous tracings  - Troponin was negative  - Continue with home medication .        Cerebrovascular accident (cva), unspecified mechanism (h)  Occurred in 2009 after his CABG, has no residual symptoms  - Monitor neuro signs.  - Continue Lipitor and aspirin.        Diet: Clear Liquid Diet  NPO for Medical/Clinical Reasons Except  for: No Exceptions    DVT Prophylaxis: Pneumatic Compression Devices  Olivo Catheter: not present  Code Status: Full     Disposition Plan   Expected discharge: 2 - 3 days, recommended to prior living arrangement once adequate pain management/ tolerating PO medications and antibiotic plan established.  Entered: Catracho Leiva MD 03/02/2019, 8:02 PM     The patient's care was discussed with the Patient.    Catracho Leiva MD  [facility]    ______________________________________________________________________    Chief Complaint   Abdominal pain   Nausea  Vomiting     History is obtained from the patient    History of Present Illness   Alvaro Horton is a pleasant 78 year old male with past medical history significant for COPD, history of kidney stones, history of ocular migraine, type 2 diabetes on insulin, sleep apnea, coronary artery disease s/p CABG , memory loss.    He presented with abdominal pain, nausea vomiting of 1 day duration.  Abdominal pain was localized in the epigastric area and described as a sharp pain.  There was associated vomiting he had 2 episodes.  Denied any loose stools or diarrhea.  He said he felt like his stomach was full of gas.  Denies fevers or chills.  Pain waxed and waned in intensity he became progressively weak where he could not ambulate. He has also had decreased appetite since the onset of his symptoms    He reports that he has had chronic nonproductive cough and breathing problems.  He reports that with activities such as bending over to  things he gets  short of breath.  Was seen by a pulmonologist at [facility] and was on Brovana but medication got too expensive and he cannot afford it anymore.  He lives with his son.  He has no history of abdominal surgeries.  He denies any urinary symptoms.    His evaluation in the ED showed elevated wbc 17,000, elevated lactate at 2.7 , BMP was within normal limits except that his  blood glucose was elevated and his Na was low.  Urine test showed no infection . Troponin was wnl .   Lab Results   Component Value Date    TROPI <0.015 03/02/2019    TROPI <0.015 09/03/2018    TROPI <0.015 07/24/2018    TROPI <0.015 07/18/2018    TROPI  12/17/2015     <0.015  The 99th percentile for upper reference range is 0.045 ug/L.  Troponin values in   the range of 0.045 - 0.120 ug/L may be associated with risks of adverse   clinical events.      TROPONIN <0.30 08/07/2004    TROPONIN <0.30 08/07/2004          Review of Systems    CONSTITUTIONAL: NEGATIVE for fever, chills, change in weight  INTEGUMENTARY/SKIN: NEGATIVE for worrisome rashes, moles or lesions  EYES: NEGATIVE for vision changes or irritation  ENT/MOUTH: NEGATIVE for ear, mouth and throat problems  RESP:POSITIVE for cough-non productive, Hx chronic bronchitis, Hx COPD and SOB/dyspnea  CV: NEGATIVE for chest pain, palpitations or peripheral edema  GI: POSITIVE for abdominal pain epigastric, nausea, poor appetite and vomiting  : NEGATIVE for frequency, dysuria, or hematuria  MUSCULOSKELETAL: NEGATIVE for significant arthralgias or myalgia  NEURO: POSITIVE for weakness   HEME: NEGATIVE for bleeding problems  PSYCHIATRIC: NEGATIVE for changes in mood or affect    Past Medical History    I have reviewed this patient's medical history and updated it with pertinent information if needed.   Past Medical History:   Diagnosis Date     Calculus of kidney     1990. s/p ESWL and percutaneous nephrolithotomy     Depression     Hospitalized 1979     Left hand weakness 12/14/2009    Afterbypass surgery, CVA, improved.      PNEUMONIA, ORGANISM NOS 2/14/2008    CT 2/08-  Pulmonary atelectasis and infiltrate in the posterior left lower lung base.        Past Surgical History   I have reviewed this patient's surgical history and updated it with pertinent information if needed.  Past Surgical History:   Procedure Laterality Date     C ANESTH,DX ARTHROSCOPIC PROC  KNEE JOINT  ,      Left     CARDIAC SURGERY      CABG x2 - LIMA-D1, SVG to OM1, OM2.     GENITOURINARY SURGERY      ESWL and percutaneous nephrolithotomy     ORTHOPEDIC SURGERY      right CTR     SURGICAL HISTORY OF -       Hernia repair     SURGICAL HISTORY OF -       NormalColonoscopy      SURGICAL HISTORY OF -       Normal Colonoscopy       Social History   I have reviewed this patient's social history and updated it with pertinent information if needed.  Social History     Tobacco Use     Smoking status: Never Smoker     Smokeless tobacco: Never Used   Substance Use Topics     Alcohol use: No     Alcohol/week: 0.0 oz     Drug use: No       Family History   I have reviewed this patient's family history and updated it with pertinent information if needed.   Family History   Problem Relation Age of Onset     C.A.D. Father         40s     Hypertension Father      C.A.D. Paternal Grandfather      Heart Disease Paternal Grandfather      Diabetes Brother      C.A.D. Brother      Heart Disease Brother      Hypertension Brother      Gastrointestinal Disease Son         Crohn's disease     Gastrointestinal Disease Other         2 grandaughters with Crohn's disease     Cancer - colorectal No family hx of        Prior to Admission Medications   Prior to Admission Medications   Prescriptions Last Dose Informant Patient Reported? Taking?   BROVANA 15 MCG/2ML NEBU neb solution 3/2/2019 at am Self No Yes   Sig: INHALE ONE VIAL VIA NEBULIZER BY MOUTH TWO TIMES A DAY   Insulin Pen Needle (PEN NEEDLES) 31G X 6 MM MISC  Self No No   Si Units 3 times daily For novolog flexpen   LEVEMIR FLEXTOUCH 100 UNIT/ML pen 3/1/2019 at pm Self No Yes   Sig: INJECT 30 UNITS SUBCUTANEOUSLY AT BEDTIME   albuterol (PROAIR HFA/PROVENTIL HFA/VENTOLIN HFA) 108 (90 Base) MCG/ACT Inhaler  Self No No   Sig: Inhale 2 puffs into the lungs every 6 hours as needed for shortness of breath / dyspnea or wheezing   aspirin 81  "MG tablet 3/2/2019 at am Self Yes Yes   Sig: Take 81 mg by mouth daily    atorvastatin (LIPITOR) 80 MG tablet 3/1/2019 at pm Self No Yes   Sig: Take 1 tablet (80 mg) by mouth daily   blood glucose (NO BRAND SPECIFIED) lancets standard  Self No No   Sig: Use to test blood sugar 3 times daily. Accu Chek Brand   blood glucose (NO BRAND SPECIFIED) test strip  Self No No   Sig: Use to test blood sugar 3 times daily Accu Chek Brand   blood glucose monitoring (NO BRAND SPECIFIED) meter device kit  Self No No   Sig: Use to test blood sugar 3 times daily. Accu Chek Brand   budesonide (PULMICORT) 0.5 MG/2ML neb solution 3/2/2019 at am Self No Yes   Sig: Take 2 mLs (0.5 mg) by nebulization 2 times daily   cholecalciferol 2000 UNITS tablet Past Week at Unknown time Self No Yes   Sig: Take 1 tablet by mouth daily.   cyanocobalamin (BL VITAMIN B-12) 500 MCG TABS Past Week at Unknown time Self No Yes   Sig: Take 500 mcg by mouth daily.   Patient taking differently: Take 2,500 mcg by mouth daily    fish oil-omega-3 fatty acids (FISH OIL) 1000 MG capsule 3/2/2019 at am Self No Yes   Sig: Take 1 capsule by mouth 2 times daily.   furosemide (LASIX) 20 MG tablet  Self Yes No   Sig: Take 20 mg by mouth daily   insulin aspart (NOVOLOG FLEXPEN) 100 UNIT/ML injection 3/2/2019 at am Self No Yes   Si units before breakfast, 11 units before lunch, 11 units before dinner   Patient taking differently: Inject 11 Units Subcutaneous 3 times daily (before meals) 11 units before breakfast, 11 units before lunch, 11 units before dinner   insulin syringe-needle U-100 (BD INSULIN SYRINGE ULTRAFINE) 31G X 5/16\" 1 ML  Self No No   Sig: Use one syringe 4 times daily or as directed.   lisinopril (PRINIVIL/ZESTRIL) 5 MG tablet 3/2/2019 at am Self No Yes   Sig: TAKE ONE TABLET BY MOUTH ONCE DAILY   metFORMIN (GLUCOPHAGE) 1000 MG tablet 3/2/2019 at am Self No Yes   Sig: TAKE ONE TABLET BY MOUTH TWICE DAILY WITH MEALS   metoprolol tartrate (LOPRESSOR) 25 " MG tablet 3/2/2019 at am Self No Yes   Sig: TAKE 1 TABLET BY MOUTH TWICE DAILY   nitroGLYcerin (NITROSTAT) 0.4 MG sublingual tablet  Self No No   Sig: Place 1 tablet (0.4 mg) under the tongue every 5 minutes as needed for chest pain (CALL 911 IF NOT IMPROVED AFTER THREE CONSECUTIVE DOSES)   order for DME  Self Yes No   Sig: Equipment being ordered: CPAP  AIRSENSE 10  11-15 CM H2O  QUATTRO AIR SIZE MED  SN# [id number]  DN# [id number]   tamsulosin (FLOMAX) 0.4 MG capsule 3/2/2019 at am Self No Yes   Sig: Take 1 capsule (0.4 mg) by mouth daily (Needs follow-up appointment for this medication)      Facility-Administered Medications: None     Allergies   Allergies   Allergen Reactions     Prednisone Other (See Comments)     Severe interaction with DM       Physical Exam   Vital Signs: Temp: 98.7  F (37.1  C) Temp src: Oral BP: 161/71 Pulse: 82   Resp: 20 SpO2: 94 % O2 Device: None (Room air)    Weight: 186 lbs 11.67 oz    Constitutional: awake, alert, cooperative, no apparent distress, and appears stated age  Eyes: Lids and lashes normal, pupils equal, round and reactive to light, extra ocular muscles intact, sclera clear, conjunctiva normal  Hematologic / Lymphatic: no cervical lymphadenopathy  Respiratory: No increased work of breathing, good air exchange, clear to auscultation bilaterally, no crackles or wheezing  Cardiovascular: Normal apical impulse, regular rate and rhythm, normal S1 and S2, no S3 or S4, and no murmur noted  GI: normal bowel sounds, soft, distended, tenderness noted in the epigastric region, voluntary guarding present and no masses palpated  Skin: no bruising or bleeding, normal skin color, texture, turgor and no redness, warmth, or swelling  Musculoskeletal: no lower extremity pitting edema present  Neuropsychiatric: General: normal, calm and normal eye contact  Level of consciousness: alert / normal  Affect: normal  Orientation: oriented to self, place, time and situation    Data   Data reviewed today:  I reviewed all medications, new labs and imaging results over the last 24 hours. I personally reviewed the abdominal CT image(s) showing showed cholelithiasis ,mild gallbladder distention and probable gallbladder wall thickening ..    Ultrasound Abdominal  IMPRESSION:  Limited exam. Cholelithiasis and mild gallbladder wall  thickening which is nonspecific but can be seen with acute  cholecystitis. If clinically indicated, nuclear medicine hepatobiliary  scan could be considered.    Most Recent 3 CBC's:  Recent Labs   Lab Test 03/02/19  1528 09/04/18  0608 09/03/18  2119   WBC 17.5* 8.3 12.0*   HGB 15.1 13.1* 13.7   MCV 88 89 90    160 182     Most Recent 3 BMP's:  Recent Labs   Lab Test 03/02/19 1528 09/04/18  0608 09/03/18  1810   * 139 139   POTASSIUM 4.6 4.0 4.0   CHLORIDE 97 105 103   CO2 26 27 28   BUN 22 15 20   CR 1.09 1.15 1.22   ANIONGAP 8 7 8   EVELINA 9.4 8.3* 9.2   * 125* 85     Most Recent 2 LFT's:  Recent Labs   Lab Test 03/02/19  1528 09/03/18  1810   AST 22 23   ALT 37 32   ALKPHOS 111 125   BILITOTAL 1.1 0.5     Most Recent 3 INR's:  Recent Labs   Lab Test 09/03/18  1810 11/09/16  0635 12/26/14  1535   INR 0.90 0.94 0.91     Most Recent 3 Creatinines:  Recent Labs   Lab Test 03/02/19  1528 09/04/18  0608 09/03/18  1810   CR 1.09 1.15 1.22     Most Recent 3 Hemoglobins:  Recent Labs   Lab Test 03/02/19  1528 09/04/18  0608 09/03/18  2119   HGB 15.1 13.1* 13.7     Most Recent 3 Troponin's:  Recent Labs   Lab Test 03/02/19  1528 09/03/18  1810 07/24/18  0155   TROPI <0.015 <0.015 <0.015     Most Recent 3 BNP's:  Recent Labs   Lab Test 09/03/18  1810 07/24/18  0155 07/18/18  1840   NTBNPI [phone]     Most Recent Hemoglobin A1c:  Recent Labs   Lab Test 05/17/18  1300   A1C 8.0*

## 2019-03-03 NOTE — CONSULTS
Care Transition Initial Assessment - RN      Met with: Patient.    DATA   Active Problems:    Cholecystitis    Acute calculous cholecystitis       Primary Care Clinic Name: [facility]  Primary Care MD Name: Dr. Carreno  Contact information and PCP information verified: Yes    ASSESSMENT  Cognitive Status: awake, alert and oriented.       Description of Support System: Supportive, Involved   Who is your support system?: Children   Support Assessment: Adequate family and caregiver support   Insurance Concerns: No Insurance issues identified      This writer met with pt, introduced self and role.  Discussed discharge planning and Medicare guidelines in regards to home care, TCU and LTC. The patient is admitted with cholecystitis and will have a cholecystectomy today. Patient reports he has had multiple falls. Discussed TCU and home care. He adamantly declined TCU, agreed to home care. Pt was provided with Medicare certified home care list. Pt chooses to use [facility] ([phone] Fax: [phone]). Per the discussion in Interdisciplinary Rounds he will have a PT evaluation after surgery to determine a safe discharge plan.     Care Transitions will continue to monitor through daily chart reviews and Interdisciplinary Rounds.  If immediate needs arise, please contact Care Management at [phone].     PLAN    [facility] vs TCU. If patient returns home with home care a referral will need to be placed.      Zainab Jenkins RN, Care Coordinator [phone]

## 2019-03-03 NOTE — PROGRESS NOTES
Discharge Planner   Discharge Plans in progress: [facility] vs TCU.   Barriers to discharge plan: Medical stability  Follow up plan: Care Transitions will continue to monitor through daily chart reviews and Interdisciplinary Rounds.  If immediate needs arise, please contact Care Management at [phone].   Zainab Jenkins RN, Care Coordinator       Entered by: Zainab Jenkins 03/03/2019 10:03 AM

## 2019-03-03 NOTE — CONSULTS
PCP:  Be Carreno    Chief complaint: Right upper quadrant abdominal pain, gallstones.    History of Present Illness: The patient is a delightful 78-year-old man who presents with a history of abdominal pain which began rather abruptly yesterday.The pain was described as gas pains and located in the right upper quadrant even into his right back.  He felt like if he could pass gas that he would feel better.  He even attempted vomiting but that did not help.  He says he feels very weak.  He was having trouble getting out of his recliner yesterday and finally presented to the emergency room.  No change in his bowel habits.  He has had quadruple bypass surgery.  No previous abdominal surgery except may be a hernia of some sort (could be umbilical).    No change in the color of his urine or stool.    A CT scan done in the emergency room showed multiple gallstones with mild gallbladder wall thickening.  This measured about 4 mm.  An ultrasound confirmed gallstones as well as mild gallbladder wall thickening.  Bile ducts were reported as normal.    Today the patient reports that he feels much better.  He is hungry.  He would like to try some food.    Histories:  Past Medical History:   Diagnosis Date     Calculus of kidney     1990. s/p ESWL and percutaneous nephrolithotomy     Depression     Hospitalized 1979     Left hand weakness 12/14/2009    Afterbypass surgery, CVA, improved.      PNEUMONIA, ORGANISM NOS 2/14/2008    CT 2/08-  Pulmonary atelectasis and infiltrate in the posterior left lower lung base.        Past Surgical History:   Procedure Laterality Date     C ANESTH,DX ARTHROSCOPIC PROC KNEE JOINT  1994, 1998     Left     CARDIAC SURGERY  11/09    CABG x2 - LIMA-D1, SVG to OM1, OM2.     GENITOURINARY SURGERY  1990    ESWL and percutaneous nephrolithotomy     ORTHOPEDIC SURGERY  8/12    right CTR     SURGICAL HISTORY OF -   1998    Hernia repair     SURGICAL HISTORY OF -   2004    NormalColonoscopy       SURGICAL HISTORY OF -   2006    Normal Colonoscopy       Family History   Problem Relation Age of Onset     C.A.D. Father         40s     Hypertension Father      C.A.D. Paternal Grandfather      Heart Disease Paternal Grandfather      Diabetes Brother      C.A.D. Brother      Heart Disease Brother      Hypertension Brother      Gastrointestinal Disease Son         Crohn's disease     Gastrointestinal Disease Other         2 grandaughters with Crohn's disease     Cancer - colorectal No family hx of        Social History     Tobacco Use     Smoking status: Never Smoker     Smokeless tobacco: Never Used   Substance Use Topics     Alcohol use: No     Alcohol/week: 0.0 oz       No current outpatient medications on file.       Allergies   Allergen Reactions     Prednisone Other (See Comments)     Severe interaction with DM       Images:  Recent Results (from the past 744 hour(s))   Abd/pelvis CT - no contrast - Stone Protocol    Narrative    CT ABDOMEN PELVIS WITHOUT CONTRAST   3/2/2019 3:44 PM     HISTORY: Abdominal pain.    TECHNIQUE: Volumetric helical sections were acquired from the lung  bases through the ischial tuberosities without IV contrast. Coronal  images were also reconstructed. Radiation dose for this scan was  reduced using automated exposure control, adjustment of the mA and/or  kV according to patient size, or iterative reconstruction technique.    COMPARISON: CT of the abdomen and pelvis performed 11/9/2016.    FINDINGS:  No bowel obstruction. Scattered colonic diverticulosis,  without convincing evidence for diverticulitis. Unremarkable appendix.  No free fluid in the pelvis. There is moderate to marked prostatic  enlargement. Bladder trabeculation is likely related to the prostatic  enlargement. Mild atherosclerotic aortoiliac calcification. Multiple  gallstones are noted within the gallbladder. The gallbladder is mildly  distended, and the gallbladder wall appears mildly thickened.  Scattered  calcified granulomas in the spleen. Nonobstructing 0.4 cm  stone in the lower pole of the left kidney is unchanged. The liver,  spleen, adrenal glands, pancreas, and kidneys have otherwise  unremarkable noncontrast appearances. No hydronephrosis. No  intra-abdominal fluid collections are identified. No free  intraperitoneal air. Coronary artery calcification. Prior midline  sternotomy. The visualized lung bases are clear. Degenerative changes  are noted throughout the visualized thoracolumbar spine.      Impression    IMPRESSION:   1. Cholelithiasis, mild gallbladder distention, and probable  gallbladder wall thickening. Acute cholecystitis could have this  appearance. Gallbladder ultrasound may be helpful for further  characterization.  2. Moderate to marked prostatic enlargement.  3. Scattered colonic diverticulosis, without convincing evidence for  diverticulitis.   4. Nonobstructing 0.4 cm stone in the lower pole of the left kidney.      ARIELA CHAIREZ MD   Abdomen US, limited ([facility] only)    Narrative    RIGHT UPPER QUADRANT ULTRASOUND 3/2/2019 4:19 PM    HISTORY:  Fever, abdominal pain, question cholecystitis on CT scan  abdomen pelvis.     COMPARISON: 3/2/2019 CT abdomen and pelvis.    FINDINGS:    Pancreas: Not visualized, completely obscured.  Liver: Poorly visualized, left lobe predominantly obscured. No focal  liver lesion where seen.  Gallbladder: Multiple gallstones. Mild gallbladder wall thickening 0.4  cm. Sonographic Escobar's sign reported as negative. No pericholecystic  fluid.  Biliary: No biliary duct dilatation.  Right kidney: Grossly normal, 13.9 cm in length, suboptimally  visualized.      Impression    IMPRESSION:  Limited exam. Cholelithiasis and mild gallbladder wall  thickening which is nonspecific but can be seen with acute  cholecystitis. If clinically indicated, nuclear medicine hepatobiliary  scan could be considered.       NEENA LINDSAY MD       Labs:  Results for orders placed or  performed during the hospital encounter of 03/02/19   Abd/pelvis CT - no contrast - Stone Protocol    Narrative    CT ABDOMEN PELVIS WITHOUT CONTRAST   3/2/2019 3:44 PM     HISTORY: Abdominal pain.    TECHNIQUE: Volumetric helical sections were acquired from the lung  bases through the ischial tuberosities without IV contrast. Coronal  images were also reconstructed. Radiation dose for this scan was  reduced using automated exposure control, adjustment of the mA and/or  kV according to patient size, or iterative reconstruction technique.    COMPARISON: CT of the abdomen and pelvis performed 11/9/2016.    FINDINGS:  No bowel obstruction. Scattered colonic diverticulosis,  without convincing evidence for diverticulitis. Unremarkable appendix.  No free fluid in the pelvis. There is moderate to marked prostatic  enlargement. Bladder trabeculation is likely related to the prostatic  enlargement. Mild atherosclerotic aortoiliac calcification. Multiple  gallstones are noted within the gallbladder. The gallbladder is mildly  distended, and the gallbladder wall appears mildly thickened.  Scattered calcified granulomas in the spleen. Nonobstructing 0.4 cm  stone in the lower pole of the left kidney is unchanged. The liver,  spleen, adrenal glands, pancreas, and kidneys have otherwise  unremarkable noncontrast appearances. No hydronephrosis. No  intra-abdominal fluid collections are identified. No free  intraperitoneal air. Coronary artery calcification. Prior midline  sternotomy. The visualized lung bases are clear. Degenerative changes  are noted throughout the visualized thoracolumbar spine.      Impression    IMPRESSION:   1. Cholelithiasis, mild gallbladder distention, and probable  gallbladder wall thickening. Acute cholecystitis could have this  appearance. Gallbladder ultrasound may be helpful for further  characterization.  2. Moderate to marked prostatic enlargement.  3. Scattered colonic diverticulosis, without  convincing evidence for  diverticulitis.   4. Nonobstructing 0.4 cm stone in the lower pole of the left kidney.      ARIELA CHAIREZ MD   Abdomen US, limited ([facility] only)    Narrative    RIGHT UPPER QUADRANT ULTRASOUND 3/2/2019 4:19 PM    HISTORY:  Fever, abdominal pain, question cholecystitis on CT scan  abdomen pelvis.     COMPARISON: 3/2/2019 CT abdomen and pelvis.    FINDINGS:    Pancreas: Not visualized, completely obscured.  Liver: Poorly visualized, left lobe predominantly obscured. No focal  liver lesion where seen.  Gallbladder: Multiple gallstones. Mild gallbladder wall thickening 0.4  cm. Sonographic Escobar's sign reported as negative. No pericholecystic  fluid.  Biliary: No biliary duct dilatation.  Right kidney: Grossly normal, 13.9 cm in length, suboptimally  visualized.      Impression    IMPRESSION:  Limited exam. Cholelithiasis and mild gallbladder wall  thickening which is nonspecific but can be seen with acute  cholecystitis. If clinically indicated, nuclear medicine hepatobiliary  scan could be considered.       NEENA LINDSAY MD   CBC with platelets differential   Result Value Ref Range    WBC 17.5 (H) 4.0 - 11.0 10e9/L    RBC Count 4.84 4.4 - 5.9 10e12/L    Hemoglobin 15.1 13.3 - 17.7 g/dL    Hematocrit 42.5 40.0 - 53.0 %    MCV 88 78 - 100 fl    MCH 31.2 26.5 - 33.0 pg    MCHC 35.5 31.5 - 36.5 g/dL    RDW 13.4 10.0 - 15.0 %    Platelet Count 168 150 - 450 10e9/L    Diff Method Automated Method     % Neutrophils 82.1 %    % Lymphocytes 7.2 %    % Monocytes 9.9 %    % Eosinophils 0.0 %    % Basophils 0.2 %    % Immature Granulocytes 0.6 %    Nucleated RBCs 0 0 /100    Absolute Neutrophil 14.4 (H) 1.6 - 8.3 10e9/L    Absolute Lymphocytes 1.3 0.8 - 5.3 10e9/L    Absolute Monocytes 1.7 (H) 0.0 - 1.3 10e9/L    Absolute Eosinophils 0.0 0.0 - 0.7 10e9/L    Absolute Basophils 0.0 0.0 - 0.2 10e9/L    Abs Immature Granulocytes 0.1 0 - 0.4 10e9/L    Absolute Nucleated RBC 0.0    Comprehensive metabolic  panel   Result Value Ref Range    Sodium 131 (L) 133 - 144 mmol/L    Potassium 4.6 3.4 - 5.3 mmol/L    Chloride 97 94 - 109 mmol/L    Carbon Dioxide 26 20 - 32 mmol/L    Anion Gap 8 3 - 14 mmol/L    Glucose 273 (H) 70 - 99 mg/dL    Urea Nitrogen 22 7 - 30 mg/dL    Creatinine 1.09 0.66 - 1.25 mg/dL    GFR Estimate 64 >60 mL/min/[1.73_m2]    GFR Estimate If Black 74 >60 mL/min/[1.73_m2]    Calcium 9.4 8.5 - 10.1 mg/dL    Bilirubin Total 1.1 0.2 - 1.3 mg/dL    Albumin 3.6 3.4 - 5.0 g/dL    Protein Total 7.2 6.8 - 8.8 g/dL    Alkaline Phosphatase 111 40 - 150 U/L    ALT 37 0 - 70 U/L    AST 22 0 - 45 U/L   Lipase   Result Value Ref Range    Lipase 117 73 - 393 U/L   Troponin I   Result Value Ref Range    Troponin I ES <0.015 0.000 - 0.045 ug/L   Lactic acid whole blood   Result Value Ref Range    Lactic Acid 2.7 (H) 0.7 - 2.0 mmol/L   UA reflex to Microscopic   Result Value Ref Range    Color Urine Yellow     Appearance Urine Clear     Glucose Urine 50 (A) NEG^Negative mg/dL    Bilirubin Urine Negative NEG^Negative    Ketones Urine Negative NEG^Negative mg/dL    Specific Gravity Urine 1.018 1.003 - 1.035    Blood Urine Small (A) NEG^Negative    pH Urine 5.0 5.0 - 7.0 pH    Protein Albumin Urine 100 (A) NEG^Negative mg/dL    Urobilinogen mg/dL 0.0 0.0 - 2.0 mg/dL    Nitrite Urine Negative NEG^Negative    Leukocyte Esterase Urine Negative NEG^Negative    Source Midstream Urine     RBC Urine <1 0 - 2 /HPF    WBC Urine 2 0 - 5 /HPF    Squamous Epithelial /HPF Urine 2 (H) 0 - 1 /HPF    Mucous Urine Present (A) NEG^Negative /LPF    Hyaline Casts 1 0 - 2 /LPF   Lactic acid whole blood   Result Value Ref Range    Lactic Acid 2.8 (H) 0.7 - 2.0 mmol/L   Glucose by meter   Result Value Ref Range    Glucose 253 (H) 70 - 99 mg/dL   Lactic acid whole blood   Result Value Ref Range    Lactic Acid 1.6 0.7 - 2.0 mmol/L   Comprehensive metabolic panel   Result Value Ref Range    Sodium 137 133 - 144 mmol/L    Potassium 3.8 3.4 - 5.3  mmol/L    Chloride 100 94 - 109 mmol/L    Carbon Dioxide 29 20 - 32 mmol/L    Anion Gap 8 3 - 14 mmol/L    Glucose 192 (H) 70 - 99 mg/dL    Urea Nitrogen 18 7 - 30 mg/dL    Creatinine 1.16 0.66 - 1.25 mg/dL    GFR Estimate 59 (L) >60 mL/min/[1.73_m2]    GFR Estimate If Black 69 >60 mL/min/[1.73_m2]    Calcium 8.6 8.5 - 10.1 mg/dL    Bilirubin Total 1.1 0.2 - 1.3 mg/dL    Albumin 2.9 (L) 3.4 - 5.0 g/dL    Protein Total 6.2 (L) 6.8 - 8.8 g/dL    Alkaline Phosphatase 90 40 - 150 U/L    ALT 28 0 - 70 U/L    AST 9 0 - 45 U/L   CBC with platelets differential   Result Value Ref Range    WBC 16.1 (H) 4.0 - 11.0 10e9/L    RBC Count 4.41 4.4 - 5.9 10e12/L    Hemoglobin 13.7 13.3 - 17.7 g/dL    Hematocrit 40.0 40.0 - 53.0 %    MCV 91 78 - 100 fl    MCH 31.1 26.5 - 33.0 pg    MCHC 34.3 31.5 - 36.5 g/dL    RDW 13.5 10.0 - 15.0 %    Platelet Count 156 150 - 450 10e9/L    Diff Method Automated Method     % Neutrophils 79.3 %    % Lymphocytes 9.1 %    % Monocytes 10.7 %    % Eosinophils 0.1 %    % Basophils 0.2 %    % Immature Granulocytes 0.6 %    Nucleated RBCs 0 0 /100    Absolute Neutrophil 12.8 (H) 1.6 - 8.3 10e9/L    Absolute Lymphocytes 1.5 0.8 - 5.3 10e9/L    Absolute Monocytes 1.7 (H) 0.0 - 1.3 10e9/L    Absolute Eosinophils 0.0 0.0 - 0.7 10e9/L    Absolute Basophils 0.0 0.0 - 0.2 10e9/L    Abs Immature Granulocytes 0.1 0 - 0.4 10e9/L    Absolute Nucleated RBC 0.0    Glucose by meter   Result Value Ref Range    Glucose 204 (H) 70 - 99 mg/dL   Glucose by meter   Result Value Ref Range    Glucose 189 (H) 70 - 99 mg/dL   Care Transition RN/SW IP Consult    Narrative    Zainab Jenkins RN     3/3/2019 10:03 AM  Care Transition Initial Assessment - RN      Met with: Patient.    DATA   Active Problems:    Cholecystitis    Acute calculous cholecystitis       Primary Care Clinic [facility]: [facility]  Primary Care MD Name: Dr. Carreno  Contact information and PCP information verified: Yes    ASSESSMENTCognitive Status: awake,  "alert and oriented.       Description of Support System: Supportive, Involved   Who is your support system?: Children   Support Assessment: Adequate family and caregiver support   Insurance Concerns: No Insurance issues identified      This writer met with pt, introduced self and role.  Discussed   discharge planning and Medicare guidelines in regards to home   care, TCU and LTC. The patient is admitted with cholecystitis and   will have a cholecystectomy today. Patient reports he has had   multiple falls. Discussed TCU and home care. He adamantly   declined TCU, agreed to home care. Pt was provided with Medicare   certified home care list. Pt chooses to use [facility] ([phone] Fax: [phone]). Per the discussion in   Interdisciplinary Rounds he will have a PT evaluation after   surgery to determine a safe discharge plan.     Care Transitions will continue to monitor through daily chart   reviews and Interdisciplinary Rounds.  If immediate needs arise,   please contact Care Management at [phone].     PLAN    [facility] vs TCU. If patient returns home with   home care a referral will need to be placed.      Zainab Jenkins RN, Care Coordinator [phone]             ROS:  Review of systems negative except as per history of present illness    /79   Pulse 80   Temp 97.7  F (36.5  C) (Oral)   Resp 18   Ht 1.727 m (5' 8\")   Wt 84.7 kg (186 lb 11.7 oz)   SpO2 93%   BMI 28.39 kg/m      Exam:  General - Alert and Oriented X4, NAD, well nourished  HEENT - Normocephalic, atraumatic  Neck - supple, no LAD  Lungs -respirations unlabored  CV - Heart RRR  Abdomen - Soft, non-tender, +BS, no hepatosplenomegaly, no palpable masses, non-tender  Groins -deferred  Rectal -deferred  Neuro - Full ROM, Strength 5/5 and major muscle groups, sensation intact  Extremities - No cyanosis, clubbing or edema.      Assessment and Plan: Patient presents with a fairly classic history of " symptomatic cholelithiasis which probably became acute cholecystitis in the last day or so.  He his pain is remarkably improved today with only minimal pain medication.  He seems to be in reasonable shape to proceed with surgery so we will plan to do that tomorrow.  The plan will be for laparoscopic cholecystectomy with possible cholangiograms.  I spent about 30 minutes with him discussing the relevant anatomy, risks, benefits, and alternatives of the procedure.  As to timing I will have to wait till tomorrow to let the patient know.  I specifically did not want to do it today so as to allow another day of antibiotics and not take up operating room personnel for a non-emergent case.  Patient understood and we will plan for surgery tomorrow.      Burt Mendieta MD FACS

## 2019-03-03 NOTE — PROGRESS NOTES
"[facility] ADMISSION NOTE    Patient admitted to room 2401 at approximately 1830 via cart from emergency room. Patient was accompanied by transport tech.     Verbal SBAR report received from Teresa ROSALES prior to patient arrival.     Patient ambulated to bed with stand-by assist. Patient alert and oriented X 3. Pain is controlled without any medications. 0-10 Pain Scale: 2. Admission vital signs: Blood pressure 161/71, pulse 82, temperature 98.7  F (37.1  C), temperature source Oral, resp. rate 20, height 1.727 m (5' 8\"), weight 84.7 kg (186 lb 11.7 oz), SpO2 94 %. Patient was oriented to plan of care, call light, bed controls, tv, telephone, bathroom and visiting hours.     Risk Assessment    The following safety risks were identified during admission: fall. Yellow risk band applied: YES.     Skin Initial Assessment    This writer admitted this patient and completed a full skin assessment and Ricardo score in the Adult PCS flowsheet. Appropriate interventions initiated as needed.     Secondary skin check completed by Nini ROSALES.         Zaida Benoit    "

## 2019-03-03 NOTE — PROGRESS NOTES
Skin affirmation note    Admitting nurse completed full skin assessment, Ricardo score and Ricardo interventions. This writer agrees with the initial skin assessment findings. Late entry from yesterday admission.

## 2019-03-03 NOTE — PROGRESS NOTES
[facility]    Medicine Progress Note - Hospitalist Service       Date of Admission:  3/2/2019  Assessment & Plan       Acute calculous cholecystitis  78 yr old male who presented with abdominal pain, nausea and vomiting . CT showed cholelithiasis ,gall bladder distension, and probable gall bladder wall thickening , patient also had elevated wbc ,elevated lactate, general surgery consulted and he will be seen tomorrow.  - IV antibiotics Zosyn q 6 hrs  - IV fluids - LR at 100 ml's/ hour  - Recheck Lactate levels  - Possible surgery in AM to be determined by General surgeon.     3/3/2019 awaiting Dr. Mendieta to see.  ?surgery vs an additional day or two of IV antibiotics  WBC still elevated at  16.1k today  Tm 100.2F        Chronic obstructive pulmonary disease, unspecified copd type (h)   Moderate persistent asthma without complication  No new events , continue home medication .   - Continue nebs as needed  - Continue with Brovana and Pulmicort  - Oxygen saturations was 95 % on admission           Hypertension goal bp (blood pressure) < 140/90  BP slightly elevated on admission  - Continue with home medication            Type 2 diabetes mellitus with stage 3 chronic kidney disease, with long-term current use of insulin (h)  Last A1C was 8.0 as at May 2018   - Continue with long acting insulin  - Sliding scale insulin            Memory loss     Mild, follows with neurology            Paroxysmal atrial fibrillation (h)  This was developed after his CABG , his rate appears controlled. He is on rate control medication and aspirin   - Continue with beta blocker and aspirin            History of CABG  No acute events   - EKG reviewed no new findings as compared to previous tracings  - Troponin was negative  - Continue with home medication .           Cerebrovascular accident (cva), unspecified mechanism (h)  Occurred in 2009 after his CABG, has no residual symptoms  - Monitor neuro signs.  -  Continue Lipitor and aspirin.       Diet: Clear Liquid Diet  NPO for Medical/Clinical Reasons Except for: No Exceptions    DVT Prophylaxis: Pneumatic Compression Devices  Olivo Catheter: not present  Code Status: Full Code      Disposition Plan   Expected discharge: 2 - 3 days, recommended to prior living arrangement once adequate pain management/ tolerating PO medications.  Entered: Antonella Shannon MD 03/03/2019, 10:01 AM       The patient's care was discussed with the Patient.    Antnoella Shannon MD  Hospitalist Service  [facility]    ______________________________________________________________________    Interval History   Frustrated this am because he was told he would be having surgery right away this morning.  We discussed that with infected gallbladders, sometimes IV antibiotics are needed for a short time before surgery can be done. He's anxious to talk to the surgeon.    Denies chest pain/pressure.  Right upper quadrant pain is slightly better this am.      Data reviewed today: I reviewed all medications, new labs and imaging results over the last 24 hours. I personally reviewed no images or EKG's today.    Physical Exam   Vital Signs: Temp: 97.7  F (36.5  C) Temp src: Oral BP: 171/79(Patient was just up to the bathroom and brushed teeth) Pulse: 80   Resp: 18 SpO2: 93 % O2 Device: None (Room air)    Weight: 186 lbs 11.67 oz  Constitutional: awake, alert, cooperative, no apparent distress, and appears stated age  Respiratory: No increased work of breathing, good air exchange, clear to auscultation bilaterally, no crackles or wheezing  Cardiovascular: Normal apical impulse, regular rate and rhythm, normal S1 and S2, no S3 or S4, and no murmur noted  GI: obese, slightly distended, tender to palpation throughout with most pain in the right upper quadrant.   Skin: no rashes  Musculoskeletal: no lower extremity pitting edema present    Data   Recent Labs   Lab 03/03/19  0506  03/02/19  1528   WBC 16.1* 17.5*   HGB 13.7 15.1   MCV 91 88    168    131*   POTASSIUM 3.8 4.6   CHLORIDE 100 97   CO2 29 26   BUN 18 22   CR 1.16 1.09   ANIONGAP 8 8   EVELINA 8.6 9.4   * 273*   ALBUMIN 2.9* 3.6   PROTTOTAL 6.2* 7.2   BILITOTAL 1.1 1.1   ALKPHOS 90 111   ALT 28 37   AST 9 22   LIPASE  --  117   TROPI  --  <0.015     Recent Results (from the past 24 hour(s))   Abd/pelvis CT - no contrast - Stone Protocol    Narrative    CT ABDOMEN PELVIS WITHOUT CONTRAST   3/2/2019 3:44 PM     HISTORY: Abdominal pain.    TECHNIQUE: Volumetric helical sections were acquired from the lung  bases through the ischial tuberosities without IV contrast. Coronal  images were also reconstructed. Radiation dose for this scan was  reduced using automated exposure control, adjustment of the mA and/or  kV according to patient size, or iterative reconstruction technique.    COMPARISON: CT of the abdomen and pelvis performed 11/9/2016.    FINDINGS:  No bowel obstruction. Scattered colonic diverticulosis,  without convincing evidence for diverticulitis. Unremarkable appendix.  No free fluid in the pelvis. There is moderate to marked prostatic  enlargement. Bladder trabeculation is likely related to the prostatic  enlargement. Mild atherosclerotic aortoiliac calcification. Multiple  gallstones are noted within the gallbladder. The gallbladder is mildly  distended, and the gallbladder wall appears mildly thickened.  Scattered calcified granulomas in the spleen. Nonobstructing 0.4 cm  stone in the lower pole of the left kidney is unchanged. The liver,  spleen, adrenal glands, pancreas, and kidneys have otherwise  unremarkable noncontrast appearances. No hydronephrosis. No  intra-abdominal fluid collections are identified. No free  intraperitoneal air. Coronary artery calcification. Prior midline  sternotomy. The visualized lung bases are clear. Degenerative changes  are noted throughout the visualized thoracolumbar  spine.      Impression    IMPRESSION:   1. Cholelithiasis, mild gallbladder distention, and probable  gallbladder wall thickening. Acute cholecystitis could have this  appearance. Gallbladder ultrasound may be helpful for further  characterization.  2. Moderate to marked prostatic enlargement.  3. Scattered colonic diverticulosis, without convincing evidence for  diverticulitis.   4. Nonobstructing 0.4 cm stone in the lower pole of the left kidney.      ARIELA CHAIREZ MD   Abdomen US, limited (RUQ only)    Narrative    RIGHT UPPER QUADRANT ULTRASOUND 3/2/2019 4:19 PM    HISTORY:  Fever, abdominal pain, question cholecystitis on CT scan  abdomen pelvis.     COMPARISON: 3/2/2019 CT abdomen and pelvis.    FINDINGS:    Pancreas: Not visualized, completely obscured.  Liver: Poorly visualized, left lobe predominantly obscured. No focal  liver lesion where seen.  Gallbladder: Multiple gallstones. Mild gallbladder wall thickening 0.4  cm. Sonographic Escobar's sign reported as negative. No pericholecystic  fluid.  Biliary: No biliary duct dilatation.  Right kidney: Grossly normal, 13.9 cm in length, suboptimally  visualized.      Impression    IMPRESSION:  Limited exam. Cholelithiasis and mild gallbladder wall  thickening which is nonspecific but can be seen with acute  cholecystitis. If clinically indicated, nuclear medicine hepatobiliary  scan could be considered.       NEENA LINDSAY MD

## 2019-03-03 NOTE — PLAN OF CARE
AOx4, forgetful. States pain has been tolerable throughout shift, no PRN's given. Denies any nausea or vomiting, VSS. NPO since MN in anticipation of surgery consult, pt hopes that his gallbladder will be removed today. Rested intermittently throughout shift.

## 2019-03-03 NOTE — PLAN OF CARE
Aspirin stopped r/t surgery tomorrow and IVF stopped r/t starting full liquid diet and already received fluids at 200/hr overnight and today, on lasix. Ok to stop per Dr. Shannon.

## 2019-03-04 ENCOUNTER — ANESTHESIA (OUTPATIENT)
Dept: SURGERY | Facility: CLINIC | Age: 79
DRG: 419 | End: 2019-03-04
Payer: COMMERCIAL

## 2019-03-04 ENCOUNTER — ANESTHESIA EVENT (OUTPATIENT)
Dept: SURGERY | Facility: CLINIC | Age: 79
DRG: 419 | End: 2019-03-04
Payer: COMMERCIAL

## 2019-03-04 VITALS
BODY MASS INDEX: 28.3 KG/M2 | HEART RATE: 85 BPM | DIASTOLIC BLOOD PRESSURE: 54 MMHG | WEIGHT: 186.73 LBS | SYSTOLIC BLOOD PRESSURE: 123 MMHG | HEIGHT: 68 IN | OXYGEN SATURATION: 93 % | TEMPERATURE: 98.4 F | RESPIRATION RATE: 16 BRPM

## 2019-03-04 LAB
ALBUMIN SERPL-MCNC: 2.6 G/DL (ref 3.4–5)
ALP SERPL-CCNC: 94 U/L (ref 40–150)
ALT SERPL W P-5'-P-CCNC: 24 U/L (ref 0–70)
ANION GAP SERPL CALCULATED.3IONS-SCNC: 6 MMOL/L (ref 3–14)
AST SERPL W P-5'-P-CCNC: 17 U/L (ref 0–45)
BASOPHILS # BLD AUTO: 0 10E9/L (ref 0–0.2)
BASOPHILS NFR BLD AUTO: 0.1 %
BILIRUB SERPL-MCNC: 1 MG/DL (ref 0.2–1.3)
BUN SERPL-MCNC: 17 MG/DL (ref 7–30)
CALCIUM SERPL-MCNC: 8.1 MG/DL (ref 8.5–10.1)
CHLORIDE SERPL-SCNC: 101 MMOL/L (ref 94–109)
CO2 SERPL-SCNC: 30 MMOL/L (ref 20–32)
CREAT SERPL-MCNC: 1.16 MG/DL (ref 0.66–1.25)
DIFFERENTIAL METHOD BLD: ABNORMAL
EOSINOPHIL # BLD AUTO: 0 10E9/L (ref 0–0.7)
EOSINOPHIL NFR BLD AUTO: 0.1 %
ERYTHROCYTE [DISTWIDTH] IN BLOOD BY AUTOMATED COUNT: 13.9 % (ref 10–15)
GFR SERPL CREATININE-BSD FRML MDRD: 59 ML/MIN/{1.73_M2}
GLUCOSE BLDC GLUCOMTR-MCNC: 206 MG/DL (ref 70–99)
GLUCOSE BLDC GLUCOMTR-MCNC: 219 MG/DL (ref 70–99)
GLUCOSE BLDC GLUCOMTR-MCNC: 235 MG/DL (ref 70–99)
GLUCOSE BLDC GLUCOMTR-MCNC: 253 MG/DL (ref 70–99)
GLUCOSE SERPL-MCNC: 238 MG/DL (ref 70–99)
HCT VFR BLD AUTO: 37.2 % (ref 40–53)
HGB BLD-MCNC: 12.9 G/DL (ref 13.3–17.7)
LYMPHOCYTES # BLD AUTO: 1.4 10E9/L (ref 0.8–5.3)
LYMPHOCYTES NFR BLD AUTO: 10.5 %
MCH RBC QN AUTO: 30.7 PG (ref 26.5–33)
MCHC RBC AUTO-ENTMCNC: 34.7 G/DL (ref 31.5–36.5)
MCV RBC AUTO: 89 FL (ref 78–100)
MONOCYTES # BLD AUTO: 1.2 10E9/L (ref 0–1.3)
MONOCYTES NFR BLD AUTO: 8.8 %
NEUTROPHILS # BLD AUTO: 10.9 10E9/L (ref 1.6–8.3)
NEUTROPHILS NFR BLD AUTO: 80.5 %
PLATELET # BLD AUTO: 149 10E9/L (ref 150–450)
POTASSIUM SERPL-SCNC: 3.6 MMOL/L (ref 3.4–5.3)
PROT SERPL-MCNC: 6.1 G/DL (ref 6.8–8.8)
RBC # BLD AUTO: 4.2 10E12/L (ref 4.4–5.9)
SODIUM SERPL-SCNC: 137 MMOL/L (ref 133–144)
WBC # BLD AUTO: 13.5 10E9/L (ref 4–11)

## 2019-03-04 PROCEDURE — 36000056 ZZH SURGERY LEVEL 3 1ST 30 MIN: Performed by: SURGERY

## 2019-03-04 PROCEDURE — 25000128 H RX IP 250 OP 636: Performed by: EMERGENCY MEDICINE

## 2019-03-04 PROCEDURE — 00000146 ZZHCL STATISTIC GLUCOSE BY METER IP

## 2019-03-04 PROCEDURE — 37000009 ZZH ANESTHESIA TECHNICAL FEE, EACH ADDTL 15 MIN: Performed by: SURGERY

## 2019-03-04 PROCEDURE — 85025 COMPLETE CBC W/AUTO DIFF WBC: CPT | Performed by: FAMILY MEDICINE

## 2019-03-04 PROCEDURE — 47562 LAPAROSCOPIC CHOLECYSTECTOMY: CPT | Performed by: SURGERY

## 2019-03-04 PROCEDURE — 27210995 ZZH RX 272: Performed by: SURGERY

## 2019-03-04 PROCEDURE — 99221 1ST HOSP IP/OBS SF/LOW 40: CPT | Mod: 57 | Performed by: SURGERY

## 2019-03-04 PROCEDURE — 80053 COMPREHEN METABOLIC PANEL: CPT | Performed by: FAMILY MEDICINE

## 2019-03-04 PROCEDURE — 0FT44ZZ RESECTION OF GALLBLADDER, PERCUTANEOUS ENDOSCOPIC APPROACH: ICD-10-PCS | Performed by: SURGERY

## 2019-03-04 PROCEDURE — 25000132 ZZH RX MED GY IP 250 OP 250 PS 637: Performed by: PHYSICIAN ASSISTANT

## 2019-03-04 PROCEDURE — 88304 TISSUE EXAM BY PATHOLOGIST: CPT | Performed by: SURGERY

## 2019-03-04 PROCEDURE — 40000275 ZZH STATISTIC RCP TIME EA 10 MIN

## 2019-03-04 PROCEDURE — 47562 LAPAROSCOPIC CHOLECYSTECTOMY: CPT | Mod: 80 | Performed by: SURGERY

## 2019-03-04 PROCEDURE — 27210794 ZZH OR GENERAL SUPPLY STERILE: Performed by: SURGERY

## 2019-03-04 PROCEDURE — 99238 HOSP IP/OBS DSCHRG MGMT 30/<: CPT | Performed by: INTERNAL MEDICINE

## 2019-03-04 PROCEDURE — 37000008 ZZH ANESTHESIA TECHNICAL FEE, 1ST 30 MIN: Performed by: SURGERY

## 2019-03-04 PROCEDURE — 25000128 H RX IP 250 OP 636: Performed by: NURSE ANESTHETIST, CERTIFIED REGISTERED

## 2019-03-04 PROCEDURE — 25000125 ZZHC RX 250: Performed by: NURSE ANESTHETIST, CERTIFIED REGISTERED

## 2019-03-04 PROCEDURE — 40000305 ZZH STATISTIC PRE PROC ASSESS I: Performed by: SURGERY

## 2019-03-04 PROCEDURE — 36415 COLL VENOUS BLD VENIPUNCTURE: CPT | Performed by: FAMILY MEDICINE

## 2019-03-04 PROCEDURE — 71000012 ZZH RECOVERY PHASE 1 LEVEL 1 FIRST HR: Performed by: SURGERY

## 2019-03-04 PROCEDURE — 36000058 ZZH SURGERY LEVEL 3 EA 15 ADDTL MIN: Performed by: SURGERY

## 2019-03-04 PROCEDURE — 25000132 ZZH RX MED GY IP 250 OP 250 PS 637: Performed by: FAMILY MEDICINE

## 2019-03-04 PROCEDURE — 25000125 ZZHC RX 250: Performed by: SURGERY

## 2019-03-04 PROCEDURE — 94640 AIRWAY INHALATION TREATMENT: CPT

## 2019-03-04 PROCEDURE — 25800025 ZZH RX 258: Performed by: SURGERY

## 2019-03-04 PROCEDURE — 25000125 ZZHC RX 250: Performed by: FAMILY MEDICINE

## 2019-03-04 PROCEDURE — 25800030 ZZH RX IP 258 OP 636: Performed by: PHYSICIAN ASSISTANT

## 2019-03-04 PROCEDURE — 88304 TISSUE EXAM BY PATHOLOGIST: CPT | Mod: 26 | Performed by: SURGERY

## 2019-03-04 RX ORDER — NALOXONE HYDROCHLORIDE 0.4 MG/ML
.1-.4 INJECTION, SOLUTION INTRAMUSCULAR; INTRAVENOUS; SUBCUTANEOUS
Status: DISCONTINUED | OUTPATIENT
Start: 2019-03-04 | End: 2019-03-04 | Stop reason: HOSPADM

## 2019-03-04 RX ORDER — FENTANYL CITRATE 50 UG/ML
25-50 INJECTION, SOLUTION INTRAMUSCULAR; INTRAVENOUS
Status: DISCONTINUED | OUTPATIENT
Start: 2019-03-04 | End: 2019-03-04 | Stop reason: HOSPADM

## 2019-03-04 RX ORDER — FENTANYL CITRATE 50 UG/ML
INJECTION, SOLUTION INTRAMUSCULAR; INTRAVENOUS PRN
Status: DISCONTINUED | OUTPATIENT
Start: 2019-03-04 | End: 2019-03-04

## 2019-03-04 RX ORDER — HYDROMORPHONE HYDROCHLORIDE 1 MG/ML
.3-.5 INJECTION, SOLUTION INTRAMUSCULAR; INTRAVENOUS; SUBCUTANEOUS
Status: DISCONTINUED | OUTPATIENT
Start: 2019-03-04 | End: 2019-03-04 | Stop reason: HOSPADM

## 2019-03-04 RX ORDER — MEPERIDINE HYDROCHLORIDE 25 MG/ML
12.5 INJECTION INTRAMUSCULAR; INTRAVENOUS; SUBCUTANEOUS
Status: DISCONTINUED | OUTPATIENT
Start: 2019-03-04 | End: 2019-03-04 | Stop reason: HOSPADM

## 2019-03-04 RX ORDER — LIDOCAINE HYDROCHLORIDE 10 MG/ML
INJECTION, SOLUTION INFILTRATION; PERINEURAL PRN
Status: DISCONTINUED | OUTPATIENT
Start: 2019-03-04 | End: 2019-03-04

## 2019-03-04 RX ORDER — SODIUM CHLORIDE, SODIUM LACTATE, POTASSIUM CHLORIDE, CALCIUM CHLORIDE 600; 310; 30; 20 MG/100ML; MG/100ML; MG/100ML; MG/100ML
INJECTION, SOLUTION INTRAVENOUS CONTINUOUS
Status: DISCONTINUED | OUTPATIENT
Start: 2019-03-04 | End: 2019-03-04 | Stop reason: HOSPADM

## 2019-03-04 RX ORDER — PROPOFOL 10 MG/ML
INJECTION, EMULSION INTRAVENOUS PRN
Status: DISCONTINUED | OUTPATIENT
Start: 2019-03-04 | End: 2019-03-04

## 2019-03-04 RX ORDER — ONDANSETRON 2 MG/ML
4 INJECTION INTRAMUSCULAR; INTRAVENOUS EVERY 30 MIN PRN
Status: DISCONTINUED | OUTPATIENT
Start: 2019-03-04 | End: 2019-03-04 | Stop reason: HOSPADM

## 2019-03-04 RX ORDER — KETOROLAC TROMETHAMINE 30 MG/ML
INJECTION, SOLUTION INTRAMUSCULAR; INTRAVENOUS PRN
Status: DISCONTINUED | OUTPATIENT
Start: 2019-03-04 | End: 2019-03-04

## 2019-03-04 RX ORDER — ALBUTEROL SULFATE 0.83 MG/ML
2.5 SOLUTION RESPIRATORY (INHALATION) EVERY 4 HOURS PRN
Status: DISCONTINUED | OUTPATIENT
Start: 2019-03-04 | End: 2019-03-04 | Stop reason: HOSPADM

## 2019-03-04 RX ORDER — HYDROMORPHONE HYDROCHLORIDE 1 MG/ML
.3-.5 INJECTION, SOLUTION INTRAMUSCULAR; INTRAVENOUS; SUBCUTANEOUS EVERY 10 MIN PRN
Status: DISCONTINUED | OUTPATIENT
Start: 2019-03-04 | End: 2019-03-04 | Stop reason: HOSPADM

## 2019-03-04 RX ORDER — BUPIVACAINE HYDROCHLORIDE AND EPINEPHRINE 5; 5 MG/ML; UG/ML
INJECTION, SOLUTION PERINEURAL PRN
Status: DISCONTINUED | OUTPATIENT
Start: 2019-03-04 | End: 2019-03-04 | Stop reason: HOSPADM

## 2019-03-04 RX ORDER — CIPROFLOXACIN 500 MG/1
500 TABLET, FILM COATED ORAL 2 TIMES DAILY
Qty: 10 TABLET | Refills: 0 | Status: SHIPPED | OUTPATIENT
Start: 2019-03-04 | End: 2019-05-16

## 2019-03-04 RX ORDER — ONDANSETRON 4 MG/1
4 TABLET, ORALLY DISINTEGRATING ORAL EVERY 30 MIN PRN
Status: DISCONTINUED | OUTPATIENT
Start: 2019-03-04 | End: 2019-03-04 | Stop reason: HOSPADM

## 2019-03-04 RX ORDER — HYDRALAZINE HYDROCHLORIDE 20 MG/ML
2.5-5 INJECTION INTRAMUSCULAR; INTRAVENOUS EVERY 10 MIN PRN
Status: DISCONTINUED | OUTPATIENT
Start: 2019-03-04 | End: 2019-03-04 | Stop reason: HOSPADM

## 2019-03-04 RX ADMIN — LISINOPRIL 5 MG: 5 TABLET ORAL at 08:45

## 2019-03-04 RX ADMIN — TAMSULOSIN HYDROCHLORIDE 0.4 MG: 0.4 CAPSULE ORAL at 08:45

## 2019-03-04 RX ADMIN — TAZOBACTAM SODIUM AND PIPERACILLIN SODIUM 3.38 G: 375; 3 INJECTION, SOLUTION INTRAVENOUS at 04:27

## 2019-03-04 RX ADMIN — FENTANYL CITRATE 100 MCG: 50 INJECTION, SOLUTION INTRAMUSCULAR; INTRAVENOUS at 12:15

## 2019-03-04 RX ADMIN — SODIUM CHLORIDE, POTASSIUM CHLORIDE, SODIUM LACTATE AND CALCIUM CHLORIDE: 600; 310; 30; 20 INJECTION, SOLUTION INTRAVENOUS at 11:30

## 2019-03-04 RX ADMIN — TAZOBACTAM SODIUM AND PIPERACILLIN SODIUM 3.38 G: 375; 3 INJECTION, SOLUTION INTRAVENOUS at 16:15

## 2019-03-04 RX ADMIN — ONDANSETRON 4 MG: 2 INJECTION INTRAMUSCULAR; INTRAVENOUS at 12:52

## 2019-03-04 RX ADMIN — PROPOFOL 150 MG: 10 INJECTION, EMULSION INTRAVENOUS at 11:58

## 2019-03-04 RX ADMIN — FUROSEMIDE 20 MG: 20 TABLET ORAL at 08:45

## 2019-03-04 RX ADMIN — LIDOCAINE HYDROCHLORIDE 50 MG: 10 INJECTION, SOLUTION INFILTRATION; PERINEURAL at 11:58

## 2019-03-04 RX ADMIN — ARFORMOTEROL TARTRATE 15 MCG: 15 SOLUTION RESPIRATORY (INHALATION) at 07:48

## 2019-03-04 RX ADMIN — BUDESONIDE 0.5 MG: 0.5 INHALANT RESPIRATORY (INHALATION) at 07:48

## 2019-03-04 RX ADMIN — MIDAZOLAM 1 MG: 1 INJECTION INTRAMUSCULAR; INTRAVENOUS at 11:54

## 2019-03-04 RX ADMIN — SODIUM CHLORIDE, POTASSIUM CHLORIDE, SODIUM LACTATE AND CALCIUM CHLORIDE: 600; 310; 30; 20 INJECTION, SOLUTION INTRAVENOUS at 08:47

## 2019-03-04 RX ADMIN — SODIUM CHLORIDE, POTASSIUM CHLORIDE, SODIUM LACTATE AND CALCIUM CHLORIDE: 600; 310; 30; 20 INJECTION, SOLUTION INTRAVENOUS at 13:02

## 2019-03-04 RX ADMIN — FENTANYL CITRATE 50 MCG: 50 INJECTION, SOLUTION INTRAMUSCULAR; INTRAVENOUS at 12:11

## 2019-03-04 RX ADMIN — SUGAMMADEX 200 MG: 100 INJECTION, SOLUTION INTRAVENOUS at 12:52

## 2019-03-04 RX ADMIN — ATORVASTATIN CALCIUM 80 MG: 80 TABLET, FILM COATED ORAL at 08:45

## 2019-03-04 RX ADMIN — ROCURONIUM BROMIDE 30 MG: 10 INJECTION INTRAVENOUS at 11:58

## 2019-03-04 RX ADMIN — MIDAZOLAM 1 MG: 1 INJECTION INTRAMUSCULAR; INTRAVENOUS at 11:57

## 2019-03-04 RX ADMIN — FENTANYL CITRATE 100 MCG: 50 INJECTION, SOLUTION INTRAMUSCULAR; INTRAVENOUS at 11:58

## 2019-03-04 RX ADMIN — INDOCYANINE GREEN 2.5 MG: KIT INTRAVENOUS at 12:00

## 2019-03-04 RX ADMIN — TAZOBACTAM SODIUM AND PIPERACILLIN SODIUM 3.38 G: 375; 3 INJECTION, SOLUTION INTRAVENOUS at 09:45

## 2019-03-04 RX ADMIN — KETOROLAC TROMETHAMINE 15 MG: 30 INJECTION, SOLUTION INTRAMUSCULAR at 13:04

## 2019-03-04 RX ADMIN — METOPROLOL TARTRATE 25 MG: 25 TABLET ORAL at 08:45

## 2019-03-04 RX ADMIN — ALUMINUM HYDROXIDE, MAGNESIUM HYDROXIDE, AND DIMETHICONE 30 ML: 400; 400; 40 SUSPENSION ORAL at 00:20

## 2019-03-04 ASSESSMENT — ACTIVITIES OF DAILY LIVING (ADL)
ADLS_ACUITY_SCORE: 18
ADLS_ACUITY_SCORE: 16
ADLS_ACUITY_SCORE: 18
ADLS_ACUITY_SCORE: 18

## 2019-03-04 ASSESSMENT — COPD QUESTIONNAIRES
COPD: 1
CAT_SEVERITY: MILD

## 2019-03-04 NOTE — BRIEF OP NOTE
[facility]    Brief Operative Note    Pre-operative diagnosis: gallstones  Post-operative diagnosis acute calculous cholecystitis  Procedure: Procedure(s):  LAPAROSCOPIC CHOLECYSTECTOMY  Surgeon: Surgeon(s) and Role:     * Burt Mendieta MD - Primary     * Gil Barrientos,  - Assisting  Anesthesia: General   Estimated blood loss: Less than 10 ml  Drains: None  Specimens:   ID Type Source Tests Collected by Time Destination   A :  Organ Gallbladder and Contents SURGICAL PATHOLOGY EXAM Burt Mendieta MD 3/4/2019 12:42 PM      Findings:  Acute cholecystitis.  Complications: None.  Implants: None.

## 2019-03-04 NOTE — ANESTHESIA POSTPROCEDURE EVALUATION
Patient: Alvaro Horton    Procedure(s):  LAPAROSCOPIC CHOLECYSTECTOMY    Diagnosis:gallstones  Diagnosis Additional Information: No value filed.    Anesthesia Type:  General, ETT    Note:  Anesthesia Post Evaluation    Patient location during evaluation: Bedside  Patient participation: Able to fully participate in evaluation  Level of consciousness: awake and alert  Pain management: adequate  Airway patency: patent  Cardiovascular status: acceptable  Respiratory status: acceptable  Hydration status: acceptable  PONV: none     Anesthetic complications: None          Last vitals:  Vitals:    03/04/19 1410 03/04/19 1420 03/04/19 1531   BP: 143/62 142/67 123/54   Pulse:   85   Resp: 16  16   Temp: 36.9  C (98.4  F)  36.9  C (98.4  F)   SpO2: 92%  93%         Electronically Signed By: THOMPSON Vinson CRNA  March 4, 2019  3:33 PM

## 2019-03-04 NOTE — PLAN OF CARE
Has been NPO overnight for surgery later today.  Denies pain.  Complained of heartburn and gas, PRN Mylanta given and was able to sleep.  IV fluids at 75 ml per hour overnight.  Up to bathroom with SBA.  Bed alarm on for safety.

## 2019-03-04 NOTE — PLAN OF CARE
[facility] TRANSPORT NOTE  Data:   Reason for Transport:  denae Horton was transported to [facility] room 8 via wheel chair at 1055.  Patient was accompanied by Nursing Assistant. Equipment used for transport: None. Family was aware of reason for transport: yes    Action:  Report: given to Marleni ROSALES.     Response:  Patient's condition when transferred off unit was stable.    Candice Orozco

## 2019-03-04 NOTE — PLAN OF CARE
[facility] TRANSPORT NOTE  Data:   Reason for Transport:  Return from [facility] maranda Horton was transported from PACU  via cart at 1410.  Patient was accompanied by Registered Nurse. Equipment used for transport: Oxygen  Nasal Cannula. Family was aware of reason for transport: yes    Action:  Report: received from RN     Response:  Patient's condition upon return was stable. Lap sites clean, dry and intact. He denies pain. Patient up in chair with stand by assist. Eating a sandwich. Urinated 200 ml clear yellow urine.     Candice Orozco

## 2019-03-04 NOTE — DISCHARGE SUMMARY
[facility]  Discharge Summary  Hospital Medicine       Date of Admission:  3/2/2019  Date of Discharge:  3/4/2019  7:00 PM  Discharging Provider: Keenan Brice MD      Identification and Chief Compaint: Alvaro Horton is a 78 year old male who presented on 3/2/2019 with complaint of nausea, vomiting and abdominal pain for 1 day..    Discharge Diagnoses   Acute calculous cholecystitis  S/p lap cholecystectomy  Chronic obstructive pulmonary disease, unspecified copd type (h)   Moderate persistent asthma without complication  Hypertension  Diabetes mellitus type 2 with chronic kidney disease   Paroxysmal atrial fibrillation  Coronary artery disease, s/p CABG  H/o CVA     Follow-ups Needed After Discharge   Follow-up Appointments     Follow-up and recommended labs and tests       Follow up with Dr. Mendieta in 2 weeks.             Unresulted Labs Ordered in the Past 30 Days of this Admission     Date and Time Order Name Status Description    3/4/2019 1242 Surgical pathology exam In process       These results will be followed up by Dr. Mendieta    Hospital Course       Acute calculous cholecystitis  78 yr old male who presented with abdominal pain, nausea and vomiting . CT showed cholelithiasis ,gall bladder distension, and probable gall bladder wall thickening , patient also had elevated wbc ,elevated lactate. Given IV fluids, Zosyn. General surgery consulted and he was taken to the OR on 3/4. Patient is doing well and ate dinner. Appears stable for discharge to home. He will complete 7 more days of antibiotics per Dr. Mendieta's request.      Chronic obstructive pulmonary disease, unspecified copd type (h)   Moderate persistent asthma without complication    No new events , continue home medication .   - Continue with Brovana and Pulmicort, nebs prn       Hypertension goal bp (blood pressure) < 140/90  BP slightly elevated on admission  - Continue with home medication         Type 2  diabetes mellitus with stage 3 chronic kidney disease, with long-term current use of insulin (h)  Last A1C was 8.0 as at May 2018   - Continue with long acting insulin  - Sliding scale insulin         Paroxysmal atrial fibrillation (h)  This was developed after his CABG , his rate appears controlled. He is on rate control medication and aspirin   - Continue with beta blocker and aspirin         History of CABG  No acute events   - EKG reviewed no new findings as compared to previous tracings  - Troponin was negative  - Continue with home medication .        Cerebrovascular accident (cva), unspecified mechanism (h)  Occurred in 2009 after his CABG, has no residual symptoms  - Monitor neuro signs.  - Continue Lipitor and aspirin.    Consultations This Hospital Stay   SURGERY GENERAL IP CONSULT    Code Status   Full Code    The discharge plan was discussed with the patient, and he expressed understanding.     Time Spent on this Encounter   Total time on this discharge was 25 minutes.       Keenan Brice MD  [facility]  ______________________________________________________________________    Physical Exam   Vital Signs: Temp: 98.4  F (36.9  C) Temp src: Oral BP: 123/54 Pulse: 85 Heart Rate: 83 Resp: 16 SpO2: 93 % O2 Device: None (Room air) Oxygen Delivery: 2 LPM  Weight: 186 lbs 11.67 oz  Constitutional: alert and oriented, NAD, post op  CV: Regular, no edema  Respiratory: CTA bilaterally  GI: Soft, mild surgical site tenderness to moderate palpation, active bowel sounds  Skin: Warm and dry       Primary Care Physician   Be Carreno  [address] [place] [id number]     Discharge Disposition   Discharged to home  Condition at discharge: Stable    Significant Results and Procedures   Results for orders placed or performed during the hospital encounter of 03/02/19   Abd/pelvis CT - no contrast - Stone Protocol    Narrative    CT ABDOMEN PELVIS WITHOUT CONTRAST   3/2/2019 3:44  PM     HISTORY: Abdominal pain.    TECHNIQUE: Volumetric helical sections were acquired from the lung  bases through the ischial tuberosities without IV contrast. Coronal  images were also reconstructed. Radiation dose for this scan was  reduced using automated exposure control, adjustment of the mA and/or  kV according to patient size, or iterative reconstruction technique.    COMPARISON: CT of the abdomen and pelvis performed 11/9/2016.    FINDINGS:  No bowel obstruction. Scattered colonic diverticulosis,  without convincing evidence for diverticulitis. Unremarkable appendix.  No free fluid in the pelvis. There is moderate to marked prostatic  enlargement. Bladder trabeculation is likely related to the prostatic  enlargement. Mild atherosclerotic aortoiliac calcification. Multiple  gallstones are noted within the gallbladder. The gallbladder is mildly  distended, and the gallbladder wall appears mildly thickened.  Scattered calcified granulomas in the spleen. Nonobstructing 0.4 cm  stone in the lower pole of the left kidney is unchanged. The liver,  spleen, adrenal glands, pancreas, and kidneys have otherwise  unremarkable noncontrast appearances. No hydronephrosis. No  intra-abdominal fluid collections are identified. No free  intraperitoneal air. Coronary artery calcification. Prior midline  sternotomy. The visualized lung bases are clear. Degenerative changes  are noted throughout the visualized thoracolumbar spine.      Impression    IMPRESSION:   1. Cholelithiasis, mild gallbladder distention, and probable  gallbladder wall thickening. Acute cholecystitis could have this  appearance. Gallbladder ultrasound may be helpful for further  characterization.  2. Moderate to marked prostatic enlargement.  3. Scattered colonic diverticulosis, without convincing evidence for  diverticulitis.   4. Nonobstructing 0.4 cm stone in the lower pole of the left kidney.      ARIELA CHAIREZ MD   Abdomen US, limited (RUQ only)     Narrative    RIGHT UPPER QUADRANT ULTRASOUND 3/2/2019 4:19 PM    HISTORY:  Fever, abdominal pain, question cholecystitis on CT scan  abdomen pelvis.     COMPARISON: 3/2/2019 CT abdomen and pelvis.    FINDINGS:    Pancreas: Not visualized, completely obscured.  Liver: Poorly visualized, left lobe predominantly obscured. No focal  liver lesion where seen.  Gallbladder: Multiple gallstones. Mild gallbladder wall thickening 0.4  cm. Sonographic Escobar's sign reported as negative. No pericholecystic  fluid.  Biliary: No biliary duct dilatation.  Right kidney: Grossly normal, 13.9 cm in length, suboptimally  visualized.      Impression    IMPRESSION:  Limited exam. Cholelithiasis and mild gallbladder wall  thickening which is nonspecific but can be seen with acute  cholecystitis. If clinically indicated, nuclear medicine hepatobiliary  scan could be considered.       NEENA LINDSAY MD     Procedures    Lap maranda    Discharge Orders      Home Care Referral      Reason for your hospital stay    Acute cholecystitis     Follow-up and recommended labs and tests     Follow up with Dr. Mendieta in 2 weeks.     Activity    Your activity upon discharge: activity as tolerated     Diet    Follow this diet upon discharge:       Regular Diet Adult     Discharge Medications   Current Discharge Medication List      START taking these medications    Details   ciprofloxacin (CIPRO) 500 MG tablet Take 1 tablet (500 mg) by mouth 2 times daily  Qty: 10 tablet, Refills: 0    Associated Diagnoses: Acute calculous cholecystitis         CONTINUE these medications which have NOT CHANGED    Details   aspirin (ASA) 325 MG EC tablet Take 325 mg by mouth daily      atorvastatin (LIPITOR) 80 MG tablet Take 1 tablet (80 mg) by mouth daily  Qty: 30 tablet, Refills: 1    Associated Diagnoses: Hyperlipidemia LDL goal <70      BROVANA 15 MCG/2ML NEBU neb solution INHALE ONE VIAL VIA NEBULIZER BY MOUTH TWO TIMES A DAY  Qty: 120 mL, Refills: 3    Associated  Diagnoses: Chronic obstructive pulmonary disease, unspecified COPD type (H)      budesonide (PULMICORT) 0.5 MG/2ML neb solution Take 2 mLs (0.5 mg) by nebulization 2 times daily  Qty: 60 ampule, Refills: 11    Associated Diagnoses: Moderate persistent asthma without complication      cholecalciferol 2000 UNITS tablet Take 1 tablet by mouth daily.  Qty: 90 tablet, Refills: 3    Associated Diagnoses: Memory loss      cyanocobalamin (BL VITAMIN B-12) 500 MCG TABS Take 500 mcg by mouth daily.  Qty: 90 tablet, Refills: 4    Associated Diagnoses: Memory loss      fish oil-omega-3 fatty acids (FISH OIL) 1000 MG capsule Take 1 capsule by mouth 2 times daily.  Qty: 180 capsule, Refills: 3    Associated Diagnoses: Hyperlipidemia LDL goal <100      insulin aspart (NOVOLOG FLEXPEN) 100 UNIT/ML injection 11 units before breakfast, 11 units before lunch, 11 units before dinner  Qty: 30 mL, Refills: 11    Comments: Do not fill at this time, patient will call.  Associated Diagnoses: Type 2 diabetes mellitus with stage 3 chronic kidney disease, with long-term current use of insulin (H)      LEVEMIR FLEXTOUCH 100 UNIT/ML pen INJECT 30 UNITS SUBCUTANEOUSLY AT BEDTIME  Qty: 3 mL, Refills: 1    Comments: Please consider 90 day supplies to promote better adherence  Associated Diagnoses: Type 2 diabetes mellitus with stage 3 chronic kidney disease, with long-term current use of insulin (H)      lisinopril (PRINIVIL/ZESTRIL) 5 MG tablet TAKE ONE TABLET BY MOUTH ONCE DAILY  Qty: 90 tablet, Refills: 3    Associated Diagnoses: Hyperlipidemia LDL goal <70      metFORMIN (GLUCOPHAGE) 1000 MG tablet TAKE ONE TABLET BY MOUTH TWICE DAILY WITH MEALS  Qty: 60 tablet, Refills: 11    Comments: Please consider 90 day supplies to promote better adherence  Associated Diagnoses: Type 2 diabetes mellitus with stage 3 chronic kidney disease, with long-term current use of insulin (H)      metoprolol tartrate (LOPRESSOR) 25 MG tablet TAKE 1 TABLET BY MOUTH  "TWICE DAILY  Qty: 60 tablet, Refills: 1    Comments: Please consider 90 day supplies to promote better adherence  Associated Diagnoses: Chronic ischemic heart disease      tamsulosin (FLOMAX) 0.4 MG capsule Take 1 capsule (0.4 mg) by mouth daily (Needs follow-up appointment for this medication)  Qty: 90 capsule, Refills: 3    Associated Diagnoses: Benign non-nodular prostatic hyperplasia with lower urinary tract symptoms      albuterol (PROAIR HFA/PROVENTIL HFA/VENTOLIN HFA) 108 (90 Base) MCG/ACT Inhaler Inhale 2 puffs into the lungs every 6 hours as needed for shortness of breath / dyspnea or wheezing  Qty: 1 Inhaler, Refills: 1      blood glucose (NO BRAND SPECIFIED) lancets standard Use to test blood sugar 3 times daily. Accu Chek Brand  Qty: 300 each, Refills: 1    Associated Diagnoses: Type 2 diabetes mellitus with stage 3 chronic kidney disease, with long-term current use of insulin (H)      blood glucose (NO BRAND SPECIFIED) test strip Use to test blood sugar 3 times daily Accu Chek Brand  Qty: 300 strip, Refills: 1    Associated Diagnoses: Type 2 diabetes mellitus with stage 3 chronic kidney disease, with long-term current use of insulin (H)      blood glucose monitoring (NO BRAND SPECIFIED) meter device kit Use to test blood sugar 3 times daily. Accu Chek Brand  Qty: 1 kit, Refills: 0    Associated Diagnoses: Type 2 diabetes mellitus with stage 3 chronic kidney disease, with long-term current use of insulin (H)      furosemide (LASIX) 20 MG tablet Take 20 mg by mouth daily      Insulin Pen Needle (PEN NEEDLES) 31G X 6 MM MISC 1 Units 3 times daily For novolog flexpen  Qty: 90 each, Refills: 0    Comments: For use with Novolog and Levemir Flexpens  Associated Diagnoses: Type 2 diabetes, HbA1C goal < 8% (H)      insulin syringe-needle U-100 (BD INSULIN SYRINGE ULTRAFINE) 31G X 5/16\" 1 ML Use one syringe 4 times daily or as directed.  Qty: 100 each, Refills: prn    Comments: Uses Levemir 30 units at HS and " Novolog 11 units before breakfast, lunch and dinner  Associated Diagnoses: Type 2 diabetes, HbA1C goal < 8% (H)      nitroGLYcerin (NITROSTAT) 0.4 MG sublingual tablet Place 1 tablet (0.4 mg) under the tongue every 5 minutes as needed for chest pain (CALL 911 IF NOT IMPROVED AFTER THREE CONSECUTIVE DOSES)  Qty: 25 tablet, Refills: 0    Comments: Do not fill at this time, patient will call.  Associated Diagnoses: Chronic ischemic heart disease      order for DME Equipment being ordered: CPAP  AIRSENSE 10  11-15 CM H2O  QUATTRO AIR SIZE MED  SN# [id number]  DN# [id number]           Allergies   Allergies   Allergen Reactions     Prednisone Other (See Comments)     Severe interaction with DM

## 2019-03-04 NOTE — PLAN OF CARE
"A&O, forgetful. SBA to bathroom. IV lost this evening due to leaking, new IV started. IV running LR @ 75 ml/hr; fluids restarted due to Pt poor oral intake on full liquid diet. Pt requests Tums due to heartburn/reflux pains, administered x2 this evening. Zosyn for abx. Pt has procedure tomorrow, not scheduled yet. First surgical prep shower completed this evening. /73 (BP Location: Right arm)   Pulse 81   Temp 99.2  F (37.3  C) (Oral)   Resp 18   Ht 1.727 m (5' 8\")   Wt 84.7 kg (186 lb 11.7 oz)   SpO2 92%   BMI 28.39 kg/m  . Will continue to monitor.   "

## 2019-03-04 NOTE — PROGRESS NOTES
Pt reports not consuming many fluids by mouth. On-call MD paged to see if cont. IV fluids should be restarted. Zaida Benoit RN.

## 2019-03-04 NOTE — PROGRESS NOTES
HOME CARE HAND OFF  Patient Name: Alvaro Horton    MRN: [id number]    : 1940    Admit Diagnosis: Cholecystitis [K81.9]      Services Pt Needs at Home: RN, PT and HHA    Discharge Support: Family/Friend Support    Living Arrangements: With family     or Address Other Than Pt: No    Wound Care: No. Scheduled for surgery today    Anticipate DC Date: 3/4/2019    Velia Archibald RN Care Coordinator  [facility] [phone]  [facility] [phone]

## 2019-03-04 NOTE — OP NOTE
Procedure Date: 03/04/2019      PREOPERATIVE DIAGNOSIS:  Acute cholecystitis.         POSTOPERATIVE DIAGNOSIS:  Acute cholecystitis.         PROCEDURES PERFORMED:  Laparoscopic cholecystectomy.         SURGEON:  Burt Mendieta MD         ASSISTANT:  Gil Barrientos DO.  I requested his assistance for his expertise with laparoscopic instrumentation, hemostasis and wound closure.         ANESTHESIA:  General.         INDICATIONS:  This 78-year-old man presented to the emergency room with abdominal pain and was admitted.  He was found to have acute cholecystitis.  He was seen yesterday in the hospital for consultation and scheduled for surgery today.  His pain had improved today, but we elected to get the gallbladder out before he left the hospital.  Prior to the procedure, he was counseled about the risks, benefits and alternatives of the procedure and he agreed to proceed.  All of his questions were answered.         DESCRIPTION OF PROCEDURE:  The patient was brought to the operating room and placed on the table in the supine position.  After induction of adequate general endotracheal anesthesia, the patient's abdomen was prepped and draped in the usual sterile fashion.  A small incision was made about 4-5 cm above the umbilicus in the midline.  Dissection was carried down through subcutaneous tissues to the level of the fascia.  The fascia was incised in the midline and two stay sutures of #0 Vicryl were placed.  The fascia was further elevated and the peritoneal cavity bluntly entered.  The Anny trocar was placed and the abdomen insufflated to 15 mmHg pressure with CO2 gas.  Three further 5 mm ports were placed in the right upper quadrant under direct vision.  The patient was then placed in a steep reverse Trendelenburg position with the left side rotated downward.  The gallbladder was easily identified and was encased in some omentum.  This was peeled away revealing an acutely inflamed tense gallbladder.  We had  to drain about 40-50 mL of bile out of it in order to grab hold of it.  The gallbladder wall was somewhat fragile and we did make several holes.  Any stones that spilled were recovered.  The fundus of the gallbladder was retracted superiorly and anteriorly, exposing the infundibulum.  A second grasper was placed here.  We then began our dissection.  The cystic duct was immediately identified and was in front of all the other structures.  We carefully dissected around this to make sure it was the structure that was not the common duct.  Once that was assured, the duct was divided between clips.  The cystic artery was posterior and to the left.  This was dissected free, clipped and divided.  The gallbladder was then removed from the gallbladder bed using cautery.  This was somewhat difficult due to the inflammation present.  The gallbladder was placed into an Endo bag with all the stones that we had spilled.  The gallbladder was then removed from the abdomen through the epigastric trocar site.  The Anny trocar was replaced and the abdomen reinsufflated.  The right upper quadrant was irrigated with about 1-2 liters of saline.  There was a little bit of oozing coming from the bed of the gallbladder but nothing significant.  It was cauterized and then the bed was coated with FloSeal.  No bleeding was seen after this.        The ports were then removed under direct vision.  The abdomen was deflated.  The supraumbilical fascia was closed with a couple of interrupted #0 Vicryl figure-of-eight sutures.  All the wounds were infiltrated with Marcaine with epinephrine and closed with subcuticular Vicryl sutures.  Surgical glue was used as a final dressing.        He was then awakened from his anesthetic and taken to the recovery room awake and in stable condition, having tolerated the procedure well.  There were no complications.  Needle, sponge, and instrument counts were correct x2.         CASSIA GREEN MD             D:  2019   T: 2019   MT: PAMELA      Name:     ABIGAIL LOVELL   MRN:      [id number]        Account:        [id number]   :      1940           Procedure Date: 2019      Document: [id number]

## 2019-03-04 NOTE — ANESTHESIA CARE TRANSFER NOTE
Patient: Alvaro Horton    Procedure(s):  LAPAROSCOPIC CHOLECYSTECTOMY    Diagnosis: gallstones  Diagnosis Additional Information: No value filed.    Anesthesia Type:   General, ETT     Note:  Airway :Face Mask  Patient transferred to:PACU  Handoff Report: Identifed the Patient, Identified the Reponsible Provider, Reviewed the pertinent medical history, Discussed the surgical course, Reviewed Intra-OP anesthesia mangement and issues during anesthesia, Set expectations for post-procedure period and Allowed opportunity for questions and acknowledgement of understanding      Vitals: (Last set prior to Anesthesia Care Transfer)    CRNA VITALS  3/4/2019 1247 - 3/4/2019 1317      3/4/2019             Pulse:  91    SpO2:  97 %                Electronically Signed By: THOMPSON Winters CRNA  March 4, 2019  1:17 PM

## 2019-03-04 NOTE — ANESTHESIA PREPROCEDURE EVALUATION
Anesthesia Pre-Procedure Evaluation    Patient: Alvaro Horton   MRN: [id number] : 1940          Preoperative Diagnosis: gallstones    Procedure(s):  LAPAROSCOPIC CHOLECYSTECTOMY    Past Medical History:   Diagnosis Date     Calculus of kidney     . s/p ESWL and percutaneous nephrolithotomy     Depression     Hospitalized 1979     Left hand weakness 2009    Afterbypass surgery, CVA, improved.      PNEUMONIA, ORGANISM NOS 2008    CT -  Pulmonary atelectasis and infiltrate in the posterior left lower lung base.      Past Surgical History:   Procedure Laterality Date     C ANESTH,DX ARTHROSCOPIC PROC KNEE JOINT  ,      Left     CARDIAC SURGERY      CABG x2 - LIMA-D1, SVG to OM1, OM2.     GENITOURINARY SURGERY      ESWL and percutaneous nephrolithotomy     ORTHOPEDIC SURGERY      right CTR     SURGICAL HISTORY OF -       Hernia repair     SURGICAL HISTORY OF -       NormalColonoscopy      SURGICAL HISTORY OF -       Normal Colonoscopy       Anesthesia Evaluation     .             ROS/MED HX    ENT/Pulmonary:     (+)sleep apnea, Moderate Persistent asthma Treatment: Nebulizer prn,  mild COPD, , . .    Neurologic:     (+)CVA    TIA: .   Cardiovascular:     (+) Dyslipidemia, hypertension--CAD, -CABG-. : . . . :. . Previous cardiac testing Echodate:results:Interpretation Summary  The study was technically difficult.  The visual ejection fraction is estimated at 55-60%. Left ventricular   systolic function is normal. Grade II left ventricular diastolic dysfunction   is noted.  PatientHeight: 68 in  PatientWeight: 206 lbs  SystolicPressure: 112 mmHg  DiastolicPressure: 68 mmHg  HeartRate: 64 bpm  BSA 2.1 m^2        Left Ventricle  The left ventricle is normal in size.  There is mild concentric left ventricular hypertrophy.  E by E prime ratio is between 8 and 15, which is indeterminate for assessment   of left ventricular filling pressures.  Grade II  left ventricular diastolic dysfunction is noted.  The visual ejection fraction is estimated at 55-60%.  Left ventricular systolic function is normal.     Right Ventricle  The right ventricle is normal in size and function.     Atria  The left atrium is mildly dilated.  Right atrial size is normal.  There is no color Doppler evidence of an atrial shunt.     Mitral Valve  There is trace mitral regurgitation.     Tricuspid Valve  There is trace tricuspid regurgitation.  The right ventricular systolic pressure is approximated at 24mmHg plus the   right atrial pressure.     Aortic Valve  The aortic valve is trileaflet.  There is mild trileaflet aortic sclerosis.     Pulmonic Valve  There is trace pulmonic valvular regurgitation.     Pericardium  There is no pericardial effusion.     Rhythm  The rhythm was normal sinus.     Procedure  Complete Echo Adult.  Contrast Definity.     MMode 2D Measurements & Calculations  IVSd: 1.4 cm  LVIDd: 4.2 cm  LVIDs: 2.6 cm  LVPWd: 0.88 cm  FS: 39 %  LV mass(C)d: 159 grams  Ao root diam: 3.3 cm  LA dimension: 5.0 cm  LA/Ao: 1.5   Doppler Measurements & Calculations  MV E point: 77 cm/sec  MV A point: 87 cm/sec  MV E/A: 0.89   MV dec time: 0.22 sec  TR Max P mmHg     Interpreting Physician:  Alfa Moreno MD electronically signed on   2011 13:27:04Stress Testdate: results:   Indication/Clinical History: Chest pain     Impression  1. Myocardial perfusion imaging using single isotope technique  demonstrated no evidence for myocardial ischemia.   2. Gated images demonstrated normal wall motion with a calculated  ejection fraction of 61%.   3. Compared to the prior study from  there are no significant  changes .     Procedure  Pharmacologic stress testing was performed with Lexiscan at a rate of  0.08 mg/ml rapid bolus injection, for 15 seconds, 0.4 mg/5ml  intravenously. Low-level exercise was performed along with the  vasodilator infusion. The heart rate was 67 at  baseline and maisha to  100 beats per minute during the Lexiscan infusion. The rest blood  pressure was 134/76 mmHg and was 112/70 mm Hg during Lexiscan  infusion. The patient experienced no chest pain  during the test.     Myocardial perfusion imaging was performed at rest, approximately 45  minutes after the injection intravenously of 11.3 mCi of Tc-99m  Myoview. At peak pharmacologic effect, 10-20 seconds after Lexiscan,   the patient was injected intravenously with 31.2 mCi of  Tc-99m  Myoview. The post-stress tomographic imaging was performed  approximately 60 minutes after stress.     EKG Findings  The resting EKG demonstrated sinus rhythm with a right bundle branch  block. The stress EKG demonstrated no evidence for ischemic ST-T wave  changes with LexiScan infusion.     Tomographic Findings  The rest and stress tomographic images are of good quality. There is  relatively uniform tracer uptake on the rest and stress tomographic  images. There is no evidence for myocardial ischemia or a prior  myocardial infarction. Gating revealed normal wall motion with a  calculated ejection fraction of 61%.     TORIE FARMER reviewed date:3/2-19 results:Sinus  Rhythm  -First degree A-V block  -With rate variation   Nick = 298   cv = 10.  -Right bundle branch block with left axis -bifascicular block. date: results:          METS/Exercise Tolerance:     Hematologic:         Musculoskeletal:         GI/Hepatic:     (+) GERD cholecystitis/cholelithiasis,       Renal/Genitourinary:     (+) chronic renal disease,       Endo:     (+) type II DM Last HgA1c: 8.0 date: 2-18 .      Psychiatric:         Infectious Disease:         Malignancy:      - no malignancy   Other:    - neg other ROS                      Physical Exam  Normal systems: cardiovascular, pulmonary and dental    Airway   Mallampati: II  TM distance: >3 FB  Neck ROM: full    Dental     Cardiovascular       Pulmonary             Lab Results   Component Value Date  "   WBC 13.5 (H) 03/04/2019    HGB 12.9 (L) 03/04/2019    HCT 37.2 (L) 03/04/2019     (L) 03/04/2019    CRP 7.7 06/22/2017    SED 10 03/31/2011     03/04/2019    POTASSIUM 3.6 03/04/2019    CHLORIDE 101 03/04/2019    CO2 30 03/04/2019    BUN 17 03/04/2019    CR 1.16 03/04/2019     (H) 03/04/2019    EVELINA 8.1 (L) 03/04/2019    PHOS 2.7 06/15/2018    MAG 1.9 06/15/2018    ALBUMIN 2.6 (L) 03/04/2019    PROTTOTAL 6.1 (L) 03/04/2019    ALT 24 03/04/2019    AST 17 03/04/2019    GGT 23 08/13/2007    ALKPHOS 94 03/04/2019    BILITOTAL 1.0 03/04/2019    LIPASE 117 03/02/2019    PTT 28 12/26/2014    INR 0.90 09/03/2018    FIBR 216 11/27/2009    TSH 1.64 05/17/2018    T4 1.35 01/18/2010       Preop Vitals  BP Readings from Last 3 Encounters:   03/04/19 163/72   01/11/19 140/70   10/29/18 120/70    Pulse Readings from Last 3 Encounters:   03/04/19 78   01/11/19 72   10/29/18 72      Resp Readings from Last 3 Encounters:   03/04/19 16   01/11/19 20   10/29/18 18    SpO2 Readings from Last 3 Encounters:   03/04/19 93%   01/11/19 96%   09/12/18 95%      Temp Readings from Last 1 Encounters:   03/04/19 36.7  C (98.1  F) (Oral)    Ht Readings from Last 1 Encounters:   03/02/19 1.727 m (5' 8\")      Wt Readings from Last 1 Encounters:   03/02/19 84.7 kg (186 lb 11.7 oz)    Estimated body mass index is 28.39 kg/m  as calculated from the following:    Height as of this encounter: 1.727 m (5' 8\").    Weight as of this encounter: 84.7 kg (186 lb 11.7 oz).       Anesthesia Plan      History & Physical Review  History and physical reviewed and following examination; no interval change.    ASA Status:  3 .    NPO Status:  > 8 hours    Plan for General and ETT with Intravenous and Propofol induction. Maintenance will be Balanced.    PONV prophylaxis:  Ondansetron (or other 5HT-3) and Dexamethasone or Solumedrol  Additional equipment: Videolaryngoscope      Postoperative Care  Postoperative pain management:  IV analgesics and " Oral pain medications.      Consents  Anesthetic plan, risks, benefits and alternatives discussed with:  Patient..                 THOMPSON Winters CRNA

## 2019-03-04 NOTE — DISCHARGE INSTRUCTIONS
Please make a follow up appointment with Dr Burt Mendieta, surgeon to be seen in two weeks following surgery [phone]

## 2019-03-04 NOTE — TELEPHONE ENCOUNTER
PA Initiation    Medication: brovana  Insurance Company: [employer] - Phone [phone] Fax [phone]  Pharmacy Filling the Rx: [facility] - [id number]   Filling Pharmacy Phone:    Filling Pharmacy Fax:    Start Date: 3/4/2019

## 2019-03-04 NOTE — PROGRESS NOTES
"Xi Leong paged per Pt request for the following: \"2401-Asking for more Tums. Used daily PRN dose already. States he takes about 3-4 per day at home usually for heartburn/reflux pain.\" Will continue to monitor.   "

## 2019-03-04 NOTE — PROGRESS NOTES
Pt is doing his home routine maintenance nebs. RT to contintue to follow.       03/04/19 0748   Nebulizer Pre Assessment   %RT Use ONLY Delivery Method Nebulizer - Initial   Nebulizer Device Mouthpiece   Pretreatment Heart Rate (beats/min) 73   Pretreatment Resp Rate (breaths/min) 15   Pretreatment O2 sats - (TCU only) 93  (on RA)   Pretreat Breath Sounds - Bilat - All Lobes clear   Breath Sounds Post-Respiratory Treatment   Posttreatment Heart Rate (beats/min) 78   Posttreatment Resp Rate (breaths/min) 15   Post treatment O2 Sats - (TCU only) 93   Throughout All Fields Post-Treatment no change          03/04/19 0748   RCAT Assessment   Reason for Assessment COPD   Pulmonary Status 0   Surgical Status 0   Chest X-ray 0   Respiratory Pattern 0   Mental Status 0   Breath Sounds 0   Cough Effectiveness 0   Level of Activity 0   O2 Required for SpO2>=92% 0   Acuity Level (points) 0   Acuity Level  5   Re-eval Interval Guideline Every 3 days   Re-evaluation Date 03/07/19

## 2019-03-05 ENCOUNTER — TELEPHONE (OUTPATIENT)
Dept: FAMILY MEDICINE | Facility: CLINIC | Age: 79
End: 2019-03-05

## 2019-03-05 LAB — COPATH REPORT: NORMAL

## 2019-03-05 NOTE — PROGRESS NOTES
[facility] [facility] [place]    Patient discharged to home at 1900 PM via wheel chair. Accompanied by son and staff. Discharge instructions reviewed with patient, opportunity offered to ask questions. Prescriptions sent to patients preferred pharmacy. All belongings sent with patient.    Annette Grant    Patient stated he wanted to review discharge instructions with son present.  Discharge instructions given to pt in room to review. When patient's son came, patient left before writer could discuss discharge paperwork/signing form. Contacted patient via phone, patient stated he understood discharge instructions.

## 2019-03-05 NOTE — TELEPHONE ENCOUNTER
Prior Authorization Approval    Authorization Effective Date: 3/4/2019  Authorization Expiration Date: 3/4/2021  Medication: brovana  Approved Dose/Quantity:   Reference #:     Insurance Company: [employer] - Phone [phone] Fax [phone]  Expected CoPay:       CoPay Card Available:      Foundation Assistance Needed:    Which Pharmacy is filling the prescription (Not needed for infusion/clinic administered): [facility] - [id number]  Pharmacy Notified: Yes  Patient Notified: Yes    Medicare Part D does not cover diabetic testing supplies.  Medicare Part B approved this drug for 2 years.

## 2019-03-05 NOTE — TELEPHONE ENCOUNTER
ED/UC/IP follow up phone call: Acute Calcilous Cholecystitis, Hypertension Goal BP - 03/04/19      RN please call to follow up.    Number of ED visits in past 12 months = 0

## 2019-03-05 NOTE — TELEPHONE ENCOUNTER
"ED/Discharge Protocol    \"Hi, my name is Zabrina Silva, a registered nurse, and I am calling on behalf of Dr. Carreno's office at [place].  I am calling to follow up and see how things are going for you after your recent visit.\"    \"I see that you were in the (ER/UC/IP) on 3-2-19.    How are you doing now that you are home?\" pretty good     Is patient experiencing symptoms that may require a hospital visit?  no    Discharge Instructions    \"Let's review your discharge instructions.  What is/are the follow-up recommendations?  Pt. Response: follow up with Wai .  He was not told to follow up with the surgeon, so I called surgery clinic and was told he needed to follow up in two weeks.    \"Were you instructed to make a follow-up appointment?\"  Pt. Response: Yes.  Has appointment been made?   Yes      \"When you see the provider, I would recommend that you bring your discharge instructions with you.    Medications    \"How many new medications are you on since your hospitalization/ED visit?\"    0-1  \"How many of your current medicines changed (dose, timing, name, etc.) while you were in the hospital/ED visit?\"   0-1  \"Do you have questions about your medications?\"   No  \"Were you newly diagnosed with heart failure, COPD, diabetes or did you have a heart attack?\"   No  For patients on insulin: \"Did you start on insulin in the hospital or did you have your insulin dose changed?\"   No    Medication reconciliation completed? No    Was MTM referral placed (*Make sure to put transitions as reason for referral)?   No    Call Summary    \"Do you have any questions or concerns about your condition or care plan at the moment?\"    No  Triage nurse advice given: call if questions    Patient was in ER 0 in the past year (assess appropriateness of ER visits.)      \"If you have questions or things don't continue to improve, we encourage you contact us through the main clinic number,  [phone].  Even if the clinic is not open, " "triage nurses are available 24/7 to help you.     We would like you to know that our clinic has extended hours (provide information).  We also have urgent care (provide details on closest location and hours/contact info)\"      \"Thank you for your time and take care!\"        "

## 2019-03-08 DIAGNOSIS — Z79.4 TYPE 2 DIABETES MELLITUS WITH STAGE 3 CHRONIC KIDNEY DISEASE, WITH LONG-TERM CURRENT USE OF INSULIN (H): ICD-10-CM

## 2019-03-08 DIAGNOSIS — E11.22 TYPE 2 DIABETES MELLITUS WITH STAGE 3 CHRONIC KIDNEY DISEASE, WITH LONG-TERM CURRENT USE OF INSULIN (H): ICD-10-CM

## 2019-03-08 DIAGNOSIS — N18.30 TYPE 2 DIABETES MELLITUS WITH STAGE 3 CHRONIC KIDNEY DISEASE, WITH LONG-TERM CURRENT USE OF INSULIN (H): ICD-10-CM

## 2019-03-08 NOTE — TELEPHONE ENCOUNTER
"Requested Prescriptions   Pending Prescriptions Disp Refills     metFORMIN (GLUCOPHAGE) 1000 MG tablet [Pharmacy Med Name: METFORMIN 1000MG TAB] 60 tablet 11     Sig: TAKE 1 TABLET BY MOUTH TWICE DAILY WITH MEALS    Biguanide Agents Failed - 3/8/2019 10:23 AM       Failed - Patient has documented LDL within the past 12 mos.    Recent Labs   Lab Test 02/05/18  1313   LDL 50            Failed - Patient has documented A1c within the specified period of time.    If HgbA1C is 8 or greater, it needs to be on file within the past 3 months.  If less than 8, must be on file within the past 6 months.     Recent Labs   Lab Test 05/17/18  1300   A1C 8.0*            Passed - Blood pressure less than 140/90 in past 6 months    BP Readings from Last 3 Encounters:   03/04/19 123/54   01/11/19 140/70   10/29/18 120/70                Passed - Patient has had a Microalbumin in the past 15 mos.    Recent Labs   Lab Test 02/05/18  1323   MICROL 64   UMALCR 60.86*            Passed - Patient is age 10 or older       Passed - Patient's CR is NOT>1.4 OR Patient's EGFR is NOT<45 within past 12 mos.    Recent Labs   Lab Test 03/04/19  0506   GFRESTIMATED 59*   GFRESTBLACK 69       Recent Labs   Lab Test 03/04/19  0506   CR 1.16            Passed - Patient does NOT have a diagnosis of CHF.       Passed - Medication is active on med list       Passed - Recent (6 mo) or future (30 days) visit within the authorizing provider's specialty    Patient had office visit in the last 6 months or has a visit in the next 30 days with authorizing provider or within the authorizing provider's specialty.  See \"Patient Info\" tab in inbasket, or \"Choose Columns\" in Meds & Orders section of the refill encounter.          metFORMIN (GLUCOPHAGE) 1000 MG tablet  Last Written Prescription Date:  02/19/2018  Last Fill Quantity: 60 tablet,  # refills: 11   Last office visit: 2/13/2019 with prescribing provider:  01/11/2019 RUBY Carreno   Future Office Visit:   Next 5 " appointments (look out 90 days)    Mar 11, 2019  8:40 AM CDT  [facility] [address] [place] [address]  [phone]   Mar 18, 2019 10:00 AM CDT  Return Visit with Burt Mend[facility]eta MD  [facility] [address] [address]  [phone]         Edwige LEVY (R) (M)

## 2019-03-11 ENCOUNTER — OFFICE VISIT (OUTPATIENT)
Dept: FAMILY MEDICINE | Facility: CLINIC | Age: 79
End: 2019-03-11
Payer: COMMERCIAL

## 2019-03-11 VITALS
OXYGEN SATURATION: 97 % | BODY MASS INDEX: 28.07 KG/M2 | RESPIRATION RATE: 20 BRPM | WEIGHT: 184.6 LBS | DIASTOLIC BLOOD PRESSURE: 50 MMHG | TEMPERATURE: 98 F | HEART RATE: 76 BPM | SYSTOLIC BLOOD PRESSURE: 110 MMHG

## 2019-03-11 DIAGNOSIS — K80.00 ACUTE CALCULOUS CHOLECYSTITIS: Primary | ICD-10-CM

## 2019-03-11 DIAGNOSIS — J44.9 CHRONIC OBSTRUCTIVE PULMONARY DISEASE, UNSPECIFIED COPD TYPE (H): ICD-10-CM

## 2019-03-11 DIAGNOSIS — N18.30 TYPE 2 DIABETES MELLITUS WITH STAGE 3 CHRONIC KIDNEY DISEASE, WITH LONG-TERM CURRENT USE OF INSULIN (H): ICD-10-CM

## 2019-03-11 DIAGNOSIS — E11.22 TYPE 2 DIABETES MELLITUS WITH STAGE 3 CHRONIC KIDNEY DISEASE, WITH LONG-TERM CURRENT USE OF INSULIN (H): ICD-10-CM

## 2019-03-11 DIAGNOSIS — Z79.4 TYPE 2 DIABETES MELLITUS WITH STAGE 3 CHRONIC KIDNEY DISEASE, WITH LONG-TERM CURRENT USE OF INSULIN (H): ICD-10-CM

## 2019-03-11 PROCEDURE — 99495 TRANSJ CARE MGMT MOD F2F 14D: CPT | Performed by: FAMILY MEDICINE

## 2019-03-11 ASSESSMENT — PAIN SCALES - GENERAL: PAINLEVEL: MODERATE PAIN (4)

## 2019-03-11 NOTE — PATIENT INSTRUCTIONS
ASSESSMENT:   (K80.00) Acute calculous cholecystitis  (primary encounter diagnosis)  Comment: doing OK after his hospital stay  Plan: No change in current treatment plan.   follow-up with surgeon next week as planned.     (J44.9) Chronic obstructive pulmonary disease, unspecified COPD type (H)  Comment:   Plan: Check which medication you need refills on and call us.  We need to know what it is that you are taking for breathing.     (E11.22,  N18.3,  Z79.4) Type 2 diabetes mellitus with stage 3 chronic kidney disease, with long-term current use of insulin (H)  Comment: Declines blood tests today.  Has had enough blood draws for a while.   Plan: Recheck in a month for diabetes mellitus.   We can do blood tests at that time.

## 2019-03-11 NOTE — NURSING NOTE
"Chief Complaint   Patient presents with     Hospital F/U       Initial /50 (BP Location: Right arm, Patient Position: Sitting, Cuff Size: Adult Regular)   Pulse 76   Temp 98  F (36.7  C) (Tympanic)   Resp 20   Wt 83.7 kg (184 lb 9.6 oz)   BMI 28.07 kg/m   Estimated body mass index is 28.07 kg/m  as calculated from the following:    Height as of 3/2/19: 1.727 m (5' 8\").    Weight as of this encounter: 83.7 kg (184 lb 9.6 oz).    Patient presents to the clinic using No DME    Health Maintenance that is potentially due pending provider review:  NONE    n/a    Is there anyone who you would like to be able to receive your results? No  If yes have patient fill out THEOD[facility]A    Kusum Maradiaga CMA    "

## 2019-03-11 NOTE — PROGRESS NOTES
SUBJECTIVE:   Alvaro Horton is a 78 year old male who presents to clinic today for the following health issues:  Chief Complaint   Patient presents with     Hospital F/U      Hospital Follow-up Visit:    Hospital/Nursing Home/IP Rehab Facility: [facility]  Date of Admission: 3/2/2019  Date of Discharge: 3/4/2019  Reason(s) for Admission: Abdominal Pain            Problems taking medications regularly:  None       Medication changes since discharge: None       Problems adhering to non-medication therapy:  None    Summary of hospitalization:  [facility] discharge summary reviewed  See abstracted notes from recent hospital stay below.   Diagnostic Tests/Treatments reviewed.  Follow up needed: none  Other Healthcare Providers Involved in Patient s Care:         surgery  Update since discharge: improved.     Post Discharge Medication Reconciliation: discharge medications reconciled, continue medications without change.  Plan of care communicated with patient     Coding guidelines for this visit:  Type of Medical   Decision Making Face-to-Face Visit       within 7 Days of discharge Face-to-Face Visit        within 14 days of discharge   Moderate Complexity [id number]   High Complexity [id number]        Hospitalized for nausea and vomiting and abdominal pain.  He had cholecystitis.  He had a laparoscopic cholecystectomy.  See abstracted notes from recent hospital stay below.    Soreness in abdomen improving.  No nausea or vomiting.  Not much appetite.   Feels tired.  Energy low.  No fever.  Sleeping oK.     Patient Active Problem List   Diagnosis     Chronic ischemic heart disease     Moderate persistent asthma     PSORIASIS     Hypertrophy of prostate without urinary obstruction     OVERACTIVE BLADDER     BAKERS LUNG     Hypertension goal BP (blood pressure) < 140/90     Esophageal reflux     Sensorineural hearing loss, bilateral     Obstructive sleep apnea     Benign paroxysmal vertigo      CVA (cerebral vascular accident) (H)     Atrial fibrillation (H)     Memory loss     CTS (carpal tunnel syndrome)     Dyslexia     Advanced care planning/counseling discussion     Hyperlipidemia LDL goal <70     COPD (chronic obstructive pulmonary disease) (H)     Chronic kidney disease, stage 2 (mild)     Health Care Home     Altered mental status     Type 2 diabetes mellitus with diabetic chronic kidney disease (H)     Encephalopathy     Abnormal blood-gas measurement     Acute calculous cholecystitis      ROS:Resp: POSITIVE for:, Hx COPD.  He feels his breathing has been OK.  HE has been doing pulmicort nebs twice daily.  Not using Brovana-too expensive.  He does not know what other breathing medications he is taking.   CV: No chest pains or palpitations  : No urinary frequency or dysuria, bladder or kidney problems  Musculoskeletal: No significant muscle or joint pains  Psychiatric: No problems with anxiety, depression or mental health  Diabetes:glucometers higher after his surgery around 200.   Long acting insulin 20-units.  Short acting insulin 11/11/11 three times daily.    OBJECTIVE:Blood pressure 110/50, pulse 76, temperature 98  F (36.7  C), temperature source Tympanic, resp. rate 20, weight 83.7 kg (184 lb 9.6 oz), SpO2 97 %. BMI=Body mass index is 28.07 kg/m .  GENERAL APPEARANCE ADULT: Alert, no acute distress  NECK: No adenopathy,masses or thyromegaly  RESP: lungs clear to auscultation   CV: normal rate, regular rhythm, no murmur or gallop  ABDOMEN: soft, no organomegaly or masses.  He has ecchymoses around incisions.  All are healing well.  A little tender all over.   MS: extremities normal, no peripheral edema     ASSESSMENT:   (K80.00) Acute calculous cholecystitis  (primary encounter diagnosis)  Comment: doing OK after his hospital stay  Plan: No change in current treatment plan.   follow-up with surgeon next week as planned.     (J44.9) Chronic obstructive pulmonary disease, unspecified COPD  type (H)  Comment:   Plan: Check which medication you need refills on and call us.  We need to know what it is that you are taking for breathing.     (E11.22,  N18.3,  Z79.4) Type 2 diabetes mellitus with stage 3 chronic kidney disease, with long-term current use of insulin (H)  Comment: Declines blood tests today.  Has had enough blood draws for a while.   Plan: Recheck in a month for diabetes mellitus.   We can do blood tests at that time.          =========================================  See abstracted notes from recent hospital stay below.   [facility]  Discharge Summary  Hospital Medicine       Date of Admission:  3/2/2019  Date of Discharge:  3/4/2019     Identification and Chief Compaint: Alvaro Horton is a 78 year old male who presented on 3/2/2019 with complaint of nausea, vomiting and abdominal pain for 1 day..        Discharge Diagnoses     Acute calculous cholecystitis  S/p lap cholecystectomy  Chronic obstructive pulmonary disease, unspecified copd type (h)   Moderate persistent asthma without complication  Hypertension  Diabetes mellitus type 2 with chronic kidney disease   Paroxysmal atrial fibrillation  Coronary artery disease, s/p CABG  H/o CVA    Follow-ups Needed After Discharge     Follow-up Appointments     Follow-up and recommended labs and tests       Follow up with Dr. Mendieta in 2 weeks.       Hospital Course         Acute calculous cholecystitis  78 yr old male who presented with abdominal pain, nausea and vomiting . CT showed cholelithiasis ,gall bladder distension, and probable gall bladder wall thickening , patient also had elevated wbc ,elevated lactate. Given IV fluids, Zosyn. General surgery consulted and he was taken to the OR on 3/4. Patient is doing well and ate dinner. Appears stable for discharge to home. He will complete 7 more days of antibiotics per Dr. Mendieta's request.      Chronic obstructive pulmonary disease, unspecified copd type (h)    Moderate persistent asthma without complication    No new events , continue home medication .   - Continue with Brovana and Pulmicort, nebs prn        Hypertension goal bp (blood pressure) < 140/90  BP slightly elevated on admission  - Continue with home medication         Type 2 diabetes mellitus with stage 3 chronic kidney disease, with long-term current use of insulin (h)  Last A1C was 8.0 as at May 2018   - Continue with long acting insulin  - Sliding scale insulin         Paroxysmal atrial fibrillation (h)  This was developed after his CABG , his rate appears controlled. He is on rate control medication and aspirin   - Continue with beta blocker and aspirin         History of CABG  No acute events   - EKG reviewed no new findings as compared to previous tracings  - Troponin was negative  - Continue with home medication .        Cerebrovascular accident (cva), unspecified mechanism (h)  Occurred in 2009 after his CABG, has no residual symptoms  - Monitor neuro signs.  - Continue Lipitor and aspirin.

## 2019-03-13 DIAGNOSIS — N40.1 BENIGN NON-NODULAR PROSTATIC HYPERPLASIA WITH LOWER URINARY TRACT SYMPTOMS: ICD-10-CM

## 2019-03-13 RX ORDER — TAMSULOSIN HYDROCHLORIDE 0.4 MG/1
0.4 CAPSULE ORAL DAILY
Qty: 90 CAPSULE | Refills: 3 | Status: SHIPPED | OUTPATIENT
Start: 2019-03-13 | End: 2020-03-10

## 2019-03-13 NOTE — TELEPHONE ENCOUNTER
Pt was due for follow up last January 2019. Called patient to discuss.  He states he just saw his PCP and would prefer not having to make extra trips to other doctors as he is exhausted.    Requests PCP take over filling this medication.    Routing to Dr Carreno to consider pt request.    Elsa Rahman RN  [facility]

## 2019-03-13 NOTE — TELEPHONE ENCOUNTER
Reason for Call:  Medication or medication refill:    Do you use a [facility]?  Name of the pharmacy and phone number for the current request:  [facility] [phone]    Name of the medication requested: tamsulosin     Other request: LOV:  1/9/18  LAST REFILL:  12/6/18    Can we leave a detailed message on this number? YES    Phone number patient can be reached at: Home number on file [phone] (home)    Best Time: tamsulosin     Call taken on 3/13/2019 at 11:19 AM by Keya Cox

## 2019-03-25 ENCOUNTER — OFFICE VISIT (OUTPATIENT)
Dept: SURGERY | Facility: CLINIC | Age: 79
End: 2019-03-25
Payer: COMMERCIAL

## 2019-03-25 VITALS
DIASTOLIC BLOOD PRESSURE: 73 MMHG | HEIGHT: 68 IN | BODY MASS INDEX: 27.89 KG/M2 | TEMPERATURE: 97.9 F | SYSTOLIC BLOOD PRESSURE: 130 MMHG | WEIGHT: 184 LBS | HEART RATE: 65 BPM

## 2019-03-25 DIAGNOSIS — Z98.890 POSTOPERATIVE STATE: Primary | ICD-10-CM

## 2019-03-25 PROCEDURE — 99024 POSTOP FOLLOW-UP VISIT: CPT | Performed by: SURGERY

## 2019-03-25 ASSESSMENT — MIFFLIN-ST. JEOR: SCORE: 1524.12

## 2019-03-25 NOTE — LETTER
3/25/2019         RE: Alvaro Horton  [address]  [place] [place] [facility] [id number]        Dear Colleague,    Thank you for referring your patient, Alvaro Horton, to the [facility]. Please see a copy of my visit note below.    Franklin is about 3 weeks postop from a cholecystectomy.  He was in the hospital with acute cholecystitis and we went ahead and took out his gallbladder.  He is made an impressive recovery.  He feels great.  He has no issues.    On exam: His wounds are healing fine.  No sign of infection.  The glue is pretty adherent at this point but it will fall off like a scab.    Pathology revealed acute cholecystitis with stones.    Impression: Satisfactory postoperative course.    Recommendation: Return to usual activities and follow-up as needed.    Burt Mendieta MD FACS    Again, thank you for allowing me to participate in the care of your patient.        Sincerely,        Burt Mendieta MD

## 2019-03-25 NOTE — PROGRESS NOTES
Franklin is about 3 weeks postop from a cholecystectomy.  He was in the hospital with acute cholecystitis and we went ahead and took out his gallbladder.  He is made an impressive recovery.  He feels great.  He has no issues.    On exam: His wounds are healing fine.  No sign of infection.  The glue is pretty adherent at this point but it will fall off like a scab.    Pathology revealed acute cholecystitis with stones.    Impression: Satisfactory postoperative course.    Recommendation: Return to usual activities and follow-up as needed.    Burt Mendieta MD FACS

## 2019-03-25 NOTE — NURSING NOTE
"Initial /73 (BP Location: Right arm, Patient Position: Sitting, Cuff Size: Adult Regular)   Pulse 65   Temp 97.9  F (36.6  C) (Oral)   Ht 1.727 m (5' 8\")   Wt 83.5 kg (184 lb)   BMI 27.98 kg/m   Estimated body mass index is 27.98 kg/m  as calculated from the following:    Height as of this encounter: 1.727 m (5' 8\").    Weight as of this encounter: 83.5 kg (184 lb). .    Opal Martines CMA    "

## 2019-04-08 DIAGNOSIS — N18.30 TYPE 2 DIABETES MELLITUS WITH STAGE 3 CHRONIC KIDNEY DISEASE, WITH LONG-TERM CURRENT USE OF INSULIN (H): ICD-10-CM

## 2019-04-08 DIAGNOSIS — Z79.4 TYPE 2 DIABETES MELLITUS WITH STAGE 3 CHRONIC KIDNEY DISEASE, WITH LONG-TERM CURRENT USE OF INSULIN (H): ICD-10-CM

## 2019-04-08 DIAGNOSIS — E78.5 HYPERLIPIDEMIA LDL GOAL <70: ICD-10-CM

## 2019-04-08 DIAGNOSIS — E11.22 TYPE 2 DIABETES MELLITUS WITH STAGE 3 CHRONIC KIDNEY DISEASE, WITH LONG-TERM CURRENT USE OF INSULIN (H): ICD-10-CM

## 2019-04-08 RX ORDER — ATORVASTATIN CALCIUM 80 MG/1
TABLET, FILM COATED ORAL
Qty: 90 TABLET | Refills: 3 | Status: SHIPPED | OUTPATIENT
Start: 2019-04-08 | End: 2020-04-27

## 2019-04-08 NOTE — TELEPHONE ENCOUNTER
Pt requesting refills of metformin and lipitor. Labs pended per  however he was seen 1 month ago. Prema Padron RN

## 2019-04-12 DIAGNOSIS — Z79.4 TYPE 2 DIABETES MELLITUS WITH STAGE 3 CHRONIC KIDNEY DISEASE, WITH LONG-TERM CURRENT USE OF INSULIN (H): ICD-10-CM

## 2019-04-12 DIAGNOSIS — N18.30 TYPE 2 DIABETES MELLITUS WITH STAGE 3 CHRONIC KIDNEY DISEASE, WITH LONG-TERM CURRENT USE OF INSULIN (H): ICD-10-CM

## 2019-04-12 DIAGNOSIS — E11.22 TYPE 2 DIABETES MELLITUS WITH STAGE 3 CHRONIC KIDNEY DISEASE, WITH LONG-TERM CURRENT USE OF INSULIN (H): ICD-10-CM

## 2019-04-12 NOTE — TELEPHONE ENCOUNTER
"Requested Prescriptions   Pending Prescriptions Disp Refills     insulin aspart (NOVOLOG FLEXPEN) 100 UNIT/ML pen [Pharmacy Med Name: NOVOLOG FLEXPEN 100UNIT/ML INJ]  23     Sig: INJECT 11 UNITS SUBCUTANEOUSLY DAILY BEFORE BREAKFAST, 11 UNITS DAILY BEFORE LUNCH, AND 11 UNITS DAILY BEFORE DINNER       Short Acting Insulin Protocol Failed - 4/12/2019  9:21 AM        Failed - LDL on file in past 12 months     Recent Labs   Lab Test 02/05/18  1313   LDL 50             Failed - HgbA1C in past 3 or 6 months     If HgbA1C is 8 or greater, it needs to be on file within the past 3 months.  If less than 8, must be on file within the past 6 months.     Recent Labs   Lab Test 05/17/18  1300   A1C 8.0*             Passed - Blood pressure less than 140/90 in past 6 months     BP Readings from Last 3 Encounters:   03/25/19 130/73   03/11/19 110/50   03/04/19 123/54                 Passed - Microalbumin on file in past 12 months     Recent Labs   Lab Test 02/05/18  1323   MICROL 64   UMALCR 60.86*             Passed - Serum creatinine on file in past 12 months     Recent Labs   Lab Test 03/04/19  0506   CR 1.16             Passed - Medication is active on med list        Passed - Patient is age 18 or older        Passed - Recent (6 mo) or future (30 days) visit within the authorizing provider's specialty     Patient had office visit in the last 6 months or has a visit in the next 30 days with authorizing provider or within the authorizing provider's specialty.  See \"Patient Info\" tab in inbasket, or \"Choose Columns\" in Meds & Orders section of the refill encounter.              novalog  Last Written Prescription Date:  02/05/2018      Last Fill Quantity: 30 ml,  # refills: 11   Last office visit: 3/11/2019 with prescribing provider:  Dr. Carreno   Future Office Visit:    "

## 2019-04-19 DIAGNOSIS — I25.9 CHRONIC ISCHEMIC HEART DISEASE: ICD-10-CM

## 2019-04-20 NOTE — TELEPHONE ENCOUNTER
"Requested Prescriptions   Pending Prescriptions Disp Refills     metoprolol tartrate (LOPRESSOR) 25 MG tablet  Last Written Prescription Date:  2/14/19  Last Fill Quantity: 60,  # refills: 1   Last office visit: 3/11/2019 with prescribing provider:  URIEL Carreno   Future Office Visit:     60 tablet 1     Sig: TAKE 1 TABLET BY MOUTH TWICE DAILY       Beta-Blockers Protocol Passed - 4/19/2019  6:41 PM        Passed - Blood pressure under 140/90 in past 12 months     BP Readings from Last 3 Encounters:   03/25/19 130/73   03/11/19 110/50   03/04/19 123/54                 Passed - Patient is age 6 or older        Passed - Recent (12 mo) or future (30 days) visit within the authorizing provider's specialty     Patient had office visit in the last 12 months or has a visit in the next 30 days with authorizing provider or within the authorizing provider's specialty.  See \"Patient Info\" tab in inbasket, or \"Choose Columns\" in Meds & Orders section of the refill encounter.              Passed - Medication is active on med list          "

## 2019-04-22 RX ORDER — METOPROLOL TARTRATE 25 MG/1
TABLET, FILM COATED ORAL
Qty: 60 TABLET | Refills: 0 | Status: SHIPPED | OUTPATIENT
Start: 2019-04-22 | End: 2019-05-21

## 2019-04-25 ENCOUNTER — TELEPHONE (OUTPATIENT)
Dept: CARDIOLOGY | Facility: CLINIC | Age: 79
End: 2019-04-25

## 2019-04-25 NOTE — TELEPHONE ENCOUNTER
"Pt calls in asking for Dr Summers to call him back. Stated he has noticed recently whenever he bends over to do anything down by his feet \"my heart just starts a-pounding something fierce\". Stated his PCP listens to his heart whenever he's in the office, but doesn't think to talk to him about this. Disc that Dr Summers has retired. Offered NEW patient appointment with Dr Velarde on Thursday 5/16/19 in [place]. Pt accepted appointment. Disc with patient that he should see his PCP for any concerning symptoms prior to 5/16/19 and not wait until then. Pt verbalized understanding and agreed with plan. Anabel Mccall RN Cardiology April 25, 2019, 1:11 PM    "

## 2019-05-06 ENCOUNTER — TELEPHONE (OUTPATIENT)
Dept: UROLOGY | Facility: CLINIC | Age: 79
End: 2019-05-06

## 2019-05-06 NOTE — TELEPHONE ENCOUNTER
Left msg to pt that needs to contact pharm or Dr Stack office about the refill.  He was no longer going to see Dr Stevens.  Elsa Rahman RN  [facility]

## 2019-05-06 NOTE — TELEPHONE ENCOUNTER
[facility] [place] is requesting refill of Finasteride 5 mg # 90 ,not found on meds list

## 2019-05-09 DIAGNOSIS — N40.0 HYPERTROPHY OF PROSTATE WITHOUT URINARY OBSTRUCTION: Primary | ICD-10-CM

## 2019-05-09 RX ORDER — FINASTERIDE 5 MG/1
5 TABLET, FILM COATED ORAL DAILY
Qty: 90 TABLET | Refills: 1 | Status: SHIPPED | OUTPATIENT
Start: 2019-05-09 | End: 2019-11-15

## 2019-05-09 NOTE — TELEPHONE ENCOUNTER
Medication was refused by Dr Stevens in March as pt was due for f/u. Pt states he doesn't want to f/u with Dr Stevens as the medication is working and he doesn't think the appointment is necessary. Pt does f/u with Dr Carreno regularly. Prema Padron RN

## 2019-05-09 NOTE — TELEPHONE ENCOUNTER
Franklin is asking for Finasteride 5 mg.  This is not on his list.  He uses [facility] in [place].  Anabel [facility]  [facility] [place]

## 2019-05-16 ENCOUNTER — HOSPITAL ENCOUNTER (OUTPATIENT)
Dept: CARDIOLOGY | Facility: CLINIC | Age: 79
Discharge: HOME | End: 2019-05-16
Attending: INTERNAL MEDICINE | Admitting: INTERNAL MEDICINE
Payer: COMMERCIAL

## 2019-05-16 ENCOUNTER — OFFICE VISIT (OUTPATIENT)
Dept: CARDIOLOGY | Facility: CLINIC | Age: 79
End: 2019-05-16
Payer: COMMERCIAL

## 2019-05-16 ENCOUNTER — TELEPHONE (OUTPATIENT)
Dept: FAMILY MEDICINE | Facility: CLINIC | Age: 79
End: 2019-05-16

## 2019-05-16 DIAGNOSIS — N18.30 TYPE 2 DIABETES MELLITUS WITH STAGE 3 CHRONIC KIDNEY DISEASE, WITH LONG-TERM CURRENT USE OF INSULIN (H): ICD-10-CM

## 2019-05-16 DIAGNOSIS — J44.9 CHRONIC OBSTRUCTIVE PULMONARY DISEASE, UNSPECIFIED COPD TYPE (H): ICD-10-CM

## 2019-05-16 DIAGNOSIS — R00.2 PALPITATIONS: ICD-10-CM

## 2019-05-16 DIAGNOSIS — R06.02 SOB (SHORTNESS OF BREATH): Primary | ICD-10-CM

## 2019-05-16 DIAGNOSIS — Z79.4 TYPE 2 DIABETES MELLITUS WITH STAGE 3 CHRONIC KIDNEY DISEASE, WITH LONG-TERM CURRENT USE OF INSULIN (H): ICD-10-CM

## 2019-05-16 DIAGNOSIS — E11.22 TYPE 2 DIABETES MELLITUS WITH STAGE 3 CHRONIC KIDNEY DISEASE, WITH LONG-TERM CURRENT USE OF INSULIN (H): ICD-10-CM

## 2019-05-16 DIAGNOSIS — R06.02 SOB (SHORTNESS OF BREATH): ICD-10-CM

## 2019-05-16 PROCEDURE — 93321 DOPPLER ECHO F-UP/LMTD STD: CPT | Mod: 26 | Performed by: INTERNAL MEDICINE

## 2019-05-16 PROCEDURE — 99214 OFFICE O/P EST MOD 30 MIN: CPT | Performed by: INTERNAL MEDICINE

## 2019-05-16 PROCEDURE — 93308 TTE F-UP OR LMTD: CPT | Mod: 26 | Performed by: INTERNAL MEDICINE

## 2019-05-16 PROCEDURE — 93325 DOPPLER ECHO COLOR FLOW MAPG: CPT | Mod: 26 | Performed by: INTERNAL MEDICINE

## 2019-05-16 PROCEDURE — 93308 TTE F-UP OR LMTD: CPT

## 2019-05-16 NOTE — LETTER
5/16/2019    Be Carreno MD  [address] [place] [id number]    RE: Alvaro DIXON Sol       Dear Colleague,    I had the pleasure of seeing Alvaro Horton in the [facility].    HPI and Plan:   Today I had the pleasure of seeing Alvaro Horton at [facility] at [place], [place]. He is a pleasant 78 year old patient with a past medical history of coronary disease status post coronary bypass grafting in 11/2009 with LIMA to LAD and SVG to OM 1 and 2, hypertension, hyperlipidemia,  paroxysmal atrial fibrillation, asthma diabetes, COPD and CKD who presents to the clinic for follow-up.  He is a previous patient of Dr. Summers who has retired.      The patient reports dyspnea on exertion and fatigue for the last 3 weeks.  He becomes short of breath on even mild exertion.  The patient also has severe COPD which was well controlled until recently.  Around the time of onset of symptoms patient discontinued Brovana which is a long-acting beta agonist due to financial reasons.  Since stopping the medication the patient has noticed worsening of symptoms gradually which now has progressed to the extent of him being unable to climb one flight of stairs.  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  He would like to be seen by a pulmonologist but does not want to drive to [place] or the [place] because of the distance.  When asked if he has chest pressure or chest pain with exertion he says he is not sure because he is too out of breath to noticed that. The symptoms are especially worse every time he bends over and when lying flat.  However, he was able to lie flat for 20 minutes for transthoracic echocardiogram without any difficulty.     Assessment and plan  1.  Dyspnea on exertion and shortness of breath  2.  Coronary artery disease status post coronary bypass grafting in  2009  3.  Hypertension  4.  Hyperlipidemia  5.  Paroxysmal atrial fibrillation    Discussion   Patient has history of severe COPD and his symptoms started after discontinuation of his long-acting beta agonist.  I believe he the most likely cause of his worsening symptoms is exacerbation of COPD.  I have asked him to contact his primary care physician and seek care for his COPD exacerbation.  I also told him to go to ER if his symptoms worsen or do not improve over the next few days.  As far as ruling out cardiac etiology of his symptoms he is 10 years out of his coronary artery disease and I am concerned about patency of the grafts.  I will perform a Lexiscan to rule out recurrence of ischemia.      I performed a bedside echocardiogram today which showed good LV systolic function with an ejection fraction of around 55 to 60%.  There were no significant valvular disease noted.  The right ventricular size was mildly dilated but with preserved function.  Right ventricular systolic pressure was mildly elevated at 45 mmHg.  CVP was normal. On echocardiogram he was noted to have frequent ectopic beats although the rhythm was predominantly sinus.  He has normal ejection fraction, grade 1 diastolic dysfunction and no evidence of volume overload and hence I do not believe he needs diuresis.  Because he carries a diagnosis of A. fib I want to rule it out as cause of worsening symptoms.  He does also reports noticing episodes of palpitations over the last 3 weeks.     EKG from 3/2/2019 showed: RBBB with LAFB.   I wanted to repeat an EKG after the clinic visit but he says he I sin a hurry and has to be somewhere.    Plan  1. Nuclear stress test  2. Seek treatment for worsening COPD  3. Zio patch for 7 days   4. Return to clinic in 1-2 weeks for symptom check.     Thank you for allowing me to participate in the care of Alvaro Velarde MD  Cardiology    Medications Discontinued During This Encounter  "  Medication Reason     atorvastatin (LIPITOR) 80 MG tablet Medication Reconciliation Clean Up     ciprofloxacin (CIPRO) 500 MG tablet Therapy completed     CURRENT MEDICATIONS:  Current Outpatient Medications   Medication Sig Dispense Refill     albuterol (PROAIR HFA/PROVENTIL HFA/VENTOLIN HFA) 108 (90 Base) MCG/ACT Inhaler Inhale 2 puffs into the lungs every 6 hours as needed for shortness of breath / dyspnea or wheezing 1 Inhaler 1     aspirin (ASA) 325 MG EC tablet Take 325 mg by mouth daily       atorvastatin (LIPITOR) 80 MG tablet TAKE 1 TABLET BY MOUTH ONCE DAILY 90 tablet 3     BROVANA 15 MCG/2ML NEBU neb solution INHALE ONE VIAL VIA NEBULIZER BY MOUTH TWO TIMES A  mL 3     budesonide (PULMICORT) 0.5 MG/2ML neb solution Take 2 mLs (0.5 mg) by nebulization 2 times daily 60 ampule 11     cholecalciferol 2000 UNITS tablet Take 1 tablet by mouth daily. 90 tablet 3     cyanocobalamin (BL VITAMIN B-12) 500 MCG TABS Take 500 mcg by mouth daily. (Patient taking differently: Take 2,500 mcg by mouth daily ) 90 tablet 4     finasteride (PROSCAR) 5 MG tablet Take 1 tablet (5 mg) by mouth daily 90 tablet 1     fish oil-omega-3 fatty acids (FISH OIL) 1000 MG capsule Take 1 capsule by mouth 2 times daily. 180 capsule 3     furosemide (LASIX) 20 MG tablet Take 20 mg by mouth daily       insulin aspart (NOVOLOG FLEXPEN) 100 UNIT/ML pen INJECT 11 UNITS SUBCUTANEOUSLY DAILY BEFORE BREAKFAST, 11 UNITS DAILY BEFORE LUNCH, AND 11 UNITS DAILY BEFORE DINNER 3 mL 3     insulin detemir (LEVEMIR FLEXTOUCH) 100 UNIT/ML pen Inject 20 Units Subcutaneous At Bedtime 15 mL 11     insulin syringe-needle U-100 (BD INSULIN SYRINGE ULTRAFINE) 31G X 5/16\" 1 ML Use one syringe 4 times daily or as directed. 100 each prn     lisinopril (PRINIVIL/ZESTRIL) 5 MG tablet TAKE ONE TABLET BY MOUTH ONCE DAILY 90 tablet 3     metFORMIN (GLUCOPHAGE) 1000 MG tablet TAKE 1 TABLET BY MOUTH TWICE DAILY WITH MEALS 180 tablet 3     metoprolol tartrate " (LOPRESSOR) 25 MG tablet TAKE 1 TABLET BY MOUTH TWICE DAILY 60 tablet 0     nitroGLYcerin (NITROSTAT) 0.4 MG sublingual tablet Place 1 tablet (0.4 mg) under the tongue every 5 minutes as needed for chest pain (CALL 911 IF NOT IMPROVED AFTER THREE CONSECUTIVE DOSES) 25 tablet 0     tamsulosin (FLOMAX) 0.4 MG capsule Take 1 capsule (0.4 mg) by mouth daily (Needs follow-up appointment for this medication) 90 capsule 3     blood glucose (NO BRAND SPECIFIED) lancets standard Use to test blood sugar 3 times daily. Accu Chek Brand (Patient not taking: Reported on 5/16/2019) 300 each 1     blood glucose (NO BRAND SPECIFIED) test strip Use to test blood sugar 3 times daily Accu Chek Brand (Patient not taking: Reported on 5/16/2019) 300 strip 1     blood glucose monitoring (NO BRAND SPECIFIED) meter device kit Use to test blood sugar 3 times daily. Accu Chek Brand (Patient not taking: Reported on 5/16/2019) 1 kit 0     Insulin Pen Needle (PEN NEEDLES) 31G X 6 MM MISC 1 Units 3 times daily For novolog flexpen (Patient not taking: Reported on 5/16/2019) 90 each 0     order for DME Equipment being ordered: CPAP  AIRSENSE 10  11-15 CM H2O  [employer] AIR SIZE MED  SN# [id number]  DN# [id number]         ALLERGIES     Allergies   Allergen Reactions     Prednisone Other (See Comments)     Severe interaction with DM       PAST MEDICAL HISTORY:  Past Medical History:   Diagnosis Date     Calculus of kidney     1990. s/p ESWL and percutaneous nephrolithotomy     COPD (chronic obstructive pulmonary disease) (H)      Depression     Hospitalized 1979     Diabetes (H)      Heart disease      Left hand weakness 12/14/2009    Afterbypass surgery, CVA, improved.      PNEUMONIA, ORGANISM NOS 2/14/2008    CT 2/08-  Pulmonary atelectasis and infiltrate in the posterior left lower lung base.      Sleep apnea        PAST SURGICAL HISTORY:  Past Surgical History:   Procedure Laterality Date     C ANESTH,DX ARTHROSCOPIC PROC KNEE JOINT  1994, 1998     Left      CARDIAC SURGERY  11/09    CABG x2 - LIMA-D1, SVG to OM1, OM2.     GENITOURINARY SURGERY  1990    ESWL and percutaneous nephrolithotomy     LAPAROSCOPIC CHOLECYSTECTOMY N/A 3/4/2019    Procedure: LAPAROSCOPIC CHOLECYSTECTOMY;  Surgeon: Burt Mendieta MD;  Location: [facility] OR     ORTHOPEDIC SURGERY  8/12    right CTR     SURGICAL HISTORY OF -   1998    Hernia repair     SURGICAL HISTORY OF -   2004    NormalColonoscopy      SURGICAL HISTORY OF -   2006    Normal Colonoscopy       FAMILY HISTORY:  Family History   Problem Relation Age of Onset     C.A.D. Father         40s     Hypertension Father      C.A.D. Paternal Grandfather      Heart Disease Paternal Grandfather      Diabetes Brother      C.A.VIRI. Brother      Heart Disease Brother      Hypertension Brother      Gastrointestinal Disease Son         Crohn's disease     Gastrointestinal Disease Other         2 grandaughters with Crohn's disease     Cancer - colorectal No family hx of        SOCIAL HISTORY:  Social History     Socioeconomic History     Marital status:      Spouse name: Not on file     Number of children: Not on file     Years of education: Not on file     Highest education level: Not on file   Occupational History     Occupation: [email or web address]     Employer: RETIRED     Occupation:    Social Needs     Financial resource strain: Not on file     Food insecurity:     Worry: Not on file     Inability: Not on file     Transportation needs:     Medical: Not on file     Non-medical: Not on file   Tobacco Use     Smoking status: Never Smoker     Smokeless tobacco: Never Used   Substance and Sexual Activity     Alcohol use: No     Alcohol/week: 0.0 oz     Drug use: No     Sexual activity: Never   Lifestyle     Physical activity:     Days per week: Not on file     Minutes per session: Not on file     Stress: Not on file   Relationships     Social connections:     Talks on phone: Not on file     Gets together: Not on file     Attends  Mormon service: Not on file     Active member of club or organization: Not on file     Attends meetings of clubs or organizations: Not on file     Relationship status: Not on file     Intimate partner violence:     Fear of current or ex partner: Not on file     Emotionally abused: Not on file     Physically abused: Not on file     Forced sexual activity: Not on file   Other Topics Concern     Parent/sibling w/ CABG, MI or angioplasty before 65F 55M? No   Social History Narrative     Not on file       Review of Systems:  Skin:  Positive for scaling;rash     Eyes:  Positive for glasses;cataracts    ENT:  Positive for hearing loss    Respiratory:  Positive for dyspnea on exertion;shortness of breath COPD   Cardiovascular:    Positive for;lower extremity symptoms;edema;fatigue;lightheadedness;dizziness    Gastroenterology: Positive for abdominal pain;excessive gas or bloating    Genitourinary:  Positive for urinary frequency;urgency    Musculoskeletal:  Positive for arthritis;joint pain;joint swelling;joint stiffness    Neurologic:  Negative      Psychiatric:  Negative      Heme/Lymph/Imm:  Negative      Endocrine:  Positive for diabetes      Physical Exam:  Vitals: 150/82, HR 73, PO2 94%  Constitutional: awake, alert, no distress  Skin: Warm and dry to touch  Head: Normocephalic, atraumatic  Eyes: Conjunctivae and lids unremarkable, sclera white  ENT: No pallor or cyanosis  Neck: Carotid pulses are full and equal bilaterally.  Respiratory: Mildly decreased air entry and decreased breath sounds on all areas, clear to auscultation, no use of sensory muscles, no wheezing or crepts or wet crackles.   Cardiac: Regular rate and rhythm, S1-S2 normal.  No murmurs gallops or rubs.  Pulses full and equal bilaterally in all 4 extremities.   Trace pedal edema.   Abdomen: soft and nontender.  Extremities and musculoskeletal: No gross motor deficit  Neurological.  Affect normal  Psych: Alert and oriented x3    Recent Lab  Results:  LIPID RESULTS:  Lab Results   Component Value Date    CHOL 143 02/05/2018    HDL 45 02/05/2018    LDL 50 02/05/2018    TRIG 238 (H) 02/05/2018    CHOLHDLRATIO 4.0 02/23/2015       LIVER ENZYME RESULTS:  Lab Results   Component Value Date    AST 17 03/04/2019    ALT 24 03/04/2019       CBC RESULTS:  Lab Results   Component Value Date    WBC 13.5 (H) 03/04/2019    RBC 4.20 (L) 03/04/2019    HGB 12.9 (L) 03/04/2019    HCT 37.2 (L) 03/04/2019    MCV 89 03/04/2019    MCH 30.7 03/04/2019    MCHC 34.7 03/04/2019    RDW 13.9 03/04/2019     (L) 03/04/2019       BMP RESULTS:  Lab Results   Component Value Date     03/04/2019    POTASSIUM 3.6 03/04/2019    CHLORIDE 101 03/04/2019    CO2 30 03/04/2019    ANIONGAP 6 03/04/2019     (H) 03/04/2019    BUN 17 03/04/2019    CR 1.16 03/04/2019    GFRESTIMATED 59 (L) 03/04/2019    GFRESTBLACK 69 03/04/2019    EVELINA 8.1 (L) 03/04/2019        A1C RESULTS:  Lab Results   Component Value Date    A1C 8.0 (H) 05/17/2018       INR RESULTS:  Lab Results   Component Value Date    INR 0.90 09/03/2018    INR 0.94 11/09/2016       CC  No referring provider defined for this encounter.    All medical records were reviewed in detail and discussed with the patient. Greater than 45 mins were spent with the patient, 50% of this time was spent on counseling and coordination of care.  After visit summary was printed and given to the patient.      Thank you for allowing me to participate in the care of your patient.      Sincerely,     Ricardo Velarde MD     [facility]

## 2019-05-16 NOTE — TELEPHONE ENCOUNTER
"Requested Prescriptions   Pending Prescriptions Disp Refills     insulin aspart (NOVOLOG FLEXPEN) 100 UNIT/ML pen [Pharmacy Med Name: NOVOLOG FLEXPEN 100UNIT/ML INJ]  23     Sig: INJECT 11 UNITS SUBCUTANEOUSLY DAILY BEFORE BREAKFAST, 11 UNITS DAILY BEFORE LUNCH, AND 11 UNITS DAILY BEFORE DINNER       Short Acting Insulin Protocol Failed - 5/16/2019 10:13 AM        Failed - LDL on file in past 12 months     Recent Labs   Lab Test 02/05/18  1313   LDL 50             Failed - Microalbumin on file in past 12 months     Recent Labs   Lab Test 02/05/18  1323   MICROL 64   UMALCR 60.86*             Failed - HgbA1C in past 3 or 6 months     If HgbA1C is 8 or greater, it needs to be on file within the past 3 months.  If less than 8, must be on file within the past 6 months.     Recent Labs   Lab Test 05/17/18  1300   A1C 8.0*             Passed - Blood pressure less than 140/90 in past 6 months     BP Readings from Last 3 Encounters:   03/25/19 130/73   03/11/19 110/50   03/04/19 123/54                 Passed - Serum creatinine on file in past 12 months     Recent Labs   Lab Test 03/04/19  0506   CR 1.16             Passed - Medication is active on med list        Passed - Patient is age 18 or older        Passed - Recent (6 mo) or future (30 days) visit within the authorizing provider's specialty     Patient had office visit in the last 6 months or has a visit in the next 30 days with authorizing provider or within the authorizing provider's specialty.  See \"Patient Info\" tab in inbasket, or \"Choose Columns\" in Meds & Orders section of the refill encounter.            Last Written Prescription Date:  4/12/19  Last Fill Quantity: 3 ml,  # refills: 3   Last office visit: 3/11/2019 with prescribing provider:  Leaf   Future Office Visit:      "

## 2019-05-16 NOTE — TELEPHONE ENCOUNTER
Routing refill request to provider for review/approval because:  Labs out of range:  See below    Torri Chong RN

## 2019-05-16 NOTE — TELEPHONE ENCOUNTER
Patient is calling because he is was told that he didn't have any refills on his Novolog insulin pen. He is also wondering why when he gets it filled, they don't give him a full box and only give him 1-2 pens.     Also states that the Brovana is extremely expensive and he cannot afford this medication so he didn't pick it up. He was seeing a lung specialist in [place] but that provider has left and he is wondering who he can see. Wondering if he could have MTM take a look at his medications and see what else he could take comparable to the Brovana. Has had to use his rescue inhaler more and more. Is having a harder time breathing. States he cannot even go outside to work. Does have an appointment with Dr. Carreno on 5/31. Told him someone would call him regarding all of this.     Justa [facility] [place]

## 2019-05-16 NOTE — PROGRESS NOTES
HPI and Plan:   Today I had the pleasure of seeing Alvaro Horton at [facility] clinic at [place], [place]. He is a pleasant 78 year old patient with a past medical history of coronary disease status post coronary bypass grafting in 11/2009 with LIMA to LAD and SVG to OM 1 and 2, hypertension, hyperlipidemia,  paroxysmal atrial fibrillation, asthma diabetes, COPD and CKD who presents to the clinic for follow-up.  He is a previous patient of Dr. Summers who has retired.      The patient reports dyspnea on exertion and fatigue for the last 3 weeks.  He becomes short of breath on even mild exertion.  The patient also has severe COPD which was well controlled until recently.  Around the time of onset of symptoms patient discontinued Brovana which is a long-acting beta agonist due to financial reasons.  Since stopping the medication the patient has noticed worsening of symptoms gradually which now has progressed to the extent of him being unable to climb one flight of stairs.  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  He would like to be seen by a pulmonologist but does not want to drive to [place] or the [place] because of the distance.  When asked if he has chest pressure or chest pain with exertion he says he is not sure because he is too out of breath to noticed that. The symptoms are especially worse every time he bends over and when lying flat.  However, he was able to lie flat for 20 minutes for transthoracic echocardiogram without any difficulty.     Assessment and plan  1.  Dyspnea on exertion and shortness of breath  2.  Coronary artery disease status post coronary bypass grafting in 2009  3.  Hypertension  4.  Hyperlipidemia  5.  Paroxysmal atrial fibrillation    Discussion   Patient has history of severe COPD and his symptoms started after discontinuation of his long-acting beta agonist.  I believe he  the most likely cause of his worsening symptoms is exacerbation of COPD.  I have asked him to contact his primary care physician and seek care for his COPD exacerbation.  I also told him to go to ER if his symptoms worsen or do not improve over the next few days.  As far as ruling out cardiac etiology of his symptoms he is 10 years out of his coronary artery disease and I am concerned about patency of the grafts.  I will perform a Lexiscan to rule out recurrence of ischemia.      I performed a bedside echocardiogram today which showed good LV systolic function with an ejection fraction of around 55 to 60%.  There were no significant valvular disease noted.  The right ventricular size was mildly dilated but with preserved function.  Right ventricular systolic pressure was mildly elevated at 45 mmHg.  CVP was normal. On echocardiogram he was noted to have frequent ectopic beats although the rhythm was predominantly sinus.  He has normal ejection fraction, grade 1 diastolic dysfunction and no evidence of volume overload and hence I do not believe he needs diuresis.  Because he carries a diagnosis of A. fib I want to rule it out as cause of worsening symptoms.  He does also reports noticing episodes of palpitations over the last 3 weeks.     EKG from 3/2/2019 showed: RBBB with LAFB.   I wanted to repeat an EKG after the clinic visit but he says he I sin a hurry and has to be somewhere.    Plan  1. Nuclear stress test  2. Seek treatment for worsening COPD  3. Zio patch for 7 days   4. Return to clinic in 1-2 weeks for symptom check.     Thank you for allowing me to participate in the care of Alvaro Velarde MD  Cardiology    Medications Discontinued During This Encounter   Medication Reason     atorvastatin (LIPITOR) 80 MG tablet Medication Reconciliation Clean Up     ciprofloxacin (CIPRO) 500 MG tablet Therapy completed     CURRENT MEDICATIONS:  Current Outpatient Medications   Medication Sig  "Dispense Refill     albuterol (PROAIR HFA/PROVENTIL HFA/VENTOLIN HFA) 108 (90 Base) MCG/ACT Inhaler Inhale 2 puffs into the lungs every 6 hours as needed for shortness of breath / dyspnea or wheezing 1 Inhaler 1     aspirin (ASA) 325 MG EC tablet Take 325 mg by mouth daily       atorvastatin (LIPITOR) 80 MG tablet TAKE 1 TABLET BY MOUTH ONCE DAILY 90 tablet 3     BROVANA 15 MCG/2ML NEBU neb solution INHALE ONE VIAL VIA NEBULIZER BY MOUTH TWO TIMES A  mL 3     budesonide (PULMICORT) 0.5 MG/2ML neb solution Take 2 mLs (0.5 mg) by nebulization 2 times daily 60 ampule 11     cholecalciferol 2000 UNITS tablet Take 1 tablet by mouth daily. 90 tablet 3     cyanocobalamin (BL VITAMIN B-12) 500 MCG TABS Take 500 mcg by mouth daily. (Patient taking differently: Take 2,500 mcg by mouth daily ) 90 tablet 4     finasteride (PROSCAR) 5 MG tablet Take 1 tablet (5 mg) by mouth daily 90 tablet 1     fish oil-omega-3 fatty acids (FISH OIL) 1000 MG capsule Take 1 capsule by mouth 2 times daily. 180 capsule 3     furosemide (LASIX) 20 MG tablet Take 20 mg by mouth daily       insulin aspart (NOVOLOG FLEXPEN) 100 UNIT/ML pen INJECT 11 UNITS SUBCUTANEOUSLY DAILY BEFORE BREAKFAST, 11 UNITS DAILY BEFORE LUNCH, AND 11 UNITS DAILY BEFORE DINNER 3 mL 3     insulin detemir (LEVEMIR FLEXTOUCH) 100 UNIT/ML pen Inject 20 Units Subcutaneous At Bedtime 15 mL 11     insulin syringe-needle U-100 (BD INSULIN SYRINGE ULTRAFINE) 31G X 5/16\" 1 ML Use one syringe 4 times daily or as directed. 100 each prn     lisinopril (PRINIVIL/ZESTRIL) 5 MG tablet TAKE ONE TABLET BY MOUTH ONCE DAILY 90 tablet 3     metFORMIN (GLUCOPHAGE) 1000 MG tablet TAKE 1 TABLET BY MOUTH TWICE DAILY WITH MEALS 180 tablet 3     metoprolol tartrate (LOPRESSOR) 25 MG tablet TAKE 1 TABLET BY MOUTH TWICE DAILY 60 tablet 0     nitroGLYcerin (NITROSTAT) 0.4 MG sublingual tablet Place 1 tablet (0.4 mg) under the tongue every 5 minutes as needed for chest pain (CALL 911 IF NOT " IMPROVED AFTER THREE CONSECUTIVE DOSES) 25 tablet 0     tamsulosin (FLOMAX) 0.4 MG capsule Take 1 capsule (0.4 mg) by mouth daily (Needs follow-up appointment for this medication) 90 capsule 3     blood glucose (NO BRAND SPECIFIED) lancets standard Use to test blood sugar 3 times daily. Accu Chek Brand (Patient not taking: Reported on 5/16/2019) 300 each 1     blood glucose (NO BRAND SPECIFIED) test strip Use to test blood sugar 3 times daily Accu Chek Brand (Patient not taking: Reported on 5/16/2019) 300 strip 1     blood glucose monitoring (NO BRAND SPECIFIED) meter device kit Use to test blood sugar 3 times daily. Accu Chek Brand (Patient not taking: Reported on 5/16/2019) 1 kit 0     Insulin Pen Needle (PEN NEEDLES) 31G X 6 MM MISC 1 Units 3 times daily For novolog flexpen (Patient not taking: Reported on 5/16/2019) 90 each 0     order for DME Equipment being ordered: CPAP  AIRSENSE 10  11-15 CM H2O  QUATTRO AIR SIZE MED  SN# [id number]  DN# [id number]         ALLERGIES     Allergies   Allergen Reactions     Prednisone Other (See Comments)     Severe interaction with DM       PAST MEDICAL HISTORY:  Past Medical History:   Diagnosis Date     Calculus of kidney     1990. s/p ESWL and percutaneous nephrolithotomy     COPD (chronic obstructive pulmonary disease) (H)      Depression     Hospitalized 1979     Diabetes (H)      Heart disease      Left hand weakness 12/14/2009    Afterbypass surgery, CVA, improved.      PNEUMONIA, ORGANISM NOS 2/14/2008    CT 2/08-  Pulmonary atelectasis and infiltrate in the posterior left lower lung base.      Sleep apnea        PAST SURGICAL HISTORY:  Past Surgical History:   Procedure Laterality Date     C ANESTH,DX ARTHROSCOPIC PROC KNEE JOINT  1994, 1998     Left     CARDIAC SURGERY  11/09    CABG x2 - LIMA-D1, SVG to OM1, OM2.     GENITOURINARY SURGERY  1990    ESWL and percutaneous nephrolithotomy     LAPAROSCOPIC CHOLECYSTECTOMY N/A 3/4/2019    Procedure: LAPAROSCOPIC CHOLECYSTECTOMY;   Surgeon: Burt Mendieta MD;  Location: [facility] OR     ORTHOPEDIC SURGERY  8/12    right CTR     SURGICAL HISTORY OF -   1998    Hernia repair     SURGICAL HISTORY OF -   2004    NormalColonoscopy      SURGICAL HISTORY OF -   2006    Normal Colonoscopy       FAMILY HISTORY:  Family History   Problem Relation Age of Onset     C.A.D. Father         40s     Hypertension Father      C.A.D. Paternal Grandfather      Heart Disease Paternal Grandfather      Diabetes Brother      C.A.D. Brother      Heart Disease Brother      Hypertension Brother      Gastrointestinal Disease Son         Crohn's disease     Gastrointestinal Disease Other         2 grandaughters with Crohn's disease     Cancer - colorectal No family hx of        SOCIAL HISTORY:  Social History     Socioeconomic History     Marital status:      Spouse name: Not on file     Number of children: Not on file     Years of education: Not on file     Highest education level: Not on file   Occupational History     Occupation: [employer]     Employer: RETIRED     Occupation:    Social Needs     Financial resource strain: Not on file     Food insecurity:     Worry: Not on file     Inability: Not on file     Transportation needs:     Medical: Not on file     Non-medical: Not on file   Tobacco Use     Smoking status: Never Smoker     Smokeless tobacco: Never Used   Substance and Sexual Activity     Alcohol use: No     Alcohol/week: 0.0 oz     Drug use: No     Sexual activity: Never   Lifestyle     Physical activity:     Days per week: Not on file     Minutes per session: Not on file     Stress: Not on file   Relationships     Social connections:     Talks on phone: Not on file     Gets together: Not on file     Attends Taoism service: Not on file     Active member of club or organization: Not on file     Attends meetings of clubs or organizations: Not on file     Relationship status: Not on file     Intimate partner violence:     Fear of current  or ex partner: Not on file     Emotionally abused: Not on file     Physically abused: Not on file     Forced sexual activity: Not on file   Other Topics Concern     Parent/sibling w/ CABG, MI or angioplasty before 65F 55M? No   Social History Narrative     Not on file       Review of Systems:  Skin:  Positive for scaling;rash     Eyes:  Positive for glasses;cataracts    ENT:  Positive for hearing loss    Respiratory:  Positive for dyspnea on exertion;shortness of breath COPD   Cardiovascular:    Positive for;lower extremity symptoms;edema;fatigue;lightheadedness;dizziness    Gastroenterology: Positive for abdominal pain;excessive gas or bloating    Genitourinary:  Positive for urinary frequency;urgency    Musculoskeletal:  Positive for arthritis;joint pain;joint swelling;joint stiffness    Neurologic:  Negative      Psychiatric:  Negative      Heme/Lymph/Imm:  Negative      Endocrine:  Positive for diabetes      Physical Exam:  Vitals: 150/82, HR 73, PO2 94%  Constitutional: awake, alert, no distress  Skin: Warm and dry to touch  Head: Normocephalic, atraumatic  Eyes: Conjunctivae and lids unremarkable, sclera white  ENT: No pallor or cyanosis  Neck: Carotid pulses are full and equal bilaterally.  Respiratory: Mildly decreased air entry and decreased breath sounds on all areas, clear to auscultation, no use of sensory muscles, no wheezing or crepts or wet crackles.   Cardiac: Regular rate and rhythm, S1-S2 normal.  No murmurs gallops or rubs.  Pulses full and equal bilaterally in all 4 extremities.  Trace pedal edema.   Abdomen: soft and nontender.  Extremities and musculoskeletal: No gross motor deficit  Neurological.  Affect normal  Psych: Alert and oriented x3    Recent Lab Results:  LIPID RESULTS:  Lab Results   Component Value Date    CHOL 143 02/05/2018    HDL 45 02/05/2018    LDL 50 02/05/2018    TRIG 238 (H) 02/05/2018    CHOLHDLRATIO 4.0 02/23/2015       LIVER ENZYME RESULTS:  Lab Results   Component Value  Date    AST 17 03/04/2019    ALT 24 03/04/2019       CBC RESULTS:  Lab Results   Component Value Date    WBC 13.5 (H) 03/04/2019    RBC 4.20 (L) 03/04/2019    HGB 12.9 (L) 03/04/2019    HCT 37.2 (L) 03/04/2019    MCV 89 03/04/2019    MCH 30.7 03/04/2019    MCHC 34.7 03/04/2019    RDW 13.9 03/04/2019     (L) 03/04/2019       BMP RESULTS:  Lab Results   Component Value Date     03/04/2019    POTASSIUM 3.6 03/04/2019    CHLORIDE 101 03/04/2019    CO2 30 03/04/2019    ANIONGAP 6 03/04/2019     (H) 03/04/2019    BUN 17 03/04/2019    CR 1.16 03/04/2019    GFRESTIMATED 59 (L) 03/04/2019    GFRESTBLACK 69 03/04/2019    EVELINA 8.1 (L) 03/04/2019        A1C RESULTS:  Lab Results   Component Value Date    A1C 8.0 (H) 05/17/2018       INR RESULTS:  Lab Results   Component Value Date    INR 0.90 09/03/2018    INR 0.94 11/09/2016       CC  No referring provider defined for this encounter.    All medical records were reviewed in detail and discussed with the patient. Greater than 45 mins were spent with the patient, 50% of this time was spent on counseling and coordination of care.  After visit summary was printed and given to the patient.

## 2019-05-16 NOTE — PATIENT INSTRUCTIONS
"[facility] HEART CARE  [facility]~[address]~[place], [place]~[address]  Thank you for your [facility] visit today. If you have questions regarding your visit, please contact your cardiology RN's, Zeynep [place] or Anaebl Mccall, at [phone]. Your provider has recommended the following:  Medication Changes:  None today. Continue taking your medications as you have been.  Recommendations:  1. Make appointment for a Lexiscan Nuclear Stress Test to be done ASAP, next available.   2. Make appointment to wear a 7 day Zio Patch monitor  3. Your provider has recommended you establish care with a Pulmonologist (Lung Doctor) Please contact the facility of your choice to make this appointment. [facility] (Locations in [facility]): [phone]; [facility]: (Located at [facility]) [phone]; [facility] and [facility] (Located in [place]) [phone]. [facility] (Located in [place], [place] and [place], [place]) Dr. Oral Price [phone].   Follow-up:  See any DANIE for cardiology follow up at [facility]: in 2 weeks.     To schedule a future appointment, we kindly ask that you call cardiology scheduling at [phone] three months prior to requested revisit date.  [facility] cardiology clinic is staffed with \"Advance Practice Providers\". These are our cardiology Physician Assistants and Nurse Practitioners.   Please call cardiology scheduling if you feel you need clinical evaluation with them at any time for any cardiac reason.   Reminder:  For your safety, we ask that you bring in your current medication(s) or an updated list of your medications with you to EACH office visit. Include the medication name, dose of pill on bottle and how you are taking it. Include over-the-counter medications or supplements. Your provider will review this at each visit and plan your care based on your current information. " "  ~~~~~~~~~~~~~~~~~~~~~~~~~~~~~~~~~~~~~~~  \"[facility] [place]\" campus telephone numbers for reference:  Cardiology Scheduling~[phone]  Diagnostic Imaging Scheduling~[phone]  Lab Scheduling~[phone]  Anticoagulation Clinic~[phone]  Cardiac Rehabilitation~[phone]  CORE Clinic RN's~[phone] (at [facility])  Cardiology Clinic RN's~[phone] (Anabel Mccall, RN & Zeynep [place], RN)  ~~~~~~~~~~~~~~~~~~~~~~~~~~~~~~~~~~~~~~~~      "

## 2019-05-20 NOTE — TELEPHONE ENCOUNTER
"Message below from Dr. Carreno relayed to patient. He says that [employer] doesn't cover \"liquid medicine for your lungs.\"  Brovana was going to cost $1,000/box, through some channels he got it for $200/box, though this is still expensive on pt's budget. Says he needs it otherwise \"I am not doing well.\"    Pt inquired about portable oxygen, he describes the one he wants is  a portable oxygen concentrator, like the Inogen One. Says he has never discussed this with Dr. Carreno. His last recorded sats were 97% on 3/11/19.    Did let pt know that MTM would be contacting him: 'The [facility] Management department will contact you to schedule an appointment.  You may also schedule the appointment by calling [phone].'    Has appt with Dr. Carreno 5/31/19.  He has an appt with Lung Specialist in [facility] on 7/6/19.    Deb VALLE RN      "

## 2019-05-21 DIAGNOSIS — I25.9 CHRONIC ISCHEMIC HEART DISEASE: ICD-10-CM

## 2019-05-21 RX ORDER — METOPROLOL TARTRATE 25 MG/1
25 TABLET, FILM COATED ORAL 2 TIMES DAILY
Qty: 60 TABLET | Refills: 0 | Status: SHIPPED | OUTPATIENT
Start: 2019-05-21 | End: 2019-06-24

## 2019-05-21 NOTE — TELEPHONE ENCOUNTER
"Requested Prescriptions   Pending Prescriptions Disp Refills     metoprolol tartrate (LOPRESSOR) 25 MG tablet [Pharmacy Med Name: METOPROL TAR 25MG   TAB] 60 tablet 0     Sig: TAKE 1 TABLET BY MOUTH TWICE DAILY . APPOINTMENT REQUIRED FOR FUTURE REFILLS       Beta-Blockers Protocol Passed - 5/21/2019  9:08 AM        Passed - Blood pressure under 140/90 in past 12 months     BP Readings from Last 3 Encounters:   05/16/19 (P) 150/82   03/25/19 130/73   03/11/19 110/50                 Passed - Patient is age 6 or older        Passed - Recent (12 mo) or future (30 days) visit within the authorizing provider's specialty     Patient had office visit in the last 12 months or has a visit in the next 30 days with authorizing provider or within the authorizing provider's specialty.  See \"Patient Info\" tab in inbasket, or \"Choose Columns\" in Meds & Orders section of the refill encounter.              Passed - Medication is active on med list      metoprolol tartrate (LOPRESSOR) 25 MG tablet  Last Written Prescription Date:  04/22/2019  Last Fill Quantity: 60 tablet,  # refills: 0   Last office visit: 3/11/2019 with prescribing provider:  RUBY Carreno   Future Office Visit:   Next 5 appointments (look out 90 days)    May 31, 2019 11:20 AM CDT  SHORT with Be Carreno MD  [facility] [address] [place] [id number]  [phone]   Jun 06, 2019  1:00 PM CDT  Return Visit with THOMPSON Yu CNP  [facility] ([facility]) [address] [place] [id number] [phone]         Edwige Gonzalez RT (R) (M)      "

## 2019-05-24 DIAGNOSIS — K21.9 GASTROESOPHAGEAL REFLUX DISEASE WITHOUT ESOPHAGITIS: ICD-10-CM

## 2019-05-24 NOTE — TELEPHONE ENCOUNTER
"Routing refill request to provider for review/approval because: Drug not active on patient's medication list    Called pt, he says he continues to take it every day, \"but it doesn't work very well.\" He only has enough for three days.  He will be seeing Susie Reyes on 5/30/19 and Dr. Carreno 5/31/19.  Pt was hospitalized 3/2/19 - 3/4/19. Med was d/c'd 3/2/19: This Order Has Been Discontinued     Order Status Reason By On   Discontinued Medication Reconciliation Henry Andrade Diana Shabazz 3/2/19 [id number]     Deb VALLE, RN      "

## 2019-05-24 NOTE — TELEPHONE ENCOUNTER
"Requested Prescriptions   Pending Prescriptions Disp Refills     ranitidine (ZANTAC) 300 MG tablet [Pharmacy Med Name: RANITIDINE 300MG    TAB] 90 tablet 1     Sig: TAKE 1 TABLET BY MOUTH AT BEDTIME       H2 Blockers Protocol Failed - 5/24/2019 12:43 PM        Failed - Medication is active on med list        Passed - Patient is age 12 or older        Passed - Recent (12 mo) or future (30 days) visit within the authorizing provider's specialty     Patient had office visit in the last 12 months or has a visit in the next 30 days with authorizing provider or within the authorizing provider's specialty.  See \"Patient Info\" tab in inbasket, or \"Choose Columns\" in Meds & Orders section of the refill encounter.            ranitidine (ZANTAC) 300 MG tablet  Last Written Prescription Date:  ?  Last Fill Quantity: ?,  # refills: ?  Last office visit: 3/11/2019 with prescribing provider:   RUBY Carreno  Future Office Visit:   Next 5 appointments (look out 90 days)    May 30, 2019  1:00 PM CDT  Office Visit with Hai Cortez [facility] ([facility]) [address] [place] [id number]  [phone]   May 31, 2019 11:20 AM CDT  SHORT with Be Carreno MD  [facility] ([facility]) [address] [place] [id number]  [phone]   Jun 06, 2019  1:00 PM CDT  Return Visit with THOMPSON Yu [facility] ([facility]) [address] [place] [id number]  [phone]             "

## 2019-05-29 ENCOUNTER — TELEPHONE (OUTPATIENT)
Dept: CARDIOLOGY | Facility: CLINIC | Age: 79
End: 2019-05-29

## 2019-05-29 ENCOUNTER — HOSPITAL ENCOUNTER (OUTPATIENT)
Dept: CARDIOLOGY | Facility: CLINIC | Age: 79
Discharge: HOME | End: 2019-05-29
Attending: INTERNAL MEDICINE | Admitting: INTERNAL MEDICINE
Payer: COMMERCIAL

## 2019-05-29 DIAGNOSIS — R53.83 FATIGUE: Primary | ICD-10-CM

## 2019-05-29 DIAGNOSIS — R06.02 SOB (SHORTNESS OF BREATH): ICD-10-CM

## 2019-05-29 DIAGNOSIS — I25.10 CAD (CORONARY ARTERY DISEASE): ICD-10-CM

## 2019-05-29 DIAGNOSIS — R00.2 PALPITATIONS: ICD-10-CM

## 2019-05-29 PROCEDURE — 0298T ZIO PATCH HOLTER ADULT PEDIATRIC GREATER THAN 48 HRS: CPT | Performed by: INTERNAL MEDICINE

## 2019-05-29 PROCEDURE — 0296T ZIO PATCH HOLTER ADULT PEDIATRIC GREATER THAN 48 HRS: CPT

## 2019-05-29 NOTE — TELEPHONE ENCOUNTER
Chante RN with stress testing calls me to report patient could not tolerate the Lexiscan Stress test imaging this morning. He was claustrophobic with open ended imaging plate on chest. Will inform Dr. Velarde to see if another form of testing is recommended. Amira [place] RN Cardiology at [facility] May 29, 2019, 9:52 AM    ADDENDUM:  Ricardo Velarde MD Balow, Jennifer, RN   Caller: Unspecified (Yesterday,  9:49 AM)             Can we do Dobutamine echo?      Called patient and discussed dobutamine stress test. Call transferred to scheduling. Amira [place] RN Cardiology at [facility] May 30, 2019, 8:08 AM

## 2019-05-30 ENCOUNTER — TELEPHONE (OUTPATIENT)
Dept: FAMILY MEDICINE | Facility: CLINIC | Age: 79
End: 2019-05-30

## 2019-05-30 ENCOUNTER — OFFICE VISIT (OUTPATIENT)
Dept: PHARMACY | Facility: CLINIC | Age: 79
End: 2019-05-30
Attending: FAMILY MEDICINE
Payer: COMMERCIAL

## 2019-05-30 VITALS — SYSTOLIC BLOOD PRESSURE: 136 MMHG | DIASTOLIC BLOOD PRESSURE: 62 MMHG

## 2019-05-30 DIAGNOSIS — K21.9 GASTROESOPHAGEAL REFLUX DISEASE WITHOUT ESOPHAGITIS: ICD-10-CM

## 2019-05-30 DIAGNOSIS — I25.9 CHRONIC ISCHEMIC HEART DISEASE: ICD-10-CM

## 2019-05-30 DIAGNOSIS — I10 HYPERTENSION GOAL BP (BLOOD PRESSURE) < 140/90: ICD-10-CM

## 2019-05-30 DIAGNOSIS — E11.22 TYPE 2 DIABETES MELLITUS WITH STAGE 3 CHRONIC KIDNEY DISEASE, WITH LONG-TERM CURRENT USE OF INSULIN (H): ICD-10-CM

## 2019-05-30 DIAGNOSIS — E78.5 HYPERLIPIDEMIA LDL GOAL <70: ICD-10-CM

## 2019-05-30 DIAGNOSIS — N18.30 TYPE 2 DIABETES MELLITUS WITH STAGE 3 CHRONIC KIDNEY DISEASE, WITH LONG-TERM CURRENT USE OF INSULIN (H): ICD-10-CM

## 2019-05-30 DIAGNOSIS — N31.8 HYPERTONICITY OF BLADDER: ICD-10-CM

## 2019-05-30 DIAGNOSIS — Z78.9 TAKES DIETARY SUPPLEMENTS: ICD-10-CM

## 2019-05-30 DIAGNOSIS — J44.9 CHRONIC OBSTRUCTIVE PULMONARY DISEASE, UNSPECIFIED COPD TYPE (H): Primary | ICD-10-CM

## 2019-05-30 DIAGNOSIS — I63.9 CEREBROVASCULAR ACCIDENT (CVA), UNSPECIFIED MECHANISM (H): ICD-10-CM

## 2019-05-30 DIAGNOSIS — Z79.4 TYPE 2 DIABETES MELLITUS WITH STAGE 3 CHRONIC KIDNEY DISEASE, WITH LONG-TERM CURRENT USE OF INSULIN (H): ICD-10-CM

## 2019-05-30 PROCEDURE — 99207 ZZC NO CHARGE LOS: CPT | Performed by: PHARMACIST

## 2019-05-30 NOTE — Clinical Note
FYI - Pt's Medicare Part B copay/deductible are likely causing his high initial cost for Brovana and Pulmicort (these are not covered by Part D).  He may benefit from switch to Airduo (fluticasone/salmeterol) inhaler twice daily in place of nebulizers as cost should be lower with Part D copays. Let me know if you have any questions,Eric Cortez, BlessingD, BCACPMedication Therapy Management PharmacistPager: [phone]

## 2019-05-30 NOTE — PROGRESS NOTES
SUBJECTIVE/OBJECTIVE:                           Alvaro Horton is a 78 year old male coming in for an initial visit for Medication Therapy Management.  He was referred to me from Dr. Carreno.    Chief Complaint: COPD Medication Costs    Allergies/ADRs: Reviewed in Epic  Tobacco: No tobacco use  Alcohol: none  Activity: limited due to breathing - especially up stairs  PMH: Reviewed in Epic    Medication Adherence/Access:  no issues reported    COPD: Current medications: Brovana twice daily and budesonide 0.5 mg twice daily. He does have albuterol HFA inhaler for PRN use, but only uses about 1-2 times a week. Pt is currently having issues with affording costs of his medication since changing his insurance plans - budesonide has been ~$54 and his Brovana is currently around $226. Contacted the [facility] who have been processing patient's insurance claims and it appears these medications are properly being processed through patient's medical benefit, BUT he has a 20% copay (until deductible is met). Pt states that he did try incruse Ellipta for a short period (though from documentation unclear if he used this consistently).  Pt rinses their mouth after using steroid inhaler.    Pt is not experiencing side effects.   Pt reports the following symptoms: shortness of breath with activity, stairs, and bending.  Pt does have an COPD Action Plan on file.   Has spirometry been completed: Yes   PIF was completed today: No    Diabetes:  Pt currently taking Levemir 20 units daily, Novolog 11 untis three times daily, metformin IR 1000 mg twice daily. Pt states biggest issues was that he was a  and does have more desserts around than he should. Pt is not experiencing side effects.  SMBG: two times daily.   Ranges (patient reported): 140-160s mg/dL  Patient is not experiencing hypoglycemia  Recent symptoms of high blood sugar? None known  Eye exam: up to date  Foot exam: due  ACEi/ARB: Yes: lisinopril.    Urine Albumin:   Lab Results   Component Value Date    UMALCR 60.86 (H) 02/05/2018   Aspirin: Taking 325mg daily and denies side effects    Hypertension/CAD/Hx of CVA: Current medications include metoprolol tartrate 25 mg twice daily, lisinopril 5 mg daily, and furosemide 20 mg daily (pt is not sure he is taking this medication), and aspirin 325 mg daily.  Patient does not self-monitor BP.  Patient reports no current medication side effects.    Hyperlipidemia: Current therapy includes atorvastatin 80 mg once daily.  Pt reports no significant myalgias or other side effects.    BPH: Currently taking tamsulosin 0.4 mg daily and finasteride 5 mg daily. Pt finds this to be ineffective. Pt reports needing to urinate every 1-2 hours at least. He has to go to the bathroom 3-4 times at night. .     GERD: Current medications include: Zantac (ranitidine) 300 mg once daily. Pt c/o heartburn mid-day.  Patient feels that current regimen is not effective all day.    Supplements: Currently taking vitamin D3 2000 units daily, vitamin B12 500 mcg daily, and fish oil 1 gram daily.  Pt reports no known issues.     Today's Vitals: /62     GFR Estimate   Date Value Ref Range Status   03/04/2019 59 (L) >60 mL/min/[1.73_m2] Final     Comment:     Non  GFR Calc  Starting 12/18/2018, serum creatinine based estimated GFR (eGFR) will be   calculated using the Chronic Kidney Disease Epidemiology Collaboration   (CKD-EPI) equation.       GFR Estimate If Black   Date Value Ref Range Status   03/04/2019 69 >60 mL/min/[1.73_m2] Final     Comment:      GFR Calc  Starting 12/18/2018, serum creatinine based estimated GFR (eGFR) will be   calculated using the Chronic Kidney Disease Epidemiology Collaboration   (CKD-EPI) equation.       Lab Results   Component Value Date    A1C 8.0 05/17/2018    A1C 7.5 02/05/2018    A1C 7.9 06/09/2017    A1C 7.5 11/07/2016    A1C 8.2 04/20/2016     ASSESSMENT:                              Current medications were reviewed today. Medicare Part D topics discussed:Recommendations to doctor and Medication changes    Medication Adherence: patient would benefit from contacting the [employer] Prescription Assistance Program for assistance with medication costs.  He has likely not paid Part B deductible thus the high cost for Part B nebulization solutions.  He has likely paid off most of his Part D deductible and an ICS/LABA inhaler may be better choice.    COPD: Needs Improvement. Pt's high cost of nebulizers likely due to patient's Medicare Part B co-pay benefits/deductible of new plan.  Because of this high cost he may benefit from change to lower cost ICS/LABA inhaler to continue this dual therapy in place of nebulizers. Pt did not have great response to LAMA therapy though this appears to be related in part due to non-adherence.     Diabetes: Needs Improvement. Patient is not meeting A1c goal of < 8%. Pt would benefit from trying to reduce sweets/desserts.     Hypertension/CAD/Hx of CVA: Stable.     Hyperlipidemia: Stable.     BPH: Needs Improvement. Pt would likely benefit from follow-up with urology.     GERD: Needs Improvement. Pt would likely benefit from splitting dose to spread out benefit of medication.     Supplements: Stable.     PLAN:                            Will send plan to Dr. Carreno for consideration of the followin. Consider changing budesonide/Brovana nebulizers to Airduo (fluticasone/salmeterol) inhaler twice daily - likely cheaper given Part B co-pay and deductible.  2. Pt to change ranitidine to 150 mg twice daily - a new prescription was sent to avoid patient needing to cut tablets.  3. Contact the [place] Prescription Assistance Program/Fund (Geraldine Silverman and Ivonne Pennington) at [phone].    I spent 60 minutes with this patient today. All changes were made via collaborative practice agreement with Dr. Be Carreno. A copy of the visit note was provided to the  patient's primary care provider.    Will follow up in 4 weeks via phone.    The patient was given a summary of these recommendations as an after visit summary.     Eric Cortez, BlessingD, [facility]  Medication Therapy Management Pharmacist  Pager: [phone]

## 2019-05-30 NOTE — PATIENT INSTRUCTIONS
Recommendations from today's MTM visit:                                                    MTM (medication therapy management) is a service provided by a clinical pharmacist designed to help you get the most of out of your medicines.   Today we reviewed what your medicines are for, how to know if they are working, that your medicines are safe and how to make your medicine regimen as easy as possible.     1. I sent a new prescription for ranitidine 150 mg tablets - you should take one of these twice daily to spread out the benefit of your heartburn/acid pill.    2. I will review your options for breathing medications before you see Dr. Carreno tomorrow.  There may be a way to process your nebulizer solution through your Medicare medical benefit, but we may have to consider trying another inhaler.     3. Contact the [place] Prescription Assistance Program/Fund (Geraldine Silverman and Ivonne Pennington) at [phone].  They can help you navigate applying for the manufacturers cost saving programs.  You need to call them to initiate this process.    Next MTM visit: I will follow-up by phone in about 4 weeks after you see Dr. Carreno.      To schedule another MTM appointment, please call the clinic directly or you may call the MTM scheduling line at [phone] or toll-free at [phone].     My Clinical Pharmacist's contact information:                                                      It was a pleasure talking with you today!  Please feel free to contact me with any questions or concerns you have.      Eric Cortez, PharmD, [facility]  Medication Therapy Management Pharmacist  Pager: [phone]    You may receive a survey about the MTM services you received by email and/or US Mail.  I would appreciate your feedback to help me serve you better in the future. Your comments will be anonymous.

## 2019-06-04 ENCOUNTER — HOSPITAL ENCOUNTER (OUTPATIENT)
Dept: CARDIOLOGY | Facility: CLINIC | Age: 79
Discharge: HOME | End: 2019-06-04
Attending: INTERNAL MEDICINE | Admitting: INTERNAL MEDICINE
Payer: COMMERCIAL

## 2019-06-04 DIAGNOSIS — I25.10 CAD (CORONARY ARTERY DISEASE): ICD-10-CM

## 2019-06-04 DIAGNOSIS — R06.02 SOB (SHORTNESS OF BREATH): ICD-10-CM

## 2019-06-04 DIAGNOSIS — R53.83 FATIGUE: ICD-10-CM

## 2019-06-04 PROCEDURE — 93350 STRESS TTE ONLY: CPT | Mod: 26 | Performed by: INTERNAL MEDICINE

## 2019-06-04 PROCEDURE — 93325 DOPPLER ECHO COLOR FLOW MAPG: CPT | Mod: 26 | Performed by: INTERNAL MEDICINE

## 2019-06-04 PROCEDURE — 25000128 H RX IP 250 OP 636: Performed by: FAMILY MEDICINE

## 2019-06-04 PROCEDURE — 93325 DOPPLER ECHO COLOR FLOW MAPG: CPT | Mod: TC

## 2019-06-04 PROCEDURE — 25500064 ZZH RX 255 OP 636: Performed by: FAMILY MEDICINE

## 2019-06-04 PROCEDURE — 25000128 H RX IP 250 OP 636: Performed by: INTERNAL MEDICINE

## 2019-06-04 PROCEDURE — 93016 CV STRESS TEST SUPVJ ONLY: CPT | Performed by: FAMILY MEDICINE

## 2019-06-04 PROCEDURE — 93321 DOPPLER ECHO F-UP/LMTD STD: CPT | Mod: 26 | Performed by: INTERNAL MEDICINE

## 2019-06-04 PROCEDURE — 93018 CV STRESS TEST I&R ONLY: CPT | Performed by: INTERNAL MEDICINE

## 2019-06-04 RX ORDER — DOBUTAMINE HYDROCHLORIDE 200 MG/100ML
10-40 INJECTION INTRAVENOUS CONTINUOUS
Status: DISCONTINUED | OUTPATIENT
Start: 2019-06-04 | End: 2019-06-05 | Stop reason: HOSPADM

## 2019-06-04 RX ORDER — ATROPINE SULFATE 0.4 MG/ML
0.2 AMPUL (ML) INJECTION
Status: DISCONTINUED | OUTPATIENT
Start: 2019-06-04 | End: 2019-06-05 | Stop reason: HOSPADM

## 2019-06-04 RX ADMIN — ATROPINE SULFATE 0.2 MG: 0.4 INJECTION, SOLUTION INTRAMUSCULAR; INTRAVENOUS; SUBCUTANEOUS at 10:52

## 2019-06-04 RX ADMIN — ATROPINE SULFATE 0.2 MG: 0.4 INJECTION, SOLUTION INTRAMUSCULAR; INTRAVENOUS; SUBCUTANEOUS at 10:49

## 2019-06-04 RX ADMIN — DOBUTAMINE IN DEXTROSE 10 MCG/KG/MIN: 200 INJECTION, SOLUTION INTRAVENOUS at 10:37

## 2019-06-04 RX ADMIN — HUMAN ALBUMIN MICROSPHERES AND PERFLUTREN 2 ML: 10; .22 INJECTION, SOLUTION INTRAVENOUS at 11:04

## 2019-06-06 ENCOUNTER — OFFICE VISIT (OUTPATIENT)
Dept: CARDIOLOGY | Facility: CLINIC | Age: 79
End: 2019-06-06
Attending: INTERNAL MEDICINE
Payer: COMMERCIAL

## 2019-06-06 VITALS
BODY MASS INDEX: 29.22 KG/M2 | OXYGEN SATURATION: 94 % | SYSTOLIC BLOOD PRESSURE: 142 MMHG | DIASTOLIC BLOOD PRESSURE: 68 MMHG | HEART RATE: 61 BPM | WEIGHT: 192.2 LBS

## 2019-06-06 DIAGNOSIS — R06.02 SOB (SHORTNESS OF BREATH): ICD-10-CM

## 2019-06-06 DIAGNOSIS — R00.2 PALPITATIONS: ICD-10-CM

## 2019-06-06 DIAGNOSIS — I25.810 CORONARY ARTERY DISEASE INVOLVING CORONARY BYPASS GRAFT OF NATIVE HEART WITHOUT ANGINA PECTORIS: ICD-10-CM

## 2019-06-06 DIAGNOSIS — I10 BENIGN ESSENTIAL HYPERTENSION: Primary | ICD-10-CM

## 2019-06-06 PROCEDURE — 99214 OFFICE O/P EST MOD 30 MIN: CPT | Performed by: NURSE PRACTITIONER

## 2019-06-06 NOTE — PATIENT INSTRUCTIONS
"[facility] HEART CARE  [facility]~[address]~[place], [place]~[address]  Thank you for your [facility] visit today. If you have questions regarding your visit, please contact your cardiology RN's, Zeynep Luna or Anabel Mccall, at [phone]. Your provider has recommended the following:  Medication Changes:  INCREASE lisinopril -see below   Recommendations:  1. Check blood pressure at least 1 hour after medications. Call the clinic if your blood pressure is consistently greater than 130/80.   2. Call my nurse to let us know what dose of lisinopril you are taking and how many times a day. Then, we can send in a new prescription for an increased dose.   Follow-up:  1. nonfasting lab work in 1 week   2. See Zaida HARRY for cardiology follow up at [facility]: 6 weeks after lung doctor   To schedule a future appointment, we kindly ask that you call cardiology scheduling at [phone] three months prior to requested revisit date.      [facility] cardiology clinic is staffed with \"Advance Practice Providers\". These are our cardiology Physician Assistants and Nurse Practitioners.   Please call cardiology scheduling if you feel you need clinical evaluation with them at any time for any cardiac reason.   Reminder:  For your safety, we ask that you bring in your current medication(s) or an updated list of your medications with you to EACH office visit. Include the medication name, dose of pill on bottle and how you are taking it. Include over-the-counter medications or supplements. Your provider will review this at each visit and plan your care based on your current information.   ~~~~~~~~~~~~~~~~~~~~~~~~~~~~~~~~~~~~~~~  \"[facility]\" [place] telephone numbers for reference:  Cardiology Scheduling~[phone]  Diagnostic Imaging Scheduling~[phone]  Lab Scheduling~[phone]  Anticoagulation Clinic~[phone]  Cardiac Rehabilitation~[phone]  CORE Clinic " RN's~[phone] (at [facility])  Cardiology Clinic RN's~[phone] (Anabel Mccall, RN & Zeynep Luna, RN)  ~~~~~~~~~~~~~~~~~~~~~~~~~~~~~~~~~~~~~~~~

## 2019-06-06 NOTE — PROGRESS NOTES
Cardiology Clinic Progress Note  Alvaro Horton MRN# [id number]   YOB: 1940 Age: 78 year old     Reason For Visit: 1 month f/u    Primary Cardiologist:   Dr. Velarde           History of Presenting Illness:    Alvaro Horton is a pleasant 78 year old patient with a past cardiac history significant for CAD with CABG 2009, hypertension, hyperlipidemia, paroxysmal atrial fibrillation.  Past medical history significant for asthma, diabetes, COPD, and CKD.     Pt was last seen by Dr. Velarde on 5/16/2019.  He reported dyspnea on exertion and fatigue for the prior three weeks even with mild exertion. His severe COPD had been well-controlled until recently.  Around the time of his symptoms the patient had discontinued his LABA medication due to financial reasons.  He then noticed gradual worsening of his symptoms to the point that he was unable to climb a flight of stairs.  He was also unable to care for his orchard trees due to symptoms.  He wanted to follow up with pulmonologist but did not want to go to [facility] or the [place] due to distance.  He also noted worsening symptoms with bending over and lying flat but was able to lie flat for echocardiogram without difficulty. Echocardiogram that day showed LVEF 55-60% without significant valvular disease, RV was mildly dilated, and mild pulmonary hypertension with normal IVC.  He was noted to have frequent PVCs. Zio Patch was recommended to assess for any atrial fibrillation and to assess his palpitations.    Pt presents today for one month follow-up.  Dobutamine stress echocardiogram was negative for ischemia, he reached target heart rate and experienced mild chest pain during testing, LV function augments with exercise, no WMA, and no ischemic changes on EKG. His Zio Patch results are still pending.  These results were reviewed with him today.    Since he was last seen, he has restarted his long-acting beta agonist and has noted improvement in  shortness of breath.  However, he still continues with shortness of breath worse than his baseline.  He was previously able to walk his dog for 1 mile without difficulty and now can only go a half a mile.  He also had shortness of breath walking in from the parking lot today.   He is scheduled to see a pulmonologist coming up in July. His blood pressure is elevated and he is agreeable to increasing his lisinopril.  However, he tells me that he takes his lisinopril 5 mg twice a day and we have that he takes this once a day.  He will call us when he gets home and let us know his correct dose.  We can then increase his lisinopril and send this to the pharmacy. Weight today in the clinic is stable at 192 pounds. He has mild bilateral lower extremity edema. He discusses some lightheadedness after bending over and with position changes.  This is usually associated with his shortness of breath. He denies any prior diagnosis of atrial fibrillation. Patient reports no chest pain, PND, orthopnea, presyncope, syncope, heart racing.    Current Cardiac Medications   aspirin 325 mg daily  Atorvastatin 80 mg daily  Lasix 20 mg daily  Lisinopril 5 mg daily  Lopressor 25 mg b.i.d.  Nitroglycerin p.r.n.                     Assessment and Plan:     Plan  1.  Increase lisinopril for hypertension  2.  Call my nurse with what dose of lisinopril, how many times a day, so that we can increase this.  3.   BMP one week after increasing lisinopril  4.  Follow-up with DANIE in one month to reassess hypertension and SOB- needs fasting labs       1. CAD     CABG 11/2009 (LIMA to LAD, SVG to OM1 and OM2)    dobutamine stress test without ischemia 5/2019    DIALLO - likely noncardiac     Continue statin, aspirin, ACE inhibitor, beta blocker      2. HTN    not well-controlled    Continue lisinopril, metoprolol    Check blood pressure at least 1 hour after medications. Call the clinic if your blood pressure is consistently greater than 130/80.        3. Hyperlipidemia     Last LDL 50 on 2/2018    Continue atorvastatin 80 mg daily      4. palpitations    Heart racing after bending over and when SOB    zio patch in process     Continue beta blocker         Thank you for allowing me to participate in this delightful patient's care.      This note was completed in part using Dragon voice recognition software. Although reviewed after completion, some word and grammatical errors may occur.    Zaida Levine, APRN, CNP           Data:   All laboratory data reviewed      HPI and Plan:   See dictation    Orders Placed This Encounter   Procedures     Basic metabolic panel     Follow-Up with Cardiac Advanced Practice Provider       No orders of the defined types were placed in this encounter.      There are no discontinued medications.      Encounter Diagnoses   Name Primary?     SOB (shortness of breath)      Palpitations      Benign essential hypertension Yes     Coronary artery disease involving coronary bypass graft of native heart without angina pectoris        CURRENT MEDICATIONS:  Current Outpatient Medications   Medication Sig Dispense Refill     albuterol (PROAIR HFA/PROVENTIL HFA/VENTOLIN HFA) 108 (90 Base) MCG/ACT Inhaler Inhale 2 puffs into the lungs every 6 hours as needed for shortness of breath / dyspnea or wheezing 1 Inhaler 1     aspirin (ASA) 325 MG EC tablet Take 325 mg by mouth daily       atorvastatin (LIPITOR) 80 MG tablet TAKE 1 TABLET BY MOUTH ONCE DAILY 90 tablet 3     BROVANA 15 MCG/2ML NEBU neb solution INHALE ONE VIAL VIA NEBULIZER BY MOUTH TWO TIMES A  mL 3     budesonide (PULMICORT) 0.5 MG/2ML neb solution Take 2 mLs (0.5 mg) by nebulization 2 times daily 60 ampule 11     cholecalciferol 2000 UNITS tablet Take 1 tablet by mouth daily. 90 tablet 3     cyanocobalamin (BL VITAMIN B-12) 500 MCG TABS Take 500 mcg by mouth daily. (Patient taking differently: Take 2,500 mcg by mouth daily ) 90 tablet 4     finasteride  (PROSCAR) 5 MG tablet Take 1 tablet (5 mg) by mouth daily 90 tablet 1     fish oil-omega-3 fatty acids (FISH OIL) 1000 MG capsule Take 1 capsule by mouth 2 times daily. 180 capsule 3     furosemide (LASIX) 20 MG tablet Take 20 mg by mouth daily       insulin aspart (NOVOLOG FLEXPEN) 100 UNIT/ML pen INJECT 11 UNITS SUBCUTANEOUSLY DAILY BEFORE BREAKFAST, 11 UNITS DAILY BEFORE LUNCH, AND 11 UNITS DAILY BEFORE DINNER 15 mL 11     insulin detemir (LEVEMIR FLEXTOUCH) 100 UNIT/ML pen Inject 20 Units Subcutaneous At Bedtime 15 mL 11     lisinopril (PRINIVIL/ZESTRIL) 5 MG tablet TAKE ONE TABLET BY MOUTH ONCE DAILY 90 tablet 3     metFORMIN (GLUCOPHAGE) 1000 MG tablet TAKE 1 TABLET BY MOUTH TWICE DAILY WITH MEALS 180 tablet 3     metoprolol tartrate (LOPRESSOR) 25 MG tablet Take 1 tablet (25 mg) by mouth 2 times daily 60 tablet 0     nitroGLYcerin (NITROSTAT) 0.4 MG sublingual tablet Place 1 tablet (0.4 mg) under the tongue every 5 minutes as needed for chest pain (CALL 911 IF NOT IMPROVED AFTER THREE CONSECUTIVE DOSES) 25 tablet 0     ranitidine (RANITIDINE 150 MAX STRENGTH) 150 MG tablet Take 1 tablet (150 mg) by mouth 2 times daily 180 tablet 1     tamsulosin (FLOMAX) 0.4 MG capsule Take 1 capsule (0.4 mg) by mouth daily (Needs follow-up appointment for this medication) 90 capsule 3     blood glucose (NO BRAND SPECIFIED) lancets standard Use to test blood sugar 3 times daily. Accu Chek Brand (Patient not taking: Reported on 6/6/2019) 300 each 1     blood glucose (NO BRAND SPECIFIED) test strip Use to test blood sugar 3 times daily Accu Chek Brand (Patient not taking: Reported on 6/6/2019) 300 strip 1     blood glucose monitoring (NO BRAND SPECIFIED) meter device kit Use to test blood sugar 3 times daily. Accu Chek Brand (Patient not taking: Reported on 6/6/2019) 1 kit 0     Insulin Pen Needle (PEN NEEDLES) 31G X 6 MM MISC 1 Units 3 times daily For novolog flexpen (Patient not taking: Reported on 6/6/2019) 90 each 0      "insulin syringe-needle U-100 (BD INSULIN SYRINGE ULTRAFINE) 31G X 5/16\" 1 ML Use one syringe 4 times daily or as directed. (Patient not taking: Reported on 6/6/2019) 100 each prn     order for DME Equipment being ordered: CPAP  AIRSENSE 10  11-15 CM H2O  [employer] AIR SIZE MED  SN# [id number]  DN# [id number]         ALLERGIES     Allergies   Allergen Reactions     Prednisone Other (See Comments)     Severe interaction with DM       PAST MEDICAL HISTORY:  Past Medical History:   Diagnosis Date     Calculus of kidney     1990. s/p ESWL and percutaneous nephrolithotomy     COPD (chronic obstructive pulmonary disease) (H)      Depression     Hospitalized 1979     Diabetes (H)      Heart disease      Left hand weakness 12/14/2009    Afterbypass surgery, CVA, improved.      PNEUMONIA, ORGANISM NOS 2/14/2008    CT 2/08-  Pulmonary atelectasis and infiltrate in the posterior left lower lung base.      Sleep apnea        PAST SURGICAL HISTORY:  Past Surgical History:   Procedure Laterality Date     C ANESTH,DX ARTHROSCOPIC PROC KNEE JOINT  1994, 1998     Left     CARDIAC SURGERY  11/09    CABG x2 - LIMA-D1, SVG to OM1, OM2.     GENITOURINARY SURGERY  1990    ESWL and percutaneous nephrolithotomy     LAPAROSCOPIC CHOLECYSTECTOMY N/A 3/4/2019    Procedure: LAPAROSCOPIC CHOLECYSTECTOMY;  Surgeon: Burt Mendieta MD;  Location: [facility] OR     ORTHOPEDIC SURGERY  8/12    right CTR     SURGICAL HISTORY OF -   1998    Hernia repair     SURGICAL HISTORY OF -   2004    NormalColonoscopy      SURGICAL HISTORY OF -   2006    Normal Colonoscopy       FAMILY HISTORY:  Family History   Problem Relation Age of Onset     C.A.D. Father         40s     Hypertension Father      C.A.D. Paternal Grandfather      Heart Disease Paternal Grandfather      Diabetes Brother      C.A.D. Brother      Heart Disease Brother      Hypertension Brother      Gastrointestinal Disease Son         Crohn's disease     Gastrointestinal Disease Other         2 grandaughters with " Crohn's disease     Cancer - colorectal No family hx of        SOCIAL HISTORY:  Social History     Socioeconomic History     Marital status:      Spouse name: None     Number of children: None     Years of education: None     Highest education level: None   Occupational History     Occupation: [employer]     Employer: RETIRED     Occupation:    Social Needs     Financial resource strain: None     Food insecurity:     Worry: None     Inability: None     Transportation needs:     Medical: None     Non-medical: None   Tobacco Use     Smoking status: Never Smoker     Smokeless tobacco: Never Used   Substance and Sexual Activity     Alcohol use: No     Alcohol/week: 0.0 oz     Drug use: No     Sexual activity: Never   Lifestyle     Physical activity:     Days per week: None     Minutes per session: None     Stress: None   Relationships     Social connections:     Talks on phone: None     Gets together: None     Attends [employer] service: None     Active member of club or organization: None     Attends meetings of clubs or organizations: None     Relationship status: None     Intimate partner violence:     Fear of current or ex partner: None     Emotionally abused: None     Physically abused: None     Forced sexual activity: None   Other Topics Concern     Parent/sibling w/ CABG, MI or angioplasty before 65F 55M? No   Social History Narrative     None       Review of Systems:  Skin:  Positive for scaling;rash     Eyes:  Positive for glasses;cataracts    ENT:  Positive for hearing loss    Respiratory:  Positive for dyspnea on exertion;shortness of breath COPD   Cardiovascular:    Positive for;lower extremity symptoms;edema;fatigue;lightheadedness;dizziness;chest pain    Gastroenterology: Positive for abdominal pain;excessive gas or bloating    Genitourinary:  Positive for urinary frequency;urgency    Musculoskeletal:  Positive for arthritis;joint pain;joint swelling;joint stiffness    Neurologic:   Negative      Psychiatric:  Negative      Heme/Lymph/Imm:  Negative      Endocrine:  Positive for diabetes      Physical Exam:  Vitals: /68   Pulse 61   Wt 87.2 kg (192 lb 3.2 oz)   SpO2 94%   BMI 29.22 kg/m      Constitutional:  cooperative;well developed;in no acute distress        Skin:  warm and dry to the touch          Head:  normocephalic        Eyes:  sclera white        Lymph:      ENT:  no pallor or cyanosis        Neck:  no stiffness        Respiratory:  clear to auscultation;normal symmetry diminished breath sounds bilaterally       Cardiac: regular rhythm;normal S1 and S2                pulses full and equal                                        GI:  abdomen soft        Extremities and Muscular Skeletal:      bilateral LE edema;1+;pitting(ankle )          Neurological:  affect appropriate;no gross motor deficits        Psych:  Alert and Oriented x 3        CC  Ricardo Velarde MD  [address]  [place] [id number]

## 2019-06-06 NOTE — LETTER
6/6/2019    Be Carreno MD  [address] [facility] [address]    RE: Alvaro Horton       Dear Colleague,    I had the pleasure of seeing Alvaro Horton in the [facility].    Cardiology Clinic Progress Note  Alvaro Horton MRN# [id number]   YOB: 1940 Age: 78 year old     Reason For Visit: 1 month f/u    Primary Cardiologist:   Dr. Velarde           History of Presenting Illness:    Alvaro Horton is a pleasant 78 year old patient with a past cardiac history significant for CAD with CABG 2009, hypertension, hyperlipidemia, paroxysmal atrial fibrillation.  Past medical history significant for asthma, diabetes, COPD, and CKD.     Pt was last seen by Dr. Velarde on 5/16/2019.  He reported dyspnea on exertion and fatigue for the prior three weeks even with mild exertion. His severe COPD had been well-controlled until recently.  Around the time of his symptoms the patient had discontinued his LABA medication due to financial reasons.  He then noticed gradual worsening of his symptoms to the point that he was unable to climb a flight of stairs.  He was also unable to care for his orchard trees due to symptoms.  He wanted to follow up with pulmonologist but did not want to go to [facility] or the [place] due to distance.  He also noted worsening symptoms with bending over and lying flat but was able to lie flat for echocardiogram without difficulty. Echocardiogram that day showed LVEF 55-60% without significant valvular disease, RV was mildly dilated, and mild pulmonary hypertension with normal IVC.  He was noted to have frequent PVCs. Zio Patch was recommended to assess for any atrial fibrillation and to assess his palpitations.    Pt presents today for one month follow-up.  Dobutamine stress echocardiogram was negative for ischemia, he reached target heart rate and experienced mild chest pain during testing, LV function augments with exercise, no  WMA, and no ischemic changes on EKG. His Zio Patch results are still pending.  These results were reviewed with him today.    Since he was last seen, he has restarted his long-acting beta agonist and has noted improvement in shortness of breath.  However, he still continues with shortness of breath worse than his baseline.  He was previously able to walk his dog for 1 mile without difficulty and now can only go a half a mile.  He also had shortness of breath walking in from the parking lot today.   He is scheduled to see a pulmonologist coming up in July. His blood pressure is elevated and he is agreeable to increasing his lisinopril.  However, he tells me that he takes his lisinopril 5 mg twice a day and we have that he takes this once a day.  He will call us when he gets home and let us know his correct dose.  We can then increase his lisinopril and send this to the pharmacy. Weight today in the clinic is stable at 192 pounds. He has mild bilateral lower extremity edema. He discusses some lightheadedness after bending over and with position changes.  This is usually associated with his shortness of breath. He denies any prior diagnosis of atrial fibrillation. Patient reports no chest pain, PND, orthopnea, presyncope, syncope, heart racing.    Current Cardiac Medications   aspirin 325 mg daily  Atorvastatin 80 mg daily  Lasix 20 mg daily  Lisinopril 5 mg daily  Lopressor 25 mg b.i.d.  Nitroglycerin p.r.n.                     Assessment and Plan:     Plan  1.  Increase lisinopril for hypertension  2.  Call my nurse with what dose of lisinopril, how many times a day, so that we can increase this.  3.   BMP one week after increasing lisinopril  4.  Follow-up with DANIE in one month to reassess hypertension and SOB- needs fasting labs       1. CAD     CABG 11/2009 (LIMA to LAD, SVG to OM1 and OM2)    dobutamine stress test without ischemia 5/2019    DIALLO - likely noncardiac     Continue statin, aspirin, ACE inhibitor,  beta blocker      2. HTN    not well-controlled    Continue lisinopril, metoprolol    Check blood pressure at least 1 hour after medications. Call the clinic if your blood pressure is consistently greater than 130/80.       3. Hyperlipidemia     Last LDL 50 on 2/2018    Continue atorvastatin 80 mg daily      4. palpitations    Heart racing after bending over and when SOB    zio patch in process     Continue beta blocker         Thank you for allowing me to participate in this delightful patient's care.      This note was completed in part using Dragon voice recognition software. Although reviewed after completion, some word and grammatical errors may occur.    Zaida Levine, APRN, CNP           Data:   All laboratory data reviewed      HPI and Plan:   See dictation    Orders Placed This Encounter   Procedures     Basic metabolic panel     Follow-Up with Cardiac Advanced Practice Provider       No orders of the defined types were placed in this encounter.      There are no discontinued medications.      Encounter Diagnoses   Name Primary?     SOB (shortness of breath)      Palpitations      Benign essential hypertension Yes     Coronary artery disease involving coronary bypass graft of native heart without angina pectoris        CURRENT MEDICATIONS:  Current Outpatient Medications   Medication Sig Dispense Refill     albuterol (PROAIR HFA/PROVENTIL HFA/VENTOLIN HFA) 108 (90 Base) MCG/ACT Inhaler Inhale 2 puffs into the lungs every 6 hours as needed for shortness of breath / dyspnea or wheezing 1 Inhaler 1     aspirin (ASA) 325 MG EC tablet Take 325 mg by mouth daily       atorvastatin (LIPITOR) 80 MG tablet TAKE 1 TABLET BY MOUTH ONCE DAILY 90 tablet 3     BROVANA 15 MCG/2ML NEBU neb solution INHALE ONE VIAL VIA NEBULIZER BY MOUTH TWO TIMES A  mL 3     budesonide (PULMICORT) 0.5 MG/2ML neb solution Take 2 mLs (0.5 mg) by nebulization 2 times daily 60 ampule 11     cholecalciferol 2000 UNITS  tablet Take 1 tablet by mouth daily. 90 tablet 3     cyanocobalamin (BL VITAMIN B-12) 500 MCG TABS Take 500 mcg by mouth daily. (Patient taking differently: Take 2,500 mcg by mouth daily ) 90 tablet 4     finasteride (PROSCAR) 5 MG tablet Take 1 tablet (5 mg) by mouth daily 90 tablet 1     fish oil-omega-3 fatty acids (FISH OIL) 1000 MG capsule Take 1 capsule by mouth 2 times daily. 180 capsule 3     furosemide (LASIX) 20 MG tablet Take 20 mg by mouth daily       insulin aspart (NOVOLOG FLEXPEN) 100 UNIT/ML pen INJECT 11 UNITS SUBCUTANEOUSLY DAILY BEFORE BREAKFAST, 11 UNITS DAILY BEFORE LUNCH, AND 11 UNITS DAILY BEFORE DINNER 15 mL 11     insulin detemir (LEVEMIR FLEXTOUCH) 100 UNIT/ML pen Inject 20 Units Subcutaneous At Bedtime 15 mL 11     lisinopril (PRINIVIL/ZESTRIL) 5 MG tablet TAKE ONE TABLET BY MOUTH ONCE DAILY 90 tablet 3     metFORMIN (GLUCOPHAGE) 1000 MG tablet TAKE 1 TABLET BY MOUTH TWICE DAILY WITH MEALS 180 tablet 3     metoprolol tartrate (LOPRESSOR) 25 MG tablet Take 1 tablet (25 mg) by mouth 2 times daily 60 tablet 0     nitroGLYcerin (NITROSTAT) 0.4 MG sublingual tablet Place 1 tablet (0.4 mg) under the tongue every 5 minutes as needed for chest pain (CALL 911 IF NOT IMPROVED AFTER THREE CONSECUTIVE DOSES) 25 tablet 0     ranitidine (RANITIDINE 150 MAX STRENGTH) 150 MG tablet Take 1 tablet (150 mg) by mouth 2 times daily 180 tablet 1     tamsulosin (FLOMAX) 0.4 MG capsule Take 1 capsule (0.4 mg) by mouth daily (Needs follow-up appointment for this medication) 90 capsule 3     blood glucose (NO BRAND SPECIFIED) lancets standard Use to test blood sugar 3 times daily. Accu Chek Brand (Patient not taking: Reported on 6/6/2019) 300 each 1     blood glucose (NO BRAND SPECIFIED) test strip Use to test blood sugar 3 times daily Accu Chek Brand (Patient not taking: Reported on 6/6/2019) 300 strip 1     blood glucose monitoring (NO BRAND SPECIFIED) meter device kit Use to test blood sugar 3 times daily. Accu  "Chek Brand (Patient not taking: Reported on 6/6/2019) 1 kit 0     Insulin Pen Needle (PEN NEEDLES) 31G X 6 MM MISC 1 Units 3 times daily For novolog flexpen (Patient not taking: Reported on 6/6/2019) 90 each 0     insulin syringe-needle U-100 (BD INSULIN SYRINGE ULTRAFINE) 31G X 5/16\" 1 ML Use one syringe 4 times daily or as directed. (Patient not taking: Reported on 6/6/2019) 100 each prn     order for DME Equipment being ordered: CPAP  AIRSENSE 10  11-15 CM H2O  QUATTRO AIR SIZE MED  SN# [id number]  DN# [id number]         ALLERGIES     Allergies   Allergen Reactions     Prednisone Other (See Comments)     Severe interaction with DM       PAST MEDICAL HISTORY:  Past Medical History:   Diagnosis Date     Calculus of kidney     1990. s/p ESWL and percutaneous nephrolithotomy     COPD (chronic obstructive pulmonary disease) (H)      Depression     Hospitalized 1979     Diabetes (H)      Heart disease      Left hand weakness 12/14/2009    Afterbypass surgery, CVA, improved.      PNEUMONIA, ORGANISM NOS 2/14/2008    CT 2/08-  Pulmonary atelectasis and infiltrate in the posterior left lower lung base.      Sleep apnea        PAST SURGICAL HISTORY:  Past Surgical History:   Procedure Laterality Date     C ANESTH,DX ARTHROSCOPIC PROC KNEE JOINT  1994, 1998     Left     CARDIAC SURGERY  11/09    CABG x2 - LIMA-D1, SVG to OM1, OM2.     GENITOURINARY SURGERY  1990    ESWL and percutaneous nephrolithotomy     LAPAROSCOPIC CHOLECYSTECTOMY N/A 3/4/2019    Procedure: LAPAROSCOPIC CHOLECYSTECTOMY;  Surgeon: Burt Mendieta MD;  Location: [facility] OR     ORTHOPEDIC SURGERY  8/12    right CTR     SURGICAL HISTORY OF -   1998    Hernia repair     SURGICAL HISTORY OF -   2004    NormalColonoscopy      SURGICAL HISTORY OF -   2006    Normal Colonoscopy       FAMILY HISTORY:  Family History   Problem Relation Age of Onset     C.A.D. Father         40s     Hypertension Father      C.A.D. Paternal Grandfather      Heart Disease Paternal Grandfather  "     Diabetes Brother      C.AYELENA. Brother      Heart Disease Brother      Hypertension Brother      Gastrointestinal Disease Son         Crohn's disease     Gastrointestinal Disease Other         2 grandaughters with Crohn's disease     Cancer - colorectal No family hx of        SOCIAL HISTORY:  Social History     Socioeconomic History     Marital status:      Spouse name: None     Number of children: None     Years of education: None     Highest education level: None   Occupational History     Occupation: [employer]     Employer: RETIRED     Occupation:    Social Needs     Financial resource strain: None     Food insecurity:     Worry: None     Inability: None     Transportation needs:     Medical: None     Non-medical: None   Tobacco Use     Smoking status: Never Smoker     Smokeless tobacco: Never Used   Substance and Sexual Activity     Alcohol use: No     Alcohol/week: 0.0 oz     Drug use: No     Sexual activity: Never   Lifestyle     Physical activity:     Days per week: None     Minutes per session: None     Stress: None   Relationships     Social connections:     Talks on phone: None     Gets together: None     Attends Anglican service: None     Active member of club or organization: None     Attends meetings of clubs or organizations: None     Relationship status: None     Intimate partner violence:     Fear of current or ex partner: None     Emotionally abused: None     Physically abused: None     Forced sexual activity: None   Other Topics Concern     Parent/sibling w/ CABG, MI or angioplasty before 65F 55M? No   Social History Narrative     None       Review of Systems:  Skin:  Positive for scaling;rash     Eyes:  Positive for glasses;cataracts    ENT:  Positive for hearing loss    Respiratory:  Positive for dyspnea on exertion;shortness of breath COPD   Cardiovascular:    Positive for;lower extremity symptoms;edema;fatigue;lightheadedness;dizziness;chest pain     Gastroenterology: Positive for abdominal pain;excessive gas or bloating    Genitourinary:  Positive for urinary frequency;urgency    Musculoskeletal:  Positive for arthritis;joint pain;joint swelling;joint stiffness    Neurologic:  Negative      Psychiatric:  Negative      Heme/Lymph/Imm:  Negative      Endocrine:  Positive for diabetes      Physical Exam:  Vitals: /68   Pulse 61   Wt 87.2 kg (192 lb 3.2 oz)   SpO2 94%   BMI 29.22 kg/m       Constitutional:  cooperative;well developed;in no acute distress        Skin:  warm and dry to the touch          Head:  normocephalic        Eyes:  sclera white        Lymph:      ENT:  no pallor or cyanosis        Neck:  no stiffness        Respiratory:  clear to auscultation;normal symmetry diminished breath sounds bilaterally       Cardiac: regular rhythm;normal S1 and S2                pulses full and equal                                        GI:  abdomen soft        Extremities and Muscular Skeletal:      bilateral LE edema;1+;pitting(ankle )          Neurological:  affect appropriate;no gross motor deficits        Psych:  Alert and Oriented x 3        CC  Ricardo Velarde MD  [address] [facility] [address]  [place] [id number]            Thank you for allowing me to participate in the care of your patient.    Sincerely,     THOMPSON Bravo [facility]

## 2019-06-13 ENCOUNTER — TELEPHONE (OUTPATIENT)
Dept: CARDIOLOGY | Facility: CLINIC | Age: 79
End: 2019-06-13

## 2019-06-13 DIAGNOSIS — I10 HYPERTENSION GOAL BP (BLOOD PRESSURE) < 140/90: Primary | ICD-10-CM

## 2019-06-13 DIAGNOSIS — E78.5 HYPERLIPIDEMIA LDL GOAL <70: ICD-10-CM

## 2019-06-13 DIAGNOSIS — I49.8 ATRIAL ARRHYTHMIA: Primary | ICD-10-CM

## 2019-06-13 RX ORDER — LISINOPRIL 10 MG/1
10 TABLET ORAL DAILY
Qty: 90 TABLET | Refills: 3 | Status: SHIPPED | OUTPATIENT
Start: 2019-06-13 | End: 2019-06-21

## 2019-06-13 NOTE — TELEPHONE ENCOUNTER
"Finally able to reach patient to confirm lisinopril dose. Pt is taking 5 mg every day. Disc with Zaida Mendez NP: she would like to increase dose to 10 mg every day. BMP in 1 week and follow up as scheduled on 7/19/19. Disc with patient. Repeated all instructions several times. Asked pt if he needed to write things down and he said his granddaughter was there with him and she was writing things down. Order placed for BMP and appt scheduled for 6/20/19 at 11:40am. New prescription sent to pharmacy. Pt will use up 5 mg tab (x 2 daily) supply prior to getting new prescription of 10 mg tabs. Pt was able to repeat all instructions back to me although he did seem a little tentative at times. Anabel Mccall RN Cardiology June 13, 2019, 2:21 PM    ADDENDUM: Pt called in to say he took one dose of lisinopril 10 mg and he felt awful--trouble breathing when going up the steps. Took 5 mg lisinopril the next day and still felt lousy but a bit better. Has stayed on 5 mg dose since. DOES NOT want to follow up as he feels we are out for his money and we don't tell him anything is wrong. I discussed that we are addressing his BP as it is too high and that I will discuss his concerns with Zaida Mendez NP to see if she has any other recommendations. He stated he had a follow up with her on 7/19/19 and he wanted to cancel it. \"I'm spending all my free time going to doctors who don't tell me anything\". He insisted I cancel the appointment. He stated he would call his PCP and follow up his BP with him. Appointment cancelled. Will route call to Zaida Mendez NP for her information. Anabel Mccall RN Cardiology June 21, 2019, 9:51 AM      "

## 2019-06-13 NOTE — RESULT ENCOUNTER NOTE
"Message per Dr. Velarde: \"Did he seek care for COPD.  I think that the cause of his SOB.  If he still reports palpitations we can perform an event monitor for 30 days and see if that can discern atrial activity. Would also be helpful to get an EKG if he is around.\"   Discussed result of zio patch monitor with patient. Pt saw Pulmonology through [place] but provider is no longer with  and he no longer can drive that far. ([place]) He has made an appt with Pulmonologist, Dr. Fulton [place] pending in July. Pt does state that his SOB got significantly worse after he was no longer able to afford a particular nebulizer medication that had been rx'ed by pulmonologist. He will contact us if he wishes to pursue the event monitor. Pending revisit with PIERO Mendez 7/19/19, which he will keep. Placed EKG order to be done prior."

## 2019-06-20 DIAGNOSIS — I10 HYPERTENSION GOAL BP (BLOOD PRESSURE) < 140/90: ICD-10-CM

## 2019-06-20 LAB
ANION GAP SERPL CALCULATED.3IONS-SCNC: 4 MMOL/L (ref 3–14)
BUN SERPL-MCNC: 23 MG/DL (ref 7–30)
CALCIUM SERPL-MCNC: 9.2 MG/DL (ref 8.5–10.1)
CHLORIDE SERPL-SCNC: 102 MMOL/L (ref 94–109)
CO2 SERPL-SCNC: 30 MMOL/L (ref 20–32)
CREAT SERPL-MCNC: 1.12 MG/DL (ref 0.66–1.25)
GFR SERPL CREATININE-BSD FRML MDRD: 62 ML/MIN/{1.73_M2}
GLUCOSE SERPL-MCNC: 201 MG/DL (ref 70–99)
POTASSIUM SERPL-SCNC: 4.4 MMOL/L (ref 3.4–5.3)
SODIUM SERPL-SCNC: 136 MMOL/L (ref 133–144)

## 2019-06-20 PROCEDURE — 36415 COLL VENOUS BLD VENIPUNCTURE: CPT | Performed by: NURSE PRACTITIONER

## 2019-06-20 PROCEDURE — 80048 BASIC METABOLIC PNL TOTAL CA: CPT | Performed by: NURSE PRACTITIONER

## 2019-06-20 NOTE — RESULT ENCOUNTER NOTE
Results noted: electrolytes and kidney function WNL. Done after increasing lisinopril on 6/13/19. Left detail message for patient re: good results and to continue all medications as directed. To be discussed at OV with Zaida Mendez NP on 7/19/19

## 2019-06-21 RX ORDER — LISINOPRIL 5 MG/1
5 TABLET ORAL DAILY
Qty: 90 TABLET | Refills: 3 | Status: SHIPPED | OUTPATIENT
Start: 2019-06-21 | End: 2019-07-29

## 2019-06-24 DIAGNOSIS — I25.9 CHRONIC ISCHEMIC HEART DISEASE: ICD-10-CM

## 2019-06-24 RX ORDER — METOPROLOL TARTRATE 25 MG/1
TABLET, FILM COATED ORAL
Qty: 180 TABLET | Refills: 1 | Status: SHIPPED | OUTPATIENT
Start: 2019-06-24 | End: 2019-12-30

## 2019-06-24 NOTE — TELEPHONE ENCOUNTER
"Pt asking why he never has refills on this medication. Says he has to call every month for this one. Please consider 90 day Rx or refills.  He wants to go to the pharmacy in a couple hours so would like this sent in soon please.    Requested Prescriptions   Pending Prescriptions Disp Refills     metoprolol tartrate (LOPRESSOR) 25 MG tablet [Pharmacy Med Name: METOPROL TAR 25MG   TAB] 60 tablet 0     Sig: TAKE 1 TABLET BY MOUTH TWICE DAILY       Beta-Blockers Protocol Failed - 6/24/2019  9:18 AM        Failed - Blood pressure under 140/90 in past 12 months     BP Readings from Last 3 Encounters:   06/06/19 142/68   05/30/19 136/62   05/16/19 (P) 150/82                 Passed - Patient is age 6 or older        Passed - Recent (12 mo) or future (30 days) visit within the authorizing provider's specialty     Patient had office visit in the last 12 months or has a visit in the next 30 days with authorizing provider or within the authorizing provider's specialty.  See \"Patient Info\" tab in inbasket, or \"Choose Columns\" in Meds & Orders section of the refill encounter.              Passed - Medication is active on med list        Last Written Prescription Date:  5/21/19  Last Fill Quantity: 60,  # refills: 0   Last office visit: 3/11/2019 with prescribing provider:  Leaf   Future Office Visit:      "

## 2019-06-27 ENCOUNTER — TELEPHONE (OUTPATIENT)
Dept: FAMILY MEDICINE | Facility: CLINIC | Age: 79
End: 2019-06-27

## 2019-06-27 ENCOUNTER — TELEPHONE (OUTPATIENT)
Dept: PHARMACY | Facility: CLINIC | Age: 79
End: 2019-06-27

## 2019-06-27 NOTE — RESULT ENCOUNTER NOTE
See TE 6/13/19--pt cancelled follow up with Zaida Mendez NP and DOES NOT wish to follow up at this time. He will follow up with his PCP.

## 2019-06-27 NOTE — TELEPHONE ENCOUNTER
Called patient to follow-up on recent MTM visit on 5/30/19 to see how patient was doing with recent medication changes. Left voicemail with MTM pager # for patient to call with updates.    Eric Cortez, BlessingD, [facility] Hospital[facility]  Medication Therapy Management Pharmacist  Pager: [phone]

## 2019-06-27 NOTE — TELEPHONE ENCOUNTER
Sammi has questions regarding Oral Surgery and Stress Test. Did Dr. Carreno see the results.  Please Call Sammi if he has been approved.  Elsa Orn Station Sec

## 2019-06-27 NOTE — TELEPHONE ENCOUNTER
I talked with Sammi and she wants to know if Dr Carreno saw the stress test before he gave OK for dental extraction.  Nurse can call Sammi on 6/28/19 with the answer  Zabrina Silva RN

## 2019-06-28 NOTE — TELEPHONE ENCOUNTER
Please call.  I reviewed his stress echocardiogram from 6/4/2019 just today.  It was OK and did not show low blood supply to his heart.    PLAN: He should be OK for dental extraction.   OWEN AMATO MD

## 2019-07-12 DIAGNOSIS — J45.40 MODERATE PERSISTENT ASTHMA WITHOUT COMPLICATION: ICD-10-CM

## 2019-07-12 RX ORDER — BUDESONIDE 0.5 MG/2ML
INHALANT ORAL
Qty: 120 ML | Refills: 11 | Status: SHIPPED | OUTPATIENT
Start: 2019-07-12 | End: 2020-03-06

## 2019-07-12 NOTE — TELEPHONE ENCOUNTER
"Requested Prescriptions   Pending Prescriptions Disp Refills     budesonide (PULMICORT) 0.5 MG/2ML neb solution [Pharmacy Med Name: BUDESONIDE 0.5MG/2ML SUSP] 120 mL 11     Sig: INHALE ONE VIAL VIA NEBULIZER TWO TIMES A DAY       Inhaled Steroids Protocol Failed - 7/12/2019  9:32 AM        Failed - Asthma control assessment score within normal limits in last 6 months     Please review ACT score.           Passed - Patient is age 12 or older        Passed - Medication is active on med list        Passed - Recent (6 mo) or future (30 days) visit within the authorizing provider's specialty     Patient had office visit in the last 6 months or has a visit in the next 30 days with authorizing provider or within the authorizing provider's specialty.  See \"Patient Info\" tab in inbasket, or \"Choose Columns\" in Meds & Orders section of the refill encounter.            budesonide (PULMICORT) 0.5 MG/2ML neb solution  Last Written Prescription Date:  06/28/2018  Last Fill Quantity: 60 ampule,  # refills: 11   Last office visit: 3/11/2019 with prescribing provider:  RUBY Carreno   Future Office Visit:      ACT Total Scores 6/9/2017 2/5/2018 9/12/2018   ACT TOTAL SCORE - - -   ASTHMA ER VISITS - - -   ASTHMA HOSPITALIZATIONS - - -   ACT TOTAL SCORE (Goal Greater than or Equal to 20) 21 25 21   In the past 12 months, how many times did you visit the emergency room for your asthma without being admitted to the hospital? 0 0 1   In the past 12 months, how many times were you hospitalized overnight because of your asthma? 0 0 0     Edwige LEVY (R) (M)    "

## 2019-07-12 NOTE — TELEPHONE ENCOUNTER
Routing refill request to provider for review/approval because: ACT not current    ACT Total Scores 6/9/2017 2/5/2018 9/12/2018   ACT TOTAL SCORE - - -   ASTHMA ER VISITS - - -   ASTHMA HOSPITALIZATIONS - - -   ACT TOTAL SCORE (Goal Greater than or Equal to 20) 21 25 21   In the past 12 months, how many times did you visit the emergency room for your asthma without being admitted to the hospital? 0 0 1   In the past 12 months, how many times were you hospitalized overnight because of your asthma? 0 0 0     Letter mailed to pt today to schedule appt.    Deb VALLE RN

## 2019-07-12 NOTE — LETTER
[facility]  [address] [place] [id number]  [phone]        July 12, 2019  Alvaro Horton  [address]  [facility] [address]    Dear Alvaro,    I care about your health and have reviewed your health plan. I have reviewed your medical conditions, medication list, and lab results and am making recommendations based on this review, to better manage your health.    You are in particular need of attention regarding:  -Asthma  -Diabetes  -COPD     I am recommending that you:  -schedule a FOLLOWUP OFFICE APPOINTMENT with me.  I will recheck your: A1c, Lipids (fasting cholesterol - nothing to eat except water and/or meds for 8+ hours) and Microablumin tests.    Here is a list of Health Maintenance topics that are due now or due soon:  Health Maintenance Due   Topic Date Due     COPD ACTION PLAN  1940     ZOSTER IMMUNIZATION (1 of 2) 07/17/1989     MEDICARE ANNUAL WELLNESS VISIT  06/26/2010     PHQ-9  05/07/2017     ADVANCE CARE PLANNING  09/24/2017     A1C  11/17/2018     LIPID  02/05/2019     MICROALBUMIN  02/05/2019     DIABETIC FOOT EXAM  02/05/2019     ASTHMA ACTION PLAN  02/05/2019     ASTHMA CONTROL TEST  03/12/2019     FALL RISK ASSESSMENT  05/17/2019       Please call us at [phone] (or use [employer]) to address the above recommendations.     Thank you for trusting [facility] and we appreciate the opportunity to serve you.  We look forward to supporting your healthcare needs in the future.    Healthy Regards,    Dr. Carreno/Deb VALLE, RN

## 2019-07-29 ENCOUNTER — OFFICE VISIT (OUTPATIENT)
Dept: FAMILY MEDICINE | Facility: CLINIC | Age: 79
End: 2019-07-29
Payer: COMMERCIAL

## 2019-07-29 VITALS
OXYGEN SATURATION: 96 % | TEMPERATURE: 97 F | WEIGHT: 194 LBS | HEART RATE: 66 BPM | SYSTOLIC BLOOD PRESSURE: 144 MMHG | HEIGHT: 68 IN | RESPIRATION RATE: 20 BRPM | BODY MASS INDEX: 29.4 KG/M2 | DIASTOLIC BLOOD PRESSURE: 68 MMHG

## 2019-07-29 DIAGNOSIS — E11.22 TYPE 2 DIABETES MELLITUS WITH STAGE 3 CHRONIC KIDNEY DISEASE, WITH LONG-TERM CURRENT USE OF INSULIN (H): Primary | ICD-10-CM

## 2019-07-29 DIAGNOSIS — E78.5 HYPERLIPIDEMIA LDL GOAL <70: ICD-10-CM

## 2019-07-29 DIAGNOSIS — J44.9 CHRONIC OBSTRUCTIVE PULMONARY DISEASE, UNSPECIFIED COPD TYPE (H): ICD-10-CM

## 2019-07-29 DIAGNOSIS — N18.30 TYPE 2 DIABETES MELLITUS WITH STAGE 3 CHRONIC KIDNEY DISEASE, WITH LONG-TERM CURRENT USE OF INSULIN (H): Primary | ICD-10-CM

## 2019-07-29 DIAGNOSIS — I10 HYPERTENSION GOAL BP (BLOOD PRESSURE) < 140/90: ICD-10-CM

## 2019-07-29 DIAGNOSIS — Z79.4 TYPE 2 DIABETES MELLITUS WITH STAGE 3 CHRONIC KIDNEY DISEASE, WITH LONG-TERM CURRENT USE OF INSULIN (H): Primary | ICD-10-CM

## 2019-07-29 LAB
ALBUMIN SERPL-MCNC: 3.8 G/DL (ref 3.4–5)
ALP SERPL-CCNC: 133 U/L (ref 40–150)
ALT SERPL W P-5'-P-CCNC: 32 U/L (ref 0–70)
ANION GAP SERPL CALCULATED.3IONS-SCNC: 5 MMOL/L (ref 3–14)
AST SERPL W P-5'-P-CCNC: 18 U/L (ref 0–45)
BILIRUB SERPL-MCNC: 0.4 MG/DL (ref 0.2–1.3)
BUN SERPL-MCNC: 24 MG/DL (ref 7–30)
CALCIUM SERPL-MCNC: 9.3 MG/DL (ref 8.5–10.1)
CHLORIDE SERPL-SCNC: 103 MMOL/L (ref 94–109)
CHOLEST SERPL-MCNC: 149 MG/DL
CO2 SERPL-SCNC: 29 MMOL/L (ref 20–32)
CREAT SERPL-MCNC: 1.07 MG/DL (ref 0.66–1.25)
CREAT UR-MCNC: 73 MG/DL
GFR SERPL CREATININE-BSD FRML MDRD: 65 ML/MIN/{1.73_M2}
GLUCOSE SERPL-MCNC: 180 MG/DL (ref 70–99)
HBA1C MFR BLD: 7.7 % (ref 0–5.6)
HDLC SERPL-MCNC: 50 MG/DL
LDLC SERPL CALC-MCNC: 61 MG/DL
MICROALBUMIN UR-MCNC: 65 MG/L
MICROALBUMIN/CREAT UR: 89.44 MG/G CR (ref 0–17)
NONHDLC SERPL-MCNC: 99 MG/DL
POTASSIUM SERPL-SCNC: 4.5 MMOL/L (ref 3.4–5.3)
PROT SERPL-MCNC: 7.4 G/DL (ref 6.8–8.8)
SODIUM SERPL-SCNC: 137 MMOL/L (ref 133–144)
TRIGL SERPL-MCNC: 189 MG/DL

## 2019-07-29 PROCEDURE — 36415 COLL VENOUS BLD VENIPUNCTURE: CPT | Performed by: FAMILY MEDICINE

## 2019-07-29 PROCEDURE — 82043 UR ALBUMIN QUANTITATIVE: CPT | Performed by: FAMILY MEDICINE

## 2019-07-29 PROCEDURE — 99214 OFFICE O/P EST MOD 30 MIN: CPT | Performed by: FAMILY MEDICINE

## 2019-07-29 PROCEDURE — 80061 LIPID PANEL: CPT | Performed by: FAMILY MEDICINE

## 2019-07-29 PROCEDURE — 80053 COMPREHEN METABOLIC PANEL: CPT | Performed by: FAMILY MEDICINE

## 2019-07-29 PROCEDURE — 83036 HEMOGLOBIN GLYCOSYLATED A1C: CPT | Performed by: FAMILY MEDICINE

## 2019-07-29 RX ORDER — LISINOPRIL 10 MG/1
10 TABLET ORAL DAILY
Qty: 30 TABLET | Refills: 11
Start: 2019-07-29 | End: 2020-07-20

## 2019-07-29 ASSESSMENT — PATIENT HEALTH QUESTIONNAIRE - PHQ9: SUM OF ALL RESPONSES TO PHQ QUESTIONS 1-9: 0

## 2019-07-29 ASSESSMENT — MIFFLIN-ST. JEOR: SCORE: 1564.48

## 2019-07-29 NOTE — LETTER
July 30, 2019      Alvaro DESTINY Sol  [address]  [facility] [address]        Dear ,    We are writing to inform you of your test results.    Urine tests show positive MAC (microalbumin/creatinine ratio) meaning protein leaking from the kidneys into the urine. This is a sign of some kidney damage from the diabetes mellitus.  It has been present in the past.   Triglycerides, a type of fat found in the bloodstream is high.  Other lipids all OK.   The blood chemistries (Basic metabolic panel) are all normal including electrolytes (salt balances in the blood), liver and kidney tests.  Glucose is high.    Hgb A1C has improved and is OK.    PLAN: No new changes in treatment recommended.    Recheck in 6 months.    Resulted Orders   **Comprehensive metabolic panel FUTURE anytime   Result Value Ref Range    Sodium 137 133 - 144 mmol/L    Potassium 4.5 3.4 - 5.3 mmol/L    Chloride 103 94 - 109 mmol/L    Carbon Dioxide 29 20 - 32 mmol/L    Anion Gap 5 3 - 14 mmol/L    Glucose 180 (H) 70 - 99 mg/dL      Comment:      Non Fasting    Urea Nitrogen 24 7 - 30 mg/dL    Creatinine 1.07 0.66 - 1.25 mg/dL    GFR Estimate 65 >60 mL/min/[1.73_m2]      Comment:      Non  GFR Calc  Starting 12/18/2018, serum creatinine based estimated GFR (eGFR) will be   calculated using the Chronic Kidney Disease Epidemiology Collaboration   (CKD-EPI) equation.      GFR Estimate If Black 76 >60 mL/min/[1.73_m2]      Comment:       GFR Calc  Starting 12/18/2018, serum creatinine based estimated GFR (eGFR) will be   calculated using the Chronic Kidney Disease Epidemiology Collaboration   (CKD-EPI) equation.      Calcium 9.3 8.5 - 10.1 mg/dL    Bilirubin Total 0.4 0.2 - 1.3 mg/dL    Albumin 3.8 3.4 - 5.0 g/dL    Protein Total 7.4 6.8 - 8.8 g/dL    Alkaline Phosphatase 133 40 - 150 U/L    ALT 32 0 - 70 U/L    AST 18 0 - 45 U/L   Lipid panel reflex to direct LDL Fasting   Result Value Ref Range     Cholesterol 149 <200 mg/dL    Triglycerides 189 (H) <150 mg/dL      Comment:      Borderline high:  150-199 mg/dl  High:             200-499 mg/dl  Very high:       >499 mg/dl  Non Fasting      HDL Cholesterol 50 >39 mg/dL    LDL Cholesterol Calculated 61 <100 mg/dL      Comment:      Desirable:       <100 mg/dl    Non HDL Cholesterol 99 <130 mg/dL   **A1C FUTURE anytime   Result Value Ref Range    Hemoglobin A1C 7.7 (H) 0 - 5.6 %      Comment:      Normal <5.7% Prediabetes 5.7-6.4%  Diabetes 6.5% or higher - adopted from ADA   consensus guidelines.     Albumin Random Urine Quantitative with Creat Ratio   Result Value Ref Range    Creatinine Urine 73 mg/dL    Albumin Urine mg/L 65 mg/L    Albumin Urine mg/g Cr 89.44 (H) 0 - 17 mg/g Cr       If you have any questions or concerns, please call the clinic at the number listed above.       Sincerely,        Be Carreno MD

## 2019-07-29 NOTE — PATIENT INSTRUCTIONS
ASSESSMENT/PLAN:                                                      (E11.22,  N18.3,  Z79.4) Type 2 diabetes mellitus with stage 3 chronic kidney disease, with long-term current use of insulin (H)  (primary encounter diagnosis)  Comment: due for Hgb A1C.   Plan: Blood tests today.   Pay attention to diet to help control diabetes mellitus.   There are less expensive insulins available.  It may be worth a switch.    You could see diabetic educator to make the switch.     (E78.5) Hyperlipidemia LDL goal <70  Comment: due for recheck  Plan: Non-fasting blood tests.     (J44.9) Chronic obstructive pulmonary disease, unspecified COPD type (H)  Comment: doing OK  Plan: No change in current treatment plan.     (I10) Hypertension goal BP (blood pressure) < 140/90  Comment: your blood pressure is high.   Plan: Increase the lisinopril to 10mg daily.  Take two of the 5mg pills daily to make 10mg.  You new pills will be 10mg.

## 2019-07-29 NOTE — NURSING NOTE
"Chief Complaint   Patient presents with     Diabetes       Initial /68   Pulse 66   Temp 97  F (36.1  C) (Tympanic)   Resp 20   Ht 1.727 m (5' 8\")   Wt 88 kg (194 lb)   SpO2 96%   BMI 29.50 kg/m   Estimated body mass index is 29.5 kg/m  as calculated from the following:    Height as of this encounter: 1.727 m (5' 8\").    Weight as of this encounter: 88 kg (194 lb).    Patient presents to the clinic using     Health Maintenance that is potentially due pending provider review:  PHQ9 and ACT        Is there anyone who you would like to be able to receive your results?   If yes have patient fill out THEODORA    "

## 2019-07-29 NOTE — PROGRESS NOTES
SUBJECTIVE: Alvaro Horton is a 79 year old male  Chief Complaint   Patient presents with     Diabetes     Medication Reconciliation     Lisinopril was advised to take 10 mg daily instead of 5 mg but still on 5 mg tablet. Novolog 11 units bid not tid.       Pulmonology visit 7/18.  He had Spiriva added once daily.    Cardiology visit 6/6.  Recommended lisinopril Increase.  He is still using 5mg pills only one per day.     He had several teeth removed 3 days.  Glucometers have been high in the past three days which happens after procedures.  Wondering if he should wait on checking Hgb A1C.  He has has more teeth to be removed in 6 weeks.     Diabetes Follow-up      How often are you checking your blood sugar? Two times daily AM and evening.      Glucometer range within the past  Month=    Did not bring in meter.   Lab Results   Component Value Date    A1C 8.0 05/17/2018   Not working hard on diet and managing diabetes mellitus.   Currently on Lantus-20 units once daily.  Novolog 11 units twice daily.  Sometimes Noon dose.    On metformin 1000mg twice daily.     What time of day are you checking your blood sugars (select all that apply)?  After meals    Have you had any blood sugars above 200?  Yes     Have you had any blood sugars below 70?  Yes     What symptoms do you notice when your blood sugar is low?  Other:     What concerns do you have today about your diabetes? Other: Seems to be elevated now with infection with his teeth     Do you have any of these symptoms? (Select all that apply)  No numbness or tingling in feet.  No redness, sores or blisters on feet.  No complaints of excessive thirst.  No reports of blurry vision.  No significant changes to weight.     Have you had a diabetic eye exam in the last 12 months? 06/2019    Diabetes Management Resources    Hyperlipidemia Follow-Up      Are you having any of the following symptoms? (Select all that apply)  Shortness of breath    Are you regularly  "taking any medication or supplement to lower your cholesterol?   Yes- taking    Are you having muscle aches or other side effects that you think could be caused by your cholesterol lowering medication?      Hypertension Follow-up      Do you check your blood pressure regularly outside of the clinic? No     Are you following a low salt diet? Yes    Are your blood pressures ever more than 140 on the top number (systolic) OR more   than 90 on the bottom number (diastolic), for example 140/90?     BP Readings from Last 2 Encounters:   06/06/19 142/68   05/30/19 136/62     Hemoglobin A1C (%)   Date Value   05/17/2018 8.0 (H)   02/05/2018 7.5 (H)     LDL Cholesterol Calculated (mg/dL)   Date Value   02/05/2018 50   11/07/2016 32       Amount of exercise or physical activity: unable    Problems taking medications regularly: No    Medication side effects: none    Last clinic visit:January   Medication or other changes at last visit:  Weight since last visit? 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 RAST for bakers yeast negative 2002. Has occupational exposures with related worsening asthmatic symptoms. CT 2/08- no fibrosis.       Moderate persistent asthma      Priority: High     PFTS 1997 - FEV1- 3.39 (64%), ratio 0.74, 24% change bap58-53% with bronchodilators,  TLC 86%, %, DLCO 78%. Methacholine challenge positive at level 7 2001.  PFTS 2004 - FEV1- 1.93 (63%), ratio 0.77. PFTS 4/08 - FEV1- 2.13 (72%), ratio 0.71, no change with bronchodilators,  TLC 78%, RV 96%, DLCO 64%          Chronic ischemic heart disease      Priority: High     atypical chest pain 2001 with GXT echo- decreased LVF, angiogram 2001- " "nonobstructing 3 vessel disease.   Repeat angio 2004- prox RCA 50-60%,  OM2- 40%, D1 30-40%, LAD 30-40%.   Cardiolite 2005 during hospitalization for SOB in [place] reported to reveal no ischemia.   Adenosine cardiolite 1/08- small slightly reversible inferior defect, most likely consistent with diaphragm attenuation with bowel uptake artifact. USA, Angio 11/09- Severe LM disease, severe subtotal occlusion of a large branch OM1.    S/p CABG x2 11/09- LIMA-D1, SVG to OM1, OM2. GXT 8/10- 5.8 mets, 121 (80%) HR, normal ekg, cardiolite- small fixed inferior/basal defect with small area khurram-infarct ischemia extending into the mid ventricle. EF 67%.  4/25/13:adenosine cardiolyte stress test through [facility] with normal EF and no ischemia.   1/16/2016 nuclear stress test:1.  Myocardial perfusion imaging using single isotope technique demonstrated no evidence for myocardial ischemia. 2. Gated images demonstrated normal wall motion with a calculated ejection fraction of 61%.  3. Compared to the prior study from 2011 there are no significant changes .           Acute calculous cholecystitis 03/02/2019     Priority: Medium     Abnormal blood-gas measurement 09/04/2018     Priority: Medium     Encephalopathy 09/03/2018     Priority: Medium     Type 2 diabetes mellitus with diabetic chronic kidney disease (H) 10/30/2015     Priority: Medium     Altered mental status 12/31/2014     Priority: Medium     12/27/2014:episode confusion for 20 minutes.  Seen at [facility]/-hospitalized.  Head CT, MRI/MRA all normal.  PET cardiac perfusion stress test normal.  Neurology consult-diagnosis= possible hypoglycemia, migraine variant.  \"Acute ischemic cerebral event was excluded\".       Chronic kidney disease, stage 2 (mild) 05/06/2014     Priority: Medium     9/9/2015:He has had two abnormal MAC in the past.        COPD (chronic obstructive pulmonary disease) (H) 11/18/2013     Priority: Medium     PFT 11/15/13 results:FVC=59%, FEV1=54%, " FEV1/FVC=92%.  His DLCO was 58%1. Spirometry: FEV1 moderately reduced. FVC moderately reduced. FEV1/FVC ratio reduced. 2. Bronchodilator Response: A 14% improvement is seen in FEV1 with bronchodilators. 3. Lung Volumes: Total lung capacity normal. Residual volume increased. Residual volume total lung capacity ratio increased. 4. DLCO: Moderately reduced.   INTERPRETATION: Moderate obstructive lung disease. Reversibility with bronchodilators is seen on testing today. Lung volumes show evidence for air trapping. DLCO is reduced, consistent with loss of capillary-alveolar exchange as in emphysema, pulmonary hypertension, chronic pulmonary embolus, pulmonary fibrosis or other pulmonary vascular disease. A concomitant restrictive lung disease process may be suspected on the basis of moderate reductions in FEV1 and FVC, with a fairly well-preserved FEV1/FVC ratio and a borderline low normal total lung capacity.   11/15/13:exercise oximetry did not show significant desaturation below 91%.  PFT February, 2014 results:FVC=54%, FEV1=48%, FEV1/FVC=64%.  DLCO=59%  9/6/2016 pulmonary consult:COPD with restrictional components and diffusion defect, history Baker's lung.          Hyperlipidemia LDL goal <70 04/17/2013     Priority: Medium     Memory loss 05/03/2010     Priority: Medium     MMSE 27/30 (0/3 attention recall, ?history of dylexia), PHQ9-16 7/09. Recommended neurocogntive testing, did not complete.  MMSE 23/30 6/10 (attention, county, if/and/buts).Neurocogntive evaluation  1/10- not suspicious for emerging dementia. Felt likely due hearing loss/dyslexia.   10/29/2018:He was seen at [facility] 10/10/2018.  Dr. Oksana Munoz.   He had an EEG.   Diagnosed with Alzheimer's dementia.   Records not yet reviewed.        Atrial fibrillation (H) 12/14/2009     Priority: Medium     After CABG 11/09       CVA (cerebral vascular accident) (H) 11/27/2009     Priority: Medium     Mild Broca's aphasia, confusion, right  hand dysesthesia after angio. MRA new small area of restricted diffusion in the white matter of the medial right temporal lobe,  consistent with a recent small infarct. Speech problems resolved, still mild right hand problems.         Obstructive sleep apnea 04/14/2008     Priority: Medium     ESS 20/24. Sleep study [facility]: 4/8/08. total sleep time was 423.0 minutes. The sleep latency was normal at 9.7 minutes.  REM sleep latency was 161.5 minutes. Sleep efficiency was normal at 82.7%. The sleep architecture was disrupted with frequent sleep stage changes and arousals. Snoring: Was reported as moderately loud. Lowest O2 saturation was 87.0%. This study is suggestive of mild sleep apnea, severe during REM sleep (21% TST).  PLM index was 0.0. EKG:  No significant cardiac arrhythmias were noted.         Hypertrophy of prostate without urinary obstruction      Priority: Medium     Elevated PSA. Bx negative 2004.       Health Care Home 08/11/2014     Priority: Low     State Tier Level:    Status:  Unable to re-connect  Care Coordinator:  Kecia Mills    See Letters for [facility] Care Plan  Date:  August 11, 2014             Advanced care planning/counseling discussion 09/24/2012     Priority: Low     Does not have a directive 9/12       Dyslexia 11/11/2010     Priority: Low     essentially unable to read       CTS (carpal tunnel syndrome) 05/12/2010     Priority: Low     EMG 5/10-  median neuropathy at the right wrist, moderate in severity electrically, right ulnar neuropathy localizing to the elbow, mild chronic right C6 radiculopathy without evidence for acute denervation.        Benign paroxysmal vertigo 02/03/2009     Priority: Low     Admit 2/09. MRI/MRA head and neck negative.       Sensorineural hearing loss, bilateral 10/08/2007     Priority: Low     Esophageal reflux 11/06/2006     Priority: Low     PSORIASIS      Priority: Low     OVERACTIVE BLADDER      Priority: Low     PVR 84 2004.       Current Outpatient  Medications   Medication Sig Dispense Refill     albuterol (PROAIR HFA/PROVENTIL HFA/VENTOLIN HFA) 108 (90 Base) MCG/ACT Inhaler Inhale 2 puffs into the lungs every 6 hours as needed for shortness of breath / dyspnea or wheezing 1 Inhaler 1     aspirin (ASA) 325 MG EC tablet Take 325 mg by mouth daily       atorvastatin (LIPITOR) 80 MG tablet TAKE 1 TABLET BY MOUTH ONCE DAILY 90 tablet 3     blood glucose (NO BRAND SPECIFIED) lancets standard Use to test blood sugar 3 times daily. Accu Chek Brand 300 each 1     BROVANA 15 MCG/2ML NEBU neb solution INHALE ONE VIAL VIA NEBULIZER BY MOUTH TWO TIMES A  mL 3     budesonide (PULMICORT) 0.5 MG/2ML neb solution INHALE ONE VIAL VIA NEBULIZER TWO TIMES A  mL 11     cyanocobalamin (BL VITAMIN B-12) 500 MCG TABS Take 500 mcg by mouth daily. (Patient taking differently: Take 2,500 mcg by mouth daily ) 90 tablet 4     finasteride (PROSCAR) 5 MG tablet Take 1 tablet (5 mg) by mouth daily 90 tablet 1     fish oil-omega-3 fatty acids (FISH OIL) 1000 MG capsule Take 1 capsule by mouth 2 times daily. 180 capsule 3     lisinopril (PRINIVIL/ZESTRIL) 5 MG tablet Take 1 tablet (5 mg) by mouth daily 90 tablet 3     metFORMIN (GLUCOPHAGE) 1000 MG tablet TAKE 1 TABLET BY MOUTH TWICE DAILY WITH MEALS 180 tablet 3     metoprolol tartrate (LOPRESSOR) 25 MG tablet TAKE 1 TABLET BY MOUTH TWICE DAILY 180 tablet 1     ranitidine (RANITIDINE 150 MAX STRENGTH) 150 MG tablet Take 1 tablet (150 mg) by mouth 2 times daily 180 tablet 1     tamsulosin (FLOMAX) 0.4 MG capsule Take 1 capsule (0.4 mg) by mouth daily (Needs follow-up appointment for this medication) 90 capsule 3     blood glucose (NO BRAND SPECIFIED) test strip Use to test blood sugar 3 times daily Accu Chek Brand (Patient not taking: Reported on 6/6/2019) 300 strip 1     blood glucose monitoring (NO BRAND SPECIFIED) meter device kit Use to test blood sugar 3 times daily. Accu Chek Brand (Patient not taking: Reported on  "6/6/2019) 1 kit 0     cholecalciferol 2000 UNITS tablet Take 1 tablet by mouth daily. 90 tablet 3     furosemide (LASIX) 20 MG tablet Take 20 mg by mouth daily       insulin aspart (NOVOLOG FLEXPEN) 100 UNIT/ML pen INJECT 11 UNITS SUBCUTANEOUSLY DAILY BEFORE BREAKFAST, 11 UNITS DAILY BEFORE LUNCH, AND 11 UNITS DAILY BEFORE DINNER 15 mL 11     insulin detemir (LEVEMIR FLEXTOUCH) 100 UNIT/ML pen Inject 20 Units Subcutaneous At Bedtime 15 mL 11     Insulin Pen Needle (PEN NEEDLES) 31G X 6 MM MISC 1 Units 3 times daily For novolog flexpen (Patient not taking: Reported on 6/6/2019) 90 each 0     insulin syringe-needle U-100 (BD INSULIN SYRINGE ULTRAFINE) 31G X 5/16\" 1 ML Use one syringe 4 times daily or as directed. (Patient not taking: Reported on 6/6/2019) 100 each prn     nitroGLYcerin (NITROSTAT) 0.4 MG sublingual tablet Place 1 tablet (0.4 mg) under the tongue every 5 minutes as needed for chest pain (CALL 911 IF NOT IMPROVED AFTER THREE CONSECUTIVE DOSES) 25 tablet 0     order for DME Equipment being ordered: CPAP  AIRSENSE 10  11-15 CM H2O  [employer] AIR SIZE MED  SN# [id number]  DN# [id number]       ROS:General: POSITIVE for:, appetite a little less.  Sleeping OK.   Resp: POSITIVE for:, Hx COPD, He has shortness of breath bending over.  Some dyspnea on exertion.   CV: No chest pains or palpitations.  Heart beats hard going up stairs.   GI: POSITIVE for:, diarrhea, once in a while.  NO blood.   : POSITIVE for:, urinary frequency  Musculoskeletal: No significant muscle or joint pains  Psychiatric: No problems with anxiety, depression or mental health    OBJECTIVE:Blood pressure 144/68, pulse 66, temperature 97  F (36.1  C), temperature source Tympanic, resp. rate 20, height 1.727 m (5' 8\"), weight 88 kg (194 lb), SpO2 96 %. BMI=Body mass index is 29.5 kg/m .  GENERAL APPEARANCE ADULT: Alert, no acute distress  MS: extremities normal, no peripheral edema   Foot exam:normal DP and PT pulses, no trophic changes or ulcerative " lesions, normal monofilament exam, hammer toe left second with corn and nail exam onychomycosis of the toenails-great toes    ASSESSMENT/PLAN:                                                      (E11.22,  N18.3,  Z79.4) Type 2 diabetes mellitus with stage 3 chronic kidney disease, with long-term current use of insulin (H)  (primary encounter diagnosis)  Comment: due for Hgb A1C.   Plan: Blood tests today.   Pay attention to diet to help control diabetes mellitus.   There are less expensive insulins available.  It may be worth a switch.    You could see diabetic educator to make the switch.     (E78.5) Hyperlipidemia LDL goal <70  Comment: due for recheck  Plan: Non-fasting blood tests.     (J44.9) Chronic obstructive pulmonary disease, unspecified COPD type (H)  Comment: doing OK  Plan: No change in current treatment plan.     (I10) Hypertension goal BP (blood pressure) < 140/90  Comment: your blood pressure is high.   Plan: Increase the lisinopril to 10mg daily.  Take two of the 5mg pills daily to make 10mg.  You new pills will be 10mg.        Lab Results   Component Value Date    A1C 7.7 07/29/2019        Diabetes Type II, A1c Controlled, insulin dependent   Associated with the following complications    Renal Complications:  CKD

## 2019-07-30 NOTE — RESULT ENCOUNTER NOTE
Please call.  Urine tests show positive MAC (microalbumin/creatinine ratio) meaning protein leaking from the kidneys into the urine. This is a sign of some kidney damage from the diabetes mellitus.  It has been present in the past.   Triglycerides, a type of fat found in the bloodstream is high.  Other lipids all OK.   The blood chemistries (Basic metabolic panel) are all normal including electrolytes (salt balances in the blood), liver and kidney tests.  Glucose is high.    Hgb A1C has improved and is OK.    PLAN: No new changes in treatment recommended.    Recheck in 6 months.

## 2019-08-06 ENCOUNTER — OFFICE VISIT (OUTPATIENT)
Dept: FAMILY MEDICINE | Facility: CLINIC | Age: 79
End: 2019-08-06
Payer: COMMERCIAL

## 2019-08-06 VITALS
WEIGHT: 192.2 LBS | BODY MASS INDEX: 29.13 KG/M2 | DIASTOLIC BLOOD PRESSURE: 74 MMHG | RESPIRATION RATE: 16 BRPM | HEART RATE: 96 BPM | SYSTOLIC BLOOD PRESSURE: 112 MMHG | HEIGHT: 68 IN | TEMPERATURE: 97.9 F

## 2019-08-06 DIAGNOSIS — S40.029A CONTUSION OF UPPER EXTREMITY, UNSPECIFIED LATERALITY, INITIAL ENCOUNTER: ICD-10-CM

## 2019-08-06 DIAGNOSIS — D64.9 ANEMIA, UNSPECIFIED TYPE: Primary | ICD-10-CM

## 2019-08-06 LAB
BASOPHILS # BLD AUTO: 0.1 10E9/L (ref 0–0.2)
BASOPHILS NFR BLD AUTO: 0.6 %
CK SERPL-CCNC: 193 U/L (ref 30–300)
DIFFERENTIAL METHOD BLD: NORMAL
EOSINOPHIL # BLD AUTO: 0.1 10E9/L (ref 0–0.7)
EOSINOPHIL NFR BLD AUTO: 0.9 %
ERYTHROCYTE [DISTWIDTH] IN BLOOD BY AUTOMATED COUNT: 13.8 % (ref 10–15)
HCT VFR BLD AUTO: 45 % (ref 40–53)
HGB BLD-MCNC: 14.9 G/DL (ref 13.3–17.7)
LYMPHOCYTES # BLD AUTO: 2.8 10E9/L (ref 0.8–5.3)
LYMPHOCYTES NFR BLD AUTO: 34.6 %
MCH RBC QN AUTO: 29.6 PG (ref 26.5–33)
MCHC RBC AUTO-ENTMCNC: 33.1 G/DL (ref 31.5–36.5)
MCV RBC AUTO: 90 FL (ref 78–100)
MONOCYTES # BLD AUTO: 0.6 10E9/L (ref 0–1.3)
MONOCYTES NFR BLD AUTO: 7.8 %
NEUTROPHILS # BLD AUTO: 4.6 10E9/L (ref 1.6–8.3)
NEUTROPHILS NFR BLD AUTO: 56.1 %
PLATELET # BLD AUTO: 183 10E9/L (ref 150–450)
RBC # BLD AUTO: 5.03 10E12/L (ref 4.4–5.9)
WBC # BLD AUTO: 8.2 10E9/L (ref 4–11)

## 2019-08-06 PROCEDURE — 99214 OFFICE O/P EST MOD 30 MIN: CPT | Performed by: NURSE PRACTITIONER

## 2019-08-06 PROCEDURE — 82550 ASSAY OF CK (CPK): CPT | Performed by: NURSE PRACTITIONER

## 2019-08-06 PROCEDURE — 36415 COLL VENOUS BLD VENIPUNCTURE: CPT | Performed by: NURSE PRACTITIONER

## 2019-08-06 PROCEDURE — 85025 COMPLETE CBC W/AUTO DIFF WBC: CPT | Performed by: NURSE PRACTITIONER

## 2019-08-06 ASSESSMENT — ASTHMA QUESTIONNAIRES
QUESTION_4 LAST FOUR WEEKS HOW OFTEN HAVE YOU USED YOUR RESCUE INHALER OR NEBULIZER MEDICATION (SUCH AS ALBUTEROL): NOT AT ALL
ACUTE_EXACERBATION_TODAY: NO
QUESTION_1 LAST FOUR WEEKS HOW MUCH OF THE TIME DID YOUR ASTHMA KEEP YOU FROM GETTING AS MUCH DONE AT WORK, SCHOOL OR AT HOME: NONE OF THE TIME
QUESTION_2 LAST FOUR WEEKS HOW OFTEN HAVE YOU HAD SHORTNESS OF BREATH: NOT AT ALL
QUESTION_5 LAST FOUR WEEKS HOW WOULD YOU RATE YOUR ASTHMA CONTROL: COMPLETELY CONTROLLED
QUESTION_3 LAST FOUR WEEKS HOW OFTEN DID YOUR ASTHMA SYMPTOMS (WHEEZING, COUGHING, SHORTNESS OF BREATH, CHEST TIGHTNESS OR PAIN) WAKE YOU UP AT NIGHT OR EARLIER THAN USUAL IN THE MORNING: NOT AT ALL
ACT_TOTALSCORE: 25

## 2019-08-06 ASSESSMENT — MIFFLIN-ST. JEOR: SCORE: 1556.31

## 2019-08-06 NOTE — PATIENT INSTRUCTIONS
Labs today.   If all ok there and bruising does not resolve then see  PCP for a possible biopsy.

## 2019-08-06 NOTE — LETTER
August 7, 2019      Alvaro G Palestine  [address] [facility] [address]        Dear ,    We are writing to inform you of your test results.    Normal red-white blood cell count   Normal platelets   No explanation for the bruising   Call or return to the clinic with any worsening of symptoms or no resolution    Resulted Orders   CBC with platelets and differential   Result Value Ref Range    WBC 8.2 4.0 - 11.0 10e9/L    RBC Count 5.03 4.4 - 5.9 10e12/L    Hemoglobin 14.9 13.3 - 17.7 g/dL    Hematocrit 45.0 40.0 - 53.0 %    MCV 90 78 - 100 fl    MCH 29.6 26.5 - 33.0 pg    MCHC 33.1 31.5 - 36.5 g/dL    RDW 13.8 10.0 - 15.0 %    Platelet Count 183 150 - 450 10e9/L    % Neutrophils 56.1 %    % Lymphocytes 34.6 %    % Monocytes 7.8 %    % Eosinophils 0.9 %    % Basophils 0.6 %    Absolute Neutrophil 4.6 1.6 - 8.3 10e9/L    Absolute Lymphocytes 2.8 0.8 - 5.3 10e9/L    Absolute Monocytes 0.6 0.0 - 1.3 10e9/L    Absolute Eosinophils 0.1 0.0 - 0.7 10e9/L    Absolute Basophils 0.1 0.0 - 0.2 10e9/L    Diff Method Automated Method        If you have any questions or concerns, please call the clinic at the number listed above.       Sincerely,        Kecia Dale, APRN CNP/adw

## 2019-08-06 NOTE — LETTER
My Asthma Action Plan  Name: Alvaro Horton   YOB: 1940  Date: 8/6/2019   My doctor: THOMPSON Lozano CNP   My clinic: [facility]        My Control Medicine:    My Asthma Severity: mild persistent  Avoid your asthma triggers: Patient is unaware of triggers               GREEN ZONE   Good Control    I feel good    No cough or wheeze    Can work, sleep and play without asthma symptoms       Take your asthma control medicine every day.     1. If exercise triggers your asthma, take your rescue medication    15 minutes before exercise or sports, and    During exercise if you have asthma symptoms  2. Spacer to use with inhaler: If you have a spacer, make sure to use it with your inhaler             YELLOW ZONE Getting Worse  I have ANY of these:    I do not feel good    Cough or wheeze    Chest feels tight    Wake up at night   1. Keep taking your Green Zone medications  2. Start taking your rescue medicine:    every 20 minutes for up to 1 hour. Then every 4 hours for 24-48 hours.  3. If you stay in the Yellow Zone for more than 12-24 hours, contact your doctor.  4. If you do not return to the Green Zone in 12-24 hours or you get worse, start taking your oral steroid medicine if prescribed by your provider.           RED ZONE Medical Alert - Get Help  I have ANY of these:    I feel awful    Medicine is not helping    Breathing getting harder    Trouble walking or talking    Nose opens wide to breathe       1. Take your rescue medicine NOW  2. If your provider has prescribed an oral steroid medicine, start taking it NOW  3. Call your doctor NOW  4. If you are still in the Red Zone after 20 minutes and you have not reached your doctor:    Take your rescue medicine again and    Call 911 or go to the emergency room right away    See your regular doctor within 2 weeks of an Emergency Room or Urgent Care visit for follow-up treatment.          Annual Reminders:  Meet with  Asthma Educator,  Flu Shot in the Fall, consider Pneumonia Vaccination for patients with asthma (aged 19 and older).    Pharmacy:    [facility] - [place], [place] - [address] [facility] [place] - [place], [place] - [address]                      Asthma Triggers  How To Control Things That Make Your Asthma Worse    Triggers are things that make your asthma worse.  Look at the list below to help you find your triggers and what you can do about them.  You can help prevent asthma flare-ups by staying away from your triggers.      Trigger                                                          What you can do   Cigarette Smoke  Tobacco smoke can make asthma worse. Do not allow smoking in your home, car or around you.  Be sure no one smokes at a child s day care or school.  If you smoke, ask your health care provider for ways to help you quit.  Ask family members to quit too.  Ask your health care provider for a referral to Quit Plan to help you quit smoking, or call 2-886-484-PLAN.     Colds, Flu, Bronchitis  These are common triggers of asthma. Wash your hands often.  Don t touch your eyes, nose or mouth.  Get a flu shot every year.     Dust Mites  These are tiny bugs that live in cloth or carpet. They are too small to see. Wash sheets and blankets in hot water every week.   Encase pillows and mattress in dust mite proof covers.  Avoid having carpet if you can. If you have carpet, vacuum weekly.   Use a dust mask and HEPA vacuum.   Pollen and Outdoor Mold  Some people are allergic to trees, grass, or weed pollen, or molds. Try to keep your windows closed.  Limit time out doors when pollen count is high.   Ask you health care provider about taking medicine during allergy season.     Animal Dander  Some people are allergic to skin flakes, urine or saliva from pets with fur or feathers. Keep pets with fur or feathers out of your home.    If you can t keep the pet outdoors, then keep the pet  out of your bedroom.  Keep the bedroom door closed.  Keep pets off cloth furniture and away from stuffed toys.     Mice, Rats, and Cockroaches  Some people are allergic to the waste from these pests.   Cover food and garbage.  Clean up spills and food crumbs.  Store grease in the refrigerator.   Keep food out of the bedroom.   Indoor Mold  This can be a trigger if your home has high moisture. Fix leaking faucets, pipes, or other sources of water.   Clean moldy surfaces.  Dehumidify basement if it is damp and smelly.   Smoke, Strong Odors, and Sprays  These can reduce air quality. Stay away from strong odors and sprays, such as perfume, powder, hair spray, paints, smoke incense, paint, cleaning products, candles and new carpet.   Exercise or Sports  Some people with asthma have this trigger. Be active!  Ask your doctor about taking medicine before sports or exercise to prevent symptoms.    Warm up for 5-10 minutes before and after sports or exercise.     Other Triggers of Asthma  Cold air:  Cover your nose and mouth with a scarf.  Sometimes laughing or crying can be a trigger.  Some medicines and food can trigger asthma.

## 2019-08-06 NOTE — NURSING NOTE
"Initial /74 (BP Location: Right arm, Patient Position: Sitting, Cuff Size: Adult Regular)   Pulse 96   Temp 97.9  F (36.6  C) (Tympanic)   Resp 16   Ht 1.727 m (5' 8\")   Wt 87.2 kg (192 lb 3.2 oz)   BMI 29.22 kg/m   Estimated body mass index is 29.22 kg/m  as calculated from the following:    Height as of this encounter: 1.727 m (5' 8\").    Weight as of this encounter: 87.2 kg (192 lb 3.2 oz). .    Brigid Milligan on 8/6/2019 at 1:24 PM    "

## 2019-08-07 ASSESSMENT — ASTHMA QUESTIONNAIRES: ACT_TOTALSCORE: 25

## 2019-09-16 ENCOUNTER — TELEPHONE (OUTPATIENT)
Dept: FAMILY MEDICINE | Facility: CLINIC | Age: 79
End: 2019-09-16

## 2019-09-16 DIAGNOSIS — J44.9 CHRONIC OBSTRUCTIVE PULMONARY DISEASE, UNSPECIFIED COPD TYPE (H): ICD-10-CM

## 2019-09-16 RX ORDER — ARFORMOTEROL TARTRATE 15 UG/2ML
SOLUTION RESPIRATORY (INHALATION)
Qty: 120 ML | Refills: 0 | Status: SHIPPED | OUTPATIENT
Start: 2019-09-16 | End: 2020-03-06

## 2019-09-16 NOTE — TELEPHONE ENCOUNTER
Requested Prescriptions   Pending Prescriptions Disp Refills     BROVANA 15 MCG/2ML NEBU neb solution [Pharmacy Med Name: BROVANA 15MCG/2ML NEBU] 120 mL 3     Sig: INHALE ONE VIAL VIA NEBULIZER BY MOUTH TWO TIMES A DAY       There is no refill protocol information for this order        Last Written Prescription Date:  11/26/2018  Last Fill Quantity: 120 ml,  # refills: 3   Last office visit: 8/6/2019 with provider:  Kecia Dale,    PCP is Dr Carreno, and last Rx'd this.   Future Office Visit: none scheduled.   Maya Klein RNC

## 2019-09-16 NOTE — TELEPHONE ENCOUNTER
Pt called stating he's having a lot of difficulty breathing and asked if this could be refilled Soon.     Thanks, [facility]

## 2019-09-25 ENCOUNTER — OFFICE VISIT (OUTPATIENT)
Dept: FAMILY MEDICINE | Facility: CLINIC | Age: 79
End: 2019-09-25
Payer: COMMERCIAL

## 2019-09-25 VITALS
HEART RATE: 77 BPM | SYSTOLIC BLOOD PRESSURE: 158 MMHG | HEIGHT: 68 IN | OXYGEN SATURATION: 95 % | TEMPERATURE: 97.3 F | WEIGHT: 196 LBS | BODY MASS INDEX: 29.7 KG/M2 | DIASTOLIC BLOOD PRESSURE: 78 MMHG

## 2019-09-25 DIAGNOSIS — N18.30 TYPE 2 DIABETES MELLITUS WITH STAGE 3 CHRONIC KIDNEY DISEASE, WITH LONG-TERM CURRENT USE OF INSULIN (H): ICD-10-CM

## 2019-09-25 DIAGNOSIS — Z79.4 TYPE 2 DIABETES MELLITUS WITH STAGE 3 CHRONIC KIDNEY DISEASE, WITH LONG-TERM CURRENT USE OF INSULIN (H): ICD-10-CM

## 2019-09-25 DIAGNOSIS — J44.9 CHRONIC OBSTRUCTIVE PULMONARY DISEASE, UNSPECIFIED COPD TYPE (H): Primary | ICD-10-CM

## 2019-09-25 DIAGNOSIS — E11.22 TYPE 2 DIABETES MELLITUS WITH STAGE 3 CHRONIC KIDNEY DISEASE, WITH LONG-TERM CURRENT USE OF INSULIN (H): ICD-10-CM

## 2019-09-25 PROCEDURE — 99213 OFFICE O/P EST LOW 20 MIN: CPT | Performed by: FAMILY MEDICINE

## 2019-09-25 RX ORDER — ARFORMOTEROL TARTRATE 15 UG/2ML
SOLUTION RESPIRATORY (INHALATION)
Qty: 120 ML | Refills: 0 | Status: CANCELLED | OUTPATIENT
Start: 2019-09-25

## 2019-09-25 RX ORDER — ALBUTEROL SULFATE 90 UG/1
2 AEROSOL, METERED RESPIRATORY (INHALATION) EVERY 6 HOURS PRN
Qty: 1 INHALER | Refills: 11 | Status: SHIPPED | OUTPATIENT
Start: 2019-09-25

## 2019-09-25 ASSESSMENT — MIFFLIN-ST. JEOR: SCORE: 1573.55

## 2019-09-25 NOTE — PROGRESS NOTES
SUBJECTIVE: Alvaro Horton is a 79 year old male  Chief Complaint   Patient presents with     Breathing Problem     would like to see if he qualifies for supplemental oxygen because his current medication is too expensive.       Last clinic visit: with me on 7/29/2019.  I had increased his lisinopril.     Breathing Concerns  Shortness of breath       Duration: years    Description (location/character/radiation): if goes up stairs, has to stop about half way. Working outside will cause shortness of breath.     Intensity:  severe    Accompanying signs and symptoms: lightheaded/dizzy; heart pounding     History (similar episodes/previous evaluation): was seeing a lung specialist in [place] in [place].     Precipitating or alleviating factors: COPD;    Therapies tried and outcome: emergency inhaler; BROVANA NEBULIZER     He has had recent insurance change.   He has had increase in medication cost.   Medications too expensive.  In particular Brovana-Arformoterol which is nebulized.  This has been mixed with budesonide.   These are too expensive.   He has been cutting doses in half.   He had Spiriva added in July.  Also too expensive.   He has seen [facility] pharmacy in May.     Breathing has not been good.  He has not been using Spiriva.   He has pulmonology follow-up in October.     Asking if he would qualify for oxygen.     Insulin is very expensive.  $400 a month.   Currently on Levemir 20 units once daily.  Taking Novolog 11 units three times daily.   Lab Results   Component Value Date    A1C 7.7 07/29/2019       Patient Active Problem List   Diagnosis     Chronic ischemic heart disease     Moderate persistent asthma     PSORIASIS     Hypertrophy of prostate without urinary obstruction     OVERACTIVE BLADDER     BAKERS LUNG     Hypertension goal BP (blood pressure) < 140/90     Esophageal reflux     Sensorineural hearing loss, bilateral     Obstructive sleep apnea     Benign paroxysmal vertigo     CVA  "(cerebral vascular accident) (H)     Atrial fibrillation (H)     Memory loss     CTS (carpal tunnel syndrome)     Dyslexia     Advanced care planning/counseling discussion     Hyperlipidemia LDL goal <70     COPD (chronic obstructive pulmonary disease) (H)     Chronic kidney disease, stage 2 (mild)     Health Care Home     Altered mental status     Type 2 diabetes mellitus with diabetic chronic kidney disease (H)     Encephalopathy     Abnormal blood-gas measurement     Acute calculous cholecystitis        OBJECTIVE:Blood pressure (!) 158/78, pulse 77, temperature 97.3  F (36.3  C), temperature source Tympanic, height 1.727 m (5' 8\"), weight 88.9 kg (196 lb), SpO2 95 %. BMI=Body mass index is 29.8 kg/m .  GENERAL APPEARANCE ADULT: Alert, no acute distress     ASSESSMENT:   (J44.9) Chronic obstructive pulmonary disease, unspecified COPD type (H)  (primary encounter diagnosis)  Comment: Medications for lungs are too expensive  Plan: Options are to see [facility] pharmacist or pulmonology.   You are seeing pulmonology in October.  They can help you adjust your medications.    If you would like to see Eric the [facility] pharmacist, I can do referral.    We will test for low oxygen with exercise today.     (E11.22,  N18.3,  Z79.4) Type 2 diabetes mellitus with stage 3 chronic kidney disease, with long-term current use of insulin (H)  Comment: Insulin too expensive  Plan: We can make switch to less expensive insulin.    Schedule an appointment with diabetic educatorAntonella to help you make the switch to less expensive insulin.      "

## 2019-09-25 NOTE — PATIENT INSTRUCTIONS
ASSESSMENT:   (J44.9) Chronic obstructive pulmonary disease, unspecified COPD type (H)  (primary encounter diagnosis)  Comment: Medications for lungs are too expensive  Plan: Options are to see [facility] pharmacist or pulmonology.   You are seeing pulmonology in October.  They can help you adjust your medications.    If you would like to see Eric the [facility] pharmacist, I can do referral.    We will test for low oxygen with exercise today.     (E11.22,  N18.3,  Z79.4) Type 2 diabetes mellitus with stage 3 chronic kidney disease, with long-term current use of insulin (H)  Comment: Insulin too expensive  Plan: We can make switch to less expensive insulin.    Schedule an appointment with diabetic educatorAntonella to help you make the switch to less expensive insulin.

## 2019-10-22 ENCOUNTER — TELEPHONE (OUTPATIENT)
Dept: EDUCATION SERVICES | Facility: CLINIC | Age: 79
End: 2019-10-22

## 2019-10-22 ENCOUNTER — ALLIED HEALTH/NURSE VISIT (OUTPATIENT)
Dept: EDUCATION SERVICES | Facility: CLINIC | Age: 79
End: 2019-10-22
Payer: COMMERCIAL

## 2019-10-22 DIAGNOSIS — E11.22 TYPE 2 DIABETES MELLITUS WITH STAGE 3 CHRONIC KIDNEY DISEASE, WITH LONG-TERM CURRENT USE OF INSULIN (H): Primary | ICD-10-CM

## 2019-10-22 DIAGNOSIS — N18.30 TYPE 2 DIABETES MELLITUS WITH STAGE 3 CHRONIC KIDNEY DISEASE, WITH LONG-TERM CURRENT USE OF INSULIN (H): Primary | ICD-10-CM

## 2019-10-22 DIAGNOSIS — Z79.4 TYPE 2 DIABETES MELLITUS WITH STAGE 3 CHRONIC KIDNEY DISEASE, WITH LONG-TERM CURRENT USE OF INSULIN (H): Primary | ICD-10-CM

## 2019-10-22 PROCEDURE — G0108 DIAB MANAGE TRN  PER INDIV: HCPCS | Performed by: DIETITIAN, REGISTERED

## 2019-10-22 NOTE — PATIENT INSTRUCTIONS
DO NOT TAKE THE NOVOLIN 70/30 until you are done with other insulin.  When you switch you will only take the 70/30 and you should start it before supper.      Novolin 70/30:  (make sure you roll it 25 times to mix it)    -22 units 30 minutes before breakfast and 22 units 30 units before supper    -Increase the breakfast dose by 2 units every 3 days if the before supper readings are over 120.    -Increase the supper dose by 2 units every 3 days  if the morning numbers are over 120.    Call Antonella [phone] with blood sugars every week.      Get glucose tablets for low blood sugars.  Need to chew 4 tablets for a low.

## 2019-10-22 NOTE — PROGRESS NOTES
"Diabetes Self-Management Education & Support    Diabetes Education Self Management & Training    SUBJECTIVE/OBJECTIVE:  Presents for: Individual review  Accompanied by: Self  Diabetes education in the past 24mo: No  Focus of Visit: Being Active, Problem Solving, Healthy Eating  Diabetes type: Type 2  Disease course: Getting harder to manage  How confident are you filling out medical forms by yourself:: Not Assessed  Transportation concerns: No  Other concerns:: None  Cultural Influences/Ethnic Background:  American      Diabetes Symptoms & Complications          Patient Problem List and Family Medical History reviewed for relevant medical history, current medical status, and diabetes risk factors.    Vitals:  There were no vitals taken for this visit.  Estimated body mass index is 29.8 kg/m  as calculated from the following:    Height as of 9/25/19: 1.727 m (5' 8\").    Weight as of 9/25/19: 88.9 kg (196 lb).   Last 3 BP:   BP Readings from Last 3 Encounters:   09/25/19 (!) 158/78   08/06/19 112/74   07/29/19 144/68       History   Smoking Status     Never Smoker   Smokeless Tobacco     Never Used       Labs:  Lab Results   Component Value Date    A1C 7.7 07/29/2019     Lab Results   Component Value Date     07/29/2019     Lab Results   Component Value Date    LDL 61 07/29/2019     HDL Cholesterol   Date Value Ref Range Status   07/29/2019 50 >39 mg/dL Final   ]  GFR Estimate   Date Value Ref Range Status   07/29/2019 65 >60 mL/min/[1.73_m2] Final     Comment:     Non  GFR Calc  Starting 12/18/2018, serum creatinine based estimated GFR (eGFR) will be   calculated using the Chronic Kidney Disease Epidemiology Collaboration   (CKD-EPI) equation.       GFR Estimate If Black   Date Value Ref Range Status   07/29/2019 76 >60 mL/min/[1.73_m2] Final     Comment:      GFR Calc  Starting 12/18/2018, serum creatinine based estimated GFR (eGFR) will be   calculated using the Chronic Kidney " Disease Epidemiology Collaboration   (CKD-EPI) equation.       Lab Results   Component Value Date    CR 1.07 07/29/2019     No results found for: MICROALBUMIN    Healthy Eating  Healthy Eating Assessed Today: Yes  Cultural/Taoism diet restrictions?: No  Breakfast: two pcs of toast with strawberry jam and a bowl of cereal  Lunch: not much for lunch: bowl of soup, crackers or leftovers  Dinner: stuffed green peppers or meat, veggies, starch  Has patient met with a dietitian in the past?: No    Being Active   not real active  Plays with his dogs    Monitoring  130-150 in the am  116-120 before supper    Taking Medications  Diabetes Medication(s)     Biguanides       metFORMIN (GLUCOPHAGE) 1000 MG tablet    TAKE 1 TABLET BY MOUTH TWICE DAILY WITH MEALS    Insulin       insulin aspart (NOVOLOG FLEXPEN) 100 UNIT/ML pen    INJECT 11 UNITS SUBCUTANEOUSLY DAILY BEFORE BREAKFAST, 11 UNITS DAILY BEFORE LUNCH, AND 11 UNITS DAILY BEFORE DINNER     insulin detemir (LEVEMIR FLEXTOUCH) 100 UNIT/ML pen    Inject 20 Units Subcutaneous At Bedtime               Problem Solving     Not assessed            Reducing Risks   not assessed    Healthy Coping     Patient Activation Measure Survey Score:  MEHDI Score (Last Two) 9/5/2014   MEHDI Raw Score 34   Activation Score 43.4   MEHDI Level 1       ASSESSMENT:  Wants to switch to cheaper insulin due to cost.  Went over Novolin 70/30 Relion brand with pt and how to increase the dosing.  PT showed good understanding.        Patient's most recent   Lab Results   Component Value Date    A1C 7.7 07/29/2019    is  meeting goal of <7.0    INTERVENTION:   Diabetes knowledge and skills assessment:     Patient is knowledgeable in diabetes management concepts related to: Healthy Eating, Being Active and Monitoring    Patient needs further education on the following diabetes management concepts: Healthy Eating, Being Active, Monitoring, Taking Medication, Problem Solving, Reducing Risks and Healthy  Coping    Based on learning assessment above, most appropriate setting for further diabetes education would be: Individual setting.    Education provided today on:  AADE Self-Care Behaviors:  Healthy Eating: consistency in amount, composition, and timing of food intake  Being Active: relationship to blood glucose and describe appropriate activity program  Monitoring: individual blood glucose targets  Taking Medication: action of prescribed medication, drawing up, administering and storing injectable diabetes medications, proper site selection and rotation for injections, side effects of prescribed medications and when to take medications    Opportunities for ongoing education and support in diabetes-self management were discussed.    Pt verbalized understanding of concepts discussed and recommendations provided today.       Education Materials Provided:  Hypoglycemia Signs and Symptoms    PLAN:  See Patient Instructions for co-developed, patient-stated behavior change goals.  AVS printed and provided to patient today. See Follow-Up section for recommended follow-up.      Time Spent: 40 minutes  Encounter Type: Individual    Any diabetes medication dose changes were made via the CDE Protocol and Collaborative Practice Agreement with the patient's primary care provider. A copy of this encounter was shared with the provider.

## 2019-10-22 NOTE — TELEPHONE ENCOUNTER
Dr Carreno,    Suggest starting Novolin 70/30 for Franklin:     22 units 30 minutes before breakfast and 22 units 30 units before supper    Increase the breakfast dose by 2 units every 3 days if the before supper readings are over 120.    Increase the supper dose by 2 units every 3 days  if the morning numbers are over 120.    Are you ok with this plan?    Antonella Greco RD, CDE

## 2019-10-23 DIAGNOSIS — N18.30 TYPE 2 DIABETES MELLITUS WITH STAGE 3 CHRONIC KIDNEY DISEASE, WITH LONG-TERM CURRENT USE OF INSULIN (H): ICD-10-CM

## 2019-10-23 DIAGNOSIS — E11.22 TYPE 2 DIABETES MELLITUS WITH STAGE 3 CHRONIC KIDNEY DISEASE, WITH LONG-TERM CURRENT USE OF INSULIN (H): ICD-10-CM

## 2019-10-23 DIAGNOSIS — Z79.4 TYPE 2 DIABETES MELLITUS WITH STAGE 3 CHRONIC KIDNEY DISEASE, WITH LONG-TERM CURRENT USE OF INSULIN (H): ICD-10-CM

## 2019-10-23 RX ORDER — BLOOD-GLUCOSE METER
EACH MISCELLANEOUS
Qty: 1 KIT | Refills: 0 | OUTPATIENT
Start: 2019-10-23

## 2019-10-23 NOTE — TELEPHONE ENCOUNTER
"Franklin called. He only needs the test strips and not the Kit. He is testing 3x a day. He will be going to the pharmacy at 4:00 today.    Requested Prescriptions   Pending Prescriptions Disp Refills     Blood Glucose Monitoring Suppl (ACCU-CHEK GUIDE) w/Device KIT [Pharmacy Med Name: ACCU-CHEK GUIDE     KIT] 1 kit 0     Sig: USE AS DIRECTED THREE TIMES DAILY       Diabetic Supplies Protocol Passed - 10/23/2019  9:11 AM        Passed - Medication is active on med list        Passed - Patient is 18 years of age or older        Passed - Recent (6 mo) or future (30 days) visit within the authorizing provider's specialty     Patient had office visit in the last 6 months or has a visit in the next 30 days with authorizing provider.  See \"Patient Info\" tab in inbasket, or \"Choose Columns\" in Meds & Orders section of the refill encounter.            Last Written Prescription Date:  2/6/19  Last Fill Quantity: 300,  # refills: 1   Last office visit: 9/25/2019 with prescribing provider:  Leaf   Future Office Visit:      "

## 2019-11-15 DIAGNOSIS — N40.0 HYPERTROPHY OF PROSTATE WITHOUT URINARY OBSTRUCTION: ICD-10-CM

## 2019-11-15 RX ORDER — FINASTERIDE 5 MG/1
TABLET, FILM COATED ORAL
Qty: 90 TABLET | Refills: 0 | Status: SHIPPED | OUTPATIENT
Start: 2019-11-15 | End: 2020-02-24

## 2019-11-15 NOTE — TELEPHONE ENCOUNTER
"Requested Prescriptions   Pending Prescriptions Disp Refills     finasteride (PROSCAR) 5 MG tablet [Pharmacy Med Name: FINASTERIDE 5MG     TAB] 90 tablet 1     Sig: TAKE 1 TABLET BY MOUTH ONCE DAILY   Last Written Prescription Date:  5/9/19  Last Fill Quantity: 90 tab,  # refills: 1   Last office visit: 9/25/2019 with prescribing provider:  Be Carreno     Future Office Visit:        Alpha Blockers Failed - 11/15/2019  9:16 AM        Failed - Blood pressure under 140/90 in past 12 months     BP Readings from Last 3 Encounters:   09/25/19 (!) 158/78   08/06/19 112/74   07/29/19 144/68                 Passed - Recent (12 mo) or future (30 days) visit within the authorizing provider's specialty     Patient has had an office visit with the authorizing provider or a provider within the authorizing providers department within the previous 12 mos or has a future within next 30 days. See \"Patient Info\" tab in inbasket, or \"Choose Columns\" in Meds & Orders section of the refill encounter.              Passed - Patient does not have Tadalafil, Vardenafil, or Sildenafil on their medication list        Passed - Medication is active on med list        Passed - Patient is 18 years of age or older          "

## 2019-12-11 DIAGNOSIS — Z79.4 TYPE 2 DIABETES MELLITUS WITH STAGE 3 CHRONIC KIDNEY DISEASE, WITH LONG-TERM CURRENT USE OF INSULIN (H): ICD-10-CM

## 2019-12-11 DIAGNOSIS — E11.22 TYPE 2 DIABETES MELLITUS WITH STAGE 3 CHRONIC KIDNEY DISEASE, WITH LONG-TERM CURRENT USE OF INSULIN (H): ICD-10-CM

## 2019-12-11 DIAGNOSIS — N18.30 TYPE 2 DIABETES MELLITUS WITH STAGE 3 CHRONIC KIDNEY DISEASE, WITH LONG-TERM CURRENT USE OF INSULIN (H): ICD-10-CM

## 2019-12-11 NOTE — PROGRESS NOTES
Pt had question regarding new insulin, dropped the dose down to 15 units in am before breakfast and 15 units before supper, will titrate up with phone calls.    Pt said his reading skills are not real good and he prefers I do his insulin increases so I will have him call.    Antonella Greco RD on 12/12/2019 at 9:53 AM

## 2019-12-17 ENCOUNTER — ALLIED HEALTH/NURSE VISIT (OUTPATIENT)
Dept: EDUCATION SERVICES | Facility: CLINIC | Age: 79
End: 2019-12-17
Payer: COMMERCIAL

## 2019-12-17 DIAGNOSIS — N18.30 TYPE 2 DIABETES MELLITUS WITH STAGE 3 CHRONIC KIDNEY DISEASE, WITH LONG-TERM CURRENT USE OF INSULIN (H): Primary | ICD-10-CM

## 2019-12-17 DIAGNOSIS — Z79.4 TYPE 2 DIABETES MELLITUS WITH STAGE 3 CHRONIC KIDNEY DISEASE, WITH LONG-TERM CURRENT USE OF INSULIN (H): Primary | ICD-10-CM

## 2019-12-17 DIAGNOSIS — E11.22 TYPE 2 DIABETES MELLITUS WITH STAGE 3 CHRONIC KIDNEY DISEASE, WITH LONG-TERM CURRENT USE OF INSULIN (H): Primary | ICD-10-CM

## 2019-12-17 PROCEDURE — G0108 DIAB MANAGE TRN  PER INDIV: HCPCS | Performed by: DIETITIAN, REGISTERED

## 2019-12-17 NOTE — PATIENT INSTRUCTIONS
Take 18 units 30 minutes before you eat breakfast and 18 units  30 minutes before you eat supper.    Call me the Week of January 6th with your blood sugar numbers.  [phone].    Get some glucose tablets for low blood sugars, you need to chew 4 of them for lows.

## 2019-12-17 NOTE — PROGRESS NOTES
"Diabetes Self-Management Education & Support    Diabetes Education Self Management & Training    SUBJECTIVE/OBJECTIVE:  Presents for: Individual review  Accompanied by: Self  Diabetes education in the past 24mo: No  Focus of Visit: Being Active, Problem Solving, Healthy Eating  Diabetes type: Type 2  Disease course: Getting harder to manage  How confident are you filling out medical forms by yourself:: Not Assessed  Transportation concerns: No  Other concerns:: None  Cultural Influences/Ethnic Background:  American      Diabetes Symptoms & Complications          Patient Problem List and Family Medical History reviewed for relevant medical history, current medical status, and diabetes risk factors.    Vitals:  There were no vitals taken for this visit.  Estimated body mass index is 29.8 kg/m  as calculated from the following:    Height as of 9/25/19: 1.727 m (5' 8\").    Weight as of 9/25/19: 88.9 kg (196 lb).   Last 3 BP:   BP Readings from Last 3 Encounters:   09/25/19 (!) 158/78   08/06/19 112/74   07/29/19 144/68       History   Smoking Status     Never Smoker   Smokeless Tobacco     Never Used       Labs:  Lab Results   Component Value Date    A1C 7.7 07/29/2019     Lab Results   Component Value Date     07/29/2019     Lab Results   Component Value Date    LDL 61 07/29/2019     HDL Cholesterol   Date Value Ref Range Status   07/29/2019 50 >39 mg/dL Final   ]  GFR Estimate   Date Value Ref Range Status   07/29/2019 65 >60 mL/min/[1.73_m2] Final     Comment:     Non  GFR Calc  Starting 12/18/2018, serum creatinine based estimated GFR (eGFR) will be   calculated using the Chronic Kidney Disease Epidemiology Collaboration   (CKD-EPI) equation.       GFR Estimate If Black   Date Value Ref Range Status   07/29/2019 76 >60 mL/min/[1.73_m2] Final     Comment:      GFR Calc  Starting 12/18/2018, serum creatinine based estimated GFR (eGFR) will be   calculated using the Chronic Kidney " Disease Epidemiology Collaboration   (CKD-EPI) equation.       Lab Results   Component Value Date    CR 1.07 07/29/2019     No results found for: MICROALBUMIN    Healthy Eating  Healthy Eating Assessed Today: Yes  Cultural/Sabianist diet restrictions?: No  Breakfast: two pcs of toast with strawberry jam and a bowl of cereal  Lunch: not much for lunch: bowl of soup, crackers or leftovers  Dinner: stuffed green peppers or meat, veggies, starch.  Meat, potato, veggies  Has patient met with a dietitian in the past?: No    Being Active       Monitoring      Breakfast units Lunch units Supper  units HS   9-Dec                              12-Dec     170      178      200    187    291      175    174     16-Dec 165    189  154    #DIV/0! #DIV/0! #DIV/0! 206 #DIV/0! 177         Taking Medications  Diabetes Medication(s)     Biguanides       metFORMIN (GLUCOPHAGE) 1000 MG tablet    TAKE 1 TABLET BY MOUTH TWICE DAILY WITH MEALS    Insulin       insulin aspart (NOVOLOG FLEXPEN) 100 UNIT/ML pen    INJECT 11 UNITS SUBCUTANEOUSLY DAILY BEFORE BREAKFAST, 11 UNITS DAILY BEFORE LUNCH, AND 11 UNITS DAILY BEFORE DINNER     insulin detemir (LEVEMIR FLEXTOUCH) 100 UNIT/ML pen    Inject 20 Units Subcutaneous At Bedtime     insulin isophane & regular (NOVOLIN 70/30 FLEXPEN RELION) susp    Take 15 units 30 minutes before breakfast and 15 units 30 minutes before supper.               Problem Solving   not assessed              Reducing Risks       Healthy Coping     Patient Activation Measure Survey Score:  MEHDI Score (Last Two) 9/5/2014   MEHDI Raw Score 34   Activation Score 43.4   MEHDI Level 1       ASSESSMENT:  Doing ok, started the Novolin 70/30.  Numbers still higher than we want them, will increase dose to 18 units in am and pm.  He does not eat much for lunch which is helpful with this insulin plan.    HE will call me in three week with his numbers, if he is going low he will call me sooner.    He is mixing insulin well and has  not issues with how to use pens.  5 pens was $10 dollars with his insurance.        Patient's most recent   Lab Results   Component Value Date    A1C 7.7 07/29/2019    is meeting goal of <8.0    INTERVENTION:   Diabetes knowledge and skills assessment:     Patient is knowledgeable in diabetes management concepts related to: Healthy Eating, Being Active and Monitoring    Patient needs further education on the following diabetes management concepts: Healthy Eating, Being Active, Monitoring, Taking Medication, Problem Solving, Reducing Risks and Healthy Coping    Based on learning assessment above, most appropriate setting for further diabetes education would be: Individual setting.    Education provided today on:  AADE Self-Care Behaviors:  Healthy Eating: consistency in amount, composition, and timing of food intake  Being Active: relationship to blood glucose and describe appropriate activity program  Monitoring: individual blood glucose targets    Opportunities for ongoing education and support in diabetes-self management were discussed.    Pt verbalized understanding of concepts discussed and recommendations provided today.       Education Materials Provided:  No new materials provided today    PLAN:  See Patient Instructions for co-developed, patient-stated behavior change goals.  AVS printed and provided to patient today. See Follow-Up section for recommended follow-up.      Time Spent: 30 minutes  Encounter Type: Individual    Any diabetes medication dose changes were made via the CDE Protocol and Collaborative Practice Agreement with the patient's primary care provider. A copy of this encounter was shared with the provider.

## 2019-12-30 DIAGNOSIS — I25.9 CHRONIC ISCHEMIC HEART DISEASE: ICD-10-CM

## 2019-12-30 RX ORDER — METOPROLOL TARTRATE 25 MG/1
TABLET, FILM COATED ORAL
Qty: 30 TABLET | Refills: 0 | Status: SHIPPED | OUTPATIENT
Start: 2019-12-30 | End: 2020-01-20

## 2019-12-30 NOTE — TELEPHONE ENCOUNTER
"Requested Prescriptions   Pending Prescriptions Disp Refills     metoprolol tartrate (LOPRESSOR) 25 MG tablet [Pharmacy Med Name: Metoprolol Tartrate 25 MG Oral Tablet]  0     Sig: TAKE 1 TABLET BY MOUTH TWICE DAILY       Beta-Blockers Protocol Failed - 12/30/2019  9:03 AM        Failed - Blood pressure under 140/90 in past 12 months     BP Readings from Last 3 Encounters:   09/25/19 (!) 158/78   08/06/19 112/74   07/29/19 144/68                 Passed - Patient is age 6 or older        Passed - Recent (12 mo) or future (30 days) visit within the authorizing provider's specialty     Patient has had an office visit with the authorizing provider or a provider within the authorizing providers department within the previous 12 mos or has a future within next 30 days. See \"Patient Info\" tab in inbasket, or \"Choose Columns\" in Meds & Orders section of the refill encounter.              Passed - Medication is active on med list        Last Written Prescription Date:  6/24/19  Last Fill Quantity: 180,  # refills: 1   Last office visit: 9/25/2019 with prescribing provider:     Future Office Visit:      "

## 2020-01-01 ENCOUNTER — TELEPHONE (OUTPATIENT)
Dept: FAMILY MEDICINE | Facility: CLINIC | Age: 80
End: 2020-01-01

## 2020-01-01 ENCOUNTER — TRANSFERRED RECORDS (OUTPATIENT)
Dept: HEALTH INFORMATION MANAGEMENT | Facility: CLINIC | Age: 80
End: 2020-01-01

## 2020-01-01 ENCOUNTER — APPOINTMENT (OUTPATIENT)
Dept: GENERAL RADIOLOGY | Facility: CLINIC | Age: 80
DRG: 177 | End: 2020-01-01
Attending: EMERGENCY MEDICINE
Payer: COMMERCIAL

## 2020-01-01 ENCOUNTER — HOSPITAL ENCOUNTER (INPATIENT)
Facility: CLINIC | Age: 80
LOS: 3 days | Discharge: NURSING, REHAB OR LONG-TERM CARE | DRG: 178 | End: 2020-12-06
Attending: PHYSICIAN ASSISTANT | Admitting: INTERNAL MEDICINE
Payer: COMMERCIAL

## 2020-01-01 ENCOUNTER — HOSPITAL ENCOUNTER (OUTPATIENT)
Dept: ULTRASOUND IMAGING | Facility: CLINIC | Age: 80
Discharge: HOME | End: 2020-08-17
Attending: NURSE PRACTITIONER | Admitting: NURSE PRACTITIONER
Payer: COMMERCIAL

## 2020-01-01 ENCOUNTER — PATIENT OUTREACH (OUTPATIENT)
Dept: NURSING | Facility: CLINIC | Age: 80
End: 2020-01-01
Payer: COMMERCIAL

## 2020-01-01 ENCOUNTER — APPOINTMENT (OUTPATIENT)
Dept: OCCUPATIONAL THERAPY | Facility: CLINIC | Age: 80
DRG: 178 | End: 2020-01-01
Payer: COMMERCIAL

## 2020-01-01 ENCOUNTER — APPOINTMENT (OUTPATIENT)
Dept: CT IMAGING | Facility: CLINIC | Age: 80
DRG: 178 | End: 2020-01-01
Attending: EMERGENCY MEDICINE
Payer: COMMERCIAL

## 2020-01-01 ENCOUNTER — OFFICE VISIT (OUTPATIENT)
Dept: FAMILY MEDICINE | Facility: CLINIC | Age: 80
End: 2020-01-01
Payer: COMMERCIAL

## 2020-01-01 ENCOUNTER — OFFICE VISIT (OUTPATIENT)
Dept: OTOLARYNGOLOGY | Facility: CLINIC | Age: 80
End: 2020-01-01
Payer: COMMERCIAL

## 2020-01-01 ENCOUNTER — APPOINTMENT (OUTPATIENT)
Dept: PHYSICAL THERAPY | Facility: CLINIC | Age: 80
End: 2020-01-01
Payer: COMMERCIAL

## 2020-01-01 ENCOUNTER — APPOINTMENT (OUTPATIENT)
Dept: PHYSICAL THERAPY | Facility: CLINIC | Age: 80
DRG: 177 | End: 2020-01-01
Payer: COMMERCIAL

## 2020-01-01 ENCOUNTER — APPOINTMENT (OUTPATIENT)
Dept: CARDIOLOGY | Facility: CLINIC | Age: 80
DRG: 177 | End: 2020-01-01
Attending: PHYSICIAN ASSISTANT
Payer: COMMERCIAL

## 2020-01-01 ENCOUNTER — DOCUMENTATION ONLY (OUTPATIENT)
Dept: MEDSURG UNIT | Facility: CLINIC | Age: 80
End: 2020-01-01

## 2020-01-01 ENCOUNTER — HOSPITAL ENCOUNTER (OUTPATIENT)
Facility: CLINIC | Age: 80
Setting detail: OBSERVATION
Discharge: NURSING, REHAB OR LONG-TERM CARE | End: 2020-12-27
Attending: EMERGENCY MEDICINE | Admitting: INTERNAL MEDICINE
Payer: COMMERCIAL

## 2020-01-01 ENCOUNTER — APPOINTMENT (OUTPATIENT)
Dept: OCCUPATIONAL THERAPY | Facility: CLINIC | Age: 80
End: 2020-01-01
Payer: COMMERCIAL

## 2020-01-01 ENCOUNTER — OFFICE VISIT (OUTPATIENT)
Dept: CONSULT | Facility: CLINIC | Age: 80
End: 2020-01-01
Attending: PHYSICIAN ASSISTANT
Payer: COMMERCIAL

## 2020-01-01 ENCOUNTER — APPOINTMENT (OUTPATIENT)
Dept: CT IMAGING | Facility: CLINIC | Age: 80
End: 2020-01-01
Attending: EMERGENCY MEDICINE
Payer: COMMERCIAL

## 2020-01-01 ENCOUNTER — APPOINTMENT (OUTPATIENT)
Dept: CT IMAGING | Facility: CLINIC | Age: 80
DRG: 177 | End: 2020-01-01
Attending: EMERGENCY MEDICINE
Payer: COMMERCIAL

## 2020-01-01 ENCOUNTER — APPOINTMENT (OUTPATIENT)
Dept: PHYSICAL THERAPY | Facility: CLINIC | Age: 80
DRG: 178 | End: 2020-01-01
Payer: COMMERCIAL

## 2020-01-01 ENCOUNTER — TELEPHONE (OUTPATIENT)
Dept: OTHER | Facility: CLINIC | Age: 80
End: 2020-01-01

## 2020-01-01 ENCOUNTER — HOSPITAL ENCOUNTER (INPATIENT)
Facility: CLINIC | Age: 80
LOS: 4 days | Discharge: HOME HEALTH | DRG: 177 | End: 2020-11-30
Attending: EMERGENCY MEDICINE | Admitting: INTERNAL MEDICINE
Payer: COMMERCIAL

## 2020-01-01 ENCOUNTER — APPOINTMENT (OUTPATIENT)
Dept: OCCUPATIONAL THERAPY | Facility: CLINIC | Age: 80
DRG: 177 | End: 2020-01-01
Payer: COMMERCIAL

## 2020-01-01 VITALS
BODY MASS INDEX: 27.97 KG/M2 | HEART RATE: 94 BPM | DIASTOLIC BLOOD PRESSURE: 53 MMHG | RESPIRATION RATE: 18 BRPM | WEIGHT: 184.53 LBS | OXYGEN SATURATION: 94 % | HEIGHT: 68 IN | TEMPERATURE: 98 F | SYSTOLIC BLOOD PRESSURE: 126 MMHG

## 2020-01-01 VITALS
WEIGHT: 188.6 LBS | SYSTOLIC BLOOD PRESSURE: 124 MMHG | TEMPERATURE: 97.6 F | OXYGEN SATURATION: 91 % | BODY MASS INDEX: 28.58 KG/M2 | HEIGHT: 68 IN | RESPIRATION RATE: 18 BRPM | HEART RATE: 80 BPM | DIASTOLIC BLOOD PRESSURE: 49 MMHG

## 2020-01-01 VITALS
HEART RATE: 87 BPM | SYSTOLIC BLOOD PRESSURE: 145 MMHG | WEIGHT: 196 LBS | BODY MASS INDEX: 29.8 KG/M2 | DIASTOLIC BLOOD PRESSURE: 79 MMHG

## 2020-01-01 VITALS
OXYGEN SATURATION: 92 % | DIASTOLIC BLOOD PRESSURE: 66 MMHG | SYSTOLIC BLOOD PRESSURE: 115 MMHG | WEIGHT: 175.71 LBS | TEMPERATURE: 98.7 F | RESPIRATION RATE: 20 BRPM | BODY MASS INDEX: 26.72 KG/M2 | HEART RATE: 62 BPM

## 2020-01-01 VITALS
TEMPERATURE: 97.4 F | OXYGEN SATURATION: 97 % | DIASTOLIC BLOOD PRESSURE: 52 MMHG | HEART RATE: 63 BPM | SYSTOLIC BLOOD PRESSURE: 110 MMHG | RESPIRATION RATE: 22 BRPM | BODY MASS INDEX: 29.8 KG/M2 | WEIGHT: 196 LBS

## 2020-01-01 DIAGNOSIS — N18.30 TYPE 2 DIABETES MELLITUS WITH STAGE 3 CHRONIC KIDNEY DISEASE, WITH LONG-TERM CURRENT USE OF INSULIN (H): Primary | ICD-10-CM

## 2020-01-01 DIAGNOSIS — U07.1 CLINICAL DIAGNOSIS OF COVID-19: ICD-10-CM

## 2020-01-01 DIAGNOSIS — I20.89 STABLE ANGINA PECTORIS (H): ICD-10-CM

## 2020-01-01 DIAGNOSIS — E11.22 TYPE 2 DIABETES MELLITUS WITH STAGE 3 CHRONIC KIDNEY DISEASE, WITH LONG-TERM CURRENT USE OF INSULIN (H): ICD-10-CM

## 2020-01-01 DIAGNOSIS — N40.1 BENIGN NON-NODULAR PROSTATIC HYPERPLASIA WITH LOWER URINARY TRACT SYMPTOMS: ICD-10-CM

## 2020-01-01 DIAGNOSIS — I25.9 CHRONIC ISCHEMIC HEART DISEASE: ICD-10-CM

## 2020-01-01 DIAGNOSIS — J44.1 CHRONIC OBSTRUCTIVE PULMONARY DISEASE WITH ACUTE EXACERBATION (H): Chronic | ICD-10-CM

## 2020-01-01 DIAGNOSIS — F02.80 ALZHEIMER'S DEMENTIA WITHOUT BEHAVIORAL DISTURBANCE, UNSPECIFIED TIMING OF DEMENTIA ONSET: ICD-10-CM

## 2020-01-01 DIAGNOSIS — G30.9 ALZHEIMER'S DEMENTIA WITHOUT BEHAVIORAL DISTURBANCE, UNSPECIFIED TIMING OF DEMENTIA ONSET: ICD-10-CM

## 2020-01-01 DIAGNOSIS — I73.9 CLAUDICATION OF CALF MUSCLES (H): ICD-10-CM

## 2020-01-01 DIAGNOSIS — N18.30 TYPE 2 DIABETES MELLITUS WITH STAGE 3 CHRONIC KIDNEY DISEASE, WITH LONG-TERM CURRENT USE OF INSULIN, UNSPECIFIED WHETHER STAGE 3A OR 3B CKD (H): ICD-10-CM

## 2020-01-01 DIAGNOSIS — J44.1 OBSTRUCTIVE CHRONIC BRONCHITIS WITH ACUTE EXACERBATION (H): ICD-10-CM

## 2020-01-01 DIAGNOSIS — R05.9 COUGH: ICD-10-CM

## 2020-01-01 DIAGNOSIS — M62.81 GENERALIZED MUSCLE WEAKNESS: ICD-10-CM

## 2020-01-01 DIAGNOSIS — N18.30 TYPE 2 DIABETES MELLITUS WITH STAGE 3 CHRONIC KIDNEY DISEASE, WITH LONG-TERM CURRENT USE OF INSULIN (H): ICD-10-CM

## 2020-01-01 DIAGNOSIS — E11.22 TYPE 2 DIABETES MELLITUS WITH STAGE 3 CHRONIC KIDNEY DISEASE, WITH LONG-TERM CURRENT USE OF INSULIN, UNSPECIFIED WHETHER STAGE 3A OR 3B CKD (H): ICD-10-CM

## 2020-01-01 DIAGNOSIS — Z20.822 SUSPECTED 2019 NOVEL CORONAVIRUS INFECTION: ICD-10-CM

## 2020-01-01 DIAGNOSIS — E11.22 TYPE 2 DIABETES MELLITUS WITH STAGE 3 CHRONIC KIDNEY DISEASE, WITH LONG-TERM CURRENT USE OF INSULIN (H): Primary | ICD-10-CM

## 2020-01-01 DIAGNOSIS — E78.5 HYPERLIPIDEMIA LDL GOAL <70: ICD-10-CM

## 2020-01-01 DIAGNOSIS — R53.1 WEAKNESS: Primary | ICD-10-CM

## 2020-01-01 DIAGNOSIS — J44.9 CHRONIC OBSTRUCTIVE PULMONARY DISEASE, UNSPECIFIED COPD TYPE (H): Chronic | ICD-10-CM

## 2020-01-01 DIAGNOSIS — U07.1 ENCEPHALOPATHY DUE TO COVID-19 VIRUS: Primary | ICD-10-CM

## 2020-01-01 DIAGNOSIS — Z79.4 TYPE 2 DIABETES MELLITUS WITH STAGE 3 CHRONIC KIDNEY DISEASE, WITH LONG-TERM CURRENT USE OF INSULIN, UNSPECIFIED WHETHER STAGE 3A OR 3B CKD (H): ICD-10-CM

## 2020-01-01 DIAGNOSIS — N40.0 HYPERTROPHY OF PROSTATE WITHOUT URINARY OBSTRUCTION: ICD-10-CM

## 2020-01-01 DIAGNOSIS — G45.9 TIA (TRANSIENT ISCHEMIC ATTACK): Primary | ICD-10-CM

## 2020-01-01 DIAGNOSIS — I63.9 CEREBROVASCULAR ACCIDENT (CVA), UNSPECIFIED MECHANISM (H): ICD-10-CM

## 2020-01-01 DIAGNOSIS — U07.1 COVID-19 VIRUS INFECTION: ICD-10-CM

## 2020-01-01 DIAGNOSIS — Z79.4 TYPE 2 DIABETES MELLITUS WITH STAGE 3 CHRONIC KIDNEY DISEASE, WITH LONG-TERM CURRENT USE OF INSULIN (H): ICD-10-CM

## 2020-01-01 DIAGNOSIS — J96.01 ACUTE RESPIRATORY FAILURE WITH HYPOXIA (H): ICD-10-CM

## 2020-01-01 DIAGNOSIS — I48.0 PAROXYSMAL ATRIAL FIBRILLATION (H): ICD-10-CM

## 2020-01-01 DIAGNOSIS — N30.00 ACUTE CYSTITIS WITHOUT HEMATURIA: ICD-10-CM

## 2020-01-01 DIAGNOSIS — R41.3 MEMORY LOSS: ICD-10-CM

## 2020-01-01 DIAGNOSIS — H90.3 SENSORINEURAL HEARING LOSS, BILATERAL: ICD-10-CM

## 2020-01-01 DIAGNOSIS — I10 HYPERTENSION GOAL BP (BLOOD PRESSURE) < 140/90: Chronic | ICD-10-CM

## 2020-01-01 DIAGNOSIS — Z79.4 TYPE 2 DIABETES MELLITUS WITH STAGE 3 CHRONIC KIDNEY DISEASE, WITH LONG-TERM CURRENT USE OF INSULIN (H): Primary | ICD-10-CM

## 2020-01-01 DIAGNOSIS — G93.49 ENCEPHALOPATHY DUE TO COVID-19 VIRUS: Primary | ICD-10-CM

## 2020-01-01 DIAGNOSIS — J44.1 CHRONIC OBSTRUCTIVE PULMONARY DISEASE WITH ACUTE EXACERBATION (H): ICD-10-CM

## 2020-01-01 DIAGNOSIS — N18.30 TYPE 2 DIABETES MELLITUS WITH STAGE 3 CHRONIC KIDNEY DISEASE, WITH LONG-TERM CURRENT USE OF INSULIN, UNSPECIFIED WHETHER STAGE 3A OR 3B CKD (H): Primary | ICD-10-CM

## 2020-01-01 DIAGNOSIS — R29.6 FALLS FREQUENTLY: ICD-10-CM

## 2020-01-01 DIAGNOSIS — I73.9 PAD (PERIPHERAL ARTERY DISEASE) (H): ICD-10-CM

## 2020-01-01 DIAGNOSIS — H61.23 BILATERAL IMPACTED CERUMEN: Primary | ICD-10-CM

## 2020-01-01 DIAGNOSIS — R53.1 GENERALIZED WEAKNESS: ICD-10-CM

## 2020-01-01 DIAGNOSIS — J44.1 OBSTRUCTIVE CHRONIC BRONCHITIS WITH EXACERBATION (H): ICD-10-CM

## 2020-01-01 DIAGNOSIS — U07.1 INFECTION DUE TO 2019 NOVEL CORONAVIRUS: Primary | ICD-10-CM

## 2020-01-01 DIAGNOSIS — I73.9 PAD (PERIPHERAL ARTERY DISEASE) (H): Primary | ICD-10-CM

## 2020-01-01 DIAGNOSIS — Z79.4 TYPE 2 DIABETES MELLITUS WITH STAGE 3 CHRONIC KIDNEY DISEASE, WITH LONG-TERM CURRENT USE OF INSULIN, UNSPECIFIED WHETHER STAGE 3A OR 3B CKD (H): Primary | ICD-10-CM

## 2020-01-01 DIAGNOSIS — E11.22 TYPE 2 DIABETES MELLITUS WITH STAGE 3 CHRONIC KIDNEY DISEASE, WITH LONG-TERM CURRENT USE OF INSULIN, UNSPECIFIED WHETHER STAGE 3A OR 3B CKD (H): Primary | ICD-10-CM

## 2020-01-01 DIAGNOSIS — R06.02 SHORTNESS OF BREATH: ICD-10-CM

## 2020-01-01 DIAGNOSIS — I73.9 CLAUDICATION OF CALF MUSCLES (H): Primary | ICD-10-CM

## 2020-01-01 DIAGNOSIS — R53.83 OTHER FATIGUE: ICD-10-CM

## 2020-01-01 LAB
ABO + RH BLD: NORMAL
ABO + RH BLD: NORMAL
ALBUMIN SERPL-MCNC: 2.2 G/DL (ref 3.4–5)
ALBUMIN SERPL-MCNC: 2.2 G/DL (ref 3.4–5)
ALBUMIN SERPL-MCNC: 2.4 G/DL (ref 3.4–5)
ALBUMIN SERPL-MCNC: 2.5 G/DL (ref 3.4–5)
ALBUMIN SERPL-MCNC: 2.5 G/DL (ref 3.4–5)
ALBUMIN SERPL-MCNC: 3 G/DL (ref 3.4–5)
ALBUMIN SERPL-MCNC: 3.4 G/DL (ref 3.4–5)
ALBUMIN SERPL-MCNC: 3.5 G/DL (ref 3.4–5)
ALBUMIN UR-MCNC: 30 MG/DL
ALBUMIN UR-MCNC: NEGATIVE MG/DL
ALP SERPL-CCNC: 77 U/L (ref 40–150)
ALP SERPL-CCNC: 81 U/L (ref 40–150)
ALP SERPL-CCNC: 85 U/L (ref 40–150)
ALP SERPL-CCNC: 85 U/L (ref 40–150)
ALP SERPL-CCNC: 89 U/L (ref 40–150)
ALP SERPL-CCNC: 89 U/L (ref 40–150)
ALP SERPL-CCNC: 94 U/L (ref 40–150)
ALP SERPL-CCNC: 96 U/L (ref 40–150)
ALT SERPL W P-5'-P-CCNC: 26 U/L (ref 0–70)
ALT SERPL W P-5'-P-CCNC: 27 U/L (ref 0–70)
ALT SERPL W P-5'-P-CCNC: 28 U/L (ref 0–70)
ALT SERPL W P-5'-P-CCNC: 29 U/L (ref 0–70)
ALT SERPL W P-5'-P-CCNC: 30 U/L (ref 0–70)
ALT SERPL W P-5'-P-CCNC: 31 U/L (ref 0–70)
ALT SERPL W P-5'-P-CCNC: 36 U/L (ref 0–70)
ALT SERPL W P-5'-P-CCNC: 37 U/L (ref 0–70)
AMMONIA PLAS-SCNC: 23 UMOL/L (ref 10–50)
ANION GAP SERPL CALCULATED.3IONS-SCNC: 2 MMOL/L (ref 3–14)
ANION GAP SERPL CALCULATED.3IONS-SCNC: 3 MMOL/L (ref 3–14)
ANION GAP SERPL CALCULATED.3IONS-SCNC: 3 MMOL/L (ref 3–14)
ANION GAP SERPL CALCULATED.3IONS-SCNC: 4 MMOL/L (ref 3–14)
ANION GAP SERPL CALCULATED.3IONS-SCNC: 5 MMOL/L (ref 3–14)
ANION GAP SERPL CALCULATED.3IONS-SCNC: 5 MMOL/L (ref 3–14)
ANION GAP SERPL CALCULATED.3IONS-SCNC: 6 MMOL/L (ref 3–14)
ANION GAP SERPL CALCULATED.3IONS-SCNC: 7 MMOL/L (ref 3–14)
ANION GAP SERPL CALCULATED.3IONS-SCNC: 8 MMOL/L (ref 3–14)
ANION GAP SERPL CALCULATED.3IONS-SCNC: 8 MMOL/L (ref 3–14)
APPEARANCE UR: ABNORMAL
APPEARANCE UR: CLEAR
APTT PPP: 32 SEC (ref 22–37)
APTT PPP: 35 SEC (ref 22–37)
AST SERPL W P-5'-P-CCNC: 10 U/L (ref 0–45)
AST SERPL W P-5'-P-CCNC: 11 U/L (ref 0–45)
AST SERPL W P-5'-P-CCNC: 15 U/L (ref 0–45)
AST SERPL W P-5'-P-CCNC: 16 U/L (ref 0–45)
AST SERPL W P-5'-P-CCNC: 16 U/L (ref 0–45)
AST SERPL W P-5'-P-CCNC: 25 U/L (ref 0–45)
AST SERPL W P-5'-P-CCNC: 26 U/L (ref 0–45)
AST SERPL W P-5'-P-CCNC: 29 U/L (ref 0–45)
AT III ACT/NOR PPP CHRO: 90 % (ref 85–135)
BACTERIA #/AREA URNS HPF: ABNORMAL /HPF
BACTERIA SPEC CULT: ABNORMAL
BACTERIA SPEC CULT: ABNORMAL
BASE DEFICIT BLDV-SCNC: 6.6 MMOL/L
BASE EXCESS BLDV CALC-SCNC: 0.9 MMOL/L
BASE EXCESS BLDV CALC-SCNC: 2.9 MMOL/L
BASOPHILS # BLD AUTO: 0 10E9/L (ref 0–0.2)
BASOPHILS # BLD AUTO: 0.1 10E9/L (ref 0–0.2)
BASOPHILS NFR BLD AUTO: 0.1 %
BASOPHILS NFR BLD AUTO: 0.2 %
BASOPHILS NFR BLD AUTO: 0.2 %
BASOPHILS NFR BLD AUTO: 0.3 %
BASOPHILS NFR BLD AUTO: 0.3 %
BASOPHILS NFR BLD AUTO: 0.5 %
BASOPHILS NFR BLD AUTO: 0.5 %
BILIRUB DIRECT SERPL-MCNC: 0.3 MG/DL (ref 0–0.2)
BILIRUB DIRECT SERPL-MCNC: 0.5 MG/DL (ref 0–0.2)
BILIRUB SERPL-MCNC: 0.4 MG/DL (ref 0.2–1.3)
BILIRUB SERPL-MCNC: 0.7 MG/DL (ref 0.2–1.3)
BILIRUB SERPL-MCNC: 0.8 MG/DL (ref 0.2–1.3)
BILIRUB SERPL-MCNC: 0.9 MG/DL (ref 0.2–1.3)
BILIRUB SERPL-MCNC: 1 MG/DL (ref 0.2–1.3)
BILIRUB SERPL-MCNC: 1.2 MG/DL (ref 0.2–1.3)
BILIRUB UR QL STRIP: NEGATIVE
BILIRUB UR QL STRIP: NEGATIVE
BUN SERPL-MCNC: 25 MG/DL (ref 7–30)
BUN SERPL-MCNC: 29 MG/DL (ref 7–30)
BUN SERPL-MCNC: 31 MG/DL (ref 7–30)
BUN SERPL-MCNC: 34 MG/DL (ref 7–30)
BUN SERPL-MCNC: 38 MG/DL (ref 7–30)
BUN SERPL-MCNC: 39 MG/DL (ref 7–30)
BUN SERPL-MCNC: 39 MG/DL (ref 7–30)
BUN SERPL-MCNC: 42 MG/DL (ref 7–30)
BUN SERPL-MCNC: 42 MG/DL (ref 7–30)
BUN SERPL-MCNC: 44 MG/DL (ref 7–30)
BUN SERPL-MCNC: 45 MG/DL (ref 7–30)
BUN SERPL-MCNC: 47 MG/DL (ref 7–30)
BUN SERPL-MCNC: 47 MG/DL (ref 7–30)
BUN SERPL-MCNC: 50 MG/DL (ref 7–30)
CALCIUM SERPL-MCNC: 7.5 MG/DL (ref 8.5–10.1)
CALCIUM SERPL-MCNC: 7.6 MG/DL (ref 8.5–10.1)
CALCIUM SERPL-MCNC: 7.6 MG/DL (ref 8.5–10.1)
CALCIUM SERPL-MCNC: 7.7 MG/DL (ref 8.5–10.1)
CALCIUM SERPL-MCNC: 7.8 MG/DL (ref 8.5–10.1)
CALCIUM SERPL-MCNC: 7.8 MG/DL (ref 8.5–10.1)
CALCIUM SERPL-MCNC: 8 MG/DL (ref 8.5–10.1)
CALCIUM SERPL-MCNC: 8.3 MG/DL (ref 8.5–10.1)
CALCIUM SERPL-MCNC: 8.4 MG/DL (ref 8.5–10.1)
CALCIUM SERPL-MCNC: 8.7 MG/DL (ref 8.5–10.1)
CALCIUM SERPL-MCNC: 8.7 MG/DL (ref 8.5–10.1)
CALCIUM SERPL-MCNC: 8.8 MG/DL (ref 8.5–10.1)
CHLORIDE SERPL-SCNC: 101 MMOL/L (ref 94–109)
CHLORIDE SERPL-SCNC: 101 MMOL/L (ref 94–109)
CHLORIDE SERPL-SCNC: 103 MMOL/L (ref 94–109)
CHLORIDE SERPL-SCNC: 105 MMOL/L (ref 94–109)
CHLORIDE SERPL-SCNC: 105 MMOL/L (ref 94–109)
CHLORIDE SERPL-SCNC: 107 MMOL/L (ref 94–109)
CHLORIDE SERPL-SCNC: 108 MMOL/L (ref 94–109)
CHLORIDE SERPL-SCNC: 112 MMOL/L (ref 94–109)
CHLORIDE SERPL-SCNC: 99 MMOL/L (ref 94–109)
CK SERPL-CCNC: 211 U/L (ref 30–300)
CK SERPL-CCNC: 765 U/L (ref 30–300)
CO2 SERPL-SCNC: 24 MMOL/L (ref 20–32)
CO2 SERPL-SCNC: 26 MMOL/L (ref 20–32)
CO2 SERPL-SCNC: 27 MMOL/L (ref 20–32)
CO2 SERPL-SCNC: 28 MMOL/L (ref 20–32)
CO2 SERPL-SCNC: 29 MMOL/L (ref 20–32)
CO2 SERPL-SCNC: 30 MMOL/L (ref 20–32)
CO2 SERPL-SCNC: 31 MMOL/L (ref 20–32)
CO2 SERPL-SCNC: 31 MMOL/L (ref 20–32)
COLOR UR AUTO: YELLOW
COLOR UR AUTO: YELLOW
CREAT SERPL-MCNC: 1.17 MG/DL (ref 0.66–1.25)
CREAT SERPL-MCNC: 1.28 MG/DL (ref 0.66–1.25)
CREAT SERPL-MCNC: 1.28 MG/DL (ref 0.66–1.25)
CREAT SERPL-MCNC: 1.34 MG/DL (ref 0.66–1.25)
CREAT SERPL-MCNC: 1.35 MG/DL (ref 0.66–1.25)
CREAT SERPL-MCNC: 1.36 MG/DL (ref 0.66–1.25)
CREAT SERPL-MCNC: 1.36 MG/DL (ref 0.66–1.25)
CREAT SERPL-MCNC: 1.37 MG/DL (ref 0.66–1.25)
CREAT SERPL-MCNC: 1.38 MG/DL (ref 0.66–1.25)
CREAT SERPL-MCNC: 1.4 MG/DL (ref 0.66–1.25)
CREAT SERPL-MCNC: 1.47 MG/DL (ref 0.66–1.25)
CREAT SERPL-MCNC: 1.58 MG/DL (ref 0.66–1.25)
CREAT SERPL-MCNC: 1.61 MG/DL (ref 0.66–1.25)
CREAT SERPL-MCNC: 1.62 MG/DL (ref 0.66–1.25)
CRP SERPL-MCNC: 22.1 MG/L (ref 0–8)
CRP SERPL-MCNC: 33 MG/L (ref 0–8)
CRP SERPL-MCNC: 36.4 MG/L (ref 0–8)
CRP SERPL-MCNC: 45.7 MG/L (ref 0–8)
CRP SERPL-MCNC: 48.9 MG/L (ref 0–8)
CRP SERPL-MCNC: 59.2 MG/L (ref 0–8)
CRP SERPL-MCNC: 63.2 MG/L (ref 0–8)
CRP SERPL-MCNC: 75.8 MG/L (ref 0–8)
CRP SERPL-MCNC: 81 MG/L (ref 0–8)
CRP SERPL-MCNC: 81.3 MG/L (ref 0–8)
CRP SERPL-MCNC: <2.9 MG/L (ref 0–8)
D DIMER PPP FEU-MCNC: 0.7 UG/ML FEU (ref 0–0.5)
D DIMER PPP FEU-MCNC: 0.7 UG/ML FEU (ref 0–0.5)
D DIMER PPP FEU-MCNC: 0.9 UG/ML FEU (ref 0–0.5)
D DIMER PPP FEU-MCNC: 0.9 UG/ML FEU (ref 0–0.5)
D DIMER PPP FEU-MCNC: 1.2 UG/ML FEU (ref 0–0.5)
D DIMER PPP FEU-MCNC: 1.2 UG/ML FEU (ref 0–0.5)
D DIMER PPP FEU-MCNC: 1.4 UG/ML FEU (ref 0–0.5)
D DIMER PPP FEU-MCNC: 2 UG/ML FEU (ref 0–0.5)
DIFFERENTIAL METHOD BLD: ABNORMAL
EOSINOPHIL # BLD AUTO: 0 10E9/L (ref 0–0.7)
EOSINOPHIL # BLD AUTO: 0.1 10E9/L (ref 0–0.7)
EOSINOPHIL NFR BLD AUTO: 0 %
EOSINOPHIL NFR BLD AUTO: 1 %
ERYTHROCYTE [DISTWIDTH] IN BLOOD BY AUTOMATED COUNT: 13.8 % (ref 10–15)
ERYTHROCYTE [DISTWIDTH] IN BLOOD BY AUTOMATED COUNT: 13.9 % (ref 10–15)
ERYTHROCYTE [DISTWIDTH] IN BLOOD BY AUTOMATED COUNT: 14 % (ref 10–15)
ERYTHROCYTE [DISTWIDTH] IN BLOOD BY AUTOMATED COUNT: 14.1 % (ref 10–15)
ERYTHROCYTE [DISTWIDTH] IN BLOOD BY AUTOMATED COUNT: 14.2 % (ref 10–15)
ERYTHROCYTE [DISTWIDTH] IN BLOOD BY AUTOMATED COUNT: 14.2 % (ref 10–15)
ERYTHROCYTE [DISTWIDTH] IN BLOOD BY AUTOMATED COUNT: 14.3 % (ref 10–15)
ERYTHROCYTE [DISTWIDTH] IN BLOOD BY AUTOMATED COUNT: 16.8 % (ref 10–15)
ERYTHROCYTE [DISTWIDTH] IN BLOOD BY AUTOMATED COUNT: 17.2 % (ref 10–15)
FERRITIN SERPL-MCNC: 583 NG/ML (ref 26–388)
FERRITIN SERPL-MCNC: 696 NG/ML (ref 26–388)
FIBRINOGEN PPP-MCNC: 483 MG/DL (ref 200–420)
FIBRINOGEN PPP-MCNC: 492 MG/DL (ref 200–420)
FIBRINOGEN PPP-MCNC: 540 MG/DL (ref 200–420)
FIBRINOGEN PPP-MCNC: 607 MG/DL (ref 200–420)
FIBRINOGEN PPP-MCNC: 614 MG/DL (ref 200–420)
FLUAV+FLUBV AG SPEC QL: NEGATIVE
FLUAV+FLUBV AG SPEC QL: NEGATIVE
GFR SERPL CREATININE-BSD FRML MDRD: 39 ML/MIN/{1.73_M2}
GFR SERPL CREATININE-BSD FRML MDRD: 39 ML/MIN/{1.73_M2}
GFR SERPL CREATININE-BSD FRML MDRD: 40 ML/MIN/{1.73_M2}
GFR SERPL CREATININE-BSD FRML MDRD: 44 ML/MIN/{1.73_M2}
GFR SERPL CREATININE-BSD FRML MDRD: 47 ML/MIN/{1.73_M2}
GFR SERPL CREATININE-BSD FRML MDRD: 47 ML/MIN/{1.73_M2}
GFR SERPL CREATININE-BSD FRML MDRD: 48 ML/MIN/{1.73_M2}
GFR SERPL CREATININE-BSD FRML MDRD: 49 ML/MIN/{1.73_M2}
GFR SERPL CREATININE-BSD FRML MDRD: 49 ML/MIN/{1.73_M2}
GFR SERPL CREATININE-BSD FRML MDRD: 52 ML/MIN/{1.73_M2}
GFR SERPL CREATININE-BSD FRML MDRD: 52 ML/MIN/{1.73_M2}
GFR SERPL CREATININE-BSD FRML MDRD: 58 ML/MIN/{1.73_M2}
GLUCOSE BLDC GLUCOMTR-MCNC: 108 MG/DL (ref 70–99)
GLUCOSE BLDC GLUCOMTR-MCNC: 123 MG/DL (ref 70–99)
GLUCOSE BLDC GLUCOMTR-MCNC: 124 MG/DL (ref 70–99)
GLUCOSE BLDC GLUCOMTR-MCNC: 146 MG/DL (ref 70–99)
GLUCOSE BLDC GLUCOMTR-MCNC: 146 MG/DL (ref 70–99)
GLUCOSE BLDC GLUCOMTR-MCNC: 148 MG/DL (ref 70–99)
GLUCOSE BLDC GLUCOMTR-MCNC: 165 MG/DL (ref 70–99)
GLUCOSE BLDC GLUCOMTR-MCNC: 169 MG/DL (ref 70–99)
GLUCOSE BLDC GLUCOMTR-MCNC: 174 MG/DL (ref 70–99)
GLUCOSE BLDC GLUCOMTR-MCNC: 178 MG/DL (ref 70–99)
GLUCOSE BLDC GLUCOMTR-MCNC: 178 MG/DL (ref 70–99)
GLUCOSE BLDC GLUCOMTR-MCNC: 188 MG/DL (ref 70–99)
GLUCOSE BLDC GLUCOMTR-MCNC: 190 MG/DL (ref 70–99)
GLUCOSE BLDC GLUCOMTR-MCNC: 217 MG/DL (ref 70–99)
GLUCOSE BLDC GLUCOMTR-MCNC: 230 MG/DL (ref 70–99)
GLUCOSE BLDC GLUCOMTR-MCNC: 235 MG/DL (ref 70–99)
GLUCOSE BLDC GLUCOMTR-MCNC: 239 MG/DL (ref 70–99)
GLUCOSE BLDC GLUCOMTR-MCNC: 242 MG/DL (ref 70–99)
GLUCOSE BLDC GLUCOMTR-MCNC: 247 MG/DL (ref 70–99)
GLUCOSE BLDC GLUCOMTR-MCNC: 248 MG/DL (ref 70–99)
GLUCOSE BLDC GLUCOMTR-MCNC: 248 MG/DL (ref 70–99)
GLUCOSE BLDC GLUCOMTR-MCNC: 255 MG/DL (ref 70–99)
GLUCOSE BLDC GLUCOMTR-MCNC: 259 MG/DL (ref 70–99)
GLUCOSE BLDC GLUCOMTR-MCNC: 263 MG/DL (ref 70–99)
GLUCOSE BLDC GLUCOMTR-MCNC: 276 MG/DL (ref 70–99)
GLUCOSE BLDC GLUCOMTR-MCNC: 281 MG/DL (ref 70–99)
GLUCOSE BLDC GLUCOMTR-MCNC: 284 MG/DL (ref 70–99)
GLUCOSE BLDC GLUCOMTR-MCNC: 291 MG/DL (ref 70–99)
GLUCOSE BLDC GLUCOMTR-MCNC: 292 MG/DL (ref 70–99)
GLUCOSE BLDC GLUCOMTR-MCNC: 292 MG/DL (ref 70–99)
GLUCOSE BLDC GLUCOMTR-MCNC: 303 MG/DL (ref 70–99)
GLUCOSE BLDC GLUCOMTR-MCNC: 312 MG/DL (ref 70–99)
GLUCOSE BLDC GLUCOMTR-MCNC: 316 MG/DL (ref 70–99)
GLUCOSE BLDC GLUCOMTR-MCNC: 337 MG/DL (ref 70–99)
GLUCOSE BLDC GLUCOMTR-MCNC: 340 MG/DL (ref 70–99)
GLUCOSE BLDC GLUCOMTR-MCNC: 345 MG/DL (ref 70–99)
GLUCOSE BLDC GLUCOMTR-MCNC: 351 MG/DL (ref 70–99)
GLUCOSE BLDC GLUCOMTR-MCNC: 385 MG/DL (ref 70–99)
GLUCOSE BLDC GLUCOMTR-MCNC: 392 MG/DL (ref 70–99)
GLUCOSE BLDC GLUCOMTR-MCNC: 447 MG/DL (ref 70–99)
GLUCOSE BLDC GLUCOMTR-MCNC: 47 MG/DL (ref 70–99)
GLUCOSE BLDC GLUCOMTR-MCNC: 484 MG/DL (ref 70–99)
GLUCOSE BLDC GLUCOMTR-MCNC: 57 MG/DL (ref 70–99)
GLUCOSE BLDC GLUCOMTR-MCNC: 65 MG/DL (ref 70–99)
GLUCOSE BLDC GLUCOMTR-MCNC: 66 MG/DL (ref 70–99)
GLUCOSE BLDC GLUCOMTR-MCNC: 69 MG/DL (ref 70–99)
GLUCOSE BLDC GLUCOMTR-MCNC: 73 MG/DL (ref 70–99)
GLUCOSE BLDC GLUCOMTR-MCNC: 78 MG/DL (ref 70–99)
GLUCOSE BLDC GLUCOMTR-MCNC: 78 MG/DL (ref 70–99)
GLUCOSE BLDC GLUCOMTR-MCNC: 82 MG/DL (ref 70–99)
GLUCOSE BLDC GLUCOMTR-MCNC: 85 MG/DL (ref 70–99)
GLUCOSE BLDC GLUCOMTR-MCNC: 87 MG/DL (ref 70–99)
GLUCOSE SERPL-MCNC: 101 MG/DL (ref 70–99)
GLUCOSE SERPL-MCNC: 128 MG/DL (ref 70–99)
GLUCOSE SERPL-MCNC: 142 MG/DL (ref 70–99)
GLUCOSE SERPL-MCNC: 224 MG/DL (ref 70–99)
GLUCOSE SERPL-MCNC: 236 MG/DL (ref 70–99)
GLUCOSE SERPL-MCNC: 256 MG/DL (ref 70–99)
GLUCOSE SERPL-MCNC: 269 MG/DL (ref 70–99)
GLUCOSE SERPL-MCNC: 277 MG/DL (ref 70–99)
GLUCOSE SERPL-MCNC: 283 MG/DL (ref 70–99)
GLUCOSE SERPL-MCNC: 318 MG/DL (ref 70–99)
GLUCOSE SERPL-MCNC: 340 MG/DL (ref 70–99)
GLUCOSE SERPL-MCNC: 41 MG/DL (ref 70–99)
GLUCOSE SERPL-MCNC: 410 MG/DL (ref 70–99)
GLUCOSE SERPL-MCNC: 65 MG/DL (ref 70–99)
GLUCOSE UR STRIP-MCNC: 50 MG/DL
GLUCOSE UR STRIP-MCNC: NEGATIVE MG/DL
HBA1C MFR BLD: 8 % (ref 0–5.6)
HBA1C MFR BLD: 8.3 % (ref 0–5.6)
HCO3 BLDV-SCNC: 17 MMOL/L (ref 21–28)
HCO3 BLDV-SCNC: 26 MMOL/L (ref 21–28)
HCO3 BLDV-SCNC: 29 MMOL/L (ref 21–28)
HCT VFR BLD AUTO: 31.5 % (ref 40–53)
HCT VFR BLD AUTO: 32.7 % (ref 40–53)
HCT VFR BLD AUTO: 36.2 % (ref 40–53)
HCT VFR BLD AUTO: 36.3 % (ref 40–53)
HCT VFR BLD AUTO: 37.2 % (ref 40–53)
HCT VFR BLD AUTO: 37.8 % (ref 40–53)
HCT VFR BLD AUTO: 38.7 % (ref 40–53)
HCT VFR BLD AUTO: 38.9 % (ref 40–53)
HCT VFR BLD AUTO: 39 % (ref 40–53)
HCT VFR BLD AUTO: 41.6 % (ref 40–53)
HCT VFR BLD AUTO: 41.7 % (ref 40–53)
HCT VFR BLD AUTO: 42 % (ref 40–53)
HGB BLD-MCNC: 10 G/DL (ref 13.3–17.7)
HGB BLD-MCNC: 12 G/DL (ref 13.3–17.7)
HGB BLD-MCNC: 12 G/DL (ref 13.3–17.7)
HGB BLD-MCNC: 12.3 G/DL (ref 13.3–17.7)
HGB BLD-MCNC: 12.9 G/DL (ref 13.3–17.7)
HGB BLD-MCNC: 12.9 G/DL (ref 13.3–17.7)
HGB BLD-MCNC: 13.2 G/DL (ref 13.3–17.7)
HGB BLD-MCNC: 13.4 G/DL (ref 13.3–17.7)
HGB BLD-MCNC: 13.8 G/DL (ref 13.3–17.7)
HGB BLD-MCNC: 14.1 G/DL (ref 13.3–17.7)
HGB BLD-MCNC: 14.2 G/DL (ref 13.3–17.7)
HGB BLD-MCNC: 9.9 G/DL (ref 13.3–17.7)
HGB UR QL STRIP: NEGATIVE
HGB UR QL STRIP: NEGATIVE
IL6 SERPL-MCNC: 36.85 PG/ML
IMM GRANULOCYTES # BLD: 0 10E9/L (ref 0–0.4)
IMM GRANULOCYTES # BLD: 0 10E9/L (ref 0–0.4)
IMM GRANULOCYTES # BLD: 0.1 10E9/L (ref 0–0.4)
IMM GRANULOCYTES # BLD: 0.2 10E9/L (ref 0–0.4)
IMM GRANULOCYTES NFR BLD: 0.3 %
IMM GRANULOCYTES NFR BLD: 0.5 %
IMM GRANULOCYTES NFR BLD: 0.5 %
IMM GRANULOCYTES NFR BLD: 0.6 %
IMM GRANULOCYTES NFR BLD: 0.7 %
IMM GRANULOCYTES NFR BLD: 1 %
IMM GRANULOCYTES NFR BLD: 1.5 %
IMM GRANULOCYTES NFR BLD: 1.6 %
IMM GRANULOCYTES NFR BLD: 1.7 %
IMM GRANULOCYTES NFR BLD: 2.3 %
INR PPP: 1.01 (ref 0.86–1.14)
INR PPP: 1.18 (ref 0.86–1.14)
INR PPP: 1.19 (ref 0.86–1.14)
INR PPP: 1.39 (ref 0.86–1.14)
KETONES UR STRIP-MCNC: 5 MG/DL
KETONES UR STRIP-MCNC: NEGATIVE MG/DL
LABORATORY COMMENT REPORT: ABNORMAL
LACTATE BLD-SCNC: 1.5 MMOL/L (ref 0.7–2)
LACTATE BLD-SCNC: 1.7 MMOL/L (ref 0.7–2)
LDH SERPL L TO P-CCNC: 244 U/L (ref 85–227)
LDH SERPL L TO P-CCNC: 250 U/L (ref 85–227)
LDH SERPL L TO P-CCNC: 294 U/L (ref 85–227)
LDH SERPL L TO P-CCNC: 307 U/L (ref 85–227)
LDH SERPL L TO P-CCNC: 311 U/L (ref 85–227)
LDH SERPL L TO P-CCNC: 338 U/L (ref 85–227)
LDH SERPL L TO P-CCNC: 348 U/L (ref 85–227)
LEUKOCYTE ESTERASE UR QL STRIP: ABNORMAL
LEUKOCYTE ESTERASE UR QL STRIP: NEGATIVE
LYMPHOCYTES # BLD AUTO: 0.6 10E9/L (ref 0.8–5.3)
LYMPHOCYTES # BLD AUTO: 0.8 10E9/L (ref 0.8–5.3)
LYMPHOCYTES # BLD AUTO: 0.9 10E9/L (ref 0.8–5.3)
LYMPHOCYTES # BLD AUTO: 1 10E9/L (ref 0.8–5.3)
LYMPHOCYTES # BLD AUTO: 1 10E9/L (ref 0.8–5.3)
LYMPHOCYTES # BLD AUTO: 1.1 10E9/L (ref 0.8–5.3)
LYMPHOCYTES # BLD AUTO: 1.2 10E9/L (ref 0.8–5.3)
LYMPHOCYTES # BLD AUTO: 2 10E9/L (ref 0.8–5.3)
LYMPHOCYTES NFR BLD AUTO: 10.2 %
LYMPHOCYTES NFR BLD AUTO: 11.1 %
LYMPHOCYTES NFR BLD AUTO: 11.4 %
LYMPHOCYTES NFR BLD AUTO: 12.3 %
LYMPHOCYTES NFR BLD AUTO: 13 %
LYMPHOCYTES NFR BLD AUTO: 13.5 %
LYMPHOCYTES NFR BLD AUTO: 20.4 %
LYMPHOCYTES NFR BLD AUTO: 6 %
LYMPHOCYTES NFR BLD AUTO: 7.3 %
LYMPHOCYTES NFR BLD AUTO: 9.6 %
Lab: ABNORMAL
MCH RBC QN AUTO: 29.3 PG (ref 26.5–33)
MCH RBC QN AUTO: 29.4 PG (ref 26.5–33)
MCH RBC QN AUTO: 29.6 PG (ref 26.5–33)
MCH RBC QN AUTO: 29.7 PG (ref 26.5–33)
MCH RBC QN AUTO: 29.8 PG (ref 26.5–33)
MCH RBC QN AUTO: 29.9 PG (ref 26.5–33)
MCH RBC QN AUTO: 29.9 PG (ref 26.5–33)
MCH RBC QN AUTO: 30 PG (ref 26.5–33)
MCH RBC QN AUTO: 30.1 PG (ref 26.5–33)
MCH RBC QN AUTO: 30.3 PG (ref 26.5–33)
MCH RBC QN AUTO: 30.4 PG (ref 26.5–33)
MCH RBC QN AUTO: 30.8 PG (ref 26.5–33)
MCHC RBC AUTO-ENTMCNC: 30.6 G/DL (ref 31.5–36.5)
MCHC RBC AUTO-ENTMCNC: 31.4 G/DL (ref 31.5–36.5)
MCHC RBC AUTO-ENTMCNC: 33.1 G/DL (ref 31.5–36.5)
MCHC RBC AUTO-ENTMCNC: 33.2 G/DL (ref 31.5–36.5)
MCHC RBC AUTO-ENTMCNC: 33.3 G/DL (ref 31.5–36.5)
MCHC RBC AUTO-ENTMCNC: 33.8 G/DL (ref 31.5–36.5)
MCHC RBC AUTO-ENTMCNC: 34.1 G/DL (ref 31.5–36.5)
MCHC RBC AUTO-ENTMCNC: 34.4 G/DL (ref 31.5–36.5)
MCV RBC AUTO: 88 FL (ref 78–100)
MCV RBC AUTO: 89 FL (ref 78–100)
MCV RBC AUTO: 90 FL (ref 78–100)
MCV RBC AUTO: 90 FL (ref 78–100)
MCV RBC AUTO: 98 FL (ref 78–100)
MCV RBC AUTO: 98 FL (ref 78–100)
MONOCYTES # BLD AUTO: 0.5 10E9/L (ref 0–1.3)
MONOCYTES # BLD AUTO: 0.6 10E9/L (ref 0–1.3)
MONOCYTES # BLD AUTO: 0.7 10E9/L (ref 0–1.3)
MONOCYTES # BLD AUTO: 0.8 10E9/L (ref 0–1.3)
MONOCYTES # BLD AUTO: 0.8 10E9/L (ref 0–1.3)
MONOCYTES # BLD AUTO: 0.9 10E9/L (ref 0–1.3)
MONOCYTES # BLD AUTO: 1 10E9/L (ref 0–1.3)
MONOCYTES # BLD AUTO: 1 10E9/L (ref 0–1.3)
MONOCYTES # BLD AUTO: 1.4 10E9/L (ref 0–1.3)
MONOCYTES # BLD AUTO: 1.5 10E9/L (ref 0–1.3)
MONOCYTES NFR BLD AUTO: 11.2 %
MONOCYTES NFR BLD AUTO: 11.7 %
MONOCYTES NFR BLD AUTO: 13 %
MONOCYTES NFR BLD AUTO: 4.9 %
MONOCYTES NFR BLD AUTO: 7.7 %
MONOCYTES NFR BLD AUTO: 7.7 %
MONOCYTES NFR BLD AUTO: 8.9 %
MONOCYTES NFR BLD AUTO: 8.9 %
MONOCYTES NFR BLD AUTO: 9 %
MONOCYTES NFR BLD AUTO: 9.6 %
MUCOUS THREADS #/AREA URNS LPF: PRESENT /LPF
MUCOUS THREADS #/AREA URNS LPF: PRESENT /LPF
NEUTROPHILS # BLD AUTO: 10.3 10E9/L (ref 1.6–8.3)
NEUTROPHILS # BLD AUTO: 4.7 10E9/L (ref 1.6–8.3)
NEUTROPHILS # BLD AUTO: 5.5 10E9/L (ref 1.6–8.3)
NEUTROPHILS # BLD AUTO: 6.8 10E9/L (ref 1.6–8.3)
NEUTROPHILS # BLD AUTO: 6.8 10E9/L (ref 1.6–8.3)
NEUTROPHILS # BLD AUTO: 6.9 10E9/L (ref 1.6–8.3)
NEUTROPHILS # BLD AUTO: 8.1 10E9/L (ref 1.6–8.3)
NEUTROPHILS # BLD AUTO: 8.4 10E9/L (ref 1.6–8.3)
NEUTROPHILS # BLD AUTO: 8.9 10E9/L (ref 1.6–8.3)
NEUTROPHILS # BLD AUTO: 8.9 10E9/L (ref 1.6–8.3)
NEUTROPHILS NFR BLD AUTO: 69.9 %
NEUTROPHILS NFR BLD AUTO: 73.9 %
NEUTROPHILS NFR BLD AUTO: 76.6 %
NEUTROPHILS NFR BLD AUTO: 77.2 %
NEUTROPHILS NFR BLD AUTO: 77.5 %
NEUTROPHILS NFR BLD AUTO: 78.7 %
NEUTROPHILS NFR BLD AUTO: 79.1 %
NEUTROPHILS NFR BLD AUTO: 79.2 %
NEUTROPHILS NFR BLD AUTO: 79.7 %
NEUTROPHILS NFR BLD AUTO: 86.7 %
NITRATE UR QL: NEGATIVE
NITRATE UR QL: NEGATIVE
NRBC # BLD AUTO: 0 10*3/UL
NRBC BLD AUTO-RTO: 0 /100
NT-PROBNP SERPL-MCNC: 2274 PG/ML (ref 0–1800)
NT-PROBNP SERPL-MCNC: ABNORMAL PG/ML (ref 0–1800)
O2/TOTAL GAS SETTING VFR VENT: ABNORMAL %
O2/TOTAL GAS SETTING VFR VENT: ABNORMAL %
O2/TOTAL GAS SETTING VFR VENT: NORMAL %
PCO2 BLDV: 27 MM HG (ref 40–50)
PCO2 BLDV: 44 MM HG (ref 40–50)
PCO2 BLDV: 50 MM HG (ref 40–50)
PH BLDV: 7.38 PH (ref 7.32–7.43)
PH BLDV: 7.38 PH (ref 7.32–7.43)
PH BLDV: 7.41 PH (ref 7.32–7.43)
PH UR STRIP: 5 PH (ref 5–7)
PH UR STRIP: 5 PH (ref 5–7)
PLATELET # BLD AUTO: 107 10E9/L (ref 150–450)
PLATELET # BLD AUTO: 112 10E9/L (ref 150–450)
PLATELET # BLD AUTO: 135 10E9/L (ref 150–450)
PLATELET # BLD AUTO: 176 10E9/L (ref 150–450)
PLATELET # BLD AUTO: 202 10E9/L (ref 150–450)
PLATELET # BLD AUTO: 206 10E9/L (ref 150–450)
PLATELET # BLD AUTO: 207 10E9/L (ref 150–450)
PLATELET # BLD AUTO: 222 10E9/L (ref 150–450)
PLATELET # BLD AUTO: 227 10E9/L (ref 150–450)
PLATELET # BLD AUTO: 230 10E9/L (ref 150–450)
PLATELET # BLD AUTO: 89 10E9/L (ref 150–450)
PLATELET # BLD AUTO: 95 10E9/L (ref 150–450)
PO2 BLDV: 23 MM HG (ref 25–47)
PO2 BLDV: 42 MM HG (ref 25–47)
PO2 BLDV: 42 MM HG (ref 25–47)
POTASSIUM SERPL-SCNC: 3.8 MMOL/L (ref 3.4–5.3)
POTASSIUM SERPL-SCNC: 4 MMOL/L (ref 3.4–5.3)
POTASSIUM SERPL-SCNC: 4 MMOL/L (ref 3.4–5.3)
POTASSIUM SERPL-SCNC: 4.1 MMOL/L (ref 3.4–5.3)
POTASSIUM SERPL-SCNC: 4.2 MMOL/L (ref 3.4–5.3)
POTASSIUM SERPL-SCNC: 4.3 MMOL/L (ref 3.4–5.3)
POTASSIUM SERPL-SCNC: 4.4 MMOL/L (ref 3.4–5.3)
POTASSIUM SERPL-SCNC: 4.5 MMOL/L (ref 3.4–5.3)
POTASSIUM SERPL-SCNC: 4.7 MMOL/L (ref 3.4–5.3)
POTASSIUM SERPL-SCNC: 4.7 MMOL/L (ref 3.4–5.3)
POTASSIUM SERPL-SCNC: 4.8 MMOL/L (ref 3.4–5.3)
POTASSIUM SERPL-SCNC: 4.9 MMOL/L (ref 3.4–5.3)
PROCALCITONIN SERPL-MCNC: 0.17 NG/ML
PROCALCITONIN SERPL-MCNC: <0.05 NG/ML
PROT SERPL-MCNC: 5.5 G/DL (ref 6.8–8.8)
PROT SERPL-MCNC: 5.7 G/DL (ref 6.8–8.8)
PROT SERPL-MCNC: 5.7 G/DL (ref 6.8–8.8)
PROT SERPL-MCNC: 5.8 G/DL (ref 6.8–8.8)
PROT SERPL-MCNC: 6.3 G/DL (ref 6.8–8.8)
PROT SERPL-MCNC: 6.8 G/DL (ref 6.8–8.8)
PROT SERPL-MCNC: 6.9 G/DL (ref 6.8–8.8)
PROT SERPL-MCNC: 7 G/DL (ref 6.8–8.8)
RBC # BLD AUTO: 3.21 10E12/L (ref 4.4–5.9)
RBC # BLD AUTO: 3.33 10E12/L (ref 4.4–5.9)
RBC # BLD AUTO: 4.08 10E12/L (ref 4.4–5.9)
RBC # BLD AUTO: 4.09 10E12/L (ref 4.4–5.9)
RBC # BLD AUTO: 4.16 10E12/L (ref 4.4–5.9)
RBC # BLD AUTO: 4.31 10E12/L (ref 4.4–5.9)
RBC # BLD AUTO: 4.35 10E12/L (ref 4.4–5.9)
RBC # BLD AUTO: 4.41 10E12/L (ref 4.4–5.9)
RBC # BLD AUTO: 4.42 10E12/L (ref 4.4–5.9)
RBC # BLD AUTO: 4.63 10E12/L (ref 4.4–5.9)
RBC # BLD AUTO: 4.68 10E12/L (ref 4.4–5.9)
RBC # BLD AUTO: 4.69 10E12/L (ref 4.4–5.9)
RBC #/AREA URNS AUTO: 2 /HPF (ref 0–2)
RBC #/AREA URNS AUTO: 4 /HPF (ref 0–2)
RETICS # AUTO: 35.4 10E9/L (ref 25–95)
RETICS # AUTO: 44 10E9/L (ref 25–95)
RETICS # AUTO: 60.8 10E9/L (ref 25–95)
RETICS/RBC NFR AUTO: 0.8 % (ref 0.5–2)
RETICS/RBC NFR AUTO: 1 % (ref 0.5–2)
RETICS/RBC NFR AUTO: 1.3 % (ref 0.5–2)
RETINOPATHY: NEGATIVE
SARS-COV-2 RNA SPEC QL NAA+PROBE: NORMAL
SARS-COV-2 RNA SPEC QL NAA+PROBE: POSITIVE
SODIUM SERPL-SCNC: 134 MMOL/L (ref 133–144)
SODIUM SERPL-SCNC: 134 MMOL/L (ref 133–144)
SODIUM SERPL-SCNC: 135 MMOL/L (ref 133–144)
SODIUM SERPL-SCNC: 136 MMOL/L (ref 133–144)
SODIUM SERPL-SCNC: 137 MMOL/L (ref 133–144)
SODIUM SERPL-SCNC: 137 MMOL/L (ref 133–144)
SODIUM SERPL-SCNC: 138 MMOL/L (ref 133–144)
SODIUM SERPL-SCNC: 138 MMOL/L (ref 133–144)
SODIUM SERPL-SCNC: 139 MMOL/L (ref 133–144)
SODIUM SERPL-SCNC: 139 MMOL/L (ref 133–144)
SODIUM SERPL-SCNC: 140 MMOL/L (ref 133–144)
SODIUM SERPL-SCNC: 140 MMOL/L (ref 133–144)
SODIUM SERPL-SCNC: 141 MMOL/L (ref 133–144)
SODIUM SERPL-SCNC: 145 MMOL/L (ref 133–144)
SOURCE: ABNORMAL
SOURCE: ABNORMAL
SP GR UR STRIP: 1.02 (ref 1–1.03)
SP GR UR STRIP: 1.02 (ref 1–1.03)
SPECIMEN EXP DATE BLD: NORMAL
SPECIMEN SOURCE: ABNORMAL
SPECIMEN SOURCE: ABNORMAL
SPECIMEN SOURCE: NORMAL
SPECIMEN SOURCE: NORMAL
SQUAMOUS #/AREA URNS AUTO: 1 /HPF (ref 0–1)
SQUAMOUS #/AREA URNS AUTO: <1 /HPF (ref 0–1)
TROPONIN I SERPL-MCNC: 0.03 UG/L (ref 0–0.04)
TROPONIN I SERPL-MCNC: 0.04 UG/L (ref 0–0.04)
TROPONIN I SERPL-MCNC: 0.04 UG/L (ref 0–0.04)
TROPONIN I SERPL-MCNC: 0.05 UG/L (ref 0–0.04)
TROPONIN I SERPL-MCNC: 0.07 UG/L (ref 0–0.04)
TROPONIN I SERPL-MCNC: 0.07 UG/L (ref 0–0.04)
TROPONIN I SERPL-MCNC: 0.09 UG/L (ref 0–0.04)
TROPONIN I SERPL-MCNC: 0.1 UG/L (ref 0–0.04)
TROPONIN I SERPL-MCNC: 0.11 UG/L (ref 0–0.04)
TROPONIN I SERPL-MCNC: 0.14 UG/L (ref 0–0.04)
TROPONIN I SERPL-MCNC: 0.22 UG/L (ref 0–0.04)
TSH SERPL DL<=0.005 MIU/L-ACNC: 0.89 MU/L (ref 0.4–4)
UROBILINOGEN UR STRIP-MCNC: 0 MG/DL (ref 0–2)
UROBILINOGEN UR STRIP-MCNC: 0 MG/DL (ref 0–2)
WBC # BLD AUTO: 10.6 10E9/L (ref 4–11)
WBC # BLD AUTO: 11.2 10E9/L (ref 4–11)
WBC # BLD AUTO: 11.5 10E9/L (ref 4–11)
WBC # BLD AUTO: 12.9 10E9/L (ref 4–11)
WBC # BLD AUTO: 6.4 10E9/L (ref 4–11)
WBC # BLD AUTO: 6.7 10E9/L (ref 4–11)
WBC # BLD AUTO: 7.1 10E9/L (ref 4–11)
WBC # BLD AUTO: 8.8 10E9/L (ref 4–11)
WBC # BLD AUTO: 8.8 10E9/L (ref 4–11)
WBC # BLD AUTO: 8.9 10E9/L (ref 4–11)
WBC # BLD AUTO: 9.4 10E9/L (ref 4–11)
WBC # BLD AUTO: 9.7 10E9/L (ref 4–11)
WBC #/AREA URNS AUTO: 1 /HPF (ref 0–5)
WBC #/AREA URNS AUTO: 39 /HPF (ref 0–5)

## 2020-01-01 PROCEDURE — 250N000013 HC RX MED GY IP 250 OP 250 PS 637: Performed by: PHYSICIAN ASSISTANT

## 2020-01-01 PROCEDURE — 85384 FIBRINOGEN ACTIVITY: CPT | Performed by: INTERNAL MEDICINE

## 2020-01-01 PROCEDURE — 85379 FIBRIN DEGRADATION QUANT: CPT | Performed by: PHYSICIAN ASSISTANT

## 2020-01-01 PROCEDURE — 84484 ASSAY OF TROPONIN QUANT: CPT | Performed by: INTERNAL MEDICINE

## 2020-01-01 PROCEDURE — 85379 FIBRIN DEGRADATION QUANT: CPT | Performed by: INTERNAL MEDICINE

## 2020-01-01 PROCEDURE — 84145 PROCALCITONIN (PCT): CPT | Performed by: EMERGENCY MEDICINE

## 2020-01-01 PROCEDURE — 96365 THER/PROPH/DIAG IV INF INIT: CPT | Performed by: EMERGENCY MEDICINE

## 2020-01-01 PROCEDURE — 99226 PR SUBSEQUENT OBSERVATION CARE,LEVEL III: CPT | Performed by: INTERNAL MEDICINE

## 2020-01-01 PROCEDURE — 86140 C-REACTIVE PROTEIN: CPT | Performed by: EMERGENCY MEDICINE

## 2020-01-01 PROCEDURE — 250N000011 HC RX IP 250 OP 636: Performed by: PHYSICIAN ASSISTANT

## 2020-01-01 PROCEDURE — 250N000009 HC RX 250: Performed by: PHYSICIAN ASSISTANT

## 2020-01-01 PROCEDURE — 85610 PROTHROMBIN TIME: CPT | Performed by: PHYSICIAN ASSISTANT

## 2020-01-01 PROCEDURE — 83615 LACTATE (LD) (LDH) ENZYME: CPT | Performed by: PHYSICIAN ASSISTANT

## 2020-01-01 PROCEDURE — XW033E5 INTRODUCTION OF REMDESIVIR ANTI-INFECTIVE INTO PERIPHERAL VEIN, PERCUTANEOUS APPROACH, NEW TECHNOLOGY GROUP 5: ICD-10-PCS | Performed by: INTERNAL MEDICINE

## 2020-01-01 PROCEDURE — 97165 OT EVAL LOW COMPLEX 30 MIN: CPT | Mod: GO

## 2020-01-01 PROCEDURE — 258N000003 HC RX IP 258 OP 636: Performed by: PHYSICIAN ASSISTANT

## 2020-01-01 PROCEDURE — 94640 AIRWAY INHALATION TREATMENT: CPT | Performed by: EMERGENCY MEDICINE

## 2020-01-01 PROCEDURE — 99218 PR INITIAL OBSERVATION CARE,LEVEL I: CPT | Performed by: INTERNAL MEDICINE

## 2020-01-01 PROCEDURE — 97129 THER IVNTJ 1ST 15 MIN: CPT

## 2020-01-01 PROCEDURE — 250N000012 HC RX MED GY IP 250 OP 636 PS 637: Performed by: PHYSICIAN ASSISTANT

## 2020-01-01 PROCEDURE — 999N000208 ECHOCARDIOGRAM COMPLETE

## 2020-01-01 PROCEDURE — 80053 COMPREHEN METABOLIC PANEL: CPT | Performed by: INTERNAL MEDICINE

## 2020-01-01 PROCEDURE — 86140 C-REACTIVE PROTEIN: CPT | Performed by: PHYSICIAN ASSISTANT

## 2020-01-01 PROCEDURE — U0003 INFECTIOUS AGENT DETECTION BY NUCLEIC ACID (DNA OR RNA); SEVERE ACUTE RESPIRATORY SYNDROME CORONAVIRUS 2 (SARS-COV-2) (CORONAVIRUS DISEASE [COVID-19]), AMPLIFIED PROBE TECHNIQUE, MAKING USE OF HIGH THROUGHPUT TECHNOLOGIES AS DESCRIBED BY CMS-2020-01-R: HCPCS | Performed by: EMERGENCY MEDICINE

## 2020-01-01 PROCEDURE — 85384 FIBRINOGEN ACTIVITY: CPT | Performed by: PHYSICIAN ASSISTANT

## 2020-01-01 PROCEDURE — 85045 AUTOMATED RETICULOCYTE COUNT: CPT | Performed by: PHYSICIAN ASSISTANT

## 2020-01-01 PROCEDURE — 250N000012 HC RX MED GY IP 250 OP 636 PS 637: Performed by: FAMILY MEDICINE

## 2020-01-01 PROCEDURE — 999N001017 HC STATISTIC GLUCOSE BY METER IP

## 2020-01-01 PROCEDURE — 86140 C-REACTIVE PROTEIN: CPT | Performed by: INTERNAL MEDICINE

## 2020-01-01 PROCEDURE — 255N000002 HC RX 255 OP 636: Performed by: INTERNAL MEDICINE

## 2020-01-01 PROCEDURE — 80048 BASIC METABOLIC PNL TOTAL CA: CPT | Performed by: EMERGENCY MEDICINE

## 2020-01-01 PROCEDURE — G0378 HOSPITAL OBSERVATION PER HR: HCPCS

## 2020-01-01 PROCEDURE — 84484 ASSAY OF TROPONIN QUANT: CPT | Performed by: EMERGENCY MEDICINE

## 2020-01-01 PROCEDURE — 83615 LACTATE (LD) (LDH) ENZYME: CPT | Performed by: INTERNAL MEDICINE

## 2020-01-01 PROCEDURE — 84484 ASSAY OF TROPONIN QUANT: CPT | Performed by: FAMILY MEDICINE

## 2020-01-01 PROCEDURE — 36415 COLL VENOUS BLD VENIPUNCTURE: CPT | Performed by: INTERNAL MEDICINE

## 2020-01-01 PROCEDURE — 87186 SC STD MICRODIL/AGAR DIL: CPT | Performed by: PHYSICIAN ASSISTANT

## 2020-01-01 PROCEDURE — 250N000013 HC RX MED GY IP 250 OP 250 PS 637: Performed by: INTERNAL MEDICINE

## 2020-01-01 PROCEDURE — 86901 BLOOD TYPING SEROLOGIC RH(D): CPT | Performed by: PHYSICIAN ASSISTANT

## 2020-01-01 PROCEDURE — 250N000011 HC RX IP 250 OP 636: Performed by: INTERNAL MEDICINE

## 2020-01-01 PROCEDURE — 71045 X-RAY EXAM CHEST 1 VIEW: CPT

## 2020-01-01 PROCEDURE — 258N000003 HC RX IP 258 OP 636: Performed by: EMERGENCY MEDICINE

## 2020-01-01 PROCEDURE — 81001 URINALYSIS AUTO W/SCOPE: CPT | Performed by: EMERGENCY MEDICINE

## 2020-01-01 PROCEDURE — 82550 ASSAY OF CK (CPK): CPT | Performed by: INTERNAL MEDICINE

## 2020-01-01 PROCEDURE — 250N000013 HC RX MED GY IP 250 OP 250 PS 637: Performed by: FAMILY MEDICINE

## 2020-01-01 PROCEDURE — 83880 ASSAY OF NATRIURETIC PEPTIDE: CPT | Mod: GZ | Performed by: EMERGENCY MEDICINE

## 2020-01-01 PROCEDURE — 83880 ASSAY OF NATRIURETIC PEPTIDE: CPT | Performed by: EMERGENCY MEDICINE

## 2020-01-01 PROCEDURE — 99233 SBSQ HOSP IP/OBS HIGH 50: CPT | Performed by: INTERNAL MEDICINE

## 2020-01-01 PROCEDURE — 85025 COMPLETE CBC W/AUTO DIFF WBC: CPT | Performed by: PHYSICIAN ASSISTANT

## 2020-01-01 PROCEDURE — 120N000001 HC R&B MED SURG/OB

## 2020-01-01 PROCEDURE — 99203 OFFICE O/P NEW LOW 30 MIN: CPT | Mod: 25 | Performed by: OTOLARYNGOLOGY

## 2020-01-01 PROCEDURE — 85027 COMPLETE CBC AUTOMATED: CPT | Performed by: PHYSICIAN ASSISTANT

## 2020-01-01 PROCEDURE — 96374 THER/PROPH/DIAG INJ IV PUSH: CPT

## 2020-01-01 PROCEDURE — 97535 SELF CARE MNGMENT TRAINING: CPT | Mod: GO

## 2020-01-01 PROCEDURE — 99285 EMERGENCY DEPT VISIT HI MDM: CPT | Mod: 25 | Performed by: EMERGENCY MEDICINE

## 2020-01-01 PROCEDURE — 96360 HYDRATION IV INFUSION INIT: CPT | Mod: 59 | Performed by: EMERGENCY MEDICINE

## 2020-01-01 PROCEDURE — 250N000011 HC RX IP 250 OP 636: Performed by: EMERGENCY MEDICINE

## 2020-01-01 PROCEDURE — G0463 HOSPITAL OUTPT CLINIC VISIT: HCPCS

## 2020-01-01 PROCEDURE — 71250 CT THORAX DX C-: CPT

## 2020-01-01 PROCEDURE — 258N000001 HC RX 258: Performed by: EMERGENCY MEDICINE

## 2020-01-01 PROCEDURE — 250N000012 HC RX MED GY IP 250 OP 636 PS 637: Performed by: INTERNAL MEDICINE

## 2020-01-01 PROCEDURE — 96372 THER/PROPH/DIAG INJ SC/IM: CPT | Performed by: INTERNAL MEDICINE

## 2020-01-01 PROCEDURE — 99284 EMERGENCY DEPT VISIT MOD MDM: CPT | Mod: 25 | Performed by: EMERGENCY MEDICINE

## 2020-01-01 PROCEDURE — 99233 SBSQ HOSP IP/OBS HIGH 50: CPT | Performed by: FAMILY MEDICINE

## 2020-01-01 PROCEDURE — 82803 BLOOD GASES ANY COMBINATION: CPT | Performed by: FAMILY MEDICINE

## 2020-01-01 PROCEDURE — 85025 COMPLETE CBC W/AUTO DIFF WBC: CPT | Performed by: INTERNAL MEDICINE

## 2020-01-01 PROCEDURE — 82803 BLOOD GASES ANY COMBINATION: CPT | Performed by: EMERGENCY MEDICINE

## 2020-01-01 PROCEDURE — 84443 ASSAY THYROID STIM HORMONE: CPT | Performed by: EMERGENCY MEDICINE

## 2020-01-01 PROCEDURE — C9803 HOPD COVID-19 SPEC COLLECT: HCPCS | Performed by: EMERGENCY MEDICINE

## 2020-01-01 PROCEDURE — 85610 PROTHROMBIN TIME: CPT | Performed by: EMERGENCY MEDICINE

## 2020-01-01 PROCEDURE — 80048 BASIC METABOLIC PNL TOTAL CA: CPT | Performed by: PHYSICIAN ASSISTANT

## 2020-01-01 PROCEDURE — 83605 ASSAY OF LACTIC ACID: CPT | Performed by: INTERNAL MEDICINE

## 2020-01-01 PROCEDURE — 97130 THER IVNTJ EA ADDL 15 MIN: CPT

## 2020-01-01 PROCEDURE — 71275 CT ANGIOGRAPHY CHEST: CPT

## 2020-01-01 PROCEDURE — 85025 COMPLETE CBC W/AUTO DIFF WBC: CPT | Performed by: EMERGENCY MEDICINE

## 2020-01-01 PROCEDURE — 97161 PT EVAL LOW COMPLEX 20 MIN: CPT | Mod: GP

## 2020-01-01 PROCEDURE — 250N000009 HC RX 250: Performed by: INTERNAL MEDICINE

## 2020-01-01 PROCEDURE — 82728 ASSAY OF FERRITIN: CPT | Performed by: PHYSICIAN ASSISTANT

## 2020-01-01 PROCEDURE — 70496 CT ANGIOGRAPHY HEAD: CPT

## 2020-01-01 PROCEDURE — 82140 ASSAY OF AMMONIA: CPT | Performed by: EMERGENCY MEDICINE

## 2020-01-01 PROCEDURE — 99223 1ST HOSP IP/OBS HIGH 75: CPT | Mod: AI | Performed by: PHYSICIAN ASSISTANT

## 2020-01-01 PROCEDURE — 93005 ELECTROCARDIOGRAM TRACING: CPT | Performed by: EMERGENCY MEDICINE

## 2020-01-01 PROCEDURE — 99220 PR INITIAL OBSERVATION CARE,LEVEL III: CPT | Performed by: PHYSICIAN ASSISTANT

## 2020-01-01 PROCEDURE — 99232 SBSQ HOSP IP/OBS MODERATE 35: CPT | Performed by: FAMILY MEDICINE

## 2020-01-01 PROCEDURE — 36415 COLL VENOUS BLD VENIPUNCTURE: CPT | Performed by: PHYSICIAN ASSISTANT

## 2020-01-01 PROCEDURE — 96372 THER/PROPH/DIAG INJ SC/IM: CPT | Performed by: PHYSICIAN ASSISTANT

## 2020-01-01 PROCEDURE — 97161 PT EVAL LOW COMPLEX 20 MIN: CPT | Mod: GP | Performed by: PHYSICAL THERAPIST

## 2020-01-01 PROCEDURE — 250N000009 HC RX 250: Performed by: EMERGENCY MEDICINE

## 2020-01-01 PROCEDURE — 36415 COLL VENOUS BLD VENIPUNCTURE: CPT | Performed by: FAMILY MEDICINE

## 2020-01-01 PROCEDURE — 93010 ELECTROCARDIOGRAM REPORT: CPT | Performed by: EMERGENCY MEDICINE

## 2020-01-01 PROCEDURE — 96361 HYDRATE IV INFUSION ADD-ON: CPT | Performed by: EMERGENCY MEDICINE

## 2020-01-01 PROCEDURE — 80076 HEPATIC FUNCTION PANEL: CPT | Performed by: EMERGENCY MEDICINE

## 2020-01-01 PROCEDURE — 99207 PR CDG-HISTORY COMPONENT: MEETS DETAILED - DOWN CODED LACK OF ROS: CPT | Performed by: INTERNAL MEDICINE

## 2020-01-01 PROCEDURE — 96375 TX/PRO/DX INJ NEW DRUG ADDON: CPT

## 2020-01-01 PROCEDURE — 86900 BLOOD TYPING SEROLOGIC ABO: CPT | Performed by: PHYSICIAN ASSISTANT

## 2020-01-01 PROCEDURE — 96372 THER/PROPH/DIAG INJ SC/IM: CPT

## 2020-01-01 PROCEDURE — 99217 PR OBSERVATION CARE DISCHARGE: CPT | Performed by: INTERNAL MEDICINE

## 2020-01-01 PROCEDURE — 250N000013 HC RX MED GY IP 250 OP 250 PS 637: Performed by: EMERGENCY MEDICINE

## 2020-01-01 PROCEDURE — 87804 INFLUENZA ASSAY W/OPTIC: CPT | Performed by: EMERGENCY MEDICINE

## 2020-01-01 PROCEDURE — 99239 HOSP IP/OBS DSCHRG MGMT >30: CPT | Performed by: INTERNAL MEDICINE

## 2020-01-01 PROCEDURE — 97110 THERAPEUTIC EXERCISES: CPT | Mod: GO

## 2020-01-01 PROCEDURE — 93005 ELECTROCARDIOGRAM TRACING: CPT

## 2020-01-01 PROCEDURE — 70450 CT HEAD/BRAIN W/O DYE: CPT

## 2020-01-01 PROCEDURE — 83036 HEMOGLOBIN GLYCOSYLATED A1C: CPT | Performed by: PHYSICIAN ASSISTANT

## 2020-01-01 PROCEDURE — 85384 FIBRINOGEN ACTIVITY: CPT | Performed by: EMERGENCY MEDICINE

## 2020-01-01 PROCEDURE — 69210 REMOVE IMPACTED EAR WAX UNI: CPT | Performed by: OTOLARYNGOLOGY

## 2020-01-01 PROCEDURE — 99207 PR CDG-CODE CATEGORY CHANGED: CPT | Performed by: INTERNAL MEDICINE

## 2020-01-01 PROCEDURE — 85300 ANTITHROMBIN III ACTIVITY: CPT | Performed by: PHYSICIAN ASSISTANT

## 2020-01-01 PROCEDURE — 258N000003 HC RX IP 258 OP 636: Performed by: INTERNAL MEDICINE

## 2020-01-01 PROCEDURE — 80053 COMPREHEN METABOLIC PANEL: CPT | Performed by: EMERGENCY MEDICINE

## 2020-01-01 PROCEDURE — 84145 PROCALCITONIN (PCT): CPT | Performed by: INTERNAL MEDICINE

## 2020-01-01 PROCEDURE — 83520 IMMUNOASSAY QUANT NOS NONAB: CPT | Performed by: PHYSICIAN ASSISTANT

## 2020-01-01 PROCEDURE — 99291 CRITICAL CARE FIRST HOUR: CPT | Mod: 25 | Performed by: EMERGENCY MEDICINE

## 2020-01-01 PROCEDURE — 87088 URINE BACTERIA CULTURE: CPT | Performed by: PHYSICIAN ASSISTANT

## 2020-01-01 PROCEDURE — 84484 ASSAY OF TROPONIN QUANT: CPT | Performed by: PHYSICIAN ASSISTANT

## 2020-01-01 PROCEDURE — 80048 BASIC METABOLIC PNL TOTAL CA: CPT | Performed by: FAMILY MEDICINE

## 2020-01-01 PROCEDURE — 80048 BASIC METABOLIC PNL TOTAL CA: CPT | Performed by: INTERNAL MEDICINE

## 2020-01-01 PROCEDURE — 83605 ASSAY OF LACTIC ACID: CPT | Performed by: EMERGENCY MEDICINE

## 2020-01-01 PROCEDURE — 82728 ASSAY OF FERRITIN: CPT | Performed by: EMERGENCY MEDICINE

## 2020-01-01 PROCEDURE — 85730 THROMBOPLASTIN TIME PARTIAL: CPT | Performed by: EMERGENCY MEDICINE

## 2020-01-01 PROCEDURE — 99214 OFFICE O/P EST MOD 30 MIN: CPT | Performed by: PHYSICIAN ASSISTANT

## 2020-01-01 PROCEDURE — 87086 URINE CULTURE/COLONY COUNT: CPT | Performed by: PHYSICIAN ASSISTANT

## 2020-01-01 PROCEDURE — 99239 HOSP IP/OBS DSCHRG MGMT >30: CPT | Performed by: FAMILY MEDICINE

## 2020-01-01 PROCEDURE — 96376 TX/PRO/DX INJ SAME DRUG ADON: CPT

## 2020-01-01 PROCEDURE — 93306 TTE W/DOPPLER COMPLETE: CPT | Mod: 26 | Performed by: INTERNAL MEDICINE

## 2020-01-01 PROCEDURE — 82550 ASSAY OF CK (CPK): CPT | Performed by: PHYSICIAN ASSISTANT

## 2020-01-01 PROCEDURE — 85025 COMPLETE CBC W/AUTO DIFF WBC: CPT | Performed by: FAMILY MEDICINE

## 2020-01-01 PROCEDURE — 96374 THER/PROPH/DIAG INJ IV PUSH: CPT | Performed by: EMERGENCY MEDICINE

## 2020-01-01 PROCEDURE — 85730 THROMBOPLASTIN TIME PARTIAL: CPT | Performed by: PHYSICIAN ASSISTANT

## 2020-01-01 PROCEDURE — 93924 LWR XTR VASC STDY BILAT: CPT

## 2020-01-01 RX ORDER — ATORVASTATIN CALCIUM 80 MG/1
80 TABLET, FILM COATED ORAL DAILY
Status: DISCONTINUED | OUTPATIENT
Start: 2020-01-01 | End: 2020-01-01 | Stop reason: HOSPADM

## 2020-01-01 RX ORDER — LISINOPRIL 10 MG/1
10 TABLET ORAL DAILY
Status: DISCONTINUED | OUTPATIENT
Start: 2020-01-01 | End: 2020-01-01

## 2020-01-01 RX ORDER — FUROSEMIDE 20 MG
20 TABLET ORAL DAILY
Status: DISCONTINUED | OUTPATIENT
Start: 2020-01-01 | End: 2020-01-01 | Stop reason: HOSPADM

## 2020-01-01 RX ORDER — TAMSULOSIN HYDROCHLORIDE 0.4 MG/1
CAPSULE ORAL
Qty: 90 CAPSULE | Refills: 0 | Status: SHIPPED | OUTPATIENT
Start: 2020-01-01 | End: 2020-01-01

## 2020-01-01 RX ORDER — OLANZAPINE 5 MG/1
5 TABLET ORAL 2 TIMES DAILY
Status: DISCONTINUED | OUTPATIENT
Start: 2020-01-01 | End: 2020-01-01

## 2020-01-01 RX ORDER — FAMOTIDINE 20 MG/1
20 TABLET, FILM COATED ORAL DAILY
Status: DISCONTINUED | OUTPATIENT
Start: 2020-01-01 | End: 2020-01-01 | Stop reason: HOSPADM

## 2020-01-01 RX ORDER — ALBUTEROL SULFATE 0.83 MG/ML
2.5 SOLUTION RESPIRATORY (INHALATION)
Status: DISCONTINUED | OUTPATIENT
Start: 2020-01-01 | End: 2020-01-01

## 2020-01-01 RX ORDER — AMOXICILLIN 250 MG
2 CAPSULE ORAL 2 TIMES DAILY
Status: DISCONTINUED | OUTPATIENT
Start: 2020-01-01 | End: 2020-01-01 | Stop reason: HOSPADM

## 2020-01-01 RX ORDER — CIPROFLOXACIN 250 MG/1
250 TABLET, FILM COATED ORAL 2 TIMES DAILY
Qty: 10 TABLET | Refills: 0 | Status: SHIPPED | OUTPATIENT
Start: 2020-01-01 | End: 2021-01-01

## 2020-01-01 RX ORDER — DEXTROSE MONOHYDRATE 25 G/50ML
25-50 INJECTION, SOLUTION INTRAVENOUS
Status: DISCONTINUED | OUTPATIENT
Start: 2020-01-01 | End: 2020-01-01 | Stop reason: HOSPADM

## 2020-01-01 RX ORDER — NICOTINE POLACRILEX 4 MG
15-30 LOZENGE BUCCAL
Status: DISCONTINUED | OUTPATIENT
Start: 2020-01-01 | End: 2020-01-01 | Stop reason: HOSPADM

## 2020-01-01 RX ORDER — METOPROLOL TARTRATE 25 MG/1
25 TABLET, FILM COATED ORAL 2 TIMES DAILY
Status: DISCONTINUED | OUTPATIENT
Start: 2020-01-01 | End: 2020-01-01 | Stop reason: HOSPADM

## 2020-01-01 RX ORDER — OLANZAPINE 5 MG/1
5 TABLET ORAL AT BEDTIME
Status: DISCONTINUED | OUTPATIENT
Start: 2020-01-01 | End: 2020-01-01

## 2020-01-01 RX ORDER — ACETAMINOPHEN 325 MG/1
975 TABLET ORAL ONCE
Status: COMPLETED | OUTPATIENT
Start: 2020-01-01 | End: 2020-01-01

## 2020-01-01 RX ORDER — TAMSULOSIN HYDROCHLORIDE 0.4 MG/1
0.4 CAPSULE ORAL DAILY
Status: DISCONTINUED | OUTPATIENT
Start: 2020-01-01 | End: 2020-01-01 | Stop reason: HOSPADM

## 2020-01-01 RX ORDER — LISINOPRIL 10 MG/1
10 TABLET ORAL DAILY
Status: DISCONTINUED | OUTPATIENT
Start: 2020-01-01 | End: 2020-01-01 | Stop reason: HOSPADM

## 2020-01-01 RX ORDER — LISINOPRIL 5 MG/1
5 TABLET ORAL DAILY
Status: DISCONTINUED | OUTPATIENT
Start: 2020-01-01 | End: 2020-01-01 | Stop reason: HOSPADM

## 2020-01-01 RX ORDER — DEXAMETHASONE 6 MG/1
6 TABLET ORAL DAILY
Qty: 5 TABLET | Refills: 0 | Status: ON HOLD | OUTPATIENT
Start: 2020-01-01 | End: 2020-01-01

## 2020-01-01 RX ORDER — LISINOPRIL 10 MG/1
5 TABLET ORAL DAILY
Qty: 90 TABLET | Refills: 3 | DISCHARGE
Start: 2020-01-01 | End: 2021-01-01

## 2020-01-01 RX ORDER — DEXAMETHASONE 2 MG/1
6 TABLET ORAL DAILY
Status: DISCONTINUED | OUTPATIENT
Start: 2020-01-01 | End: 2020-01-01 | Stop reason: HOSPADM

## 2020-01-01 RX ORDER — DEXTROSE MONOHYDRATE 25 G/50ML
50 INJECTION, SOLUTION INTRAVENOUS CONTINUOUS
Status: DISCONTINUED | OUTPATIENT
Start: 2020-01-01 | End: 2020-01-01 | Stop reason: CLARIF

## 2020-01-01 RX ORDER — POLYETHYLENE GLYCOL 3350 17 G/17G
17 POWDER, FOR SOLUTION ORAL DAILY
Status: DISCONTINUED | OUTPATIENT
Start: 2020-01-01 | End: 2020-01-01 | Stop reason: HOSPADM

## 2020-01-01 RX ORDER — FUROSEMIDE 10 MG/ML
40 INJECTION INTRAMUSCULAR; INTRAVENOUS ONCE
Status: COMPLETED | OUTPATIENT
Start: 2020-01-01 | End: 2020-01-01

## 2020-01-01 RX ORDER — OLANZAPINE 5 MG/1
5 TABLET ORAL 2 TIMES DAILY
Status: DISCONTINUED | OUTPATIENT
Start: 2020-01-01 | End: 2020-01-01 | Stop reason: HOSPADM

## 2020-01-01 RX ORDER — METOPROLOL TARTRATE 25 MG/1
TABLET, FILM COATED ORAL
Qty: 180 TABLET | Refills: 3 | Status: SHIPPED | OUTPATIENT
Start: 2020-01-01 | End: 2021-01-01

## 2020-01-01 RX ORDER — DEXTROSE MONOHYDRATE 25 G/50ML
50 INJECTION, SOLUTION INTRAVENOUS ONCE
Status: COMPLETED | OUTPATIENT
Start: 2020-01-01 | End: 2020-01-01

## 2020-01-01 RX ORDER — AMOXICILLIN 250 MG
1 CAPSULE ORAL 2 TIMES DAILY
Status: DISCONTINUED | OUTPATIENT
Start: 2020-01-01 | End: 2020-01-01 | Stop reason: HOSPADM

## 2020-01-01 RX ORDER — CEFTRIAXONE SODIUM 1 G/50ML
1 INJECTION, SOLUTION INTRAVENOUS EVERY 24 HOURS
Status: DISCONTINUED | OUTPATIENT
Start: 2020-01-01 | End: 2020-01-01 | Stop reason: HOSPADM

## 2020-01-01 RX ORDER — DEXAMETHASONE SODIUM PHOSPHATE 10 MG/ML
6 INJECTION, SOLUTION INTRAMUSCULAR; INTRAVENOUS DAILY
Status: DISCONTINUED | OUTPATIENT
Start: 2020-01-01 | End: 2020-01-01 | Stop reason: HOSPADM

## 2020-01-01 RX ORDER — PROCHLORPERAZINE MALEATE 5 MG
5 TABLET ORAL EVERY 6 HOURS PRN
Status: DISCONTINUED | OUTPATIENT
Start: 2020-01-01 | End: 2020-01-01 | Stop reason: HOSPADM

## 2020-01-01 RX ORDER — POLYETHYLENE GLYCOL 3350 17 G/17G
17 POWDER, FOR SOLUTION ORAL DAILY PRN
Status: DISCONTINUED | OUTPATIENT
Start: 2020-01-01 | End: 2020-01-01 | Stop reason: HOSPADM

## 2020-01-01 RX ORDER — IPRATROPIUM BROMIDE AND ALBUTEROL SULFATE 2.5; .5 MG/3ML; MG/3ML
3 SOLUTION RESPIRATORY (INHALATION)
Status: DISCONTINUED | OUTPATIENT
Start: 2020-01-01 | End: 2020-01-01

## 2020-01-01 RX ORDER — ONDANSETRON 2 MG/ML
4 INJECTION INTRAMUSCULAR; INTRAVENOUS EVERY 6 HOURS PRN
Status: DISCONTINUED | OUTPATIENT
Start: 2020-01-01 | End: 2020-01-01 | Stop reason: HOSPADM

## 2020-01-01 RX ORDER — HUMAN INSULIN 100 [IU]/ML
INJECTION, SUSPENSION SUBCUTANEOUS
Qty: 15 ML | Refills: 1 | Status: ON HOLD | OUTPATIENT
Start: 2020-01-01 | End: 2020-01-01

## 2020-01-01 RX ORDER — LIDOCAINE 40 MG/G
CREAM TOPICAL
Status: DISCONTINUED | OUTPATIENT
Start: 2020-01-01 | End: 2020-01-01 | Stop reason: HOSPADM

## 2020-01-01 RX ORDER — ACETAMINOPHEN 325 MG/1
650 TABLET ORAL EVERY 4 HOURS PRN
Status: DISCONTINUED | OUTPATIENT
Start: 2020-01-01 | End: 2020-01-01 | Stop reason: HOSPADM

## 2020-01-01 RX ORDER — ALBUTEROL SULFATE 90 UG/1
4 AEROSOL, METERED RESPIRATORY (INHALATION) EVERY 6 HOURS PRN
Status: DISCONTINUED | OUTPATIENT
Start: 2020-01-01 | End: 2020-01-01 | Stop reason: HOSPADM

## 2020-01-01 RX ORDER — FINASTERIDE 5 MG/1
5 TABLET, FILM COATED ORAL DAILY
Status: DISCONTINUED | OUTPATIENT
Start: 2020-01-01 | End: 2020-01-01 | Stop reason: HOSPADM

## 2020-01-01 RX ORDER — IOPAMIDOL 755 MG/ML
70 INJECTION, SOLUTION INTRAVASCULAR ONCE
Status: COMPLETED | OUTPATIENT
Start: 2020-01-01 | End: 2020-01-01

## 2020-01-01 RX ORDER — ONDANSETRON 4 MG/1
4 TABLET, FILM COATED ORAL EVERY 6 HOURS PRN
Status: DISCONTINUED | OUTPATIENT
Start: 2020-01-01 | End: 2020-01-01 | Stop reason: HOSPADM

## 2020-01-01 RX ORDER — ALBUTEROL SULFATE 0.83 MG/ML
3 SOLUTION RESPIRATORY (INHALATION) EVERY 6 HOURS PRN
Status: ON HOLD | COMMUNITY
Start: 2020-01-01 | End: 2020-01-01

## 2020-01-01 RX ORDER — ATORVASTATIN CALCIUM 80 MG/1
80 TABLET, FILM COATED ORAL DAILY
Qty: 90 TABLET | Refills: 3 | Status: SHIPPED | OUTPATIENT
Start: 2020-01-01 | End: 2021-01-01

## 2020-01-01 RX ORDER — ATORVASTATIN CALCIUM 80 MG/1
TABLET, FILM COATED ORAL
Qty: 90 TABLET | Refills: 0 | Status: SHIPPED | OUTPATIENT
Start: 2020-01-01 | End: 2020-01-01

## 2020-01-01 RX ORDER — PROCHLORPERAZINE 25 MG
12.5 SUPPOSITORY, RECTAL RECTAL EVERY 12 HOURS PRN
Status: DISCONTINUED | OUTPATIENT
Start: 2020-01-01 | End: 2020-01-01 | Stop reason: HOSPADM

## 2020-01-01 RX ORDER — FAMOTIDINE 20 MG/1
20 TABLET, FILM COATED ORAL 2 TIMES DAILY
Status: DISCONTINUED | OUTPATIENT
Start: 2020-01-01 | End: 2020-01-01

## 2020-01-01 RX ORDER — FINASTERIDE 5 MG/1
TABLET, FILM COATED ORAL
Qty: 90 TABLET | Refills: 0 | Status: SHIPPED | OUTPATIENT
Start: 2020-01-01 | End: 2021-01-01

## 2020-01-01 RX ORDER — OLANZAPINE 5 MG/1
5 TABLET ORAL 2 TIMES DAILY
DISCHARGE
Start: 2020-01-01 | End: 2020-01-01

## 2020-01-01 RX ORDER — HALOPERIDOL 5 MG/ML
2-4 INJECTION INTRAMUSCULAR EVERY 4 HOURS PRN
Status: DISCONTINUED | OUTPATIENT
Start: 2020-01-01 | End: 2020-01-01 | Stop reason: HOSPADM

## 2020-01-01 RX ORDER — ALBUTEROL SULFATE 90 UG/1
2 AEROSOL, METERED RESPIRATORY (INHALATION) EVERY 6 HOURS PRN
Status: DISCONTINUED | OUTPATIENT
Start: 2020-01-01 | End: 2020-01-01 | Stop reason: HOSPADM

## 2020-01-01 RX ORDER — ONDANSETRON 4 MG/1
4 TABLET, ORALLY DISINTEGRATING ORAL EVERY 6 HOURS PRN
Status: DISCONTINUED | OUTPATIENT
Start: 2020-01-01 | End: 2020-01-01 | Stop reason: HOSPADM

## 2020-01-01 RX ORDER — BENZONATATE 100 MG/1
100 CAPSULE ORAL 3 TIMES DAILY PRN
Status: DISCONTINUED | OUTPATIENT
Start: 2020-01-01 | End: 2020-01-01 | Stop reason: HOSPADM

## 2020-01-01 RX ORDER — ACETAMINOPHEN 650 MG/1
650 SUPPOSITORY RECTAL EVERY 4 HOURS PRN
Status: DISCONTINUED | OUTPATIENT
Start: 2020-01-01 | End: 2020-01-01 | Stop reason: HOSPADM

## 2020-01-01 RX ORDER — CYANOCOBALAMIN (VITAMIN B-12) 2500 MCG
2500 TABLET, SUBLINGUAL SUBLINGUAL DAILY
COMMUNITY
End: 2021-01-01

## 2020-01-01 RX ORDER — TAMSULOSIN HYDROCHLORIDE 0.4 MG/1
0.4 CAPSULE ORAL DAILY
Qty: 90 CAPSULE | Refills: 3 | Status: SHIPPED | OUTPATIENT
Start: 2020-01-01

## 2020-01-01 RX ORDER — HALOPERIDOL 2 MG/1
2-4 TABLET ORAL EVERY 4 HOURS PRN
Status: DISCONTINUED | OUTPATIENT
Start: 2020-01-01 | End: 2020-01-01 | Stop reason: HOSPADM

## 2020-01-01 RX ORDER — MAGNESIUM SULFATE HEPTAHYDRATE 40 MG/ML
2 INJECTION, SOLUTION INTRAVENOUS ONCE
Status: COMPLETED | OUTPATIENT
Start: 2020-01-01 | End: 2020-01-01

## 2020-01-01 RX ORDER — FUROSEMIDE 20 MG
TABLET ORAL
Qty: 90 TABLET | Refills: 3 | DISCHARGE
Start: 2020-01-01 | End: 2021-01-01

## 2020-01-01 RX ORDER — IOPAMIDOL 755 MG/ML
81 INJECTION, SOLUTION INTRAVASCULAR ONCE
Status: COMPLETED | OUTPATIENT
Start: 2020-01-01 | End: 2020-01-01

## 2020-01-01 RX ORDER — ALBUTEROL SULFATE 90 UG/1
6 AEROSOL, METERED RESPIRATORY (INHALATION) ONCE
Status: COMPLETED | OUTPATIENT
Start: 2020-01-01 | End: 2020-01-01

## 2020-01-01 RX ORDER — SODIUM CHLORIDE 9 MG/ML
INJECTION, SOLUTION INTRAVENOUS CONTINUOUS
Status: DISCONTINUED | OUTPATIENT
Start: 2020-01-01 | End: 2020-01-01

## 2020-01-01 RX ADMIN — METFORMIN HYDROCHLORIDE 1000 MG: 500 TABLET ORAL at 08:36

## 2020-01-01 RX ADMIN — ASPIRIN 325 MG: 325 TABLET, COATED ORAL at 08:57

## 2020-01-01 RX ADMIN — ACETAMINOPHEN 650 MG: 325 TABLET, FILM COATED ORAL at 08:25

## 2020-01-01 RX ADMIN — ALBUTEROL SULFATE 6 PUFF: 90 AEROSOL, METERED RESPIRATORY (INHALATION) at 11:33

## 2020-01-01 RX ADMIN — IPRATROPIUM BROMIDE 2 PUFF: 17 AEROSOL, METERED RESPIRATORY (INHALATION) at 11:57

## 2020-01-01 RX ADMIN — ACETAMINOPHEN 975 MG: 325 TABLET, FILM COATED ORAL at 18:34

## 2020-01-01 RX ADMIN — METOPROLOL TARTRATE 25 MG: 25 TABLET, FILM COATED ORAL at 08:24

## 2020-01-01 RX ADMIN — DOCUSATE SODIUM AND SENNOSIDES 1 TABLET: 8.6; 5 TABLET ORAL at 08:25

## 2020-01-01 RX ADMIN — FINASTERIDE 5 MG: 5 TABLET, FILM COATED ORAL at 09:25

## 2020-01-01 RX ADMIN — INSULIN ASPART 3 UNITS: 100 INJECTION, SOLUTION INTRAVENOUS; SUBCUTANEOUS at 11:55

## 2020-01-01 RX ADMIN — FAMOTIDINE 20 MG: 20 TABLET, FILM COATED ORAL at 08:46

## 2020-01-01 RX ADMIN — INSULIN HUMAN 21 UNITS: 100 INJECTION, SUSPENSION SUBCUTANEOUS at 18:07

## 2020-01-01 RX ADMIN — FINASTERIDE 5 MG: 5 TABLET, FILM COATED ORAL at 08:43

## 2020-01-01 RX ADMIN — HALOPERIDOL LACTATE 2 MG: 5 INJECTION, SOLUTION INTRAMUSCULAR at 17:54

## 2020-01-01 RX ADMIN — LISINOPRIL 10 MG: 10 TABLET ORAL at 08:46

## 2020-01-01 RX ADMIN — TAMSULOSIN HYDROCHLORIDE 0.4 MG: 0.4 CAPSULE ORAL at 07:27

## 2020-01-01 RX ADMIN — REMDESIVIR 200 MG: 100 INJECTION, POWDER, LYOPHILIZED, FOR SOLUTION INTRAVENOUS at 22:08

## 2020-01-01 RX ADMIN — Medication 12.5 MG: at 23:08

## 2020-01-01 RX ADMIN — LISINOPRIL 10 MG: 10 TABLET ORAL at 11:56

## 2020-01-01 RX ADMIN — ENOXAPARIN SODIUM 40 MG: 40 INJECTION SUBCUTANEOUS at 15:51

## 2020-01-01 RX ADMIN — UMECLIDINIUM 1 PUFF: 62.5 AEROSOL, POWDER ORAL at 09:05

## 2020-01-01 RX ADMIN — INSULIN HUMAN 22 UNITS: 100 INJECTION, SUSPENSION SUBCUTANEOUS at 22:32

## 2020-01-01 RX ADMIN — DOCUSATE SODIUM AND SENNOSIDES 2 TABLET: 8.6; 5 TABLET ORAL at 07:27

## 2020-01-01 RX ADMIN — FAMOTIDINE 20 MG: 20 TABLET, FILM COATED ORAL at 08:57

## 2020-01-01 RX ADMIN — INSULIN ASPART 7 UNITS: 100 INJECTION, SOLUTION INTRAVENOUS; SUBCUTANEOUS at 11:57

## 2020-01-01 RX ADMIN — TAMSULOSIN HYDROCHLORIDE 0.4 MG: 0.4 CAPSULE ORAL at 08:36

## 2020-01-01 RX ADMIN — TAMSULOSIN HYDROCHLORIDE 0.4 MG: 0.4 CAPSULE ORAL at 08:43

## 2020-01-01 RX ADMIN — TAMSULOSIN HYDROCHLORIDE 0.4 MG: 0.4 CAPSULE ORAL at 08:57

## 2020-01-01 RX ADMIN — DEXAMETHASONE 6 MG: 2 TABLET ORAL at 07:27

## 2020-01-01 RX ADMIN — METOPROLOL TARTRATE 25 MG: 25 TABLET, FILM COATED ORAL at 08:54

## 2020-01-01 RX ADMIN — FUROSEMIDE 20 MG: 20 TABLET ORAL at 08:46

## 2020-01-01 RX ADMIN — HUMAN ALBUMIN MICROSPHERES AND PERFLUTREN 9 ML: 10; .22 INJECTION, SOLUTION INTRAVENOUS at 09:17

## 2020-01-01 RX ADMIN — FINASTERIDE 5 MG: 5 TABLET, FILM COATED ORAL at 08:56

## 2020-01-01 RX ADMIN — METOPROLOL TARTRATE 25 MG: 25 TABLET, FILM COATED ORAL at 09:08

## 2020-01-01 RX ADMIN — INSULIN HUMAN 21 UNITS: 100 INJECTION, SUSPENSION SUBCUTANEOUS at 18:13

## 2020-01-01 RX ADMIN — POLYETHYLENE GLYCOL 3350 17 G: 17 POWDER, FOR SOLUTION ORAL at 16:21

## 2020-01-01 RX ADMIN — DOCUSATE SODIUM AND SENNOSIDES 1 TABLET: 8.6; 5 TABLET ORAL at 20:31

## 2020-01-01 RX ADMIN — CEFTRIAXONE SODIUM 1 G: 1 INJECTION, SOLUTION INTRAVENOUS at 16:12

## 2020-01-01 RX ADMIN — METOPROLOL TARTRATE 25 MG: 25 TABLET, FILM COATED ORAL at 08:36

## 2020-01-01 RX ADMIN — Medication 1 MG: at 22:44

## 2020-01-01 RX ADMIN — OLANZAPINE 5 MG: 5 TABLET, FILM COATED ORAL at 20:36

## 2020-01-01 RX ADMIN — APIXABAN 5 MG: 5 TABLET, FILM COATED ORAL at 20:36

## 2020-01-01 RX ADMIN — DOCUSATE SODIUM AND SENNOSIDES 1 TABLET: 8.6; 5 TABLET ORAL at 21:16

## 2020-01-01 RX ADMIN — POLYETHYLENE GLYCOL 3350 17 G: 17 POWDER, FOR SOLUTION ORAL at 09:27

## 2020-01-01 RX ADMIN — ATORVASTATIN CALCIUM 80 MG: 80 TABLET, FILM COATED ORAL at 09:27

## 2020-01-01 RX ADMIN — APIXABAN 5 MG: 5 TABLET, FILM COATED ORAL at 20:31

## 2020-01-01 RX ADMIN — Medication 1 MG: at 00:35

## 2020-01-01 RX ADMIN — INSULIN GLARGINE 17 UNITS: 100 INJECTION, SOLUTION SUBCUTANEOUS at 21:06

## 2020-01-01 RX ADMIN — ASPIRIN 325 MG: 325 TABLET, COATED ORAL at 08:15

## 2020-01-01 RX ADMIN — ASPIRIN 325 MG: 325 TABLET, COATED ORAL at 08:35

## 2020-01-01 RX ADMIN — OLANZAPINE 5 MG: 5 TABLET, FILM COATED ORAL at 12:01

## 2020-01-01 RX ADMIN — LISINOPRIL 10 MG: 10 TABLET ORAL at 08:35

## 2020-01-01 RX ADMIN — METOPROLOL TARTRATE 25 MG: 25 TABLET, FILM COATED ORAL at 21:17

## 2020-01-01 RX ADMIN — APIXABAN 5 MG: 5 TABLET, FILM COATED ORAL at 20:38

## 2020-01-01 RX ADMIN — FINASTERIDE 5 MG: 5 TABLET, FILM COATED ORAL at 08:15

## 2020-01-01 RX ADMIN — REMDESIVIR 100 MG: 100 INJECTION, POWDER, LYOPHILIZED, FOR SOLUTION INTRAVENOUS at 18:12

## 2020-01-01 RX ADMIN — FAMOTIDINE 20 MG: 20 TABLET, FILM COATED ORAL at 08:15

## 2020-01-01 RX ADMIN — FINASTERIDE 5 MG: 5 TABLET, FILM COATED ORAL at 07:28

## 2020-01-01 RX ADMIN — LISINOPRIL 10 MG: 10 TABLET ORAL at 07:28

## 2020-01-01 RX ADMIN — POLYETHYLENE GLYCOL 3350 17 G: 17 POWDER, FOR SOLUTION ORAL at 08:23

## 2020-01-01 RX ADMIN — SODIUM CHLORIDE 100 ML: 9 INJECTION, SOLUTION INTRAVENOUS at 11:04

## 2020-01-01 RX ADMIN — OLANZAPINE 5 MG: 5 TABLET, FILM COATED ORAL at 20:57

## 2020-01-01 RX ADMIN — Medication 25 MG: at 11:34

## 2020-01-01 RX ADMIN — FLUTICASONE FUROATE AND VILANTEROL TRIFENATATE 1 PUFF: 100; 25 POWDER RESPIRATORY (INHALATION) at 09:05

## 2020-01-01 RX ADMIN — APIXABAN 5 MG: 5 TABLET, FILM COATED ORAL at 08:15

## 2020-01-01 RX ADMIN — DEXAMETHASONE SODIUM PHOSPHATE 6 MG: 10 INJECTION, SOLUTION INTRAMUSCULAR; INTRAVENOUS at 09:05

## 2020-01-01 RX ADMIN — TAMSULOSIN HYDROCHLORIDE 0.4 MG: 0.4 CAPSULE ORAL at 08:46

## 2020-01-01 RX ADMIN — FINASTERIDE 5 MG: 5 TABLET, FILM COATED ORAL at 08:35

## 2020-01-01 RX ADMIN — INSULIN ASPART 4 UNITS: 100 INJECTION, SOLUTION INTRAVENOUS; SUBCUTANEOUS at 11:45

## 2020-01-01 RX ADMIN — ATORVASTATIN CALCIUM 80 MG: 80 TABLET, FILM COATED ORAL at 07:28

## 2020-01-01 RX ADMIN — ATORVASTATIN CALCIUM 80 MG: 80 TABLET, FILM COATED ORAL at 08:46

## 2020-01-01 RX ADMIN — UMECLIDINIUM 1 PUFF: 62.5 AEROSOL, POWDER ORAL at 08:46

## 2020-01-01 RX ADMIN — IPRATROPIUM BROMIDE 2 PUFF: 17 AEROSOL, METERED RESPIRATORY (INHALATION) at 16:40

## 2020-01-01 RX ADMIN — TAMSULOSIN HYDROCHLORIDE 0.4 MG: 0.4 CAPSULE ORAL at 09:25

## 2020-01-01 RX ADMIN — METOPROLOL TARTRATE 25 MG: 25 TABLET, FILM COATED ORAL at 22:36

## 2020-01-01 RX ADMIN — FLUTICASONE FUROATE AND VILANTEROL TRIFENATATE 1 PUFF: 100; 25 POWDER RESPIRATORY (INHALATION) at 08:26

## 2020-01-01 RX ADMIN — ENOXAPARIN SODIUM 40 MG: 40 INJECTION SUBCUTANEOUS at 14:49

## 2020-01-01 RX ADMIN — INSULIN ASPART 4 UNITS: 100 INJECTION, SOLUTION INTRAVENOUS; SUBCUTANEOUS at 08:45

## 2020-01-01 RX ADMIN — APIXABAN 5 MG: 5 TABLET, FILM COATED ORAL at 08:35

## 2020-01-01 RX ADMIN — ACETAMINOPHEN 650 MG: 325 TABLET, FILM COATED ORAL at 16:48

## 2020-01-01 RX ADMIN — METOPROLOL TARTRATE 25 MG: 25 TABLET, FILM COATED ORAL at 21:33

## 2020-01-01 RX ADMIN — ENOXAPARIN SODIUM 40 MG: 40 INJECTION SUBCUTANEOUS at 15:01

## 2020-01-01 RX ADMIN — TAMSULOSIN HYDROCHLORIDE 0.4 MG: 0.4 CAPSULE ORAL at 21:45

## 2020-01-01 RX ADMIN — FINASTERIDE 5 MG: 5 TABLET, FILM COATED ORAL at 08:24

## 2020-01-01 RX ADMIN — SODIUM CHLORIDE 100 ML: 9 INJECTION, SOLUTION INTRAVENOUS at 11:05

## 2020-01-01 RX ADMIN — DOCUSATE SODIUM AND SENNOSIDES 1 TABLET: 8.6; 5 TABLET ORAL at 09:08

## 2020-01-01 RX ADMIN — ATORVASTATIN CALCIUM 80 MG: 80 TABLET, FILM COATED ORAL at 08:24

## 2020-01-01 RX ADMIN — OLANZAPINE 5 MG: 5 TABLET, FILM COATED ORAL at 21:16

## 2020-01-01 RX ADMIN — SODIUM CHLORIDE 500 ML: 9 INJECTION, SOLUTION INTRAVENOUS at 11:30

## 2020-01-01 RX ADMIN — ASPIRIN 325 MG: 325 TABLET, COATED ORAL at 09:27

## 2020-01-01 RX ADMIN — APIXABAN 5 MG: 5 TABLET, FILM COATED ORAL at 20:56

## 2020-01-01 RX ADMIN — INSULIN HUMAN 22 UNITS: 100 INJECTION, SUSPENSION SUBCUTANEOUS at 08:45

## 2020-01-01 RX ADMIN — METOPROLOL TARTRATE 25 MG: 25 TABLET, FILM COATED ORAL at 08:57

## 2020-01-01 RX ADMIN — INSULIN ASPART 2 UNITS: 100 INJECTION, SOLUTION INTRAVENOUS; SUBCUTANEOUS at 16:49

## 2020-01-01 RX ADMIN — OLANZAPINE 5 MG: 5 TABLET, FILM COATED ORAL at 07:27

## 2020-01-01 RX ADMIN — INSULIN HUMAN 22 UNITS: 100 INJECTION, SUSPENSION SUBCUTANEOUS at 08:49

## 2020-01-01 RX ADMIN — APIXABAN 5 MG: 5 TABLET, FILM COATED ORAL at 09:27

## 2020-01-01 RX ADMIN — REMDESIVIR 100 MG: 100 INJECTION, POWDER, LYOPHILIZED, FOR SOLUTION INTRAVENOUS at 17:54

## 2020-01-01 RX ADMIN — UMECLIDINIUM 1 PUFF: 62.5 AEROSOL, POWDER ORAL at 09:41

## 2020-01-01 RX ADMIN — INSULIN ASPART 3 UNITS: 100 INJECTION, SOLUTION INTRAVENOUS; SUBCUTANEOUS at 12:04

## 2020-01-01 RX ADMIN — FUROSEMIDE 40 MG: 10 INJECTION, SOLUTION INTRAVENOUS at 18:33

## 2020-01-01 RX ADMIN — ASPIRIN 325 MG: 325 TABLET, COATED ORAL at 08:36

## 2020-01-01 RX ADMIN — FLUTICASONE FUROATE AND VILANTEROL TRIFENATATE 1 PUFF: 100; 25 POWDER RESPIRATORY (INHALATION) at 07:33

## 2020-01-01 RX ADMIN — UMECLIDINIUM 1 PUFF: 62.5 AEROSOL, POWDER ORAL at 07:33

## 2020-01-01 RX ADMIN — IPRATROPIUM BROMIDE 2 PUFF: 17 AEROSOL, METERED RESPIRATORY (INHALATION) at 15:25

## 2020-01-01 RX ADMIN — FUROSEMIDE 20 MG: 20 TABLET ORAL at 10:01

## 2020-01-01 RX ADMIN — FAMOTIDINE 20 MG: 20 TABLET, FILM COATED ORAL at 08:24

## 2020-01-01 RX ADMIN — FUROSEMIDE 20 MG: 20 TABLET ORAL at 08:24

## 2020-01-01 RX ADMIN — SODIUM CHLORIDE 1000 ML: 9 INJECTION, SOLUTION INTRAVENOUS at 18:52

## 2020-01-01 RX ADMIN — FUROSEMIDE 20 MG: 20 TABLET ORAL at 09:07

## 2020-01-01 RX ADMIN — ATORVASTATIN CALCIUM 80 MG: 80 TABLET, FILM COATED ORAL at 21:45

## 2020-01-01 RX ADMIN — UMECLIDINIUM 1 PUFF: 62.5 AEROSOL, POWDER ORAL at 08:37

## 2020-01-01 RX ADMIN — DOCUSATE SODIUM AND SENNOSIDES 1 TABLET: 8.6; 5 TABLET ORAL at 20:36

## 2020-01-01 RX ADMIN — METOPROLOL TARTRATE 25 MG: 25 TABLET, FILM COATED ORAL at 07:27

## 2020-01-01 RX ADMIN — FLUTICASONE FUROATE AND VILANTEROL TRIFENATATE 1 PUFF: 100; 25 POWDER RESPIRATORY (INHALATION) at 08:45

## 2020-01-01 RX ADMIN — ATORVASTATIN CALCIUM 80 MG: 80 TABLET, FILM COATED ORAL at 08:35

## 2020-01-01 RX ADMIN — IPRATROPIUM BROMIDE AND ALBUTEROL 1 PUFF: 20; 100 SPRAY, METERED RESPIRATORY (INHALATION) at 11:33

## 2020-01-01 RX ADMIN — IPRATROPIUM BROMIDE 2 PUFF: 17 AEROSOL, METERED RESPIRATORY (INHALATION) at 12:04

## 2020-01-01 RX ADMIN — LISINOPRIL 10 MG: 10 TABLET ORAL at 08:15

## 2020-01-01 RX ADMIN — APIXABAN 5 MG: 5 TABLET, FILM COATED ORAL at 08:24

## 2020-01-01 RX ADMIN — FLUTICASONE FUROATE AND VILANTEROL TRIFENATATE 1 PUFF: 100; 25 POWDER RESPIRATORY (INHALATION) at 09:41

## 2020-01-01 RX ADMIN — FAMOTIDINE 20 MG: 20 TABLET, FILM COATED ORAL at 23:08

## 2020-01-01 RX ADMIN — ASPIRIN 325 MG: 325 TABLET, COATED ORAL at 08:46

## 2020-01-01 RX ADMIN — LISINOPRIL 10 MG: 10 TABLET ORAL at 21:43

## 2020-01-01 RX ADMIN — FINASTERIDE 5 MG: 5 TABLET, FILM COATED ORAL at 08:46

## 2020-01-01 RX ADMIN — METOPROLOL TARTRATE 25 MG: 25 TABLET, FILM COATED ORAL at 09:26

## 2020-01-01 RX ADMIN — LISINOPRIL 10 MG: 10 TABLET ORAL at 09:27

## 2020-01-01 RX ADMIN — DOCUSATE SODIUM AND SENNOSIDES 1 TABLET: 8.6; 5 TABLET ORAL at 08:36

## 2020-01-01 RX ADMIN — METOPROLOL TARTRATE 25 MG: 25 TABLET, FILM COATED ORAL at 20:36

## 2020-01-01 RX ADMIN — Medication 25 MG: at 00:44

## 2020-01-01 RX ADMIN — BENZONATATE 100 MG: 100 CAPSULE, LIQUID FILLED ORAL at 10:55

## 2020-01-01 RX ADMIN — TAMSULOSIN HYDROCHLORIDE 0.4 MG: 0.4 CAPSULE ORAL at 09:08

## 2020-01-01 RX ADMIN — IPRATROPIUM BROMIDE 2 PUFF: 17 AEROSOL, METERED RESPIRATORY (INHALATION) at 23:09

## 2020-01-01 RX ADMIN — LISINOPRIL 10 MG: 10 TABLET ORAL at 08:24

## 2020-01-01 RX ADMIN — IPRATROPIUM BROMIDE 2 PUFF: 17 AEROSOL, METERED RESPIRATORY (INHALATION) at 08:47

## 2020-01-01 RX ADMIN — INSULIN ASPART 1 UNITS: 100 INJECTION, SOLUTION INTRAVENOUS; SUBCUTANEOUS at 11:27

## 2020-01-01 RX ADMIN — FAMOTIDINE 20 MG: 20 TABLET, FILM COATED ORAL at 08:35

## 2020-01-01 RX ADMIN — INSULIN ASPART 2 UNITS: 100 INJECTION, SOLUTION INTRAVENOUS; SUBCUTANEOUS at 17:25

## 2020-01-01 RX ADMIN — DEXAMETHASONE SODIUM PHOSPHATE 6 MG: 10 INJECTION, SOLUTION INTRAMUSCULAR; INTRAVENOUS at 08:46

## 2020-01-01 RX ADMIN — DEXTROSE MONOHYDRATE 50 ML: 500 INJECTION PARENTERAL at 06:00

## 2020-01-01 RX ADMIN — ATORVASTATIN CALCIUM 80 MG: 80 TABLET, FILM COATED ORAL at 08:15

## 2020-01-01 RX ADMIN — FAMOTIDINE 20 MG: 20 TABLET, FILM COATED ORAL at 08:43

## 2020-01-01 RX ADMIN — Medication 25 MG: at 17:18

## 2020-01-01 RX ADMIN — METOPROLOL TARTRATE 25 MG: 25 TABLET, FILM COATED ORAL at 08:15

## 2020-01-01 RX ADMIN — DEXAMETHASONE 6 MG: 2 TABLET ORAL at 08:24

## 2020-01-01 RX ADMIN — SODIUM CHLORIDE: 9 INJECTION, SOLUTION INTRAVENOUS at 11:32

## 2020-01-01 RX ADMIN — ENOXAPARIN SODIUM 40 MG: 40 INJECTION SUBCUTANEOUS at 15:24

## 2020-01-01 RX ADMIN — Medication 25 MG: at 08:25

## 2020-01-01 RX ADMIN — SODIUM CHLORIDE 500 ML: 9 INJECTION, SOLUTION INTRAVENOUS at 21:42

## 2020-01-01 RX ADMIN — UMECLIDINIUM 1 PUFF: 62.5 AEROSOL, POWDER ORAL at 22:04

## 2020-01-01 RX ADMIN — DEXAMETHASONE SODIUM PHOSPHATE 6 MG: 10 INJECTION, SOLUTION INTRAMUSCULAR; INTRAVENOUS at 16:48

## 2020-01-01 RX ADMIN — INSULIN ASPART 1 UNITS: 100 INJECTION, SOLUTION INTRAVENOUS; SUBCUTANEOUS at 08:45

## 2020-01-01 RX ADMIN — INSULIN HUMAN 22 UNITS: 100 INJECTION, SUSPENSION SUBCUTANEOUS at 21:31

## 2020-01-01 RX ADMIN — DEXAMETHASONE 6 MG: 2 TABLET ORAL at 21:46

## 2020-01-01 RX ADMIN — IPRATROPIUM BROMIDE 2 PUFF: 17 AEROSOL, METERED RESPIRATORY (INHALATION) at 09:05

## 2020-01-01 RX ADMIN — INSULIN ASPART 2 UNITS: 100 INJECTION, SOLUTION INTRAVENOUS; SUBCUTANEOUS at 09:07

## 2020-01-01 RX ADMIN — IPRATROPIUM BROMIDE 2 PUFF: 17 AEROSOL, METERED RESPIRATORY (INHALATION) at 11:45

## 2020-01-01 RX ADMIN — METOPROLOL TARTRATE 25 MG: 25 TABLET, FILM COATED ORAL at 20:57

## 2020-01-01 RX ADMIN — TAMSULOSIN HYDROCHLORIDE 0.4 MG: 0.4 CAPSULE ORAL at 08:15

## 2020-01-01 RX ADMIN — DOCUSATE SODIUM AND SENNOSIDES 2 TABLET: 8.6; 5 TABLET ORAL at 20:56

## 2020-01-01 RX ADMIN — APIXABAN 5 MG: 5 TABLET, FILM COATED ORAL at 21:17

## 2020-01-01 RX ADMIN — TAMSULOSIN HYDROCHLORIDE 0.4 MG: 0.4 CAPSULE ORAL at 08:35

## 2020-01-01 RX ADMIN — FAMOTIDINE 20 MG: 20 TABLET, FILM COATED ORAL at 07:27

## 2020-01-01 RX ADMIN — METFORMIN HYDROCHLORIDE 1000 MG: 500 TABLET ORAL at 09:07

## 2020-01-01 RX ADMIN — Medication 12.5 MG: at 21:33

## 2020-01-01 RX ADMIN — APIXABAN 5 MG: 5 TABLET, FILM COATED ORAL at 07:28

## 2020-01-01 RX ADMIN — ATORVASTATIN CALCIUM 80 MG: 80 TABLET, FILM COATED ORAL at 15:32

## 2020-01-01 RX ADMIN — FLUTICASONE FUROATE AND VILANTEROL TRIFENATATE 1 PUFF: 100; 25 POWDER RESPIRATORY (INHALATION) at 08:37

## 2020-01-01 RX ADMIN — INSULIN ASPART 5 UNITS: 100 INJECTION, SOLUTION INTRAVENOUS; SUBCUTANEOUS at 08:17

## 2020-01-01 RX ADMIN — METFORMIN HYDROCHLORIDE 1000 MG: 500 TABLET ORAL at 17:54

## 2020-01-01 RX ADMIN — ASPIRIN 325 MG: 325 TABLET, COATED ORAL at 14:49

## 2020-01-01 RX ADMIN — ASPIRIN 325 MG: 325 TABLET, COATED ORAL at 07:27

## 2020-01-01 RX ADMIN — INSULIN GLARGINE 15 UNITS: 100 INJECTION, SOLUTION SUBCUTANEOUS at 21:46

## 2020-01-01 RX ADMIN — INSULIN HUMAN 22 UNITS: 100 INJECTION, SUSPENSION SUBCUTANEOUS at 09:07

## 2020-01-01 RX ADMIN — INSULIN ASPART 1 UNITS: 100 INJECTION, SOLUTION INTRAVENOUS; SUBCUTANEOUS at 18:07

## 2020-01-01 RX ADMIN — FUROSEMIDE 20 MG: 20 TABLET ORAL at 14:15

## 2020-01-01 RX ADMIN — FUROSEMIDE 20 MG: 20 TABLET ORAL at 08:35

## 2020-01-01 RX ADMIN — FLUTICASONE FUROATE AND VILANTEROL TRIFENATATE 1 PUFF: 100; 25 POWDER RESPIRATORY (INHALATION) at 08:46

## 2020-01-01 RX ADMIN — IPRATROPIUM BROMIDE 2 PUFF: 17 AEROSOL, METERED RESPIRATORY (INHALATION) at 22:36

## 2020-01-01 RX ADMIN — ATORVASTATIN CALCIUM 80 MG: 80 TABLET, FILM COATED ORAL at 08:43

## 2020-01-01 RX ADMIN — FAMOTIDINE 20 MG: 20 TABLET, FILM COATED ORAL at 09:23

## 2020-01-01 RX ADMIN — FLUTICASONE FUROATE AND VILANTEROL TRIFENATATE 1 PUFF: 100; 25 POWDER RESPIRATORY (INHALATION) at 08:18

## 2020-01-01 RX ADMIN — INSULIN HUMAN 22 UNITS: 100 INJECTION, SUSPENSION SUBCUTANEOUS at 23:09

## 2020-01-01 RX ADMIN — FINASTERIDE 5 MG: 5 TABLET, FILM COATED ORAL at 09:07

## 2020-01-01 RX ADMIN — INSULIN ASPART 3 UNITS: 100 INJECTION, SOLUTION INTRAVENOUS; SUBCUTANEOUS at 12:22

## 2020-01-01 RX ADMIN — LISINOPRIL 5 MG: 5 TABLET ORAL at 08:35

## 2020-01-01 RX ADMIN — ASPIRIN 325 MG: 325 TABLET, COATED ORAL at 08:25

## 2020-01-01 RX ADMIN — FUROSEMIDE 20 MG: 20 TABLET ORAL at 15:01

## 2020-01-01 RX ADMIN — INSULIN GLARGINE 17 UNITS: 100 INJECTION, SOLUTION SUBCUTANEOUS at 21:15

## 2020-01-01 RX ADMIN — IOPAMIDOL 81 ML: 755 INJECTION, SOLUTION INTRAVENOUS at 11:05

## 2020-01-01 RX ADMIN — REMDESIVIR 200 MG: 100 INJECTION, POWDER, LYOPHILIZED, FOR SOLUTION INTRAVENOUS at 16:48

## 2020-01-01 RX ADMIN — ATORVASTATIN CALCIUM 80 MG: 80 TABLET, FILM COATED ORAL at 08:56

## 2020-01-01 RX ADMIN — ASPIRIN 325 MG: 325 TABLET, COATED ORAL at 15:32

## 2020-01-01 RX ADMIN — IPRATROPIUM BROMIDE 2 PUFF: 17 AEROSOL, METERED RESPIRATORY (INHALATION) at 21:12

## 2020-01-01 RX ADMIN — METOPROLOL TARTRATE 25 MG: 25 TABLET, FILM COATED ORAL at 23:08

## 2020-01-01 RX ADMIN — MAGNESIUM SULFATE 2 G: 2 INJECTION INTRAVENOUS at 19:53

## 2020-01-01 RX ADMIN — UMECLIDINIUM 1 PUFF: 62.5 AEROSOL, POWDER ORAL at 08:18

## 2020-01-01 RX ADMIN — IPRATROPIUM BROMIDE 2 PUFF: 17 AEROSOL, METERED RESPIRATORY (INHALATION) at 08:37

## 2020-01-01 RX ADMIN — Medication 12.5 MG: at 18:16

## 2020-01-01 RX ADMIN — LISINOPRIL 10 MG: 10 TABLET ORAL at 09:07

## 2020-01-01 RX ADMIN — OLANZAPINE 5 MG: 5 TABLET, FILM COATED ORAL at 21:06

## 2020-01-01 RX ADMIN — METOPROLOL TARTRATE 25 MG: 25 TABLET, FILM COATED ORAL at 11:56

## 2020-01-01 RX ADMIN — DEXAMETHASONE 6 MG: 2 TABLET ORAL at 09:24

## 2020-01-01 RX ADMIN — BENZONATATE 100 MG: 100 CAPSULE, LIQUID FILLED ORAL at 12:02

## 2020-01-01 RX ADMIN — IPRATROPIUM BROMIDE 2 PUFF: 17 AEROSOL, METERED RESPIRATORY (INHALATION) at 16:49

## 2020-01-01 RX ADMIN — INSULIN ASPART 2 UNITS: 100 INJECTION, SOLUTION INTRAVENOUS; SUBCUTANEOUS at 18:00

## 2020-01-01 RX ADMIN — ATORVASTATIN CALCIUM 80 MG: 80 TABLET, FILM COATED ORAL at 08:36

## 2020-01-01 RX ADMIN — FLUTICASONE FUROATE AND VILANTEROL TRIFENATATE 1 PUFF: 100; 25 POWDER RESPIRATORY (INHALATION) at 22:06

## 2020-01-01 RX ADMIN — APIXABAN 5 MG: 5 TABLET, FILM COATED ORAL at 09:07

## 2020-01-01 RX ADMIN — DOCUSATE SODIUM AND SENNOSIDES 1 TABLET: 8.6; 5 TABLET ORAL at 20:37

## 2020-01-01 RX ADMIN — METOPROLOL TARTRATE 25 MG: 25 TABLET, FILM COATED ORAL at 21:44

## 2020-01-01 RX ADMIN — IPRATROPIUM BROMIDE 2 PUFF: 17 AEROSOL, METERED RESPIRATORY (INHALATION) at 08:55

## 2020-01-01 RX ADMIN — ACETAMINOPHEN 650 MG: 325 TABLET, FILM COATED ORAL at 23:08

## 2020-01-01 RX ADMIN — CEFTRIAXONE SODIUM 1 G: 1 INJECTION, SOLUTION INTRAVENOUS at 16:03

## 2020-01-01 RX ADMIN — METOPROLOL TARTRATE 25 MG: 25 TABLET, FILM COATED ORAL at 08:35

## 2020-01-01 RX ADMIN — INSULIN HUMAN 22 UNITS: 100 INJECTION, SUSPENSION SUBCUTANEOUS at 08:37

## 2020-01-01 RX ADMIN — IOPAMIDOL 70 ML: 755 INJECTION, SOLUTION INTRAVENOUS at 11:04

## 2020-01-01 RX ADMIN — UMECLIDINIUM 1 PUFF: 62.5 AEROSOL, POWDER ORAL at 08:45

## 2020-01-01 RX ADMIN — FUROSEMIDE 20 MG: 20 TABLET ORAL at 07:28

## 2020-01-01 RX ADMIN — INSULIN GLARGINE 22 UNITS: 100 INJECTION, SOLUTION SUBCUTANEOUS at 21:06

## 2020-01-01 RX ADMIN — OLANZAPINE 5 MG: 5 TABLET, FILM COATED ORAL at 09:08

## 2020-01-01 RX ADMIN — DOCUSATE SODIUM AND SENNOSIDES 1 TABLET: 8.6; 5 TABLET ORAL at 09:27

## 2020-01-01 RX ADMIN — IPRATROPIUM BROMIDE 2 PUFF: 17 AEROSOL, METERED RESPIRATORY (INHALATION) at 15:52

## 2020-01-01 RX ADMIN — DEXAMETHASONE SODIUM PHOSPHATE 6 MG: 10 INJECTION, SOLUTION INTRAMUSCULAR; INTRAVENOUS at 08:35

## 2020-01-01 RX ADMIN — INSULIN ASPART 1 UNITS: 100 INJECTION, SOLUTION INTRAVENOUS; SUBCUTANEOUS at 16:49

## 2020-01-01 RX ADMIN — ATORVASTATIN CALCIUM 80 MG: 80 TABLET, FILM COATED ORAL at 09:07

## 2020-01-01 RX ADMIN — UMECLIDINIUM 1 PUFF: 62.5 AEROSOL, POWDER ORAL at 08:26

## 2020-01-01 RX ADMIN — APIXABAN 5 MG: 5 TABLET, FILM COATED ORAL at 21:45

## 2020-01-01 RX ADMIN — LISINOPRIL 10 MG: 10 TABLET ORAL at 08:56

## 2020-01-01 RX ADMIN — METFORMIN HYDROCHLORIDE 1000 MG: 500 TABLET ORAL at 17:43

## 2020-01-01 RX ADMIN — LISINOPRIL 10 MG: 10 TABLET ORAL at 15:32

## 2020-01-01 RX ADMIN — IPRATROPIUM BROMIDE 2 PUFF: 17 AEROSOL, METERED RESPIRATORY (INHALATION) at 12:20

## 2020-01-01 RX ADMIN — TAMSULOSIN HYDROCHLORIDE 0.4 MG: 0.4 CAPSULE ORAL at 08:25

## 2020-01-01 RX ADMIN — ASPIRIN 325 MG: 325 TABLET, COATED ORAL at 09:08

## 2020-01-01 RX ADMIN — FINASTERIDE 5 MG: 5 TABLET, FILM COATED ORAL at 21:45

## 2020-01-01 RX ADMIN — DEXAMETHASONE 6 MG: 2 TABLET ORAL at 08:15

## 2020-01-01 ASSESSMENT — ENCOUNTER SYMPTOMS
FEVER: 0
FEVER: 0
WHEEZING: 1
COUGH: 1
ABDOMINAL PAIN: 0
CONFUSION: 1
MYALGIAS: 0
WEAKNESS: 1
SORE THROAT: 0
DYSURIA: 0
DIARRHEA: 1
SHORTNESS OF BREATH: 0

## 2020-01-01 ASSESSMENT — MIFFLIN-ST. JEOR
SCORE: 1515.5
SCORE: 1534.98
SCORE: 1528.18
SCORE: 1523.19
SCORE: 1497.34
SCORE: 1516.5

## 2020-01-01 ASSESSMENT — ACTIVITIES OF DAILY LIVING (ADL)
ADLS_ACUITY_SCORE: 22
DEPENDENT_IADLS:: INDEPENDENT
ADLS_ACUITY_SCORE: 22
ADLS_ACUITY_SCORE: 22
DEPENDENT_IADLS:: INDEPENDENT
ADLS_ACUITY_SCORE: 26
ADLS_ACUITY_SCORE: 22
DEPENDENT_IADLS:: CLEANING;COOKING;LAUNDRY;SHOPPING;MEAL PREPARATION;MEDICATION MANAGEMENT;TRANSPORTATION;INCONTINENCE
ADLS_ACUITY_SCORE: 24
ADLS_ACUITY_SCORE: 26
ADLS_ACUITY_SCORE: 22
ADLS_ACUITY_SCORE: 22
ADLS_ACUITY_SCORE: 21
DEPENDENT_IADLS:: INDEPENDENT
ADLS_ACUITY_SCORE: 22
DEPENDENT_IADLS:: INDEPENDENT
ADLS_ACUITY_SCORE: 26
ADLS_ACUITY_SCORE: 22
ADLS_ACUITY_SCORE: 24
ADLS_ACUITY_SCORE: 22
ADLS_ACUITY_SCORE: 26
ADLS_ACUITY_SCORE: 22
ADLS_ACUITY_SCORE: 26
ADLS_ACUITY_SCORE: 22
DEPENDENT_IADLS:: INDEPENDENT
ADLS_ACUITY_SCORE: 26
ADLS_ACUITY_SCORE: 21
ADLS_ACUITY_SCORE: 26
ADLS_ACUITY_SCORE: 22
ADLS_ACUITY_SCORE: 24
ADLS_ACUITY_SCORE: 22

## 2020-01-01 ASSESSMENT — PAIN SCALES - GENERAL
PAINLEVEL: NO PAIN (0)
PAINLEVEL: NO PAIN (0)

## 2020-01-20 DIAGNOSIS — I25.9 CHRONIC ISCHEMIC HEART DISEASE: ICD-10-CM

## 2020-01-20 RX ORDER — METOPROLOL TARTRATE 25 MG/1
TABLET, FILM COATED ORAL
Qty: 60 TABLET | Refills: 3 | Status: SHIPPED | OUTPATIENT
Start: 2020-01-20 | End: 2020-05-26

## 2020-01-20 NOTE — TELEPHONE ENCOUNTER
Franklin says he isn't sure why his last Rx for Metropolol was only for 30 days. He takes 2 a day. In the past he always got #180.  He says he saw Dr Carreno in September and doesn't feel he needs to come in as he feels fine.    He wants to go to the pharmacy in a couple hours to get this.     Last Written Prescription Date:  12/30/19  Last Fill Quantity: 30,  # refills: 0   Last office visit: 9/25/2019 with prescribing provider:  Leaf   Future Office Visit:

## 2020-02-19 ENCOUNTER — ALLIED HEALTH/NURSE VISIT (OUTPATIENT)
Dept: EDUCATION SERVICES | Facility: CLINIC | Age: 80
End: 2020-02-19
Payer: COMMERCIAL

## 2020-02-19 ENCOUNTER — TELEPHONE (OUTPATIENT)
Dept: FAMILY MEDICINE | Facility: CLINIC | Age: 80
End: 2020-02-19

## 2020-02-19 DIAGNOSIS — N18.30 TYPE 2 DIABETES MELLITUS WITH STAGE 3 CHRONIC KIDNEY DISEASE, WITH LONG-TERM CURRENT USE OF INSULIN (H): Primary | ICD-10-CM

## 2020-02-19 DIAGNOSIS — E11.22 TYPE 2 DIABETES MELLITUS WITH STAGE 3 CHRONIC KIDNEY DISEASE, WITH LONG-TERM CURRENT USE OF INSULIN (H): Primary | ICD-10-CM

## 2020-02-19 DIAGNOSIS — Z79.4 TYPE 2 DIABETES MELLITUS WITH STAGE 3 CHRONIC KIDNEY DISEASE, WITH LONG-TERM CURRENT USE OF INSULIN (H): Primary | ICD-10-CM

## 2020-02-19 PROCEDURE — G0108 DIAB MANAGE TRN  PER INDIV: HCPCS | Performed by: DIETITIAN, REGISTERED

## 2020-02-19 NOTE — PROGRESS NOTES
"Diabetes Self-Management Education & Support    Presents for: Individual review    SUBJECTIVE/OBJECTIVE:  Presents for: Individual review  Diabetes education in the past 24mo: No  Focus of Visit: Healthy Coping, Taking Medication  Diabetes type: Type 2  Cultural Influences/Ethnic Background:  American      Diabetes Symptoms & Complications:          Patient Problem List and Family Medical History reviewed for relevant medical history, current medical status, and diabetes risk factors.    Vitals:  There were no vitals taken for this visit.  Estimated body mass index is 29.8 kg/m  as calculated from the following:    Height as of 9/25/19: 1.727 m (5' 8\").    Weight as of 9/25/19: 88.9 kg (196 lb).   Last 3 BP:   BP Readings from Last 3 Encounters:   09/25/19 (!) 158/78   08/06/19 112/74   07/29/19 144/68       History   Smoking Status     Never Smoker   Smokeless Tobacco     Never Used       Labs:  Lab Results   Component Value Date    A1C 7.7 07/29/2019     Lab Results   Component Value Date     07/29/2019     Lab Results   Component Value Date    LDL 61 07/29/2019     HDL Cholesterol   Date Value Ref Range Status   07/29/2019 50 >39 mg/dL Final   ]  GFR Estimate   Date Value Ref Range Status   07/29/2019 65 >60 mL/min/[1.73_m2] Final     Comment:     Non  GFR Calc  Starting 12/18/2018, serum creatinine based estimated GFR (eGFR) will be   calculated using the Chronic Kidney Disease Epidemiology Collaboration   (CKD-EPI) equation.       GFR Estimate If Black   Date Value Ref Range Status   07/29/2019 76 >60 mL/min/[1.73_m2] Final     Comment:      GFR Calc  Starting 12/18/2018, serum creatinine based estimated GFR (eGFR) will be   calculated using the Chronic Kidney Disease Epidemiology Collaboration   (CKD-EPI) equation.       Lab Results   Component Value Date    CR 1.07 07/29/2019     No results found for: MICROALBUMIN    Healthy Eating:  Healthy Eating Assessed Today: " Yes  Breakfast: cheerios or cornflakes, two pc of his bread, orange marmalade on the toast, coffee  Lunch: nothing or chips or one cookie  Dinner: pot pie, chicken cattatore, beef stew, chili  Snacks: not much, bowl of cereal and pc of toast  Beverages: Water, Coffee(v8 with hot sauce)    Being Active:  Exercise:: (not like he should)    Monitoring:      Breakfast units Lunch units Supper  units HS   12-Feb 167    197     13-Feb 160    157     14-Feb 174    303     15-Feb 177    161     16-Feb 171    174     17-Feb 171    158     18-Feb 199    210     19-Feb 186          #DIV/0! #DIV/0! #DIV/0! 194 #DIV/0! #DIV/0!   He had a 111 and 119 before supper in the week before these numbers so opted to not increase the morning dose at this time.        Taking Medications:  Diabetes Medication(s)     Biguanides       metFORMIN (GLUCOPHAGE) 1000 MG tablet    TAKE 1 TABLET BY MOUTH TWICE DAILY WITH MEALS    Insulin       insulin aspart (NOVOLOG FLEXPEN) 100 UNIT/ML pen    INJECT 11 UNITS SUBCUTANEOUSLY DAILY BEFORE BREAKFAST, 11 UNITS DAILY BEFORE LUNCH, AND 11 UNITS DAILY BEFORE DINNER     insulin detemir (LEVEMIR FLEXTOUCH) 100 UNIT/ML pen    Inject 20 Units Subcutaneous At Bedtime     insulin isophane & regular (NOVOLIN 70/30 FLEXPEN RELION) susp    Take 15 units 30 minutes before breakfast and 15 units 30 minutes before supper.               Problem Solving:   not assessed              Reducing Risks:   not assessed    Healthy Coping:     Patient Activation Measure Survey Score:  MEHDI Score (Last Two) 9/5/2014   MEHDI Raw Score 34   Activation Score 43.4   MEHDI Level 1       Diabetes knowledge and skills assessment:   Patient is knowledgeable in diabetes management concepts related to: Healthy Eating, Being Active and Monitoring    Patient needs further education on the following diabetes management concepts: Healthy Eating, Being Active, Monitoring, Taking Medication, Problem Solving, Reducing Risks and Healthy  Coping    Based on learning assessment above, most appropriate setting for further diabetes education would be: Individual setting.      INTERVENTIONS:    Education provided today on:  AADE Self-Care Behaviors:  Healthy Eating: consistency in amount, composition, and timing of food intake and keeping lunch to small snack  Being Active: relationship to blood glucose and describe appropriate activity program  Monitoring: individual blood glucose targets and frequency of monitoring    Opportunities for ongoing education and support in diabetes-self management were discussed.    Pt verbalized understanding of concepts discussed and recommendations provided today.       Education Materials Provided:  No new materials provided today      ASSESSMENT:  Numbers still high in the am so increased the supper dose by 2 units to 22 units.  Since he had a few normal numbers and he plans to go back to gym I did not increase the morning dose.  He does better when he only does a snack for lunch but sometimes he goes out for lunch and those are the days his before supper reading is 200-300.  Doing well with pens, remembering to mix them before giving.    Patient's most recent   Lab Results   Component Value Date    A1C 7.7 07/29/2019    is meeting goal of <8.0    PLAN  See Patient Instructions for co-developed, patient-stated behavior change goals.  AVS printed and provided to patient today. See Follow-Up section for recommended follow-up.      Time Spent: 30 minutes  Encounter Type: Individual    Any diabetes medication dose changes were made via the CDE Protocol and Collaborative Practice Agreement with the patient's primary care provider. A copy of this encounter was shared with the provider.

## 2020-02-19 NOTE — PATIENT INSTRUCTIONS
1. Increase the supper dose of Novolin 70/30 to 22 units.      2. Stay active daily, try for 30 minutes.  [facility]

## 2020-02-23 DIAGNOSIS — N40.0 HYPERTROPHY OF PROSTATE WITHOUT URINARY OBSTRUCTION: ICD-10-CM

## 2020-02-23 NOTE — TELEPHONE ENCOUNTER
"Requested Prescriptions   Pending Prescriptions Disp Refills     finasteride (PROSCAR) 5 MG tablet [Pharmacy Med Name: Finasteride 5 MG Oral Tablet]  0     Sig: TAKE 1 TABLET BY MOUTH ONCE DAILY     Last Written Prescription Date:  11/15/19  Last Fill Quantity: 90,  # refills: 0   Last office visit: 9/25/2019 with prescribing provider:  NOELLE Carreno   Future Office Visit:        BPH Agents Passed - 2/23/2020  3:03 PM        Passed - Recent (12 mo) or future (30 days) visit within the authorizing provider's department     Patient has had an office visit with the authorizing provider or a provider within the authorizing providers department within the previous 12 mos or has a future within next 30 days. See \"Patient Info\" tab in inbasket, or \"Choose Columns\" in Meds & Orders section of the refill encounter.              Passed - Medication is active on med list        Passed - Patient is 18 years of age or older        "

## 2020-02-24 RX ORDER — FINASTERIDE 5 MG/1
TABLET, FILM COATED ORAL
Qty: 90 TABLET | Refills: 1 | Status: SHIPPED | OUTPATIENT
Start: 2020-02-24 | End: 2020-01-01

## 2020-02-26 ENCOUNTER — HOSPITAL ENCOUNTER (OUTPATIENT)
Facility: CLINIC | Age: 80
Setting detail: OBSERVATION
Discharge: HOME | End: 2020-02-27
Attending: EMERGENCY MEDICINE | Admitting: INTERNAL MEDICINE
Payer: COMMERCIAL

## 2020-02-26 ENCOUNTER — APPOINTMENT (OUTPATIENT)
Dept: GENERAL RADIOLOGY | Facility: CLINIC | Age: 80
End: 2020-02-26
Attending: EMERGENCY MEDICINE
Payer: COMMERCIAL

## 2020-02-26 DIAGNOSIS — R07.9 CHEST PAIN, UNSPECIFIED TYPE: ICD-10-CM

## 2020-02-26 LAB
ALBUMIN SERPL-MCNC: 3.8 G/DL (ref 3.4–5)
ALP SERPL-CCNC: 125 U/L (ref 40–150)
ALT SERPL W P-5'-P-CCNC: 30 U/L (ref 0–70)
ANION GAP SERPL CALCULATED.3IONS-SCNC: 6 MMOL/L (ref 3–14)
AST SERPL W P-5'-P-CCNC: 18 U/L (ref 0–45)
BASOPHILS # BLD AUTO: 0.1 10E9/L (ref 0–0.2)
BASOPHILS NFR BLD AUTO: 0.9 %
BILIRUB SERPL-MCNC: 0.5 MG/DL (ref 0.2–1.3)
BUN SERPL-MCNC: 23 MG/DL (ref 7–30)
CALCIUM SERPL-MCNC: 9.1 MG/DL (ref 8.5–10.1)
CHLORIDE SERPL-SCNC: 106 MMOL/L (ref 94–109)
CO2 SERPL-SCNC: 27 MMOL/L (ref 20–32)
CREAT SERPL-MCNC: 1.43 MG/DL (ref 0.66–1.25)
DIFFERENTIAL METHOD BLD: NORMAL
EOSINOPHIL # BLD AUTO: 0.1 10E9/L (ref 0–0.7)
EOSINOPHIL NFR BLD AUTO: 0.9 %
ERYTHROCYTE [DISTWIDTH] IN BLOOD BY AUTOMATED COUNT: 13.6 % (ref 10–15)
GFR SERPL CREATININE-BSD FRML MDRD: 46 ML/MIN/{1.73_M2}
GLUCOSE SERPL-MCNC: 139 MG/DL (ref 70–99)
HCT VFR BLD AUTO: 42.8 % (ref 40–53)
HGB BLD-MCNC: 14.3 G/DL (ref 13.3–17.7)
IMM GRANULOCYTES # BLD: 0 10E9/L (ref 0–0.4)
IMM GRANULOCYTES NFR BLD: 0.3 %
LYMPHOCYTES # BLD AUTO: 3.1 10E9/L (ref 0.8–5.3)
LYMPHOCYTES NFR BLD AUTO: 35.1 %
MCH RBC QN AUTO: 30.8 PG (ref 26.5–33)
MCHC RBC AUTO-ENTMCNC: 33.4 G/DL (ref 31.5–36.5)
MCV RBC AUTO: 92 FL (ref 78–100)
MONOCYTES # BLD AUTO: 0.7 10E9/L (ref 0–1.3)
MONOCYTES NFR BLD AUTO: 7.3 %
NEUTROPHILS # BLD AUTO: 4.9 10E9/L (ref 1.6–8.3)
NEUTROPHILS NFR BLD AUTO: 55.5 %
NRBC # BLD AUTO: 0 10*3/UL
NRBC BLD AUTO-RTO: 0 /100
PLATELET # BLD AUTO: 179 10E9/L (ref 150–450)
POTASSIUM SERPL-SCNC: 4.2 MMOL/L (ref 3.4–5.3)
PROT SERPL-MCNC: 7.2 G/DL (ref 6.8–8.8)
RBC # BLD AUTO: 4.65 10E12/L (ref 4.4–5.9)
SODIUM SERPL-SCNC: 139 MMOL/L (ref 133–144)
TROPONIN I SERPL-MCNC: <0.015 UG/L (ref 0–0.04)
WBC # BLD AUTO: 8.9 10E9/L (ref 4–11)

## 2020-02-26 PROCEDURE — 99285 EMERGENCY DEPT VISIT HI MDM: CPT | Mod: 25 | Performed by: EMERGENCY MEDICINE

## 2020-02-26 PROCEDURE — 84484 ASSAY OF TROPONIN QUANT: CPT | Performed by: EMERGENCY MEDICINE

## 2020-02-26 PROCEDURE — 93005 ELECTROCARDIOGRAM TRACING: CPT | Performed by: EMERGENCY MEDICINE

## 2020-02-26 PROCEDURE — 71046 X-RAY EXAM CHEST 2 VIEWS: CPT

## 2020-02-26 PROCEDURE — 93308 TTE F-UP OR LMTD: CPT | Mod: Z6 | Performed by: EMERGENCY MEDICINE

## 2020-02-26 PROCEDURE — 96361 HYDRATE IV INFUSION ADD-ON: CPT | Performed by: EMERGENCY MEDICINE

## 2020-02-26 PROCEDURE — 25000132 ZZH RX MED GY IP 250 OP 250 PS 637: Performed by: EMERGENCY MEDICINE

## 2020-02-26 PROCEDURE — 93308 TTE F-UP OR LMTD: CPT | Performed by: EMERGENCY MEDICINE

## 2020-02-26 PROCEDURE — 83036 HEMOGLOBIN GLYCOSYLATED A1C: CPT | Performed by: PHYSICIAN ASSISTANT

## 2020-02-26 PROCEDURE — 80053 COMPREHEN METABOLIC PANEL: CPT | Performed by: EMERGENCY MEDICINE

## 2020-02-26 PROCEDURE — 85025 COMPLETE CBC W/AUTO DIFF WBC: CPT | Performed by: EMERGENCY MEDICINE

## 2020-02-26 PROCEDURE — 96360 HYDRATION IV INFUSION INIT: CPT | Mod: 59 | Performed by: EMERGENCY MEDICINE

## 2020-02-26 PROCEDURE — 93010 ELECTROCARDIOGRAM REPORT: CPT | Mod: Z6 | Performed by: EMERGENCY MEDICINE

## 2020-02-26 RX ORDER — ASPIRIN 81 MG/1
162 TABLET, CHEWABLE ORAL ONCE
Status: COMPLETED | OUTPATIENT
Start: 2020-02-26 | End: 2020-02-26

## 2020-02-26 RX ADMIN — ASPIRIN 81 MG 162 MG: 81 TABLET ORAL at 23:02

## 2020-02-26 ASSESSMENT — MIFFLIN-ST. JEOR: SCORE: 1562.21

## 2020-02-27 VITALS
TEMPERATURE: 97.5 F | BODY MASS INDEX: 28.14 KG/M2 | HEART RATE: 65 BPM | HEIGHT: 69 IN | WEIGHT: 190 LBS | DIASTOLIC BLOOD PRESSURE: 67 MMHG | OXYGEN SATURATION: 94 % | RESPIRATION RATE: 18 BRPM | SYSTOLIC BLOOD PRESSURE: 149 MMHG

## 2020-02-27 PROBLEM — R07.9 CHEST PAIN: Status: ACTIVE | Noted: 2020-02-27

## 2020-02-27 LAB
ANION GAP SERPL CALCULATED.3IONS-SCNC: 3 MMOL/L (ref 3–14)
BUN SERPL-MCNC: 21 MG/DL (ref 7–30)
CALCIUM SERPL-MCNC: 8.5 MG/DL (ref 8.5–10.1)
CHLORIDE SERPL-SCNC: 102 MMOL/L (ref 94–109)
CO2 SERPL-SCNC: 31 MMOL/L (ref 20–32)
CREAT SERPL-MCNC: 1.09 MG/DL (ref 0.66–1.25)
GFR SERPL CREATININE-BSD FRML MDRD: 63 ML/MIN/{1.73_M2}
GLUCOSE BLDC GLUCOMTR-MCNC: 136 MG/DL (ref 70–99)
GLUCOSE BLDC GLUCOMTR-MCNC: 154 MG/DL (ref 70–99)
GLUCOSE BLDC GLUCOMTR-MCNC: 310 MG/DL (ref 70–99)
GLUCOSE SERPL-MCNC: 350 MG/DL (ref 70–99)
HBA1C MFR BLD: 7.9 % (ref 0–5.6)
POTASSIUM SERPL-SCNC: 4.4 MMOL/L (ref 3.4–5.3)
SODIUM SERPL-SCNC: 136 MMOL/L (ref 133–144)
TROPONIN I SERPL-MCNC: <0.015 UG/L (ref 0–0.04)

## 2020-02-27 PROCEDURE — G0378 HOSPITAL OBSERVATION PER HR: HCPCS

## 2020-02-27 PROCEDURE — 00000146 ZZHCL STATISTIC GLUCOSE BY METER IP

## 2020-02-27 PROCEDURE — 96361 HYDRATE IV INFUSION ADD-ON: CPT | Performed by: EMERGENCY MEDICINE

## 2020-02-27 PROCEDURE — 84484 ASSAY OF TROPONIN QUANT: CPT | Performed by: FAMILY MEDICINE

## 2020-02-27 PROCEDURE — 80048 BASIC METABOLIC PNL TOTAL CA: CPT | Performed by: FAMILY MEDICINE

## 2020-02-27 PROCEDURE — 25000132 ZZH RX MED GY IP 250 OP 250 PS 637: Performed by: PHYSICIAN ASSISTANT

## 2020-02-27 PROCEDURE — 25800030 ZZH RX IP 258 OP 636: Performed by: EMERGENCY MEDICINE

## 2020-02-27 PROCEDURE — 36415 COLL VENOUS BLD VENIPUNCTURE: CPT | Performed by: EMERGENCY MEDICINE

## 2020-02-27 PROCEDURE — 99207 ZZC CDG-CODE CATEGORY CHANGED: CPT | Performed by: FAMILY MEDICINE

## 2020-02-27 PROCEDURE — 84484 ASSAY OF TROPONIN QUANT: CPT | Performed by: EMERGENCY MEDICINE

## 2020-02-27 PROCEDURE — 25000131 ZZH RX MED GY IP 250 OP 636 PS 637: Performed by: FAMILY MEDICINE

## 2020-02-27 PROCEDURE — 36415 COLL VENOUS BLD VENIPUNCTURE: CPT | Mod: XP | Performed by: FAMILY MEDICINE

## 2020-02-27 PROCEDURE — 96360 HYDRATION IV INFUSION INIT: CPT | Mod: 59 | Performed by: EMERGENCY MEDICINE

## 2020-02-27 PROCEDURE — 96372 THER/PROPH/DIAG INJ SC/IM: CPT

## 2020-02-27 PROCEDURE — 25000132 ZZH RX MED GY IP 250 OP 250 PS 637: Performed by: FAMILY MEDICINE

## 2020-02-27 PROCEDURE — 99236 HOSP IP/OBS SAME DATE HI 85: CPT | Performed by: FAMILY MEDICINE

## 2020-02-27 PROCEDURE — 25000132 ZZH RX MED GY IP 250 OP 250 PS 637: Performed by: EMERGENCY MEDICINE

## 2020-02-27 RX ORDER — NITROGLYCERIN 0.4 MG/1
0.4 TABLET SUBLINGUAL EVERY 5 MIN PRN
Status: DISCONTINUED | OUTPATIENT
Start: 2020-02-27 | End: 2020-02-27 | Stop reason: HOSPADM

## 2020-02-27 RX ORDER — FINASTERIDE 5 MG/1
5 TABLET, FILM COATED ORAL DAILY
Status: DISCONTINUED | OUTPATIENT
Start: 2020-02-27 | End: 2020-02-27 | Stop reason: HOSPADM

## 2020-02-27 RX ORDER — METOPROLOL TARTRATE 25 MG/1
25 TABLET, FILM COATED ORAL 2 TIMES DAILY
Status: DISCONTINUED | OUTPATIENT
Start: 2020-02-27 | End: 2020-02-27 | Stop reason: HOSPADM

## 2020-02-27 RX ORDER — ACETAMINOPHEN 325 MG/1
650 TABLET ORAL EVERY 4 HOURS PRN
Status: DISCONTINUED | OUTPATIENT
Start: 2020-02-27 | End: 2020-02-27 | Stop reason: HOSPADM

## 2020-02-27 RX ORDER — ATORVASTATIN CALCIUM 80 MG/1
80 TABLET, FILM COATED ORAL DAILY
Status: DISCONTINUED | OUTPATIENT
Start: 2020-02-27 | End: 2020-02-27 | Stop reason: HOSPADM

## 2020-02-27 RX ORDER — BUDESONIDE 0.5 MG/2ML
0.5 INHALANT ORAL 2 TIMES DAILY
Status: DISCONTINUED | OUTPATIENT
Start: 2020-02-27 | End: 2020-02-27 | Stop reason: HOSPADM

## 2020-02-27 RX ORDER — DEXTROSE MONOHYDRATE 25 G/50ML
25-50 INJECTION, SOLUTION INTRAVENOUS
Status: DISCONTINUED | OUTPATIENT
Start: 2020-02-27 | End: 2020-02-27 | Stop reason: HOSPADM

## 2020-02-27 RX ORDER — FUROSEMIDE 20 MG
20 TABLET ORAL DAILY
Status: DISCONTINUED | OUTPATIENT
Start: 2020-02-27 | End: 2020-02-27 | Stop reason: HOSPADM

## 2020-02-27 RX ORDER — LISINOPRIL 10 MG/1
10 TABLET ORAL DAILY
Status: DISCONTINUED | OUTPATIENT
Start: 2020-02-27 | End: 2020-02-27 | Stop reason: HOSPADM

## 2020-02-27 RX ORDER — ARFORMOTEROL TARTRATE 15 UG/2ML
15 SOLUTION RESPIRATORY (INHALATION) 2 TIMES DAILY
Status: DISCONTINUED | OUTPATIENT
Start: 2020-02-27 | End: 2020-02-27 | Stop reason: HOSPADM

## 2020-02-27 RX ORDER — ONDANSETRON 2 MG/ML
4 INJECTION INTRAMUSCULAR; INTRAVENOUS EVERY 6 HOURS PRN
Status: DISCONTINUED | OUTPATIENT
Start: 2020-02-27 | End: 2020-02-27 | Stop reason: HOSPADM

## 2020-02-27 RX ORDER — NITROGLYCERIN 0.4 MG/1
0.4 TABLET SUBLINGUAL EVERY 5 MIN PRN
Status: DISCONTINUED | OUTPATIENT
Start: 2020-02-27 | End: 2020-02-27

## 2020-02-27 RX ORDER — ALBUTEROL SULFATE 0.83 MG/ML
2.5 SOLUTION RESPIRATORY (INHALATION)
Status: DISCONTINUED | OUTPATIENT
Start: 2020-02-27 | End: 2020-02-27 | Stop reason: HOSPADM

## 2020-02-27 RX ORDER — FAMOTIDINE 20 MG/1
20 TABLET, FILM COATED ORAL 2 TIMES DAILY
Status: DISCONTINUED | OUTPATIENT
Start: 2020-02-27 | End: 2020-02-27 | Stop reason: HOSPADM

## 2020-02-27 RX ORDER — ONDANSETRON 4 MG/1
4 TABLET, ORALLY DISINTEGRATING ORAL EVERY 6 HOURS PRN
Status: DISCONTINUED | OUTPATIENT
Start: 2020-02-27 | End: 2020-02-27 | Stop reason: HOSPADM

## 2020-02-27 RX ORDER — NICOTINE POLACRILEX 4 MG
15-30 LOZENGE BUCCAL
Status: DISCONTINUED | OUTPATIENT
Start: 2020-02-27 | End: 2020-02-27 | Stop reason: HOSPADM

## 2020-02-27 RX ORDER — TAMSULOSIN HYDROCHLORIDE 0.4 MG/1
0.4 CAPSULE ORAL DAILY
Status: DISCONTINUED | OUTPATIENT
Start: 2020-02-27 | End: 2020-02-27 | Stop reason: HOSPADM

## 2020-02-27 RX ADMIN — SODIUM CHLORIDE, POTASSIUM CHLORIDE, SODIUM LACTATE AND CALCIUM CHLORIDE 500 ML: 600; 310; 30; 20 INJECTION, SOLUTION INTRAVENOUS at 00:59

## 2020-02-27 RX ADMIN — METOPROLOL TARTRATE 25 MG: 25 TABLET, FILM COATED ORAL at 08:59

## 2020-02-27 RX ADMIN — FAMOTIDINE 20 MG: 20 TABLET ORAL at 08:13

## 2020-02-27 RX ADMIN — NITROGLYCERIN 0.4 MG: 0.4 TABLET SUBLINGUAL at 01:05

## 2020-02-27 RX ADMIN — NITROGLYCERIN 0.4 MG: 0.4 TABLET SUBLINGUAL at 01:01

## 2020-02-27 RX ADMIN — TAMSULOSIN HYDROCHLORIDE 0.4 MG: 0.4 CAPSULE ORAL at 08:59

## 2020-02-27 RX ADMIN — ASPIRIN 325 MG: 325 TABLET, COATED ORAL at 08:12

## 2020-02-27 RX ADMIN — FINASTERIDE 5 MG: 5 TABLET, FILM COATED ORAL at 08:59

## 2020-02-27 RX ADMIN — ATORVASTATIN CALCIUM 80 MG: 80 TABLET ORAL at 08:12

## 2020-02-27 RX ADMIN — FUROSEMIDE 20 MG: 20 TABLET ORAL at 08:59

## 2020-02-27 RX ADMIN — NITROGLYCERIN 0.4 MG: 0.4 TABLET SUBLINGUAL at 01:11

## 2020-02-27 RX ADMIN — INSULIN HUMAN 20 UNITS: 100 INJECTION, SUSPENSION SUBCUTANEOUS at 10:37

## 2020-02-27 RX ADMIN — LISINOPRIL 10 MG: 10 TABLET ORAL at 08:59

## 2020-02-27 ASSESSMENT — ENCOUNTER SYMPTOMS
SORE THROAT: 0
ABDOMINAL PAIN: 0
DIAPHORESIS: 0
COUGH: 0
NAUSEA: 0
SHORTNESS OF BREATH: 1
SPEECH DIFFICULTY: 0
BACK PAIN: 0
HEADACHES: 0
CHILLS: 0
VOMITING: 0
DIARRHEA: 0
DIFFICULTY URINATING: 0
FEVER: 0

## 2020-02-27 NOTE — DISCHARGE SUMMARY
[facility] History and Physical and discharge summary     Alvaro Horton MRN# [id number]   Age: 79 year old YOB: 1940     Date of Admission:  2/26/2020      Primary care provider: Be Carreno          Assessment and Plan:   Assessment & Plan       Chest pain    History of Chronic ischemic heart disease   2/27/2020 -- S/p CABG x2 11/09- LIMA-D1, SVG to OM1, OM2.  Had episode of non-exertional chest pain with some increase in his baseline dyspnea that lasted a few hours yesterday, didn't clearly respond to nitrogylcerin, resolved spontaneously in the ER and has remained chest pain free with dyspnea back to baseline since admission.  Today feels back to baseline with no chest pain, pressure or heaviness.  Troponins were negative.  No acute changes on ECG or tele.  Recommended repeat stress testing but patient refusing at present - last stress test was about 8 months ago - didn't tolerate lexiscan due to anxiety so did a dobutamine echo - discussed that we could do the same again - patient says he definitely can't do the lexiscan and doesn't want the dobutamine echo either - understands the risks, but says he's 80 and doesn't want it done.  Also however says he hopes to live to be 89 like his mom did.  Regardless, we can't to the dobutamine echo today and this would need to be done as an outpatient.  Patient agreeable with plan for discharge today and follow-up with primary care provider in 1 week and with cardiology when available to discuss this again - says he plans to continue to refuse the stress test but is willing to talk to his primary care provider and cardiology about it as an outpatient.  Encouraged to avoid any strenuous activity at least until his follow-up with his primary care provider, then can readdress.  Overall I think this is pretty low risk with fairly atypical symptoms that patient thinks are different than what he felt with his CABG and last stress test < 1 year  ago, but I think I'd still recommend stress testing if patient were amenable to it.  Regardless, stable for now and ok for discharge on prior to admission medications with plans to follow-up as above.  Patient understands risks including risks of not doing the stress test and the importance of follow-up with his primary care provider.  Continue prior to admission medications including daily aspirin, lipitor, metoprolol and lisinopril.         Hypertension goal BP (blood pressure) < 140/90  2/27/2020 -- blood pressure stable, continue home metoprolol, lisinopril and lasix.         Hypertrophy of prostate without urinary obstruction  2/27/2020 -- continue home flomax and proscar.        Obstructive sleep apnea  No change      CVA (cerebral vascular accident) (H)  2/27/2020 -- occurred at time of CABG, no residual symptoms.  Continue daily aspirin.         Atrial fibrillation (H)  2/27/2020 -- has this listed in his history but sounds like a doubtful diagnosis - all recent ECGs from the past few years are sinus, one computer read as possible atrial fibrillation but doesn't look convincing.  I don't see any notes directly addressing this but he's been followed by cardiology and his primary care provider and is not on any anticoagulation.  No atrial fibrillation on admit ECG or on tele so far here.          Chronic kidney disease, stage 2 (mild)  2/27/2020 -- creatinine slightly up from baseline, recheck normalized.         Type 2 diabetes mellitus with diabetic chronic kidney disease (H)  2/27/2020 -- A1c 7.9, glucose good so far.  Recently changed to 70/30 twice daily, continue this unchanged on discharge.  Continue to follow-up with primary care provider and diabetic ed.        Esophageal reflux  2/27/2020 -- continued home pepcid,    COPD/Baker's Lung  2/27/2020 -- apearss baseline, continue prior to admission medications.         Prophylaxis  Low risk    Lines  PIV while inpatient        Disposition  Ok for  "discharge home today as above.      Follow-up   Follow-up with primary care provider in 1 week and with cardiology at first available non-urgent follow-up appointment, confirm no ongoing symptoms and confirm if patient still refusing outpatient dobutamine echo and recheck BMP at that time as above.                Chief Complaint:   Chest pain   History is obtained from the patient          History of Present Illness:   This patient is a 79 year old  male with a significant past medical history of CAD with prior CABG, diabetes and COPD among other things as above who presents with chest pain.  Has been at baseline recently, then yesterday developed a \"pressure\" type pain in the left side of is chest.  Was at rest, although he did feel a bit more short of breath with activity than normal while the chest pain was ongoing.  Does get short of breath with fairly minimal activity at baseline.  He took nitrogylcerin x 2, not sure if that really made any difference.  Symptoms didn't resolve with rest and patient talked with his son who encouraged him to come to the ER.  In the ER patient says the pain resolved before they did anything and he's remained pain free since.  Has been up and about in the room this morning, a bit winded after going to the bathroom and back but patient says this is back to baseline for him.  No chest tightness, pressure or heaviness since admission either.  Says today he feels completely back to baseline.  No other concerns.      Did have his medications for his COPD changed a few weeks ago due to insurance coverage, but says otherwise no recent medication changes and the COPD has been stable and doing well with the new medications.  No other changes.   Non-smoker, no alcohol.  , lives independently.                Past Medical History:   I have reviewed this patient's past medical history.  Patient Active Problem List    Diagnosis Date Noted     Hypertension goal BP (blood pressure) " "< 140/90 10/09/2006     Priority: High     BAKERS LUNG      Priority: High     Dx possible \"Baker's Lung\" 2001 [facility]. RAST for bakers yeast negative 2002. Has occupational exposures with related worsening asthmatic symptoms. CT 2/08- no fibrosis.       Moderate persistent asthma      Priority: High     PFTS 1997 - FEV1- 3.39 (64%), ratio 0.74, 24% change qgu59-40% with bronchodilators,  TLC 86%, %, DLCO 78%. Methacholine challenge positive at level 7 2001.  PFTS 2004 - FEV1- 1.93 (63%), ratio 0.77. PFTS 4/08 - FEV1- 2.13 (72%), ratio 0.71, no change with bronchodilators,  TLC 78%, RV 96%, DLCO 64%          Chronic ischemic heart disease      Priority: High     atypical chest pain 2001 with GXT echo- decreased LVF, angiogram 2001- nonobstructing 3 vessel disease.   Repeat angio 2004- prox RCA 50-60%,  OM2- 40%, D1 30-40%, LAD 30-40%.   Cardiolite 2005 during hospitalization for SOB in [place] reported to reveal no ischemia.   Adenosine cardiolite 1/08- small slightly reversible inferior defect, most likely consistent with diaphragm attenuation with bowel uptake artifact. USA, Angio 11/09- Severe LM disease, severe subtotal occlusion of a large branch OM1.    S/p CABG x2 11/09- LIMA-D1, SVG to OM1, OM2. GXT 8/10- 5.8 mets, 121 (80%) HR, normal ekg, cardiolite- small fixed inferior/basal defect with small area khurram-infarct ischemia extending into the mid ventricle. EF 67%.  4/25/13:adenosine cardiolyte stress test through [facility] with normal EF and no ischemia.   1/16/2016 nuclear stress test:1.  Myocardial perfusion imaging using single isotope technique demonstrated no evidence for myocardial ischemia. 2. Gated images demonstrated normal wall motion with a calculated ejection fraction of 61%.  3. Compared to the prior study from 2011 there are no significant changes .           Chest pain 02/27/2020     Priority: Medium     Acute calculous cholecystitis 03/02/2019     Priority: Medium     Abnormal " "blood-gas measurement 09/04/2018     Priority: Medium     Encephalopathy 09/03/2018     Priority: Medium     Type 2 diabetes mellitus with diabetic chronic kidney disease (H) 10/30/2015     Priority: Medium     Altered mental status 12/31/2014     Priority: Medium     12/27/2014:episode confusion for 20 minutes.  Seen at [facility]/-hospitalized.  Head CT, MRI/MRA all normal.  PET cardiac perfusion stress test normal.  Neurology consult-diagnosis= possible hypoglycemia, migraine variant.  \"Acute ischemic cerebral event was excluded\".       Health Care Home 08/11/2014     Priority: Medium     State Tier Level:    Status:  Unable to re-connect  Care Coordinator:  Kecia Mills    See Letters for [facility] Care Plan  Date:  August 11, 2014             Chronic kidney disease, stage 2 (mild) 05/06/2014     Priority: Medium     9/9/2015:He has had two abnormal MAC in the past.        COPD (chronic obstructive pulmonary disease) (H) 11/18/2013     Priority: Medium     PFT 11/15/13 results:FVC=59%, FEV1=54%, FEV1/FVC=92%.  His DLCO was 58%1. Spirometry: FEV1 moderately reduced. FVC moderately reduced. FEV1/FVC ratio reduced. 2. Bronchodilator Response: A 14% improvement is seen in FEV1 with bronchodilators. 3. Lung Volumes: Total lung capacity normal. Residual volume increased. Residual volume total lung capacity ratio increased. 4. DLCO: Moderately reduced.   INTERPRETATION: Moderate obstructive lung disease. Reversibility with bronchodilators is seen on testing today. Lung volumes show evidence for air trapping. DLCO is reduced, consistent with loss of capillary-alveolar exchange as in emphysema, pulmonary hypertension, chronic pulmonary embolus, pulmonary fibrosis or other pulmonary vascular disease. A concomitant restrictive lung disease process may be suspected on the basis of moderate reductions in FEV1 and FVC, with a fairly well-preserved FEV1/FVC ratio and a borderline low normal total lung capacity.   11/15/13:exercise " oximetry did not show significant desaturation below 91%.  PFT February, 2014 results:FVC=54%, FEV1=48%, FEV1/FVC=64%.  DLCO=59%  9/6/2016 pulmonary consult:COPD with restrictional components and diffusion defect, history Baker's lung.          Hyperlipidemia LDL goal <70 04/17/2013     Priority: Medium     Atrial fibrillation (H) 12/14/2009     Priority: Medium     After CABG 11/09       CVA (cerebral vascular accident) (H) 11/27/2009     Priority: Medium     Mild Broca's aphasia, confusion, right hand dysesthesia after angio. MRA new small area of restricted diffusion in the white matter of the medial right temporal lobe,  consistent with a recent small infarct. Speech problems resolved, still mild right hand problems.         Obstructive sleep apnea 04/14/2008     Priority: Medium     ESS 20/24. Sleep study [facility]: 4/8/08. total sleep time was 423.0 minutes. The sleep latency was normal at 9.7 minutes.  REM sleep latency was 161.5 minutes. Sleep efficiency was normal at 82.7%. The sleep architecture was disrupted with frequent sleep stage changes and arousals. Snoring: Was reported as moderately loud. Lowest O2 saturation was 87.0%. This study is suggestive of mild sleep apnea, severe during REM sleep (21% TST).  PLM index was 0.0. EKG:  No significant cardiac arrhythmias were noted.         Hypertrophy of prostate without urinary obstruction      Priority: Medium     Elevated PSA. Bx negative 2004.       Advanced care planning/counseling discussion 09/24/2012     Priority: Low     Does not have a directive 9/12       Dyslexia 11/11/2010     Priority: Low     essentially unable to read       CTS (carpal tunnel syndrome) 05/12/2010     Priority: Low     EMG 5/10-  median neuropathy at the right wrist, moderate in severity electrically, right ulnar neuropathy localizing to the elbow, mild chronic right C6 radiculopathy without evidence for acute denervation.        Memory loss 05/03/2010     Priority: Low      MMSE 27/30 (0/3 attention recall, ?history of dylexia), PHQ9-16 7/09. Recommended neurocogntive testing, did not complete.  MMSE 23/30 6/10 (attention, county, if/and/buts).Neurocogntive evaluation  1/10- not suspicious for emerging dementia. Felt likely due hearing loss/dyslexia.   10/29/2018:He was seen at [facility] Neurology clinic 10/10/2018.  Dr. Oksana Munoz.   He had an EEG.   Diagnosed with Alzheimer's dementia.   Records not yet reviewed.        Benign paroxysmal vertigo 02/03/2009     Priority: Low     Admit 2/09. MRI/MRA head and neck negative.       Sensorineural hearing loss, bilateral 10/08/2007     Priority: Low     Esophageal reflux 11/06/2006     Priority: Low     PSORIASIS      Priority: Low     OVERACTIVE BLADDER      Priority: Low     PVR 84 2004.                Past Surgical History:   I have reviewed this patient's past surgical history   Past Surgical History:   Procedure Laterality Date     C ANESTH,DX ARTHROSCOPIC PROC KNEE JOINT  1994, 1998     Left     CARDIAC SURGERY  11/09    CABG x2 - LIMA-D1, SVG to OM1, OM2.     GENITOURINARY SURGERY  1990    ESWL and percutaneous nephrolithotomy     LAPAROSCOPIC CHOLECYSTECTOMY N/A 3/4/2019    Procedure: LAPAROSCOPIC CHOLECYSTECTOMY;  Surgeon: Burt Mendieta MD;  Location: [facility] OR     ORTHOPEDIC SURGERY  8/12    right CTR     SURGICAL HISTORY OF -   1998    Hernia repair     SURGICAL HISTORY OF -   2004    NormalColonoscopy      SURGICAL HISTORY OF -   2006    Normal Colonoscopy             Social History:   I have reviewed this patient's social history   Social History     Tobacco Use     Smoking status: Never Smoker     Smokeless tobacco: Never Used   Substance Use Topics     Alcohol use: No     Alcohol/week: 0.0 standard drinks             Family History:   I have reviewed this patient's family history  Family History   Problem Relation Age of Onset     C.A.D. Father         40s     Hypertension Father      C.A.D. Paternal Grandfather      Heart  Disease Paternal Grandfather      Diabetes Brother      C.A.D. Brother      Heart Disease Brother      Hypertension Brother      Gastrointestinal Disease Son         Crohn's disease     Gastrointestinal Disease Other         2 grandaughters with Crohn's disease     Cancer - colorectal No family hx of              Immunizations:   Immunizations are current          Allergies:     Allergies   Allergen Reactions     Prednisone Other (See Comments)     Severe interaction with DM             Medications:     Medications Prior to Admission   Medication Sig Dispense Refill Last Dose     albuterol (PROAIR HFA/PROVENTIL HFA/VENTOLIN HFA) 108 (90 Base) MCG/ACT inhaler Inhale 2 puffs into the lungs every 6 hours as needed for shortness of breath / dyspnea or wheezing 1 Inhaler 11      aspirin (ASA) 325 MG EC tablet Take 325 mg by mouth daily   Taking     atorvastatin (LIPITOR) 80 MG tablet TAKE 1 TABLET BY MOUTH ONCE DAILY 90 tablet 3 Taking     [] blood glucose (NO BRAND SPECIFIED) lancets standard Use to test blood sugar 3 times daily. Accu Chek Brand 300 each 1 Taking     blood glucose (NO BRAND SPECIFIED) test strip Use to test blood sugar 3 times daily Accu Chek Brand 300 strip 1      [] blood glucose monitoring (NO BRAND SPECIFIED) meter device kit Use to test blood sugar 3 times daily. Accu Chek Brand (Patient not taking: Reported on 2019) 1 kit 0 Not Taking     BROVANA 15 MCG/2ML NEBU neb solution INHALE ONE VIAL VIA NEBULIZER BY MOUTH TWO TIMES A  mL 0 Taking     budesonide (PULMICORT) 0.5 MG/2ML neb solution INHALE ONE VIAL VIA NEBULIZER TWO TIMES A  mL 11 Taking     cholecalciferol 2000 UNITS tablet Take 1 tablet by mouth daily. 90 tablet 3 Taking     cyanocobalamin (BL VITAMIN B-12) 500 MCG TABS Take 500 mcg by mouth daily. (Patient taking differently: Take 2,500 mcg by mouth daily ) 90 tablet 4 Taking     finasteride (PROSCAR) 5 MG tablet TAKE 1 TABLET BY MOUTH ONCE DAILY 90 tablet  "1      fish oil-omega-3 fatty acids (FISH OIL) 1000 MG capsule Take 1 capsule by mouth 2 times daily. 180 capsule 3 Taking     furosemide (LASIX) 20 MG tablet Take 20 mg by mouth daily   Taking     insulin aspart (NOVOLOG FLEXPEN) 100 UNIT/ML pen INJECT 11 UNITS SUBCUTANEOUSLY DAILY BEFORE BREAKFAST, 11 UNITS DAILY BEFORE LUNCH, AND 11 UNITS DAILY BEFORE DINNER 15 mL 11 Taking     insulin detemir (LEVEMIR FLEXTOUCH) 100 UNIT/ML pen Inject 20 Units Subcutaneous At Bedtime 15 mL 11 Taking     insulin isophane & regular (NOVOLIN 70/30 FLEXPEN RELION) susp Take 15 units 30 minutes before breakfast and 15 units 30 minutes before supper. 15 mL 6      insulin pen needle (BD ABRAM U/F) 32G X 4 MM miscellaneous Use 2 daily as directed. 100 each 11      Insulin Pen Needle (PEN NEEDLES) 31G X 6 MM MISC 1 Units 3 times daily For novolog flexpen 90 each 0 Taking     insulin syringe-needle U-100 (BD INSULIN SYRINGE ULTRAFINE) 31G X 5/16\" 1 ML Use one syringe 4 times daily or as directed. 100 each prn Taking     lisinopril (PRINIVIL/ZESTRIL) 10 MG tablet Take 1 tablet (10 mg) by mouth daily 30 tablet 11 Taking     metFORMIN (GLUCOPHAGE) 1000 MG tablet TAKE 1 TABLET BY MOUTH TWICE DAILY WITH MEALS 180 tablet 3 Taking     metoprolol tartrate (LOPRESSOR) 25 MG tablet TAKE 1 TABLET BY MOUTH TWICE DAILY 60 tablet 3      nitroGLYcerin (NITROSTAT) 0.4 MG sublingual tablet Place 1 tablet (0.4 mg) under the tongue every 5 minutes as needed for chest pain (CALL 911 IF NOT IMPROVED AFTER THREE CONSECUTIVE DOSES) 25 tablet 0 Taking     order for DME Equipment being ordered: CPAP  AIRSENSE 10  11-15 CM H2O  QUATTRO AIR SIZE MED  SN# [id number]  DN# [id number]   Taking     ranitidine (RANITIDINE 150 MAX STRENGTH) 150 MG tablet Take 1 tablet (150 mg) by mouth 2 times daily 180 tablet 1 Taking     tamsulosin (FLOMAX) 0.4 MG capsule Take 1 capsule (0.4 mg) by mouth daily (Needs follow-up appointment for this medication) 90 capsule 3 Taking        Continue " "prior to admission medications unchanged on discharge.           Review of Systems:    ROS: 10 point ROS neg other than the symptoms noted above in the HPI.           Physical Exam:   Blood pressure (!) 149/67, pulse 65, temperature 97.5  F (36.4  C), temperature source Oral, resp. rate 18, height 1.753 m (5' 9\"), weight 86.2 kg (190 lb), SpO2 94 %.  Temperatures:  Current - Temp: 97.5  F (36.4  C); Max - Temp  Av.6  F (36.4  C)  Min: 97.5  F (36.4  C)  Max: 97.7  F (36.5  C)  Respiration range: Resp  Av.4  Min: 12  Max: 26  Pulse range: Pulse  Av.5  Min: 65  Max: 75  Blood pressure range: Systolic (24hrs), Av , Min:111 , Max:195   ; Diastolic (24hrs), Av, Min:61, Max:144    Pulse oximetry range: SpO2  Av.6 %  Min: 92 %  Max: 97 %    Intake/Output Summary (Last 24 hours) at 2020 0750  Last data filed at 2020 2300  Gross per 24 hour   Intake --   Output 150 ml   Net -150 ml     EXAM:  General: awake and alert, NAD, oriented x 3  Head: normocephalic  Neck: unremarkable, no lymphadenopathy   HEENT: oropharynx pink and moist    Heart: Regular rate and rhythm, no murmurs, rubs, or gallops  Lungs: clear to auscultation bilaterally with good air movement throughout, no wheezing or distress even with forced exhalation   Abdomen: soft, non-tender, no masses or organomegaly  Extremities: no edema in lower extremities   Skin unremarkable.             Data:     Results for orders placed or performed during the hospital encounter of 20 (from the past 24 hour(s))   CBC with platelets differential   Result Value Ref Range    WBC 8.9 4.0 - 11.0 10e9/L    RBC Count 4.65 4.4 - 5.9 10e12/L    Hemoglobin 14.3 13.3 - 17.7 g/dL    Hematocrit 42.8 40.0 - 53.0 %    MCV 92 78 - 100 fl    MCH 30.8 26.5 - 33.0 pg    MCHC 33.4 31.5 - 36.5 g/dL    RDW 13.6 10.0 - 15.0 %    Platelet Count 179 150 - 450 10e9/L    Diff Method Automated Method     % Neutrophils 55.5 %    % Lymphocytes 35.1 %    % " Monocytes 7.3 %    % Eosinophils 0.9 %    % Basophils 0.9 %    % Immature Granulocytes 0.3 %    Nucleated RBCs 0 0 /100    Absolute Neutrophil 4.9 1.6 - 8.3 10e9/L    Absolute Lymphocytes 3.1 0.8 - 5.3 10e9/L    Absolute Monocytes 0.7 0.0 - 1.3 10e9/L    Absolute Eosinophils 0.1 0.0 - 0.7 10e9/L    Absolute Basophils 0.1 0.0 - 0.2 10e9/L    Abs Immature Granulocytes 0.0 0 - 0.4 10e9/L    Absolute Nucleated RBC 0.0    Comprehensive metabolic panel   Result Value Ref Range    Sodium 139 133 - 144 mmol/L    Potassium 4.2 3.4 - 5.3 mmol/L    Chloride 106 94 - 109 mmol/L    Carbon Dioxide 27 20 - 32 mmol/L    Anion Gap 6 3 - 14 mmol/L    Glucose 139 (H) 70 - 99 mg/dL    Urea Nitrogen 23 7 - 30 mg/dL    Creatinine 1.43 (H) 0.66 - 1.25 mg/dL    GFR Estimate 46 (L) >60 mL/min/[1.73_m2]    GFR Estimate If Black 53 (L) >60 mL/min/[1.73_m2]    Calcium 9.1 8.5 - 10.1 mg/dL    Bilirubin Total 0.5 0.2 - 1.3 mg/dL    Albumin 3.8 3.4 - 5.0 g/dL    Protein Total 7.2 6.8 - 8.8 g/dL    Alkaline Phosphatase 125 40 - 150 U/L    ALT 30 0 - 70 U/L    AST 18 0 - 45 U/L   Troponin I   Result Value Ref Range    Troponin I ES <0.015 0.000 - 0.045 ug/L   Hemoglobin A1c   Result Value Ref Range    Hemoglobin A1C 7.9 (H) 0 - 5.6 %   Chest XR,  PA & LAT    Narrative    EXAM: XR CHEST 2 VW  LOCATION: [facility]  DATE/TIME: 2/26/2020 11:12 PM    INDICATION: Chest pressure and shortness of breath.  COMPARISON: 05/17/2018      Impression    IMPRESSION: Low lung volumes. Heart size and pulmonary vascularity normal. Prior median sternotomy and CABG. Subsegmental atelectasis in the bases. Lungs otherwise clear.   POC US ECHO LIMITED    Impression    [facility] Procedure Note      Limited Bedside ED Cardiac Ultrasound:    PROCEDURE: PERFORMED BY: Dr. Berlin Mccabe MD  INDICATIONS/SYMPTOM:  Shortness of Breath  PROBE: Cardiac phased array probe  BODY LOCATION: Chest  FINDINGS:   The ultrasound was performed utilizing the  parasternal long axis, parasternal short axis and apical 4 chamber views.  Cardiac contractility:  Present  Gross estimation of cardiac kinesis: normal  Pericardial Effusion:  None  RV:LV ratio: LV > RV  IVC: Unable to visualize IVC or obtain adequate window for sub-costal view.  PULM: A-Lines and sliding in bilateral apicies.       INTERPRETATION:    Chamber size and motion were grossly normal with LV > RV, normal cardiac kinesis.  No pericardial effusion was found.  IVC not visualized.  No sonographic evidence of pulmonary edema    IMAGE DOCUMENTATION: Images were archived to PACs system.         Troponin I (second draw)   Result Value Ref Range    Troponin I ES <0.015 0.000 - 0.045 ug/L   Glucose by meter   Result Value Ref Range    Glucose 154 (H) 70 - 99 mg/dL   Troponin I   Result Value Ref Range    Troponin I ES <0.015 0.000 - 0.045 ug/L   Glucose by meter   Result Value Ref Range    Glucose 136 (H) 70 - 99 mg/dL     All cardiac studies reviewed by me.  All imaging studies reviewed by me.    Attestation:  I have reviewed today's vital signs, notes, medications, labs and imaging.  Amount of time performed on this history and physical and discharge summary: 70 minutes.        Joseph Miller MD

## 2020-02-27 NOTE — ED NOTES
"Pt arrives with complaints of chest pressure and SOB since 1400. He states that he did not want to worry his son so he did not complain. He states that he went to bed tonight and had an increase in SOB and chest pressure. His son states that he took \"couple of nitroglycerin and it helped a little bit\". The pt had triple bypass about 10 yrs ago. The pt states that he has had SOB for the last 8 months but has increased over the last 2 days. He has no nausea. He is alert and oriented, cooperative.  "

## 2020-02-27 NOTE — ED PROVIDER NOTES
"  History     Chief Complaint   Patient presents with     Chest Pain     started @1400/ midsternal pressure and SOB     HPI  Alvaro Horton is a 79 year old male with history of CAD s/p CABG (2009), hypertension, atrial fibrillation, CVA, hyperlipidemia, COPD, CKD 2, diabetes type 2 who presents with chest pain.  Patient describes his discomfort as \"pressure\" and points to his left anterior chest.  Discomfort is nonradiating, associated with shortness of breath but no nausea, vomiting, or diaphoresis.  Discomfort occurred while playing with his dog.  He came upstairs and sat down as was feeling more shortness of breath than usual.  He took 2 sublingual nitroglycerin tabs.  Said it is been over a year since he has taken those.  Has trouble with dyspnea on exertion for some time but says this was more than his usual.  After he sat down and rested he still remained dyspneic.  Tried to lie down this evening hoping he would feel better and did not.  Had a conversation with his son Juan José who was concerned and suggested he comes to the emergency department for further evaluation.  Reports being compliant with his medications.  Says he saw his pulmonologist within the past week and no acute issues.  Denies cough, fever, chills, abdominal pain, diarrhea, worsening extremity edema.    The patient's PMHx, Surgical Hx, Allergies, and Medications were all reviewed with the patient.    ECHO: 6/4/2019:  Interpretation Summary  Target Heart Rate was achieved with dobutamine infusion.  There was a borderline hypertensive BP response to drug infusion.  Mild chest pain at peak dobutamine infusion.  The EKG portion of this stress test was negative for inducible ischemia (see  echo results below).  At target heart rate with Dobutamine left ventricular size decreases.  At target heart rate with Dobutamine, global left ventricular function  augments.  Normal resting wall motion and no stress-induced wall motion abnormality.  No " "objective evidence of inducible ischemia on dobutamine stress echo.    Allergies:  Allergies   Allergen Reactions     Prednisone Other (See Comments)     Severe interaction with DM       Problem List:    Patient Active Problem List    Diagnosis Date Noted     Hypertension goal BP (blood pressure) < 140/90 10/09/2006     Priority: High     BAKERS LUNG      Priority: High     Dx possible \"Baker's Lung\" 2001 [facility]. RAST for bakers yeast negative 2002. Has occupational exposures with related worsening asthmatic symptoms. CT 2/08- no fibrosis.       Moderate persistent asthma      Priority: High     PFTS 1997 - FEV1- 3.39 (64%), ratio 0.74, 24% change hml87-90% with bronchodilators,  TLC 86%, %, DLCO 78%. Methacholine challenge positive at level 7 2001.  PFTS 2004 - FEV1- 1.93 (63%), ratio 0.77. PFTS 4/08 - FEV1- 2.13 (72%), ratio 0.71, no change with bronchodilators,  TLC 78%, RV 96%, DLCO 64%          Chronic ischemic heart disease      Priority: High     atypical chest pain 2001 with GXT echo- decreased LVF, angiogram 2001- nonobstructing 3 vessel disease.   Repeat angio 2004- prox RCA 50-60%,  OM2- 40%, D1 30-40%, LAD 30-40%.   Cardiolite 2005 during hospitalization for SOB in [place] reported to reveal no ischemia.   Adenosine cardiolite 1/08- small slightly reversible inferior defect, most likely consistent with diaphragm attenuation with bowel uptake artifact. USA, Angio 11/09- Severe LM disease, severe subtotal occlusion of a large branch OM1.    S/p CABG x2 11/09- LIMA-D1, SVG to OM1, OM2. GXT 8/10- 5.8 mets, 121 (80%) HR, normal ekg, cardiolite- small fixed inferior/basal defect with small area khurram-infarct ischemia extending into the mid ventricle. EF 67%.  4/25/13:adenosine cardiolyte stress test [facility] with normal EF and no ischemia.   1/16/2016 nuclear stress test:1.  Myocardial perfusion imaging using single isotope technique demonstrated no evidence for myocardial ischemia. 2. Gated " "images demonstrated normal wall motion with a calculated ejection fraction of 61%.  3. Compared to the prior study from 2011 there are no significant changes .           Acute calculous cholecystitis 03/02/2019     Priority: Medium     Abnormal blood-gas measurement 09/04/2018     Priority: Medium     Encephalopathy 09/03/2018     Priority: Medium     Type 2 diabetes mellitus with diabetic chronic kidney disease (H) 10/30/2015     Priority: Medium     Altered mental status 12/31/2014     Priority: Medium     12/27/2014:episode confusion for 20 minutes.  Seen at [facility]/-hospitalized.  Head CT, MRI/MRA all normal.  PET cardiac perfusion stress test normal.  Neurology consult-diagnosis= possible hypoglycemia, migraine variant.  \"Acute ischemic cerebral event was excluded\".       Health Care Home 08/11/2014     Priority: Medium     State Tier Level:    Status:  Unable to re-connect  Care Coordinator:  Kecia Mills    See Letters for [facility] Care Plan  Date:  August 11, 2014             Chronic kidney disease, stage 2 (mild) 05/06/2014     Priority: Medium     9/9/2015:He has had two abnormal MAC in the past.        COPD (chronic obstructive pulmonary disease) (H) 11/18/2013     Priority: Medium     PFT 11/15/13 results:FVC=59%, FEV1=54%, FEV1/FVC=92%.  His DLCO was 58%1. Spirometry: FEV1 moderately reduced. FVC moderately reduced. FEV1/FVC ratio reduced. 2. Bronchodilator Response: A 14% improvement is seen in FEV1 with bronchodilators. 3. Lung Volumes: Total lung capacity normal. Residual volume increased. Residual volume total lung capacity ratio increased. 4. DLCO: Moderately reduced.   INTERPRETATION: Moderate obstructive lung disease. Reversibility with bronchodilators is seen on testing today. Lung volumes show evidence for air trapping. DLCO is reduced, consistent with loss of capillary-alveolar exchange as in emphysema, pulmonary hypertension, chronic pulmonary embolus, pulmonary fibrosis or other pulmonary " vascular disease. A concomitant restrictive lung disease process may be suspected on the basis of moderate reductions in FEV1 and FVC, with a fairly well-preserved FEV1/FVC ratio and a borderline low normal total lung capacity.   11/15/13:exercise oximetry did not show significant desaturation below 91%.  PFT February, 2014 results:FVC=54%, FEV1=48%, FEV1/FVC=64%.  DLCO=59%  9/6/2016 pulmonary consult:COPD with restrictional components and diffusion defect, history Baker's lung.          Hyperlipidemia LDL goal <70 04/17/2013     Priority: Medium     Atrial fibrillation (H) 12/14/2009     Priority: Medium     After CABG 11/09       CVA (cerebral vascular accident) (H) 11/27/2009     Priority: Medium     Mild Broca's aphasia, confusion, right hand dysesthesia after angio. MRA new small area of restricted diffusion in the white matter of the medial right temporal lobe,  consistent with a recent small infarct. Speech problems resolved, still mild right hand problems.         Obstructive sleep apnea 04/14/2008     Priority: Medium     ESS 20/24. Sleep study [facility]: 4/8/08. total sleep time was 423.0 minutes. The sleep latency was normal at 9.7 minutes.  REM sleep latency was 161.5 minutes. Sleep efficiency was normal at 82.7%. The sleep architecture was disrupted with frequent sleep stage changes and arousals. Snoring: Was reported as moderately loud. Lowest O2 saturation was 87.0%. This study is suggestive of mild sleep apnea, severe during REM sleep (21% TST).  PLM index was 0.0. EKG:  No significant cardiac arrhythmias were noted.         Hypertrophy of prostate without urinary obstruction      Priority: Medium     Elevated PSA. Bx negative 2004.       Advanced care planning/counseling discussion 09/24/2012     Priority: Low     Does not have a directive 9/12       Dyslexia 11/11/2010     Priority: Low     essentially unable to read       CTS (carpal tunnel syndrome) 05/12/2010     Priority: Low     EMG 5/10-   median neuropathy at the right wrist, moderate in severity electrically, right ulnar neuropathy localizing to the elbow, mild chronic right C6 radiculopathy without evidence for acute denervation.        Memory loss 05/03/2010     Priority: Low     MMSE 27/30 (0/3 attention recall, ?history of dylexia), PHQ9-16 7/09. Recommended neurocogntive testing, did not complete.  MMSE 23/30 6/10 (attention, county, if/and/buts).Neurocogntive evaluation  1/10- not suspicious for emerging dementia. Felt likely due hearing loss/dyslexia.   10/29/2018:He was seen at [facility] Neurology clinic 10/10/2018.  Dr. Oksana Munoz.   He had an EEG.   Diagnosed with Alzheimer's dementia.   Records not yet reviewed.        Benign paroxysmal vertigo 02/03/2009     Priority: Low     Admit 2/09. MRI/MRA head and neck negative.       Sensorineural hearing loss, bilateral 10/08/2007     Priority: Low     Esophageal reflux 11/06/2006     Priority: Low     PSORIASIS      Priority: Low     OVERACTIVE BLADDER      Priority: Low     PVR 84 2004.          Past Medical History:    Past Medical History:   Diagnosis Date     Calculus of kidney      COPD (chronic obstructive pulmonary disease) (H)      Depression      Diabetes (H)      Heart disease      Left hand weakness 12/14/2009     PNEUMONIA, ORGANISM NOS 2/14/2008     Sleep apnea        Past Surgical History:    Past Surgical History:   Procedure Laterality Date     C ANESTH,DX ARTHROSCOPIC PROC KNEE JOINT  1994, 1998     Left     CARDIAC SURGERY  11/09    CABG x2 - LIMA-D1, SVG to OM1, OM2.     GENITOURINARY SURGERY  1990    ESWL and percutaneous nephrolithotomy     LAPAROSCOPIC CHOLECYSTECTOMY N/A 3/4/2019    Procedure: LAPAROSCOPIC CHOLECYSTECTOMY;  Surgeon: Burt Mendieta MD;  Location: [facility] OR     ORTHOPEDIC SURGERY  8/12    right CTR     SURGICAL HISTORY OF -   1998    Hernia repair     SURGICAL HISTORY OF -   2004    NormalColonoscopy      SURGICAL HISTORY OF -   2006    Normal Colonoscopy  "      Family History:    Family History   Problem Relation Age of Onset     GITA.AYELENA. Father         40s     Hypertension Father      C.A.D. Paternal Grandfather      Heart Disease Paternal Grandfather      Diabetes Brother      DUNCAN. Brother      Heart Disease Brother      Hypertension Brother      Gastrointestinal Disease Son         Crohn's disease     Gastrointestinal Disease Other         2 grandaughters with Crohn's disease     Cancer - colorectal No family hx of        Social History:  Marital Status:   [5]  Social History     Tobacco Use     Smoking status: Never Smoker     Smokeless tobacco: Never Used   Substance Use Topics     Alcohol use: No     Alcohol/week: 0.0 standard drinks     Drug use: No        Medications:    albuterol (PROAIR HFA/PROVENTIL HFA/VENTOLIN HFA) 108 (90 Base) MCG/ACT inhaler  aspirin (ASA) 325 MG EC tablet  atorvastatin (LIPITOR) 80 MG tablet  blood glucose (NO BRAND SPECIFIED) test strip  BROVANA 15 MCG/2ML NEBU neb solution  budesonide (PULMICORT) 0.5 MG/2ML neb solution  cholecalciferol 2000 UNITS tablet  cyanocobalamin (BL VITAMIN B-12) 500 MCG TABS  finasteride (PROSCAR) 5 MG tablet  fish oil-omega-3 fatty acids (FISH OIL) 1000 MG capsule  furosemide (LASIX) 20 MG tablet  insulin aspart (NOVOLOG FLEXPEN) 100 UNIT/ML pen  insulin detemir (LEVEMIR FLEXTOUCH) 100 UNIT/ML pen  insulin isophane & regular (NOVOLIN 70/30 FLEXPEN RELION) susp  insulin pen needle (BD ABRAM U/F) 32G X 4 MM miscellaneous  Insulin Pen Needle (PEN NEEDLES) 31G X 6 MM MISC  insulin syringe-needle U-100 (BD INSULIN SYRINGE ULTRAFINE) 31G X 5/16\" 1 ML  lisinopril (PRINIVIL/ZESTRIL) 10 MG tablet  metFORMIN (GLUCOPHAGE) 1000 MG tablet  metoprolol tartrate (LOPRESSOR) 25 MG tablet  nitroGLYcerin (NITROSTAT) 0.4 MG sublingual tablet  order for DME  ranitidine (RANITIDINE 150 MAX STRENGTH) 150 MG tablet  tamsulosin (FLOMAX) 0.4 MG capsule          Review of Systems   Constitutional: Negative for chills, " "diaphoresis and fever.   HENT: Negative for congestion and sore throat.    Eyes: Negative for visual disturbance.   Respiratory: Positive for shortness of breath. Negative for cough.    Cardiovascular: Positive for chest pain.   Gastrointestinal: Negative for abdominal pain, diarrhea, nausea and vomiting.   Genitourinary: Negative for difficulty urinating.   Musculoskeletal: Negative for back pain.   Skin: Negative for rash.   Neurological: Negative for speech difficulty and headaches.       Physical Exam   BP: (!) 195/106  Pulse: 68  Heart Rate: 74  Temp: 97.7  F (36.5  C)  Resp: 22  Height: 175.3 cm (5' 9\")  Weight: 86.2 kg (190 lb)  SpO2: 97 %    Physical Exam  GEN: Awake, alert, and cooperative.  Appears mildly distressed  HENT: MMM. External ears and nose normal bilaterally.  Traumatic  EYES: EOM intact. Conjunctiva clear. PERRL. No discharge.   NECK: Supple, symmetric.  Nontender  CV : Regular rate and rhythm.  No murmurs appreciated.  Extremities warm well perfused.  Symmetric radial pulses  PULM: Normal effort. No wheezes, rales, or rhonchi bilaterally.  ABD: Soft, non-tender, non-distended. No rebound or guarding.   NEURO: Normal speech. Following commands. CN II-XII grossly intact. Answering questions and interacting appropriately.   EXT: No gross deformity.  No pitting lower extremity edema  INT: Warm. No diaphoresis. Normal color.        ED Course        Procedures  Results for orders placed during the hospital encounter of 02/26/20   POC US ECHO LIMITED    [facility] Procedure Note      Limited Bedside ED Cardiac Ultrasound:    PROCEDURE: PERFORMED BY: Dr. Berlin Mccabe MD  INDICATIONS/SYMPTOM:  Shortness of Breath  PROBE: Cardiac phased array probe  BODY LOCATION: Chest  FINDINGS:   The ultrasound was performed utilizing the parasternal long axis, parasternal short axis and apical 4 chamber views.  Cardiac contractility:  Present  Gross estimation of cardiac kinesis: " normal  Pericardial Effusion:  None  RV:LV ratio: LV > RV  IVC: Unable to visualize IVC or obtain adequate window for sub-costal view.  PULM: A-Lines and sliding in bilateral apicies.       INTERPRETATION:    Chamber size and motion were grossly normal with LV > RV, normal cardiac kinesis.  No pericardial effusion was found.  IVC not visualized.  No sonographic evidence of pulmonary edema    IMAGE DOCUMENTATION: Images were archived to PACs system.                   EKG Interpretation:      Interpreted by Berlin Mccabe MD  Time reviewed: 2235  Symptoms at time of EKG: chest pressure   Rhythm: 1 degree AV block  Rate: Normal  Axis: Normal  Ectopy: none  Conduction: right bundle branch block (complete), left anterior fasciclar block and 1st degree AV block  ST Segments/ T Waves: ST Segment Depression V2 and T wave inversion III  Q Waves: none  Comparison to prior: no significant changes compared to 3/2/2019    Clinical Impression: Abnormal ECG.  No significant changes compared to 3/2/2019.  First-degree AV block, LAFB, RBBB          Critical Care time:  none               Results for orders placed or performed during the hospital encounter of 02/26/20 (from the past 24 hour(s))   CBC with platelets differential   Result Value Ref Range    WBC 8.9 4.0 - 11.0 10e9/L    RBC Count 4.65 4.4 - 5.9 10e12/L    Hemoglobin 14.3 13.3 - 17.7 g/dL    Hematocrit 42.8 40.0 - 53.0 %    MCV 92 78 - 100 fl    MCH 30.8 26.5 - 33.0 pg    MCHC 33.4 31.5 - 36.5 g/dL    RDW 13.6 10.0 - 15.0 %    Platelet Count 179 150 - 450 10e9/L    Diff Method Automated Method     % Neutrophils 55.5 %    % Lymphocytes 35.1 %    % Monocytes 7.3 %    % Eosinophils 0.9 %    % Basophils 0.9 %    % Immature Granulocytes 0.3 %    Nucleated RBCs 0 0 /100    Absolute Neutrophil 4.9 1.6 - 8.3 10e9/L    Absolute Lymphocytes 3.1 0.8 - 5.3 10e9/L    Absolute Monocytes 0.7 0.0 - 1.3 10e9/L    Absolute Eosinophils 0.1 0.0 - 0.7 10e9/L    Absolute Basophils 0.1  0.0 - 0.2 10e9/L    Abs Immature Granulocytes 0.0 0 - 0.4 10e9/L    Absolute Nucleated RBC 0.0    Comprehensive metabolic panel   Result Value Ref Range    Sodium 139 133 - 144 mmol/L    Potassium 4.2 3.4 - 5.3 mmol/L    Chloride 106 94 - 109 mmol/L    Carbon Dioxide 27 20 - 32 mmol/L    Anion Gap 6 3 - 14 mmol/L    Glucose 139 (H) 70 - 99 mg/dL    Urea Nitrogen 23 7 - 30 mg/dL    Creatinine 1.43 (H) 0.66 - 1.25 mg/dL    GFR Estimate 46 (L) >60 mL/min/[1.73_m2]    GFR Estimate If Black 53 (L) >60 mL/min/[1.73_m2]    Calcium 9.1 8.5 - 10.1 mg/dL    Bilirubin Total 0.5 0.2 - 1.3 mg/dL    Albumin 3.8 3.4 - 5.0 g/dL    Protein Total 7.2 6.8 - 8.8 g/dL    Alkaline Phosphatase 125 40 - 150 U/L    ALT 30 0 - 70 U/L    AST 18 0 - 45 U/L   Troponin I   Result Value Ref Range    Troponin I ES <0.015 0.000 - 0.045 ug/L   Hemoglobin A1c   Result Value Ref Range    Hemoglobin A1C 7.9 (H) 0 - 5.6 %   Chest XR,  PA & LAT    Narrative    EXAM: XR CHEST 2 VW  LOCATION: [facility]  DATE/TIME: 2/26/2020 11:12 PM    INDICATION: Chest pressure and shortness of breath.  COMPARISON: 05/17/2018      Impression    IMPRESSION: Low lung volumes. Heart size and pulmonary vascularity normal. Prior median sternotomy and CABG. Subsegmental atelectasis in the bases. Lungs otherwise clear.   POC US ECHO LIMITED    Impression    [facility] Procedure Note      Limited Bedside ED Cardiac Ultrasound:    PROCEDURE: PERFORMED BY: Dr. Berlin Mccabe MD  INDICATIONS/SYMPTOM:  Shortness of Breath  PROBE: Cardiac phased array probe  BODY LOCATION: Chest  FINDINGS:   The ultrasound was performed utilizing the parasternal long axis, parasternal short axis and apical 4 chamber views.  Cardiac contractility:  Present  Gross estimation of cardiac kinesis: normal  Pericardial Effusion:  None  RV:LV ratio: LV > RV  IVC: Unable to visualize IVC or obtain adequate window for sub-costal view.  PULM: A-Lines and sliding in bilateral apicies.  "      INTERPRETATION:    Chamber size and motion were grossly normal with LV > RV, normal cardiac kinesis.  No pericardial effusion was found.  IVC not visualized.  No sonographic evidence of pulmonary edema    IMAGE DOCUMENTATION: Images were archived to PACs system.         Troponin I (second draw)   Result Value Ref Range    Troponin I ES <0.015 0.000 - 0.045 ug/L   Glucose by meter   Result Value Ref Range    Glucose 154 (H) 70 - 99 mg/dL   Troponin I   Result Value Ref Range    Troponin I ES <0.015 0.000 - 0.045 ug/L   Glucose by meter   Result Value Ref Range    Glucose 136 (H) 70 - 99 mg/dL       Medications   lactated ringers BOLUS 500 mL (500 mLs Intravenous New Bag 2/27/20 0059)   nitroGLYcerin (NITROSTAT) sublingual tablet 0.4 mg (0.4 mg Sublingual Given 2/27/20 0111)   aspirin (ASA) chewable tablet 162 mg (162 mg Oral Given 2/26/20 2302)       Assessments & Plan (with Medical Decision Making)   79 year old male with past medical history significant for coronary disease status post CABG (2009), diabetes 2, hyperlipidemia, hypertension, COPD, who presents for evaluation of chest discomfort which he describes as \"pressure\".   on arrival to Emergency Department, vital signs were notable for blood pressure of 195/106.  ECG without acute ischemic changes, and unchanged from prior ECG on 3/2/2019.  Given possible ischemia symptoms he was given full dose aspirin.  Is notable for first-degree AV block, right bundle branch block, and left anterior fascicle block.  Initial troponin below level of detection.  Given patient's cardiac history, risk factors, and presentation I feel he is too high risk for discharge and should be admitted for observation for chest pain rule out and serial troponins.  Chart review shows dobutamine stress echo from 6/4/2019 without inducible ischemia.  CBC normal, CMP elevated creatinine of 1.43 which is new, glucose of 139.  Chest x-ray without effusion, infiltrate, pneumothorax.  Images " reviewed personally as well as read from radiology detailed above.  Point-of-care ultrasound with grossly preserved LV function, no pericardial effusion, and no sonographic signs of pulmonary edema.  Unable to visualize IVC.  Patient does have history of COPD but denies fever, chills, cough, or wheezing.  Lungs are clear on examination with normal respiratory effort, no wheezing, no prolonged expiratory phase.  Dyspnea occurred with exertion and says is worse than baseline dyspnea.  He did take 2 sublingual nitro stats with possibly some improvement of symptoms when they occurred at 2 in the afternoon.  Patient denies any pain in the emergency department but does relate feeling of pressure.  Difficulty describing.  Severity of 2/10 (originally 8/10) he was given sublingual nitro x2 with improvement to 1/10 of severity.  2-hour delta troponin below level of detection.  100 cc lactated Ringer solution.  Hypertension resolved after administration of sublingual nitroglycerin.  I discussed the case with Xi Leong from the hospitalist service who accepted the admission.  He remained hemodynamic stable in the emergency department and transferred to the floor in stable condition.    I have reviewed the nursing notes.         HEART Score  Background  Calculates the overall risk of adverse event in patient's presenting with chest pain.  Based on 5 criteria (each assigned 0-2 points) including suspiciousness of history, EKG, age, risk factors and troponin.    Data  79 year old male  has Chronic ischemic heart disease; Moderate persistent asthma; PSORIASIS; Hypertrophy of prostate without urinary obstruction; OVERACTIVE BLADDER; BAKERS LUNG; Hypertension goal BP (blood pressure) < 140/90; Esophageal reflux; Sensorineural hearing loss, bilateral; Obstructive sleep apnea; Benign paroxysmal vertigo; CVA (cerebral vascular accident) (H); Atrial fibrillation (H); Memory loss; CTS (carpal tunnel syndrome); Dyslexia; Advanced  care planning/counseling discussion; Hyperlipidemia LDL goal <70; COPD (chronic obstructive pulmonary disease) (H); Chronic kidney disease, stage 2 (mild); Health Care Home; Altered mental status; Type 2 diabetes mellitus with diabetic chronic kidney disease (H); Encephalopathy; Abnormal blood-gas measurement; and Acute calculous cholecystitis on their problem list.   reports that he has never smoked. He has never used smokeless tobacco.  family history includes C.A.D. in his brother, father, and paternal grandfather; Diabetes in his brother; Gastrointestinal Disease in his son and another family member; Heart Disease in his brother and paternal grandfather; Hypertension in his brother and father.  Lab Results   Component Value Date    TROPI <0.015 02/26/2020     Criteria   0-2 points for each of 5 items (maximum of 10 points):  Score 1- History moderately suspicious for coronary syndrome  Score 1- EKG with Non-specific repolarization disturbance  Score 2- Age 65 years or older  Score 2- Three or more risk factors for or history of atherosclerotic disease  Score 0- Within normal limits for troponin levels  Interpretation  Risk of adverse outcome  Heart Score: 6  Total Score 4-6- Adverse Outcome Risk 20.3% - Supports admission with standard rule-out management -serial troponins and stress testing        New Prescriptions    No medications on file       Final diagnoses:   Chest pain, unspecified type     Berlin Mccabe MD    2/26/2020   [facility] EMERGENCY DEPARTMENT    Disclaimer: This note consists of words and symbols derived from keyboarding and dictation using voice recognition software.  As a result, there may be errors that have gone undetected.  Please consider this when interpreting information found in this note.             Berlin Mccabe MD  02/27/20 0821

## 2020-02-27 NOTE — PROGRESS NOTES
"[place] [facility] ADMISSION NOTE    Patient admitted to room 2207 at approximately 0300 via wheel chair from emergency room. Patient was accompanied by transport tech.     Verbal SBAR report received from Rachel prior to patient arrival.     Patient ambulated to bed independently. Patient alert and oriented X 3. The patient is not having any pain. 0-10 Pain Scale: 8. Admission vital signs: Blood pressure 127/66, pulse 65, temperature 97.7  F (36.5  C), temperature source Axillary, resp. rate 19, height 1.753 m (5' 9\"), weight 86.2 kg (190 lb), SpO2 95 %. Patient was oriented to plan of care, call light, bed controls, tv, telephone, bathroom and visiting hours.     Risk Assessment    The following safety risks were identified during admission: none. Yellow risk band applied: NO.     Skin Initial Assessment    This writer admitted this patient and completed a full skin assessment and Ricardo score in the Adult PCS flowsheet. Appropriate interventions initiated as needed.     Secondary skin check completed by Patient refused.         Education    Patient has a [place] to Observation order: Yes  Observation education completed and documented: Yes      Tasneem Penn RN    "

## 2020-02-27 NOTE — PHARMACY
"Unable to Reconcile medications with patient. Patient refusal to assess medications stating \"I take my medications, what's on the computer is right\". Writer informed patient about possibility for discrepancy without going over medications. Patient stated he does not know his medications, only the bottles.    Writer informed [facility] Pharmacist of incident.  "

## 2020-02-27 NOTE — ED NOTES
Called med/surg floor to give report as patient is very tired and anxious to get to bed, he's threatening to leave if he doesn't have a bed soon, they informed RN that they had not seen the blue slip until now and would call us back in a few minutes. Charge RN into speak with patient and explain what is happening.

## 2020-02-27 NOTE — ED NOTES
Pt stood at bedside and voided. He had a significant increase in SOB with this short episode. He improved after returnng to bed.

## 2020-02-27 NOTE — PLAN OF CARE
[facility] [facility] DISCHARGE NOTE    Patient discharged to home at 12:31 PM via wheel chair. Accompanied by son and staff. Discharge instructions reviewed with patient, opportunity offered to ask questions. Prescriptions - None ordered for discharge. All belongings sent with patient.    Lisa Frias RN

## 2020-02-27 NOTE — PLAN OF CARE
No complaints of chest pain overnight. VSS, rested well, up to bathroom with stand-by assist.   Tasneem Penn RN on 2/27/2020 at 5:59 AM

## 2020-02-28 ENCOUNTER — TELEPHONE (OUTPATIENT)
Dept: FAMILY MEDICINE | Facility: CLINIC | Age: 80
End: 2020-02-28

## 2020-02-28 NOTE — TELEPHONE ENCOUNTER
ED / Discharge Outreach Protocol    Patient Contact    Attempt # 1    Was call answered?  No.  Left message on voicemail with information to call me back.  Zabrina Silva RN

## 2020-02-28 NOTE — TELEPHONE ENCOUNTER
ED / Discharge Outreach Protocol    Patient Contact    Attempt # 1    Was call answered?  No.  Left message on voicemail with information to call me back.  Has appointment 3/3/20

## 2020-02-28 NOTE — TELEPHONE ENCOUNTER
ED/UC/IP follow up phone call: [facility] discharge 2/27/20 Chest pain ,unspecified type    RN please call to follow up.    Number of ED visits in past 12 months = 1

## 2020-03-04 DIAGNOSIS — K21.9 GASTROESOPHAGEAL REFLUX DISEASE WITHOUT ESOPHAGITIS: ICD-10-CM

## 2020-03-04 NOTE — TELEPHONE ENCOUNTER
"Franklin says Eddie told him that Dr Carreno refused to refill his Ranitidine. I told him it doesn't look like we have had a request from [facility] for this.  He says he needs it so hopes he can get it today or tomorrow.     Requested Prescriptions   Pending Prescriptions Disp Refills     ranitidine (RANITIDINE 150 MAX STRENGTH) 150 MG tablet 180 tablet 1     Sig: Take 1 tablet (150 mg) by mouth 2 times daily       H2 Blockers Protocol Passed - 3/4/2020 10:09 AM        Passed - Patient is age 12 or older        Passed - Recent (12 mo) or future (30 days) visit within the authorizing provider's specialty     Patient has had an office visit with the authorizing provider or a provider within the authorizing providers department within the previous 12 mos or has a future within next 30 days. See \"Patient Info\" tab in inbasket, or \"Choose Columns\" in Meds & Orders section of the refill encounter.              Passed - Medication is active on med list        Last Written Prescription Date:  5/30/19  Last Fill Quantity: 180,  # refills: 1   Last office visit: 9/25/2019 with prescribing provider:  Leaf   Future Office Visit:   Next 5 appointments (look out 90 days)    Mar 06, 2020  2:20 PM CST  [facility] [address] [place] [id number] [phone]   Mar 23, 2020  2:30 PM CDT  Return Visit with Ricardo Velarde MD  [facility] ([facility]) [address] [facility] [address] [phone]           "

## 2020-03-04 NOTE — PATIENT INSTRUCTIONS
Take antibiotics as directed.    Follow up in 4 days and sooner if needed with primary care provider.    Follow-up with your primary care provider next week and as needed.    Indications for emergent return to emergency department discussed with patient, who verbalized good understanding and agreement.  Patient understands the limitations of today's evaluation.         Cellulitis  Cellulitis is an infection of the deep layers of skin. A break in the skin, such as a cut or scratch, can let bacteria under the skin. If the bacteria get to deep layers of the skin, it can be serious. If not treated, cellulitis can get into the bloodstream and lymph nodes. The infection can then spread throughout the body. This causes serious illness.  Cellulitis causes the affected skin to become red, swollen, warm, and sore. The reddened areas have a visible border. An open sore may leak fluid (pus). You may have a fever, chills, and pain.  Cellulitis is treated with antibiotics taken for 7 to 10 days. An open sore may be cleaned and covered with cool wet gauze. Symptoms should get better 1 to 2 days after treatment is started. Make sure to take all the antibiotics for the full number of days until they are gone. Keep taking the medicine even if your symptoms go away.  Home care  Follow these tips:    Limit the use of the part of your body with cellulitis. Movement can cause the infection to spread.    If the infection is on your leg, walk as little as possible in the first few days of the treatment. Keep your leg raised while sitting. This will help to reduce swelling.    Take all of the antibiotic medicine exactly as directed until it is gone. Do not miss any doses, especially during the first 7 days. Don t stop taking the medicine when your symptoms get better.    Keep the affected area clean and dry.    Wash your hands with soap and warm water before and after touching your skin. Anyone else who touches your skin should also wash  his or her hands. Don't share towels.  Follow-up care  Follow up with your healthcare provider. If your infection does not go away on 1 antibiotic, your healthcare provider will prescribe a different one.  When to seek medical advice  Call your healthcare provider right away if any of these occur:    Red areas that spread    Swelling or pain that gets worse    Fluid leaking from the skin (pus)    Fever higher of 100.4  F (38.0  C) or higher after 2 days on antibiotics    1587-8956 [employer] [address] [place] [address], [place] [address]. All rights reserved. This information is not intended as a substitute for professional medical care. Always follow your healthcare professional's instructions.         Type Of Destruction Used: Curettage

## 2020-03-06 ENCOUNTER — OFFICE VISIT (OUTPATIENT)
Dept: FAMILY MEDICINE | Facility: CLINIC | Age: 80
End: 2020-03-06
Payer: COMMERCIAL

## 2020-03-06 VITALS
TEMPERATURE: 98 F | RESPIRATION RATE: 20 BRPM | HEIGHT: 68 IN | WEIGHT: 197.4 LBS | DIASTOLIC BLOOD PRESSURE: 64 MMHG | OXYGEN SATURATION: 94 % | BODY MASS INDEX: 29.92 KG/M2 | HEART RATE: 68 BPM | SYSTOLIC BLOOD PRESSURE: 120 MMHG

## 2020-03-06 DIAGNOSIS — I25.9 CHRONIC ISCHEMIC HEART DISEASE: Primary | ICD-10-CM

## 2020-03-06 DIAGNOSIS — J45.40 MODERATE PERSISTENT ASTHMA WITHOUT COMPLICATION: ICD-10-CM

## 2020-03-06 DIAGNOSIS — E11.22 TYPE 2 DIABETES MELLITUS WITH STAGE 3 CHRONIC KIDNEY DISEASE, WITH LONG-TERM CURRENT USE OF INSULIN (H): ICD-10-CM

## 2020-03-06 DIAGNOSIS — N18.30 TYPE 2 DIABETES MELLITUS WITH STAGE 3 CHRONIC KIDNEY DISEASE, WITH LONG-TERM CURRENT USE OF INSULIN (H): ICD-10-CM

## 2020-03-06 DIAGNOSIS — Z79.4 TYPE 2 DIABETES MELLITUS WITH STAGE 3 CHRONIC KIDNEY DISEASE, WITH LONG-TERM CURRENT USE OF INSULIN (H): ICD-10-CM

## 2020-03-06 DIAGNOSIS — J44.9 CHRONIC OBSTRUCTIVE PULMONARY DISEASE, UNSPECIFIED COPD TYPE (H): ICD-10-CM

## 2020-03-06 PROCEDURE — 99214 OFFICE O/P EST MOD 30 MIN: CPT | Performed by: FAMILY MEDICINE

## 2020-03-06 RX ORDER — NITROGLYCERIN 0.4 MG/1
0.4 TABLET SUBLINGUAL EVERY 5 MIN PRN
Qty: 25 TABLET | Refills: 5 | Status: SHIPPED | OUTPATIENT
Start: 2020-03-06

## 2020-03-06 ASSESSMENT — PAIN SCALES - GENERAL: PAINLEVEL: NO PAIN (0)

## 2020-03-06 ASSESSMENT — MIFFLIN-ST. JEOR: SCORE: 1579.9

## 2020-03-06 NOTE — PROGRESS NOTES
SUBJECTIVE: Alvaro Horton is a 79 year old male  Chief Complaint   Patient presents with     Hospital F/U      Hospital Follow-up Visit:    Hospital/Nursing Home/IP Rehab Facility: [facility]  Date of Admission: 2/26/2020  Date of Discharge: 2/27/2020  Reason(s) for Admission: chest pain-hx of Chronic ischemic heart disease            Problems taking medications regularly:  None. Except states he is not on any nebulizer meds now- On Trelegy Ellipta now       Medication changes since discharge: Ranitidine was changed?       Problems adhering to non-medication therapy:  None    Summary of hospitalization:  [facility] discharge summary reviewed  See abstracted notes from recent hospital stay below.   Diagnostic Tests/Treatments reviewed.  Follow up needed: none  Other Healthcare Providers Involved in Patient s Care:         cardiology, pulmonology  Update since discharge: stable.     Post Discharge Medication Reconciliation: discharge medications reconciled, continue medications without change.  Plan of care communicated with patient     Coding guidelines for this visit:  Type of Medical   Decision Making Face-to-Face Visit       within 7 Days of discharge Face-to-Face Visit        within 14 days of discharge   Moderate Complexity 28598 [id number]   High Complexity [id number]        Hospitalized.  He was hospitalized for chest pain and shortness of breath.  MI was ruled out.  He declined a stress test.  He has had past history of coronary artery disease and COPD.  Please see abstracted hospital notes below    He had shortness of breath and anterior chest pain.   He went to bed, put his CPAP on.  Bent down to put small dog on the bed and had difficulty catching his breath.   Started with shortness of breath which persisted.  Pain in chest like a pressure with the shortness of breath.  Duration: 1 hour.  He tried nitros. Seemed to help a little.   No pain since his hospital stay.     Breathing has  "not been good.    He always has shortness of breath with a flight of stairs.  His walking and dyspnea on exertion has not changed. He feels the Trelegy has helped some.   Uses albuteral inhaler about once daily.   Trelegy once daily.     Chest pressure:Has not had in a long time.  Usually does not have chest pressure with activity, more respiratory symptoms, shortness of breath.  He uses nitros on occasion.  Tried   3-4 weeks ago.   Did not seem to help.     Energy so/so.    Pans to go to \"[facility]\" to improve his endurance.     Cough once in a while.     Diabetes has been 0OK.    Lab Results   Component Value Date    A1C 7.9 02/26/2020   He  Has switched  To 70/30 insulin mix.     Patient Active Problem List   Diagnosis     Chronic ischemic heart disease     Moderate persistent asthma     PSORIASIS     Hypertrophy of prostate without urinary obstruction     OVERACTIVE BLADDER     BAKERS LUNG     Hypertension goal BP (blood pressure) < 140/90     Esophageal reflux     Sensorineural hearing loss, bilateral     Obstructive sleep apnea     Benign paroxysmal vertigo     CVA (cerebral vascular accident) (H)     Atrial fibrillation (H)     Memory loss     CTS (carpal tunnel syndrome)     Dyslexia     Advanced care planning/counseling discussion     Hyperlipidemia LDL goal <70     COPD (chronic obstructive pulmonary disease) (H)     Chronic kidney disease, stage 2 (mild)     Health Care Home     Altered mental status     Type 2 diabetes mellitus with diabetic chronic kidney disease (H)     Encephalopathy     Abnormal blood-gas measurement     Acute calculous cholecystitis     Chest pain      ROS:GI: POSITIVE for:, diarrhea, sometimes.   Heartburn once in a while.   : No urinary frequency or dysuria, bladder or kidney problems    OBJECTIVE:Blood pressure 120/64, pulse 68, temperature 98  F (36.7  C), temperature source Tympanic, resp. rate 20, height 1.727 m (5' 8\"), weight 89.5 kg (197 lb 6.4 oz), SpO2 94 %. BMI=Body mass index " is 30.01 kg/m .  GENERAL APPEARANCE ADULT: Alert, no acute distress  NECK: No adenopathy,masses or thyromegaly  RESP: lungs clear to auscultation , no respiratory distress  CV: normal rate, regular rhythm, no murmur or gallop  MS: extremities normal, no peripheral edema     ASSESSMENT:   (I25.9) Chronic ischemic heart disease  (primary encounter diagnosis)  Comment: It is difficult to tell if your chest pressure and shortness of breath are only from lungs or heart or a combination of the two.    You have not been  Interested in doing another stress test and a stress test was normal just last year.  You are not interested in seeing cardiologist at this time.   I suspect that your symptoms are more from lungs than heart from what you are telling me.   Plan: nitroGLYcerin (NITROSTAT) 0.4 MG sublingual         tablet          Watch for more chest pressure episodes.  Use your nitroglycerin pills if needed.   If you have significant chest discomfort or pain that does not stop with rest, you can take a nitroglycerin pill to dissolve under your tongue.  Tablets may  be repeated every 5 minutes up to three pills.  If chest pain persists, call 911 and go to the hospital.  These pills could give you a headache or cause you to feel lightheaded.     If you have more chest pressure episodes or are using nitros more often, let me know as  It could be your heart.    Continue current lung medications.      (J45.40) Moderate persistent asthma without complication    (J44.9) Chronic obstructive pulmonary disease, unspecified COPD type (H)    (E11.22,  N18.3,  Z79.4) Type 2 diabetes mellitus with stage 3 chronic kidney disease, with long-term current use of insulin (H)  Comment: doing well on the insulin change.    Plan: No change in current treatment plan.              ======================================  See abstracted notes from recent hospital stay below.     [facility] History and Physical and discharge summary       Alvaro Horton MRN# [id number]   Age: 79 year old YOB: 1940      Date of Admission:                  2/26/2020         Assessment and Plan:      Assessment & Plan          Chest pain    History of Chronic ischemic heart disease   2/27/2020 -- S/p CABG x2 11/09- LIMA-D1, SVG to OM1, OM2.  Had episode of non-exertional chest pain with some increase in his baseline dyspnea that lasted a few hours yesterday, didn't clearly respond to nitrogylcerin, resolved spontaneously in the ER and has remained chest pain free with dyspnea back to baseline since admission.  Today feels back to baseline with no chest pain, pressure or heaviness.  Troponins were negative.  No acute changes on ECG or tele.  Recommended repeat stress testing but patient refusing at present - last stress test was about 8 months ago - didn't tolerate lexiscan due to anxiety so did a dobutamine echo - discussed that we could do the same again - patient says he definitely can't do the lexiscan and doesn't want the dobutamine echo either - understands the risks, but says he's 80 and doesn't want it done.  Also however says he hopes to live to be 89 like his mom did.  Regardless, we can't to the dobutamine echo today and this would need to be done as an outpatient.  Patient agreeable with plan for discharge today and follow-up with primary care provider in 1 week and with cardiology when available to discuss this again - says he plans to continue to refuse the stress test but is willing to talk to his primary care provider and cardiology about it as an outpatient.  Encouraged to avoid any strenuous activity at least until his follow-up with his primary care provider, then can readdress.  Overall I think this is pretty low risk with fairly atypical symptoms that patient thinks are different than what he felt with his CABG and last stress test < 1 year ago, but I think I'd still recommend stress testing if patient were amenable to it.   Regardless, stable for now and ok for discharge on prior to admission medications with plans to follow-up as above.  Patient understands risks including risks of not doing the stress test and the importance of follow-up with his primary care provider.  Continue prior to admission medications including daily aspirin, lipitor, metoprolol and lisinopril.         Hypertension goal BP (blood pressure) < 140/90  2/27/2020 -- blood pressure stable, continue home metoprolol, lisinopril and lasix.          Hypertrophy of prostate without urinary obstruction  2/27/2020 -- continue home flomax and proscar.         Obstructive sleep apnea  No change       CVA (cerebral vascular accident) (H)  2/27/2020 -- occurred at time of CABG, no residual symptoms.  Continue daily aspirin.          Atrial fibrillation (H)  2/27/2020 -- has this listed in his history but sounds like a doubtful diagnosis - all recent ECGs from the past few years are sinus, one computer read as possible atrial fibrillation but doesn't look convincing.  I don't see any notes directly addressing this but he's been followed by cardiology and his primary care provider and is not on any anticoagulation.  No atrial fibrillation on admit ECG or on tele so far here.           Chronic kidney disease, stage 2 (mild)  2/27/2020 -- creatinine slightly up from baseline, recheck normalized.          Type 2 diabetes mellitus with diabetic chronic kidney disease (H)  2/27/2020 -- A1c 7.9, glucose good so far.  Recently changed to 70/30 twice daily, continue this unchanged on discharge.  Continue to follow-up with primary care provider and diabetic ed.         Esophageal reflux  2/27/2020 -- continued home pepcid,     COPD/Baker's Lung  2/27/2020 -- apearss baseline, continue prior to admission medications.           Prophylaxis  Low risk     Lines  PIV while inpatient         Disposition  Ok for discharge home today as above.       Follow-up   Follow-up with primary care  "provider in 1 week and with cardiology at first available non-urgent follow-up appointment, confirm no ongoing symptoms and confirm if patient still refusing outpatient dobutamine echo and recheck BMP at that time as above.           History of Present Illness:   This patient is a 79 year old  male with a significant past medical history of CAD with prior CABG, diabetes and COPD among other things as above who presents with chest pain.  Has been at baseline recently, then yesterday developed a \"pressure\" type pain in the left side of is chest.  Was at rest, although he did feel a bit more short of breath with activity than normal while the chest pain was ongoing.  Does get short of breath with fairly minimal activity at baseline.  He took nitrogylcerin x 2, not sure if that really made any difference.  Symptoms didn't resolve with rest and patient talked with his son who encouraged him to come to the ER.  In the ER patient says the pain resolved before they did anything and he's remained pain free since.  Has been up and about in the room this morning, a bit winded after going to the bathroom and back but patient says this is back to baseline for him.  No chest tightness, pressure or heaviness since admission either.  Says today he feels completely back to baseline.  No other concerns.       Did have his medications for his COPD changed a few weeks ago due to insurance coverage, but says otherwise no recent medication changes and the COPD has been stable and doing well with the new medications.  No other changes.   Non-smoker, no alcohol.  , lives independently.           "

## 2020-03-06 NOTE — NURSING NOTE
"Chief Complaint   Patient presents with     Hospital F/U       Initial /64 (BP Location: Right arm, Patient Position: Chair, Cuff Size: Adult Large)   Pulse 68   Temp 98  F (36.7  C) (Tympanic)   Resp 20   Ht 1.727 m (5' 8\")   Wt 89.5 kg (197 lb 6.4 oz)   SpO2 94%   BMI 30.01 kg/m   Estimated body mass index is 30.01 kg/m  as calculated from the following:    Height as of this encounter: 1.727 m (5' 8\").    Weight as of this encounter: 89.5 kg (197 lb 6.4 oz).    Patient presents to the clinic using No DME    Health Maintenance that is potentially due pending provider review:  Eye exam    Pt will schedule eye appt.    Is there anyone who you would like to be able to receive your results? Yes  If yes have patient fill out [facility]  Zabrina Churchill CMA    "

## 2020-03-06 NOTE — PATIENT INSTRUCTIONS
ASSESSMENT:   (I25.9) Chronic ischemic heart disease  (primary encounter diagnosis)  Comment: It is difficult to tell if your chest pressure and shortness of breath are only from lungs of heart or a combination of the two.    You have not been  Interested in doing another stress test and a stress test was normal just last year.  You are not interested in seeing cardiologist at this time.   I suspect that your symptoms are more from lungs than heart from what you are telling me.   Plan: nitroGLYcerin (NITROSTAT) 0.4 MG sublingual         tablet          Watch for more chest pressure episodes.  Use your nitroglycerin pills if needed.   If you have significant chest discomfort or pain that does not stop with rest, you can take a nitroglycerin pill to dissolve under your tongue.  Tablets may  be repeated every 5 minutes up to three pills.  If chest pain persists, call 911 and go to the hospital.  These pills could give you a headache or cause you to feel lightheaded.     If you have more chest pressure episodes or are using nitros more often, let me know as  It could be your heart.    Continue current lung medications.      (J45.40) Moderate persistent asthma without complication    (J44.9) Chronic obstructive pulmonary disease, unspecified COPD type (H)    (E11.22,  N18.3,  Z79.4) Type 2 diabetes mellitus with stage 3 chronic kidney disease, with long-term current use of insulin (H)  Comment: doing well on the insulin change.    Plan: No change in current treatment plan.

## 2020-03-09 DIAGNOSIS — N40.1 BENIGN NON-NODULAR PROSTATIC HYPERPLASIA WITH LOWER URINARY TRACT SYMPTOMS: ICD-10-CM

## 2020-03-09 NOTE — TELEPHONE ENCOUNTER
"Requested Prescriptions   Pending Prescriptions Disp Refills     tamsulosin (FLOMAX) 0.4 MG capsule [Pharmacy Med Name: Tamsulosin HCl 0.4 MG Oral Capsule] 90 capsule 0     Sig: TAKE 1 CAPSULE BY MOUTH ONCE DAILY . APPOINTMENT REQUIRED FOR FUTURE REFILLS       Alpha Blockers Passed - 3/9/2020  5:36 PM        Passed - Blood pressure under 140/90 in past 12 months     BP Readings from Last 3 Encounters:   03/06/20 120/64   02/27/20 (!) (P) 154/54   09/25/19 (!) 158/78                 Passed - Recent (12 mo) or future (30 days) visit within the authorizing provider's specialty     Patient has had an office visit with the authorizing provider or a provider within the authorizing providers department within the previous 12 mos or has a future within next 30 days. See \"Patient Info\" tab in inbasket, or \"Choose Columns\" in Meds & Orders section of the refill encounter.              Passed - Patient does not have Tadalafil, Vardenafil, or Sildenafil on their medication list        Passed - Medication is active on med list        Passed - Patient is 18 years of age or older           tamsulosin (FLOMAX) 0.4 MG capsule   Last Written Prescription Date:  03/13/2019  Last Fill Quantity: 90 capsule,  # refills: 3   Last office visit: 1/9/2018 with prescribing provider:  03/06/2020 RUBY Carreno   Future Office Visit:   Next 5 appointments (look out 90 days)    Mar 23, 2020  2:30 PM CDT  Return Visit with Ricardo Velarde MD  [facility] ([facility]) [address] [place] [id number] [phone]         Edwige Gonzalez RT (R) (M)    "

## 2020-03-10 RX ORDER — TAMSULOSIN HYDROCHLORIDE 0.4 MG/1
CAPSULE ORAL
Qty: 90 CAPSULE | Refills: 1 | Status: SHIPPED | OUTPATIENT
Start: 2020-03-10 | End: 2020-01-01

## 2020-03-18 ENCOUNTER — TELEPHONE (OUTPATIENT)
Dept: CARDIOLOGY | Facility: CLINIC | Age: 80
End: 2020-03-18

## 2020-03-18 NOTE — TELEPHONE ENCOUNTER
Wellness Screening Tool    Symptom Screening:    Do you have a:    Fever?   no    Cough?  no    Shortness of breath?  Copd yes (if yes, is this a change?)    Skin rash?no      Within the past 14 days, have you been in contact with someone who:    Is currently being ruled out for COVID-19 (novel coronavirus)?   no    Has tested positive for COVID-19?   no    Has symptoms of a respiratory illness (fever, cough, shortness of breath)?   no    Have you or someone you have been in contact with traveled to an area with COVID-19:    Refer to the CDC Coronavirus webpage for COVID-19 areas: no (if yes, what country?)    Within the past 3 weeks, have you been exposed to the following:      Pertussis?   no    Chicken pox?   no    Measles?  no    Patient's appointment status:   patient will be seen in clinic as scheduled, appointment converted to phone visit, appointment rescheduled to a later date.  10:52 AM 03/18/20 [facility]

## 2020-03-23 ENCOUNTER — VIRTUAL VISIT (OUTPATIENT)
Dept: CARDIOLOGY | Facility: CLINIC | Age: 80
End: 2020-03-23
Payer: COMMERCIAL

## 2020-03-23 VITALS — BODY MASS INDEX: 28.13 KG/M2 | WEIGHT: 185 LBS

## 2020-03-23 DIAGNOSIS — I20.89 STABLE ANGINA PECTORIS (H): Primary | ICD-10-CM

## 2020-03-23 PROCEDURE — 99214 OFFICE O/P EST MOD 30 MIN: CPT | Mod: TEL | Performed by: INTERNAL MEDICINE

## 2020-03-23 RX ORDER — FUROSEMIDE 20 MG
20 TABLET ORAL DAILY
Qty: 90 TABLET | Refills: 3 | Status: ON HOLD | OUTPATIENT
Start: 2020-03-23 | End: 2020-01-01

## 2020-03-23 NOTE — PROGRESS NOTES
"Alvaro Horton is a 79 year old male who is being evaluated via a billable telephone visit.      The patient has been notified of following:     \"This telephone visit will be conducted via a call between you and your physician/provider. We have found that certain health care needs can be provided without the need for a physical exam.  This service lets us provide the care you need with a short phone conversation.  If a prescription is necessary we can send it directly to your pharmacy.  If lab work is needed we can place an order for that and you can then stop by our lab to have the test done at a later time.    If during the course of the call the physician/provider feels a telephone visit is not appropriate, you will not be charged for this service.\"     Alvaro Horton complains of  No chief complaint on file.      I have reviewed and updated the patient's Past Medical History, Social History, Family History and Medication List.    ALLERGIES  Prednisone    Additional provider notes: The patient is a 79-year-old male with past medical history of coronary disease status post coronary bypass grafting in 11/2009 with LIMA to LAD and SVG to OM 1 and 2, hypertension, hyperlipidemia,  paroxysmal atrial fibrillation, asthma diabetes, severe COPD and CKD who I reached out to today for a telephone encounter.  He was scheduled to see me in clinic today but due to coronavirus pandemic we decided to switch this to telephone encounter.  I had previously seen the patient on 5/16/2019 for a routine visit.  He was followed by Dr. Summers prior to that.    During the last visit the patient reported dyspnea on exertion and shortness of breath after discontinuing the long-acting beta-blocker which he was on for quite some time for severe COPD.  However due to significant coronary disease history I decided to perform a stress test to rule out ischemia.  Lexiscan was attempted but patient was unable to tolerate the procedure. " He then underwent dobutamine stress echocardiogram which ruled Ischemia.  He also had a ZIO Patch which showed predominantly sinus rhythm with first-degree AV block, 1 pause of 3 seconds while asleep and isolated SVE's.  He did not activate the ZIO Patch.  It is difficult to say if he had atrial fibrillation during duration of monitoring.      He was also recently admitted to the hospital with complaints of chest pain.  EKG was nonischemic and troponins were negative.  He was offered a stress test but refused it.  Since discharge he has been doing well and has not had any further episodes of chest pain.  He has some issues with his CPAP machine and has been unable to use it.  He also reports shortness of breath on walking and especially when bending over to pick his dog.    Assessment/Plan:  1.  Dyspnea on exertion and shortness of breath- unchanged from before  2.  Coronary artery disease status post coronary bypass grafting in 2009  3.  Hypertension  4.  Hyperlipidemia  5.  Paroxysmal atrial fibrillation, no clear evidence of A. fib on ZIO Patch  6.  EDEN on CPAP  7.  CVA post CABG, no deficits  8.  Mild pulmonary hypertension    He denies chest pain or chest tightness. He does report Sob which has been an ongoing issue for him.  He is supposed to be taking 20 mg of Lasix daily but it seems like he has not been taking it for quite some time.  As far as inhalers for COPD/asthma is concerned he is takes Pulmicort, Brovana and albuterol based on the H&P during recent hospital admission.  However looking at his medications I do not see Pulmicort and Brovana listed.  It is unclear if the patient takes it.  I tried to clarify this with him but he was unable to answer my question clearly.  In any case, I have asked him to discuss this further with his pulmonologist for optimal control of COPD.  I will also start him on low-dose Lasix which he was supposed to be taking anyways.  He will take 20 mg in the morning and will  reach out to us with update on his symptoms.  We will have him come back and see us in the clinic once the pandemic subsides.    Phone call duration: 25 minutes    Ricardo Velarde MD

## 2020-04-13 DIAGNOSIS — N18.30 TYPE 2 DIABETES MELLITUS WITH STAGE 3 CHRONIC KIDNEY DISEASE, WITH LONG-TERM CURRENT USE OF INSULIN (H): ICD-10-CM

## 2020-04-13 DIAGNOSIS — E11.22 TYPE 2 DIABETES MELLITUS WITH STAGE 3 CHRONIC KIDNEY DISEASE, WITH LONG-TERM CURRENT USE OF INSULIN (H): ICD-10-CM

## 2020-04-13 DIAGNOSIS — Z79.4 TYPE 2 DIABETES MELLITUS WITH STAGE 3 CHRONIC KIDNEY DISEASE, WITH LONG-TERM CURRENT USE OF INSULIN (H): ICD-10-CM

## 2020-04-13 NOTE — TELEPHONE ENCOUNTER
Pt is calling on this medication and would like to pick this up tomorrow and is almost out of this.    Liz Dye

## 2020-04-27 DIAGNOSIS — E78.5 HYPERLIPIDEMIA LDL GOAL <70: ICD-10-CM

## 2020-04-27 RX ORDER — ATORVASTATIN CALCIUM 80 MG/1
80 TABLET, FILM COATED ORAL DAILY
Qty: 90 TABLET | OUTPATIENT
Start: 2020-04-27

## 2020-05-14 ENCOUNTER — TELEPHONE (OUTPATIENT)
Dept: CARDIOLOGY | Facility: CLINIC | Age: 80
End: 2020-05-14

## 2020-05-14 NOTE — TELEPHONE ENCOUNTER
"Pt called stating he had \"spell\" - trouble breathing  Unable to walk dog as far as usual.  SOB, fatigue/activity intolerance, worse bending over, stomach feels like it full all the time.    Today working around house- feeling horrible, SOB, took neb tx with little effect, then took nitroglycerine and felt a lot better after that.    Saw Pulmonology 4/23/20 - pt has not done recommended testing (EKG/CXR/Labs)    Discussed importance of testing - pt willing to do testing   Will set up appts and call back  JOSEPH LOPEZ RN on 5/14/2020 at 2:47 PM            "

## 2020-05-14 NOTE — TELEPHONE ENCOUNTER
ADDENDUM:  Pulmonology was through [facility] - they will fax orders for testing.  Will call when CXR can be scheduled.  Follow up with Zaida Linton NP.    JOSEPH LOPEZ RN on 5/14/2020 at 3:33 PM    ADDENDUM:  Per Zaida Mendez NP - pt to follow up with pulmonology first.  CXR/Labs ordered 5/19/20.  No EKG order was faxed from Dr Price.  Pt aware he is to follow up with pulmonology after testing.  JOSEPH LOPEZ RN on 5/18/2020 at 1:55 PM    ADDENDUM:  Notified Dr Price's office that testing was done (no EKG order).  Asked to reach out to pt for pulm follow up as pt seems forgetful.  JOSEPH LOPEZ RN on 5/19/2020 at 2:49 PM

## 2020-05-18 DIAGNOSIS — R06.00 DYSPNEA, PAROXYSMAL NOCTURNAL: Primary | ICD-10-CM

## 2020-05-19 ENCOUNTER — ANCILLARY PROCEDURE (OUTPATIENT)
Dept: GENERAL RADIOLOGY | Facility: CLINIC | Age: 80
End: 2020-05-19
Attending: INTERNAL MEDICINE
Payer: COMMERCIAL

## 2020-05-19 DIAGNOSIS — R06.00 DYSPNEA, PAROXYSMAL NOCTURNAL: ICD-10-CM

## 2020-05-19 DIAGNOSIS — R06.09 DYSPNEA ON EXERTION: ICD-10-CM

## 2020-05-19 LAB
BASOPHILS # BLD AUTO: 0 10E9/L (ref 0–0.2)
BASOPHILS NFR BLD AUTO: 0.6 %
DIFFERENTIAL METHOD BLD: NORMAL
EOSINOPHIL # BLD AUTO: 0.1 10E9/L (ref 0–0.7)
EOSINOPHIL NFR BLD AUTO: 0.8 %
ERYTHROCYTE [DISTWIDTH] IN BLOOD BY AUTOMATED COUNT: 13.1 % (ref 10–15)
HCT VFR BLD AUTO: 41.1 % (ref 40–53)
HGB BLD-MCNC: 13.8 G/DL (ref 13.3–17.7)
LYMPHOCYTES # BLD AUTO: 2.2 10E9/L (ref 0.8–5.3)
LYMPHOCYTES NFR BLD AUTO: 31.3 %
MCH RBC QN AUTO: 30.7 PG (ref 26.5–33)
MCHC RBC AUTO-ENTMCNC: 33.6 G/DL (ref 31.5–36.5)
MCV RBC AUTO: 92 FL (ref 78–100)
MONOCYTES # BLD AUTO: 0.6 10E9/L (ref 0–1.3)
MONOCYTES NFR BLD AUTO: 8.2 %
NEUTROPHILS # BLD AUTO: 4.2 10E9/L (ref 1.6–8.3)
NEUTROPHILS NFR BLD AUTO: 59.1 %
NT-PROBNP SERPL-MCNC: 696 PG/ML (ref 0–450)
PLATELET # BLD AUTO: 172 10E9/L (ref 150–450)
RBC # BLD AUTO: 4.49 10E12/L (ref 4.4–5.9)
TROPONIN I SERPL-MCNC: <0.015 UG/L (ref 0–0.04)
WBC # BLD AUTO: 7.1 10E9/L (ref 4–11)

## 2020-05-19 PROCEDURE — 83880 ASSAY OF NATRIURETIC PEPTIDE: CPT | Performed by: PEDIATRICS

## 2020-05-19 PROCEDURE — 84484 ASSAY OF TROPONIN QUANT: CPT | Performed by: PEDIATRICS

## 2020-05-19 PROCEDURE — 85025 COMPLETE CBC W/AUTO DIFF WBC: CPT | Performed by: PEDIATRICS

## 2020-05-19 PROCEDURE — 36415 COLL VENOUS BLD VENIPUNCTURE: CPT | Performed by: PEDIATRICS

## 2020-05-19 PROCEDURE — 71046 X-RAY EXAM CHEST 2 VIEWS: CPT

## 2020-05-23 DIAGNOSIS — I25.9 CHRONIC ISCHEMIC HEART DISEASE: ICD-10-CM

## 2020-05-23 DIAGNOSIS — E11.22 TYPE 2 DIABETES MELLITUS WITH STAGE 3 CHRONIC KIDNEY DISEASE, WITH LONG-TERM CURRENT USE OF INSULIN (H): ICD-10-CM

## 2020-05-23 DIAGNOSIS — Z79.4 TYPE 2 DIABETES MELLITUS WITH STAGE 3 CHRONIC KIDNEY DISEASE, WITH LONG-TERM CURRENT USE OF INSULIN (H): ICD-10-CM

## 2020-05-23 DIAGNOSIS — N18.30 TYPE 2 DIABETES MELLITUS WITH STAGE 3 CHRONIC KIDNEY DISEASE, WITH LONG-TERM CURRENT USE OF INSULIN (H): ICD-10-CM

## 2020-05-26 RX ORDER — METOPROLOL TARTRATE 25 MG/1
TABLET, FILM COATED ORAL
Qty: 60 TABLET | Refills: 0 | Status: SHIPPED | OUTPATIENT
Start: 2020-05-26 | End: 2020-07-07

## 2020-05-26 RX ORDER — HUMAN INSULIN 100 [IU]/ML
INJECTION, SUSPENSION SUBCUTANEOUS
Qty: 15 ML | Refills: 0 | Status: SHIPPED | OUTPATIENT
Start: 2020-05-26 | End: 2020-01-01

## 2020-06-30 DIAGNOSIS — E11.22 TYPE 2 DIABETES MELLITUS WITH STAGE 3 CHRONIC KIDNEY DISEASE, WITH LONG-TERM CURRENT USE OF INSULIN (H): ICD-10-CM

## 2020-06-30 DIAGNOSIS — Z79.4 TYPE 2 DIABETES MELLITUS WITH STAGE 3 CHRONIC KIDNEY DISEASE, WITH LONG-TERM CURRENT USE OF INSULIN (H): ICD-10-CM

## 2020-06-30 DIAGNOSIS — N18.30 TYPE 2 DIABETES MELLITUS WITH STAGE 3 CHRONIC KIDNEY DISEASE, WITH LONG-TERM CURRENT USE OF INSULIN (H): ICD-10-CM

## 2020-06-30 RX ORDER — BLOOD SUGAR DIAGNOSTIC
STRIP MISCELLANEOUS
Qty: 300 EACH | Refills: 1 | Status: SHIPPED | OUTPATIENT
Start: 2020-06-30 | End: 2020-01-01 | Stop reason: ALTCHOICE

## 2020-07-07 DIAGNOSIS — I25.9 CHRONIC ISCHEMIC HEART DISEASE: ICD-10-CM

## 2020-07-07 RX ORDER — METOPROLOL TARTRATE 25 MG/1
TABLET, FILM COATED ORAL
Qty: 60 TABLET | Refills: 0 | Status: SHIPPED | OUTPATIENT
Start: 2020-07-07 | End: 2020-08-07

## 2020-07-20 DIAGNOSIS — I10 HYPERTENSION GOAL BP (BLOOD PRESSURE) < 140/90: ICD-10-CM

## 2020-07-20 RX ORDER — LISINOPRIL 10 MG/1
10 TABLET ORAL DAILY
Qty: 90 TABLET | Refills: 3 | Status: ON HOLD | OUTPATIENT
Start: 2020-07-20 | End: 2020-01-01

## 2020-07-20 RX ORDER — LISINOPRIL 10 MG/1
10 TABLET ORAL DAILY
Qty: 30 TABLET | Refills: 11 | Status: CANCELLED | OUTPATIENT
Start: 2020-07-20

## 2020-08-04 ENCOUNTER — HOSPITAL ENCOUNTER (EMERGENCY)
Facility: CLINIC | Age: 80
Discharge: HOME | End: 2020-08-04
Attending: EMERGENCY MEDICINE | Admitting: EMERGENCY MEDICINE
Payer: COMMERCIAL

## 2020-08-04 VITALS
TEMPERATURE: 99.4 F | DIASTOLIC BLOOD PRESSURE: 82 MMHG | OXYGEN SATURATION: 94 % | HEART RATE: 88 BPM | SYSTOLIC BLOOD PRESSURE: 167 MMHG | RESPIRATION RATE: 25 BRPM

## 2020-08-04 DIAGNOSIS — H54.61 VISION LOSS OF RIGHT EYE: ICD-10-CM

## 2020-08-04 DIAGNOSIS — Z20.822 SUSPECTED COVID-19 VIRUS INFECTION: ICD-10-CM

## 2020-08-04 LAB
ALBUMIN SERPL-MCNC: 3.5 G/DL (ref 3.4–5)
ALP SERPL-CCNC: 98 U/L (ref 40–150)
ALT SERPL W P-5'-P-CCNC: 27 U/L (ref 0–70)
ANION GAP SERPL CALCULATED.3IONS-SCNC: 4 MMOL/L (ref 3–14)
AST SERPL W P-5'-P-CCNC: 14 U/L (ref 0–45)
BASOPHILS # BLD AUTO: 0.1 10E9/L (ref 0–0.2)
BASOPHILS NFR BLD AUTO: 0.7 %
BILIRUB SERPL-MCNC: 0.6 MG/DL (ref 0.2–1.3)
BUN SERPL-MCNC: 26 MG/DL (ref 7–30)
CALCIUM SERPL-MCNC: 9.4 MG/DL (ref 8.5–10.1)
CHLORIDE SERPL-SCNC: 104 MMOL/L (ref 94–109)
CO2 SERPL-SCNC: 31 MMOL/L (ref 20–32)
CREAT SERPL-MCNC: 1.35 MG/DL (ref 0.66–1.25)
DIFFERENTIAL METHOD BLD: NORMAL
EOSINOPHIL # BLD AUTO: 0.1 10E9/L (ref 0–0.7)
EOSINOPHIL NFR BLD AUTO: 0.6 %
ERYTHROCYTE [DISTWIDTH] IN BLOOD BY AUTOMATED COUNT: 13.4 % (ref 10–15)
GFR SERPL CREATININE-BSD FRML MDRD: 49 ML/MIN/{1.73_M2}
GLUCOSE BLDC GLUCOMTR-MCNC: 208 MG/DL (ref 70–99)
GLUCOSE SERPL-MCNC: 215 MG/DL (ref 70–99)
HCT VFR BLD AUTO: 41.1 % (ref 40–53)
HGB BLD-MCNC: 13.9 G/DL (ref 13.3–17.7)
IMM GRANULOCYTES # BLD: 0 10E9/L (ref 0–0.4)
IMM GRANULOCYTES NFR BLD: 0.4 %
LYMPHOCYTES # BLD AUTO: 2.7 10E9/L (ref 0.8–5.3)
LYMPHOCYTES NFR BLD AUTO: 32.9 %
MCH RBC QN AUTO: 31 PG (ref 26.5–33)
MCHC RBC AUTO-ENTMCNC: 33.8 G/DL (ref 31.5–36.5)
MCV RBC AUTO: 92 FL (ref 78–100)
MONOCYTES # BLD AUTO: 0.6 10E9/L (ref 0–1.3)
MONOCYTES NFR BLD AUTO: 7.6 %
NEUTROPHILS # BLD AUTO: 4.8 10E9/L (ref 1.6–8.3)
NEUTROPHILS NFR BLD AUTO: 57.8 %
NRBC # BLD AUTO: 0 10*3/UL
NRBC BLD AUTO-RTO: 0 /100
PLATELET # BLD AUTO: 166 10E9/L (ref 150–450)
POTASSIUM SERPL-SCNC: 4.4 MMOL/L (ref 3.4–5.3)
PROT SERPL-MCNC: 7 G/DL (ref 6.8–8.8)
RBC # BLD AUTO: 4.49 10E12/L (ref 4.4–5.9)
SODIUM SERPL-SCNC: 139 MMOL/L (ref 133–144)
WBC # BLD AUTO: 8.3 10E9/L (ref 4–11)

## 2020-08-04 PROCEDURE — C9803 HOPD COVID-19 SPEC COLLECT: HCPCS | Performed by: EMERGENCY MEDICINE

## 2020-08-04 PROCEDURE — 85025 COMPLETE CBC W/AUTO DIFF WBC: CPT | Performed by: EMERGENCY MEDICINE

## 2020-08-04 PROCEDURE — U0003 INFECTIOUS AGENT DETECTION BY NUCLEIC ACID (DNA OR RNA); SEVERE ACUTE RESPIRATORY SYNDROME CORONAVIRUS 2 (SARS-COV-2) (CORONAVIRUS DISEASE [COVID-19]), AMPLIFIED PROBE TECHNIQUE, MAKING USE OF HIGH THROUGHPUT TECHNOLOGIES AS DESCRIBED BY CMS-2020-01-R: HCPCS | Performed by: EMERGENCY MEDICINE

## 2020-08-04 PROCEDURE — 80053 COMPREHEN METABOLIC PANEL: CPT | Performed by: EMERGENCY MEDICINE

## 2020-08-04 PROCEDURE — 99284 EMERGENCY DEPT VISIT MOD MDM: CPT | Mod: 25 | Performed by: EMERGENCY MEDICINE

## 2020-08-04 PROCEDURE — 99284 EMERGENCY DEPT VISIT MOD MDM: CPT | Performed by: EMERGENCY MEDICINE

## 2020-08-04 PROCEDURE — 93010 ELECTROCARDIOGRAM REPORT: CPT | Mod: Z6 | Performed by: EMERGENCY MEDICINE

## 2020-08-04 PROCEDURE — 93005 ELECTROCARDIOGRAM TRACING: CPT | Performed by: EMERGENCY MEDICINE

## 2020-08-04 PROCEDURE — 00000146 ZZHCL STATISTIC GLUCOSE BY METER IP

## 2020-08-04 NOTE — ED NOTES
"Daughter called to see what happened during ed visit. Advised could not give info without pt ok. Pt will not tell her or her brother what happened although he indicated he had covid swab. Daughter concerned he will spread covid at [employer] tomorrow. Advised if he gives ok I will answer questions but otherwise I cannot  Share info. She became angry and said \"thanks for not helping\" and hung up  "

## 2020-08-04 NOTE — ED NOTES
Patient stated that he was making lunch at around 1430 today and looked over at his son and saw only half of his face.  Vision normal within a few minutes.  Patient stated that he has shortness of breath but that it is normal for him with his COPD.  Patient called his eye doctor because he had cataract surgery and they told him to come to the ER.

## 2020-08-04 NOTE — ED NOTES
Patient complaining of having to stay in room with door shut.  Explained to patient that we need to keep the door shut.  Patient stated that he wanted to leave against medical advice.  AMA form signed.

## 2020-08-05 ENCOUNTER — APPOINTMENT (OUTPATIENT)
Dept: CT IMAGING | Facility: CLINIC | Age: 80
End: 2020-08-05
Attending: FAMILY MEDICINE
Payer: COMMERCIAL

## 2020-08-05 ENCOUNTER — APPOINTMENT (OUTPATIENT)
Dept: GENERAL RADIOLOGY | Facility: CLINIC | Age: 80
End: 2020-08-05
Attending: FAMILY MEDICINE
Payer: COMMERCIAL

## 2020-08-05 ENCOUNTER — HOSPITAL ENCOUNTER (EMERGENCY)
Facility: CLINIC | Age: 80
Discharge: HOME | End: 2020-08-05
Attending: FAMILY MEDICINE | Admitting: FAMILY MEDICINE
Payer: COMMERCIAL

## 2020-08-05 ENCOUNTER — TELEPHONE (OUTPATIENT)
Dept: FAMILY MEDICINE | Facility: CLINIC | Age: 80
End: 2020-08-05

## 2020-08-05 VITALS
TEMPERATURE: 98 F | HEART RATE: 78 BPM | DIASTOLIC BLOOD PRESSURE: 70 MMHG | RESPIRATION RATE: 12 BRPM | SYSTOLIC BLOOD PRESSURE: 138 MMHG | OXYGEN SATURATION: 94 % | WEIGHT: 185 LBS | BODY MASS INDEX: 28.13 KG/M2

## 2020-08-05 DIAGNOSIS — I73.9 CLAUDICATION (H): ICD-10-CM

## 2020-08-05 DIAGNOSIS — G45.9 TIA (TRANSIENT ISCHEMIC ATTACK): ICD-10-CM

## 2020-08-05 LAB
ALBUMIN SERPL-MCNC: 3.2 G/DL (ref 3.4–5)
ALP SERPL-CCNC: 82 U/L (ref 40–150)
ALT SERPL W P-5'-P-CCNC: 23 U/L (ref 0–70)
ANION GAP SERPL CALCULATED.3IONS-SCNC: 5 MMOL/L (ref 3–14)
AST SERPL W P-5'-P-CCNC: 15 U/L (ref 0–45)
BASOPHILS # BLD AUTO: 0 10E9/L (ref 0–0.2)
BASOPHILS NFR BLD AUTO: 0.5 %
BILIRUB DIRECT SERPL-MCNC: 0.2 MG/DL (ref 0–0.2)
BILIRUB SERPL-MCNC: 0.5 MG/DL (ref 0.2–1.3)
BUN SERPL-MCNC: 24 MG/DL (ref 7–30)
CALCIUM SERPL-MCNC: 9 MG/DL (ref 8.5–10.1)
CHLORIDE SERPL-SCNC: 106 MMOL/L (ref 94–109)
CO2 SERPL-SCNC: 28 MMOL/L (ref 20–32)
CREAT SERPL-MCNC: 1.1 MG/DL (ref 0.66–1.25)
DIFFERENTIAL METHOD BLD: ABNORMAL
EOSINOPHIL # BLD AUTO: 0 10E9/L (ref 0–0.7)
EOSINOPHIL NFR BLD AUTO: 0.5 %
ERYTHROCYTE [DISTWIDTH] IN BLOOD BY AUTOMATED COUNT: 13.4 % (ref 10–15)
GFR SERPL CREATININE-BSD FRML MDRD: 63 ML/MIN/{1.73_M2}
GLUCOSE BLDC GLUCOMTR-MCNC: 223 MG/DL (ref 70–99)
GLUCOSE SERPL-MCNC: 223 MG/DL (ref 70–99)
HCT VFR BLD AUTO: 37.2 % (ref 40–53)
HGB BLD-MCNC: 12.6 G/DL (ref 13.3–17.7)
IMM GRANULOCYTES # BLD: 0 10E9/L (ref 0–0.4)
IMM GRANULOCYTES NFR BLD: 0.3 %
LYMPHOCYTES # BLD AUTO: 2 10E9/L (ref 0.8–5.3)
LYMPHOCYTES NFR BLD AUTO: 25.8 %
MCH RBC QN AUTO: 30.7 PG (ref 26.5–33)
MCHC RBC AUTO-ENTMCNC: 33.9 G/DL (ref 31.5–36.5)
MCV RBC AUTO: 91 FL (ref 78–100)
MONOCYTES # BLD AUTO: 0.6 10E9/L (ref 0–1.3)
MONOCYTES NFR BLD AUTO: 7.6 %
NEUTROPHILS # BLD AUTO: 5.1 10E9/L (ref 1.6–8.3)
NEUTROPHILS NFR BLD AUTO: 65.3 %
NRBC # BLD AUTO: 0 10*3/UL
NRBC BLD AUTO-RTO: 0 /100
PLATELET # BLD AUTO: 154 10E9/L (ref 150–450)
POTASSIUM SERPL-SCNC: 4 MMOL/L (ref 3.4–5.3)
PROT SERPL-MCNC: 6.3 G/DL (ref 6.8–8.8)
RBC # BLD AUTO: 4.11 10E12/L (ref 4.4–5.9)
SARS-COV-2 RNA SPEC QL NAA+PROBE: NOT DETECTED
SODIUM SERPL-SCNC: 139 MMOL/L (ref 133–144)
SPECIMEN SOURCE: NORMAL
TROPONIN I SERPL-MCNC: <0.015 UG/L (ref 0–0.04)
WBC # BLD AUTO: 7.8 10E9/L (ref 4–11)

## 2020-08-05 PROCEDURE — 84484 ASSAY OF TROPONIN QUANT: CPT | Performed by: FAMILY MEDICINE

## 2020-08-05 PROCEDURE — 85025 COMPLETE CBC W/AUTO DIFF WBC: CPT | Performed by: FAMILY MEDICINE

## 2020-08-05 PROCEDURE — 80076 HEPATIC FUNCTION PANEL: CPT | Performed by: FAMILY MEDICINE

## 2020-08-05 PROCEDURE — 25000128 H RX IP 250 OP 636: Performed by: FAMILY MEDICINE

## 2020-08-05 PROCEDURE — 25000125 ZZHC RX 250: Performed by: FAMILY MEDICINE

## 2020-08-05 PROCEDURE — 93010 ELECTROCARDIOGRAM REPORT: CPT | Mod: Z6 | Performed by: FAMILY MEDICINE

## 2020-08-05 PROCEDURE — 93005 ELECTROCARDIOGRAM TRACING: CPT | Performed by: FAMILY MEDICINE

## 2020-08-05 PROCEDURE — 99285 EMERGENCY DEPT VISIT HI MDM: CPT | Mod: 25 | Performed by: FAMILY MEDICINE

## 2020-08-05 PROCEDURE — 70450 CT HEAD/BRAIN W/O DYE: CPT

## 2020-08-05 PROCEDURE — 71046 X-RAY EXAM CHEST 2 VIEWS: CPT

## 2020-08-05 PROCEDURE — 96374 THER/PROPH/DIAG INJ IV PUSH: CPT | Mod: 59 | Performed by: FAMILY MEDICINE

## 2020-08-05 PROCEDURE — 80048 BASIC METABOLIC PNL TOTAL CA: CPT | Performed by: FAMILY MEDICINE

## 2020-08-05 PROCEDURE — 70498 CT ANGIOGRAPHY NECK: CPT

## 2020-08-05 PROCEDURE — 00000146 ZZHCL STATISTIC GLUCOSE BY METER IP

## 2020-08-05 RX ORDER — IOPAMIDOL 755 MG/ML
70 INJECTION, SOLUTION INTRAVASCULAR ONCE
Status: COMPLETED | OUTPATIENT
Start: 2020-08-05 | End: 2020-08-05

## 2020-08-05 RX ORDER — LORAZEPAM 2 MG/ML
0.5 INJECTION INTRAMUSCULAR ONCE
Status: COMPLETED | OUTPATIENT
Start: 2020-08-05 | End: 2020-08-05

## 2020-08-05 RX ADMIN — SODIUM CHLORIDE 100 ML: 9 INJECTION, SOLUTION INTRAVENOUS at 13:58

## 2020-08-05 RX ADMIN — IOPAMIDOL 70 ML: 755 INJECTION, SOLUTION INTRAVENOUS at 13:58

## 2020-08-05 RX ADMIN — LORAZEPAM 0.5 MG: 2 INJECTION INTRAMUSCULAR; INTRAVENOUS at 14:17

## 2020-08-05 ASSESSMENT — ENCOUNTER SYMPTOMS
DIFFICULTY URINATING: 0
HEADACHES: 0
DIAPHORESIS: 0
VOMITING: 0
COUGH: 0
SPEECH DIFFICULTY: 0
CONFUSION: 1
CHILLS: 0
NAUSEA: 0
FREQUENCY: 0
NUMBNESS: 0
CHEST TIGHTNESS: 0
DIARRHEA: 0
LIGHT-HEADEDNESS: 0
PALPITATIONS: 0
SINUS PRESSURE: 0
CHILLS: 0
DIARRHEA: 0
DYSURIA: 0
TROUBLE SWALLOWING: 0
NAUSEA: 0
FEVER: 0
BLOOD IN STOOL: 0
EYE REDNESS: 0
PALPITATIONS: 0
ABDOMINAL PAIN: 0
SORE THROAT: 0
COLOR CHANGE: 0
ABDOMINAL PAIN: 0
EYE PAIN: 0
SORE THROAT: 0
COUGH: 0
SHORTNESS OF BREATH: 1
WEAKNESS: 0
FEVER: 0
NECK PAIN: 0
SHORTNESS OF BREATH: 1
WHEEZING: 0
CONSTIPATION: 0
VOMITING: 0
VOICE CHANGE: 0
NECK STIFFNESS: 0

## 2020-08-05 NOTE — ED NOTES
Pt anxious about his glucose level- 30 per EMS, repeatedly asking for insulin. States he took 20 units of insulin this morning with breakfast.

## 2020-08-05 NOTE — ED PROVIDER NOTES
"  History     Chief Complaint   Patient presents with     Eye Problem      onset at approx 15:00 of blurred vision now vision is back to normal now     Shortness of Breath     shortness of breath \" for a couple of years \"  pt denies chest pain, has COPD, breathing has gotten better while in triage     Altered Mental Status     son states \" he was really confused today and peed on himself today \"      HPI  Alvaro Horton is a 80 year old male with past medical history which includes hypertension, CVA, atrial fibrillation, hyperlipidemia, COPD, CKD stage III, diabetes type 2, and coronary artery disease who presents to emergency department for evaluation of loss of vision.  Patient reports that he was making a BLT sandwich for his son at approximately 2:00 this afternoon and then suddenly was unable to see out of his right eye.  Says that the right half of his son's face was suddenly gone.  This was not associated with headache, pain is in his eye, blurriness of vision, flashing lights, palpitations, chest pain/pressure, nausea, vomiting, or diaphoresis.  No weakness, slurring of speech, or sensory changes reported.  He called his eye clinic and discussed this with them and it was recommended that he goes to the emergency department immediately for concern of possible stroke.  I asked which eye clinic he goes to he was unable to tell me the name.  I asked which eye doctor he spoke to today and he says it was a woman but not sure who he spoke with.  Prior to my evaluation his eyesight has returned and states he has no visual changes.  He is uncertain of the time but it was close to the time of his arrival.    During triage he reported that he has been short of breath for the past few days and he was placed in respiratory isolation room.  He states that he is also claustrophobic and is asking for the door to remain open.  He is also not wearing his mask on his face.  I discussed with him that given the current " "pandemic that mass are required by all patients and that anybody who is a patient investigation must remain in the room with the door closed.  He expressed his displeasure and frustration with this and continued to request that the door remains open.    I was contacted by nursing for rapid stroke evaluation.  Based on my initial evaluation a code stroke was not called.    Allergies:  Allergies   Allergen Reactions     Prednisone Other (See Comments)     Severe interaction with DM       Problem List:    Patient Active Problem List    Diagnosis Date Noted     Hypertension goal BP (blood pressure) < 140/90 10/09/2006     Priority: High     BAKERS LUNG      Priority: High     Dx possible \"Baker's Lung\" 2001 [place] [facility]. RAST for bakers yeast negative 2002. Has occupational exposures with related worsening asthmatic symptoms. CT 2/08- no fibrosis.       Moderate persistent asthma      Priority: High     PFTS 1997 - FEV1- 3.39 (64%), ratio 0.74, 24% change cbp49-39% with bronchodilators,  TLC 86%, %, DLCO 78%. Methacholine challenge positive at level 7 2001.  PFTS 2004 - FEV1- 1.93 (63%), ratio 0.77. PFTS 4/08 - FEV1- 2.13 (72%), ratio 0.71, no change with bronchodilators,  TLC 78%, RV 96%, DLCO 64%          Chronic ischemic heart disease      Priority: High     atypical chest pain 2001 with GXT echo- decreased LVF, angiogram 2001- nonobstructing 3 vessel disease.   Repeat angio 2004- prox RCA 50-60%,  OM2- 40%, D1 30-40%, LAD 30-40%.   Cardiolite 2005 during hospitalization for SOB in [place] reported to reveal no ischemia.   Adenosine cardiolite 1/08- small slightly reversible inferior defect, most likely consistent with diaphragm attenuation with bowel uptake artifact. USA, Angio 11/09- Severe LM disease, severe subtotal occlusion of a large branch OM1.    S/p CABG x2 11/09- LIMA-D1, SVG to OM1, OM2. GXT 8/10- 5.8 mets, 121 (80%) HR, normal ekg, cardiolite- small fixed inferior/basal defect with small area " "khurram-infarct ischemia extending into the mid ventricle. EF 67%.  4/25/13:adenosine cardiolyte stress test through [facility] with normal EF and no ischemia.   1/16/2016 nuclear stress test:1.  Myocardial perfusion imaging using single isotope technique demonstrated no evidence for myocardial ischemia. 2. Gated images demonstrated normal wall motion with a calculated ejection fraction of 61%.  3. Compared to the prior study from 2011 there are no significant changes .           Chest pain 02/27/2020     Priority: Medium     Acute calculous cholecystitis 03/02/2019     Priority: Medium     Abnormal blood-gas measurement 09/04/2018     Priority: Medium     Encephalopathy 09/03/2018     Priority: Medium     Type 2 diabetes mellitus with diabetic chronic kidney disease (H) 10/30/2015     Priority: Medium     Altered mental status 12/31/2014     Priority: Medium     12/27/2014:episode confusion for 20 minutes.  Seen at [facility]/-hospitalized.  Head CT, MRI/MRA all normal.  PET cardiac perfusion stress test normal.  Neurology consult-diagnosis= possible hypoglycemia, migraine variant.  \"Acute ischemic cerebral event was excluded\".       Health Care Home 08/11/2014     Priority: Medium     State Tier Level:    Status:  Unable to re-connect  Care Coordinator:  Kecia Mills    See Letters for [facility] Care Plan  Date:  August 11, 2014             Chronic kidney disease, stage 2 (mild) 05/06/2014     Priority: Medium     9/9/2015:He has had two abnormal MAC in the past.        COPD (chronic obstructive pulmonary disease) (H) 11/18/2013     Priority: Medium     PFT 11/15/13 results:FVC=59%, FEV1=54%, FEV1/FVC=92%.  His DLCO was 58%1. Spirometry: FEV1 moderately reduced. FVC moderately reduced. FEV1/FVC ratio reduced. 2. Bronchodilator Response: A 14% improvement is seen in FEV1 with bronchodilators. 3. Lung Volumes: Total lung capacity normal. Residual volume increased. Residual volume total lung capacity ratio increased. 4. DLCO: " Moderately reduced.   INTERPRETATION: Moderate obstructive lung disease. Reversibility with bronchodilators is seen on testing today. Lung volumes show evidence for air trapping. DLCO is reduced, consistent with loss of capillary-alveolar exchange as in emphysema, pulmonary hypertension, chronic pulmonary embolus, pulmonary fibrosis or other pulmonary vascular disease. A concomitant restrictive lung disease process may be suspected on the basis of moderate reductions in FEV1 and FVC, with a fairly well-preserved FEV1/FVC ratio and a borderline low normal total lung capacity.   11/15/13:exercise oximetry did not show significant desaturation below 91%.  PFT February, 2014 results:FVC=54%, FEV1=48%, FEV1/FVC=64%.  DLCO=59%  9/6/2016 pulmonary consult:COPD with restrictional components and diffusion defect, history Baker's lung.          Hyperlipidemia LDL goal <70 04/17/2013     Priority: Medium     Atrial fibrillation (H) 12/14/2009     Priority: Medium     After CABG 11/09       CVA (cerebral vascular accident) (H) 11/27/2009     Priority: Medium     Mild Broca's aphasia, confusion, right hand dysesthesia after angio. MRA new small area of restricted diffusion in the white matter of the medial right temporal lobe,  consistent with a recent small infarct. Speech problems resolved, still mild right hand problems.         Obstructive sleep apnea 04/14/2008     Priority: Medium     ESS 20/24. Sleep study [facility]: 4/8/08. total sleep time was 423.0 minutes. The sleep latency was normal at 9.7 minutes.  REM sleep latency was 161.5 minutes. Sleep efficiency was normal at 82.7%. The sleep architecture was disrupted with frequent sleep stage changes and arousals. Snoring: Was reported as moderately loud. Lowest O2 saturation was 87.0%. This study is suggestive of mild sleep apnea, severe during REM sleep (21% TST).  PLM index was 0.0. EKG:  No significant cardiac arrhythmias were noted.         Hypertrophy of prostate  without urinary obstruction      Priority: Medium     Elevated PSA. Bx negative 2004.       Advanced care planning/counseling discussion 09/24/2012     Priority: Low     Does not have a directive 9/12       Dyslexia 11/11/2010     Priority: Low     essentially unable to read       CTS (carpal tunnel syndrome) 05/12/2010     Priority: Low     EMG 5/10-  median neuropathy at the right wrist, moderate in severity electrically, right ulnar neuropathy localizing to the elbow, mild chronic right C6 radiculopathy without evidence for acute denervation.        Memory loss 05/03/2010     Priority: Low     MMSE 27/30 (0/3 attention recall, ?history of dylexia), PHQ9-16 7/09. Recommended neurocogntive testing, did not complete.  MMSE 23/30 6/10 (attention, county, if/and/buts).Neurocogntive evaluation  1/10- not suspicious for emerging dementia. Felt likely due hearing loss/dyslexia.   10/29/2018:He was [facility] 10/10/2018.  Dr. Oksana Munoz.   He had an EEG.   Diagnosed with Alzheimer's dementia.   Records not yet reviewed.        Benign paroxysmal vertigo 02/03/2009     Priority: Low     Admit 2/09. MRI/MRA head and neck negative.       Sensorineural hearing loss, bilateral 10/08/2007     Priority: Low     Esophageal reflux 11/06/2006     Priority: Low     PSORIASIS      Priority: Low     OVERACTIVE BLADDER      Priority: Low     PVR 84 2004.          Past Medical History:    Past Medical History:   Diagnosis Date     Calculus of kidney      COPD (chronic obstructive pulmonary disease) (H)      Depression      Diabetes (H)      Heart disease      Left hand weakness 12/14/2009     PNEUMONIA, ORGANISM NOS 2/14/2008     Sleep apnea        Past Surgical History:    Past Surgical History:   Procedure Laterality Date     C ANESTH,DX ARTHROSCOPIC PROC KNEE JOINT  1994, 1998     Left     CARDIAC SURGERY  11/09    CABG x2 - LIMA-D1, SVG to OM1, OM2.     GENITOURINARY SURGERY  1990    ESWL and percutaneous  "nephrolithotomy     LAPAROSCOPIC CHOLECYSTECTOMY N/A 3/4/2019    Procedure: LAPAROSCOPIC CHOLECYSTECTOMY;  Surgeon: Burt Mendieta MD;  Location: [facility] OR     ORTHOPEDIC SURGERY  8/12    right CTR     SURGICAL HISTORY OF -   1998    Hernia repair     SURGICAL HISTORY OF -   2004    NormalColonoscopy      SURGICAL HISTORY OF -   2006    Normal Colonoscopy       Family History:    Family History   Problem Relation Age of Onset     C.A.D. Father         40s     Hypertension Father      C.A.D. Paternal Grandfather      Heart Disease Paternal Grandfather      Diabetes Brother      C.A.D. Brother      Heart Disease Brother      Hypertension Brother      Gastrointestinal Disease Son         Crohn's disease     Gastrointestinal Disease Other         2 grandaughters with Crohn's disease     Cancer - colorectal No family hx of        Social History:  Marital Status:   5  Social History     Tobacco Use     Smoking status: Never Smoker     Smokeless tobacco: Never Used   Substance Use Topics     Alcohol use: No     Alcohol/week: 0.0 standard drinks     Drug use: No        Medications:    ACCU-CHEK GUIDE test strip  albuterol (PROAIR HFA/PROVENTIL HFA/VENTOLIN HFA) 108 (90 Base) MCG/ACT inhaler  aspirin (ASA) 325 MG EC tablet  atorvastatin (LIPITOR) 80 MG tablet  cholecalciferol 2000 UNITS tablet  cyanocobalamin (BL VITAMIN B-12) 500 MCG TABS  finasteride (PROSCAR) 5 MG tablet  fish oil-omega-3 fatty acids (FISH OIL) 1000 MG capsule  furosemide (LASIX) 20 MG tablet  furosemide (LASIX) 20 MG tablet  insulin pen needle (BD ABRAM U/F) 32G X 4 MM miscellaneous  Insulin Pen Needle (PEN NEEDLES) 31G X 6 MM MISC  insulin syringe-needle U-100 (BD INSULIN SYRINGE ULTRAFINE) 31G X 5/16\" 1 ML  lisinopril (ZESTRIL) 10 MG tablet  metFORMIN (GLUCOPHAGE) 1000 MG tablet  metoprolol tartrate (LOPRESSOR) 25 MG tablet  nitroGLYcerin (NITROSTAT) 0.4 MG sublingual tablet  NOVOLIN 70/30 FLEXPEN RELION susp  order for DME  ranitidine (RANITIDINE " 150 MAX STRENGTH) 150 MG tablet  tamsulosin (FLOMAX) 0.4 MG capsule  TRELEGY ELLIPTA 100-62.5-25 MCG/INH oral inhaler          Review of Systems   Constitutional: Negative for chills and fever.   HENT: Negative for sore throat, trouble swallowing and voice change.    Eyes: Positive for visual disturbance. Negative for pain and redness.   Respiratory: Positive for shortness of breath. Negative for cough and chest tightness.    Cardiovascular: Negative for chest pain and palpitations.   Gastrointestinal: Negative for abdominal pain, diarrhea, nausea and vomiting.   Genitourinary: Negative for difficulty urinating.   Musculoskeletal: Negative for neck pain and neck stiffness.   Skin: Negative for color change and rash.   Neurological: Negative for speech difficulty, weakness, light-headedness and numbness.       Physical Exam   BP: 137/73  Pulse: 79  Heart Rate: 81  Temp: 99.4  F (37.4  C)  Resp: 14  SpO2: 94 %      Physical Exam   GEN: Awake, alert, and cooperative. No acute distress. Agitated  HENT: MMM. External ears and nose normal bilaterally. Atraumatic. Symmetric.   EYES: EOM intact. Conjunctiva clear. No discharge. No RAPD. Visual fields intact to direct confrontation.   NECK: Supple, symmetric. Non tender.   CV : Regular rate and rhythm. Extremities warm and well perfused.   PULM: Normal effort. No wheezes, rales, or rhonchi bilaterally.  ABD: Soft, non-tender, non-distended. No rebound or guarding.   NEURO: NIH 0.  Mental status: Alert, awake. Oriented to self, date, and place. Normal speech and language. GCS 15  Cranial Nerves: II-XII intact  Motor: Follows commands x 4 extremities   Upper Extremities:   RUE: 5/5 shoulder abduction. 5/5 elbow flex/ext. 5/5 wrist flex/ext. 5/5 hand .   LUE: 5/5 shoulder abduction. 5/5 elbow flex/ext. 5/5 wrist flex/ext. 5/5 hand .    Lower Extremities:   RLE: 5/5 hip flexion. 5/5 knee flex/ext. 5/5 ankle plantar-/dorsiflexion. 5/5 EHL.   LLE: 5/5 hip flexion. 5/5 knee  flex/ext. 5/5 ankle plantar-/dorsiflexion. 5/5 EHL   Sensory: Sensation intact in all 4 extremities   Coordination: Finger-nose finger intact. Heel-shin intact. Observed steady gait as leaving department.   EXT: No gross deformity. No lower extremity edema.   INT: Warm. No diaphoresis. Normal color.            ED Course        Procedures               EKG Interpretation:      Interpreted by Berlin Mccabe MD  Time reviewed: 1620  Symptoms at time of EKG: None   Rhythm: 1 degree AV block  Rate: 87  Axis: Normal  Ectopy: none  Conduction: right bundle branch block (complete)  ST Segments/ T Waves: Non-specific ST-T wave changes  Q Waves: none  Comparison to prior: Unchanged    Clinical Impression: Abnormal ECG. RBBB. 1st degree AV block        Critical Care time:  none               Results for orders placed or performed during the hospital encounter of 08/04/20 (from the past 24 hour(s))   Glucose by meter   Result Value Ref Range    Glucose 208 (H) 70 - 99 mg/dL   CBC with platelets, differential   Result Value Ref Range    WBC 8.3 4.0 - 11.0 10e9/L    RBC Count 4.49 4.4 - 5.9 10e12/L    Hemoglobin 13.9 13.3 - 17.7 g/dL    Hematocrit 41.1 40.0 - 53.0 %    MCV 92 78 - 100 fl    MCH 31.0 26.5 - 33.0 pg    MCHC 33.8 31.5 - 36.5 g/dL    RDW 13.4 10.0 - 15.0 %    Platelet Count 166 150 - 450 10e9/L    Diff Method Automated Method     % Neutrophils 57.8 %    % Lymphocytes 32.9 %    % Monocytes 7.6 %    % Eosinophils 0.6 %    % Basophils 0.7 %    % Immature Granulocytes 0.4 %    Nucleated RBCs 0 0 /100    Absolute Neutrophil 4.8 1.6 - 8.3 10e9/L    Absolute Lymphocytes 2.7 0.8 - 5.3 10e9/L    Absolute Monocytes 0.6 0.0 - 1.3 10e9/L    Absolute Eosinophils 0.1 0.0 - 0.7 10e9/L    Absolute Basophils 0.1 0.0 - 0.2 10e9/L    Abs Immature Granulocytes 0.0 0 - 0.4 10e9/L    Absolute Nucleated RBC 0.0    Comprehensive metabolic panel   Result Value Ref Range    Sodium 139 133 - 144 mmol/L    Potassium 4.4 3.4 - 5.3 mmol/L     Chloride 104 94 - 109 mmol/L    Carbon Dioxide 31 20 - 32 mmol/L    Anion Gap 4 3 - 14 mmol/L    Glucose 215 (H) 70 - 99 mg/dL    Urea Nitrogen 26 7 - 30 mg/dL    Creatinine 1.35 (H) 0.66 - 1.25 mg/dL    GFR Estimate 49 (L) >60 mL/min/[1.73_m2]    GFR Estimate If Black 57 (L) >60 mL/min/[1.73_m2]    Calcium 9.4 8.5 - 10.1 mg/dL    Bilirubin Total 0.6 0.2 - 1.3 mg/dL    Albumin 3.5 3.4 - 5.0 g/dL    Protein Total 7.0 6.8 - 8.8 g/dL    Alkaline Phosphatase 98 40 - 150 U/L    ALT 27 0 - 70 U/L    AST 14 0 - 45 U/L       Medications - No data to display    Assessments & Plan (with Medical Decision Making)   80 year old male with with transient loss of vision in right eye.  Episode occurred this afternoon just prior to arrival with unclear timeline but improved prior to my evaluation.  Was emergently called to the room for a stroke evaluation.  NIH stroke scale 0 and no code stroke called.  Am concerned about CVA versus TIA versus CRAO, CRVO.  IV access obtained initial labs sent with plan for CT imaging of head and CT angiography of head and neck.  On arrival to emergency departmentVital signs were blood pressure 137/73, temperature 99.4, heart rate 81, respiratory rate 14, and SPO2 of 94% on room air.     Patient was distressed about having to wear a mask and having the room door closed due to his claustrophobia.  I discussed the need for these steps.  He appeared quite frustrated and agitated with my response.  I had reminded him to cover his mouth and nose with his mask on each occasion that I entered his room.  He continued to request the door to be left open and continually shouted from his room instead of using the call light to express his needs.  When the call he was answered he would only state that he requested to speak to somebody in person.  He said he was cold and I provided him with a warm blanket.  He states that he does not want to be here long and wants to know how long it would take.  I explained to  him that I did not have a definite time but that I was concerned about the possibility of a stroke or other potentially life-threatening condition and that his complete work-up may take a few hours.  He says he was not willing to stay more than 1 hour and requesting that he get swabbed for COVID-19 and then is discharged.  I told him that we would be happy to test him for COVID and reiterated my concerns and in for further evaluation.    ECG without evidence of acute ischemia and does show right bundle branch block and first-degree AV block unchanged from most recent ECG in June of this year.  CBC normal CMP with elevated glucose of 215, and creatinine elevation of 1.39 (1.05 6 months prior).    Unfortunately prior to CT scan or visual acuity patient had left AGAINST MEDICAL ADVICE.      I have reviewed the nursing notes.    I have reviewed the findings, diagnosis, plan and need for follow up with the patient.       Discharge Medication List as of 8/4/2020  4:42 PM          Final diagnoses:   Vision loss of right eye   Suspected COVID-19 virus infection       8/4/2020   [facility] EMERGENCY DEPARTMENT     Berlin Mccabe MD  08/05/20 1712

## 2020-08-05 NOTE — ED TRIAGE NOTES
"Pt arrives via EMS with confusion per son. Pt was seen here yesterday for vision loss but left AMA because he became claustrophobic in the room. Pt was given 1 mg ativan by EMS. Pt states his vision was only gone for \"a couple seconds\" yesterday, no vision problems today. Alert and oriented. Pt states that for the past year he has been getting clumsy and his legs feel heavy, unable to walk as far as he used to. Hx of COPD.   "

## 2020-08-05 NOTE — ED PROVIDER NOTES
History     Chief Complaint   Patient presents with     Altered Mental Status     confusion per son, left AMA yesterday     HPI  Alvaro Horton is a 80 year old male who who presents with a history of coronary disease, Bakers lung, obstructive sleep apnea, CVA, atrial fibrillation, COPD, chronic kidney disease, type 2 diabetes and prior encephalopathy, chronic memory changes and here because of altered mental status and confusion per his son.  Patient left AGAINST MEDICAL ADVICE yesterday -tells me that he felt claustrophobic in the rooms yesterday and could not stay.  He returns again today telling me that yesterday he had a period of vision loss that appears to have been a field cut and he notes it was only one eye but it sounds as if this may have been a bilateral field cut.  He also felt off and lightheaded and some sense of confusion that resolved.  Is unclear how long this episode lasted.  He had no head injury.  He is not on anticoagulation.  He was seen originally yesterday and due to a cough that he had for the last 2 weeks he was placed in a COVID room which resulted in his sense of claustrophobia needed to leave.  He returns today and can give me a complete history that was thorough.    He also describes a sense of claudication in bilateral lower extremities where when he goes for a mile walking he returns he can barely walk up the driveway because his legs feel weak.  This is been ongoing.  He has had a chemical stress test he tells me in the last year and this was reassuring.  He has no significant chest pain but he does note increased dyspnea on exertion over time.  Has been seen by his pulmonologist who believes this may be cardiac.        Allergies:  Allergies   Allergen Reactions     Prednisone Other (See Comments)     Severe interaction with DM       Problem List:    Patient Active Problem List    Diagnosis Date Noted     Hypertension goal BP (blood pressure) < 140/90 10/09/2006      "Priority: High     BAKERS LUNG      Priority: High     Dx possible \"Baker's Lung\" 2001 [facility]. RAST for bakers yeast negative 2002. Has occupational exposures with related worsening asthmatic symptoms. CT 2/08- no fibrosis.       Moderate persistent asthma      Priority: High     PFTS 1997 - FEV1- 3.39 (64%), ratio 0.74, 24% change [id number]% with bronchodilators,  TLC 86%, %, DLCO 78%. Methacholine challenge positive at level 7 2001.  PFTS 2004 - FEV1- 1.93 (63%), ratio 0.77. PFTS 4/08 - FEV1- 2.13 (72%), ratio 0.71, no change with bronchodilators,  TLC 78%, RV 96%, DLCO 64%          Chronic ischemic heart disease      Priority: High     atypical chest pain 2001 with GXT echo- decreased LVF, angiogram 2001- nonobstructing 3 vessel disease.   Repeat angio 2004- prox RCA 50-60%,  OM2- 40%, D1 30-40%, LAD 30-40%.   Cardiolite 2005 during hospitalization for SOB in [place] reported to reveal no ischemia.   Adenosine cardiolite 1/08- small slightly reversible inferior defect, most likely consistent with diaphragm attenuation with bowel uptake artifact. USA, Angio 11/09- Severe LM disease, severe subtotal occlusion of a large branch OM1.    S/p CABG x2 11/09- LIMA-D1, SVG to OM1, OM2. GXT 8/10- 5.8 mets, 121 (80%) HR, normal ekg, cardiolite- small fixed inferior/basal defect with small area khurram-infarct ischemia extending into the mid ventricle. EF 67%.  4/25/13:adenosine cardiolyte stress test through [facility] with normal EF and no ischemia.   1/16/2016 nuclear stress test:1.  Myocardial perfusion imaging using single isotope technique demonstrated no evidence for myocardial ischemia. 2. Gated images demonstrated normal wall motion with a calculated ejection fraction of 61%.  3. Compared to the prior study from 2011 there are no significant changes .           Chest pain 02/27/2020     Priority: Medium     Acute calculous cholecystitis 03/02/2019     Priority: Medium     Abnormal blood-gas measurement " "09/04/2018     Priority: Medium     Encephalopathy 09/03/2018     Priority: Medium     Type 2 diabetes mellitus with diabetic chronic kidney disease (H) 10/30/2015     Priority: Medium     Altered mental status 12/31/2014     Priority: Medium     12/27/2014:episode confusion for 20 minutes.  Seen at [facility]/-hospitalized.  Head CT, MRI/MRA all normal.  PET cardiac perfusion stress test normal.  Neurology consult-diagnosis= possible hypoglycemia, migraine variant.  \"Acute ischemic cerebral event was excluded\".       Health Care Home 08/11/2014     Priority: Medium     State Tier Level:    Status:  Unable to re-connect  Care Coordinator:  Kecia Mills    See Letters for [facility] Care Plan  Date:  August 11, 2014             Chronic kidney disease, stage 2 (mild) 05/06/2014     Priority: Medium     9/9/2015:He has had two abnormal MAC in the past.        COPD (chronic obstructive pulmonary disease) (H) 11/18/2013     Priority: Medium     PFT 11/15/13 results:FVC=59%, FEV1=54%, FEV1/FVC=92%.  His DLCO was 58%1. Spirometry: FEV1 moderately reduced. FVC moderately reduced. FEV1/FVC ratio reduced. 2. Bronchodilator Response: A 14% improvement is seen in FEV1 with bronchodilators. 3. Lung Volumes: Total lung capacity normal. Residual volume increased. Residual volume total lung capacity ratio increased. 4. DLCO: Moderately reduced.   INTERPRETATION: Moderate obstructive lung disease. Reversibility with bronchodilators is seen on testing today. Lung volumes show evidence for air trapping. DLCO is reduced, consistent with loss of capillary-alveolar exchange as in emphysema, pulmonary hypertension, chronic pulmonary embolus, pulmonary fibrosis or other pulmonary vascular disease. A concomitant restrictive lung disease process may be suspected on the basis of moderate reductions in FEV1 and FVC, with a fairly well-preserved FEV1/FVC ratio and a borderline low normal total lung capacity.   11/15/13:exercise oximetry did not show " significant desaturation below 91%.  PFT February, 2014 results:FVC=54%, FEV1=48%, FEV1/FVC=64%.  DLCO=59%  9/6/2016 pulmonary consult:COPD with restrictional components and diffusion defect, history Baker's lung.          Hyperlipidemia LDL goal <70 04/17/2013     Priority: Medium     Atrial fibrillation (H) 12/14/2009     Priority: Medium     After CABG 11/09       CVA (cerebral vascular accident) (H) 11/27/2009     Priority: Medium     Mild Broca's aphasia, confusion, right hand dysesthesia after angio. MRA new small area of restricted diffusion in the white matter of the medial right temporal lobe,  consistent with a recent small infarct. Speech problems resolved, still mild right hand problems.         Obstructive sleep apnea 04/14/2008     Priority: Medium     ESS 20/24. Sleep study [facility]: 4/8/08. total sleep time was 423.0 minutes. The sleep latency was normal at 9.7 minutes.  REM sleep latency was 161.5 minutes. Sleep efficiency was normal at 82.7%. The sleep architecture was disrupted with frequent sleep stage changes and arousals. Snoring: Was reported as moderately loud. Lowest O2 saturation was 87.0%. This study is suggestive of mild sleep apnea, severe during REM sleep (21% TST).  PLM index was 0.0. EKG:  No significant cardiac arrhythmias were noted.         Hypertrophy of prostate without urinary obstruction      Priority: Medium     Elevated PSA. Bx negative 2004.       Advanced care planning/counseling discussion 09/24/2012     Priority: Low     Does not have a directive 9/12       Dyslexia 11/11/2010     Priority: Low     essentially unable to read       CTS (carpal tunnel syndrome) 05/12/2010     Priority: Low     EMG 5/10-  median neuropathy at the right wrist, moderate in severity electrically, right ulnar neuropathy localizing to the elbow, mild chronic right C6 radiculopathy without evidence for acute denervation.        Memory loss 05/03/2010     Priority: Low     MMSE 27/30 (0/3  attention recall, ?history of dylexia), PHQ9-16 7/09. Recommended neurocogntive testing, did not complete.  MMSE 23/30 6/10 (attention, county, if/and/buts).Neurocogntive evaluation  1/10- not suspicious for emerging dementia. Felt likely due hearing loss/dyslexia.   10/29/2018:He was seen at [facility] clinic 10/10/2018.  Dr. Oksana Munoz.   He had an EEG.   Diagnosed with Alzheimer's dementia.   Records not yet reviewed.        Benign paroxysmal vertigo 02/03/2009     Priority: Low     Admit 2/09. MRI/MRA head and neck negative.       Sensorineural hearing loss, bilateral 10/08/2007     Priority: Low     Esophageal reflux 11/06/2006     Priority: Low     PSORIASIS      Priority: Low     OVERACTIVE BLADDER      Priority: Low     PVR 84 2004.          Past Medical History:    Past Medical History:   Diagnosis Date     Calculus of kidney      COPD (chronic obstructive pulmonary disease) (H)      Depression      Diabetes (H)      Heart disease      Left hand weakness 12/14/2009     PNEUMONIA, ORGANISM NOS 2/14/2008     Sleep apnea        Past Surgical History:    Past Surgical History:   Procedure Laterality Date     C ANESTH,DX ARTHROSCOPIC PROC KNEE JOINT  1994, 1998     Left     CARDIAC SURGERY  11/09    CABG x2 - LIMA-D1, SVG to OM1, OM2.     GENITOURINARY SURGERY  1990    ESWL and percutaneous nephrolithotomy     LAPAROSCOPIC CHOLECYSTECTOMY N/A 3/4/2019    Procedure: LAPAROSCOPIC CHOLECYSTECTOMY;  Surgeon: Burt Mendieta MD;  Location: [facility] OR     ORTHOPEDIC SURGERY  8/12    right CTR     SURGICAL HISTORY OF -   1998    Hernia repair     SURGICAL HISTORY OF -   2004    NormalColonoscopy      SURGICAL HISTORY OF -   2006    Normal Colonoscopy       Family History:    Family History   Problem Relation Age of Onset     GITA.AYELENA. Father         40s     Hypertension Father      DUNCAN. Paternal Grandfather      Heart Disease Paternal Grandfather      Diabetes Brother      SURYAYELENA. Brother      Heart Disease Brother   "    Hypertension Brother      Gastrointestinal Disease Son         Crohn's disease     Gastrointestinal Disease Other         2 grandaughters with Crohn's disease     Cancer - colorectal No family hx of        Social History:  Marital Status:   [5]  Social History     Tobacco Use     Smoking status: Never Smoker     Smokeless tobacco: Never Used   Substance Use Topics     Alcohol use: No     Alcohol/week: 0.0 standard drinks     Drug use: No        Medications:    ACCU-CHEK GUIDE test strip  albuterol (PROAIR HFA/PROVENTIL HFA/VENTOLIN HFA) 108 (90 Base) MCG/ACT inhaler  aspirin (ASA) 325 MG EC tablet  atorvastatin (LIPITOR) 80 MG tablet  cholecalciferol 2000 UNITS tablet  cyanocobalamin (BL VITAMIN B-12) 500 MCG TABS  finasteride (PROSCAR) 5 MG tablet  fish oil-omega-3 fatty acids (FISH OIL) 1000 MG capsule  furosemide (LASIX) 20 MG tablet  furosemide (LASIX) 20 MG tablet  insulin pen needle (BD ABRAM U/F) 32G X 4 MM miscellaneous  Insulin Pen Needle (PEN NEEDLES) 31G X 6 MM MISC  insulin syringe-needle U-100 (BD INSULIN SYRINGE ULTRAFINE) 31G X 5/16\" 1 ML  lisinopril (ZESTRIL) 10 MG tablet  metFORMIN (GLUCOPHAGE) 1000 MG tablet  metoprolol tartrate (LOPRESSOR) 25 MG tablet  nitroGLYcerin (NITROSTAT) 0.4 MG sublingual tablet  NOVOLIN 70/30 FLEXPEN RELION susp  order for DME  ranitidine (RANITIDINE 150 MAX STRENGTH) 150 MG tablet  tamsulosin (FLOMAX) 0.4 MG capsule  TRELEGY ELLIPTA 100-62.5-25 MCG/INH oral inhaler          Review of Systems   Constitutional: Negative for chills, diaphoresis and fever.   HENT: Negative for ear pain, sinus pressure and sore throat.    Eyes: Positive for visual disturbance.   Respiratory: Positive for shortness of breath. Negative for cough and wheezing.    Cardiovascular: Negative for chest pain and palpitations.   Gastrointestinal: Negative for abdominal pain, blood in stool, constipation, diarrhea, nausea and vomiting.   Genitourinary: Negative for dysuria, frequency and " urgency.   Skin: Negative for rash.   Neurological: Negative for headaches.   Psychiatric/Behavioral: Positive for confusion.   All other systems reviewed and are negative.      Physical Exam   BP: 122/72  Pulse: 74  Heart Rate: 75  Temp: 98  F (36.7  C)  Resp: 25  Weight: 83.9 kg (185 lb)  SpO2: 94 %      Physical Exam  Constitutional:       General: He is not in acute distress.     Appearance: He is not ill-appearing.   Eyes:      General: No visual field deficit.  Neck:      Musculoskeletal: Neck supple.   Cardiovascular:      Rate and Rhythm: Normal rate and regular rhythm.      Heart sounds: No murmur.   Pulmonary:      Effort: Pulmonary effort is normal. No respiratory distress.      Breath sounds: Normal breath sounds. No stridor. No wheezing or rhonchi.   Abdominal:      General: Bowel sounds are normal. There is no distension.      Palpations: Abdomen is soft. There is no mass.   Skin:     Coloration: Skin is not pale.      Findings: No rash.   Neurological:      Mental Status: He is alert.      GCS: GCS eye subscore is 4. GCS verbal subscore is 5. GCS motor subscore is 6.      Cranial Nerves: No cranial nerve deficit, dysarthria or facial asymmetry.      Sensory: No sensory deficit.      Motor: No weakness.      Coordination: Coordination normal.   Psychiatric:         Mood and Affect: Mood normal.         ED Course        Procedures                 EKG Interpretation:      Interpreted by Harshil Carias MD  EKG done at 1332 hrs. demonstrates a likely sinus rhythm with a leftward axis and no ST change T wave inversion in lead V1 to and V3 as well as flattening in lead III and aVF.  There is a rapid R wave progression related to a right bundle branch block.  Other intervals are normal and although the computer reads no OH interval this appears to be a prolonged and likely a first-degree AV block.  No ectopy.   Impression sinus rhythm 77 bpm right bundle branch block.  No acute changes.  There is also a  bifascicular block as this is a left axis.  This is unchanged from EKG yesterday.    Critical Care time:  none               Results for orders placed or performed during the hospital encounter of 08/05/20 (from the past 24 hour(s))   Glucose by meter   Result Value Ref Range    Glucose 223 (H) 70 - 99 mg/dL   CBC with platelets differential   Result Value Ref Range    WBC 7.8 4.0 - 11.0 10e9/L    RBC Count 4.11 (L) 4.4 - 5.9 10e12/L    Hemoglobin 12.6 (L) 13.3 - 17.7 g/dL    Hematocrit 37.2 (L) 40.0 - 53.0 %    MCV 91 78 - 100 fl    MCH 30.7 26.5 - 33.0 pg    MCHC 33.9 31.5 - 36.5 g/dL    RDW 13.4 10.0 - 15.0 %    Platelet Count 154 150 - 450 10e9/L    Diff Method Automated Method     % Neutrophils 65.3 %    % Lymphocytes 25.8 %    % Monocytes 7.6 %    % Eosinophils 0.5 %    % Basophils 0.5 %    % Immature Granulocytes 0.3 %    Nucleated RBCs 0 0 /100    Absolute Neutrophil 5.1 1.6 - 8.3 10e9/L    Absolute Lymphocytes 2.0 0.8 - 5.3 10e9/L    Absolute Monocytes 0.6 0.0 - 1.3 10e9/L    Absolute Eosinophils 0.0 0.0 - 0.7 10e9/L    Absolute Basophils 0.0 0.0 - 0.2 10e9/L    Abs Immature Granulocytes 0.0 0 - 0.4 10e9/L    Absolute Nucleated RBC 0.0    Basic metabolic panel   Result Value Ref Range    Sodium 139 133 - 144 mmol/L    Potassium 4.0 3.4 - 5.3 mmol/L    Chloride 106 94 - 109 mmol/L    Carbon Dioxide 28 20 - 32 mmol/L    Anion Gap 5 3 - 14 mmol/L    Glucose 223 (H) 70 - 99 mg/dL    Urea Nitrogen 24 7 - 30 mg/dL    Creatinine 1.10 0.66 - 1.25 mg/dL    GFR Estimate 63 >60 mL/min/[1.73_m2]    GFR Estimate If Black 73 >60 mL/min/[1.73_m2]    Calcium 9.0 8.5 - 10.1 mg/dL   Hepatic panel   Result Value Ref Range    Bilirubin Direct 0.2 0.0 - 0.2 mg/dL    Bilirubin Total 0.5 0.2 - 1.3 mg/dL    Albumin 3.2 (L) 3.4 - 5.0 g/dL    Protein Total 6.3 (L) 6.8 - 8.8 g/dL    Alkaline Phosphatase 82 40 - 150 U/L    ALT 23 0 - 70 U/L    AST 15 0 - 45 U/L   Troponin I   Result Value Ref Range    Troponin I ES <0.015 0.000 -  0.045 ug/L   CT Head w/o Contrast    Narrative    CT SCAN OF THE HEAD WITHOUT CONTRAST   8/5/2020 2:40 PM     HISTORY: Confusion, transient vision loss - occurred 24 hours ago.    TECHNIQUE:  Axial images of the head and coronal reformations without  IV contrast material. Radiation dose for this scan was reduced using  automated exposure control, adjustment of the mA and/or kV according  to patient size, or iterative reconstruction technique.    COMPARISON: Head CT 9/3/2018    FINDINGS:  Moderate volume loss is present. White matter  hypoattenuation likely represents moderate chronic small vessel  ischemic change. The cerebral hemispheres, brainstem, and cerebellum  otherwise demonstrate normal morphology and attenuation. No evidence  of acute ischemia, hemorrhage, mass, mass effect or hydrocephalus. The  visualized calvarium, tympanic cavities, mastoid cavities, and  paranasal sinuses are unremarkable. Bilateral lens replacements are  present.      Impression    IMPRESSION:   1. No CT evidence of acute ischemia or hemorrhage.  2. Patchy white matter hypoattenuation likely represents chronic small  vessel ischemic change.    DARCIE PERSAUD MD   CTA Head Neck with Contrast    Narrative    CT ANGIOGRAM OF THE HEAD AND NECK WITH CONTRAST  8/5/2020 2:42 PM     HISTORY: History of confusion, vision loss.     TECHNIQUE:  CT angiography with an injection of 70 mL Isovue 370 IV  with scans through the head and neck. Images were transferred to a  separate 3-D workstation where multiplanar reformations and 3-D images  were created. Estimates of carotid stenoses are made relative to the  distal internal carotid artery diameters except as noted. Radiation  dose for this scan was reduced using automated exposure control,  adjustment of the mA and/or kV according to patient size, or iterative  reconstruction technique.    COMPARISON: CTA of the head and neck 9/3/2018     CT ANGIOGRAM HEAD FINDINGS:     The bilateral vertebral  arteries, basilar artery, and posterior  cerebral arteries are patent. Fetal origin of the right posterior  cerebral artery. The internal carotid arteries, anterior cerebral  arteries, and middle cerebral arteries are patent. Small right  anterior cerebral artery A1 segment. Patent anterior communicating  artery is present. No evidence of large-vessel occlusion or high-grade  stenosis. No evidence of aneurysm or vascular malformation.     CT ANGIOGRAM NECK FINDINGS:   A three-vessel arch is present. The bilateral common carotid, internal  carotid, external carotid, and vertebral arteries are patent. Mild  plaquing is present at the vertebral artery origins and carotid  bifurcations without evidence of flow-limiting stenosis. Mild stenosis  at the right vertebral artery origin. Approximately 20% stenosis of  the proximal right internal carotid artery. Approximately 5% stenosis  of the proximal left internal carotid artery. Mild irregularity of the  bilateral vertebral arteries. Focal flattening of the left vertebral  artery at the mid V2 segment related to cervical spine degenerative  change (series 6 image 234).    Changes of coronary artery bypass grafting are demonstrated. Scattered  dental disease is present. Multiple periapical abscesses are present.  Moderate-to-severe cervical spinal degenerative changes present with  disc height loss worst at C4-5 and C5-6.      Impression    IMPRESSION:   CTA Head: No evidence of large-vessel occlusion or high-grade  stenosis.   CTA Neck: No evidence of large-vessel occlusion or high-grade  stenosis.     DARCIE PERSAUD MD   Chest XR,  PA & LAT    Narrative    CHEST TWO VIEWS 8/5/2020 2:45 PM     HISTORY: Cough x2 weeks.    COMPARISON: None.       Impression    IMPRESSION: Cardiac silhouette is within normal limits. Elevation of  the right hemidiaphragm is noted. No evidence for infiltrate or  effusion. No pneumothorax. Sternotomy wires are present. No  significant bony  abnormalities.    YELENA MOJICA MD       Medications - No data to display    Assessments & Plan (with Medical Decision Making)     MDM: Alvaro Horton is a 80 year old male who presents with likely TIA yesterday and history of coronary disease status post CABG. his mental status is clear here and he has no acute neurologic findings on examination.  His symptoms yesterday including field cut and possible confusion are again likely TIA.  Is severe claustrophobia will still allow CT but MRI may be difficult to impossible for this patient.  Recommended that we start with CT general laboratory testing as listed above.  EKG shows sinus rhythm.  His other symptoms are more recently including claudication-like symptoms would warrant additional evaluation also in the outpatient setting.  No signs of acute limb ischemia.    Patient's CT head, CT angiogram head and neck are negative for acute changes.  I have spoken with the patient earlier about undergoing MRI following CT but he has refused.  He notes severe claustrophobia.  He even had claustrophobia when performing the CT and required Ativan before this.  We discussed that with possible TIA I could not exclude CVA and I expressed this to both the patient and to his 2 children who I spoke to by phone.  He again notes that he will try and perform this with his primary provider but would not like to have it today.  He has no neurologic deficits today.  We discussed also that he needs to undergo echocardiogram and zio monitor and asked him to follow-up with his primary provider in the next couple of days.  I have asked him to stay on aspirin 324 mg daily.  To return for new neurologic changes.    We also discussed his claudication symptoms.  I recommend that he follow-up in clinic.  He has no signs of acute limb ischemia but his symptoms are classic for claudication with a gentleman who has had coronary artery bypass graft x4 vessel in the past I assume this is  peripheral arterial disease.  Again we discussed staying on aspirin and following up in clinic.    Again I also spoke with both of his children by phone.  They understand the need for close follow-up and additional testing as noted above.  Other tests done here are reassuring.    I have reviewed the nursing notes.    I have reviewed the findings, diagnosis, plan and need for follow up with the patient.       New Prescriptions    No medications on file       Final diagnoses:   TIA (transient ischemic attack) - stay on aspirin. See your primary doctor and obtain echocardiogram and Zio monitor to evaluate for arrhythmia. We discussed MRI Brain here and you wanted to delay this due to claustrophobia.  I recommend this occur.  please discuss having this done with primaryu provider.  You are at high risk of recurrence. please ghet this testing done soon   Claudication (H) - follow- up clinic. recommend arterial doppler - PARAG outpatient to evaluate for peripheral vascular disease       8/5/2020   [facility] EMERGENCY DEPARTMENT     Harshil Carias MD  08/05/20 [id number]

## 2020-08-05 NOTE — ED AVS SNAPSHOT
[facility] Emergency Department  [address] [address]  Phone:  [phone]  Fax:  [phone]                                    Alvaro Horton   MRN: [id number]    Department:  [facility] Emergency Department   Date of Visit:  8/5/2020           After Visit Summary Signature Page    I have received my discharge instructions, and my questions have been answered. I have discussed any challenges I see with this plan with the nurse or doctor.    ..........................................................................................................................................  Patient/Patient Representative Signature      ..........................................................................................................................................  Patient Representative Print Name and Relationship to Patient    ..................................................               ................................................  Date                                   Time    ..........................................................................................................................................  Reviewed by Signature/Title    ...................................................              ..............................................  Date                                               Time          22EPIC Rev 08/18

## 2020-08-05 NOTE — TELEPHONE ENCOUNTER
Reason for Call:  Other     Detailed comments: Patient has questions regarding his ED visit - Patient left because it scared him to be there     Phone Number Patient can be reached at: Home number on file [phone] (home)    Best Time:     Can we leave a detailed message on this number? YES    Call taken on 8/5/2020 at 9:03 AM by Nini Bates

## 2020-08-05 NOTE — TELEPHONE ENCOUNTER
Patient states he was making his son zoë yesterday when he looked at his son and could not see half of him and could only focus out of one eye. He states it then came back but has not felt well since. He went to the ER in [place] and they put him in a room and shut the door and he felt very claustrophobic and told them and they needed to keep the door shut so he left AMA. They had him in the stroke eval room. He has COPD and his breathing is a little worse for him but not much. His temp in the ER was 99.4 yesterday and he does not have a way to check at his house so we cannot see this patient here in clinic. States his head still does not feel right. States he is thirsty all the time. I asked what his blood sugars are and he states last night he was over 300 so he gave himself a shot. Today states he is at 170. States his stomach feels gassy and hard and just does not feel well. He stated he would have his daughter call me.    I spoke with his daughter Sujatha. She states she feels the same that he had a possible stroke and agrees he needs to be in the ER. She states her brother was with him at the time of the vision loss and he told her he urinated on himself at that same time. She states he is refusing to go to the ER again. Advised he can go to any ER but that he needs to be seen in an ER today. Advised we can call an ambulance and have them pick him up as she is 4 hours away from him. She states she will call an ambulance and her brother too as he lives in the area.     Torri Chong RN

## 2020-08-05 NOTE — DISCHARGE INSTRUCTIONS
ICD-10-CM    1. TIA (transient ischemic attack)  G45.9     stay on aspirin. See your primary doctor and obtain echocardiogram and Zio monitor to evaluate for arrhythmia. We discussed MRI Brain here and you wanted to delay this due to claustrophobia.  I recommend this occur.  please discuss having this done with primaryu provider.  You are at high risk of recurrence. please ghet this testing done soon   2. Claudication (H)  I73.9     follow- up clinic. recommend arterial doppler - PARAG outpatient to evaluate for peripheral vascular disease   \

## 2020-08-06 ENCOUNTER — TELEPHONE (OUTPATIENT)
Dept: FAMILY MEDICINE | Facility: CLINIC | Age: 80
End: 2020-08-06

## 2020-08-06 NOTE — TELEPHONE ENCOUNTER
Reason for call:  Patient reporting a symptom    Symptom or request: Pt was in th ER - TIA- Pt was told he needed Cardiac Test  Ordered.  Can the Nurse order these from the AFR Summary? Does pt need appt.    Phone Number patient can be reached at:  Other phone number:  Sujatha [phone]*    Best Time:  Any Time      Can we leave a detailed message on this number:  YES    Call taken on 8/6/2020 at 11:53 AM by Elsa Cain

## 2020-08-06 NOTE — TELEPHONE ENCOUNTER
Chantal Hunter, RN cannot order. Pt needs to be seen; looks like this was made clear to him/family; per hospital discharge summary. He will have to see another provider in PCP absence. Please call and help him schedule.    Discharge summary:   'We discussed also that he needs to undergo echocardiogram and zio monitor and asked him to follow-up with his primary provider in the next couple of days.'    Deb VALLE RN, BSN

## 2020-08-07 DIAGNOSIS — I25.9 CHRONIC ISCHEMIC HEART DISEASE: ICD-10-CM

## 2020-08-07 RX ORDER — METOPROLOL TARTRATE 25 MG/1
TABLET, FILM COATED ORAL
Qty: 60 TABLET | Refills: 0 | Status: SHIPPED | OUTPATIENT
Start: 2020-08-07 | End: 2020-01-01

## 2020-08-07 NOTE — TELEPHONE ENCOUNTER
"Medication is being filled for 1 time refill only due to:  Patient needs to be seen because see 8/5/2020 ED notes. .       Requested Prescriptions   Pending Prescriptions Disp Refills     metoprolol tartrate (LOPRESSOR) 25 MG tablet [Pharmacy Med Name: Metoprolol Tartrate 25 MG Oral Tablet] 60 tablet 0     Sig: Take 1 tablet by mouth twice daily       Beta-Blockers Protocol Passed - 8/7/2020  9:12 AM        Passed - Blood pressure under 140/90 in past 12 months     BP Readings from Last 3 Encounters:   08/05/20 138/70   08/04/20 (!) 167/82   03/06/20 120/64                 Passed - Patient is age 6 or older        Passed - Recent (12 mo) or future (30 days) visit within the authorizing provider's specialty     Patient has had an office visit with the authorizing provider or a provider within the authorizing providers department within the previous 12 mos or has a future within next 30 days. See \"Patient Info\" tab in inbasket, or \"Choose Columns\" in Meds & Orders section of the refill encounter.              Passed - Medication is active on med list           Deb VALLE RN, BSN      "

## 2020-08-08 ENCOUNTER — TELEPHONE (OUTPATIENT)
Dept: EMERGENCY MEDICINE | Facility: CLINIC | Age: 80
End: 2020-08-08

## 2020-08-08 DIAGNOSIS — N18.30 TYPE 2 DIABETES MELLITUS WITH STAGE 3 CHRONIC KIDNEY DISEASE, WITH LONG-TERM CURRENT USE OF INSULIN (H): ICD-10-CM

## 2020-08-08 DIAGNOSIS — E11.22 TYPE 2 DIABETES MELLITUS WITH STAGE 3 CHRONIC KIDNEY DISEASE, WITH LONG-TERM CURRENT USE OF INSULIN (H): ICD-10-CM

## 2020-08-08 DIAGNOSIS — Z79.4 TYPE 2 DIABETES MELLITUS WITH STAGE 3 CHRONIC KIDNEY DISEASE, WITH LONG-TERM CURRENT USE OF INSULIN (H): ICD-10-CM

## 2020-08-08 NOTE — TELEPHONE ENCOUNTER
Coronavirus (COVID-19) Notification    Lab Result   Lab test 2019-nCoV rRt-PCR OR SARS-COV-2 PCR    Nasopharyngeal AND/OR Oropharyngeal swab is NEGATIVE for 2019-nCoV RNA [OR] SARS-COV-2 RNA (COVID-19) RNA    Your result was negative. This means that we didn't find the virus that causes COVID-19 in your sample. A test may show negative when you do actually have the virus. This can happen when the virus is in the early stages of infection, before you feel illness symptoms.    If you have symptoms   Stay home and away from others (self-isolate) until you meet ALL of the guidelines below:    You've had no fever--and no medicine that reduces fever--for 3 full days (72 hours). And      Your other symptoms have gotten better. For example, your cough or breathing has improved. And     At least 10 days have passed since your symptoms started.    During this time:    Stay home. Don't go to work, school or anywhere else.     Stay in your own room, including for meals. Use your own bathroom if you can.    Stay away from others in your home. No hugging, kissing or shaking hands. No visitors.    Clean  high touch  surfaces often (doorknobs, counters, handles, etc.). Use a household cleaning spray or wipes. You can find a full list on the EPA website at www.epa.gov/pesticide-registration/list-n-disinfectants-use-against-sars-cov-2.    Cover your mouth and nose with a mask, tissue or washcloth to avoid spreading germs.    Wash your hands and face often with soap and water.    Results given to patient's son with the patient's permission.     If your symptoms worsen or other concerning symptoms, contact PCP, oncare or consider returning to Emergency Dept.    Where can I get more information?     [facility] [place]: [email or web address]    Coronavirus Basics: www.health.[email or web address].[email or web address]/diseases/coronavirus/basics.html    [facility] Hotline (656-925-5228)    {Name]  Isabella Montalvo RN  [facility] RN  Lung Nodule and ED Lab  Result RN  Epic pool (ED late result f/u RN): [id number]  [facility] INCIDENTAL RADIOLOGY F/U NURSES: P [id number]  # [phone]

## 2020-08-10 NOTE — PROGRESS NOTES
Subjective     Alvaro Horton is a 80 year old male who presents to clinic today for the following health issues:    HPI   ED/UC Followup:  Facility:  [facility]   Date of visit: 08/05/20  Reason for visit: Altered Mental Status   Current Status: Says that he  Is short of breath due to his COPD. Still confused at times. Says they tell him he has some onset of alzheimer but says that he does not believe that. Here to also discuss a MRI and Echocardiogram.     Pt has been doing well since discharge from the ED.   No new symptoms. No observed symptoms that are new from his daughter who is here today.   Daughter reports that her brother, whom the patient lives with, has noticed worsening memory issues. She states that he has not really had any acute changes.     Pt denies any new symptoms today including focal neuro deficit. He does report cramping in both of his legs and feet after walking his dog, which has become more significant recently.      Patient Active Problem List   Diagnosis     Chronic ischemic heart disease     Moderate persistent asthma     PSORIASIS     Hypertrophy of prostate without urinary obstruction     OVERACTIVE BLADDER     BAKERS LUNG     Hypertension goal BP (blood pressure) < 140/90     Esophageal reflux     Sensorineural hearing loss, bilateral     Obstructive sleep apnea     Benign paroxysmal vertigo     CVA (cerebral vascular accident) (H)     Atrial fibrillation (H)     Memory loss     CTS (carpal tunnel syndrome)     Dyslexia     Advanced care planning/counseling discussion     Hyperlipidemia LDL goal <70     COPD (chronic obstructive pulmonary disease) (H)     Chronic kidney disease, stage 2 (mild)     Health Care Home     Altered mental status     Type 2 diabetes mellitus with diabetic chronic kidney disease (H)     Encephalopathy     Abnormal blood-gas measurement     Acute calculous cholecystitis     Chest pain     Past Surgical History:   Procedure Laterality Date      C ANESTH,DX ARTHROSCOPIC PROC KNEE JOINT  1994, 1998     Left     CARDIAC SURGERY  11/09    CABG x2 - LIMA-D1, SVG to OM1, OM2.     GENITOURINARY SURGERY  1990    ESWL and percutaneous nephrolithotomy     LAPAROSCOPIC CHOLECYSTECTOMY N/A 3/4/2019    Procedure: LAPAROSCOPIC CHOLECYSTECTOMY;  Surgeon: Burt Mendieta MD;  Location: [facility] OR     ORTHOPEDIC SURGERY  8/12    right CTR     SURGICAL HISTORY OF -   1998    Hernia repair     SURGICAL HISTORY OF -   2004    NormalColonoscopy      SURGICAL HISTORY OF -   2006    Normal Colonoscopy       Social History     Tobacco Use     Smoking status: Never Smoker     Smokeless tobacco: Never Used   Substance Use Topics     Alcohol use: No     Alcohol/week: 0.0 standard drinks     Family History   Problem Relation Age of Onset     C.A.D. Father         40s     Hypertension Father      C.A.D. Paternal Grandfather      Heart Disease Paternal Grandfather      Diabetes Brother      C.A.D. Brother      Heart Disease Brother      Hypertension Brother      Gastrointestinal Disease Son         Crohn's disease     Gastrointestinal Disease Other         2 grandaughters with Crohn's disease     Cancer - colorectal No family hx of          Current Outpatient Medications   Medication Sig Dispense Refill     ACCU-CHEK GUIDE test strip USE TO CHECK BLOOD SUGARS THREE TIMES DAILY 300 each 1     albuterol (PROAIR HFA/PROVENTIL HFA/VENTOLIN HFA) 108 (90 Base) MCG/ACT inhaler Inhale 2 puffs into the lungs every 6 hours as needed for shortness of breath / dyspnea or wheezing 1 Inhaler 11     aspirin (ASA) 325 MG EC tablet Take 325 mg by mouth daily       cholecalciferol 2000 UNITS tablet Take 1 tablet by mouth daily. 90 tablet 3     cyanocobalamin (BL VITAMIN B-12) 500 MCG TABS Take 500 mcg by mouth daily. (Patient taking differently: Take 2,500 mcg by mouth daily ) 90 tablet 4     finasteride (PROSCAR) 5 MG tablet TAKE 1 TABLET BY MOUTH ONCE DAILY 90 tablet 1     fish oil-omega-3 fatty acids  "(FISH OIL) 1000 MG capsule Take 1 capsule by mouth 2 times daily. 180 capsule 3     furosemide (LASIX) 20 MG tablet Take 1 tablet (20 mg) by mouth daily 90 tablet 3     furosemide (LASIX) 20 MG tablet Take 20 mg by mouth daily       insulin pen needle (BD ABRAM U/F) 32G X 4 MM miscellaneous Use 2 daily as directed. 100 each 11     Insulin Pen Needle (PEN NEEDLES) 31G X 6 MM MISC 1 Units 3 times daily For novolog flexpen 90 each 0     insulin syringe-needle U-100 (BD INSULIN SYRINGE ULTRAFINE) 31G X 5/16\" 1 ML Use one syringe 4 times daily or as directed. 100 each prn     lisinopril (ZESTRIL) 10 MG tablet Take 1 tablet (10 mg) by mouth daily 90 tablet 3     metFORMIN (GLUCOPHAGE) 1000 MG tablet TAKE 1 TABLET BY MOUTH TWICE DAILY WITH MEALS 180 tablet 1     metoprolol tartrate (LOPRESSOR) 25 MG tablet Take 1 tablet by mouth twice daily 60 tablet 0     nitroGLYcerin (NITROSTAT) 0.4 MG sublingual tablet Place 1 tablet (0.4 mg) under the tongue every 5 minutes as needed for chest pain (CALL 911 IF NOT IMPROVED AFTER THREE CONSECUTIVE DOSES) 25 tablet 5     order for DME Equipment being ordered: CPAP  AIRSENSE 10  11-15 CM H2O  QUATTRO AIR SIZE MED  SN# [id number]  DN# [id number]       ranitidine (RANITIDINE 150 MAX STRENGTH) 150 MG tablet Take 1 tablet (150 mg) by mouth 2 times daily 180 tablet 0     tamsulosin (FLOMAX) 0.4 MG capsule TAKE 1 CAPSULE BY MOUTH ONCE DAILY . APPOINTMENT REQUIRED FOR FUTURE REFILLS 90 capsule 1     TRELEGY ELLIPTA 100-62.5-25 MCG/INH oral inhaler Inhale 1 puff into the lungs daily       atorvastatin (LIPITOR) 80 MG tablet Take 1 tablet by mouth once daily (Patient not taking: Reported on 8/10/2020) 90 tablet 0     NOVOLIN 70/30 FLEXPEN RELION susp INJECT 22 UNITS SUBCUTANEOUSLY 30 MINUTES BEFORE BREAKFAST AND 22 UNITS 30 MINUTES BEFORE DINNER 15 mL 1     Allergies   Allergen Reactions     Prednisone Other (See Comments)     Severe interaction with DM       Reviewed and updated as needed this visit by " Provider  Tobacco  Allergies  Meds  Problems  Med Hx  Surg Hx  Fam Hx         Review of Systems   Constitutional, HEENT, cardiovascular, pulmonary, gi and gu systems are negative, except as otherwise noted.      Objective    /52 (BP Location: Right arm, Patient Position: Sitting, Cuff Size: Adult Regular)   Pulse 63   Temp 97.4  F (36.3  C) (Tympanic)   Resp 22   Wt 88.9 kg (196 lb)   SpO2 97%   BMI 29.80 kg/m    Body mass index is 29.8 kg/m .  Physical Exam   Constitutional: healthy, alert, and no distress  Head: Normocephalic. Atraumatic  Eyes: No conjunctival injection, sclera anicteric  Cardiovascular: RRR. No murmurs, clicks, gallops, or rubs. Trace peripheral edema.   Respiratory: No resp distress. Lungs CTAB bilaterally. Decreased breath sounds bilateral bases.    Musculoskeletal: extremities normal- no gross deformities noted, and normal muscle tone  Skin: no suspicious lesions or rashes  Neurologic: Gait normal. CN 2-12 grossly intact. No pronator drift. No slurred speech. Negative Romberg. KAUR intact. FNF intact. AXO x4.   Psychiatric: mentation appears normal and affect normal/bright     Diagnostic Test Results:  Labs reviewed in Epic      Assessment & Plan   (G45.9) TIA (transient ischemic attack)  (primary encounter diagnosis)  Comment: No recurrent symptoms since being discharge from the ED. Extensive neuro exam was unremarkable today. Family notes some memory changes, but those are more progressive. Continue to monitor symptoms and continue full dose ASA at this time. Referral to Neurology for discussion of treatment options including Plavix or other anti-platelet medication.   Plan: NEUROLOGY ADULT REFERRAL    (I63.9) Cerebrovascular accident (CVA), unspecified mechanism (H)  Comment: Hx of CVA in the past. Has been on full dose ASA. Head and Neck CT with no stroke, but chronic small vessel disease. Pt unable to do MRI due to claustrophobia even with sedation. Additional work up  with Echo's, Holter monitor, stress test were all done last year and were unremarkable. Refer to Neuro as above.   Plan: NEUROLOGY ADULT REFERRAL    (I73.9) Claudication of calf muscles (H)  Comment: Pt has had this for some time and it is worsening. Given his heart disease, and chronic small vessel changes on his Head CT's, suspect this is a vascular cause. PARAG's ordered and Vascular referral placed as the patients family wants to discuss possible work up and treatment options.   Plan: VASCULAR MEDICINE REFERRAL, US PARAG Doppler with        Exercise Bilateral    (E78.5) Hyperlipidemia LDL goal <70  Comment: Considered this in his symptoms today.   Plan: Continue atorvastatin daily.     (I48.0) Paroxysmal atrial fibrillation (H)  Comment: Considered this in his diagnosis above. No afib on todays exam.   Plan: Continue to monitor.      Return in about 3 months (around 11/10/2020), or if symptoms worsen or fail to improve, for In-Clinic Visit.    Gil Ocasio PA-C  [facility]

## 2020-08-10 NOTE — TELEPHONE ENCOUNTER
"Pt referred to [facility] by Gil Ocasio PA-C for Claudication,   \"Pt with symptoms of claudication. Hx of CABG and likely TIA's with chronic small vessel disease in the brain. Suspect vascular claudication. Please eval and treat.\"     Patient has no recent imaging in [facility].     Pt needs to be scheduled for PARAG with exercise and consult with vascular medicine or IR.  Will route to scheduling to coordinate an appointment at next available.    Uzma RODRIGUEZ, RN    [facility]  Office: [phone]  Fax: [phone]      "

## 2020-08-13 NOTE — TELEPHONE ENCOUNTER
Patient is scheduled for his Ultrasound on 08/17/20 and an In Clinic Consult with Hannah (RONN) on 08/21/20 in [facility].

## 2020-08-21 NOTE — Clinical Note
Hi, Thanks you for the vascular consult with Franklin Horton. I do not believe his symptoms are PAD in nature and this is also supported by his normal PARAG. I offered a CTA with run offs to rule out that possibility in a non invasive imaging test but he was claustrophobic and did not want to proceed. He denies back issues but that could be considered to rule out possible causes for lower extremity pain/weakness.     Thanks [facility] Interventional Radiology CNP ([phone]) (phone [phone])

## 2020-08-21 NOTE — PATIENT INSTRUCTIONS
Hi, It was a pleasure to meet you today.     Your PARAG test is normal which indicates there is no obvious vascular disease in your legs per that test. Your symptoms aren't typical for vascular disease either.     If you would like a more definitive test a CTA abdomen/pelvis with leg run off would show any issues if that is suspected.     Please let me know if you are interested.    Thanks [facility] Interventional Radiology CNP ([phone]) (phone [phone])

## 2020-08-27 PROBLEM — I73.9 CLAUDICATION OF CALF MUSCLES (H): Status: ACTIVE | Noted: 2020-01-01

## 2020-08-27 NOTE — PROGRESS NOTES
"Patient Name: Alvaro Horton  MRN: [id number]  : 1940  Primary Care Provider: Be Carreno    HPI: Alvaro Horton is an 80 year old male with a history of hypertension, Baker's lung, COPD, type 2 diabetes, CVA, recent TIA, EDEN, CABG in  and mild memory loss being seen for symptoms of weakness and heaviness in his legs when he walks ~ 2 blocks.     His daughter is with him today.     He stated he used to be able to walk for a mile, now he can only walk 1/2 mile. He isn't able to work in his yard where he has multiple apple tress to care for them as he is too weak. He stated \"his whole body is shot\", \"you might as well take me out to the back and shoot me\". When he walks more than 2 blocks he feels heaviness in his legs and also through out his whole body. He becomes very SOB. Sometimes he feels cramping in his legs-but it was hard to get a good description of how that happens or whether it is consistent. Sometimes at night he gets cramps in his left foot. He has no complaints of cold feet. He does have numbness in the soles of his feet bilaterally which has been present for awhile.     He denies chest pain, or stroke symptoms such as lateralizing numbness, tingling or weakness in face or extremities; No acute speech difficulties; No acute vision changes. 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 He does have significant SOB/COPD which has gotten worse for years that limits his activity.    Patient Active Problem List    Diagnosis Date Noted     Hypertension goal BP (blood pressure) < 140/90 10/09/2006     Priority: High     BAKERS LUNG      Priority: High     Dx possible \"Baker's Lung\"  [facility]. RAST for bakers yeast negative . Has occupational exposures with related worsening asthmatic symptoms. CT - no fibrosis.       Moderate persistent asthma      Priority: High     PFTS  - FEV1- " 3.39 (64%), ratio 0.74, 24% change qgh93-85% with bronchodilators,  TLC 86%, %, DLCO 78%. Methacholine challenge positive at level 7 2001.  PFTS 2004 - FEV1- 1.93 (63%), ratio 0.77. PFTS 4/08 - FEV1- 2.13 (72%), ratio 0.71, no change with bronchodilators,  TLC 78%, RV 96%, DLCO 64%          Chronic ischemic heart disease      Priority: High     atypical chest pain 2001 with GXT echo- decreased LVF, angiogram 2001- nonobstructing 3 vessel disease.   Repeat angio 2004- prox RCA 50-60%,  OM2- 40%, D1 30-40%, LAD 30-40%.   Cardiolite 2005 during hospitalization for SOB in [place] reported to reveal no ischemia.   Adenosine cardiolite 1/08- small slightly reversible inferior defect, most likely consistent with diaphragm attenuation with bowel uptake artifact. USA, Angio 11/09- Severe LM disease, severe subtotal occlusion of a large branch OM1.    S/p CABG x2 11/09- LIMA-D1, SVG to OM1, OM2. GXT 8/10- 5.8 mets, 121 (80%) HR, normal ekg, cardiolite- small fixed inferior/basal defect with small area khurram-infarct ischemia extending into the mid ventricle. EF 67%.  4/25/13:adenosine cardiolyte stress test through [facility] with normal EF and no ischemia.   1/16/2016 nuclear stress test:1.  Myocardial perfusion imaging using single isotope technique demonstrated no evidence for myocardial ischemia. 2. Gated images demonstrated normal wall motion with a calculated ejection fraction of 61%.  3. Compared to the prior study from 2011 there are no significant changes .           Claudication of calf muscles (H) 08/27/2020     Priority: Medium     Chest pain 02/27/2020     Priority: Medium     Acute calculous cholecystitis 03/02/2019     Priority: Medium     Abnormal blood-gas measurement 09/04/2018     Priority: Medium     Encephalopathy 09/03/2018     Priority: Medium     Type 2 diabetes mellitus with diabetic chronic kidney disease (H) 10/30/2015     Priority: Medium     Altered mental status 12/31/2014     Priority: Medium     " 12/27/2014:episode confusion for 20 minutes.  Seen at [facility]/-hospitalized.  Head CT, MRI/MRA all normal.  PET cardiac perfusion stress test normal.  Neurology consult-diagnosis= possible hypoglycemia, migraine variant.  \"Acute ischemic cerebral event was excluded\".       Health Care Home 08/11/2014     Priority: Medium     State Tier Level:    Status:  Unable to re-connect  Care Coordinator:  Kecia Mills    See Letters for [facility] Care Plan  Date:  August 11, 2014             Chronic kidney disease, stage 2 (mild) 05/06/2014     Priority: Medium     9/9/2015:He has had two abnormal MAC in the past.        COPD (chronic obstructive pulmonary disease) (H) 11/18/2013     Priority: Medium     PFT 11/15/13 results:FVC=59%, FEV1=54%, FEV1/FVC=92%.  His DLCO was 58%1. Spirometry: FEV1 moderately reduced. FVC moderately reduced. FEV1/FVC ratio reduced. 2. Bronchodilator Response: A 14% improvement is seen in FEV1 with bronchodilators. 3. Lung Volumes: Total lung capacity normal. Residual volume increased. Residual volume total lung capacity ratio increased. 4. DLCO: Moderately reduced.   INTERPRETATION: Moderate obstructive lung disease. Reversibility with bronchodilators is seen on testing today. Lung volumes show evidence for air trapping. DLCO is reduced, consistent with loss of capillary-alveolar exchange as in emphysema, pulmonary hypertension, chronic pulmonary embolus, pulmonary fibrosis or other pulmonary vascular disease. A concomitant restrictive lung disease process may be suspected on the basis of moderate reductions in FEV1 and FVC, with a fairly well-preserved FEV1/FVC ratio and a borderline low normal total lung capacity.   11/15/13:exercise oximetry did not show significant desaturation below 91%.  PFT February, 2014 results:FVC=54%, FEV1=48%, FEV1/FVC=64%.  DLCO=59%  9/6/2016 pulmonary consult:COPD with restrictional components and diffusion defect, history Baker's lung.          Hyperlipidemia LDL goal " <70 04/17/2013     Priority: Medium     Atrial fibrillation (H) 12/14/2009     Priority: Medium     After CABG 11/09       CVA (cerebral vascular accident) (H) 11/27/2009     Priority: Medium     Mild Broca's aphasia, confusion, right hand dysesthesia after angio. MRA new small area of restricted diffusion in the white matter of the medial right temporal lobe,  consistent with a recent small infarct. Speech problems resolved, still mild right hand problems.         Obstructive sleep apnea 04/14/2008     Priority: Medium     ESS 20/24. Sleep study [facility]: 4/8/08. total sleep time was 423.0 minutes. The sleep latency was normal at 9.7 minutes.  REM sleep latency was 161.5 minutes. Sleep efficiency was normal at 82.7%. The sleep architecture was disrupted with frequent sleep stage changes and arousals. Snoring: Was reported as moderately loud. Lowest O2 saturation was 87.0%. This study is suggestive of mild sleep apnea, severe during REM sleep (21% TST).  PLM index was 0.0. EKG:  No significant cardiac arrhythmias were noted.         Hypertrophy of prostate without urinary obstruction      Priority: Medium     Elevated PSA. Bx negative 2004.       Advanced care planning/counseling discussion 09/24/2012     Priority: Low     Does not have a directive 9/12       Dyslexia 11/11/2010     Priority: Low     essentially unable to read       CTS (carpal tunnel syndrome) 05/12/2010     Priority: Low     EMG 5/10-  median neuropathy at the right wrist, moderate in severity electrically, right ulnar neuropathy localizing to the elbow, mild chronic right C6 radiculopathy without evidence for acute denervation.        Memory loss 05/03/2010     Priority: Low     MMSE 27/30 (0/3 attention recall, ?history of dylexia), PHQ9-16 7/09. Recommended neurocogntive testing, did not complete.  MMSE 23/30 6/10 (attention, county, if/and/buts).Neurocogntive evaluation  1/10- not suspicious for emerging dementia. Felt likely due hearing  "loss/dyslexia.   10/29/2018:He was seen at [facility] Neurology clinic 10/10/2018.  Dr. Oksana Munoz.   He had an EEG.   Diagnosed with Alzheimer's dementia.   Records not yet reviewed.        Benign paroxysmal vertigo 02/03/2009     Priority: Low     Admit 2/09. MRI/MRA head and neck negative.       Sensorineural hearing loss, bilateral 10/08/2007     Priority: Low     Esophageal reflux 11/06/2006     Priority: Low     PSORIASIS      Priority: Low     OVERACTIVE BLADDER      Priority: Low     PVR 84 2004.       MEDICATIONS AS OF 8/27/2020:  Current Outpatient Medications   Medication Sig     ACCU-CHEK GUIDE test strip USE TO CHECK BLOOD SUGARS THREE TIMES DAILY     albuterol (PROAIR HFA/PROVENTIL HFA/VENTOLIN HFA) 108 (90 Base) MCG/ACT inhaler Inhale 2 puffs into the lungs every 6 hours as needed for shortness of breath / dyspnea or wheezing     aspirin (ASA) 325 MG EC tablet Take 325 mg by mouth daily     atorvastatin (LIPITOR) 80 MG tablet Take 1 tablet by mouth once daily (Patient not taking: Reported on 8/10/2020)     cholecalciferol 2000 UNITS tablet Take 1 tablet by mouth daily.     cyanocobalamin (BL VITAMIN B-12) 500 MCG TABS Take 500 mcg by mouth daily. (Patient taking differently: Take 2,500 mcg by mouth daily )     finasteride (PROSCAR) 5 MG tablet TAKE 1 TABLET BY MOUTH ONCE DAILY     fish oil-omega-3 fatty acids (FISH OIL) 1000 MG capsule Take 1 capsule by mouth 2 times daily.     furosemide (LASIX) 20 MG tablet Take 1 tablet (20 mg) by mouth daily     furosemide (LASIX) 20 MG tablet Take 20 mg by mouth daily     insulin pen needle (BD ABRAM U/F) 32G X 4 MM miscellaneous Use 2 daily as directed.     Insulin Pen Needle (PEN NEEDLES) 31G X 6 MM MISC 1 Units 3 times daily For novolog flexpen     insulin syringe-needle U-100 (BD INSULIN SYRINGE ULTRAFINE) 31G X 5/16\" 1 ML Use one syringe 4 times daily or as directed.     lisinopril (ZESTRIL) 10 MG tablet Take 1 tablet (10 mg) by mouth daily     metFORMIN " (GLUCOPHAGE) 1000 MG tablet TAKE 1 TABLET BY MOUTH TWICE DAILY WITH MEALS     metoprolol tartrate (LOPRESSOR) 25 MG tablet Take 1 tablet by mouth twice daily     nitroGLYcerin (NITROSTAT) 0.4 MG sublingual tablet Place 1 tablet (0.4 mg) under the tongue every 5 minutes as needed for chest pain (CALL 911 IF NOT IMPROVED AFTER THREE CONSECUTIVE DOSES)     NOVOLIN 70/30 FLEXPEN RELION susp INJECT 22 UNITS SUBCUTANEOUSLY 30 MINUTES BEFORE BREAKFAST AND 22 UNITS 30 MINUTES BEFORE DINNER     order for DME Equipment being ordered: CPAP  AIRSENSE 10  11-15 CM H2O  QUATTRO AIR SIZE MED  SN# [id number]  DN# [id number]     ranitidine (RANITIDINE 150 MAX STRENGTH) 150 MG tablet Take 1 tablet (150 mg) by mouth 2 times daily     tamsulosin (FLOMAX) 0.4 MG capsule TAKE 1 CAPSULE BY MOUTH ONCE DAILY . APPOINTMENT REQUIRED FOR FUTURE REFILLS     TRELEGY ELLIPTA 100-62.5-25 MCG/INH oral inhaler Inhale 1 puff into the lungs daily     No current facility-administered medications for this visit.      ALLERGIES:   Allergies   Allergen Reactions     Prednisone Other (See Comments)     Severe interaction with DM       VASCULAR RISK FACTORS  1. Diabetes:Yes controlled  2. Smoking: quit smoking some time ago.  3. HTN: controlled  4.Hyperlipidemia: Yes - controlled    REVIEW OF SYSTEMS:  General: No fever, chills, diarrhea, or constipation  Skin: negative  Eyes: negative, wears glasses, no issues  Cardiovascular: negative for, palpitations, chest pain and exertional chest pain or pressure  Respiratory: See HPI  Gastrointestinal: negative  Musculoskeletal: negative. Also denies back pain or past issues with back problems  Neurologic: Negative for lateralizing numbness, tingling or weakness in face or extremities; No acute speech difficulties; No acute vision changes.   Vascular: See HPI, but denies claudication pain     OBJECTIVE:   B/P: 126/70; HR 75; Oxygen saturation on room air 93%  GENERAL;   CAROTID BRUITS AUSCULTATED: No CTA of head and neck  done 8/5/2020 which showed no evidence of large-vessel occlusion or high-grade stenosis.     LOWER EXTREMITIES: Warm, no non healing sores, wounds. No calluses. Mild shin edema. Skin between his toes very dry, Thick, fungal large toe nails bilaterally R > L.     PULSES      Right DP: 2+ (Palpable and dopplerable)    Right PT: 2+ (Dopplerable)       Left DP: 2+ (Palpable and dopplerable)    Left PT: 2+ (Dopplerable)     Imaging:   DATE: 8/17/2020 EXAM: RESTING AND POSTEXERCISE ANKLE BRACHIAL INDICES (ABIs)     INDICATION: Decreased lower extremity pulses, lower extremity edema/pain     COMPARISON: None.     PARAG FINDINGS:      The patient was exercised on a treadmill at 0.7 mph at a 10% incline for 5 minutes total. There are no reported symptoms with exercise.     Resting and immediate postexercise ABIs are 1.19 and 1.46 on the right.     Resting and immediate postexercise ABIs are 1.15 and 1.24 on the left.     WAVEFORMS: The dorsalis pedis and posterior tibial arteries show normal triphasic vascular waveforms bilaterally.                                                                   IMPRESSION:  1. RIGHT LOWER EXTREMITY: PARAG at rest is normal with a normal response to exercise. These findings would not be consistent with symptoms of arterial claudication.     2. LEFT LOWER EXTREMITY: PARAG at rest is normal with a normal response to exercise. These findings would not be consistent with symptoms of arterial claudication.     NATHAN GREGORIO MD    Assessment/Plan  (I73.9) Claudication of calf muscles (H)  (primary encounter diagnosis)    Comment: Alvaro Horton is an 80 year old male with a history of hypertension, Baker's lung, COPD, type 2 diabetes, CVA, recent TIA, EDEN, CABG in 2009 and mild memory loss being seen for symptoms of weakness and heaviness in his legs when he walks ~ 2 blocks.     PARAG results are normal with normal waveforms which does not indicate an arterial insufficiency problem. It does not  "appear that his symptoms are typical of claudication. He does not have pain in his legs that are typical of claudication, regular cramping or pain in calves or buttock/hips with walking a certain distance. During our clinic visit he complained more of his breathing issues and weakness in general.     Sometimes back pain and/or stenosis can contribute to leg weakness or nerve pain. This could be explored though Franklin denied having any issues with his back.     Franklin stated \"I knew you wouldn't find anything. They never do. So why do I feel like this then?\" I did try to discuss PAD but I'm not sure if he understood. I did offer to order a CTA with run off to rule out any possible arterial stenosis in his lower extremities but he is claustrophobic and refused that test. He has had a CT with Ativan before.     Plan: Please continue with current cares. I do not need to see Franklin in follow up as he does not appear to have PAD issues.     Consider a CTA with run offs if you feel it would be helpful to rule out vascular disease. Also consider MRI of back (which from what the patient said he wouldn't consider because of claustrophobia) or Thoracic/lumbar X rays to evaluate the possibility of back issues causing lower extremity pain or weakness if you feel is indicated.     Thank you for the consult.  Please call [phone] if you have any questions or if you need to contact me.     I met with Franklin and his daughter for 35 minutes of which greater than 50% revolved around discussion of treatment options and coordination of care.    [facility] Interventional Radiology CNP ([phone]) (phone [phone])       "

## 2020-09-11 NOTE — TELEPHONE ENCOUNTER
Franklin calling asking to go back to using  One touch Ultra test strips states others are hard to use and costly. States he does have a One Touch Ultra meter.    Tamar Bradshaw CSS

## 2020-09-21 NOTE — TELEPHONE ENCOUNTER
Reason for Call:  Other new diabetic meter    Detailed comments: calling asking for replacement of his Accu-check guide meter states his has been broke for two days he will check with [facility] [employer]  later today.    Phone Number Patient can be reached at: Cell number on file:    Telephone Information:   Mobile [phone]       Best Time: anytime     Can we leave a detailed message on this number? Not Applicable    Call taken on 9/21/2020 at 12:02 PM by Tamar Bradshaw

## 2020-10-16 NOTE — TELEPHONE ENCOUNTER
"Requested Prescriptions   Pending Prescriptions Disp Refills     metFORMIN (GLUCOPHAGE) 1000 MG tablet [Pharmacy Med Name: metFORMIN HCl 1000 MG Oral Tablet] 180 tablet 0     Sig: TAKE 1 TABLET BY MOUTH TWICE DAILY WITH MEALS       Biguanide Agents Failed - 10/16/2020  3:13 PM        Failed - Patient has documented A1c within the specified period of time.     If HgbA1C is 8 or greater, it needs to be on file within the past 3 months.  If less than 8, must be on file within the past 6 months.     Recent Labs   Lab Test 02/26/20  2239   A1C 7.9*             Passed - Patient is age 10 or older        Passed - Patient's CR is NOT>1.4 OR Patient's EGFR is NOT<45 within past 12 mos.     Recent Labs   Lab Test 08/05/20  1135   GFRESTIMATED 63   GFRESTBLACK 73       Recent Labs   Lab Test 08/05/20  1135   CR 1.10             Passed - Patient does NOT have a diagnosis of CHF.        Passed - Medication is active on med list        Passed - Recent (6 mo) or future (30 days) visit within the authorizing provider's specialty     Patient had office visit in the last 6 months or has a visit in the next 30 days with authorizing provider or within the authorizing provider's specialty.  See \"Patient Info\" tab in inbasket, or \"Choose Columns\" in Meds & Orders section of the refill encounter.                 "

## 2020-10-28 NOTE — TELEPHONE ENCOUNTER
"Requested Prescriptions   Pending Prescriptions Disp Refills     atorvastatin (LIPITOR) 80 MG tablet [Pharmacy Med Name: Atorvastatin Calcium 80 MG Oral Tablet] 90 tablet 0     Sig: Take 1 tablet by mouth once daily       Statins Protocol Failed - 10/27/2020  9:53 AM        Failed - LDL on file in past 12 months     Recent Labs   Lab Test 07/29/19  1137   LDL 61             Passed - No abnormal creatine kinase in past 12 months     Recent Labs   Lab Test 08/06/19  1359                   Passed - Recent (12 mo) or future (30 days) visit within the authorizing provider's specialty     Patient has had an office visit with the authorizing provider or a provider within the authorizing providers department within the previous 12 mos or has a future within next 30 days. See \"Patient Info\" tab in inbasket, or \"Choose Columns\" in Meds & Orders section of the refill encounter.              Passed - Medication is active on med list        Passed - Patient is age 18 or older             "

## 2020-10-28 NOTE — TELEPHONE ENCOUNTER
This is a chronic medication that is not urgent to refill. This should go to Dr. Carreno as this is his PCP. Please forward to Dr. Carreno.     Gil Ocasio PA-C

## 2020-11-22 NOTE — PROGRESS NOTES
Chief Complaint   Patient presents with     Cerumen Impaction     needs ears cleaned     History of Present Illness   Alvaro Horton is a 80 year old male who presents to me today for ear evaluation. The patient is concerned about possible ear wax causing a plugged ear. They have had their ears cleaned out before. The patient denies significant hearing changes, otalgia, otorrhea, bloody otorrhea, dizziness, tinnitus, vertigo.  No history of ear surgery or chronic ear disease.     Past Medical History  Patient Active Problem List   Diagnosis     Chronic ischemic heart disease     Moderate persistent asthma     PSORIASIS     Hypertrophy of prostate without urinary obstruction     OVERACTIVE BLADDER     BAKERS LUNG     Hypertension goal BP (blood pressure) < 140/90     Esophageal reflux     Sensorineural hearing loss, bilateral     Obstructive sleep apnea     Benign paroxysmal vertigo     CVA (cerebral vascular accident) (H)     Atrial fibrillation (H)     Memory loss     CTS (carpal tunnel syndrome)     Dyslexia     Advanced care planning/counseling discussion     Hyperlipidemia LDL goal <70     COPD (chronic obstructive pulmonary disease) (H)     Chronic kidney disease, stage 2 (mild)     Health Care Home     Altered mental status     Type 2 diabetes mellitus with diabetic chronic kidney disease (H)     Encephalopathy     Abnormal blood-gas measurement     Acute calculous cholecystitis     Chest pain     Claudication of calf muscles (H)     Current Medications    Current Outpatient Medications:      albuterol (PROAIR HFA/PROVENTIL HFA/VENTOLIN HFA) 108 (90 Base) MCG/ACT inhaler, Inhale 2 puffs into the lungs every 6 hours as needed for shortness of breath / dyspnea or wheezing, Disp: 1 Inhaler, Rfl: 11     aspirin (ASA) 325 MG EC tablet, Take 325 mg by mouth daily, Disp: , Rfl:      atorvastatin (LIPITOR) 80 MG tablet, Take 1 tablet by mouth once daily, Disp: 90 tablet, Rfl: 0     blood glucose (NO BRAND  "SPECIFIED) test strip, Use to test blood sugar 3 times daily, Disp: 100 strip, Rfl: 0     blood glucose monitoring (NO BRAND SPECIFIED) meter device kit, Use to test blood sugar 3 times daily or as directed., Disp: 1 kit, Rfl: 0     cholecalciferol 2000 UNITS tablet, Take 1 tablet by mouth daily., Disp: 90 tablet, Rfl: 3     cyanocobalamin (BL VITAMIN B-12) 500 MCG TABS, Take 500 mcg by mouth daily. (Patient taking differently: Take 2,500 mcg by mouth daily ), Disp: 90 tablet, Rfl: 4     finasteride (PROSCAR) 5 MG tablet, Take 1 tablet by mouth once daily, Disp: 90 tablet, Rfl: 0     fish oil-omega-3 fatty acids (FISH OIL) 1000 MG capsule, Take 1 capsule by mouth 2 times daily., Disp: 180 capsule, Rfl: 3     furosemide (LASIX) 20 MG tablet, Take 1 tablet (20 mg) by mouth daily, Disp: 90 tablet, Rfl: 3     furosemide (LASIX) 20 MG tablet, Take 20 mg by mouth daily, Disp: , Rfl:      insulin pen needle (BD ABRAM U/F) 32G X 4 MM miscellaneous, Use 2 daily as directed., Disp: 100 each, Rfl: 11     Insulin Pen Needle (PEN NEEDLES) 31G X 6 MM MISC, 1 Units 3 times daily For novolog flexpen, Disp: 90 each, Rfl: 0     insulin syringe-needle U-100 (BD INSULIN SYRINGE ULTRAFINE) 31G X 5/16\" 1 ML, Use one syringe 4 times daily or as directed., Disp: 100 each, Rfl: prn     lisinopril (ZESTRIL) 10 MG tablet, Take 1 tablet (10 mg) by mouth daily, Disp: 90 tablet, Rfl: 3     metFORMIN (GLUCOPHAGE) 1000 MG tablet, TAKE 1 TABLET BY MOUTH TWICE DAILY WITH MEALS, Disp: 180 tablet, Rfl: 0     metoprolol tartrate (LOPRESSOR) 25 MG tablet, Take 1 tablet by mouth twice daily, Disp: 180 tablet, Rfl: 3     NOVOLIN 70/30 FLEXPEN RELION susp, INJECT 22 UNITS SUBCUTANEOUSLY 30 MINUTES BEFORE BREAKFAST AND 22 UNITS 30 MINUTES BEFORE DINNER, Disp: 15 mL, Rfl: 1     order for DME, Equipment being ordered: CPAP AIRSENSE 10 11-15 CM H2O QUATTRO AIR SIZE MED SN# [id number]  DN# [id number], Disp: , Rfl:      ranitidine (RANITIDINE 150 MAX STRENGTH) 150 MG " tablet, Take 1 tablet (150 mg) by mouth 2 times daily, Disp: 180 tablet, Rfl: 0     tamsulosin (FLOMAX) 0.4 MG capsule, TAKE 1 CAPSULE BY MOUTH ONCE DAILY . APPOINTMENT REQUIRED FOR FUTURE REFILLS, Disp: 90 capsule, Rfl: 0     TRELEGY ELLIPTA 100-62.5-25 MCG/INH oral inhaler, Inhale 1 puff into the lungs daily, Disp: , Rfl:      nitroGLYcerin (NITROSTAT) 0.4 MG sublingual tablet, Place 1 tablet (0.4 mg) under the tongue every 5 minutes as needed for chest pain (CALL 911 IF NOT IMPROVED AFTER THREE CONSECUTIVE DOSES) (Patient not taking: Reported on 11/23/2020), Disp: 25 tablet, Rfl: 5    Allergies  Allergies   Allergen Reactions     Prednisone Other (See Comments)     Severe interaction with DM       Social History  Social History     Socioeconomic History     Marital status:      Spouse name: Not on file     Number of children: Not on file     Years of education: Not on file     Highest education level: Not on file   Occupational History     Occupation: [employer]     Employer: RETIRED     Occupation:    Social Needs     Financial resource strain: Not on file     Food insecurity     Worry: Not on file     Inability: Not on file     Transportation needs     Medical: Not on file     Non-medical: Not on file   Tobacco Use     Smoking status: Never Smoker     Smokeless tobacco: Never Used   Substance and Sexual Activity     Alcohol use: No     Alcohol/week: 0.0 standard drinks     Drug use: No     Sexual activity: Never   Lifestyle     Physical activity     Days per week: Not on file     Minutes per session: Not on file     Stress: Not on file   Relationships     Social connections     Talks on phone: Not on file     Gets together: Not on file     Attends Bahai service: Not on file     Active member of club or organization: Not on file     Attends meetings of clubs or organizations: Not on file     Relationship status: Not on file     Intimate partner violence     Fear of current or ex  partner: Not on file     Emotionally abused: Not on file     Physically abused: Not on file     Forced sexual activity: Not on file   Other Topics Concern     Parent/sibling w/ CABG, MI or angioplasty before 65F 55M? No   Social History Narrative     Not on file       Family History  Family History   Problem Relation Age of Onset     C.A.D. Father         40s     Hypertension Father      C.A.D. Paternal Grandfather      Heart Disease Paternal Grandfather      Diabetes Brother      C.A.D. Brother      Heart Disease Brother      Hypertension Brother      Gastrointestinal Disease Son         Crohn's disease     Gastrointestinal Disease Other         2 grandaughters with Crohn's disease     Cancer - colorectal No family hx of        Review of Systems  As per HPI and PMHx, otherwise 7 system review of the head and neck negative.    Physical Exam  BP (!) 145/79 (BP Location: Right arm, Patient Position: Chair, Cuff Size: Adult Large)   Pulse 87   Wt 88.9 kg (196 lb)   BMI 29.80 kg/m    GENERAL: Patient is a pleasant, cooperative 80 year old male in no acute distress.  HEAD: Normocephalic, atraumatic.  Hair and scalp are normal.  EYES: Pupils are equal, round, reactive to light and accommodation.  Extraocular movements are intact.  The sclera nonicteric without injection.  The extraocular structures are normal.  EARS: See below.  NEUROLOGIC: Cranial nerves II through XII are grossly intact.  Voice is strong.  Facial nerve examination incomplete due to the patient wearing a mask.  CARDIOVASCULAR: Extremities are warm and well-perfused.  No significant peripheral edema.  RESPIRATORY: Patient has nonlabored breathing without cough, wheeze, stridor.  PSYCHIATRIC: Patient is alert and oriented.  Mood and affect appear normal.  SKIN: Warm and dry.  No scalp, face, or neck lesions noted.    Physical Exam and Procedure    EARS: Normal shape and symmetry.  No tenderness when palpating the mastoid or tragal areas bilaterally.   The ears were then examined under the otic binocular microscope to perform cerumen removal.  Otoscopic exam on the right reveals impacted cerumen down to the level of the tympanic membrane.  The cerumen was removed with an alligator forceps.  The right tympanic membrane was round, intact without evidence of effusion.  No retraction, granulation, drainage, or evidence of cholesteatoma.      Attention was turned to the left ear.  Otoscopic exam on the left reveals impacted cerumen down to the level of the tympanic membrane.  The cerumen was removed with a curette and alligator forceps.  The left tympanic membrane was round, intact without evidence of effusion.  No retraction, granulation, drainage, or evidence of cholesteatoma.      Assessment and Plan     ICD-10-CM    1. Bilateral impacted cerumen  H61.23 Remove Cerumen   2. Sensorineural hearing loss, bilateral  H90.3      It was my pleasure seeing Alvaro Horton today in clinic.  The patient had bilateral cerumen impactions today successfully removed in office.  We spent the remainder of today's visit on education including not putting any instrument in the ear.       The patient will follow up 6-12 months.    [place]lin to follow up with Primary Care provider regarding elevated blood pressure.    José Luis Thompson MD  Department of Otolarygology-Head and Neck Surgery  [facility]

## 2020-11-23 NOTE — LETTER
11/23/2020         RE: Alvaro Horton  [address]  [facility] [place] [address]        Dear Colleague,    Thank you for referring your patient, Alvaro Horton, to the [facility]. Please see a copy of my visit note below.    Chief Complaint   Patient presents with     Cerumen Impaction     needs ears cleaned     History of Present Illness   Alvaro Horton is a 80 year old male who presents to me today for ear evaluation. The patient is concerned about possible ear wax causing a plugged ear. They have had their ears cleaned out before. The patient denies significant hearing changes, otalgia, otorrhea, bloody otorrhea, dizziness, tinnitus, vertigo.  No history of ear surgery or chronic ear disease.     Past Medical History  Patient Active Problem List   Diagnosis     Chronic ischemic heart disease     Moderate persistent asthma     PSORIASIS     Hypertrophy of prostate without urinary obstruction     OVERACTIVE BLADDER     BAKERS LUNG     Hypertension goal BP (blood pressure) < 140/90     Esophageal reflux     Sensorineural hearing loss, bilateral     Obstructive sleep apnea     Benign paroxysmal vertigo     CVA (cerebral vascular accident) (H)     Atrial fibrillation (H)     Memory loss     CTS (carpal tunnel syndrome)     Dyslexia     Advanced care planning/counseling discussion     Hyperlipidemia LDL goal <70     COPD (chronic obstructive pulmonary disease) (H)     Chronic kidney disease, stage 2 (mild)     Health Care Home     Altered mental status     Type 2 diabetes mellitus with diabetic chronic kidney disease (H)     Encephalopathy     Abnormal blood-gas measurement     Acute calculous cholecystitis     Chest pain     Claudication of calf muscles (H)     Current Medications    Current Outpatient Medications:      albuterol (PROAIR HFA/PROVENTIL HFA/VENTOLIN HFA) 108 (90 Base) MCG/ACT inhaler, Inhale 2 puffs into the lungs every 6 hours as needed for shortness of  "breath / dyspnea or wheezing, Disp: 1 Inhaler, Rfl: 11     aspirin (ASA) 325 MG EC tablet, Take 325 mg by mouth daily, Disp: , Rfl:      atorvastatin (LIPITOR) 80 MG tablet, Take 1 tablet by mouth once daily, Disp: 90 tablet, Rfl: 0     blood glucose (NO BRAND SPECIFIED) test strip, Use to test blood sugar 3 times daily, Disp: 100 strip, Rfl: 0     blood glucose monitoring (NO BRAND SPECIFIED) meter device kit, Use to test blood sugar 3 times daily or as directed., Disp: 1 kit, Rfl: 0     cholecalciferol 2000 UNITS tablet, Take 1 tablet by mouth daily., Disp: 90 tablet, Rfl: 3     cyanocobalamin (BL VITAMIN B-12) 500 MCG TABS, Take 500 mcg by mouth daily. (Patient taking differently: Take 2,500 mcg by mouth daily ), Disp: 90 tablet, Rfl: 4     finasteride (PROSCAR) 5 MG tablet, Take 1 tablet by mouth once daily, Disp: 90 tablet, Rfl: 0     fish oil-omega-3 fatty acids (FISH OIL) 1000 MG capsule, Take 1 capsule by mouth 2 times daily., Disp: 180 capsule, Rfl: 3     furosemide (LASIX) 20 MG tablet, Take 1 tablet (20 mg) by mouth daily, Disp: 90 tablet, Rfl: 3     furosemide (LASIX) 20 MG tablet, Take 20 mg by mouth daily, Disp: , Rfl:      insulin pen needle (BD ABRAM U/F) 32G X 4 MM miscellaneous, Use 2 daily as directed., Disp: 100 each, Rfl: 11     Insulin Pen Needle (PEN NEEDLES) 31G X 6 MM MISC, 1 Units 3 times daily For novolog flexpen, Disp: 90 each, Rfl: 0     insulin syringe-needle U-100 (BD INSULIN SYRINGE ULTRAFINE) 31G X 5/16\" 1 ML, Use one syringe 4 times daily or as directed., Disp: 100 each, Rfl: prn     lisinopril (ZESTRIL) 10 MG tablet, Take 1 tablet (10 mg) by mouth daily, Disp: 90 tablet, Rfl: 3     metFORMIN (GLUCOPHAGE) 1000 MG tablet, TAKE 1 TABLET BY MOUTH TWICE DAILY WITH MEALS, Disp: 180 tablet, Rfl: 0     metoprolol tartrate (LOPRESSOR) 25 MG tablet, Take 1 tablet by mouth twice daily, Disp: 180 tablet, Rfl: 3     NOVOLIN 70/30 FLEXPEN RELION susp, INJECT 22 UNITS SUBCUTANEOUSLY 30 MINUTES " BEFORE BREAKFAST AND 22 UNITS 30 MINUTES BEFORE DINNER, Disp: 15 mL, Rfl: 1     order for DME, Equipment being ordered: CPAP AIRSENSE 10 11-15 CM H2O QUATTRO AIR SIZE MED # [id number]  DN# [id number], Disp: , Rfl:      ranitidine (RANITIDINE 150 MAX STRENGTH) 150 MG tablet, Take 1 tablet (150 mg) by mouth 2 times daily, Disp: 180 tablet, Rfl: 0     tamsulosin (FLOMAX) 0.4 MG capsule, TAKE 1 CAPSULE BY MOUTH ONCE DAILY . APPOINTMENT REQUIRED FOR FUTURE REFILLS, Disp: 90 capsule, Rfl: 0     TRELEGY ELLIPTA 100-62.5-25 MCG/INH oral inhaler, Inhale 1 puff into the lungs daily, Disp: , Rfl:      nitroGLYcerin (NITROSTAT) 0.4 MG sublingual tablet, Place 1 tablet (0.4 mg) under the tongue every 5 minutes as needed for chest pain (CALL 911 IF NOT IMPROVED AFTER THREE CONSECUTIVE DOSES) (Patient not taking: Reported on 11/23/2020), Disp: 25 tablet, Rfl: 5    Allergies  Allergies   Allergen Reactions     Prednisone Other (See Comments)     Severe interaction with DM       Social History  Social History     Socioeconomic History     Marital status:      Spouse name: Not on file     Number of children: Not on file     Years of education: Not on file     Highest education level: Not on file   Occupational History     Occupation:      Employer: RETIRED     Occupation:    Social Needs     Financial resource strain: Not on file     Food insecurity     Worry: Not on file     Inability: Not on file     Transportation needs     Medical: Not on file     Non-medical: Not on file   Tobacco Use     Smoking status: Never Smoker     Smokeless tobacco: Never Used   Substance and Sexual Activity     Alcohol use: No     Alcohol/week: 0.0 standard drinks     Drug use: No     Sexual activity: Never   Lifestyle     Physical activity     Days per week: Not on file     Minutes per session: Not on file     Stress: Not on file   Relationships     Social connections     Talks on phone: Not on file     Gets together: Not  on file     Attends Scientologist service: Not on file     Active member of club or organization: Not on file     Attends meetings of clubs or organizations: Not on file     Relationship status: Not on file     Intimate partner violence     Fear of current or ex partner: Not on file     Emotionally abused: Not on file     Physically abused: Not on file     Forced sexual activity: Not on file   Other Topics Concern     Parent/sibling w/ CABG, MI or angioplasty before 65F 55M? No   Social History Narrative     Not on file       Family History  Family History   Problem Relation Age of Onset     C.A.D. Father         40s     Hypertension Father      C.A.D. Paternal Grandfather      Heart Disease Paternal Grandfather      Diabetes Brother      C.A.D. Brother      Heart Disease Brother      Hypertension Brother      Gastrointestinal Disease Son         Crohn's disease     Gastrointestinal Disease Other         2 grandaughters with Crohn's disease     Cancer - colorectal No family hx of        Review of Systems  As per HPI and PMHx, otherwise 7 system review of the head and neck negative.    Physical Exam  BP (!) 145/79 (BP Location: Right arm, Patient Position: Chair, Cuff Size: Adult Large)   Pulse 87   Wt 88.9 kg (196 lb)   BMI 29.80 kg/m    GENERAL: Patient is a pleasant, cooperative 80 year old male in no acute distress.  HEAD: Normocephalic, atraumatic.  Hair and scalp are normal.  EYES: Pupils are equal, round, reactive to light and accommodation.  Extraocular movements are intact.  The sclera nonicteric without injection.  The extraocular structures are normal.  EARS: See below.  NEUROLOGIC: Cranial nerves II through XII are grossly intact.  Voice is strong.  Facial nerve examination incomplete due to the patient wearing a mask.  CARDIOVASCULAR: Extremities are warm and well-perfused.  No significant peripheral edema.  RESPIRATORY: Patient has nonlabored breathing without cough, wheeze, stridor.  PSYCHIATRIC:  Patient is alert and oriented.  Mood and affect appear normal.  SKIN: Warm and dry.  No scalp, face, or neck lesions noted.    Physical Exam and Procedure    EARS: Normal shape and symmetry.  No tenderness when palpating the mastoid or tragal areas bilaterally.  The ears were then examined under the otic binocular microscope to perform cerumen removal.  Otoscopic exam on the right reveals impacted cerumen down to the level of the tympanic membrane.  The cerumen was removed with an alligator forceps.  The right tympanic membrane was round, intact without evidence of effusion.  No retraction, granulation, drainage, or evidence of cholesteatoma.      Attention was turned to the left ear.  Otoscopic exam on the left reveals impacted cerumen down to the level of the tympanic membrane.  The cerumen was removed with a curette and alligator forceps.  The left tympanic membrane was round, intact without evidence of effusion.  No retraction, granulation, drainage, or evidence of cholesteatoma.      Assessment and Plan     ICD-10-CM    1. Bilateral impacted cerumen  H61.23 Remove Cerumen   2. Sensorineural hearing loss, bilateral  H90.3      It was my pleasure seeing Alvaro Horton today in clinic.  The patient had bilateral cerumen impactions today successfully removed in office.  We spent the remainder of today's visit on education including not putting any instrument in the ear.       The patient will follow up 6-12 months.    [place] to follow up with Primary Care provider regarding elevated blood pressure.    José Luis Thompson MD  Department of Otolarygology-Head and Neck Surgery  [facility]         Again, thank you for allowing me to participate in the care of your patient.        Sincerely,        José Luis Thompson MD

## 2020-11-23 NOTE — NURSING NOTE
"Initial BP (!) 145/79 (BP Location: Right arm, Patient Position: Chair, Cuff Size: Adult Large)   Pulse 87   Wt 88.9 kg (196 lb)   BMI 29.80 kg/m   Estimated body mass index is 29.8 kg/m  as calculated from the following:    Height as of 3/6/20: 1.727 m (5' 8\").    Weight as of this encounter: 88.9 kg (196 lb). .    Araceli Lee LPN    "

## 2020-11-25 NOTE — TELEPHONE ENCOUNTER
Reason for Call:  Other     Detailed comments: Patient thinks he might have covid - Please traige -     Phone Number Patient can be reached at: Cell number on file:    Telephone Information:   Mobile [phone]       Best Time:     Can we leave a detailed message on this number? YES    Call taken on 11/25/2020 at 12:58 PM by Nini Bates

## 2020-11-25 NOTE — TELEPHONE ENCOUNTER
"S-(situation): I talked with Franklin.  He says last few days he has been \"kind of dizzy and coughing up green phlegm\".  It is hard for him to give me details of how he feels.  No fever.  Had chills last night.  Didn't sleep much last night because not not breathing well he says.  I do not hear wheezing while talking with him and do not hear any shortness of breath while talking to him.  Is able to talk in complete sentences without stopping to cough or catch breath  Sometimes short of breath while just sitting. Has not noticed a change in breathing when up walking around.  States slipped out of slippers and fell.  Says hit head hard and hurt right leg.  Is alert and oriented.  Talks fine on the phone.  Able to walk OK    B-(background): COPD, HTN, diabetes reflux.    A-(assessment): cough, fell a due to foot coming out of slippers, not from being dizzy.      R-(recommendations): advised could do phone visit or clinic appointment  Zabrina Silva RN      "

## 2020-11-26 PROBLEM — U07.1 CLINICAL DIAGNOSIS OF COVID-19: Status: ACTIVE | Noted: 2020-01-01

## 2020-11-27 PROBLEM — Z86.73 HISTORY OF CVA (CEREBROVASCULAR ACCIDENT): Status: ACTIVE | Noted: 2020-01-01

## 2020-11-27 PROBLEM — R79.89 ELEVATED TROPONIN: Status: ACTIVE | Noted: 2020-01-01

## 2020-11-27 PROBLEM — R53.1 GENERALIZED WEAKNESS: Status: ACTIVE | Noted: 2020-01-01

## 2020-11-27 PROBLEM — R29.6 FALLS FREQUENTLY: Status: ACTIVE | Noted: 2020-01-01

## 2020-11-27 PROBLEM — Z20.822 SUSPECTED 2019 NOVEL CORONAVIRUS INFECTION: Status: ACTIVE | Noted: 2020-01-01

## 2020-11-27 PROBLEM — D69.6 THROMBOCYTOPENIA (H): Status: ACTIVE | Noted: 2020-01-01

## 2020-11-27 NOTE — PROGRESS NOTES
Just paged provider- Dr. Price to update re: change in mentation, fever, agitation/frustration with staff as is disoriented and unable to reorient.  New orders received for haldol and seroquel PRN for agitation.

## 2020-11-27 NOTE — H&P
[facility]     History and Physical  Hospital Medicine       Date of Admission:  11/26/2020  Date of Service: 11/27/2020     Assessment & Plan   Alvaro Horton is a 80 year old male who presents on 11/26/2020 with weakness and frequent falls. Also concern for COVID infection.     Acute respiratory failure with hypoxia due to 2019 novel coronavirus infection  Weakness, fatigue, shortness of breath, cough and hypoxia. Was hypoxic to 84% in ED. Was placed on 2 L NC and has remained on this. Admits to shortness of breath. Has a chronic cough. CXR revealed hypo aerated lungs but otherwise clear. D dimer is elevated - can be seen with COVID infection , awaiting test results. Influenza negative. Covid labsinclude elevated troponin, elevated CRP of 22.1-->45.7, low platelets of 107-->95, elevated d dimer of 1.4. Was also febrile to 101.6 on arrival. COVID RESULT POSITIVE.   - Continue supplemental oxygen and wean as able.   - Continuous pulse ox monitoring.    - Continue home COPD inhalers.     - Continue special precautions.   - Adding on decadron 6 mg daily.   - Adding remdesivir, 200 mg today, 100 mg next 4 days.   - Daily COVID labs.     Falls frequently  Generalized weakness  Son notes generalized weakness, patient was found down early am hours of 11/25.    - Physical therapy and occupational therapy evals.   - Social work for discharge planning, may need TCU if son unable to care for him at home.    - Up with assist.    - Fall precautions.      Elevated troponin   Initial troponin 0.044-->0.069-->0.107-->0.141.  Denies chest pain.  Suspect due to a combination of atrial fibrillation, demand ischemia, and possible COVID-19 infection vs ACS.    - Repeat troponin-6 PM this evening.   - Echocardiogram pending    COPD (chronic obstructive pulmonary disease) , possible acute exacerbation   Flores's lung   Patient managed prior to admission with Trelegy Ellipta daily and as needed albuterol.  PFTs in 04/2018 revealed severe airflow obstruction.    - Continue home medication.    Atrial fibrillation   Noted in history as transient episode following CABG in 2009.Had ziopatch early in year with no a fib.. Not on anticoagulation.  Does take metoprolol 25 mg twice daily. Takes 325 mg aspirin daily. EKG with a fib now, likely due to acute illness.    - Continue metoprolol and aspirin.    - Telemetry.   - Lovenox for prophylaxis, does have low platelets. Will give dose today, monitor platelets in am, may need to hold Lovenox.    - Consider anti coagulation at time of discharge, CHADSVAs of 6. Would need to discuss with family and patient. Is hard for patient to understand current situation. Is a high fall risk.     History of CVA (cerebrovascular accident)  CVA at time of CABG, 2009.   Son reports weakness, patient was found down on the early am hours of 10/25/2020. Did appear more weak than baseline and a bit confused per son.   Takes full dose aspirin. Also on statin. No CT evidence for acute intracranial process, chronic microvascular ischemic changes noted.    - If neuro deficits are appreciated or patient has sudden decline in mentation, would consider MRI.    - Neuro checks.     Chronic ischemic heart disease  CAD, s/p coronary bypass grafting in 11/2009 with LIMA to LAD and SVG to OM1 and 2  Follows with cards, last seen at virtual visit in 03/2020.  Stress echo 06/2019 with EF >70%.    - Hold lasix.    - Echo pending.     Hypertension goal BP (blood pressure) < 140/90  Patient managed prior to admission with lisinopril 10 mg daily.   - Continue to monitor blood pressure.   - Continue home medication.    Hypertrophy of prostate without urinary obstruction   - Continue prior to admission flomax and finasteride.    Hyperlipidemia LDL goal <70   - Continue prior to admission atorvastatin 80 mg daily.       Type 2 diabetes mellitus with diabetic chronic kidney disease   A1c of 8.0. Patient managed prior to  admission with  Metformin and NPH BID. Creatinine is stable.    - Hold metformin.    - ISS.    - Continue insulin NPH 22 units BID.    - Continue to monitor bs.   - Hypo/hyperglycemia protocol.       Esophageal reflux   - Continue prior to admission ranitidine twice daily.     Memory loss  Early onset Alzheimer dementia   Recent diagnosis per son, not on medications.        Diet: Moderate Consistent CHO Diet    DVT Prophylaxis: Enoxaparin (Lovenox) SQ  Olivo Catheter: not present  Code Status:  Full code       Disposition Plan   Expected discharge: 2-3 days, recommended to prior living arrangement vs TCU depending on hospital course once adequate pain management/ tolerating PO medications, mental status at baseline, O2 use baseline or has plan for home set up  and safe disposition plan/ TCU bed available.  Entered: Kim Mejia PA-C 11/27/2020, 7:24 AM       Status: Patient is appropriate for admission to inpatient service for further evaluation and treatment.    Kim Mejia PA-C        The patient's care was discussed with the Attending Physician, Dr.Shams Price, and the patient.     Primary Care Physician   Be Carreno [phone]    History is obtained from the patient, Franklin Horton and review of old records via the EMR.    History of Present Illness   Alvaro Horton is a 80 year old male with past medical history of COPD, hypertension, DM, depression, hyperlipidemia, GERD, BPH/overactive bladder, chronic ischemia heart disease, questionable history of a fib, history of CVA who now presents on 11/26/2020 with generalized weakness and fatigue.     Apparently has a recent diagnosis of dementia. He is forgetful.     Patient was in normal state of health on Tuesday, 11/24/2020 other than a mild cough.     It appears son reached out to PCP on 11/25/2020 with concerns of patient feeling somewhat dizzy and coughing up green phlegm. Per son, he found patient laying on the ground in the  early morning of 11/25/2020. He was confused. He couldn't walk, he was unsteady on his feet. He was weak. He has had poor appetite. Reports shortness of breath, seems a bit worse than his baseline. He has a chronic cough.      Patient is a poor historian. Reports frequent falls, apparently due to tripping but also possible leg tremor. Has hit his head. He has had chills, no fever at home. Was febrile to 101.6 on arrival to ED. He was given 975 mg of tylenol and a fluid bolus. Some shortness of breath was reported.    Lives with his son. Per his son, has been struggling to walk due to fatigue.     Was able to ambulate in ED, though saturations dropped to 84% on room air, RR increased to 26. Was placed on supplemental oxygen via nasal canula with improvement of saturations. Poor appetite was reported, though able to eat in ED. He has a cough.     Review of Systems   CONSTITUTIONAL:  positive for  fevers, chills, fatigue, malaise, and anorexia  EYES:  positive for glasses  HEENT:  negative  RESPIRATORY:  positive for  dyspnea and cough   CARDIOVASCULAR:  positive for  dyspnea  GASTROINTESTINAL:  positive for poor appetite   GENITOURINARY:  negative  HEMATOLOGIC/LYMPHATIC:  negative  ENDOCRINE:  negative  MUSCULOSKELETAL:  positive for  decreased range of motion and muscle weakness  NEUROLOGICAL:  positive for memory problems and weakness  BEHAVIOR/PSYCH:  negative    Past Medical History    Past Medical History:   Diagnosis Date     Calculus of kidney     1990. s/p ESWL and percutaneous nephrolithotomy     COPD (chronic obstructive pulmonary disease) (H)      Depression     Hospitalized 1979     Diabetes (H)      Heart disease      Left hand weakness 12/14/2009    Afterbypass surgery, CVA, improved.      PNEUMONIA, ORGANISM NOS 2/14/2008    CT 2/08-  Pulmonary atelectasis and infiltrate in the posterior left lower lung base.      Sleep apnea      Patient Active Problem List    Diagnosis Date Noted     Hypertension goal  "BP (blood pressure) < 140/90 10/09/2006     Priority: High     BAKERS LUNG      Priority: High     Dx possible \"Baker's Lung\" 2001 [place] [facility]. RAST for bakers yeast negative 2002. Has occupational exposures with related worsening asthmatic symptoms. CT 2/08- no fibrosis.       Moderate persistent asthma      Priority: High     PFTS 1997 - FEV1- 3.39 (64%), ratio 0.74, 24% change mjn70-44% with bronchodilators,  TLC 86%, %, DLCO 78%. Methacholine challenge positive at level 7 2001.  PFTS 2004 - FEV1- 1.93 (63%), ratio 0.77. PFTS 4/08 - FEV1- 2.13 (72%), ratio 0.71, no change with bronchodilators,  TLC 78%, RV 96%, DLCO 64%          Chronic ischemic heart disease      Priority: High     atypical chest pain 2001 with GXT echo- decreased LVF, angiogram 2001- nonobstructing 3 vessel disease.   Repeat angio 2004- prox RCA 50-60%,  OM2- 40%, D1 30-40%, LAD 30-40%.   Cardiolite 2005 during hospitalization for SOB in [place] reported to reveal no ischemia.   Adenosine cardiolite 1/08- small slightly reversible inferior defect, most likely consistent with diaphragm attenuation with bowel uptake artifact. USA, Angio 11/09- Severe LM disease, severe subtotal occlusion of a large branch OM1.    S/p CABG x2 11/09- LIMA-D1, SVG to OM1, OM2. GXT 8/10- 5.8 mets, 121 (80%) HR, normal ekg, cardiolite- small fixed inferior/basal defect with small area khurram-infarct ischemia extending into the mid ventricle. EF 67%.  4/25/13:adenosine cardiolyte stress test through [facility] with normal EF and no ischemia.   1/16/2016 nuclear stress test:1.  Myocardial perfusion imaging using single isotope technique demonstrated no evidence for myocardial ischemia. 2. Gated images demonstrated normal wall motion with a calculated ejection fraction of 61%.  3. Compared to the prior study from 2011 there are no significant changes .           Suspected 2019 novel coronavirus infection 11/27/2020     Priority: Medium     Acute respiratory failure " "with hypoxia (H) 11/27/2020     Priority: Medium     History of CVA (cerebrovascular accident) 11/27/2020     Priority: Medium     Falls frequently 11/27/2020     Priority: Medium     Generalized weakness 11/27/2020     Priority: Medium     Thrombocytopenia (H) 11/27/2020     Priority: Medium     Elevated troponin 11/27/2020     Priority: Medium     Clinical diagnosis of COVID-19 11/26/2020     Priority: Medium     Claudication of calf muscles (H) 08/27/2020     Priority: Medium     Chest pain 02/27/2020     Priority: Medium     Acute calculous cholecystitis 03/02/2019     Priority: Medium     Abnormal blood-gas measurement 09/04/2018     Priority: Medium     Encephalopathy 09/03/2018     Priority: Medium     Type 2 diabetes mellitus with diabetic chronic kidney disease (H) 10/30/2015     Priority: Medium     Altered mental status 12/31/2014     Priority: Medium     12/27/2014:episode confusion for 20 minutes.  Seen at [facility]/-hospitalized.  Head CT, MRI/MRA all normal.  PET cardiac perfusion stress test normal.  Neurology consult-diagnosis= possible hypoglycemia, migraine variant.  \"Acute ischemic cerebral event was excluded\".       Health Care Home 08/11/2014     Priority: Medium     State Tier Level:    Status:  Unable to re-connect  Care Coordinator:  Kecia Mills    See Letters for [facility] Care Plan  Date:  August 11, 2014             Chronic kidney disease, stage 2 (mild) 05/06/2014     Priority: Medium     9/9/2015:He has had two abnormal MAC in the past.        COPD (chronic obstructive pulmonary disease) (H) 11/18/2013     Priority: Medium     PFT 11/15/13 results:FVC=59%, FEV1=54%, FEV1/FVC=92%.  His DLCO was 58%1. Spirometry: FEV1 moderately reduced. FVC moderately reduced. FEV1/FVC ratio reduced. 2. Bronchodilator Response: A 14% improvement is seen in FEV1 with bronchodilators. 3. Lung Volumes: Total lung capacity normal. Residual volume increased. Residual volume total lung capacity ratio increased. " 4. DLCO: Moderately reduced.   INTERPRETATION: Moderate obstructive lung disease. Reversibility with bronchodilators is seen on testing today. Lung volumes show evidence for air trapping. DLCO is reduced, consistent with loss of capillary-alveolar exchange as in emphysema, pulmonary hypertension, chronic pulmonary embolus, pulmonary fibrosis or other pulmonary vascular disease. A concomitant restrictive lung disease process may be suspected on the basis of moderate reductions in FEV1 and FVC, with a fairly well-preserved FEV1/FVC ratio and a borderline low normal total lung capacity.   11/15/13:exercise oximetry did not show significant desaturation below 91%.  PFT February, 2014 results:FVC=54%, FEV1=48%, FEV1/FVC=64%.  DLCO=59%  9/6/2016 pulmonary consult:COPD with restrictional components and diffusion defect, history Baker's lung.          Hyperlipidemia LDL goal <70 04/17/2013     Priority: Medium     Atrial fibrillation (H) 12/14/2009     Priority: Medium     After CABG 11/09       CVA (cerebral vascular accident) (H) 11/27/2009     Priority: Medium     Mild Broca's aphasia, confusion, right hand dysesthesia after angio. MRA new small area of restricted diffusion in the white matter of the medial right temporal lobe,  consistent with a recent small infarct. Speech problems resolved, still mild right hand problems.         Obstructive sleep apnea 04/14/2008     Priority: Medium     ESS 20/24. Sleep study [facility]: 4/8/08. total sleep time was 423.0 minutes. The sleep latency was normal at 9.7 minutes.  REM sleep latency was 161.5 minutes. Sleep efficiency was normal at 82.7%. The sleep architecture was disrupted with frequent sleep stage changes and arousals. Snoring: Was reported as moderately loud. Lowest O2 saturation was 87.0%. This study is suggestive of mild sleep apnea, severe during REM sleep (21% TST).  PLM index was 0.0. EKG:  No significant cardiac arrhythmias were noted.         Hypertrophy of  prostate without urinary obstruction      Priority: Medium     Elevated PSA. Bx negative 2004.       Advanced care planning/counseling discussion 09/24/2012     Priority: Low     Does not have a directive 9/12       Dyslexia 11/11/2010     Priority: Low     essentially unable to read       CTS (carpal tunnel syndrome) 05/12/2010     Priority: Low     EMG 5/10-  median neuropathy at the right wrist, moderate in severity electrically, right ulnar neuropathy localizing to the elbow, mild chronic right C6 radiculopathy without evidence for acute denervation.        Memory loss 05/03/2010     Priority: Low     MMSE 27/30 (0/3 attention recall, ?history of dylexia), PHQ9-16 7/09. Recommended neurocogntive testing, did not complete.  MMSE 23/30 6/10 (attention, county, if/and/buts).Neurocogntive evaluation  1/10- not suspicious for emerging dementia. Felt likely due hearing loss/dyslexia.   10/29/2018:He was seen at [facility] Neurology clinic 10/10/2018.  Dr. Oksana Munoz.   He had an EEG.   Diagnosed with Alzheimer's dementia.   Records not yet reviewed.        Benign paroxysmal vertigo 02/03/2009     Priority: Low     Admit 2/09. MRI/MRA head and neck negative.       Sensorineural hearing loss, bilateral 10/08/2007     Priority: Low     Esophageal reflux 11/06/2006     Priority: Low     PSORIASIS      Priority: Low     OVERACTIVE BLADDER      Priority: Low     PVR 84 2004.          Past Surgical History   Past Surgical History:   Procedure Laterality Date     C ANESTH,DX ARTHROSCOPIC PROC KNEE JOINT  1994, 1998     Left     CARDIAC SURGERY  11/09    CABG x2 - LIMA-D1, SVG to OM1, OM2.     GENITOURINARY SURGERY  1990    ESWL and percutaneous nephrolithotomy     LAPAROSCOPIC CHOLECYSTECTOMY N/A 3/4/2019    Procedure: LAPAROSCOPIC CHOLECYSTECTOMY;  Surgeon: Burt Mendieta MD;  Location: [facility] OR     ORTHOPEDIC SURGERY  8/12    right CTR     SURGICAL HISTORY OF -   1998    Hernia repair     SURGICAL HISTORY OF -   2004     NormalColonoscopy      SURGICAL HISTORY OF -       Normal Colonoscopy        Prior to Admission Medications   Prior to Admission Medications   Prescriptions Last Dose Informant Patient Reported? Taking?   Insulin Pen Needle (PEN NEEDLES) 31G X 6 MM MISC  Self No No   Si Units 3 times daily For novolog flexpen   NOVOLIN 70/30 FLEXPEN RELION susp 2020 at Unknown time  No Yes   Sig: INJECT 22 UNITS SUBCUTANEOUSLY 30 MINUTES BEFORE BREAKFAST AND 22 UNITS 30 MINUTES BEFORE DINNER   TRELEGY ELLIPTA 100-62.5-25 MCG/INH oral inhaler 2020 at Unknown time  Yes Yes   Sig: Inhale 1 puff into the lungs daily   albuterol (PROAIR HFA/PROVENTIL HFA/VENTOLIN HFA) 108 (90 Base) MCG/ACT inhaler 2020 at Unknown time  No Yes   Sig: Inhale 2 puffs into the lungs every 6 hours as needed for shortness of breath / dyspnea or wheezing   aspirin (ASA) 325 MG EC tablet 2020 at Unknown time  Yes Yes   Sig: Take 325 mg by mouth daily   atorvastatin (LIPITOR) 80 MG tablet 2020 at Unknown time  No Yes   Sig: Take 1 tablet by mouth once daily   blood glucose (NO BRAND SPECIFIED) test strip   No No   Sig: Use to test blood sugar 3 times daily   blood glucose monitoring (NO BRAND SPECIFIED) meter device kit   No No   Sig: Use to test blood sugar 3 times daily or as directed.   cholecalciferol 2000 UNITS tablet 2020 at Unknown time Self No Yes   Sig: Take 1 tablet by mouth daily.   cyanocobalamin (BL VITAMIN B-12) 500 MCG TABS Past Week at Unknown time Self No Yes   Sig: Take 500 mcg by mouth daily.   Patient taking differently: Take 2,500 mcg by mouth daily    finasteride (PROSCAR) 5 MG tablet Past Week at Unknown time  No Yes   Sig: Take 1 tablet by mouth once daily   fish oil-omega-3 fatty acids (FISH OIL) 1000 MG capsule Past Week at Unknown time Self No Yes   Sig: Take 1 capsule by mouth 2 times daily.   furosemide (LASIX) 20 MG tablet 2020 at Unknown time Self Yes Yes   Sig: Take 20 mg by mouth  "daily   furosemide (LASIX) 20 MG tablet Past Week at Unknown time  No Yes   Sig: Take 1 tablet (20 mg) by mouth daily   insulin pen needle (BD ABRAM U/F) 32G X 4 MM miscellaneous   No No   Sig: Use 2 daily as directed.   insulin syringe-needle U-100 (BD INSULIN SYRINGE ULTRAFINE) 31G X 5/16\" 1 ML  Self No No   Sig: Use one syringe 4 times daily or as directed.   lisinopril (ZESTRIL) 10 MG tablet 11/26/2020 at Unknown time  No Yes   Sig: Take 1 tablet (10 mg) by mouth daily   metFORMIN (GLUCOPHAGE) 1000 MG tablet 11/26/2020 at Unknown time  No Yes   Sig: TAKE 1 TABLET BY MOUTH TWICE DAILY WITH MEALS   metoprolol tartrate (LOPRESSOR) 25 MG tablet 11/26/2020 at Unknown time  No Yes   Sig: Take 1 tablet by mouth twice daily   nitroGLYcerin (NITROSTAT) 0.4 MG sublingual tablet   No No   Sig: Place 1 tablet (0.4 mg) under the tongue every 5 minutes as needed for chest pain (CALL 911 IF NOT IMPROVED AFTER THREE CONSECUTIVE DOSES)   Patient not taking: Reported on 11/23/2020   order for DME  Self Yes No   Sig: Equipment being ordered: CPAP  AIRSENSE 10  11-15 CM H2O  QUATTRO AIR SIZE MED  SN# [id number]  DN# [id number]   ranitidine (RANITIDINE 150 MAX STRENGTH) 150 MG tablet 11/26/2020 at Unknown time  No Yes   Sig: Take 1 tablet (150 mg) by mouth 2 times daily   tamsulosin (FLOMAX) 0.4 MG capsule 11/26/2020 at Unknown time  No Yes   Sig: TAKE 1 CAPSULE BY MOUTH ONCE DAILY . APPOINTMENT REQUIRED FOR FUTURE REFILLS      Facility-Administered Medications: None     Allergies   Allergies   Allergen Reactions     Prednisone Other (See Comments)     Severe interaction with DM       Family History    Family History   Problem Relation Age of Onset     C.A.D. Father         40s     Hypertension Father      C.A.D. Paternal Grandfather      Heart Disease Paternal Grandfather      Diabetes Brother      C.A.D. Brother      Heart Disease Brother      Hypertension Brother      Gastrointestinal Disease Son         Crohn's disease     " "Gastrointestinal Disease Other         2 grandaughters with Crohn's disease     Cancer - colorectal No family hx of        Social History   Social History     Socioeconomic History     Marital status:      Spouse name: Not on file     Number of children: Not on file     Years of education: Not on file     Highest education level: Not on file   Occupational History     Occupation: [employer]     Employer: RETIRED     Occupation:    Social Needs     Financial resource strain: Not on file     Food insecurity     Worry: Not on file     Inability: Not on file     Transportation needs     Medical: Not on file     Non-medical: Not on file   Tobacco Use     Smoking status: Never Smoker     Smokeless tobacco: Never Used   Substance and Sexual Activity     Alcohol use: No     Alcohol/week: 0.0 standard drinks     Drug use: No     Sexual activity: Never   Lifestyle     Physical activity     Days per week: Not on file     Minutes per session: Not on file     Stress: Not on file   Relationships     Social connections     Talks on phone: Not on file     Gets together: Not on file     Attends Zoroastrianism service: Not on file     Active member of club or organization: Not on file     Attends meetings of clubs or organizations: Not on file     Relationship status: Not on file     Intimate partner violence     Fear of current or ex partner: Not on file     Emotionally abused: Not on file     Physically abused: Not on file     Forced sexual activity: Not on file   Other Topics Concern     Parent/sibling w/ CABG, MI or angioplasty before 65F 55M? No   Social History Narrative     Not on file       Physical Exam   BP (!) 143/82   Pulse 125   Temp 97.5  F (36.4  C) (Oral)   Resp 25   Ht 1.727 m (5' 8\")   Wt 81.8 kg (180 lb 4.8 oz)   SpO2 93%   BMI 27.41 kg/m       Weight: 180 lbs 4.8 oz Body mass index is 27.41 kg/m .     Constitutional: Alert, able to recall birth date only. cooperative, no apparent " distress, appears nontoxic, speaking in full sentences, appears stated age.  Eyes: Eyes are clear, pupils are reactive. No scleral icterus. Extroccular movements intact.  HEENT: Oropharynx is dry, dry lips. Normocephalic, no evidence of cranial trauma.   Cardiovascular: Regular rhythm and rate, normal S1 and S2. No murmur appreciated. Peripheral pulses intact bilaterally. Trace lower extremity edema.  Respiratory: Lung sounds with mild diffuse wheezing.   GI: Soft, non-distended. Non-tender, no rebound or guarding. No hepatosplenomegaly or masses appreciated. Normal bowel sounds.  Musculoskeletal: Without obvious deformity, moves all extremities approprietly. Normal muscle bulk and tone. Resting tremor present of left lower ext. Weakness per RN.  Skin: Warm and dry, no rashes or ecchymoses.   Neurologic: Neck supple. Patient moves all extremities. Cranial nerves are grossly intact. Gross strength and sensation are equal in bilateral upper and lower extremities.   Genitourinary: Deferred      Data   Data reviewed today:   Recent Labs   Lab 11/26/20  2230 11/26/20  1831   WBC  --  6.4   HGB  --  14.1   MCV  --  89   PLT  --  107*   NA  --  134   POTASSIUM  --  4.0   CHLORIDE  --  99   CO2  --  27   BUN  --  29   CR  --  1.40*   ANIONGAP  --  8   EVELINA  --  8.7   GLC  --  256*   ALBUMIN  --  3.4  3.5   PROTTOTAL  --  6.9  7.0   BILITOTAL  --  0.8  0.9   ALKPHOS  --  77  81   ALT  --  28  29   AST  --  26  29   TROPI 0.069* 0.044       Recent Results (from the past 24 hour(s))   XR Chest Port 1 View    Narrative    XR PORTABLE CHEST ONE VIEW   11/26/2020 7:34 PM     HISTORY: Cough, possible COVID.    COMPARISON: Chest x-ray 8/5/2020.      Impression    IMPRESSION: Portable chest. Lungs are hypoaerated but otherwise clear.  Heart is normal in size. No pneumothorax. No definite pleural  effusions. Prior CABG.    HARRY SANDRA MD   CT Head w/o Contrast    Narrative    EXAM: CT HEAD W/O CONTRAST  LOCATION: [facility]  Services  DATE/TIME: 11/26/2020 9:09 PM    INDICATION: Altered level of consciousness (LOC), unexplained  COMPARISON: Head CT 09/03/2018  TECHNIQUE: Routine CT Head without IV contrast. Multiplanar reformats. Dose reduction techniques were used.    FINDINGS:  INTRACRANIAL CONTENTS: No intracranial hemorrhage, extraaxial collection, or mass effect.  No CT evidence of acute infarct. Mild presumed chronic small vessel ischemic changes. Mild generalized volume loss. No hydrocephalus.     VISUALIZED ORBITS/SINUSES/MASTOIDS: No intraorbital abnormality. No paranasal sinus mucosal disease. No middle ear or mastoid effusion.    BONES/SOFT TISSUES: No acute abnormality.      Impression    IMPRESSION:  1.  No CT evidence for acute intracranial process.  2.  Brain atrophy and presumed chronic microvascular ischemic changes as above.

## 2020-11-27 NOTE — PLAN OF CARE
"[facility] [facility] ADMISSION NOTE    Patient admitted to room 2403 at approximately 0200 via cart from emergency room. Patient was accompanied by nurse.     Verbal SBAR report received from Kiya ROSALES   prior to patient arrival.     Patient trasferred to bed via self. Patient alert and oriented X 3. The patient is not having any pain.  . Admission vital signs: Blood pressure (!) 143/82, pulse 125, temperature 97.5  F (36.4  C), temperature source Oral, resp. rate 25, height 1.727 m (5' 8\"), weight 81.8 kg (180 lb 4.8 oz), SpO2 93 %. Patient was oriented to plan of care, call light, bed controls, tv, telephone, bathroom, and visiting hours.     Risk Assessment    The following safety risks were identified during admission: fall, skin, and isolation. Yellow risk band applied: YES.     Skin Initial Assessment    This writer admitted this patient and completed a full skin assessment and Ricardo score in the Adult PCS flowsheet. Appropriate interventions initiated as needed.     Secondary skin check completed by not completed as patient is PUI.    Ricardo Risk Assessment  Sensory Perception: 3-->slightly limited  Moisture: 3-->occasionally moist  Activity: 3-->walks occasionally  Mobility: 3-->slightly limited  Nutrition: 3-->adequate  Friction and Shear: 3-->no apparent problem  Ricardo Score: 18  Bed Support Surface: Atmos Air mattress  Reassessed using Bed Algorithm: Yes  Ricardo Intervention(s) Implemented: draw sheets, encouraged fluids    Education    Patient has a [place] to Observation order: No  Observation education completed and documented: PRABHAKAR Arredondo RN      "

## 2020-11-27 NOTE — ED PROVIDER NOTES
"  History     Chief Complaint   Patient presents with     Generalized Weakness     Covid Concern     HPI  Alvaro Horton is a 80 year old male who presents with weakness and fatigue. +cough. Feels runs down. Denies fevers, did have diarrhea today. Denies chest pain. Denies dysuria. No sore throat. Tired when walking. Hx of dementia. Hx of COPD.      Per son, he has been struggling to walk because he is tired and struggling to sit up, unsure how to use his phone, forgetful (unsure if he had Thanksgiving dinner), all new since last day. No recent travel. Lives with son, son without symptoms. Patient not eating recently. Satting 88% on arrival.     Allergies:  Allergies   Allergen Reactions     Prednisone Other (See Comments)     Severe interaction with DM       Problem List:    Patient Active Problem List    Diagnosis Date Noted     Hypertension goal BP (blood pressure) < 140/90 10/09/2006     Priority: High     BAKERS LUNG      Priority: High     Dx possible \"Baker's Lung\" 2001 [facility]. RAST for bakers yeast negative 2002. Has occupational exposures with related worsening asthmatic symptoms. CT 2/08- no fibrosis.       Moderate persistent asthma      Priority: High     PFTS 1997 - FEV1- 3.39 (64%), ratio 0.74, 24% change yhb64-71% with bronchodilators,  TLC 86%, %, DLCO 78%. Methacholine challenge positive at level 7 2001.  PFTS 2004 - FEV1- 1.93 (63%), ratio 0.77. PFTS 4/08 - FEV1- 2.13 (72%), ratio 0.71, no change with bronchodilators,  TLC 78%, RV 96%, DLCO 64%          Chronic ischemic heart disease      Priority: High     atypical chest pain 2001 with GXT echo- decreased LVF, angiogram 2001- nonobstructing 3 vessel disease.   Repeat angio 2004- prox RCA 50-60%,  OM2- 40%, D1 30-40%, LAD 30-40%.   Cardiolite 2005 during hospitalization for SOB in [place] reported to reveal no ischemia.   Adenosine cardiolite 1/08- small slightly reversible inferior defect, most likely consistent with diaphragm " "attenuation with bowel uptake artifact. USA, Angio 11/09- Severe LM disease, severe subtotal occlusion of a large branch OM1.    S/p CABG x2 11/09- LIMA-D1, SVG to OM1, OM2. GXT 8/10- 5.8 mets, 121 (80%) HR, normal ekg, cardiolite- small fixed inferior/basal defect with small area khurram-infarct ischemia extending into the mid ventricle. EF 67%.  4/25/13:adenosine cardiolyte stress test through [facility] with normal EF and no ischemia.   1/16/2016 nuclear stress test:1.  Myocardial perfusion imaging using single isotope technique demonstrated no evidence for myocardial ischemia. 2. Gated images demonstrated normal wall motion with a calculated ejection fraction of 61%.  3. Compared to the prior study from 2011 there are no significant changes .           Clinical diagnosis of COVID-19 11/26/2020     Priority: Medium     Claudication of calf muscles (H) 08/27/2020     Priority: Medium     Chest pain 02/27/2020     Priority: Medium     Acute calculous cholecystitis 03/02/2019     Priority: Medium     Abnormal blood-gas measurement 09/04/2018     Priority: Medium     Encephalopathy 09/03/2018     Priority: Medium     Type 2 diabetes mellitus with diabetic chronic kidney disease (H) 10/30/2015     Priority: Medium     Altered mental status 12/31/2014     Priority: Medium     12/27/2014:episode confusion for 20 minutes.  Seen at [facility]/-hospitalized.  Head CT, MRI/MRA all normal.  PET cardiac perfusion stress test normal.  Neurology consult-diagnosis= possible hypoglycemia, migraine variant.  \"Acute ischemic cerebral event was excluded\".       Health Care Home 08/11/2014     Priority: Medium     State Tier Level:    Status:  Unable to re-connect  Care Coordinator:  Kecia Mills    See Letters for [facility] Care Plan  Date:  August 11, 2014             Chronic kidney disease, stage 2 (mild) 05/06/2014     Priority: Medium     9/9/2015:He has had two abnormal MAC in the past.        COPD (chronic obstructive pulmonary disease) " (H) 11/18/2013     Priority: Medium     PFT 11/15/13 results:FVC=59%, FEV1=54%, FEV1/FVC=92%.  His DLCO was 58%1. Spirometry: FEV1 moderately reduced. FVC moderately reduced. FEV1/FVC ratio reduced. 2. Bronchodilator Response: A 14% improvement is seen in FEV1 with bronchodilators. 3. Lung Volumes: Total lung capacity normal. Residual volume increased. Residual volume total lung capacity ratio increased. 4. DLCO: Moderately reduced.   INTERPRETATION: Moderate obstructive lung disease. Reversibility with bronchodilators is seen on testing today. Lung volumes show evidence for air trapping. DLCO is reduced, consistent with loss of capillary-alveolar exchange as in emphysema, pulmonary hypertension, chronic pulmonary embolus, pulmonary fibrosis or other pulmonary vascular disease. A concomitant restrictive lung disease process may be suspected on the basis of moderate reductions in FEV1 and FVC, with a fairly well-preserved FEV1/FVC ratio and a borderline low normal total lung capacity.   11/15/13:exercise oximetry did not show significant desaturation below 91%.  PFT February, 2014 results:FVC=54%, FEV1=48%, FEV1/FVC=64%.  DLCO=59%  9/6/2016 pulmonary consult:COPD with restrictional components and diffusion defect, history Baker's lung.          Hyperlipidemia LDL goal <70 04/17/2013     Priority: Medium     Atrial fibrillation (H) 12/14/2009     Priority: Medium     After CABG 11/09       CVA (cerebral vascular accident) (H) 11/27/2009     Priority: Medium     Mild Broca's aphasia, confusion, right hand dysesthesia after angio. MRA new small area of restricted diffusion in the white matter of the medial right temporal lobe,  consistent with a recent small infarct. Speech problems resolved, still mild right hand problems.         Obstructive sleep apnea 04/14/2008     Priority: Medium     ESS 20/24. Sleep study [facility]: 4/8/08. total sleep time was 423.0 minutes. The sleep latency was normal at 9.7 minutes.  REM  sleep latency was 161.5 minutes. Sleep efficiency was normal at 82.7%. The sleep architecture was disrupted with frequent sleep stage changes and arousals. Snoring: Was reported as moderately loud. Lowest O2 saturation was 87.0%. This study is suggestive of mild sleep apnea, severe during REM sleep (21% TST).  PLM index was 0.0. EKG:  No significant cardiac arrhythmias were noted.         Hypertrophy of prostate without urinary obstruction      Priority: Medium     Elevated PSA. Bx negative 2004.       Advanced care planning/counseling discussion 09/24/2012     Priority: Low     Does not have a directive 9/12       Dyslexia 11/11/2010     Priority: Low     essentially unable to read       CTS (carpal tunnel syndrome) 05/12/2010     Priority: Low     EMG 5/10-  median neuropathy at the right wrist, moderate in severity electrically, right ulnar neuropathy localizing to the elbow, mild chronic right C6 radiculopathy without evidence for acute denervation.        Memory loss 05/03/2010     Priority: Low     MMSE 27/30 (0/3 attention recall, ?history of dylexia), PHQ9-16 7/09. Recommended neurocogntive testing, did not complete.  MMSE 23/30 6/10 (attention, county, if/and/buts).Neurocogntive evaluation  1/10- not suspicious for emerging dementia. Felt likely due hearing loss/dyslexia.   10/29/2018:He was seen at [facility] 10/10/2018.  Dr. Oksana Munoz.   He had an EEG.   Diagnosed with Alzheimer's dementia.   Records not yet reviewed.        Benign paroxysmal vertigo 02/03/2009     Priority: Low     Admit 2/09. MRI/MRA head and neck negative.       Sensorineural hearing loss, bilateral 10/08/2007     Priority: Low     Esophageal reflux 11/06/2006     Priority: Low     PSORIASIS      Priority: Low     OVERACTIVE BLADDER      Priority: Low     PVR 84 2004.          Past Medical History:    Past Medical History:   Diagnosis Date     Calculus of kidney      COPD (chronic obstructive pulmonary disease) (H)       Depression      Diabetes (H)      Heart disease      Left hand weakness 12/14/2009     PNEUMONIA, ORGANISM NOS 2/14/2008     Sleep apnea        Past Surgical History:    Past Surgical History:   Procedure Laterality Date     C ANESTH,DX ARTHROSCOPIC PROC KNEE JOINT  1994, 1998     Left     CARDIAC SURGERY  11/09    CABG x2 - LIMA-D1, SVG to OM1, OM2.     GENITOURINARY SURGERY  1990    ESWL and percutaneous nephrolithotomy     LAPAROSCOPIC CHOLECYSTECTOMY N/A 3/4/2019    Procedure: LAPAROSCOPIC CHOLECYSTECTOMY;  Surgeon: Burt Mendieta MD;  Location: [facility] OR     ORTHOPEDIC SURGERY  8/12    right CTR     SURGICAL HISTORY OF -   1998    Hernia repair     SURGICAL HISTORY OF -   2004    NormalColonoscopy      SURGICAL HISTORY OF -   2006    Normal Colonoscopy       Family History:    Family History   Problem Relation Age of Onset     C.A.D. Father         40s     Hypertension Father      C.A.D. Paternal Grandfather      Heart Disease Paternal Grandfather      Diabetes Brother      C.A.D. Brother      Heart Disease Brother      Hypertension Brother      Gastrointestinal Disease Son         Crohn's disease     Gastrointestinal Disease Other         2 grandaughters with Crohn's disease     Cancer - colorectal No family hx of        Social History:  Marital Status:   [5]  Social History     Tobacco Use     Smoking status: Never Smoker     Smokeless tobacco: Never Used   Substance Use Topics     Alcohol use: No     Alcohol/week: 0.0 standard drinks     Drug use: No        Medications:         albuterol (PROAIR HFA/PROVENTIL HFA/VENTOLIN HFA) 108 (90 Base) MCG/ACT inhaler       aspirin (ASA) 325 MG EC tablet       atorvastatin (LIPITOR) 80 MG tablet       blood glucose (NO BRAND SPECIFIED) test strip       blood glucose monitoring (NO BRAND SPECIFIED) meter device kit       cholecalciferol 2000 UNITS tablet       cyanocobalamin (BL VITAMIN B-12) 500 MCG TABS       finasteride (PROSCAR) 5 MG tablet       fish oil-omega-3  "fatty acids (FISH OIL) 1000 MG capsule       furosemide (LASIX) 20 MG tablet       furosemide (LASIX) 20 MG tablet       insulin pen needle (BD ABRAM U/F) 32G X 4 MM miscellaneous       Insulin Pen Needle (PEN NEEDLES) 31G X 6 MM MISC       insulin syringe-needle U-100 (BD INSULIN SYRINGE ULTRAFINE) 31G X 5/16\" 1 ML       lisinopril (ZESTRIL) 10 MG tablet       metFORMIN (GLUCOPHAGE) 1000 MG tablet       metoprolol tartrate (LOPRESSOR) 25 MG tablet       nitroGLYcerin (NITROSTAT) 0.4 MG sublingual tablet       NOVOLIN 70/30 FLEXPEN RELION susp       order for DME       ranitidine (RANITIDINE 150 MAX STRENGTH) 150 MG tablet       tamsulosin (FLOMAX) 0.4 MG capsule       TRELEGY ELLIPTA 100-62.5-25 MCG/INH oral inhaler          Review of Systems   Constitutional: Negative for fever.   HENT: Negative for sore throat.    Respiratory: Positive for cough and wheezing.    Cardiovascular: Negative for chest pain.   Gastrointestinal: Positive for diarrhea.   Genitourinary: Negative for dysuria.   Musculoskeletal: Negative for myalgias.   Skin: Negative for rash.   Neurological: Positive for weakness.       Physical Exam   BP: (!) 154/99  Pulse: 111  Temp: 101.6  F (38.7  C)  Resp: 18  Weight: 88.9 kg (196 lb)  SpO2: (!) 88 %      Physical Exam  Cardiovascular:      Rate and Rhythm: Tachycardia present.   Pulmonary:      Breath sounds: Wheezing and rhonchi present.         ED Course         814 - no wbc elevation, lactate wnl, troponin wnl, creatinine up.     920 - patient says he feels better, wants to go home    1001 - patient ate. Spoke to son, ok to take patient home if he can walk. Will attempt ambulation    1049 - was able to walk but desatted and felt very weak. Suspect COVID-19. Pending lab results. Will admit for likely COVID.     1150  - signed out to hospitalist. Hospitalist favors COPD.        Procedures               EKG Interpretation:      Interpreted by Mirza Joel MD  Time reviewed: 645  Symptoms at time " of EKG: weak  Rhythm: normal sinus   Rate: normal  Axis: left  Ectopy: none  Conduction: RBBB  ST Segments/ T Waves: No ST-T wave changes  Q Waves: none  Comparison to prior: Unchanged from 8/5/20    Clinical Impression: normal EKG      909 - ammonia wnl. Pending Head CT. No e/o infiltrate on CXR. Pending blood gas.     1001 - head CT w/o acute findings. Patient feeling better. Patient admits to feeling confused recently. Says he is hungry. Wants to go home.     1020 - son nervous about bringing patient home. Will speak with patient.    1028 - patient eating. Suspect patient is not eating, is weak in setting of this. Has a PCP appt tomorrow, wants to leave. Will trial ambulation, speak with son. Patient insists he can walk.       Results for orders placed or performed during the hospital encounter of 11/26/20 (from the past 24 hour(s))   CBC with platelets differential   Result Value Ref Range    WBC 6.4 4.0 - 11.0 10e9/L    RBC Count 4.69 4.4 - 5.9 10e12/L    Hemoglobin 14.1 13.3 - 17.7 g/dL    Hematocrit 41.7 40.0 - 53.0 %    MCV 89 78 - 100 fl    MCH 30.1 26.5 - 33.0 pg    MCHC 33.8 31.5 - 36.5 g/dL    RDW 13.8 10.0 - 15.0 %    Platelet Count 107 (L) 150 - 450 10e9/L    Diff Method Automated Method     % Neutrophils 73.9 %    % Lymphocytes 12.3 %    % Monocytes 13.0 %    % Eosinophils 0.0 %    % Basophils 0.5 %    % Immature Granulocytes 0.3 %    Nucleated RBCs 0 0 /100    Absolute Neutrophil 4.7 1.6 - 8.3 10e9/L    Absolute Lymphocytes 0.8 0.8 - 5.3 10e9/L    Absolute Monocytes 0.8 0.0 - 1.3 10e9/L    Absolute Eosinophils 0.0 0.0 - 0.7 10e9/L    Absolute Basophils 0.0 0.0 - 0.2 10e9/L    Abs Immature Granulocytes 0.0 0 - 0.4 10e9/L    Absolute Nucleated RBC 0.0    Comprehensive metabolic panel   Result Value Ref Range    Sodium 134 133 - 144 mmol/L    Potassium 4.0 3.4 - 5.3 mmol/L    Chloride 99 94 - 109 mmol/L    Carbon Dioxide 27 20 - 32 mmol/L    Anion Gap 8 3 - 14 mmol/L    Glucose 256 (H) 70 - 99 mg/dL     Urea Nitrogen 29 7 - 30 mg/dL    Creatinine 1.40 (H) 0.66 - 1.25 mg/dL    GFR Estimate 47 (L) >60 mL/min/[1.73_m2]    GFR Estimate If Black 54 (L) >60 mL/min/[1.73_m2]    Calcium 8.7 8.5 - 10.1 mg/dL    Bilirubin Total 0.9 0.2 - 1.3 mg/dL    Albumin 3.5 3.4 - 5.0 g/dL    Protein Total 7.0 6.8 - 8.8 g/dL    Alkaline Phosphatase 81 40 - 150 U/L    ALT 29 0 - 70 U/L    AST 29 0 - 45 U/L   Troponin I   Result Value Ref Range    Troponin I ES 0.044 0.000 - 0.045 ug/L   TSH   Result Value Ref Range    TSH 0.89 0.40 - 4.00 mU/L   Hepatic panel   Result Value Ref Range    Bilirubin Direct 0.3 (H) 0.0 - 0.2 mg/dL    Bilirubin Total 0.8 0.2 - 1.3 mg/dL    Albumin 3.4 3.4 - 5.0 g/dL    Protein Total 6.9 6.8 - 8.8 g/dL    Alkaline Phosphatase 77 40 - 150 U/L    ALT 28 0 - 70 U/L    AST 26 0 - 45 U/L   Lactic acid whole blood   Result Value Ref Range    Lactic Acid 1.5 0.7 - 2.0 mmol/L   XR Chest Port 1 View    Narrative    XR PORTABLE CHEST ONE VIEW   11/26/2020 7:34 PM     HISTORY: Cough, possible COVID.    COMPARISON: Chest x-ray 8/5/2020.      Impression    IMPRESSION: Portable chest. Lungs are hypoaerated but otherwise clear.  Heart is normal in size. No pneumothorax. No definite pleural  effusions. Prior CABG.    HARRY SANDRA MD   Ammonia   Result Value Ref Range    Ammonia 23 10 - 50 umol/L   CT Head w/o Contrast    Narrative    EXAM: CT HEAD W/O CONTRAST  LOCATION: [facility]  DATE/TIME: 11/26/2020 9:09 PM    INDICATION: Altered level of consciousness (LOC), unexplained  COMPARISON: Head CT 09/03/2018  TECHNIQUE: Routine CT Head without IV contrast. Multiplanar reformats. Dose reduction techniques were used.    FINDINGS:  INTRACRANIAL CONTENTS: No intracranial hemorrhage, extraaxial collection, or mass effect.  No CT evidence of acute infarct. Mild presumed chronic small vessel ischemic changes. Mild generalized volume loss. No hydrocephalus.     VISUALIZED ORBITS/SINUSES/MASTOIDS: No intraorbital  abnormality. No paranasal sinus mucosal disease. No middle ear or mastoid effusion.    BONES/SOFT TISSUES: No acute abnormality.      Impression    IMPRESSION:  1.  No CT evidence for acute intracranial process.  2.  Brain atrophy and presumed chronic microvascular ischemic changes as above.   Blood gas venous   Result Value Ref Range    Ph Venous 7.38 7.32 - 7.43 pH    PCO2 Venous 44 40 - 50 mm Hg    PO2 Venous 42 25 - 47 mm Hg    Bicarbonate Venous 26 21 - 28 mmol/L    Base Excess Venous 0.9 mmol/L    FIO2 28%    Troponin I   Result Value Ref Range    Troponin I ES 0.069 (H) 0.000 - 0.045 ug/L   CRP inflammation   Result Value Ref Range    CRP Inflammation 22.1 (H) 0.0 - 8.0 mg/L   UA with Microscopic   Result Value Ref Range    Color Urine Yellow     Appearance Urine Clear     Glucose Urine 50 (A) NEG^Negative mg/dL    Bilirubin Urine Negative NEG^Negative    Ketones Urine 5 (A) NEG^Negative mg/dL    Specific Gravity Urine 1.018 1.003 - 1.035    Blood Urine Negative NEG^Negative    pH Urine 5.0 5.0 - 7.0 pH    Protein Albumin Urine 30 (A) NEG^Negative mg/dL    Urobilinogen mg/dL 0.0 0.0 - 2.0 mg/dL    Nitrite Urine Negative NEG^Negative    Leukocyte Esterase Urine Negative NEG^Negative    Source Midstream Urine     WBC Urine 1 0 - 5 /HPF    RBC Urine 2 0 - 2 /HPF    Squamous Epithelial /HPF Urine <1 0 - 1 /HPF    Mucous Urine Present (A) NEG^Negative /LPF       Medications   albuterol (PROAIR HFA/PROVENTIL HFA/VENTOLIN HFA) 108 (90 Base) MCG/ACT inhaler 4 puff (has no administration in time range)   ipratropium (ATROVENT HFA) Inhaler 2 puff (2 puffs Inhalation Given 11/26/20 2112)   acetaminophen (TYLENOL) tablet 975 mg (975 mg Oral Given 11/26/20 1834)   0.9% sodium chloride BOLUS (0 mLs Intravenous Stopped 11/26/20 2205)   magnesium sulfate 2 g in water intermittent infusion (0 g Intravenous Stopped 11/26/20 2103)   0.9% sodium chloride BOLUS (500 mLs Intravenous New Bag 11/26/20 2142)       Assessments & Plan  (with Medical Decision Making)     I am not sure what is causing the patient's symptoms.  It could be Covid, could be an electrolyte disturbance, could be worsening dementia, it could be head bleed, could be cardiac.  I am he is wheezy and crackly on my exam.  It may be that he has poor O2 sats and I will check a VBG. Will give some COPD meds for wheezing, possible COPD exacerbation.     I have reviewed the nursing notes.    I have reviewed the findings, diagnosis, plan and need for follow up with the patient.      New Prescriptions    No medications on file       Final diagnoses:   Clinical diagnosis of COVID-19       11/26/2020   [facility] EMERGENCY DEPT     Mirza Joel MD  11/27/20 0000

## 2020-11-27 NOTE — PLAN OF CARE
DATE:  11/27/2020   TIME OF RECEIPT FROM LAB:  6770  LAB TEST:  Troponin  LAB VALUE:  0.141  RESULTS GIVEN WITH READ-BACK TO (PROVIDER):  Wilfrid Price MD, Kim Mejia,[facility]  TIME LAB VALUE REPORTED TO PROVIDER:   1799

## 2020-11-27 NOTE — ED NOTES
Pt presents to ED with concern for generalized weakness.  Per EMS recent dx with dementia.  Pt lives at home with son.  Has had recent falls states d/t tripping, but pt then shows RN his leg tremor, which is new for him.  EMS states patient denied cough or SOA.  Pt then tells RN, he feels SOA sometimes at rest.  Pt coughs while RN in room. Pt also febrile in department.

## 2020-11-27 NOTE — PLAN OF CARE
VS have been stable today except just this evening he spiked a fever of 100.6 and had a notable change in mentation including increased confusion and restless and wanting to get out of bed- found him stuck sideways with legs hanging off the side.  He is oriented to self only, at times gets frustrated/angry because he thinks he's at home in a chair and he wants Shantal to help him get down so he can get up.  Attempts at reorientation are unsuccessful.  Bed alarm is on, Tylenol given for the fever, and remdesivir/dexamethasone started as COVID test did come back positive.

## 2020-11-27 NOTE — ED NOTES
Patient up in room with walker and standby assistance. Patient able to ambulate to sink and back in room. Patient oxygen saturation dropped to 84% on RA, RR increased to 26. Provider updated.

## 2020-11-28 NOTE — PLAN OF CARE
VSS, ate 75% of breakfast, confusion has improved since last evening, has not been agitated or restless so far today, continues to be incontinent of urine soaking through all of his bedding when he goes, plan to get him up in chair using lift for lunch, crackles in lungs and still on 2L O2 with sats consistently around 94-95%.

## 2020-11-28 NOTE — PROGRESS NOTES
Care Management Initial Consult    General Information  Assessment completed with: Family, Jae, pt's son as pt remains confused today.  Type of CM/SW Visit: Initial Assessment  Primary Care Provider verified and updated as needed: Yes   Readmission within the last 30 days: no previous admission in last 30 days      Reason for Consult: discharge planning  Advance Care Planning:     Not on record     Communication Assessment  Patient's communication style: spoken language (English or Bilingual)    Hearing Difficulty or Deaf: yes   Wear Glasses or Blind: yes    Cognitive  Cognitive/Neuro/Behavioral: .WDL except  Level of Consciousness: confused  Arousal Level: opens eyes spontaneously  Orientation: disoriented to;time  Mood/Behavior: calm  Best Language: 0 - No aphasia  Speech: clear    Living Environment:   People in home: child(katherin), adult  Jae  Current living Arrangements: house      Able to return to prior arrangements: yes, depending upon PT/OT recommendations once pt is closer to medically stability.     Family/Social Support:  Care provided by: child(katherin)  Provides care for: no one, unable/limited ability to care for self  Marital Status:   Children          Description of Support System: Supportive;Involved    Support Assessment: Adequate family and caregiver support;Adequate social supports    Current Resources:   Skilled Home Care Services:  None  Community Resources: None  Equipment currently used at home: cane, straight  Supplies currently used at home: None    Employment/Financial:  Employment Status: retired        Financial Concerns: No concerns identified       Lifestyle & Psychosocial Needs:  Socioeconomic History     Marital status:      Spouse name: Not on file     Number of children: Not on file     Years of education: Not on file     Highest education level: Not on file   Occupational History     Occupation:      Employer: RETIRED     Occupation:       Tobacco Use     Smoking status: Never Smoker     Smokeless tobacco: Never Used   Substance and Sexual Activity     Alcohol use: No     Alcohol/week: 0.0 standard drinks     Drug use: No     Sexual activity: Never     Functional Status:  Prior to admission patient needed assistance:   Dependent ADLs:: Ambulation-cane  Dependent IADLs:: Independent  Assesssment of Functional Status: Not at baseline with ADL Functioning;Not at baseline with mobility;Not at  functional baseline    Mental Health Status:  Mental Health Status: No Current Concerns       Chemical Dependency Status:  Chemical Dependency Status: No Current Concerns           Values/Beliefs:  Spiritual, Cultural Beliefs, Buddhist Practices, Values that affect care: yes             Additional Information:  RYAN reviewed pt's EMR, attended IDT rounds & learned that pt to remain hospitalized for another 2-4 days due to Covid status.    RYAN placed call to pt's son, Jae, who shared that at baseline, pt is independent with all cares, drives, attends [employer] & lives with Jae.    Jae expressed concern with his own health & shared he is becoming ill, body aches, headache, and SOB with distances. Jae inquired when he should present to the ED for himself or for a Covid test.  RYAN informed I am not a RN, but encouraged pt to contact his PCP office for testing inforamtion & to present to ED if SOB worsens, fever that is uncontrolled, or vomiting/diahrrea.  This writer identified that Jae was able to speak multiple sentences without break in breathing & used that example for SOB.  Jae verbally acknowledged that he has his clinic phone number, will cancel his own infusion appt on 11-30, and will wait it out a day or 2.    CTS to continue to follow pt for discharge planning needs & will need to ensure that son, Jae, is well enough to have pt return home.    JESUS Escalona

## 2020-11-28 NOTE — PLAN OF CARE
OT order received. Per MD, Pt not appropriate to start therapies yet. Will monitor status and initiate OT when appropriate.     Jane Mckeon, OTR/L

## 2020-11-28 NOTE — PROGRESS NOTES
[facility] Internal Medicine Progress Note     Date of Service (when I saw the patient): 11/28/2020    REASON FOR ADMISSION / INTERVAL HISTORY:  Alvaro Horton is a 80 year old male who presents on 11/26/2020 with weakness and frequent falls. HAS COVID 19 infection. See details below.     ASSESSMENT/PLAN:     Acute respiratory failure with hypoxia due to 2019 novel coronavirus infection  presenting with Weakness, fatigue, shortness of breath, cough and hypoxia. Was hypoxic to 84% in ED. Was placed on 2 L NC and has remained on this. Admits to shortness of breath. Has a chronic cough. CXR revealed hypo aerated lungs but otherwise clear. D dimer is elevated .Covid labs include elevated troponin, elevated CRP of 22.1-->45.7, low platelets of 107-->95, elevated d dimer of 1.4. Was also febrile to 101.6 on arrival. COVID RESULT POSITIVE.   - Continue supplemental oxygen and wean as able.   - Continue home COPD inhalers.     - Continue special precautions.   - continue  decadron 6 mg daily,  remdesivir   - Daily COVID labs.      Falls frequently  Generalized weakness  Son notes generalized weakness, patient was found down early am hours of 11/25.   PT/OT/SW eval.    Elevated troponin   Initial troponin 0.044-->0.069-->0.107-->0.141.  Denies chest pain.  Suspect due to a combination of atrial fibrillation, demand ischemia, and possible COVID-19 infection .    - trop decreasing.   - Echocardiogram pending     COPD (chronic obstructive pulmonary disease) , possible acute exacerbation   Flores's lung   Patient managed prior to admission with Trelegy Ellipta daily and as needed albuterol. PFTs in 04/2018 revealed severe airflow obstruction.    - Continue home medication.     Atrial fibrillation   Noted in history as transient episode following CABG in 2009.Had ziopatch early in year with no a fib.. Not on anticoagulation.  Does take metoprolol 25 mg twice daily. Takes 325 mg aspirin daily. EKG with a fib now, likely  due to acute illness. had zio patch recently in 3/20 that apparently was negative.   - Continue metoprolol and aspirin.    - Lovenox for prophylaxis, does have low platelets. Stable. Monitor.    - Consider anti coagulation at time of discharge, CHADSVAs of 6. Would need to discuss with family and patient. Is hard for patient to understand current situation. Is a high fall risk.   -continue tele.      History of CVA (cerebrovascular accident)  CVA at time of CABG, 2009.   Son reports weakness, patient was found down on the early am hours of 10/25/2020. Did appear more weak than baseline and a bit confused per son.   Takes full dose aspirin. Also on statin. No CT evidence for acute intracranial process, chronic microvascular ischemic changes noted.      Chronic ischemic heart disease  CAD, s/p coronary bypass grafting in 11/2009 with LIMA to LAD and SVG to OM1 and 2  Follows with cards, last seen at virtual visit in 03/2020.  Stress echo 06/2019 with EF >70%.    - resume  lasix.    - Echo pending.      Hypertension goal BP (blood pressure) < 140/90  Patient managed prior to admission with lisinopril 10 mg daily.  Stable. .   - Continue home medication.     Hypertrophy of prostate without urinary obstruction   - Continue prior to admission flomax and finasteride.     Hyperlipidemia LDL goal <70   - Continue prior to admission atorvastatin 80 mg daily.        Type 2 diabetes mellitus with diabetic chronic kidney disease   A1c of 8.0. Patient managed prior to admission with  Metformin and NPH BID. Creatinine is stable.   Renal fx slightly worse. BS >250   -continue to  Hold metformin, continue  ISS.    - increase  insulin NPH 25 units BID.    - Hypo/hyperglycemia protocol.       Esophageal reflux   - Continue prior to admission ranitidine twice daily.     Memory loss  Early onset Alzheimer dementia   Recent diagnosis per son, not on medications.       DISPO  Will be in [facility] 2-4 days      ANGELA ROCHE MD   Pg  "[phone]    DVT Prhylaxis: Enoxaparin (Lovenox) SQ  Code Status: Full Code    ROS:  As described in A/P and Exam.  Otherwise ALL are  negative.    PHYSICAL EXAM:  All vitals have been reviewed    Blood pressure 122/53, pulse 84, temperature 98.5  F (36.9  C), temperature source Oral, resp. rate 20, height 1.727 m (5' 8\"), weight 84.9 kg (187 lb 1.6 oz), SpO2 95 %.    I/O this shift:  In: -   Out: 200 [Urine:200]    GENERAL APPEARANCE: healthy, alert and no distress  EYES: conjunctiva clear, eyes grossly normal  HENT: external ears and nose normal   NECK: supple, no masses or adenopathy  RESP: lungs-B crackles/ rhonchi   CV: regular rate and rhythm, normal S1 S2, no S3 or S4 and no murmur, click or rub   ABDOMEN: soft, nontender, no HSM or masses and bowel sounds normal  MS: no clubbing, cyanosis; no edema  SKIN: clear without significant rashes or lesions  NEURO: -non-focal moves all 4 extr    ROUTINE  LABS (Last four results)  CMP  Recent Labs   Lab 11/28/20  0652 11/27/20  0801 11/26/20  1831    134 134   POTASSIUM 4.3 3.8 4.0   CHLORIDE 105 101 99   CO2 26 26 27   ANIONGAP 6 7 8   * 283* 256*   BUN 38* 25 29   CR 1.36* 1.17 1.40*   GFRESTIMATED 48* 58* 47*   GFRESTBLACK 56* 67 54*   EVELINA 7.8* 7.6* 8.7   PROTTOTAL  --   --  6.9  7.0   ALBUMIN  --   --  3.4  3.5   BILITOTAL  --   --  0.8  0.9   ALKPHOS  --   --  77  81   AST  --   --  26  29   ALT  --   --  28  29     CBC  Recent Labs   Lab 11/28/20  0652 11/27/20  0801 11/26/20  1831   WBC 7.1 6.7 6.4   RBC 4.68 4.41 4.69   HGB 14.2 13.4 14.1   HCT 42.0 38.9* 41.7   MCV 90 88 89   MCH 30.3 30.4 30.1   MCHC 33.8 34.4 33.8   RDW 13.8 13.8 13.8   PLT 89* 95* 107*     INR  Recent Labs   Lab 11/28/20  0652   INR 1.01     Arterial Blood Gas  Recent Labs   Lab 11/26/20  2200   O2PER 28%       No results found for this or any previous visit (from the past 24 hour(s)).    "

## 2020-11-28 NOTE — PROGRESS NOTES
"CLINICAL NUTRITION SERVICES - ASSESSMENT NOTE     Nutrition Prescription    RECOMMENDATIONS FOR MDs/PROVIDERS TO ORDER:  None    Malnutrition Status:    Unable to determine due to no NFPA and weight hx too variable.    Recommendations already ordered by Registered Dietitian (RD):  Boost Glucose Control w/ meals  Food preferences given to kitchen  Daily weights    Future/Additional Recommendations:  Continue to monitor and encourage oral intake  Monitor for WOC RN consult and reassess nutrition interventions as needed.     REASON FOR ASSESSMENT  Alvaro Horton is a/an 80 year old male assessed by the dietitian for Admission Nutrition Risk Screen for stageable pressure injuries or large/non-healing wound or burn    NUTRITION HISTORY  Obtained from patient's son Jae via telephone:  -COVID-19 positive 11/26/20  -PMH: frequent falls, COPD, A-fib, hx of CVA, chronic ischemic heart disease, HTN, hyperlipidemia, type 2 diabetes managed with metformin and NPH BID, and recent dx of early onset Alzheimer's.   -lives with son Jae in [place].  -hasn't been eating well for the past few days (2-4 days), but before this he still ate smaller amounts. During this time he ate a little bit of turkey, potatoes, and drinking water.   -Drinks Boost Glucose Control at least one per day. Likes coffee and doesn't drink milk unless with cereal. Not much into sweets.    CURRENT NUTRITION ORDERS  Diet: Orders Placed This Encounter      Moderate Consistent CHO Diet    Intake/Tolerance: Ate 25% of breakfast this morning.    LABS  Recent Labs   Lab 11/27/20  1631 11/27/20  1155 11/27/20  0810 11/27/20  0801 11/27/20  0211 11/26/20  1831   GLC  --   --   --  283*  --  256*   * 259* 292*  --  345*  --      Na 134 (WNL)  K+ 3.8 (WNL)  GFR 58 (L)  A1C 8.0 11/27  CRP inflammation 45.7 (H)    MEDICATIONS  Decadron  pepcid  novolog  NPH       ANTHROPOMETRICS  Height: 172.7 cm (5' 8\")  Most Recent Weight: 81.8 kg (180 lb 4.8 oz)    IBW: 70 kg " (117% IBW)  BMI: Overweight BMI 25-29.9  Weight History:   Wt Readings from Last 10 Encounters:   11/27/20 81.8 kg (180 lb 4.8 oz)   11/23/20 88.9 kg (196 lb)   08/10/20 88.9 kg (196 lb)   08/05/20 83.9 kg (185 lb)   03/23/20 83.9 kg (185 lb)   03/06/20 89.5 kg (197 lb 6.4 oz)   02/26/20 86.2 kg (190 lb)   09/25/19 88.9 kg (196 lb)   08/06/19 87.2 kg (192 lb 3.2 oz)   07/29/19 88 kg (194 lb)   -Jae reports UBW is 180-190 lbs and has not noticed any weight loss.  -appears that weight fluctuates between 185 and 196 lbs. Due to variations, unable to determine if weight loss meets criteria.     Vitals:    11/26/20 1813 11/27/20 0153   Weight: 88.9 kg (196 lb) 81.8 kg (180 lb 4.8 oz)   -RD to order daily weights    Dosing Weight: 82 kg (current weight)    ASSESSED NUTRITION NEEDS  Estimated Energy Needs: 3349-0810 kcals/day (25 - 30 kcals/kg)  Justification: Maintenance  Estimated Protein Needs: 82-98 grams protein/day (1 - 1.2 grams of pro/kg)  Justification: CKD and Increased needs  Estimated Fluid Needs: (1 mL/kcal)   Justification: Maintenance    PHYSICAL FINDINGS  Unable to assess due to isolation precautions secondary to COVID-19 infection. RDs not entering rooms to preserve PPE and minimize exposure, per nutrition department guidelines.    MALNUTRITION  % Intake:  </= 50% for >/= 5 days (severe malnutrition)  % Weight Loss: Unable to assess - weight hx variable  Subcutaneous Fat Loss: Unable to assess  Muscle Loss: Unable to assess  Fluid Accumulation/Edema: None noted  Malnutrition Diagnosis: Unable to determine due to no NFPA and weight hx too variable.    NUTRITION DIAGNOSIS  Inadequate oral intake related to poor appetite secondary to acute illness as evidenced by family report and oral intake  </= 50% for >/= 5 days    INTERVENTIONS  Implementation  Nutrition Education: Not appropriate at this time due to patient condition. Provided education to Jae on role of RD and available snacks/supplements. Encouraged  Jae to have patient drink 2-3 Boost Glucose Control at home after discharge due to increased nutrient needs with COVID-19.   Collaboration with other providers: rounds  Medical food supplement therapy: see above  Modify composition of meals/snacks: see above  Daily weights    Goals  Patient to consume % of nutritionally adequate meal trays TID, or the equivalent with supplements/snacks.     Monitoring/Evaluation  Progress toward goals will be monitored and evaluated per protocol.    Calli Torres RDN, LD  Clinical Dietitian  Office: [phone]  Weekend Pager: [phone]

## 2020-11-28 NOTE — PLAN OF CARE
"Pt alert, confused. Lift for transfers. Incontinent. Agitated at times, strikes out at staff. Blood glucose monitoring. Seroquel given. Has infrequent cough. LS crackles. On 2 L O2 via nc. Resting comfortably between cares. Pt denies pain, chest pain, nausea, SOB. /65 (BP Location: Right arm)   Pulse 57   Temp 97.5  F (36.4  C) (Oral)   Resp 20   Ht 1.727 m (5' 8\")   Wt 81.8 kg (180 lb 4.8 oz)   SpO2 90%   BMI 27.41 kg/m      "

## 2020-11-29 NOTE — PROGRESS NOTES
Care Management Follow Up    Length of Stay (days): 3    Expected Discharge Date: 20     Concerns to be Addressed: discharge planning     Patient plan of care discussed at interdisciplinary rounds: Yes    Anticipated Discharge Disposition: Home;Home Care     Anticipated Discharge Services: None  Anticipated Discharge DME: None    Patient/family educated on Medicare website which has current facility and service quality ratings: yes  Education Provided on the Discharge Plan:    Patient/Family in Agreement with the Plan: yes    Referrals Placed by CM/SW: Homecare  Private pay costs discussed: Not applicable    Additional Information:  Spoke with patient via phone, introduced self and role. Discussed discharge planning.  Patient states he is feeling better.  Discussed patient's mobility.  PT and OT evaluations pending for .  Patient feels he will be safe to return home.  He has been up moving around room today.  Patient feels he will be safe to return home upon discharge.  Discussed home care services.  He is agreeable.  Patient chooses [facility] ([phone] Fax: [phone]).  Order/referral placed for RN, PT and OT.      Discussed above with patient's son, Jae.  Jae is encouraged to hear patient is improving.  Jae states he is also feeling better today.  Jae is agreeable to home care services for his dad upon discharge.      PLAN:  Home w/ son, Jae and [facility] ([phone] Fax: [phone]), RN, PT and OT.      Rosa RODRIGUEZ RN  Inpatient Care Coordinator  [facility] [phone]  [facility] [phone]    HOME CARE HAND OFF  Patient Name: Alvaro Horton    MRN: [id number]    : 1940    Patient Zip Code: [address]    Admit Diagnosis: Clinical diagnosis of COVID-19 [U07.1]      Services Pt Needs at Home: RN, PT and OT    Discharge Support: Family/Friend Support    Living Arrangements: With family    Contact  Person or Address Other Than Pt: No    Wound Care: No    Anticipate DC Date: 11/30/2020

## 2020-11-29 NOTE — PROGRESS NOTES
11/29/20 1400   Quick Adds   Type of Visit Initial Occupational Therapy Evaluation       Present no   Language English   Living Environment   People in home child(katherin), adult   Current Living Arrangements house   Home Accessibility no concerns   Transportation Anticipated family or friend will provide   Self-Care   Usual Activity Tolerance good   Current Activity Tolerance fair   Regular Exercise No   Equipment Currently Used at Home cane, straight   Activity/Exercise/Self-Care Comment Has 4ww, hospital bed, shower chair that he can use. Prev ind/mod I with ADLs   Disability/Function   Hearing Difficulty or Deaf yes   Patient's preferred means of communication verbal   Describe hearing loss hearing loss on right side;hearing loss on left side   Use of hearing assistive devices right hearing aid;left hearing aid   Were auxiliary aids offered? no   The following aids were provided; patient declined offer of auxiliary aids   Hearing Management yes   Wear Glasses or Blind yes   Vision Management glasses   Concentrating, Remembering or Making Decisions Difficulty yes   Concentration Management dementia   Difficulty Communicating no   Difficulty Eating/Swallowing yes   Eating/Swallowing other (see comments)  (doesn't taste good)   Eating/Swallowing Management   (none)   Walking or Climbing Stairs Difficulty no   Dressing/Bathing Difficulty no   Toileting no   Doing Errands Independently Difficulty (such as shopping) no   Fall history within last six months yes   Number of times patient has fallen within last six months 2   Change in Functional Status Since Onset of Current Illness/Injury yes   General Information   Onset of Illness/Injury or Date of Surgery 11/26/20   Referring Physician Dr. Price   Patient/Family Therapy Goal Statement (OT) Pt would like to dc home when medically ready   Additional Occupational Profile Info/Pertinent History of Current Problem  Alvaro Horton is a 80 year old  male who presents on 11/26/2020 with weakness and frequent falls. HAS COVID 19 infection.   Existing Precautions/Restrictions fall;oxygen therapy device and L/min   Limitations/Impairments hearing   General Observations and Info on 2 L o2, nasal cannula   Cognitive Status Examination   Orientation Status person;place   Affect/Mental Status (Cognitive) WNL   Cognitive Status Comments baseline dementia, per chart   Visual Perception   Visual Impairment/Limitations corrective lenses full-time   Pain Assessment   Patient Currently in Pain No   Range of Motion Comprehensive   General Range of Motion no range of motion deficits identified   Strength Comprehensive (MMT)   Comment, General Manual Muscle Testing (MMT) Assessment BUE grossly 4-/5   Coordination   Upper Extremity Coordination No deficits were identified   Bed Mobility   Bed Mobility sit-supine   Sit-Supine [place] (Bed Mobility) verbal cues   Assistive Device (Bed Mobility) bed rails   Comment (Bed Mobility) extra time needed   Transfers   Transfer Comments near SBA/CGA   Activities of Daily Living   BADL Assessment/Intervention grooming;toileting   Grooming Assessment/Training   [place] Level (Grooming) supervision   Position (Grooming) supported standing   Comment (Grooming) in stance, holding onto counter for support   Toileting   Comment (Toileting) Completing upon arrival, RN completing max A for hygiene after bowel movement.    Instrumental Activities of Daily Living (IADL)   IADL Comments unknown   Clinical Impression   Criteria for Skilled Therapeutic Interventions Met (OT) yes;meets criteria   OT Diagnosis decreased functional ind   OT Problem List-Impairments impacting ADL activity tolerance impaired;balance;strength   ADL comments/analysis limited by SOA, weakness   Assessment of Occupational Performance 1-3 Performance Deficits   Identified Performance Deficits activity tolerance for ADLs   Planned Therapy Interventions (OT) ADL  retraining;strengthening;progressive activity/exercise   Clinical Decision Making Complexity (OT) low complexity   Therapy Frequency (OT) Daily   Predicted Duration of Therapy 1-2 days   Anticipated Equipment Needs Upon Discharge (OT)   (none)   Risks and Benefits of Treatment have been explained. Yes   Patient, Family & other staff in agreement with plan of care Yes   Comment-Clinical Impression Pt will benefit from ongoing OT to maximize safety and ind with ADLs/IADLs and improve funcitonal strength and endurance   OT Discharge Planning    OT Discharge Recommendation (DC Rec) home with home care occupational therapy   OT Rationale for DC Rec Pt will benefit from home OT to increase safety and ind with ADLs/IADLs and improve functional strength and endurance   OT Brief overview of current status  Needs SBA/CGA for ambulation without walker, needing max A for hygiene after toileting, near supervision for g/ h in stance   Total Evaluation Time (Minutes)   Total Evaluation Time (Minutes) 15     See care plan for goals    Jane Mckeon, OTR/L

## 2020-11-29 NOTE — PLAN OF CARE
Patient again this evening around 4-5pm became acutely more confused and was getting increasingly restless and anxious.  IV haldol given and tolerated well, patient resting calmly afterwards.

## 2020-11-29 NOTE — PLAN OF CARE
Alvaro Horton   1940     Nursing Assessment:  Pt a/o to self and place, pleasant and appropriate, able to make needs known when staff present. Pt had a better night than previous, able to sleep well with minimal waking's. No c/o pain or discomfort, no acute changes noted, will continue to monitor.       Vitals:  B/P: 100/50,   T: 97.6,   P: 50,   R: 20       Sandra Payne RN

## 2020-11-29 NOTE — PLAN OF CARE
VSS, patient doing well today, was able to sit up in bed, ambulate to chair, then later ambulated to the bathroom and had a BM.  Seems much stronger today than previously.  COVID labs today are slightly improved since yesterday so hopefully patient has turned a corner.  O2 requirements have remained stable at 2L O2 per NC.  C/o cough, given Tessalon pamella for this.  Cognitively patient is at baseline, oriented to situation but not to time.

## 2020-11-29 NOTE — PROGRESS NOTES
[facility] Internal Medicine Progress Note     Date of Service (when I saw the patient): 11/29/2020    REASON FOR ADMISSION / INTERVAL HISTORY:  Alvaro Horton is a 80 year old male who presents on 11/26/2020 with weakness and frequent falls. HAS COVID 19 infection. See details below.     ASSESSMENT/PLAN:     Acute respiratory failure with hypoxia due to 2019 novel coronavirus infection  presenting with Weakness, fatigue, shortness of breath, cough and hypoxia. Was hypoxic to 84% in ED. Was placed on 2 L NC and has remained on this. CXR revealed hypo aerated lungs but otherwise cleal. COVID RESULT POSITIVE. Labs stable. CRP improving. o2 sats stable on 2L o2.    - Continue supplemental oxygen and wean as able.   - continue  decadron 6 mg daily,  Remdesivir. Follow covid labs.     Falls frequently  Generalized weakness  Son notes generalized weakness, patient was found down early am hours of 11/25.   PT/OT/SW on case. Pt better-did walk from bed to chair today. Continue to follow.     Elevated troponin   Initial troponin 0.044-->0.069-->0.107-->0.141.  Denies chest pain.  Suspect due to a combination of atrial fibrillation, demand ischemia, and possible COVID-19 infection . Trend was decreasing.   - Echocardiogram in AM     COPD (chronic obstructive pulmonary disease) , possible acute exacerbation   Flores's lung   Patient managed prior to admission with Trelegy Ellipta daily and as needed albuterol. PFTs in 04/2018 revealed severe airflow obstruction.    - Continue home medication.     Atrial fibrillation   Noted in history as transient episode following CABG in 2009. Had  Seen cards 3/20 and had ziopatch -no a fib seen.. Not on anticoagulation.  Does take metoprolol 25 mg twice daily. Takes 325 mg aspirin daily. EKG with a fib now, likely due to acute illness.   Does have intermittent afib on tele monitoring but has RBBB and arrythmia.    - Continue metoprolol and aspirin. ECHO in AM. May need to discuss  "with cards in AM after ECHO for anticoagulation.      History of CVA (cerebrovascular accident)  CVA at time of CABG, 2009.    Continue aspirin     Chronic ischemic heart disease  CAD, s/p coronary bypass grafting in 11/2009 with LIMA to LAD and SVG to OM1 and 2  Follows with cards, last seen at virtual visit in 03/2020.  Stress echo 06/2019 with EF >70%.    - resume  lasix.    - Echo pending.      Hypertension goal BP (blood pressure) < 140/90  Patient managed prior to admission with lisinopril 10 mg daily.  Stable. .   - Continue home medication.     Hypertrophy of prostate without urinary obstruction   - Continue prior to admission flomax and finasteride.     Hyperlipidemia LDL goal <70   - Continue prior to admission atorvastatin 80 mg daily.        Type 2 diabetes mellitus with diabetic chronic kidney disease   A1c of 8.0. Patient managed prior to admission with  Metformin and NPH BID. Creatinine is stable.   Renal fx slightly worse. BS >250  Continue NPH, ISS, resume metformin      Esophageal reflux   - Continue prior to admission ranitidine twice daily.     Memory loss  Early onset Alzheimer dementia   Recent diagnosis per son, not on medications.       DISPO  Home in AM if o2 sats stable, ECHO done and discussed with cards.      ANGELA ROCHE MD   Pg [phone]    DVT Prhylaxis: Enoxaparin (Lovenox) SQ  Code Status: Full Code    ROS:  As described in A/P and Exam.  Otherwise ALL are  negative.    PHYSICAL EXAM:  All vitals have been reviewed    Blood pressure 103/49, pulse 58, temperature 97.7  F (36.5  C), temperature source Oral, resp. rate 18, height 1.727 m (5' 8\"), weight 84.4 kg (186 lb), SpO2 95 %.    No intake/output data recorded.    GENERAL APPEARANCE: healthy, alert and no distress  EYES: conjunctiva clear, eyes grossly normal  HENT: external ears and nose normal   NECK: supple, no masses or adenopathy  RESP: lungs-few B crackles/ rhonchi   CV: regular rate and rhythm, normal S1 S2, no S3 or S4 " and no murmur, click or rub   ABDOMEN: soft, nontender, no HSM or masses and bowel sounds normal  MS: no clubbing, cyanosis; no edema  SKIN: clear without significant rashes or lesions  NEURO: -non-focal moves all 4 extr    ROUTINE  LABS (Last four results)  CMP  Recent Labs   Lab 11/29/20 0448 11/28/20 0652 11/27/20  0801 11/26/20  1831    137 134 134   POTASSIUM 4.1 4.3 3.8 4.0   CHLORIDE 107 105 101 99   CO2 24 26 26 27   ANIONGAP 7 6 7 8   * 236* 283* 256*   BUN 47* 38* 25 29   CR 1.37* 1.36* 1.17 1.40*   GFRESTIMATED 48* 48* 58* 47*   GFRESTBLACK 56* 56* 67 54*   EVELINA 7.5* 7.8* 7.6* 8.7   PROTTOTAL  --   --   --  6.9  7.0   ALBUMIN  --   --   --  3.4  3.5   BILITOTAL  --   --   --  0.8  0.9   ALKPHOS  --   --   --  77  81   AST  --   --   --  26  29   ALT  --   --   --  28  29     CBC  Recent Labs   Lab 11/29/20 0448 11/28/20 0652 11/27/20  0801 11/26/20  1831   WBC 8.8 7.1 6.7 6.4   RBC 4.31* 4.68 4.41 4.69   HGB 12.9* 14.2 13.4 14.1   HCT 37.8* 42.0 38.9* 41.7   MCV 88 90 88 89   MCH 29.9 30.3 30.4 30.1   MCHC 34.1 33.8 34.4 33.8   RDW 13.9 13.8 13.8 13.8   * 89* 95* 107*     INR  Recent Labs   Lab 11/29/20 0448 11/28/20  0652   INR 1.19* 1.01     Arterial Blood Gas  Recent Labs   Lab 11/26/20  2200   O2PER 28%       No results found for this or any previous visit (from the past 24 hour(s)).

## 2020-11-30 NOTE — DISCHARGE INSTRUCTIONS
Right 2nd Toe Dressing Change  Wash your hands before and after the dressing change. You may wear gloves if you choose. Gloves are available over the counter at [facility], [facility], [facility], etc.   Use scissors at home that you can designate solely for wound care and cleanse well with each use, (rubbing alcohol or alcohol pads work well for this).    Wash hands.  Remove old dressing.  Wash hands.  1.) Cleanse wound with wound cleanser and wipe with gauze. Pat wound and surrounding skin dry with gauze.   2.) Apply iodosorb to wound.   3.) Cover with Mepilex Lite cut to fit over wound and surrounding skin. This will provide a small amount of cushion/protect from the big toe nail and will absorb drainage.  4.) Secure with Medipore Tape.  Change bandage 3 times/week such as Monday, Wednesday, Friday.    You will need to have someone pare down your big toe nails because this is what is causing the sore on your toe. There are nurses that come to your house to do nail care, but you will need to wait to schedule until your Covid-19 restrictions are removed. This service isn't covered by insurance, so you will need to pay privately.    Foot Care Specialists in Your Area:  [facility],  Clotilde Plascencia, [phone]  Aure Roach, [phone]    Signs and symptoms of infection:  Bright green drainage  Foul odor  Increasing pain in area  New redness or swelling at the site of the wound or streaks up from wound  New warmth to touch around wound accompanied by redness and swelling  Fever    Need to be seen by your provider as soon as possible for infection concerns.

## 2020-11-30 NOTE — PROGRESS NOTES
Care Management Discharge Note    Discharge Date: 11/30/20       Discharge Disposition: Home;Home Care    Discharge Services: None    Discharge DME: None    Discharge Transportation: family or friend will provide    Private pay costs discussed: Not applicable    Education Provided on the Discharge Plan:  yes   Persons Notified of Discharge Plans: Jennifer # [phone]  Patient/Family in Agreement with the Plan: yes    Handoff Referral Completed: Yes    Additional Information:  SW received notice of Pt's discharge. SW called Pt's son Jae to confirm discharge plan.     Jae confirmed that Pt will discharge home into his care. Jae stated that he will be providing 24/7 care in his home. Jae states that he has been diagnosed with covid as well.      Discharge Plan:    Return home with bassem Rowe    Referral to [facility] ([phone] Fax: [phone])-RN, MEREDITHA, PT    Bassem Rowe will provide transport         JESUS Ballard

## 2020-11-30 NOTE — PROGRESS NOTES
Oxygen Documentation:   I certify that this patient, Alvaro Horton has been under my care (or a nurse practitioner or physican's assistant working with me). This is the face-to-face encounter for oxygen medical necessity.      Alvaro Horton is now in a chronic stable state and continues to require supplemental oxygen. Patient has continued oxygen desaturation due to COVID-19 infection.    Alternative treatment(s) tried or considered and deemed clinically infective for treatment of COVID-19 infection include inhalers and steroids.  If portability is ordered, is the patient mobile within the home? yes    **Patients who qualify for home O2 coverage under the CMS guidelines require ABG tests or O2 sat readings obtained closest to, but no earlier than 2 days prior to the discharge, as evidence of the need for home oxygen therapy. Testing must be performed while patient is in the chronic stable state. See notes for O2 sats.**    Keenan Brice MD  [facility] Medicine

## 2020-11-30 NOTE — DISCHARGE SUMMARY
[facility]   Discharge Summary  Hospital Medicine       Date of Admission:  11/26/2020  Date of Discharge:  11/30/2020  7:40 PM  Discharging Provider: Keenan Brice MD      Identification and Chief Compaint: Alvaro Horton is a 80 year old male who presented on 11/26/2020 with complaint of generalized weakness and fatigue for 1 day.    Discharge Diagnoses   Acute respiratory failure with hypoxia due to 2019 novel coronavirus infection  Atrial fibrillation, persistent  Suspect type 2 MI, demand ischemia  Moderate RV dilatation and decreased RV function  Moderate pulmonary hypertension   Falls frequently  Generalized weakness  Chronic obstructive pulmonary disease   H/o CVA  Chronic ischemic heart disease  CAD, s/p coronary bypass grafting   Hypertension   Diabetes mellitus type 2  BPH  Cognitive decline  Gastroesophageal reflux disease     Follow-ups Needed After Discharge   Follow-up Appointments     Follow-up and recommended labs and tests       Follow up with primary care provider, Be Carreno, within 7 days for   hospital follow- up.  No follow up labs or test are needed.             Hospital Course   Alvaro Horton is a 80 year old male who presented on 11/26/2020 with generalized weakness and fatigue for 1 day.     Acute respiratory failure with hypoxia due to 2019 novel coronavirus infection    Onset of symptoms: 11/25    Test positive: 11/26    Presented with weakness, fatigue, shortness of breath, cough and hypoxemia. Was hypoxemic to 84% in ED. Was placed on 2 L NC and has remained on this. CXR revealed hypo-aerated lungs but otherwise clear.      Patient appears to be clinically improving. O2 needs stable to improving as well.       CRP trend: 22.1 ->-> 81.0 (peak) ->-> 33.0     IL-6: 36.85    Ferritin 696    LDH: 348 - 311 - 338    Requiring O2 by NC at 2 lpm and stable.     Dexamethasone 6 mg daily started 11/27    Remdesivir started 11/27   - Patient  will need O2 on discharge but appears stable to go home.    - continue  decadron 6 mg daily,  Remdesivir.      Falls frequently  Generalized weakness  Son notes generalized weakness, patient was found down early am hours of 11/25.   PT/OT/SW on case.     Patient's activity level adequate for discharge home with home care today, 11/30.     Suspect type 2 MI, demand ischemia  Moderate RV dilatation and decreased RV function  Moderate pulmonary hypertension     Troponin 0.044-->0.069-->0.107-->0.141-->0.216-->0.101-->0.047.  Denies chest pain.  Suspect due to a combination of atrial fibrillation, demand ischemia, and possible COVID-19 infection.    Echo shows normal LV size and function, LVEF 60-65%, no RWMA, RV mod dilated and mod decreased systolic function, RV volume overload and mod pulmonary hypertension. Previous RV dilatation, decreased function and pulmonary hypertension were mild in May 2019. Appears to be progression of pulmonary hypertension and RV dysfunction likely due to underlying COPD.      COPD (chronic obstructive pulmonary disease)    Patient managed prior to admission with Trelegy Ellipta daily and as needed albuterol. PFTs in 04/2018 revealed severe airflow obstruction. Suspect this is baseline. Continue home therapies.      Atrial fibrillation     Noted in history as transient episode following CABG in 2009. Had  Seen cards 3/20 and had ziopatch -no a fib seen. Not on anticoagulation.  Does take metoprolol 25 mg twice daily. Takes 325 mg aspirin daily.     On admission, patient in atrial fibrillation. Appears has been largely in atrial fibrillation this admission including today, 11/30. Likely exacerbated by acute illness but could be due to worsening RV dysfunction as well.      CHADS-Vasc score 6. Does have falls at home, but given patient also has clotting risk from COVID-19, recommend at least short term anticoagulation. Patient agreeable as is his son.   - start apixaban 5 mg BID, continue at  least a month, follow up with PCP     History of CVA (cerebrovascular accident)  CVA at time of CABG, 2009.    Continue aspirin     Chronic ischemic heart disease  CAD, s/p coronary bypass grafting in 11/2009 with LIMA to LAD and SVG to OM1 and 2  Follows with cards, last seen at virtual visit in 03/2020.  Stress echo 06/2019 with EF >70%.    - resume  lasix.      Hypertension goal BP (blood pressure) < 140/90  Patient managed prior to admission with lisinopril 10 mg daily.  Stable. .   - Continue home medication.     Hypertrophy of prostate without urinary obstruction   - Continue prior to admission flomax and finasteride.     Hyperlipidemia LDL goal <70   - Continue prior to admission atorvastatin 80 mg daily.        Type 2 diabetes mellitus with diabetic chronic kidney disease   A1c of 8.0. Patient managed prior to admission with  Metformin and NPH BID. Creatinine is stable.   Renal fx slightly worse. BS >250  Continue NPH, ISS, resumed metformin      Esophageal reflux   - Continue prior to admission ranitidine twice daily.     Memory loss  Early onset Alzheimer dementia   Recent diagnosis per son, not on medications.        Consultations This Hospital Stay   WOUND OSTOMY CONTINENCE NURSE  IP CONSULT  PHYSICAL THERAPY ADULT IP CONSULT  OCCUPATIONAL THERAPY ADULT IP CONSULT  SOCIAL WORK IP CONSULT  PHYSICAL THERAPY ADULT IP CONSULT  OCCUPATIONAL THERAPY ADULT IP CONSULT    Code Status   Full Code    The discharge plan was discussed with the patient and with son by phone, and they expressed understanding.     Time Spent on this Encounter   Total time on this discharge was 40 minutes.       Keenan Brice MD  [facility]   ______________________________________________________________________    Physical Exam   Vital Signs: Temp: 97.6  F (36.4  C) Temp src: Oral BP: 124/49 Pulse: 80   Resp: 18 SpO2: 91 % O2 Device: None (Room air) Oxygen Delivery: 2 LPM  Weight: 188 lbs 9.6  oz  Constitutional: alert, orientation waxes and wanes, NAD and nontoxic appearing  CV: Irregular, JVP increased but not distended, trace bilateral lower extremity edema   Respiratory: a few mild bibasilar crackles, moderate rhonchi in bases right > left and coarse exp breath sounds but not wheezing and air movement decent  GI: Soft, non-tender, bowel sounds normal  Skin: Warm and dry       Primary Care Physician   Be Carreno  [id number] [facility] [address]     Discharge Disposition   Discharged to home with home care  Condition at discharge: Stable    Significant Results and Procedures   Results for orders placed or performed during the hospital encounter of 11/26/20   XR Chest Port 1 View    Narrative    XR PORTABLE CHEST ONE VIEW   11/26/2020 7:34 PM     HISTORY: Cough, possible COVID.    COMPARISON: Chest x-ray 8/5/2020.      Impression    IMPRESSION: Portable chest. Lungs are hypoaerated but otherwise clear.  Heart is normal in size. No pneumothorax. No definite pleural  effusions. Prior CABG.    HARRY SANDRA MD   CT Head w/o Contrast    Narrative    EXAM: CT HEAD W/O CONTRAST  LOCATION: [facility]  DATE/TIME: 11/26/2020 9:09 PM    INDICATION: Altered level of consciousness (LOC), unexplained  COMPARISON: Head CT 09/03/2018  TECHNIQUE: Routine CT Head without IV contrast. Multiplanar reformats. Dose reduction techniques were used.    FINDINGS:  INTRACRANIAL CONTENTS: No intracranial hemorrhage, extraaxial collection, or mass effect.  No CT evidence of acute infarct. Mild presumed chronic small vessel ischemic changes. Mild generalized volume loss. No hydrocephalus.     VISUALIZED ORBITS/SINUSES/MASTOIDS: No intraorbital abnormality. No paranasal sinus mucosal disease. No middle ear or mastoid effusion.    BONES/SOFT TISSUES: No acute abnormality.      Impression    IMPRESSION:  1.  No CT evidence for acute intracranial process.  2.  Brain atrophy and presumed chronic microvascular  ischemic changes as above.   Echocardiogram Complete    Narrative    [id number]  [id number]  [id number]  [id number]^ROBERT^DUYEN           [facility]  Echocardiography Laboratory  [address]  [place] [address]        Name: ABIGAIL LOVELL  MRN: [id number]  : 1940  Study Date: 2020 08:11 AM  Age: 80 yrs  Gender: Male  Patient Location: [facility]  Reason For Study: Atrial Fibrillation  Ordering Physician: DUYEN JONES  Referring Physician: Fallon Carreno  Performed By: Michelle Ambriz RDCS     BSA: 2.0 m2  Height: 68 in  Weight: 180 lb  HR: 65  BP: 134/70 mmHg  _____________________________________________________________________________  __        Procedure  Complete Portable Echo Adult. Optison (NDC #[id number]) given intravenously.  Complete Portable Bubble Echo Adult.  _____________________________________________________________________________  __        Interpretation Summary     The left ventricle is normal in size.  Left ventricular systolic function is normal.  The visual ejection fraction is estimated at 60-65%.  No regional wall motion abnormalities noted.  Paradoxical septal motion is consistent with right ventricular volume  overload.  The right ventricle is moderately dilated.  Moderately decreased right ventricular systolic function  Right ventricular systolic pressure is elevated, consistent with moderate  pulmonary hypertension.     Compared to the prior study, the RVSP is higher, RV dysfunction is now present  and afib is new. The study was technically difficult.  _____________________________________________________________________________  __        Left Ventricle  The left ventricle is normal in size. There is normal left ventricular wall  thickness. Left ventricular systolic function is normal. The visual ejection  fraction is estimated at 60-65%. Diastolic Doppler findings (E/E' ratio and/or  other parameters) suggest left ventricular filling pressures  are  indeterminate. No regional wall motion abnormalities noted. Paradoxical septal  motion is consistent with right ventricular volume overload.     Right Ventricle  The right ventricle is moderately dilated. Moderately decreased right  ventricular systolic function.     Atria  The left atrium is moderately dilated. The right atrium is moderately dilated.  Intact atrial septum.     Mitral Valve  The mitral valve is normal in structure and function. There is trace mitral  regurgitation.        Tricuspid Valve  The tricuspid valve is normal in structure and function. There is trace  tricuspid regurgitation. The right ventricular systolic pressure is  approximated at 41mmHg plus the right atrial pressure. Right ventricular  systolic pressure is elevated, consistent with moderate pulmonary  hypertension.     Aortic Valve  The aortic valve is normal in structure and function. No aortic regurgitation  is present. No aortic stenosis is present.     Pulmonic Valve  The pulmonic valve is not well seen, but is grossly normal. There is trace  pulmonic valvular regurgitation.     Vessels  The aortic root is normal size.     Pericardium  There is no pericardial effusion.        Rhythm  The rhythm was atrial fibrillation.  _____________________________________________________________________________  __  MMode/2D Measurements & Calculations  IVSd: 0.96 cm     LVIDd: 5.3 cm  LVIDs: 3.1 cm  LVPWd: 1.0 cm  FS: 41.3 %  LV mass(C)d: 200.3 grams  LV mass(C)dI: 102.5 grams/m2  Ao root diam: 3.7 cm  LA dimension: 4.7 cm  LA/Ao: 1.3  LA Volume (BP): 86.0 ml  LA Volume Index (BP): 44.1 ml/m2  RWT: 0.39           Doppler Measurements & Calculations  MV E max dariusz: 107.8 cm/sec  MV dec time: 0.18 sec  TR max dariusz: 257.6 cm/sec  TR max P.9 mmHg  RVSP(TR): 36.9 mmHg  E/E' av.3  Lateral E/e': 9.8  Medial E/e': 12.8           _____________________________________________________________________________  __           Report approved by:  Neela Bear 11/30/2020 10:43 AM          Procedures    None    Discharge Orders      Home Care Referral      COVID-19 GetWell Loop Referral      Reason for your hospital stay    Hypoxic respiratory failure due to COVID-19 infection. Also atrial fibrillation which increases stroke risk from blood clots. COVID-19 also increases clotting risk. So we will treat with anticoagulation with Eliquis (apixaban) for at least a month.     Follow-up and recommended labs and tests     Follow up with primary care provider, Be Carreno, within 7 days for hospital follow- up.  No follow up labs or test are needed.     Contact provider    Contact your primary care provider if If increased trouble breathing, new arm/leg swelling, dizziness/passing out, falls, bleeding that doesn't stop, or uncontrolled pain.     Discharge - Quarantine/Isolation Instruction    Date of symptom onset:     Date of first positive test:    Stay home and away from others (self-isolate) for at least 20 days from when your symptoms started And...   You've had no fevers and no medicine that reduces fever for 3 full days (72 hours) And...   Your other symptoms have resolved (gotten better).     Activity    Your activity upon discharge: activity as tolerated     Oxygen Adult/Peds    Oxygen Documentation:   I certify that this patient, Alvaro Horton has been under my care (or a nurse practitioner or physican's assistant working with me). This is the face-to-face encounter for oxygen medical necessity.      Alvaro Horton is now in a chronic stable state and continues to require supplemental oxygen. Patient has continued oxygen desaturation due to COVID-19 infection.    Alternative treatment(s) tried or considered and deemed clinically infective for treatment of COVID-19 infection include inhalers and steroids.  If portability is ordered, is the patient mobile within the home? yes    **Patients who qualify for home O2 coverage under the CMS  guidelines require ABG tests or O2 sat readings obtained closest to, but no earlier than 2 days prior to the discharge, as evidence of the need for home oxygen therapy. Testing must be performed while patient is in the chronic stable state. See notes for O2 sats.**     Diet    Follow this diet upon discharge:       Snacks/Supplements Adult: Boost Glucose Control; With Meals      Moderate Consistent CHO Diet     Discharge Medications   Current Discharge Medication List      START taking these medications    Details   apixaban ANTICOAGULANT (ELIQUIS) 5 MG tablet Take 1 tablet (5 mg) by mouth 2 times daily  Qty: 60 tablet, Refills: 1    Associated Diagnoses: Infection due to 2019 novel coronavirus; Paroxysmal atrial fibrillation (H)      dexamethasone (DECADRON) 6 MG tablet Take 1 tablet (6 mg) by mouth daily  Qty: 5 tablet, Refills: 0    Associated Diagnoses: Infection due to 2019 novel coronavirus         CONTINUE these medications which have NOT CHANGED    Details   albuterol (PROAIR HFA/PROVENTIL HFA/VENTOLIN HFA) 108 (90 Base) MCG/ACT inhaler Inhale 2 puffs into the lungs every 6 hours as needed for shortness of breath / dyspnea or wheezing  Qty: 1 Inhaler, Refills: 11    Comments: Pharmacy may dispense brand covered by insurance (Proair, or proventil or ventolin or generic albuterol inhaler)  Associated Diagnoses: Chronic obstructive pulmonary disease, unspecified COPD type (H)      albuterol (PROVENTIL) (2.5 MG/3ML) 0.083% neb solution Take 3 mLs by nebulization every 6 hours as needed      aspirin (ASA) 325 MG EC tablet Take 325 mg by mouth daily      atorvastatin (LIPITOR) 80 MG tablet Take 1 tablet by mouth once daily  Qty: 90 tablet, Refills: 0    Associated Diagnoses: Hyperlipidemia LDL goal <70      blood glucose (NO BRAND SPECIFIED) test strip Use to test blood sugar 3 times daily  Qty: 100 strip, Refills: 0    Associated Diagnoses: Type 2 diabetes mellitus with stage 3 chronic kidney disease, with  long-term current use of insulin (H)      blood glucose monitoring (NO BRAND SPECIFIED) meter device kit Use to test blood sugar 3 times daily or as directed.  Qty: 1 kit, Refills: 0    Comments: Requesting Accu-check guide meter  Associated Diagnoses: Type 2 diabetes mellitus with stage 3 chronic kidney disease, with long-term current use of insulin (H)      cholecalciferol 2000 UNITS tablet Take 1 tablet by mouth daily.  Qty: 90 tablet, Refills: 3    Associated Diagnoses: Memory loss      cyanocobalamin (BL VITAMIN B-12) 500 MCG TABS Take 500 mcg by mouth daily.  Qty: 90 tablet, Refills: 4    Associated Diagnoses: Memory loss      finasteride (PROSCAR) 5 MG tablet Take 1 tablet by mouth once daily  Qty: 90 tablet, Refills: 0    Associated Diagnoses: Hypertrophy of prostate without urinary obstruction      furosemide (LASIX) 20 MG tablet Take 1 tablet (20 mg) by mouth daily  Qty: 90 tablet, Refills: 3    Associated Diagnoses: Stable angina pectoris (H)      lisinopril (ZESTRIL) 10 MG tablet Take 1 tablet (10 mg) by mouth daily  Qty: 90 tablet, Refills: 3    Associated Diagnoses: Hypertension goal BP (blood pressure) < 140/90      metFORMIN (GLUCOPHAGE) 1000 MG tablet TAKE 1 TABLET BY MOUTH TWICE DAILY WITH MEALS  Qty: 180 tablet, Refills: 0    Associated Diagnoses: Type 2 diabetes mellitus with stage 3 chronic kidney disease, with long-term current use of insulin, unspecified whether stage 3a or 3b CKD (H)      metoprolol tartrate (LOPRESSOR) 25 MG tablet Take 1 tablet by mouth twice daily  Qty: 180 tablet, Refills: 3    Associated Diagnoses: Chronic ischemic heart disease      nitroGLYcerin (NITROSTAT) 0.4 MG sublingual tablet Place 1 tablet (0.4 mg) under the tongue every 5 minutes as needed for chest pain (CALL 911 IF NOT IMPROVED AFTER THREE CONSECUTIVE DOSES)  Qty: 25 tablet, Refills: 5    Associated Diagnoses: Chronic ischemic heart disease      NOVOLIN 70/30 FLEXPEN RELION susp INJECT 22 UNITS SUBCUTANEOUSLY  30 MINUTES BEFORE BREAKFAST AND 22 UNITS 30 MINUTES BEFORE DINNER  Qty: 15 mL, Refills: 1    Associated Diagnoses: Type 2 diabetes mellitus with stage 3 chronic kidney disease, with long-term current use of insulin (H)      ranitidine (RANITIDINE 150 MAX STRENGTH) 150 MG tablet Take 1 tablet (150 mg) by mouth 2 times daily  Qty: 180 tablet, Refills: 0    Associated Diagnoses: Gastroesophageal reflux disease without esophagitis      tamsulosin (FLOMAX) 0.4 MG capsule TAKE 1 CAPSULE BY MOUTH ONCE DAILY . APPOINTMENT REQUIRED FOR FUTURE REFILLS  Qty: 90 capsule, Refills: 0    Associated Diagnoses: Benign non-nodular prostatic hyperplasia with lower urinary tract symptoms      TRELEGY ELLIPTA 100-62.5-25 MCG/INH oral inhaler Inhale 1 puff into the lungs daily      insulin pen needle (BD ABRAM U/F) 32G X 4 MM miscellaneous Use 2 daily as directed.  Qty: 100 each, Refills: 11    Associated Diagnoses: Type 2 diabetes mellitus with stage 3 chronic kidney disease, with long-term current use of insulin (H)      order for DME Equipment being ordered: CPAP  AIRSENSE 10  11-15 CM H2O  [employer] AIR SIZE MED  SN# [id number]  DN# [id number]           Allergies   Allergies   Allergen Reactions     Prednisone Other (See Comments)     Severe interaction with DM

## 2020-11-30 NOTE — PROGRESS NOTES
WO Nurse Inpatient Wound Assessment   Reason for consultation: Evaluate and treat right toe wound    Assessment  Right 2nd toe wound due to rubbing from fungal overgrown great toe nail  Status: initial assessment    Treatment Plan  Right toe wound: every other day while inpatient   Orders Written   Right 2nd Toe Wound Cares:  1.) Cleanse wound with wound cleanser and wipe with gauze. Pat wound and surrounding skin dry with gauze.   2.) Apply small amount of iodosorb to wound.  3.) Cover with Mepilex Lite cut to fit over wound and surrounding skin. This will provide a small amount of cushion/protect from the big toe nail and will absorb drainage.  4.) Secure with Medipore Tape.  Change every other day.    Recommended provider order: None, at this time  WOC Nurse follow-up plan:signing off  Nursing to notify the Provider(s) and re-consult the WOC Nurse if wound(s) deteriorates or new skin concern.    Patient History  According to provider note(s):  Patient hospitalized after coming to ED on 11/26/20 with weakness, falls. Found to be COVID-19 positive.     Wound on right foot 2nd toe noted by RN when patient admitted to Med Surg unit.     Pertinent medical history includes: DM 2 with CKD, A1C of 8.0, COPD, atrial fibrillation (s/p CABG 2009), hx of CVA, chronic ischemic heart disease, HTN, hyperlipidemia, early onset Alzheimer dementia    Objective Data  Active Diet Order  Orders Placed This Encounter      Moderate Consistent CHO Diet      Output:   I/O last 3 completed shifts:  In: 240 [P.O.:240]  Out: 1125 [Urine:1125]    Risk Assessment:   Sensory Perception: 3-->slightly limited  Moisture: 3-->occasionally moist  Activity: 3-->walks occasionally  Mobility: 3-->slightly limited  Nutrition: 3-->adequate  Friction and Shear: 3-->no apparent problem  Ricardo Score: 18                          Labs:   Recent Labs   Lab 11/30/20  0621 11/27/20  0801 11/27/20  0801 11/26/20  1831 11/26/20  1831   ALBUMIN  --   --   --    --  3.4  3.5   HGB 13.2*   < > 13.4  --  14.1   INR 1.18*   < >  --   --   --    WBC 10.6   < > 6.7  --  6.4   A1C  --   --  8.0*  --   --    CRP 33.0*   < > 45.7*   < >  --     < > = values in this interval not displayed.       Physical Exam  Areas of skin assessed: focused right foot    Wound Location:  Right 2nd toe, medial    Wound Base: combination of brown crust and area measuring approximately 0.1 cm x 0.1 cm with red base  Drainage: small  Description of drainage: serosanguinous  Measurements (length x width x depth, in cm) 0.7 cm  x 0.5 cm  x  flush   Tunneling N/A  Undermining N/A  Periwound skin: pink, blanchable  Temperature: consistent with rest of foot  Odor: none  Pain: none, patient voices having numbness in feet     Right pedal pulse palpable. Some hair growth noted on right leg. No edema noted. Tip of right great toe pink/red, rapidly blanchable. Patient with hypertrophic fungal great toe nails bilaterally. No wound noted on the 2nd toe of his left foot.  Interventions  Visual inspection and assessment completed.  Wound Care Rationale: Iodosorb is appropriate for antimicrobial properties/absorption of drainage and Mepilex Lite will provide small amount of cushion/protection from great toe nail. Goal is to keep area protected and prevent infection as patient is diabetic. Patient needs to have great toe nails pared down after discharge home to remove friction from toe nail on 2nd toe.  Wound Care: done per plan of care  Supplies: gathered, placed at the bedside and extras being sent home with the patient until home care can order more supplies.  Education provided to: plan of care  Discussed plan of care with Patient and Nurse    Triny Cooper RN

## 2020-11-30 NOTE — PLAN OF CARE
Frequent congested sounding, nonproductive cough. SATs stable in the mid 90s on 2/NC. No SOA observed.

## 2020-11-30 NOTE — PLAN OF CARE
"Pt alert to self, place. Reorientation to time and situation as needed. Using urinal to void. Blood pressure 108/58, pulse 62, temperature 97.8  F (36.6  C), temperature source Oral, resp. rate 18, height 1.727 m (5' 8\"), weight 84.4 kg (186 lb), SpO2 94 %.Stable on 2 L of supplemental oxygen. Assist of two, walker, and gait belt to get up. Strength is improving. Frequent cough. Not using IS consistently. Possible discharge today. Continue to assess per POC.    "

## 2020-11-30 NOTE — PROGRESS NOTES
[facility] Medicine Progress Note  Date of Service: 11/30/2020    Assessment & Plan   Alvaro Horton is a 80 year old male who presented on 11/26/2020 with generalized weakness and fatigue for 1 day.     Acute respiratory failure with hypoxia due to 2019 novel coronavirus infection    Onset of symptoms: 11/25    Test positive: 11/26    Presented with weakness, fatigue, shortness of breath, cough and hypoxemia. Was hypoxemic to 84% in ED. Was placed on 2 L NC and has remained on this. CXR revealed hypo-aerated lungs but otherwise clear.      Patient appears to be clinically improving. O2 needs stable to improving as well.       CRP trend: 22.1 ->-> 81.0 (peak) ->-> 33.0     IL-6: 36.85    Ferritin 696    LDH: 348 - 311 - 338    Requiring O2 by NC at 2 lpm and stable.     Dexamethasone 6 mg daily started 11/27    Remdesivir started 11/27   - Patient will need O2 on discharge but appears stable to go home.    - continue  decadron 6 mg daily,  Remdesivir.      Falls frequently  Generalized weakness  Son notes generalized weakness, patient was found down early am hours of 11/25.   PT/OT/SW on case.     Patient's activity level adequate for discharge home with home care today, 11/30.     Suspect type 2 MI, demand ischemia  Moderate RV dilatation and decreased RV function  Moderate pulmonary hypertension     Troponin 0.044-->0.069-->0.107-->0.141-->0.216-->0.101-->0.047.  Denies chest pain.  Suspect due to a combination of atrial fibrillation, demand ischemia, and possible COVID-19 infection.    Echo shows normal LV size and function, LVEF 60-65%, no RWMA, RV mod dilated and mod decreased systolic function, RV volume overload and mod pulmonary hypertension. Previous RV dilatation, decreased function and pulmonary hypertension were mild in May 2019. Appears to be progression of pulmonary hypertension and RV dysfunction likely due to underlying COPD.      COPD (chronic  obstructive pulmonary disease)    Patient managed prior to admission with Trelegy Ellipta daily and as needed albuterol. PFTs in 04/2018 revealed severe airflow obstruction. Suspect this is baseline. Continue home therapies.      Atrial fibrillation     Noted in history as transient episode following CABG in 2009. Had  Seen sukh 3/20 and had ziopatch -no a fib seen. Not on anticoagulation.  Does take metoprolol 25 mg twice daily. Takes 325 mg aspirin daily.     On admission, patient in atrial fibrillation. Appears has been largely in atrial fibrillation this admission including today, 11/30. Likely exacerbated by acute illness but could be due to worsening RV dysfunction as well.      CHADS-Vasc score 6. Does have falls at home, but given patient also has clotting risk from COVID-19, recommend at least short term anticoagulation. Patient agreeable as is his son.   - start apixaban 5 mg BID, continue at least a month, follow up with PCP     History of CVA (cerebrovascular accident)  CVA at time of CABG, 2009.    Continue aspirin     Chronic ischemic heart disease  CAD, s/p coronary bypass grafting in 11/2009 with LIMA to LAD and SVG to OM1 and 2  Follows with sukh, last seen at virtual visit in 03/2020.  Stress echo 06/2019 with EF >70%.    - resume  lasix.    - Echo pending.      Hypertension goal BP (blood pressure) < 140/90  Patient managed prior to admission with lisinopril 10 mg daily.  Stable. .   - Continue home medication.     Hypertrophy of prostate without urinary obstruction   - Continue prior to admission flomax and finasteride.     Hyperlipidemia LDL goal <70   - Continue prior to admission atorvastatin 80 mg daily.        Type 2 diabetes mellitus with diabetic chronic kidney disease   A1c of 8.0. Patient managed prior to admission with  Metformin and NPH BID. Creatinine is stable.   Renal fx slightly worse. BS >250  Continue NPH, ISS, resumed metformin      Esophageal reflux   - Continue prior to  admission ranitidine twice daily.     Memory loss  Early onset Alzheimer dementia   Recent diagnosis per son, not on medications.      Discussion: Appears stable for discharge    Disposition: Anticipate discharge likely today if logistics work-up    Attestation:  Total time: 40 minutes    Keenan Brice MD  [facility] Medicine        Interval History   Currently denies shortness of breath.  Denies chest pain.  Hopes to go home.    Physical Exam   Temp:  [97.6  F (36.4  C)-97.8  F (36.6  C)] 97.6  F (36.4  C)  Pulse:  [55-68] 55  Resp:  [18] 18  BP: ()/(47-58) 111/49  SpO2:  [85 %-96 %] 94 %    Weights:   Vitals:    11/28/20 0418 11/29/20 0352 11/30/20 0603   Weight: 84.9 kg (187 lb 1.6 oz) 84.4 kg (186 lb) 85.5 kg (188 lb 9.6 oz)    Body mass index is 28.68 kg/m .    Constitutional: alert, orientation waxes and wanes, NAD and nontoxic appearing  CV: Irregular, JVP increased but not distended, trace bilateral lower extremity edema   Respiratory: a few mild bibasilar crackles, moderate rhonchi in bases right > left and coarse exp breath sounds but not wheezing and air movement decent  GI: Soft, non-tender, bowel sounds normal  Skin: Warm and dry    Data   Recent Labs   Lab 11/30/20  0621 11/29/20  0448 11/28/20  0652 11/27/20  1836 11/26/20  1831 11/26/20  1831   WBC 10.6 8.8 7.1  --    < > 6.4   HGB 13.2* 12.9* 14.2  --    < > 14.1   MCV 88 88 90  --    < > 89   * 112* 89*  --    < > 107*   INR 1.18* 1.19* 1.01  --   --   --     138 137  --    < > 134   POTASSIUM 4.0 4.1 4.3  --    < > 4.0   CHLORIDE 107 107 105  --    < > 99   CO2 28 24 26  --    < > 27   BUN 50* 47* 38*  --    < > 29   CR 1.38* 1.37* 1.36*  --    < > 1.40*   ANIONGAP 4 7 6  --    < > 8   EVELINA 7.7* 7.5* 7.8*  --    < > 8.7   * 128* 236*  --    < > 256*   ALBUMIN  --   --   --   --   --  3.4  3.5   PROTTOTAL  --   --   --   --   --  6.9  7.0   BILITOTAL  --   --   --   --   --  0.8  0.9   ALKPHOS  --   --   --   --   --  77   81   ALT  --   --   --   --   --  28  29   AST  --   --   --   --   --    29   TROPI 0.047*  --  0.101* 0.216*   < > 0.044    < > = values in this interval not displayed.       Recent Labs   Lab 20  1143 20  0724 20  0621 20  0337 20  2207 20  1647 20  1217 20  0448 20  0448 20  0652 20  0652 20  0801 20  0801 20  1831 20  183   GLC  --   --  142*  --   --   --   --   --  128*  --  236*  --  283*  --  256*   * 174*  --  148* 230* 190* 255*   < >  --    < >  --    < >  --    < >  --     < > = values in this interval not displayed.        Unresulted Labs Ordered in the Past 30 Days of this Admission     No orders found from 10/27/2020 to 2020.           Imaging:   Recent Results (from the past 24 hour(s))   Echocardiogram Complete    Narrative    [id number]  [id number]  [id number]  [id number]^ROBERT^DUYEN           [facility]  Echocardiography Laboratory  [address] [place] [address]        Name: ABIGAIL LOVELL  MRN: [id number]  : 1940  Study Date: 2020 08:11 AM  Age: 80 yrs  Gender: Male  Patient Location: [facility]  Reason For Study: Atrial Fibrillation  Ordering Physician: DUYEN JONES  Referring Physician: Fallon Carreno  Performed By: Michelle Ambriz RDCS     BSA: 2.0 m2  Height: 68 in  Weight: 180 lb  HR: 65  BP: 134/70 mmHg  _____________________________________________________________________________  __        Procedure  Complete Portable Echo Adult. Optison (NDC #[id number]) given intravenously.  Complete Portable Bubble Echo Adult.  _____________________________________________________________________________  __        Interpretation Summary     The left ventricle is normal in size.  Left ventricular systolic function is normal.  The visual ejection fraction is estimated at 60-65%.  No regional wall motion abnormalities noted.  Paradoxical septal motion is  consistent with right ventricular volume  overload.  The right ventricle is moderately dilated.  Moderately decreased right ventricular systolic function  Right ventricular systolic pressure is elevated, consistent with moderate  pulmonary hypertension.     Compared to the prior study, the RVSP is higher, RV dysfunction is now present  and afib is new. The study was technically difficult.  _____________________________________________________________________________  __        Left Ventricle  The left ventricle is normal in size. There is normal left ventricular wall  thickness. Left ventricular systolic function is normal. The visual ejection  fraction is estimated at 60-65%. Diastolic Doppler findings (E/E' ratio and/or  other parameters) suggest left ventricular filling pressures are  indeterminate. No regional wall motion abnormalities noted. Paradoxical septal  motion is consistent with right ventricular volume overload.     Right Ventricle  The right ventricle is moderately dilated. Moderately decreased right  ventricular systolic function.     Atria  The left atrium is moderately dilated. The right atrium is moderately dilated.  Intact atrial septum.     Mitral Valve  The mitral valve is normal in structure and function. There is trace mitral  regurgitation.        Tricuspid Valve  The tricuspid valve is normal in structure and function. There is trace  tricuspid regurgitation. The right ventricular systolic pressure is  approximated at 41mmHg plus the right atrial pressure. Right ventricular  systolic pressure is elevated, consistent with moderate pulmonary  hypertension.     Aortic Valve  The aortic valve is normal in structure and function. No aortic regurgitation  is present. No aortic stenosis is present.     Pulmonic Valve  The pulmonic valve is not well seen, but is grossly normal. There is trace  pulmonic valvular regurgitation.     Vessels  The aortic root is normal size.     Pericardium  There is no  pericardial effusion.        Rhythm  The rhythm was atrial fibrillation.  _____________________________________________________________________________  __  MMode/2D Measurements & Calculations  IVSd: 0.96 cm     LVIDd: 5.3 cm  LVIDs: 3.1 cm  LVPWd: 1.0 cm  FS: 41.3 %  LV mass(C)d: 200.3 grams  LV mass(C)dI: 102.5 grams/m2  Ao root diam: 3.7 cm  LA dimension: 4.7 cm  LA/Ao: 1.3  LA Volume (BP): 86.0 ml  LA Volume Index (BP): 44.1 ml/m2  RWT: 0.39           Doppler Measurements & Calculations  MV E max dariusz: 107.8 cm/sec  MV dec time: 0.18 sec  TR max dariusz: 257.6 cm/sec  TR max P.9 mmHg  RVSP(TR): 36.9 mmHg  E/E' av.3  Lateral E/e': 9.8  Medial E/e': 12.8           _____________________________________________________________________________  __           Report approved by: Neela Bear 2020 10:43 AM           I reviewed all new labs and imaging results over the last 24 hours. I personally reviewed no images or EKG's today.    Medications     - MEDICATION INSTRUCTIONS -         aspirin  325 mg Oral Daily     atorvastatin  80 mg Oral Daily     dexamethasone  6 mg Intravenous Daily     enoxaparin ANTICOAGULANT  40 mg Subcutaneous Q24H     famotidine  20 mg Oral Daily     finasteride  5 mg Oral Daily     umeclidinium  1 puff Inhalation Daily    And     fluticasone-vilanterol  1 puff Inhalation Daily     furosemide  20 mg Oral Daily     insulin aspart  1-7 Units Subcutaneous TID AC     insulin aspart  1-5 Units Subcutaneous At Bedtime     insulin NPH-Regular  22 Units Subcutaneous BID     ipratropium  2 puff Inhalation 4x daily     lisinopril  10 mg Oral Daily     metoprolol tartrate  25 mg Oral BID     remdesivir  100 mg Intravenous Q24H     tamsulosin  0.4 mg Oral Daily   Reviewed meds     Keenan Brice MD  [facility] Medicine

## 2020-11-30 NOTE — PROGRESS NOTES
Received intake call for home oxygen at 1:52PM. Reviewed patient's chart; we need a signed order and notes.  2:00PM- Called to offer choice and patient is sticking with [employer] for insurance next year and would like to get oxygen through [facility] to get the best coverage. I let him know that once I have everything I need I will send his information to [facility] to get him set up.  2:00PM- Spoke with care coordinator, Maikol, confirmed we received the intake. I let him know I need a signed order and notes and he said he would page the doctor to enter those. I let him know I would call in 1.5 hrs if I still need documentation. He agreed.  3:35PM- Kristi DUNAWAY called and spoke with Cheng, Maikol has left for the evening. She let Cheng know that we still need notes and an order. Cheng said she would work on it.   4:05PM- Received signed notes and order. Called Castro at [facility] to confirm they could set the patient up, he said that they will not be able to tonight due to having too many set ups and not enough equipment. I discussed with Castro that we can set the patient up today and then we will transfer him over to [facility] in January and they will take him on at that point.. He is agreeable to this plan.

## 2020-11-30 NOTE — PLAN OF CARE
"Patient impulsive, and repeatedly gets up without calling for help. Alert to self and place, but forgetful with time/situation. Removes telemetry frequently. Alarmed for safety. Patient verbalizing hope to go home, calling repeatedly. Currently 95% on room air. Denying any pain. Called to update daughter Sujatha. Echo this morning, as well as WOC eval. /49   Pulse 55   Temp 97.6  F (36.4  C) (Oral)   Resp 18   Ht 1.727 m (5' 8\")   Wt 85.5 kg (188 lb 9.6 oz)   SpO2 94%   BMI 28.68 kg/m      "

## 2020-11-30 NOTE — PLAN OF CARE
Occupational Therapy Discharge Summary    Reason for therapy discharge:    Discharged to home with home therapy.    Progress towards therapy goal(s). See goals on Care Plan in [facility] electronic health record for goal details.  Goals partially met.  Barriers to achieving goals:   discharge from facility.    Therapy recommendation(s):    Home therapy to maximize safety with ADLs/IADLs and related mobility

## 2020-12-01 NOTE — PROGRESS NOTES
"Awaiting home medical to deliver home o2 prior to patient's discharge. /49 (BP Location: Right arm)   Pulse 80   Temp 97.6  F (36.4  C) (Oral)   Resp 18   Ht 1.727 m (5' 8\")   Wt 85.5 kg (188 lb 9.6 oz)   SpO2 91%   BMI 28.68 kg/m      "

## 2020-12-01 NOTE — TELEPHONE ENCOUNTER
ED/UC/IP follow up phone call: [facility] discharge 11/30/20  Clinical diagnosis of Covid 19.    RN please call to follow up.    Number of ED visits in past 12 months = 2

## 2020-12-01 NOTE — PROGRESS NOTES
Clinic Care Coordination Contact    Clinic Care Coordination Contact  OUTREACH    Referral Information:  Referral Source: IP Report    Primary Diagnosis: (Covid-19 positive)    Chief Complaint   Patient presents with     Clinic Care Coordination - Post Hospital     RN        Universal Utilization: Inpatient at [facility] from 11/26/20 to 11/30/20 with coivd-19   Clinic Utilization  Difficulty keeping appointments:: No  Compliance Concerns: No  No-Show Concerns: No  No PCP office visit in Past Year: No  Utilization    Last refreshed: 12/1/2020  9:39 AM: Hospital Admissions 2           Last refreshed: 12/1/2020  9:39 AM: ED Visits 4           Last refreshed: 12/1/2020  9:39 AM: No Show Count (past year) 2              Current as of: 12/1/2020  9:39 AM          Clinical Concerns:    Current Medical Concerns:  Outreach to patient's son, Jae.  Patient is currently staying with Jae. Jae is also symptomatic with covid-19. He states he was tested this morning.     Jea states patient is staying mostly in his bedroom. He states he keeps forgetting to keep his oxygen on.   Needs frequent reminders.   Jae is managing patient's medications currently.     Jae states he has Chron's and is on infusions and has had multiple surgeries. He is very fatigued.    Patient does have home care ordered.     Current Behavioral Concerns: Jae notes some forgetfullness    Education Provided to patient: Role of care coordination      Health Maintenance Reviewed:    Health Maintenance Due   Topic Date Due     COPD ACTION PLAN  1940     ZOSTER IMMUNIZATION (1 of 2) 07/17/1989     MEDICARE ANNUAL WELLNESS VISIT  06/26/2010     ADVANCE CARE PLANNING  09/24/2017     DIABETIC FOOT EXAM  02/05/2019     LIPID  07/29/2020     MICROALBUMIN  07/29/2020     INFLUENZA VACCINE (1) 09/01/2020     Clinical Pathway: None    Medication Management:  Medication reconciliation status: Medications reviewed and reconciled.  Continue medications without  change. Jae states he has medications and has reviewed them    Functional Status:  Dependent ADLs:: Ambulation-cane  Dependent IADLs:: Independent  Bed or wheelchair confined:: No  Mobility Status: Independent w/Device  Fallen 2 or more times in the past year?: No  Any fall with injury in the past year?: No    Living Situation:       Lifestyle & Psychosocial Needs:        Diet:: Diabetic diet  Transportation means:: Accessible car     Chemical Dependency Status: No Current Concerns  Informal Support system:: Children   Socioeconomic History     Marital status:      Spouse name: Not on file     Number of children: Not on file     Years of education: Not on file     Highest education level: Not on file   Occupational History     Occupation: [employer]     Employer: RETIRED     Occupation:      Tobacco Use     Smoking status: Never Smoker     Smokeless tobacco: Never Used   Substance and Sexual Activity     Alcohol use: No     Alcohol/week: 0.0 standard drinks     Drug use: No     Sexual activity: Never      Resources and Interventions:  Current Resources: [facility]  List of home care services:: Skilled Nursing, Physicial Therapy  Community Resources: Home Care  Supplies used at home:: Oxygen Tubing/Supplies, Nebulizer tubing  Equipment Currently Used at Home: cane, straight  Employment Status: retired    Advance Care Plan/Directive  Advanced Care Plans/Directives on file:: No    Referrals Placed: None    Patient/Caregiver understanding: Caregiver has understanding     Future Appointments              In 2 days Be Carreno MD [facility]      Plan: Patient will attend scheduled PCP appointment on 12/3/20.  This is virtual visit. Instructed Jae to let clinic know that patient is staying with him and they should call him.     Declines ongoing care coordination intervention.     Patient instructions and Get Well Loop have both been completed.      Colette Edward RN, [facility] RN Coordinator  [facility]   12/1/2020    1:03 PM  [phone]

## 2020-12-02 PROBLEM — R79.89 ELEVATED TROPONIN: Status: RESOLVED | Noted: 2020-01-01 | Resolved: 2020-01-01

## 2020-12-02 PROBLEM — R07.9 CHEST PAIN: Status: RESOLVED | Noted: 2020-02-27 | Resolved: 2020-01-01

## 2020-12-02 PROBLEM — E11.9 TYPE 2 DIABETES MELLITUS (H): Status: ACTIVE | Noted: 2020-01-01

## 2020-12-02 PROBLEM — G93.40 ENCEPHALOPATHY: Status: RESOLVED | Noted: 2018-09-03 | Resolved: 2020-01-01

## 2020-12-02 PROBLEM — R79.81: Status: RESOLVED | Noted: 2018-09-04 | Resolved: 2020-01-01

## 2020-12-02 PROBLEM — K80.00 ACUTE CALCULOUS CHOLECYSTITIS: Status: RESOLVED | Noted: 2019-03-02 | Resolved: 2020-01-01

## 2020-12-02 PROBLEM — U07.1 COVID-19 VIRUS INFECTION: Status: ACTIVE | Noted: 2020-01-01

## 2020-12-02 NOTE — ED NOTES
"Pt incontinent, linens changed. Pt placed in a recliner for pt comfort. Explained to pt again about the wait for an inpatient bed. Pt states \"this place is worse than nam\".  "

## 2020-12-02 NOTE — ED NOTES
Pt's O2 saturation down to 82% with slight activity of standing at the bedside to use a urinal on room air. Place [address]

## 2020-12-02 NOTE — ED PROVIDER NOTES
"  History     Chief Complaint   Patient presents with     Shortness of Breath     + COVID, hx of COPD     HPI  Alvaro Horton is a 80 year old male with history of COPD, baker's lung, atrial fibrillation (on Eliquis), chronic ischemic heart disease, CVA, DM type II, early onset Alzheimer's dementia and recently diagnosed COVID-19 illness (6 days ago) who presents for worsening dyspnea and shortness of breath refractory to home O2 therapy 2 L/nasal cannula which was provided to him on hospital discharge 2 days ago. He was admitted for COVID-19 illness with acute respiratory failure with hypoxia 11/26-11/30/2020 and discharged on home O2 2 L/NC and Dexamethasone, and Eliquis due to atrial fibrillation. He is on day 9 or 10 of COVID-19 illness.  He has been living with his son and his son reports that he his father has had increased confusion, not always keeping his oxygen on, and worsened dyspnea since hospital discharge 2 days ago. His son tells me he has also become ill with COVID-19 symptoms is too sick to supervise and adequately care for his father at home at present time. The patient is a poor and unreliable historian. He denies chest pain, hemoptysis or leg pain or leg swelling.    Allergies:  Allergies   Allergen Reactions     Prednisone Other (See Comments)     Severe interaction with DM       Problem List:    Patient Active Problem List    Diagnosis Date Noted     Hypertension goal BP (blood pressure) < 140/90 10/09/2006     Priority: High     BAKERS LUNG      Priority: High     Dx possible \"Baker's Lung\" 2001 [facility]. RAST for bakers yeast negative 2002. Has occupational exposures with related worsening asthmatic symptoms. CT 2/08- no fibrosis.       Moderate persistent asthma      Priority: High     PFTS 1997 - FEV1- 3.39 (64%), ratio 0.74, 24% change [id number]% with bronchodilators,  TLC 86%, %, DLCO 78%. Methacholine challenge positive at level 7 2001.  PFTS 2004 - FEV1- 1.93 (63%), " ratio 0.77. PFTS 4/08 - FEV1- 2.13 (72%), ratio 0.71, no change with bronchodilators,  TLC 78%, RV 96%, DLCO 64%          Chronic ischemic heart disease      Priority: High     atypical chest pain 2001 with GXT echo- decreased LVF, angiogram 2001- nonobstructing 3 vessel disease.   Repeat angio 2004- prox RCA 50-60%,  OM2- 40%, D1 30-40%, LAD 30-40%.   Cardiolite 2005 during hospitalization for SOB in [place] reported to reveal no ischemia.   Adenosine cardiolite 1/08- small slightly reversible inferior defect, most likely consistent with diaphragm attenuation with bowel uptake artifact. USA, Angio 11/09- Severe LM disease, severe subtotal occlusion of a large branch OM1.    S/p CABG x2 11/09- LIMA-D1, SVG to OM1, OM2. GXT 8/10- 5.8 mets, 121 (80%) HR, normal ekg, cardiolite- small fixed inferior/basal defect with small area khurram-infarct ischemia extending into the mid ventricle. EF 67%.  4/25/13:adenosine cardiolyte stress test through [facility] with normal EF and no ischemia.   1/16/2016 nuclear stress test:1.  Myocardial perfusion imaging using single isotope technique demonstrated no evidence for myocardial ischemia. 2. Gated images demonstrated normal wall motion with a calculated ejection fraction of 61%.  3. Compared to the prior study from 2011 there are no significant changes .           COVID-19 virus infection 12/02/2020     Priority: Medium     Suspected 2019 novel coronavirus infection 11/27/2020     Priority: Medium     Acute respiratory failure with hypoxia (H) 11/27/2020     Priority: Medium     History of CVA (cerebrovascular accident) 11/27/2020     Priority: Medium     Falls frequently 11/27/2020     Priority: Medium     Generalized weakness 11/27/2020     Priority: Medium     Thrombocytopenia (H) 11/27/2020     Priority: Medium     Elevated troponin 11/27/2020     Priority: Medium     Clinical diagnosis of COVID-19 11/26/2020     Priority: Medium     Claudication of calf muscles (H) 08/27/2020      "Priority: Medium     Chest pain 02/27/2020     Priority: Medium     Acute calculous cholecystitis 03/02/2019     Priority: Medium     Abnormal blood-gas measurement 09/04/2018     Priority: Medium     Encephalopathy 09/03/2018     Priority: Medium     Type 2 diabetes mellitus with diabetic chronic kidney disease (H) 10/30/2015     Priority: Medium     Altered mental status 12/31/2014     Priority: Medium     12/27/2014:episode confusion for 20 minutes.  Seen at [facility]/-hospitalized.  Head CT, MRI/MRA all normal.  PET cardiac perfusion stress test normal.  Neurology consult-diagnosis= possible hypoglycemia, migraine variant.  \"Acute ischemic cerebral event was excluded\".       Health Care Home 08/11/2014     Priority: Medium     State Tier Level:    Status:  Unable to re-connect  Care Coordinator:  Kecia Mills    See Letters for [facility] Care Plan  Date:  August 11, 2014             Chronic kidney disease, stage 2 (mild) 05/06/2014     Priority: Medium     9/9/2015:He has had two abnormal MAC in the past.        COPD (chronic obstructive pulmonary disease) (H) 11/18/2013     Priority: Medium     PFT 11/15/13 results:FVC=59%, FEV1=54%, FEV1/FVC=92%.  His DLCO was 58%1. Spirometry: FEV1 moderately reduced. FVC moderately reduced. FEV1/FVC ratio reduced. 2. Bronchodilator Response: A 14% improvement is seen in FEV1 with bronchodilators. 3. Lung Volumes: Total lung capacity normal. Residual volume increased. Residual volume total lung capacity ratio increased. 4. DLCO: Moderately reduced.   INTERPRETATION: Moderate obstructive lung disease. Reversibility with bronchodilators is seen on testing today. Lung volumes show evidence for air trapping. DLCO is reduced, consistent with loss of capillary-alveolar exchange as in emphysema, pulmonary hypertension, chronic pulmonary embolus, pulmonary fibrosis or other pulmonary vascular disease. A concomitant restrictive lung disease process may be suspected on the basis of " moderate reductions in FEV1 and FVC, with a fairly well-preserved FEV1/FVC ratio and a borderline low normal total lung capacity.   11/15/13:exercise oximetry did not show significant desaturation below 91%.  PFT February, 2014 results:FVC=54%, FEV1=48%, FEV1/FVC=64%.  DLCO=59%  9/6/2016 pulmonary consult:COPD with restrictional components and diffusion defect, history Baker's lung.          Hyperlipidemia LDL goal <70 04/17/2013     Priority: Medium     Atrial fibrillation (H) 12/14/2009     Priority: Medium     After CABG 11/09       CVA (cerebral vascular accident) (H) 11/27/2009     Priority: Medium     Mild Broca's aphasia, confusion, right hand dysesthesia after angio. MRA new small area of restricted diffusion in the white matter of the medial right temporal lobe,  consistent with a recent small infarct. Speech problems resolved, still mild right hand problems.         Obstructive sleep apnea 04/14/2008     Priority: Medium     ESS 20/24. Sleep study [facility]: 4/8/08. total sleep time was 423.0 minutes. The sleep latency was normal at 9.7 minutes.  REM sleep latency was 161.5 minutes. Sleep efficiency was normal at 82.7%. The sleep architecture was disrupted with frequent sleep stage changes and arousals. Snoring: Was reported as moderately loud. Lowest O2 saturation was 87.0%. This study is suggestive of mild sleep apnea, severe during REM sleep (21% TST).  PLM index was 0.0. EKG:  No significant cardiac arrhythmias were noted.         Hypertrophy of prostate without urinary obstruction      Priority: Medium     Elevated PSA. Bx negative 2004.       Advanced care planning/counseling discussion 09/24/2012     Priority: Low     Does not have a directive 9/12       Dyslexia 11/11/2010     Priority: Low     essentially unable to read       CTS (carpal tunnel syndrome) 05/12/2010     Priority: Low     EMG 5/10-  median neuropathy at the right wrist, moderate in severity electrically, right ulnar neuropathy  localizing to the elbow, mild chronic right C6 radiculopathy without evidence for acute denervation.        Memory loss 05/03/2010     Priority: Low     MMSE 27/30 (0/3 attention recall, ?history of dylexia), PHQ9-16 7/09. Recommended neurocogntive testing, did not complete.  MMSE 23/30 6/10 (attention, county, if/and/buts).Neurocogntive evaluation  1/10- not suspicious for emerging dementia. Felt likely due hearing loss/dyslexia.   10/29/2018:He was seen at [facility]rology clinic 10/10/2018.  Dr. Oksana Munoz.   He had an EEG.   Diagnosed with Alzheimer's dementia.   Records not yet reviewed.        Benign paroxysmal vertigo 02/03/2009     Priority: Low     Admit 2/09. MRI/MRA head and neck negative.       Sensorineural hearing loss, bilateral 10/08/2007     Priority: Low     Esophageal reflux 11/06/2006     Priority: Low     PSORIASIS      Priority: Low     OVERACTIVE BLADDER      Priority: Low     PVR 84 2004.          Past Medical History:    Past Medical History:   Diagnosis Date     Calculus of kidney      COPD (chronic obstructive pulmonary disease) (H)      Depression      Diabetes (H)      Heart disease      Left hand weakness 12/14/2009     PNEUMONIA, ORGANISM NOS 2/14/2008     Sleep apnea        Past Surgical History:    Past Surgical History:   Procedure Laterality Date     C ANESTH,DX ARTHROSCOPIC PROC KNEE JOINT  1994, 1998     Left     CARDIAC SURGERY  11/09    CABG x2 - LIMA-D1, SVG to OM1, OM2.     GENITOURINARY SURGERY  1990    ESWL and percutaneous nephrolithotomy     LAPAROSCOPIC CHOLECYSTECTOMY N/A 3/4/2019    Procedure: LAPAROSCOPIC CHOLECYSTECTOMY;  Surgeon: Burt Mendieta MD;  Location: [facility] OR     ORTHOPEDIC SURGERY  8/12    right CTR     SURGICAL HISTORY OF -   1998    Hernia repair     SURGICAL HISTORY OF -   2004    NormalColonoscopy      SURGICAL HISTORY OF -   2006    Normal Colonoscopy       Family History:    Family History   Problem Relation Age of Onset     C.A.D. Father         40s      Hypertension Father      C.A.D. Paternal Grandfather      Heart Disease Paternal Grandfather      Diabetes Brother      C.A.D. Brother      Heart Disease Brother      Hypertension Brother      Gastrointestinal Disease Son         Crohn's disease     Gastrointestinal Disease Other         2 grandaughters with Crohn's disease     Cancer - colorectal No family hx of        Social History:  Marital Status:   [5]  Social History     Tobacco Use     Smoking status: Never Smoker     Smokeless tobacco: Never Used   Substance Use Topics     Alcohol use: No     Alcohol/week: 0.0 standard drinks     Drug use: No        Medications:         albuterol (PROAIR HFA/PROVENTIL HFA/VENTOLIN HFA) 108 (90 Base) MCG/ACT inhaler       albuterol (PROVENTIL) (2.5 MG/3ML) 0.083% neb solution       apixaban ANTICOAGULANT (ELIQUIS) 5 MG tablet       aspirin (ASA) 325 MG EC tablet       atorvastatin (LIPITOR) 80 MG tablet       blood glucose (NO BRAND SPECIFIED) test strip       blood glucose monitoring (NO BRAND SPECIFIED) meter device kit       cholecalciferol 2000 UNITS tablet       cyanocobalamin (BL VITAMIN B-12) 500 MCG TABS       dexamethasone (DECADRON) 6 MG tablet       finasteride (PROSCAR) 5 MG tablet       furosemide (LASIX) 20 MG tablet       insulin pen needle (BD ABRAM U/F) 32G X 4 MM miscellaneous       lisinopril (ZESTRIL) 10 MG tablet       metFORMIN (GLUCOPHAGE) 1000 MG tablet       metoprolol tartrate (LOPRESSOR) 25 MG tablet       nitroGLYcerin (NITROSTAT) 0.4 MG sublingual tablet       NOVOLIN 70/30 FLEXPEN RELION susp       order for DME       ranitidine (RANITIDINE 150 MAX STRENGTH) 150 MG tablet       tamsulosin (FLOMAX) 0.4 MG capsule       TRELEGY ELLIPTA 100-62.5-25 MCG/INH oral inhaler        Review of Systems   Unable to perform ROS: Dementia       Physical Exam   BP: (!) 155/77  Pulse: 82  Temp: 97.8  F (36.6  C)  Resp: 20  Weight: 85.3 kg (188 lb)  SpO2: 91 %      Physical Exam  Vitals signs and  nursing note reviewed.   Constitutional:       General: He is in acute distress.      Appearance: Normal appearance. He is well-developed. He is ill-appearing. He is not diaphoretic.   HENT:      Head: Normocephalic and atraumatic.      Right Ear: External ear normal.      Left Ear: External ear normal.      Nose: Nose normal.      Mouth/Throat:      Mouth: Mucous membranes are moist.   Eyes:      General: No scleral icterus.     Extraocular Movements: Extraocular movements intact.      Conjunctiva/sclera: Conjunctivae normal.   Neck:      Musculoskeletal: Normal range of motion and neck supple.      Trachea: No tracheal deviation.   Cardiovascular:      Rate and Rhythm: Normal rate and regular rhythm.      Heart sounds: Normal heart sounds. No murmur. No friction rub. No gallop.    Pulmonary:      Effort: Tachypnea, accessory muscle usage and respiratory distress present.      Breath sounds: Decreased breath sounds present. No wheezing, rhonchi or rales.   Abdominal:      General: There is no distension.      Palpations: Abdomen is soft.      Tenderness: There is no abdominal tenderness.   Musculoskeletal: Normal range of motion.         General: No tenderness.      Right lower leg: He exhibits no tenderness. No edema.      Left lower leg: He exhibits no tenderness. No edema.   Skin:     General: Skin is warm and dry.      Coloration: Skin is not pale.      Findings: No erythema or rash.   Neurological:      General: No focal deficit present.      Mental Status: He is alert. He is disoriented.      Coordination: Coordination normal.   Psychiatric:         Behavior: Behavior normal.      Comments: Anxious affect.         ED Course        Procedures               EKG Interpretation:      Interpreted by Geovani Holley MD  Time reviewed: Upon completion  Symptoms at time of EKG: Shortness of breath  Rhythm: normal sinus   Rate: normal  Axis: Left axis deviation  Ectopy: none  Conduction: Right bundle branch block,  bifascicular block  ST Segments/ T Waves: No acute ST-T wave changes  Q Waves: inferior  Comparison to prior: unchanged from 11/26/2020   Clinical Impression: Atrial fibrillation with controlled ventricular response and no acute change from most recent EKG 11/26/2020           Results for orders placed or performed during the hospital encounter of 12/02/20 (from the past 24 hour(s))   Fibrinogen activity   Result Value Ref Range    Fibrinogen 614 (H) 200 - 420 mg/dL   INR   Result Value Ref Range    INR 1.39 (H) 0.86 - 1.14   PTT   Result Value Ref Range    PTT 32 22 - 37 sec   Blood gas venous   Result Value Ref Range    Ph Venous 7.38 7.32 - 7.43 pH    PCO2 Venous 50 40 - 50 mm Hg    PO2 Venous 23 (L) 25 - 47 mm Hg    Bicarbonate Venous 29 (H) 21 - 28 mmol/L    Base Excess Venous 2.9 mmol/L    FIO2 2 liters    CBC with platelets differential   Result Value Ref Range    WBC 11.5 (H) 4.0 - 11.0 10e9/L    RBC Count 4.63 4.4 - 5.9 10e12/L    Hemoglobin 13.8 13.3 - 17.7 g/dL    Hematocrit 41.6 40.0 - 53.0 %    MCV 90 78 - 100 fl    MCH 29.8 26.5 - 33.0 pg    MCHC 33.2 31.5 - 36.5 g/dL    RDW 14.3 10.0 - 15.0 %    Platelet Count 176 150 - 450 10e9/L    Diff Method Automated Method     % Neutrophils 77.5 %    % Lymphocytes 9.6 %    % Monocytes 11.7 %    % Eosinophils 0.0 %    % Basophils 0.2 %    % Immature Granulocytes 1.0 %    Nucleated RBCs 0 0 /100    Absolute Neutrophil 8.9 (H) 1.6 - 8.3 10e9/L    Absolute Lymphocytes 1.1 0.8 - 5.3 10e9/L    Absolute Monocytes 1.4 (H) 0.0 - 1.3 10e9/L    Absolute Eosinophils 0.0 0.0 - 0.7 10e9/L    Absolute Basophils 0.0 0.0 - 0.2 10e9/L    Abs Immature Granulocytes 0.1 0 - 0.4 10e9/L    Absolute Nucleated RBC 0.0    Troponin I   Result Value Ref Range    Troponin I ES 0.043 0.000 - 0.045 ug/L   Procalcitonin   Result Value Ref Range    Procalcitonin 0.17 ng/ml   CRP inflammation   Result Value Ref Range    CRP Inflammation 75.8 (H) 0.0 - 8.0 mg/L   Ferritin   Result Value Ref Range     Ferritin 583 (H) 26 - 388 ng/mL   Nt probnp inpatient   Result Value Ref Range    N-Terminal Pro BNP Inpatient 2,274 (H) 0 - 1,800 pg/mL   Hepatic panel   Result Value Ref Range    Bilirubin Direct 0.5 (H) 0.0 - 0.2 mg/dL    Bilirubin Total 1.2 0.2 - 1.3 mg/dL    Albumin 3.0 (L) 3.4 - 5.0 g/dL    Protein Total 6.8 6.8 - 8.8 g/dL    Alkaline Phosphatase 94 40 - 150 U/L    ALT 36 0 - 70 U/L    AST 25 0 - 45 U/L   Basic metabolic panel   Result Value Ref Range    Sodium 135 133 - 144 mmol/L    Potassium 4.9 3.4 - 5.3 mmol/L    Chloride 103 94 - 109 mmol/L    Carbon Dioxide 27 20 - 32 mmol/L    Anion Gap 5 3 - 14 mmol/L    Glucose 318 (H) 70 - 99 mg/dL    Urea Nitrogen 39 (H) 7 - 30 mg/dL    Creatinine 1.28 (H) 0.66 - 1.25 mg/dL    GFR Estimate 52 (L) >60 mL/min/[1.73_m2]    GFR Estimate If Black 60 (L) >60 mL/min/[1.73_m2]    Calcium 8.8 8.5 - 10.1 mg/dL   CT Head w/o Contrast    Narrative    CT SCAN OF THE HEAD WITHOUT CONTRAST December 2, 2020 11:22 AM     HISTORY: Altered mental status.    TECHNIQUE: Axial images of the head and coronal reformations without  IV contrast material. Radiation dose for this scan was reduced using  automated exposure control, adjustment of the mA and/or kV according  to patient size, or iterative reconstruction technique.    COMPARISON: Head CT 11/26/2020.    FINDINGS: Images are degraded by motion artifact, particularly within  the mid aspect of the brain. No definite evidence of acute  intracranial hemorrhage. No mass effect or midline shift. Mild diffuse  parenchymal volume loss. Patchy periventricular white matter  hypodensities are nonspecific, but likely related to chronic  microvascular ischemic disease. Ventricular size appears to be grossly  within normal limits.    The visualized portions of the sinuses and mastoids appear normal.       Impression    IMPRESSION:     1. Images are degraded by motion artifact particularly within the  midbrain. This limits evaluation.  2. No  definite acute intracranial pathology noting limitations by  motion. CT angiogram to follow.     COTY GOODE MD   CTA Head with Contrast    Narrative    CT ANGIOGRAM OF THE HEAD WITH CONTRAST December 2, 2020 11:23 AM     HISTORY: Altered mental status, chronic anticoagulation therapy.    TECHNIQUE: CT angiography with an injection of 70 mL Isovue 370 IV  with scans through the head. Images were transferred to a separate 3-D  workstation where multiplanar reformations and 3-D images were  created. Radiation dose for this scan was reduced using automated  exposure control, adjustment of the mA and/or kV according to patient  size, or iterative reconstruction technique.    COMPARISON: CTA 8/5/2020.     CT HEAD FINDINGS: No contrast enhancing lesions. Cerebral blood flow  is grossly normal.     CT ANGIOGRAM HEAD FINDINGS: The major intracranial arteries including  the proximal branches of the anterior cerebral, middle cerebral, and  posterior cerebral arteries appear patent without vascular cutoff. No  aneurysm identified. No significant stenosis. Venous circulation is  unremarkable.     The P1 segment of the right posterior cerebral artery is not  visualized and is likely hypoplastic or congenitally absent. The right  posterior cerebral artery is supplied by the patent right posterior  communicating artery.      Impression    IMPRESSION: Patent proximal major intracranial arteries without  vascular cutoff. No significant intracranial stenosis or aneurysm.         COTY GOODE MD   CT Chest Pulmonary Embolism w Contrast    Narrative    CT CHEST PULMONARY EMBOLISM WITH CONTRAST December 2, 2020 11:24 AM    CLINICAL HISTORY: Acute respiratory failure with hypoxia. COVID-19  illness.    TECHNIQUE: CT angiogram chest during arterial phase injection IV  contrast. 2D and 3D MIP reconstructions were performed by the CT  technologist. Dose reduction techniques were used.     CONTRAST: 81 mL Isovue 370.    COMPARISON:  None.    FINDINGS:  ANGIOGRAM CHEST: Pulmonary arteries are normal caliber and negative  for pulmonary emboli. Thoracic aorta is negative for dissection.  Atherosclerotic calcification of the thoracic aorta and coronary  arteries.    LUNGS AND PLEURA: No pleural effusions. Patchy ground-glass opacities  in both lungs may be infectious in etiology. Small areas of  consolidation are noted at both lung bases, and could be related to  atelectasis or infection. No pneumothorax.    MEDIASTINUM/AXILLAE: Mildly enlarged subcarinal lymph node measures  2.3 x 1.4 cm. No other enlarged lymph nodes are identified in the  chest. Scattered smaller mediastinal lymph nodes are visible, but do  not meet size criteria for pathologic enlargement. No pericardial  effusion. Prior midline sternotomy.    UPPER ABDOMEN: Prior cholecystectomy. Scattered small calcified  granulomas in the spleen. Limited views of the upper abdomen are  otherwise unremarkable.    MUSCULOSKELETAL: Unremarkable.      Impression    IMPRESSION:  1.  No evidence for pulmonary embolism or thoracic aortic dissection.  2.  Patchy ground-glass opacities are noted in both lungs, with areas  of mild consolidation at both lung bases. Commonly reported imaging  features of COVID-19 pneumonia are present. Influenza pneumonia and  organizing pneumonia, as can be seen in the setting of drug toxicity  and connective tissue disease, could cause a similar imaging pattern.  3.  A mildly enlarged subcarinal lymph node in the mediastinum is  nonspecific.    ARIELA CHAIREZ MD   Blood gas venous   Result Value Ref Range    Ph Venous 7.41 7.32 - 7.43 pH    PCO2 Venous 27 (L) 40 - 50 mm Hg    PO2 Venous 42 25 - 47 mm Hg    Bicarbonate Venous 17 (L) 21 - 28 mmol/L    Base Deficit Venous 6.6 mmol/L    FIO2 2 LITERS      *Note: Due to a large number of results and/or encounters for the requested time period, some results have not been displayed. A complete set of results can be found  in Results Review.       Medications   sodium chloride 0.9% infusion ( Intravenous Stopped 12/2/20 1514)   ipratropium-albuterol (COMBIVENT RESPIMAT) inhaler 1 puff (1 puff Inhalation Given 12/2/20 1133)   0.9% sodium chloride BOLUS (0 mLs Intravenous Stopped 12/2/20 1324)   albuterol (PROAIR HFA/PROVENTIL HFA/VENTOLIN HFA) 108 (90 Base) MCG/ACT inhaler 6 puff (6 puffs Inhalation Given 12/2/20 1133)   iopamidol (ISOVUE-370) solution 81 mL (81 mLs Intravenous Given 12/2/20 1105)   sodium chloride 0.9 % bag 500mL for CT scan flush use (100 mLs As instructed Given 12/2/20 1105)   iopamidol (ISOVUE-370) solution 70 mL (70 mLs Intravenous Given 12/2/20 1104)   sodium chloride 0.9 % bag 500mL for CT scan flush use (100 mLs As instructed Given 12/2/20 1104)        10:30 AM - I spoke with the patient's son to him by telephone.    I reviewed the case and consulted with the Hospitalist on-call, Dr. Patten who suggested I contact  to see if the patient could be directly admitted to a TCU facility.    12:57 PM - I reviewed the case with Amira .    1:36 PM - Reviewed case with Gina . Therapy eval during recent hospitalization felt he was stable to be d/c home to his son.      Critical Care time:  was 45 minutes for this patient excluding procedures.    Assessments & Plan (with Medical Decision Making)   80 year old male with history of COPD, baker's lung, atrial fibrillation (on Eliquis), chronic ischemic heart disease, CVA, DM type II, early onset Alzheimer's dementia and recently diagnosed COVID-19 illness (6 days ago) who presents by EMS for worsening dyspnea and shortness of breath refractory to home O2 therapy 2 L/nasal cannula and increased confusion with noncompliance using oxygen at times. He was admitted for COVID-19 illness with acute respiratory failure with hypoxia 11/26-11/30/2020 and discharged 2 days ago on home O2 x 2 L/NC,  Dexamethasone, and Eliquis due to  atrial fibrillation. He is on day 9 or 10 of COVID-19 illness.  I spoke with his son who states that his father has become more confused, is not wearing his oxygen sometimes and he (his son Jae) is not able to care for or adequately support and supervise his father at home because he has also developed symptoms of COVID-19 illness and is currently too ill to care for his father at home.  The patient has Alzheimer's dementia and is confused and disoriented, which his son reports he is worse than baseline.  In the ED the patient desaturated into the low 80s on room air with minimal exertion while being settled shortly after arrival by EMS, but sats in the low 90s on 2 L/nasal cannula.  Extensive evaluation including CT of the chest, PE protocol, and CT/CTA of the head showed findings of COVID-19 infiltrates on CT of the chest, no PE and no evidence of an acute stroke or intracranial process.  After contacting the hospitalist on-call, and then  to see if he could be admitted to a TCU facility for continued supportive care of COVID-19 illness/pneumonia with hypoxia and respiratory distress, it was determined that he will not be able to be admitted to a TCU facility and will need to be admitted to our hospital today.    I have reviewed the nursing notes.    I have reviewed the findings, diagnosis, plan and need for follow up with the patient    New Prescriptions    No medications on file       Final diagnoses:   COVID-19 virus infection   Acute respiratory failure with hypoxia (H)   Chronic obstructive pulmonary disease with acute exacerbation (H)   Alzheimer's dementia without behavioral disturbance, unspecified timing of dementia onset (H)       12/2/2020   [facility] EMERGENCY DEPT     Geovani Holley MD  12/02/20 [id number]

## 2020-12-02 NOTE — PROGRESS NOTES
"CM received call from MD in ED to assess need for TCU placement.     RYAN familiar with Pt due to recent discharge from hospital 11/30/20    Per SW note: 11/30/20   Son Jae confirmed that Pt will discharge home into his care. Jae stated that he will be providing 24/7 care in his home. Jae states that he has been diagnosed with covid as well.       Discharge Plan:     Return home with son Jae     Referral to [facility] ([phone] Fax: [phone])-RN, KAITLYN, PT     Son Jae will provide transport       ----------------    SW called Pt's son Jae to discuss need for TCU. Jae stated that the Pt has not been keeping his O2 on and has been needing increased cares at home. Jae stated that he is not feeling well due to possible covid. Jae stated the Pt requested to come back to the hospital. Jae agreed because his cares have become more than he could manage.     Pt had PT/OT evals completed during recent hospitalization that recommended home with home care.     Jae stated that Home Care has not been out to their home yet. RYAN attempted to discuss Pt's [employer] insurance benefits for TCU with Jae however he refused to discuss further stating \"I was told he was being admitted to the hospital, I do not feel well myself now and cannot spend the time to discuss this anymore with you!\"     RYAN followed up with MD that further PT/OT assessments will need to be completed to pursue TCU placement on discharge. Unaware if extended family is available to assist the Pt with cares as the Pt and son feel Pt requires hospital readmission.     If Pt does indeed require TCU on discharge- possible barrier to TCU acceptance: [employer] insurance, need for PT/OT evals, recent Covid diagnosis      JESUS Ballard  [facility] [phone]   [facility]  [phone]       "

## 2020-12-02 NOTE — ED TRIAGE NOTES
"Pt arrives via EMS with increased shortness of breath, diagnosed with COVID 11/26/2020 and was hospitalized until 11/30. Pt discharged home on 2 L of oxygen. EMS reports that he was at 88% when they arrived. Pt has a hx of COPD, states he uses oxygen but doesn't know how much. Pt states \"I think I need to go back upstairs\".   "

## 2020-12-02 NOTE — ED NOTES
Pt took of oxygen and crawled out of bed, opened up room 18 door and wanted help.  Pt had call light within reach.   Writer redirected pt back to bed.   Pt upset that pt has been in this room for this long and wanted to know why pt has been moved to his room yet.   Advised pt that charge nurse advised writer to call medsurg to give report to nurse.   Pt updated that room would be available soon.

## 2020-12-02 NOTE — ED NOTES
"Pt stating \"I need help, I need to go upstairs\". Explained to patient that there is a wait to get upstairs, unsure of timeline. Pt given lunch tray and reassured that this oxygen saturation is fine.   "

## 2020-12-03 PROBLEM — F02.80 ALZHEIMER'S DEMENTIA WITHOUT BEHAVIORAL DISTURBANCE, UNSPECIFIED TIMING OF DEMENTIA ONSET: Status: ACTIVE | Noted: 2020-01-01

## 2020-12-03 PROBLEM — G30.9 ALZHEIMER'S DEMENTIA WITHOUT BEHAVIORAL DISTURBANCE, UNSPECIFIED TIMING OF DEMENTIA ONSET: Status: ACTIVE | Noted: 2020-01-01

## 2020-12-03 NOTE — PROGRESS NOTES
12/03/20 1000   Quick Adds   Type of Visit Initial Occupational Therapy Evaluation       Present no   Language English   Living Environment   People in home child(katherin), adult   Current Living Arrangements house   Home Accessibility no concerns   Transportation Anticipated family or friend will provide   Living Environment Comments Pt's son currently has COVID   Self-Care   Usual Activity Tolerance good   Current Activity Tolerance fair   Equipment Currently Used at Home cane, straight   Disability/Function   Hearing Difficulty or Deaf yes   Patient's preferred means of communication verbal   Describe hearing loss hearing loss on right side;hearing loss on left side   Use of hearing assistive devices right hearing aid;left hearing aid   Were auxiliary aids offered? no   The following aids were provided; patient declined offer of auxiliary aids   Wear Glasses or Blind yes   Concentrating, Remembering or Making Decisions Difficulty yes   Difficulty Communicating no   Difficulty Eating/Swallowing yes   Eating/Swallowing other (see comments)  (doesn't taste good)   Walking or Climbing Stairs Difficulty no   Dressing/Bathing Difficulty no   Toileting issues no   Doing Errands Independently Difficulty (such as shopping) no   Fall history within last six months yes   Number of times patient has fallen within last six months 2   Change in Functional Status Since Onset of Current Illness/Injury yes   General Information   Onset of Illness/Injury or Date of Surgery 12/02/20   Referring Physician Kingston Coronado MD   Patient/Family Therapy Goal Statement (OT) to increase strength.    Existing Precautions/Restrictions fall;oxygen therapy device and L/min   Limitations/Impairments hearing   Cognitive Status Examination   Orientation Status person;place   Affect/Mental Status (Cognitive) WNL   Cognitive Status Comments baseline dementia, per chart   Visual Perception   Visual Impairment/Limitations  corrective lenses full-time   Pain Assessment   Patient Currently in Pain No   Range of Motion Comprehensive   General Range of Motion no range of motion deficits identified   Strength Comprehensive (MMT)   Comment, General Manual Muscle Testing (MMT) Assessment fatigues easily. States his strength is 70% back to baseline.    Coordination   Upper Extremity Coordination No deficits were identified   Bed Mobility   Comment (Bed Mobility) Min A for supine to sit. SBA for sit to supine.    Transfers   Transfer Comments sit to stand with CGA- needs 2 attempts to complete.    Grooming Assessment/Training   [place] Level (Grooming) independent   Toileting   [place] Level (Toileting) supervision  (standing)   Instrumental Activities of Daily Living (IADL)   IADL Comments unsure if son can assist upon discharge as son is sick as well.    Clinical Impression   Criteria for Skilled Therapeutic Interventions Met (OT) yes   OT Diagnosis decreased independence with ADLs/IADLs   OT Problem List-Impairments impacting ADL activity tolerance impaired;cognition;strength   Assessment of Occupational Performance 1-3 Performance Deficits   Identified Performance Deficits showering, dressing, toileting    Planned Therapy Interventions (OT) ADL retraining;strengthening;progressive activity/exercise   Clinical Decision Making Complexity (OT) low complexity   Therapy Frequency (OT) 5x/week   Predicted Duration of Therapy 2-3 days   Anticipated Equipment Needs Upon Discharge (OT)   (none)   Risks and Benefits of Treatment have been explained. Yes   Patient, Family & other staff in agreement with plan of care Yes   OT Discharge Planning    OT Discharge Recommendation (DC Rec) Transitional Care Facility   OT Rationale for DC Rec generalized weakness- son able to assist at home. AT this time pt would struggle with IADLs and showers and at risk for falls    Total Evaluation Time (Minutes)   Total Evaluation Time (Minutes) 15

## 2020-12-03 NOTE — PLAN OF CARE
Pt continues to require 2L oxygen to maintain saturation > 90%. On 2L pt is 91%. Pt continues to have dry nonproductive cough & hoarse voice, speaking only in a whisper. Pt remains confused w/ frequent bed alarm triggering & call light use. No acute distress noted. Up to BR w/ SBA & extension tubing on oxygen.

## 2020-12-03 NOTE — UTILIZATION REVIEW
Admission Status; Secondary Review Determination       Under the authority of the Utilization Management Committee, the utilization review process indicated a secondary review on the above patient. The review outcome is based on review of the medical records, discussions with staff, and applying clinical experience noted on the date of the review.     (x) Conditional Inpatient Status -     RATIONALE FOR DETERMINATION    80 year old male admitted on 12/2/2020. He was initially admitted 11/26-11/30/20 with pneumonia and hypoxemic respiratory failure due to Covid 19 .Presented with weakness, fatigue, shortness of breath, cough and hypoxemia to 84% in ED. CXR revealed hypo aerated lungs but otherwise clear.   He was discharge home on O2 2LPM. He returns with persistent hypoxemia, dyspnea, confusion. Son is unable to take care of patient because he is also ill with Covid 19.     I had a long discussion with the attending physician Dr. Miller, the patient has failed outpatient management, there is concern for possible confusion, he will see if the patient is able to discharge home with his son later today then he should remain under observation status the current order.  However if there is ongoing concern for hypoxia, worsening inflammatory indicators and delirium related to infectious encephalopathy from Covid requiring ongoing hospital stay then advancing to inpatient would be appropriate.    This document was produced using voice recognition software       The information on this document is developed by the utilization review team in order for the business office to ensure compliance. This only denotes the appropriateness of proper admission status and does not reflect the quality of care rendered.   The definitions of Inpatient Status and Observation Status used in making the determination above are those provided in the CMS Coverage Manual, Chapter 1 and Chapter 6, section 70.4.   Sincerely,   KATIUSKA GARCIA MD    System Medical Director   Utilization Management   [facility].

## 2020-12-03 NOTE — PROGRESS NOTES
"Dr. Miller paged with the following:    \"2211- and 502 at this time on different fingers. Please advise.\"    Patient's BG was 542 at 11:42am on the right hand ring finger and 502 at 11:45 am on the right hand pinky finger.     BG was 369 this morning at 8:17 am and was corrected with 5 units.     7 units insulin given at this time per order. Per Dr. Miller, RN is to give another 6 units of insulin at this time.   "

## 2020-12-03 NOTE — PROGRESS NOTES
"Patient alert, but forgetful. Patient is on the call light frequently and setting the bed alarm off. Patient stated \"I need someone to stay in here with me so I can fall asleep\". Writer explained to patient that he is Covid positive and we will come to your room as soon as we can when you call.   "

## 2020-12-03 NOTE — H&P
[facility]     History and Physical - Hospitalist Service       Date of Admission:  12/2/2020    Assessment & Plan   Alvaro Horton is a 80 year old male admitted on 12/2/2020. He was initially admitted 11/26-11/30/20 with pneumonia and hypoxemic respiratory failure due to Covid 19 .Presented with weakness, fatigue, shortness of breath, cough and hypoxemia to 84% in ED. CXR revealed hypo aerated lungs but otherwise clear.   He was discharge home on O2 2LPM. He returns with persistent hypoxemia, dyspnea, confusion. Son is unable to take care of patient because he is also ill with Covid 19.       Acute respiratory failure with hypoxia due to 2019 novel coronavirus infection    Onset of symptoms: 11/25    Test positive: 11/26    Initially presented with weakness, fatigue, shortness of breath, cough and hypoxemia. Was hypoxemic to 84% in ED. Was placed on 2 L NC and has remained on this. CXR revealed hypo aerated lungs but otherwise clear.      Dexamethasone 6 mg daily started 11/27, discharge with dexamethasone daily  Remdesivir 11/27- 11/30/20    CT 12/2/2020 - Patchy ground-glass opacities are noted in both lungs, with areas of mild consolidation at both lung bases. Commonly reported imaging features of COVID-19 pneumonia are present. Procalcitonin on readmission 12/2/2020 0.17 and BNP 2,274.     Inflammatory markers (including last hospitalization)    CRP : 22.1 ->-> 81.0 (peak) ->-> 33.0 --> 75.8 12/2/2020     IL-6: 36.85    Ferritin 696 ---> 583 12/2/2020     LDH: 348 - 311 - 338    Fibrinogen 540 - 483 - 492 - 614 12/2/2020     CPK: 765    D-Dimer 1.4 - 2.0 - 1.2 - 0.7    Retic count 1.3 - 1.0 - 0.8     Currently on O2 by NC at 2 lpm and stable. Doubt secondary bacterial pneumonia  - Continue dexamethasone (day   - Restart Remdesivir (received 3 days in initial hospital stay)   -Continue supplemental oxygen and wean as able.  - Re-trend inflammatory markers        Falls  frequently  Generalized weakness  Son notes generalized weakness, patient was found down early am hours of 11/25.   - PT/OT/SW consults     Suspect type 2 MI, demand ischemia  Moderate RV dilatation and decreased RV function  Moderate pulmonary hypertension    During last hospialization Troponin 0.044-->0.069-->0.107-->0.141-->0.216-->0.101-->0.047. Suspected due to a combination of atrial fibrillation, demand ischemia, and possible COVID-19 infection. Echo showed normal LV size and function, LVEF 60-65%, no RWMA, RV mod dilated and mod decreased systolic function, RV volume overload and mod pulmonary hypertension. Previous RV dilatation, decreased function and pulmonary hypertension were mild in May 2019. Appears to be progression of pulmonary hypertension and RV dysfunction likely due to underlying COPD.     BNP elevated, has mild edema, weight stable.   - Recheck Troponin 0.043  - Trial of Lasix 40mg IV x 1, reasess in AM  - Hold usual lasix   - I/o , daily weights     COPD (chronic obstructive pulmonary disease)  Asthma  Baker's Lung    Patient managed prior to admission with Trelegy Ellipta daily and as needed albuterol. PFTs in 04/2018 revealed severe airflow obstruction. Suspect this is baseline.  - Continue home therapies.      Atrial fibrillation     Noted in history as transient episode following CABG in 2009. Had  Seen cards 3/2020 and had ziopatch - no a fib seen. EKG 8/2020 with NSR with long 1st degree AVB. Not on anticoagulation prior to last admission.  Does take metoprolol 25 mg twice daily. Takes 325 mg aspirin daily.     On previous admission, patient in atrial fibrillation, rate controlled. Likely exacerbated by acute illness but could be due to worsening RV dysfunction as well. CHADS-Vasc score 6. Does have falls at home. Started on Eliquis prior to discharge (also partly due to Covid 19)  - Telemetry  - Continue Eliquis    Type 2 diabetes mellitus with diabetic chronic kidney disease   A1c of  8.0. Patient managed prior to admission with  Metformin and NPH BID. Creatinine is stable.   Renal fx slightly worse. BS >250  - Hold usual NPH 70/30, metformin   - Start lantus 15, use medium insulin sliding scale until PO intake acertained    Obstructive sleep apnea  Patient uses CPAP at home, but son reports with illness and dementia he has not been compliant recently.      History of CVA (cerebrovascular accident)  CVA at time of CABG, 2009.    - Continue aspirin     CAD, s/p coronary bypass grafting in 11/2009 with LIMA to LAD and SVG to OM1 and 2  Follows with cards, last seen at virtual visit in 03/2020.  Stress echo 06/2019 with EF >70%.   - Hold usual lasix.   - Continue aspirin  - Continue home Lisinopril 10, metorolol 25 BID.    Hypertension goal BP (blood pressure) < 140/90  Patient managed prior to admission with lisinopril 10 mg daily.  Stable.   - Continue home Lisinopril 10, metorolol 25 BID.     Hypertrophy of prostate without urinary obstruction   - Continue prior to admission flomax and finasteride.     Hyperlipidemia LDL goal <70   - Continue prior to admission atorvastatin 80 mg daily.        Esophageal reflux   - Continue prior to admission ranitidine twice daily.     Memory loss  Early onset Alzheimer dementia   Recent diagnosis per son, not on medications.         Discussed with son Jae [phone]     Diet: Regular Diet Adult    DVT Prophylaxis: Eliqis 5 BID  Olivo Catheter: not present  Code Status: No CPR- Pre-arrest intubation OK  - Discussed with patient and son in separate conversations. Son wants to discuss DNR with sister, patient wants to discuss DNI with son.          Disposition Plan   Expected discharge: 1-2 days, recommended to transitional care unit once O2 use less than 2 liters/minute and safe disposition plan/ TCU bed available.  Entered: Kingston Coronado MD 12/02/2020, 6:52 PM     The patient's care was discussed with the Bedside Nurse, Patient and Patient's Family.    Kingston VINCENT  MD Ivonne  [facility]   Contact information available via [facility] Paging/Directory      ______________________________________________________________________    Chief Complaint   Chief Complaint   Patient presents with     Shortness of Breath     + COVID, hx of COPD        History is obtained from the patient, electronic health record, emergency department physician and patient's son    History of Present Illness   Alvaro Horton is a 80 year old male who was initially admitted 11/26-11/30/20 with pneumonia and hypoxemic respiratory failure due to Covid 19. He presented with weakness, fatigue, shortness of breath, cough and hypoxemia to 84% in ED. CXR revealed hypo-aerated lungs but otherwise clear.   He was discharged home on O2 2LPM.     He returns with persistent hypoxemia, dyspnea, confusion. Son is unable to take care of patient because he is also ill with Covid 19.       Review of Systems    The 5 point Review of Systems is negative other than noted in the HPI or here.    Past Medical History    I have reviewed this patient's medical history and updated it with pertinent information if needed.     Patient Active Problem List    Diagnosis Date Noted     History of CVA (cerebrovascular accident) 11/27/2020     Priority: High     Mild Broca's aphasia, confusion, right hand dysesthesia after angiogram 2009. MRA new small area of restricted diffusion in the white matter of the medial right temporal lobe,  consistent with a recent small infarct. Speech problems resolved, still mild right hand problems.         Alzheimer's dementia (H) 05/03/2010     Priority: High     MMSE 27/30 (0/3 attention recall, ?history of dylexia), PHQ9-16 7/09. Recommended neurocogntive testing, did not complete.  MMSE 23/30 6/10 (attention, county, if/and/buts).Neurocogntive evaluation  1/10- not suspicious for emerging dementia. Felt likely due hearing loss/dyslexia.   10/29/2018:He was seen at [facility]  "Neurology clinic 10/10/2018.  Dr. Oksana Munoz.   He had an EEG.   Diagnosed with Alzheimer's dementia.   Records not yet reviewed.        Hypertension goal BP (blood pressure) < 140/90 10/09/2006     Priority: High     Bakers' asthma (H)      Priority: High     Dx possible \"Baker's Lung\" 2001 [facility]. RAST for bakers yeast negative 2002. Has occupational exposures with related worsening asthmatic symptoms. CT 2/08- no fibrosis.       Moderate persistent asthma      Priority: High     PFTS 1997 - FEV1- 3.39 (64%), ratio 0.74, 24% change ulj24-02% with bronchodilators,  TLC 86%, %, DLCO 78%. Methacholine challenge positive at level 7 2001.  PFTS 2004 - FEV1- 1.93 (63%), ratio 0.77. PFTS 4/08 - FEV1- 2.13 (72%), ratio 0.71, no change with bronchodilators,  TLC 78%, RV 96%, DLCO 64%          Coronary artery disease involving native coronary artery of native heart      Priority: High     atypical chest pain 2001 with GXT echo- decreased LVF, angiogram 2001- nonobstructing 3 vessel disease.   Repeat angio 2004- prox RCA 50-60%,  OM2- 40%, D1 30-40%, LAD 30-40%.   Cardiolite 2005 during hospitalization for SOB in [place] reported to reveal no ischemia.   Adenosine cardiolite 1/08- small slightly reversible inferior defect, most likely consistent with diaphragm attenuation with bowel uptake artifact. USA, Angio 11/09- Severe LM disease, severe subtotal occlusion of a large branch OM1.    S/p CABG x2 11/09- LIMA-D1, SVG to OM1, OM2. GXT 8/10- 5.8 mets, 121 (80%) HR, normal ekg, cardiolite- small fixed inferior/basal defect with small area khurram-infarct ischemia extending into the mid ventricle. EF 67%.  4/25/13:adenosine cardiolyte stress test through [facility] with normal EF and no ischemia.   1/16/2016 nuclear stress test:1.  Myocardial perfusion imaging using single isotope technique demonstrated no evidence for myocardial ischemia. 2. Gated images demonstrated normal wall motion with a calculated ejection " fraction of 61%.  3. Compared to the prior study from 2011 there are no significant changes .           COVID-19 virus infection 12/02/2020     Priority: Medium     Type 2 diabetes mellitus (H) 12/02/2020     Priority: Medium     Acute respiratory failure with hypoxia (H) 11/27/2020     Priority: Medium     Thrombocytopenia (H) 11/27/2020     Priority: Medium     COPD (chronic obstructive pulmonary disease) (H) 11/18/2013     Priority: Medium     PFT 11/15/13 results:FVC=59%, FEV1=54%, FEV1/FVC=92%.  His DLCO was 58%1. Spirometry: FEV1 moderately reduced. FVC moderately reduced. FEV1/FVC ratio reduced. 2. Bronchodilator Response: A 14% improvement is seen in FEV1 with bronchodilators. 3. Lung Volumes: Total lung capacity normal. Residual volume increased. Residual volume total lung capacity ratio increased. 4. DLCO: Moderately reduced.   INTERPRETATION: Moderate obstructive lung disease. Reversibility with bronchodilators is seen on testing today. Lung volumes show evidence for air trapping. DLCO is reduced, consistent with loss of capillary-alveolar exchange as in emphysema, pulmonary hypertension, chronic pulmonary embolus, pulmonary fibrosis or other pulmonary vascular disease. A concomitant restrictive lung disease process may be suspected on the basis of moderate reductions in FEV1 and FVC, with a fairly well-preserved FEV1/FVC ratio and a borderline low normal total lung capacity.   11/15/13:exercise oximetry did not show significant desaturation below 91%.  PFT February, 2014 results:FVC=54%, FEV1=48%, FEV1/FVC=64%.  DLCO=59%  9/6/2016 pulmonary consult:COPD with restrictional components and diffusion defect, history Baker's lung.          Hyperlipidemia LDL goal <70 04/17/2013     Priority: Medium     Atrial fibrillation (H) 12/14/2009     Priority: Medium     Afib after CABG 11/2009  Afib during hospitalization 11/2020, 12/2020       Obstructive sleep apnea- mild (AHI 11) 04/14/2008     Priority: Medium      ESS 20/24. Sleep study [facility]: 4/8/08 (196#) AHI 11.2, REM AHI 49.6, low O2 87%.         Falls frequently 11/27/2020     Priority: Low     Generalized weakness 11/27/2020     Priority: Low     Claudication of calf muscles (H) 08/27/2020     Priority: Low     Type 2 diabetes mellitus with diabetic chronic kidney disease (H) 10/30/2015     Priority: Low     [facility] 08/11/2014     Priority: Low     State Tier Level:    Status:  Unable to re-connect  Care Coordinator:  Kecia Mills    See Letters for [facility] Plan  Date:  August 11, 2014             Chronic kidney disease, stage 2 (mild) 05/06/2014     Priority: Low     9/9/2015:He has had two abnormal MAC in the past.        Advanced care planning/counseling discussion 09/24/2012     Priority: Low     Does not have a directive 9/12       Dyslexia 11/11/2010     Priority: Low     essentially unable to read       CTS (carpal tunnel syndrome) 05/12/2010     Priority: Low     EMG 5/10-  median neuropathy at the right wrist, moderate in severity electrically, right ulnar neuropathy localizing to the elbow, mild chronic right C6 radiculopathy without evidence for acute denervation.        Benign paroxysmal vertigo 02/03/2009     Priority: Low     Admit 2/09. MRI/MRA head and neck negative.       Sensorineural hearing loss, bilateral 10/08/2007     Priority: Low     Esophageal reflux 11/06/2006     Priority: Low     PSORIASIS      Priority: Low     Hypertrophy of prostate without urinary obstruction      Priority: Low     Elevated PSA. Bx negative 2004.       OVERACTIVE BLADDER      Priority: Low     PVR 84 2004.        Past Medical History:   Diagnosis Date     Acute calculous cholecystitis 03/02/2019     Altered mental status 12/31/2014 12/27/2014:episode confusion for 20 minutes.  Seen at [facility]/-hospitalized.  Head CT, MRI/MRA all normal.  PET cardiac perfusion stress test normal.  Neurology consult-diagnosis= possible hypoglycemia, migraine variant.  " \"Acute ischemic cerebral event was excluded\".      Calculus of kidney 1990 1990. s/p ESWL and percutaneous nephrolithotomy     COPD (chronic obstructive pulmonary disease) (H)      Depression 1979    Hospitalized 1979     Diabetes (H)      Elevated troponin 11/27/2020     Encephalopathy 09/03/2018     Heart disease      Left hand weakness 12/14/2009    Afterbypass surgery, CVA, improved.      PNEUMONIA, ORGANISM NOS 02/14/2008    CT 2/08-  Pulmonary atelectasis and infiltrate in the posterior left lower lung base.      Sleep apnea        Past Surgical History   I have reviewed this patient's surgical history and updated it with pertinent information if needed.  Past Surgical History:   Procedure Laterality Date     C ANESTH,DX ARTHROSCOPIC PROC KNEE JOINT  1994, 1998     Left     CARDIAC SURGERY  11/09    CABG x2 - LIMA-D1, SVG to OM1, OM2.     GENITOURINARY SURGERY  1990    ESWL and percutaneous nephrolithotomy     LAPAROSCOPIC CHOLECYSTECTOMY N/A 3/4/2019    Procedure: LAPAROSCOPIC CHOLECYSTECTOMY;  Surgeon: Burt Mendieta MD;  Location: [facility] OR     ORTHOPEDIC SURGERY  8/12    right CTR     SURGICAL HISTORY OF -   1998    Hernia repair     SURGICAL HISTORY OF -   2004    NormalColonoscopy      SURGICAL HISTORY OF -   2006    Normal Colonoscopy       Social History   I have reviewed this patient's social history and updated it with pertinent information if needed.  Social History     Tobacco Use     Smoking status: Never Smoker     Smokeless tobacco: Never Used   Substance Use Topics     Alcohol use: No     Alcohol/week: 0.0 standard drinks     Drug use: No       Family History   I have reviewed this patient's family history and updated it with pertinent information if needed.  Family History   Problem Relation Age of Onset     C.A.D. Father         40s     Hypertension Father      C.A.D. Paternal Grandfather      Heart Disease Paternal Grandfather      Diabetes Brother      C.A.D. Brother      Heart Disease " Brother      Hypertension Brother      Gastrointestinal Disease Son         Crohn's disease     Gastrointestinal Disease Other         2 grandaughters with Crohn's disease     Cancer - colorectal No family hx of        Prior to Admission Medications   Prior to Admission Medications   Prescriptions Last Dose Informant Patient Reported? Taking?   NOVOLIN 70/30 FLEXPEN RELION susp   No No   Sig: INJECT 22 UNITS SUBCUTANEOUSLY 30 MINUTES BEFORE BREAKFAST AND 22 UNITS 30 MINUTES BEFORE DINNER   Patient taking differently: 20 in am and 21 in pm   TRELEGY ELLIPTA 100-62.5-25 MCG/INH oral inhaler   Yes No   Sig: Inhale 1 puff into the lungs daily   albuterol (PROAIR HFA/PROVENTIL HFA/VENTOLIN HFA) 108 (90 Base) MCG/ACT inhaler   No No   Sig: Inhale 2 puffs into the lungs every 6 hours as needed for shortness of breath / dyspnea or wheezing   albuterol (PROVENTIL) (2.5 MG/3ML) 0.083% neb solution   Yes No   Sig: Take 3 mLs by nebulization every 6 hours as needed   apixaban ANTICOAGULANT (ELIQUIS) 5 MG tablet   No No   Sig: Take 1 tablet (5 mg) by mouth 2 times daily   aspirin (ASA) 325 MG EC tablet   Yes No   Sig: Take 325 mg by mouth daily   atorvastatin (LIPITOR) 80 MG tablet   No No   Sig: Take 1 tablet by mouth once daily   Patient taking differently: Take 80 mg by mouth daily    blood glucose (NO BRAND SPECIFIED) test strip   No No   Sig: Use to test blood sugar 3 times daily   blood glucose monitoring (NO BRAND SPECIFIED) meter device kit   No No   Sig: Use to test blood sugar 3 times daily or as directed.   cholecalciferol 2000 UNITS tablet  Self No No   Sig: Take 1 tablet by mouth daily.   Patient taking differently: Take 1 tablet by mouth every evening    cyanocobalamin (BL VITAMIN B-12) 500 MCG TABS  Self No No   Sig: Take 500 mcg by mouth daily.   Patient taking differently: Take 2,500 mcg by mouth daily    dexamethasone (DECADRON) 6 MG tablet   No No   Sig: Take 1 tablet (6 mg) by mouth daily   finasteride  (PROSCAR) 5 MG tablet   No No   Sig: Take 1 tablet by mouth once daily   Patient taking differently: Take 5 mg by mouth daily    furosemide (LASIX) 20 MG tablet   No No   Sig: Take 1 tablet (20 mg) by mouth daily   insulin pen needle (BD ABRAM U/F) 32G X 4 MM miscellaneous   No No   Sig: Use 2 daily as directed.   lisinopril (ZESTRIL) 10 MG tablet   No No   Sig: Take 1 tablet (10 mg) by mouth daily   metFORMIN (GLUCOPHAGE) 1000 MG tablet   No No   Sig: TAKE 1 TABLET BY MOUTH TWICE DAILY WITH MEALS   Patient taking differently: Take 1,000 mg by mouth 2 times daily (with meals)    metoprolol tartrate (LOPRESSOR) 25 MG tablet   No No   Sig: Take 1 tablet by mouth twice daily   Patient taking differently: Take 25 mg by mouth 2 times daily    nitroGLYcerin (NITROSTAT) 0.4 MG sublingual tablet   No No   Sig: Place 1 tablet (0.4 mg) under the tongue every 5 minutes as needed for chest pain (CALL 911 IF NOT IMPROVED AFTER THREE CONSECUTIVE DOSES)   order for DME  Self Yes No   Sig: Equipment being ordered: CPAP  AIRSENSE 10  11-15 CM H2O  QUATTRO AIR SIZE MED  SN# [id number]  DN# [id number]   ranitidine (RANITIDINE 150 MAX STRENGTH) 150 MG tablet   No No   Sig: Take 1 tablet (150 mg) by mouth 2 times daily   tamsulosin (FLOMAX) 0.4 MG capsule   No No   Sig: TAKE 1 CAPSULE BY MOUTH ONCE DAILY . APPOINTMENT REQUIRED FOR FUTURE REFILLS   Patient taking differently: Take 0.4 mg by mouth daily       Facility-Administered Medications: None     Allergies   Allergies   Allergen Reactions     Prednisone Other (See Comments)     Severe interaction with DM       Physical Exam   Vital Signs: Temp: 97.8  F (36.6  C) Temp src: Oral BP: (!) 164/71 Pulse: 97   Resp: (!) 32 SpO2: 93 % O2 Device: Nasal cannula Oxygen Delivery: 2 LPM  Weight: 188 lbs .84 oz    SpO2 Readings from Last 4 Encounters:   12/02/20 93%   11/30/20 91%   08/10/20 97%   08/05/20 94%       GENERAL APPEARANCE: alert and no distress, fatigued  EYES: Eyes grossly normal to  inspection  NECK: generous size. No JVD appreciated  CV: ?regular rate and rhythm   LUNGS: bilateral rales, no wheeze, coughing  NEURO: mentation intact, speech normal and cranial nerves 2-12 appear intact  PSYCH: affect flat  EXT: Trace to 1+ edema      Data   Data reviewed today: I reviewed all medications, new labs and imaging results over the last 24 hours. I personally reviewed the EKG tracing showing afib with RBBB.    Component      Latest Ref Rng & Units 12/2/2020   Troponin I ES      0.000 - 0.045 ug/L 0.043   Procalcitonin      ng/ml 0.17   CRP Inflammation      0.0 - 8.0 mg/L 75.8 (H)   Ferritin      26 - 388 ng/mL 583 (H)   N-Terminal Pro BNP Inpatient      0 - 1,800 pg/mL 2,274 (H)   INR      0.86 - 1.14 1.39 (H)   PTT      22 - 37 sec 32       CMP  Recent Labs   Lab 12/02/20  1015 11/30/20  0621 11/29/20  0448 11/28/20  0652 11/26/20  1831 11/26/20  1831    139 138 137   < > 134   POTASSIUM 4.9 4.0 4.1 4.3   < > 4.0   CHLORIDE 103 107 107 105   < > 99   CO2 27 28 24 26   < > 27   ANIONGAP 5 4 7 6   < > 8   * 142* 128* 236*   < > 256*   BUN 39* 50* 47* 38*   < > 29   CR 1.28* 1.38* 1.37* 1.36*   < > 1.40*   GFRESTIMATED 52* 47* 48* 48*   < > 47*   GFRESTBLACK 60* 55* 56* 56*   < > 54*   EVELINA 8.8 7.7* 7.5* 7.8*   < > 8.7   PROTTOTAL 6.8  --   --   --   --  6.9  7.0   ALBUMIN 3.0*  --   --   --   --  3.4  3.5   BILITOTAL 1.2  --   --   --   --  0.8  0.9   ALKPHOS 94  --   --   --   --  77  81   AST 25  --   --   --   --  26  29   ALT 36  --   --   --   --  28  29    < > = values in this interval not displayed.     CBC  Recent Labs   Lab 12/02/20  1015 11/30/20  0621 11/29/20  0448 11/28/20  0652   WBC 11.5* 10.6 8.8 7.1   RBC 4.63 4.42 4.31* 4.68   HGB 13.8 13.2* 12.9* 14.2   HCT 41.6 39.0* 37.8* 42.0   MCV 90 88 88 90   MCH 29.8 29.9 29.9 30.3   MCHC 33.2 33.8 34.1 33.8   RDW 14.3 14.0 13.9 13.8    135* 112* 89*     INR  Recent Labs   Lab 12/02/20  1014 11/30/20  0621  11/29/20  0448 11/28/20  0652   INR 1.39* 1.18* 1.19* 1.01     Venous Blood Gas  Recent Labs   Lab 12/02/20  1455 12/02/20  1015 11/26/20  2200   PHV 7.41 7.38 7.38   PCO2V 27* 50 44   PO2V 42 23* 42   HCO3V 17* 29* 26   KELLIE  --  2.9 0.9   O2PER 2 LITERS 2 liters 28%     Results for orders placed or performed during the hospital encounter of 12/02/20   CT Chest Pulmonary Embolism w Contrast    Narrative    CT CHEST PULMONARY EMBOLISM WITH CONTRAST December 2, 2020 11:24 AM    CLINICAL HISTORY: Acute respiratory failure with hypoxia. COVID-19  illness.    TECHNIQUE: CT angiogram chest during arterial phase injection IV  contrast. 2D and 3D MIP reconstructions were performed by the CT  technologist. Dose reduction techniques were used.     CONTRAST: 81 mL Isovue 370.    COMPARISON: None.    FINDINGS:  ANGIOGRAM CHEST: Pulmonary arteries are normal caliber and negative  for pulmonary emboli. Thoracic aorta is negative for dissection.  Atherosclerotic calcification of the thoracic aorta and coronary  arteries.    LUNGS AND PLEURA: No pleural effusions. Patchy ground-glass opacities  in both lungs may be infectious in etiology. Small areas of  consolidation are noted at both lung bases, and could be related to  atelectasis or infection. No pneumothorax.    MEDIASTINUM/AXILLAE: Mildly enlarged subcarinal lymph node measures  2.3 x 1.4 cm. No other enlarged lymph nodes are identified in the  chest. Scattered smaller mediastinal lymph nodes are visible, but do  not meet size criteria for pathologic enlargement. No pericardial  effusion. Prior midline sternotomy.    UPPER ABDOMEN: Prior cholecystectomy. Scattered small calcified  granulomas in the spleen. Limited views of the upper abdomen are  otherwise unremarkable.    MUSCULOSKELETAL: Unremarkable.      Impression    IMPRESSION:  1.  No evidence for pulmonary embolism or thoracic aortic dissection.  2.  Patchy ground-glass opacities are noted in both lungs, with areas  of  mild consolidation at both lung bases. Commonly reported imaging  features of COVID-19 pneumonia are present. Influenza pneumonia and  organizing pneumonia, as can be seen in the setting of drug toxicity  and connective tissue disease, could cause a similar imaging pattern.  3.  A mildly enlarged subcarinal lymph node in the mediastinum is  nonspecific.    ARIELA CHAIREZ MD   CT Head w/o Contrast    Narrative    CT SCAN OF THE HEAD WITHOUT CONTRAST December 2, 2020 11:22 AM     HISTORY: Altered mental status.    TECHNIQUE: Axial images of the head and coronal reformations without  IV contrast material. Radiation dose for this scan was reduced using  automated exposure control, adjustment of the mA and/or kV according  to patient size, or iterative reconstruction technique.    COMPARISON: Head CT 11/26/2020.    FINDINGS: Images are degraded by motion artifact, particularly within  the mid aspect of the brain. No definite evidence of acute  intracranial hemorrhage. No mass effect or midline shift. Mild diffuse  parenchymal volume loss. Patchy periventricular white matter  hypodensities are nonspecific, but likely related to chronic  microvascular ischemic disease. Ventricular size appears to be grossly  within normal limits.    The visualized portions of the sinuses and mastoids appear normal.       Impression    IMPRESSION:     1. Images are degraded by motion artifact particularly within the  midbrain. This limits evaluation.  2. No definite acute intracranial pathology noting limitations by  motion. CT angiogram to follow.     COTY GOODE MD   CTA Head with Contrast    Narrative    CT ANGIOGRAM OF THE HEAD WITH CONTRAST December 2, 2020 11:23 AM     HISTORY: Altered mental status, chronic anticoagulation therapy.    TECHNIQUE: CT angiography with an injection of 70 mL Isovue 370 IV  with scans through the head. Images were transferred to a separate 3-D  workstation where multiplanar reformations and 3-D images  were  created. Radiation dose for this scan was reduced using automated  exposure control, adjustment of the mA and/or kV according to patient  size, or iterative reconstruction technique.    COMPARISON: CTA 8/5/2020.     CT HEAD FINDINGS: No contrast enhancing lesions. Cerebral blood flow  is grossly normal.     CT ANGIOGRAM HEAD FINDINGS: The major intracranial arteries including  the proximal branches of the anterior cerebral, middle cerebral, and  posterior cerebral arteries appear patent without vascular cutoff. No  aneurysm identified. No significant stenosis. Venous circulation is  unremarkable.     The P1 segment of the right posterior cerebral artery is not  visualized and is likely hypoplastic or congenitally absent. The right  posterior cerebral artery is supplied by the patent right posterior  communicating artery.      Impression    IMPRESSION: Patent proximal major intracranial arteries without  vascular cutoff. No significant intracranial stenosis or aneurysm.         COTY GOODE MD     *Note: Due to a large number of results and/or encounters for the requested time period, some results have not been displayed. A complete set of results can be found in Results Review.

## 2020-12-03 NOTE — PROGRESS NOTES
Patient is alert with confusion. Up out of bed without assistance. Will not wait for staff to assist. Keeps asking for medications and sleeping pills. Keeps asking to see his son. Explained son is at home sleeping. Reoriented to place and time.

## 2020-12-03 NOTE — PROGRESS NOTES
Antimicrobial Stewardship Team Note    Antimicrobial Stewardship Program - A joint venture between [facility] and  Physicians to optimize antibiotic management.  NOT a formal consult - Restricted Antimicrobial Review     Patient: Alvaro Horton  MRN: [id number]  Allergies: Prednisone    Brief Summary: Franklin Horton is an 80 year old male with PMHx significant for COPD and Baker's lung, atrial fibrillation (on Eliquis), chronic ischemic heart disease s/p CABG (2009), HLD, HTN, CVA, T2DM (11/27 Hgb A1c 8.0), early onset Alzheimer's dementia, and recently diagnosed COVID-19 (+11/26) who was admitted yesterday (12/2) with worsening dyspnea and shortness of breath refractory to home supplemental oxygen (2L NC).    History of Present Illness: Patient was hospitalized from 11/26 to 11/30 for generalized weakness, fatigue, and cough for 1 day prior to admission where he was found to be COVID-19 positive. 11/26 CXR showed hypoaerated lungs, but otherwise clear. He lives with his son, who noted patient's appetite decreased. His oxygen saturations were 84% on room air on presentation and was febrile (Tmax 101.6). He received three doses of remdesivir  (11/27-11/29) and started a 10-day dexamethasone course on 11/27 (to end 12/6). He clinically improved and was discharged home with supplemental oxygen.      Son reported that Franklin was having increased confusion, not always keeping his oxygen on, and worsened dyspnea since being discharged. Of note, son has also tested positive for COVID-19 and having symptoms, making it difficult to care for his father. Physical exam documentation noted patient to be in acute distress, ill-appearing. He was disoriented, with tachypnea, accessory muscle usage, and respiratory distress. In the ED, the patient desaturated into the low 80s on room air with minimal exertion while being settled by EMS. Once on 2L NC, saturations improved to the low 90s. Social work attempted  to see if patient could be admitted to a TCU, but unfortunately patient could not be transferred to this type of facility and subsequently admitted to the hospital. Chest CT with bilateral patchy groundglass opacities. Head CTA with no significant intracranial stenosis or aneurysm. Head CT w/out contrast with no definitive intracranial pathology. Labs included a mild leukocytosis (WBC 11.5, was 10.6 on discharge), elevated CRP (up to 75.8 from 33.0), BNP 2274, procalcitonin 0.17 (down to <0.05 today), and hyperglycemia. He was started on remdesivir last night.           Active Anti-infective Medications   (From admission, onward)                 Start     Stop    12/03/20 1700  remdesivir 100 mg IV soln  100 mg,   Intravenous,   500 mL/hr,   EVERY 24 HOURS     COVID-19        12/07/20 1659                  Assessment: COVID-19 pneumonia   Patient has been afebrile and vitally stable since readmission. He remains on 2L NC with oxygen saturations in the low 90s. Suspect patient is out of the acute viral phase associated with COVID-19 with symptom onset being ~9 days ago and overall viral load being lower with previous receipt of remdesivir. It also appears that patient is not acutely worse in comparison to clinical status at time of discharge on 11/30 with his home situation being tough as son also has symptomatic COVID-19. There is no evidence supporting the additional/repeat remdesivir courses to target COVID-19 pneumonia. With viral load presumably being lower and clinically patient appearing stable, ultimately question the utility of remdesivir in this scenario and recommend stopping. With repeat loading dose administered last night, patient has arguably received a 5-day total course. If it is felt that patient would benefit from remdesivir, the ID Advice Line needs to be utilized with appropriate documentation of ID provider approval.     Recommendations:  Stop remdesivir    Discussed with ID Staff Yolis Lawson  MD Amira Alfaro, PharmD, BCIDP  Pager: [phone]    Vital Signs/Clinical Features:  Vitals         12/01 0700  -  12/02 0659 12/02 0700  -  12/03 0659 12/03 0700  -  12/03 1220   Most Recent    Temp ( F)   97.3 -  97.8       --  Comment: eating    Pulse   64 -  100      66     66  Comment: 59-81 fluctuating    Resp   18 -  32      20     20    BP   123/105 -  169/87      129/56     129/56    SpO2 (%)   91 -  96      91     91            Labs  Estimated Creatinine Clearance: 45.3 mL/min (A) (based on SCr of 1.34 mg/dL (H)).  Recent Labs   Lab Test 11/27/20  0801 11/28/20  0652 11/29/20 0448 11/30/20  0621 12/02/20  1015 12/03/20  0425   CR 1.17 1.36* 1.37* 1.38* 1.28* 1.34*       Recent Labs   Lab Test 11/26/20  1831 11/27/20  0801 11/28/20  0652 11/29/20  0448 11/30/20  0621 12/02/20  1015 12/03/20  0425   WBC 6.4 6.7 7.1 8.8 10.6 11.5* 9.4   ANEU 4.7  --  5.5 6.9 8.4* 8.9* 8.1   ALYM 0.8  --  1.0 1.1 1.2 1.1 0.6*   DAVID 0.8  --  0.6 0.7 1.0 1.4* 0.5   AEOS 0.0  --  0.0 0.0 0.0 0.0 0.0   HGB 14.1 13.4 14.2 12.9* 13.2* 13.8 12.0*   HCT 41.7 38.9* 42.0 37.8* 39.0* 41.6 36.3*   MCV 89 88 90 88 88 90 89   * 95* 89* 112* 135* 176 202       Recent Labs   Lab Test 02/26/20  2239 08/04/20  1550 08/05/20  1135 11/26/20  1831 12/02/20  1015 12/03/20  0425   BILITOTAL 0.5 0.6 0.5 0.8  0.9 1.2 1.0   ALKPHOS 125 98 82 77  81 94 89   ALBUMIN 3.8 3.5 3.2* 3.4  3.5 3.0* 2.5*   AST 18 14 15 26  29 25 16   ALT 30 27 23 28  29 36 31       Recent Labs   Lab Test 11/09/16  0635 11/09/16  0635 09/03/18 2000 03/02/19  1528 03/02/19  1936 03/02/19  2302 11/26/20  1832 11/26/20  1832 11/27/20  0801 11/28/20  0652 11/29/20  0448 11/30/20  0621 12/02/20  1015 12/03/20  0425   PCAL  --   --   --   --   --   --   --   --   --   --   --   --  0.17 <0.05   LACT 1.8  --  1.5 2.7* 2.8* 1.6 1.5  --   --   --   --   --   --   --    CRP <2.9   < >  --   --   --   --   --    < > 45.7* 81.0* 59.2* 33.0* 75.8* 81.3*    < > = values  in this interval not displayed.             Culture Results:  7-Day Micro Results       Procedure Component Value Units Date/Time    Influenza A/B antigen [[id number]] Collected: 20 0005    Order Status: Completed Lab Status: Edited Result - FINAL Updated: 20 0048    Specimen: Nasopharyngeal      Influenza A/B Agn Specimen Nasopharyngeal     Influenza A Negative     Influenza B Negative     Comment: Test results must be correlated with clinical data. If necessary, results   should be confirmed by a molecular assay or viral culture.                 Recent Labs   Lab Test 14  1641 16  0630 18  1800 19  1601 20  2237   URINEPH 5.0 5.5 7.0 5.0 5.0   NITRITE Negative Negative Negative Negative Negative   LEUKEST Negative Negative Negative Negative Negative   WBCU  --   --  1 2 1                   Recent Labs   Lab Test 17  1019   CDBPCT Negative  Negative: Clostridium difficile target DNA sequences NOT detected, presumed   negative for Clostridium difficile toxin B or the number of bacteria present   may be below the limit of detection for the test.   FDA approved assay performed using [employer] GeneXpert real-time PCR.   A negative result does not exclude actual disease due to Clostridium difficile   and may be due to improper collection, handling and storage of the specimen or   the number of organisms in the specimen is below the detection limit of the   assay.         Imaging: Echocardiogram Complete    Result Date: 2020  [id number]^ROBERT^DUYEN    [facility] [address] [place] [address]   Name: ABIGAIL LOVELL MRN: [id number] : 1940 Study Date: 2020 08:11 AM Age: 80 yrs Gender: Male Patient Location: [facility] Reason For Study: Atrial Fibrillation Ordering Physician: DUYEN JONES Referring Physician: Fallon Carreno Performed By: Michelle Ambriz RDCS  BSA: 2.0 m2  Height: 68 in Weight: 180 lb HR: 65 BP: 134/70 mmHg _____________________________________________________________________________ __   Procedure Complete Portable Echo Adult. Optison (NDC #8512-0407) given intravenously. Complete Portable Bubble Echo Adult. _____________________________________________________________________________ __   Interpretation Summary  The left ventricle is normal in size. Left ventricular systolic function is normal. The visual ejection fraction is estimated at 60-65%. No regional wall motion abnormalities noted. Paradoxical septal motion is consistent with right ventricular volume overload. The right ventricle is moderately dilated. Moderately decreased right ventricular systolic function Right ventricular systolic pressure is elevated, consistent with moderate pulmonary hypertension.  Compared to the prior study, the RVSP is higher, RV dysfunction is now present and afib is new. The study was technically difficult. _____________________________________________________________________________ __   Left Ventricle The left ventricle is normal in size. There is normal left ventricular wall thickness. Left ventricular systolic function is normal. The visual ejection fraction is estimated at 60-65%. Diastolic Doppler findings (E/E' ratio and/or other parameters) suggest left ventricular filling pressures are indeterminate. No regional wall motion abnormalities noted. Paradoxical septal motion is consistent with right ventricular volume overload.  Right Ventricle The right ventricle is moderately dilated. Moderately decreased right ventricular systolic function.  Atria The left atrium is moderately dilated. The right atrium is moderately dilated. Intact atrial septum.  Mitral Valve The mitral valve is normal in structure and function. There is trace mitral regurgitation.   Tricuspid Valve The tricuspid valve is normal in structure and function. There is trace tricuspid regurgitation. The right  ventricular systolic pressure is approximated at 41mmHg plus the right atrial pressure. Right ventricular systolic pressure is elevated, consistent with moderate pulmonary hypertension.  Aortic Valve The aortic valve is normal in structure and function. No aortic regurgitation is present. No aortic stenosis is present.  Pulmonic Valve The pulmonic valve is not well seen, but is grossly normal. There is trace pulmonic valvular regurgitation.  Vessels The aortic root is normal size.  Pericardium There is no pericardial effusion.   Rhythm The rhythm was atrial fibrillation. _____________________________________________________________________________ __ MMode/2D Measurements & Calculations IVSd: 0.96 cm  LVIDd: 5.3 cm LVIDs: 3.1 cm LVPWd: 1.0 cm FS: 41.3 % LV mass(C)d: 200.3 grams LV mass(C)dI: 102.5 grams/m2 Ao root diam: 3.7 cm LA dimension: 4.7 cm LA/Ao: 1.3 LA Volume (BP): 86.0 ml LA Volume Index (BP): 44.1 ml/m2 RWT: 0.39    Doppler Measurements & Calculations MV E max dariusz: 107.8 cm/sec MV dec time: 0.18 sec TR max dariusz: 257.6 cm/sec TR max P.9 mmHg RVSP(TR): 36.9 mmHg E/E' av.3 Lateral E/e': 9.8 Medial E/e': 12.8    _____________________________________________________________________________ __    Report approved by: Neela Bear 2020 10:43 AM      Xr Chest Port 1 View    Result Date: 2020  XR PORTABLE CHEST ONE VIEW   2020 7:34 PM HISTORY: Cough, possible COVID. COMPARISON: Chest x-ray 2020.     IMPRESSION: Portable chest. Lungs are hypoaerated but otherwise clear. Heart is normal in size. No pneumothorax. No definite pleural effusions. Prior CABG. HARRY SANDRA MD    Cta Head With Contrast    Result Date: 2020  CT ANGIOGRAM OF THE HEAD WITH CONTRAST 2020 11:23 AM HISTORY: Altered mental status, chronic anticoagulation therapy. TECHNIQUE: CT angiography with an injection of 70 mL Isovue 370 IV with scans through the head. Images were transferred to a separate  3-D workstation where multiplanar reformations and 3-D images were created. Radiation dose for this scan was reduced using automated exposure control, adjustment of the mA and/or kV according to patient size, or iterative reconstruction technique. COMPARISON: CTA 8/5/2020. CT HEAD FINDINGS: No contrast enhancing lesions. Cerebral blood flow is grossly normal. CT ANGIOGRAM HEAD FINDINGS: The major intracranial arteries including the proximal branches of the anterior cerebral, middle cerebral, and posterior cerebral arteries appear patent without vascular cutoff. No aneurysm identified. No significant stenosis. Venous circulation is unremarkable. The P1 segment of the right posterior cerebral artery is not visualized and is likely hypoplastic or congenitally absent. The right posterior cerebral artery is supplied by the patent right posterior communicating artery.     IMPRESSION: Patent proximal major intracranial arteries without vascular cutoff. No significant intracranial stenosis or aneurysm.  COTY GOODE MD    Ct Chest Pulmonary Embolism W Contrast    Result Date: 12/2/2020  CT CHEST PULMONARY EMBOLISM WITH CONTRAST December 2, 2020 11:24 AM CLINICAL HISTORY: Acute respiratory failure with hypoxia. COVID-19 illness. TECHNIQUE: CT angiogram chest during arterial phase injection IV contrast. 2D and 3D MIP reconstructions were performed by the CT technologist. Dose reduction techniques were used. CONTRAST: 81 mL Isovue 370. COMPARISON: None. FINDINGS: ANGIOGRAM CHEST: Pulmonary arteries are normal caliber and negative for pulmonary emboli. Thoracic aorta is negative for dissection. Atherosclerotic calcification of the thoracic aorta and coronary arteries. LUNGS AND PLEURA: No pleural effusions. Patchy ground-glass opacities in both lungs may be infectious in etiology. Small areas of consolidation are noted at both lung bases, and could be related to atelectasis or infection. No pneumothorax. MEDIASTINUM/AXILLAE:  Mildly enlarged subcarinal lymph node measures 2.3 x 1.4 cm. No other enlarged lymph nodes are identified in the chest. Scattered smaller mediastinal lymph nodes are visible, but do not meet size criteria for pathologic enlargement. No pericardial effusion. Prior midline sternotomy. UPPER ABDOMEN: Prior cholecystectomy. Scattered small calcified granulomas in the spleen. Limited views of the upper abdomen are otherwise unremarkable. MUSCULOSKELETAL: Unremarkable.     IMPRESSION: 1.  No evidence for pulmonary embolism or thoracic aortic dissection. 2.  Patchy ground-glass opacities are noted in both lungs, with areas of mild consolidation at both lung bases. Commonly reported imaging features of COVID-19 pneumonia are present. Influenza pneumonia and organizing pneumonia, as can be seen in the setting of drug toxicity and connective tissue disease, could cause a similar imaging pattern. 3.  A mildly enlarged subcarinal lymph node in the mediastinum is nonspecific. ARIELA CHAIREZ MD    Ct Head W/o Contrast    Result Date: 12/2/2020  CT SCAN OF THE HEAD WITHOUT CONTRAST December 2, 2020 11:22 AM HISTORY: Altered mental status. TECHNIQUE: Axial images of the head and coronal reformations without IV contrast material. Radiation dose for this scan was reduced using automated exposure control, adjustment of the mA and/or kV according to patient size, or iterative reconstruction technique. COMPARISON: Head CT 11/26/2020. FINDINGS: Images are degraded by motion artifact, particularly within the mid aspect of the brain. No definite evidence of acute intracranial hemorrhage. No mass effect or midline shift. Mild diffuse parenchymal volume loss. Patchy periventricular white matter hypodensities are nonspecific, but likely related to chronic microvascular ischemic disease. Ventricular size appears to be grossly within normal limits. The visualized portions of the sinuses and mastoids appear normal.     IMPRESSION:   1. Images  are degraded by motion artifact particularly within the midbrain. This limits evaluation. 2. No definite acute intracranial pathology noting limitations by motion. CT angiogram to follow. COTY GOODE MD    Ct Head W/o Contrast    Result Date: 11/26/2020  EXAM: CT HEAD W/O CONTRAST LOCATION: [facility] DATE/TIME: 11/26/2020 9:09 PM INDICATION: Altered level of consciousness (LOC), unexplained COMPARISON: Head CT 09/03/2018 TECHNIQUE: Routine CT Head without IV contrast. Multiplanar reformats. Dose reduction techniques were used. FINDINGS: INTRACRANIAL CONTENTS: No intracranial hemorrhage, extraaxial collection, or mass effect.  No CT evidence of acute infarct. Mild presumed chronic small vessel ischemic changes. Mild generalized volume loss. No hydrocephalus. VISUALIZED ORBITS/SINUSES/MASTOIDS: No intraorbital abnormality. No paranasal sinus mucosal disease. No middle ear or mastoid effusion. BONES/SOFT TISSUES: No acute abnormality.     IMPRESSION: 1.  No CT evidence for acute intracranial process. 2.  Brain atrophy and presumed chronic microvascular ischemic changes as above.

## 2020-12-03 NOTE — PLAN OF CARE
[facility] [place]    Patient admitted to room 2211 at approximately 1745 via cart from emergency room. Patient was accompanied by transport tech.     Verbal SBAR report received from ED RN prior to patient arrival.     Patient ambulated to bed with one assist. Patient alert and oriented X 2. Pain is controlled without any medications.  . Admission vital signs: Blood pressure (!) 164/71, pulse 97, temperature 97.8  F (36.6  C), temperature source Oral, resp. rate (!) 32, weight 85.3 kg (188 lb 0.8 oz), SpO2 93 %. Patient was oriented to plan of care, call light, bed controls, tv, telephone, bathroom, and visiting hours.     Risk Assessment    The following safety risks were identified during admission: fall. Yellow risk band applied: YES.     Skin Initial Assessment    This writer admitted this patient and completed a full skin assessment and Ricardo score in the Adult PCS flowsheet. Appropriate interventions initiated as needed.     Ricardo Risk Assessment  Sensory Perception: 3-->slightly limited  Moisture: 3-->occasionally moist  Activity: 3-->walks occasionally  Mobility: 3-->slightly limited  Nutrition: 3-->adequate  Friction and Shear: 3-->no apparent problem  Ricardo Score: 18  Bed Support Surface: Atmos Air mattress  Reassessed using Bed Algorithm: No  Ricardo Intervention(s) Implemented: draw sheets, encouraged fluids, heels suspended, HOB elevated 30 degrees or less, patient /family education on pressure injury prevention, pillows between bony prominences    Scab on second toe on right foot.     Education    Patient has a [place] to Observation order: Yes  Observation education completed and documented: Yes      Krystal Bob RN

## 2020-12-03 NOTE — PROGRESS NOTES
Patient is alert with confusion. Uses call light frequently. No acute distress observed. Respirations even and unlabored. No dyspnea up to bathroom. Continues on O2 via N/C 2LPM.

## 2020-12-03 NOTE — CONSULTS
Care Management Initial Consult    General Information  Assessment completed with: Patient, Jae  Type of CM/SW Visit: Initial Assessment  Primary Care Provider verified and updated as needed: Yes   Readmission within the last 30 days: other (see comments)   Return Category: Progression of disease  Reason for Consult: discharge planning  Advance Care Planning:            Communication Assessment  Patient's communication style: spoken language (English or Bilingual)    Hearing Difficulty or Deaf: yes   Wear Glasses or Blind: yes    Cognitive  Cognitive/Neuro/Behavioral: .WDL except  Level of Consciousness: alert;confused  Arousal Level: opens eyes spontaneously  Orientation: disoriented to;place;time;situation  Mood/Behavior: calm;cooperative  Best Language: 0 - No aphasia  Speech: slow;whispers    Living Environment:   People in home: child(katherin), adult  Jae  Current living Arrangements: house      Able to return to prior arrangements: yes       Family/Social Support:  Care provided by: self;child(katherin)  Provides care for: no one  Marital Status: Single  Children          Description of Support System: Supportive;Involved    Support Assessment: Adequate family and caregiver support    Current Resources:   Skilled Home Care Services: Skilled Nursing;Physicial Therapy  Community Resources: Home Care  Equipment currently used at home: cane, straight  Supplies currently used at home: Oxygen Tubing/Supplies    Employment/Financial:  Employment Status: retired        Financial Concerns: No concerns identified   Referral to Financial Counselor: No       Lifestyle & Psychosocial Needs:        Socioeconomic History     Marital status:      Spouse name: Not on file     Number of children: Not on file     Years of education: Not on file     Highest education level: Not on file   Occupational History     Occupation:      Employer: RETIRED     Occupation:      Tobacco Use     Smoking status: Never  "Smoker     Smokeless tobacco: Never Used   Substance and Sexual Activity     Alcohol use: No     Alcohol/week: 0.0 standard drinks     Drug use: No     Sexual activity: Never       Functional Status:  Prior to admission patient needed assistance:   Dependent ADLs:: Ambulation-cane  Dependent IADLs:: Independent  Assesssment of Functional Status: Not at  functional baseline;Not at baseline with ADL Functioning    Mental Health Status:  Mental Health Status: No Current Concerns       Chemical Dependency Status:  Chemical Dependency Status: No Current Concerns             Values/Beliefs:  Spiritual, Cultural Beliefs, Anabaptism Practices, Values that affect care: yes       Additional Information:  Attempted to reach patient, he is unwilling to speak on phone today per nursing.  Nursing will continue to request he speak with CTS to help assist with discharge planning.      Spoke with patient's son, Jae via phone.  Introduced self and role.  Jae currently ill with COVID, states he doesn't feel well and is unable to care for his dad in his current state.  Jae states his dad, at baseline is very active and functional.  He drives, attends [employer] frequently, independent with ADLs.  Jae is concerned about patent's cognition.  He states this is new since diagnosis and hospitalization.  Updated MD with patient's cognition changes.     Jae states there is no one to assist with cares for his dad at home.  Jae's sister just had surgery and his brother lives out of state and isn't returning his calls.  Discussed TCU.  Jae feels this is best to assist while they are both recovering.  Discussed need for [facility] contracted SNF that is accepting COVID patients will not be local.  Jae understands, but states his dad will be \"scared.\"      TCU referrals pending as below:    [facility] ( Main: [phone] Admissions: [phone] Fax: [phone])    [facility] [place] (Main Phone: " [phone] Admissons Phone: [phone] Fax: [phone])     [facility] (Admissions Phone: [phone] Main Phone: [phone] Fax: [phone])    [facility]) (Admissions: [phone] Main Phone: [phone] Fax: [phone])    [facility] TCU Phone: (Admissions: [phone] RN Report: [phone] Fax: [phone])     [facility] (Main Phone: [phone] Admissions Phone: [phone] Fax: [phone])    [facility] (Admission phone: [phone] Main Phone: [phone] Fax: [phone])    [facility] (Admissions Phone: [phone] Main Phone: [phone] Fax: [phone])    [facility] (phone:[phone] Fax:[phone]     [facility]    Escalated to Janae Camilo.    Referral placed to central intake @ [facility]        CTS to connect with patient when able to discuss discharge plan.      Rosa STEVENN RN  Inpatient Care Coordinator  [facility] [phone]  [facility] [phone]

## 2020-12-03 NOTE — PROGRESS NOTES
[facility] Hospitalist Progress Note           Assessment & Plan        Alvaro Horton is a 80 year old male admitted on 12/2/2020. He was initially admitted 11/26-11/30/20 with pneumonia and hypoxemic respiratory failure due to Covid 19 .Presented with weakness, fatigue, shortness of breath, cough and hypoxemia to 84% in ED. CXR revealed hypo aerated lungs but otherwise clear.   He was discharge home on O2 2LPM. He returns with persistent hypoxemia, dyspnea, confusion. Son is unable to take care of patient because he is also ill with Covid 19.         Acute respiratory failure with hypoxia due to 2019 novel coronavirus infection    Onset of symptoms: 11/25    Test positive: 11/26    Initially presented with weakness, fatigue, shortness of breath, cough and hypoxemia. Was hypoxemic to 84% in ED. Was placed on 2 L NC and has remained on this. CXR revealed hypo aerated lungs but otherwise clear.    Dexamethasone 6 mg daily started 11/27, discharged with dexamethasone daily - plan to continue through 12/5/20.    Remdesivir 11/27- 11/30/20     CT 12/2/2020 - Patchy ground-glass opacities are noted in both lungs, with areas of mild consolidation at both lung bases. Commonly reported imaging features of COVID-19 pneumonia are present. Procalcitonin on readmission 12/2/2020 0.17 and BNP 2,274.      Inflammatory markers (including last hospitalization)    CRP : 22.1 ->-> 81.0 (peak) ->-> 33.0 --> 75.8 12/2/2020     IL-6: 36.85    Ferritin 696 ---> 583 12/2/2020     LDH: 348 - 311 - 338    Fibrinogen 540 - 483 - 492 - 614 12/2/2020     CPK: 765    D-Dimer 1.4 - 2.0 - 1.2 - 0.7    Retic count 1.3 - 1.0 - 0.8   Currently on O2 by NC at 2 lpm and stable. Doubt secondary bacterial pneumonia  - Continue dexamethasone (day =   -Continue supplemental oxygen and wean as able but likely will still need 2L on discharge.    12/3/2020 -- inflammatory markers up slightly but respiratory status stable, continue with above  treatments.  Does appear to have encephalopathy as below.   Restarted Remdesivir (received 3 days in initial hospital stay), but per ID recommendations stopped 12/3.    Continue dexamethasone through 12/5.             Acute COVID-induced encephalopathy   Memory loss/Early onset Alzheimer dementia   Recent diagnosis per son, not on medications.  acutely worse with current infection consistent with acute encephalopathy due to COVID.   Was highly functional prior to infection, this appears to be a clear change from basleine.   Here has needed frequent redirection throughout admission so far - starting zyprexa 5mg daily at bedtime, continue to treat COVID as above.  I think this is very unlikely to be due to dexamethasone but only has 2 days left of this anyway.       Falls frequently  Generalized weakness  Son notes generalized weakness, patient was found down early am hours of 11/25.   - PT/OT/SW consults, likely needs TCU on discharge as below.       Suspect type 2 MI, demand ischemia  Moderate RV dilatation and decreased RV function  Moderate pulmonary hypertension   12/2/20 --  During last hospialization Troponin 0.044-->0.069-->0.107-->0.141-->0.216-->0.101-->0.047. Suspected due to a combination of atrial fibrillation, demand ischemia, and possible COVID-19 infection. Echo showed normal LV size and function, LVEF 60-65%, no RWMA, RV mod dilated and mod decreased systolic function, RV volume overload and mod pulmonary hypertension. Previous RV dilatation, decreased function and pulmonary hypertension were mild in May 2019. Appears to be progression of pulmonary hypertension and RV dysfunction likely due to underlying COPD.   BNP elevated, has mild edema, weight stable.   - Recheck Troponin 0.043  - Trial of Lasix 40mg IV x 1, reasess in AM  - Hold usual lasix   - I/o , daily weights  12/3/2020 -- troponin slightly up again, ECG unchanged, does not look like ACS.  Follow.  Weight down, creatinine stable, resuming  home lasix for now.       COPD (chronic obstructive pulmonary disease)  Asthma  Baker's Lung    Patient managed prior to admission with Trelegy Ellipta daily and as needed albuterol. PFTs in 04/2018 revealed severe airflow obstruction. Suspect this is baseline.  - Continue home therapies.      Atrial fibrillation   12/2/20 --   Noted in history as transient episode following CABG in 2009. Had  Seen sukh 3/2020 and had ziopatch - no a fib seen. EKG 8/2020 with NSR with long 1st degree AVB. Not on anticoagulation prior to last admission.  Does take metoprolol 25 mg twice daily. Takes 325 mg aspirin daily.     On previous admission, patient in atrial fibrillation, rate controlled. Likely exacerbated by acute illness but could be due to worsening RV dysfunction as well. CHADS-Vasc score 6. Does have falls at home. Started on Eliquis prior to discharge (also partly due to Covid 19)  - Telemetry  - Continue Eliquis  12/3/2020 -- rate controlled so far.       Type 2 diabetes mellitus with diabetic chronic kidney disease   12/2/20 -- A1c of 8.0. Patient managed prior to admission with  Metformin and NPH BID. Creatinine is stable.   Renal fx slightly worse. BS >250  - Hold usual NPH 70/30, metformin   - Start lantus 15, use medium insulin sliding scale until PO intake acertained  12/3/2020 -- running high, increased to novolog 3/3/3 with meals and high dose sliding scale insulin, increase lantus to 17u.       Obstructive sleep apnea  Patient uses CPAP at home, but son reports with illness and dementia he has not been compliant recently.      History of CVA (cerebrovascular accident)  CVA at time of CABG, 2009.    - Continue aspirin     CAD, s/p coronary bypass grafting in 11/2009 with LIMA to LAD and SVG to OM1 and 2  Elevated troponin    12/2/20 -- Follows with [facility], last seen at virtual visit in 03/2020.  Stress echo 06/2019 with EF >70%.   - Hold usual lasix.   - Continue aspirin  - Continue home Lisinopril 10,  metorolol 25 BID.  12/3/2020 -- troponin slightly up this AM, see discussion above,  Resumed home lasix as above.        Hypertension goal BP (blood pressure) < 140/90  12/2/20 -- Stable.  Continue home Lisinopril 10, metorolol 25 BID.  12/3/2020 -- stable.        Hypertrophy of prostate without urinary obstruction   - Continue prior to admission flomax and finasteride.     Hyperlipidemia LDL goal <70   - Continue prior to admission atorvastatin 80 mg daily.        Esophageal reflux   - Continue prior to admission ranitidine twice daily.           Discussed with son Jae on admission [phone]          Diet: Regular Diet Adult      DVT Prophylaxis: Eliqis 5 BID    Olivo Catheter: not present    Code Status: No CPR- Pre-arrest intubation OK  - Discussed with patient and son in separate conversations. Son wants to discuss DNR with sister, patient wants to discuss DNI with son.           Disposition  Admitted to inpatient - having ongoing oxygen needs for COVID with inflammatory markers actually up a bit and now apparent COVID-induced encephalopathy as above.  Anticipate discharge to home vs TCU depending on improvement and level of care available at home in 1-3 days depending on mental status with COVID and respiratory status.              Interval History:   No new concerns today.  On my evaluation patient doing pretty well, awakened from sleep and then alert and appropriate throughout conversation.  A bit slow to respond, but answered questions appropriately, oriented to person and to recently being Thanksgiving albeit not to actual date.  However, throughout the day and overnight has been restless and needed frequent redirection/reminders.  No aggressiveness.  Denies pain or dyspnea, but still needing 2LNC.                Review of Systems:    ROS: 10 point ROS neg other than the symptoms noted above in the HPI.           Medications:   Current active medications and PTA medications reviewed, see medication list  for details.            Physical Exam:   Vitals were reviewed  Patient Vitals for the past 24 hrs:   BP Temp Temp src Pulse Resp SpO2 Weight   20 0700 129/56 -- -- 66 20 91 % --   20 0537 (!) 169/87 97.3  F (36.3  C) Oral -- 18 91 % --   20 0226 139/70 97.8  F (36.6  C) Oral 64 18 91 % 82.7 kg (182 lb 5.1 oz)   20 0000 -- -- -- 67 -- -- --   20 2230 (!) 142/69 97.3  F (36.3  C) Axillary 82 24 91 % --   20 1942 (!) 146/55 97.8  F (36.6  C) Oral 90 22 91 % --   20 1940 -- -- -- 88 -- -- --   20 1746 (!) 164/71 -- -- 97 (!) 32 93 % 85.3 kg (188 lb 0.8 oz)   20 1600 (!) 160/84 -- -- 97 -- -- --   20 1450 -- 97.8  F (36.6  C) Oral -- -- -- --   20 1400 133/66 -- -- 89 -- -- --       Temperatures:  Current - Temp: (eating); Max - Temp  Av.6  F (36.4  C)  Min: 97.3  F (36.3  C)  Max: 97.8  F (36.6  C)  Respiration range: Resp  Av.3  Min: 18  Max: 32  Pulse range: Pulse  Av.2  Min: 64  Max: 97  Blood pressure range: Systolic (24hrs), Av , Min:129 , Max:169   ; Diastolic (24hrs), Av, Min:55, Max:87    Pulse oximetry range: SpO2  Av.3 %  Min: 91 %  Max: 93 %  I/O last 3 completed shifts:  In: -   Out: 1425 [Urine:1425]    Intake/Output Summary (Last 24 hours) at 12/3/2020 1320  Last data filed at 12/3/2020 0838  Gross per 24 hour   Intake 450 ml   Output 1425 ml   Net -975 ml     EXAM:  General: awake and alert, NAD, oriented x 2.5  Head: normocephalic  Neck: unremarkable, no lymphadenopathy   HEENT: oropharynx pink and moist    Heart: Regular rate and rhythm, no murmurs, rubs, or gallops  Lungs: clear to auscultation bilaterally with good air movement throughout  Abdomen: soft, non-tender, no masses or organomegaly  Extremities: no edema in lower extremities   Skin unremarkable.               Data:     Results for orders placed or performed during the hospital encounter of 20 (from the past 24 hour(s))   Blood gas venous    Result Value Ref Range    Ph Venous 7.41 7.32 - 7.43 pH    PCO2 Venous 27 (L) 40 - 50 mm Hg    PO2 Venous 42 25 - 47 mm Hg    Bicarbonate Venous 17 (L) 21 - 28 mmol/L    Base Deficit Venous 6.6 mmol/L    FIO2 2 LITERS    Glucose by meter   Result Value Ref Range    Glucose 447 (H) 70 - 99 mg/dL   Glucose by meter   Result Value Ref Range    Glucose 340 (H) 70 - 99 mg/dL   D dimer quantitative   Result Value Ref Range    D Dimer 1.2 (H) 0.0 - 0.50 ug/ml FEU   Lactate Dehydrogenase   Result Value Ref Range    Lactate Dehydrogenase 307 (H) 85 - 227 U/L   CRP inflammation   Result Value Ref Range    CRP Inflammation 81.3 (H) 0.0 - 8.0 mg/L   Procalcitonin   Result Value Ref Range    Procalcitonin <0.05 ng/ml   Fibrinogen activity   Result Value Ref Range    Fibrinogen 607 (H) 200 - 420 mg/dL   CK total   Result Value Ref Range    CK Total 211 30 - 300 U/L   Troponin I   Result Value Ref Range    Troponin I ES 0.085 (H) 0.000 - 0.045 ug/L   CBC with platelets differential   Result Value Ref Range    WBC 9.4 4.0 - 11.0 10e9/L    RBC Count 4.09 (L) 4.4 - 5.9 10e12/L    Hemoglobin 12.0 (L) 13.3 - 17.7 g/dL    Hematocrit 36.3 (L) 40.0 - 53.0 %    MCV 89 78 - 100 fl    MCH 29.3 26.5 - 33.0 pg    MCHC 33.1 31.5 - 36.5 g/dL    RDW 14.2 10.0 - 15.0 %    Platelet Count 202 150 - 450 10e9/L    Diff Method Automated Method     % Neutrophils 86.7 %    % Lymphocytes 6.0 %    % Monocytes 4.9 %    % Eosinophils 0.0 %    % Basophils 0.1 %    % Immature Granulocytes 2.3 %    Nucleated RBCs 0 0 /100    Absolute Neutrophil 8.1 1.6 - 8.3 10e9/L    Absolute Lymphocytes 0.6 (L) 0.8 - 5.3 10e9/L    Absolute Monocytes 0.5 0.0 - 1.3 10e9/L    Absolute Eosinophils 0.0 0.0 - 0.7 10e9/L    Absolute Basophils 0.0 0.0 - 0.2 10e9/L    Abs Immature Granulocytes 0.2 0 - 0.4 10e9/L    Absolute Nucleated RBC 0.0    Comprehensive metabolic panel   Result Value Ref Range    Sodium 136 133 - 144 mmol/L    Potassium 4.5 3.4 - 5.3 mmol/L    Chloride 103 94 -  109 mmol/L    Carbon Dioxide 26 20 - 32 mmol/L    Anion Gap 7 3 - 14 mmol/L    Glucose 410 (H) 70 - 99 mg/dL    Urea Nitrogen 34 (H) 7 - 30 mg/dL    Creatinine 1.34 (H) 0.66 - 1.25 mg/dL    GFR Estimate 49 (L) >60 mL/min/[1.73_m2]    GFR Estimate If Black 57 (L) >60 mL/min/[1.73_m2]    Calcium 7.6 (L) 8.5 - 10.1 mg/dL    Bilirubin Total 1.0 0.2 - 1.3 mg/dL    Albumin 2.5 (L) 3.4 - 5.0 g/dL    Protein Total 5.8 (L) 6.8 - 8.8 g/dL    Alkaline Phosphatase 89 40 - 150 U/L    ALT 31 0 - 70 U/L    AST 16 0 - 45 U/L     *Note: Due to a large number of results and/or encounters for the requested time period, some results have not been displayed. A complete set of results can be found in Results Review.           Attestation:  I have reviewed today's vital signs, notes, medications, labs and imaging.  Amount of time spent in direct patient care: 35 minutes.     Joseph Miller MD, MD

## 2020-12-04 NOTE — PROGRESS NOTES
"Care Management Follow Up    Length of Stay (days): 1    Expected Discharge Date: 12/05/20     Concerns to be Addressed: discharge planning     Patient plan of care discussed at interdisciplinary rounds: Yes    Anticipated Discharge Disposition: Skilled Nursing Facilty     Anticipated Discharge Services: None  Anticipated Discharge DME: Oxygen    Patient/family educated on Medicare website which has current facility and service quality ratings: yes  Education Provided on the Discharge Plan:  yes  Patient/Family in Agreement with the Plan:  yes    Referrals Placed by CM/SW: Post Acute Facilities  Private pay costs discussed: Not applicable    Additional Information:  Patient continues to be confused, not combative per nursing, improved but requiring 1:2 staffing due to impulsivity and confusion.  Spoke with patient's son, Jae via phone.  Discussed discharge planning.  Jae states he is still home ill with COVID.  Jae states he is unable to care for his dad in his state.  Jae feels TCU placement is appropriate and needed.  Jae is aware facility will NOT be local.     TCU referrals as below:      [facility] (phone:[phone] Fax:[phone] )--spoke with Traci in admissions.  They have potential bed availability this weekend.  Discussed patient status and cognition.  Per [place] unit is locked unit.  Patient would need to be stable with \"close observation or frequent checks.\"  Not able to accept while patient is needing 1:1 or 1:2 staffing.  Updated nursing.  CTS to connect with [place] over weekend.      [facility] ( Main: [phone] Admissions: [phone] Fax: [phone])--pending--left message with admission team     [facility] (Main Phone: [phone] Admissons Phone: [phone] Fax: [phone]) ---pending--left message with admission team     [facility] (Admissions Phone: [phone] Main " Phone: [phone] Fax: [phone])--pending--left message with admission team     [facility] (Admissions: [phone] Main Phone: [phone] Fax: [phone])--pending--left message with admission team     [facility]--[phone]--pending--left message with admission team     [facility] (Admissions Phone: [phone] Main Phone: [phone] Fax: [phone])--pending--left message with admission team    Referral placed to central intake @ [facility]--pending      Critical access hospital--pending    [facility] Phone: (Admissions: [phone] RN Report: [phone] Fax: [phone]) --declined-not taking COVID patients     [facility] (Main Phone: [phone] Admissions Phone: [phone] Fax: [phone])--declined due to COVID status    [facility] (Admission phone: [phone] Main Phone: [phone] Fax: [phone])--declined due to behaviors     Escalated to [place] repJanae.  ---no [facility] contracted with [facility] that accept COVID patients    Rosa STEVENN RN  Inpatient Care Coordinator  [facility] [phone]  [facility] [phone]

## 2020-12-04 NOTE — PROGRESS NOTES
"Patient woke up confused and started pulling his telemetry, pulse oximeter and n/c off. Patient combative hitting, kicking and yelling. Patient redirected and explained to him about his condition. Patient asked, \"why didn't anyone tell me?, This nurse explained that he just forgot.  Charge nurse called MD, new orders received. Seroquel given as ordered.   Patient is calm now with tele, O2 and pulse oximeter in place. Maintaining O2 >90%.  "

## 2020-12-04 NOTE — PLAN OF CARE
S:  Patient inpatient at [facility]    B:  Patient admitted for pneumonia second to positive COVID    A:  Vitals stable on 2ltr/O2  On tele, ICU did call and repot some marcia in the upper 40s, for a short amount of time, one incident  Up with stand by assist, (sometimes will ask for hep moving legs around, other times will get self up strong and walk to bathroom.  Oriented to self and place.  Disoriented to time and situation.  Has been cooperative overall; had once incident of getting a little agitated and starting to be more dissagreeable with cares, 25mg quetiapine administered at that time, patient was calm and cooperative again after the med administration took affect  Patient has been calling son Jae asking to go home    R:  Patient was calm and agreeable for 90% of the day shift, he spent most of that time in bed watching television  1:1 supervision continues    Yong Cook RN

## 2020-12-04 NOTE — PROGRESS NOTES
[facility] Hospitalist Progress Note           Assessment & Plan        Alvaro Horton is a 80 year old male admitted on 12/2/2020. He was initially admitted 11/26-11/30/20 with pneumonia and hypoxemic respiratory failure due to Covid 19 .Presented with weakness, fatigue, shortness of breath, cough and hypoxemia to 84% in ED. CXR revealed hypo aerated lungs but otherwise clear.   He was discharge home on O2 2LPM. He returns with persistent hypoxemia, dyspnea, confusion. Son is unable to take care of patient because he is also ill with Covid 19.         Acute respiratory failure with hypoxia due to 2019 novel coronavirus infection    Onset of symptoms: 11/25    Test positive: 11/26    Initially presented with weakness, fatigue, shortness of breath, cough and hypoxemia. Was hypoxemic to 84% in ED. Was placed on 2 L NC and has remained on this. CXR revealed hypo aerated lungs but otherwise clear.    Dexamethasone 6 mg daily started 11/27, discharged with dexamethasone daily - plan to continue through 12/5/20.    Remdesivir 11/27- 11/30/20     CT 12/2/2020 - Patchy ground-glass opacities are noted in both lungs, with areas of mild consolidation at both lung bases. Commonly reported imaging features of COVID-19 pneumonia are present. Procalcitonin on readmission 12/2/2020 0.17 and BNP 2,274.      Inflammatory markers (including last hospitalization)    CRP : 22.1 ->-> 81.0 (peak) ->-> 33.0 --> 75.8 12/2/2020     IL-6: 36.85    Ferritin 696 ---> 583 12/2/2020     LDH: 348 - 311 - 338    Fibrinogen 540 - 483 - 492 - 614 12/2/2020     CPK: 765    D-Dimer 1.4 - 2.0 - 1.2 - 0.7    Retic count 1.3 - 1.0 - 0.8   Currently on O2 by NC at 2 lpm and stable. Doubt secondary bacterial pneumonia  - Continue dexamethasone (day =   -Continue supplemental oxygen and wean as able but likely will still need 2L on discharge.    12/3/2020 -- inflammatory markers up slightly but respiratory status stable, continue with above  treatments.  Does appear to have encephalopathy as below.   Restarted Remdesivir (received 3 days in initial hospital stay), but per ID recommendations stopped 12/3.    12/4/2020 -- unchanged.  Wean oxygen as able.    Continue dexamethasone through 12/5.             Acute COVID-induced encephalopathy   Memory loss/Early onset Alzheimer dementia   12/2/20 - 12/3/20 -- Recent diagnosis per son, not on medications.  acutely worse with current infection consistent with acute encephalopathy due to COVID.   Was highly functional prior to infection, this appears to be a clear change from aleena.   Here has needed frequent redirection throughout admission so far - starting zyprexa 5mg daily at bedtime, continue to treat COVID as above.  I think this is very unlikely to be due to dexamethasone but only has 2 days left of this anyway.     12/4/2020 -- slightly improved.  Continue zyprexa  With prn seroquel.       Falls frequently  Generalized weakness  Son notes generalized weakness, patient was found down early am hours of 11/25.   - PT/OT/SW consults, likely needs TCU on discharge as below.       Suspect type 2 MI, demand ischemia  Moderate RV dilatation and decreased RV function  Moderate pulmonary hypertension   12/2/20 --  During last hospialization Troponin 0.044-->0.069-->0.107-->0.141-->0.216-->0.101-->0.047. Suspected due to a combination of atrial fibrillation, demand ischemia, and possible COVID-19 infection. Echo showed normal LV size and function, LVEF 60-65%, no RWMA, RV mod dilated and mod decreased systolic function, RV volume overload and mod pulmonary hypertension. Previous RV dilatation, decreased function and pulmonary hypertension were mild in May 2019. Appears to be progression of pulmonary hypertension and RV dysfunction likely due to underlying COPD.   BNP elevated, has mild edema, weight stable.   - Recheck Troponin 0.043  - Trial of Lasix 40mg IV x 1, reasess in AM  - Hold usual lasix   - I/o ,  daily weights  12/3/2020 -- troponin slightly up again, ECG unchanged, does not look like ACS.  Follow.  Weight down, creatinine stable, resuming home lasix for now.    12/4/2020 -- remains asymptomatic.  No changes.      COPD (chronic obstructive pulmonary disease)  Asthma  Baker's Lung    Patient managed prior to admission with Trelegy Ellipta daily and as needed albuterol. PFTs in 04/2018 revealed severe airflow obstruction. Suspect this is baseline.  - Continue home therapies.      Atrial fibrillation   12/2/20 --   Noted in history as transient episode following CABG in 2009. Had  Seen cards 3/2020 and had ziopatch - no a fib seen. EKG 8/2020 with NSR with long 1st degree AVB. Not on anticoagulation prior to last admission.  Does take metoprolol 25 mg twice daily. Takes 325 mg aspirin daily.     On previous admission, patient in atrial fibrillation, rate controlled. Likely exacerbated by acute illness but could be due to worsening RV dysfunction as well. CHADS-Vasc score 6. Does have falls at home. Started on Eliquis prior to discharge (also partly due to Covid 19)  - Telemetry  - Continue Eliquis  12/4/2020 -- rate controlled so far.       Type 2 diabetes mellitus with diabetic chronic kidney disease   12/2/20 -- A1c of 8.0. Patient managed prior to admission with  Metformin and NPH BID. Creatinine is stable.   Renal fx slightly worse. BS >250  - Hold usual NPH 70/30, metformin   - Start lantus 15, use medium insulin sliding scale until PO intake acertained  12/3/2020 -- running high, increased to novolog 3/3/3 with meals and high dose sliding scale insulin, increase lantus to 17u.    12/4/2020 -- still running high - increasing novolog to 6/6/6.       Obstructive sleep apnea  Patient uses CPAP at home, but son reports with illness and dementia he has not been compliant recently.      History of CVA (cerebrovascular accident)  CVA at time of CABG, 2009.    - Continue aspirin     CAD, s/p coronary bypass  grafting in 11/2009 with LIMA to LAD and SVG to OM1 and 2  Elevated troponin    12/2/20 -- Follows with cards, last seen at virtual visit in 03/2020.  Stress echo 06/2019 with EF >70%.   - Hold usual lasix.   - Continue aspirin  - Continue home Lisinopril 10, metorolol 25 BID.  12/3/2020 -- troponin slightly up this AM, see discussion above,  Resumed home lasix as above.     12/4/2020 -- doing well.       Hypertension goal BP (blood pressure) < 140/90  12/2/20 -- Stable.  Continue home Lisinopril 10, metorolol 25 BID.  12/4/2020 -- low normal, no change.         Hypertrophy of prostate without urinary obstruction   - Continue prior to admission flomax and finasteride.     Hyperlipidemia LDL goal <70   - Continue prior to admission atorvastatin 80 mg daily.        Esophageal reflux   - Continue prior to admission ranitidine twice daily.                Disposition  At least 1 more day inpatient - likely TCU in 1-3 days depending on improvement.              Interval History:   Not much change.  Patient reports feeling well and mental status appears a bit better, but remains impulsive and needing redirection fairly frequently still.  No aggressiveness or agitation overnight or this AM per nursing.  Denies dyspnea.  No other new concerns.                Review of Systems:    ROS: 10 point ROS neg other than the symptoms noted above in the HPI.           Medications:   Current active medications and PTA medications reviewed, see medication list for details.            Physical Exam:   Vitals were reviewed  Patient Vitals for the past 24 hrs:   BP Temp Temp src Pulse Resp SpO2 Weight   12/04/20 1230 95/50 96.7  F (35.9  C) Axillary 84 20 95 % --   12/04/20 0945 -- -- -- -- -- 92 % --   12/04/20 0915 -- -- -- -- -- (!) 85 % --   12/04/20 0727 112/51 96.7  F (35.9  C) Axillary 75 16 97 % --   12/04/20 0432 -- -- -- -- -- -- 81.6 kg (180 lb)   12/04/20 0000 -- -- -- 50 -- -- --   12/03/20 2117 113/57 -- -- 69 -- -- --    20 113/57 97.1  F (36.2  C) -- 69 20 92 % --   20 1650 105/52 97  F (36.1  C) Axillary 57 -- (!) 88 % --       Temperatures:  Current - Temp: 96.7  F (35.9  C); Max - Temp  Av.9  F (36.1  C)  Min: 96.7  F (35.9  C)  Max: 97.1  F (36.2  C)  Respiration range: Resp  Av.7  Min: 16  Max: 20  Pulse range: Pulse  Av.3  Min: 50  Max: 84  Blood pressure range: Systolic (24hrs), Av , Min:95 , Max:113   ; Diastolic (24hrs), Av, Min:50, Max:57    Pulse oximetry range: SpO2  Av.5 %  Min: 85 %  Max: 97 %  I/O last 3 completed shifts:  In: 378 [P.O.:375; I.V.:3]  Out: 325 [Urine:325]    Intake/Output Summary (Last 24 hours) at 2020 1644  Last data filed at 2020 1536  Gross per 24 hour   Intake 378 ml   Output 500 ml   Net -122 ml     EXAM:  General: awake and alert, NAD, oriented to place and recently being Thanksgiving   Head: normocephalic  Neck: unremarkable, no lymphadenopathy   HEENT: oropharynx pink and moist    Heart: Regular rate and rhythm, no murmurs, rubs, or gallops   Lungs: clear to auscultation bilaterally with good air movement throughout   Abdomen: soft, non-tender, no masses or organomegaly   Extremities: no edema in lower extremities   Skin unremarkable.              Data:     Results for orders placed or performed during the hospital encounter of 20 (from the past 24 hour(s))   Glucose by meter   Result Value Ref Range    Glucose 248 (H) 70 - 99 mg/dL   D dimer quantitative   Result Value Ref Range    D Dimer 0.9 (H) 0.0 - 0.50 ug/ml FEU   Lactate Dehydrogenase   Result Value Ref Range    Lactate Dehydrogenase 294 (H) 85 - 227 U/L   CRP inflammation   Result Value Ref Range    CRP Inflammation 63.2 (H) 0.0 - 8.0 mg/L   CBC with platelets differential   Result Value Ref Range    WBC 12.9 (H) 4.0 - 11.0 10e9/L    RBC Count 4.16 (L) 4.4 - 5.9 10e12/L    Hemoglobin 12.3 (L) 13.3 - 17.7 g/dL    Hematocrit 37.2 (L) 40.0 - 53.0 %    MCV 89 78 - 100 fl     MCH 29.6 26.5 - 33.0 pg    MCHC 33.1 31.5 - 36.5 g/dL    RDW 14.2 10.0 - 15.0 %    Platelet Count 222 150 - 450 10e9/L    Diff Method Automated Method     % Neutrophils 79.7 %    % Lymphocytes 7.3 %    % Monocytes 11.2 %    % Eosinophils 0.0 %    % Basophils 0.2 %    % Immature Granulocytes 1.6 %    Nucleated RBCs 0 0 /100    Absolute Neutrophil 10.3 (H) 1.6 - 8.3 10e9/L    Absolute Lymphocytes 0.9 0.8 - 5.3 10e9/L    Absolute Monocytes 1.5 (H) 0.0 - 1.3 10e9/L    Absolute Eosinophils 0.0 0.0 - 0.7 10e9/L    Absolute Basophils 0.0 0.0 - 0.2 10e9/L    Abs Immature Granulocytes 0.2 0 - 0.4 10e9/L    Absolute Nucleated RBC 0.0    Comprehensive metabolic panel   Result Value Ref Range    Sodium 138 133 - 144 mmol/L    Potassium 4.2 3.4 - 5.3 mmol/L    Chloride 103 94 - 109 mmol/L    Carbon Dioxide 27 20 - 32 mmol/L    Anion Gap 8 3 - 14 mmol/L    Glucose 269 (H) 70 - 99 mg/dL    Urea Nitrogen 42 (H) 7 - 30 mg/dL    Creatinine 1.36 (H) 0.66 - 1.25 mg/dL    GFR Estimate 48 (L) >60 mL/min/[1.73_m2]    GFR Estimate If Black 56 (L) >60 mL/min/[1.73_m2]    Calcium 7.8 (L) 8.5 - 10.1 mg/dL    Bilirubin Total 0.8 0.2 - 1.3 mg/dL    Albumin 2.4 (L) 3.4 - 5.0 g/dL    Protein Total 5.7 (L) 6.8 - 8.8 g/dL    Alkaline Phosphatase 85 40 - 150 U/L    ALT 30 0 - 70 U/L    AST 16 0 - 45 U/L   Glucose by meter   Result Value Ref Range    Glucose 292 (H) 70 - 99 mg/dL   Glucose by meter   Result Value Ref Range    Glucose 392 (H) 70 - 99 mg/dL     *Note: Due to a large number of results and/or encounters for the requested time period, some results have not been displayed. A complete set of results can be found in Results Review.       All imaging studies reviewed by me.    Attestation:  I have reviewed today's vital signs, notes, medications, labs and imaging.  Amount of time spent in direct patient care: 30 minutes.     Joseph Miller MD, MD

## 2020-12-04 NOTE — PROGRESS NOTES
Patient resting quietly semi-monzon position. O2 3L n/c in place. Satuation 92%. Denies pain. Respirations unlabored. Skin cool and dry. Blankets in place for warmth. No acute distress noted. Currently 1:2 with this nurse in room. Up to bathroom standby assist. No difficulties. No dyspnea observed. Back to bed without difficulty. Bed in low position. Call light within reach.

## 2020-12-04 NOTE — PROGRESS NOTES
Patient desating to 88% on 3 Liters N/C. Titrated O2 to 4 Liters and changed oxygen sensor. O2 at 90%.

## 2020-12-04 NOTE — PROGRESS NOTES
Patient woke up confused again. Pulled off N/C and pulsoximeter. Refusing vital signs as he did around midnight. Patient got OOB refusing assistance. This nurse stayed within arms reach to maintain safety. Patient combative not allowing gentle guidance. Patient then back to bed. Will not allow this nurse to assist with O2. Patient maintains confusion. Monitoring with the 1:2 sitting of this patient and is roommate.

## 2020-12-05 NOTE — PROGRESS NOTES
S:  Patient inpatient at [facility]    B:  Patient admitted for pneumonia/COVID-19 pos    A:  Vitals stable at baseline O2 of 2 Ltr/min  Patient's mood is improved today, after having roommate removed from room.  Patient was in a double room, now he has the room to himself.  BG has been 200s-300s today  Patient was taken off of 1:1 observation at approx 1030 12/5/2020  Patient remains on zone 2 in bed and will not consistently call appropriately  Patient has been agreeable and compliant to cares  Lungs are diminished/crackles but writer feels that they sound better than they did on 11/4/2020  Patient did report headache approx 0800 12/5/2020 was given tylenol, the pain went away  Patient was showing some signs of anxiety at approx 0800, 25mg seroquel was added to 0800 medication pass, this helped patient to relax quite a bit,   Patient was calm and cooperative and calling appropriately for rest of day without any seroquel    R:  Patient spent the day awake watching [employer] on the television in his room    Yong Cook RN

## 2020-12-05 NOTE — PLAN OF CARE
Patient alert and confused to place and time.  Does not use call light appropriately, just starts to get up.  Lung sounds diminished and course.  Heart sounds irregular.  Patient denies pain and does not appear to be in pain.  Coughs infrequently.  Patient up with assist of 1  stand by, to the bathroom to void 175 yellow urine.   Patient then back to bed .  Patient was noted to be a little short of breath after trip to bathroom.  Recovered quickly and only coughed a couple of times.  Oxygen in place at all times on 2 liters nasal canula.  Patient is impulsive when wanting to get up.  Bed alarm is activated and bedside sitter to monitor continuously.

## 2020-12-05 NOTE — PROGRESS NOTES
Physical Therapy Evaluation       12/05/20 1600   Quick Adds   Type of Visit Initial PT Evaluation   Living Environment   People in home child(katherin), adult   Current Living Arrangements house   Transportation Anticipated family or friend will provide   Self-Care   Usual Activity Tolerance good   Current Activity Tolerance fair   Equipment Currently Used at Home cane, straight   Disability/Function   Hearing Difficulty or Deaf yes   Patient's preferred means of communication verbal   Describe hearing loss hearing loss on right side;hearing loss on left side   Use of hearing assistive devices right hearing aid;left hearing aid   Were auxiliary aids offered? no   The following aids were provided; patient declined offer of auxiliary aids   Wear Glasses or Blind yes   Concentrating, Remembering or Making Decisions Difficulty yes   Difficulty Communicating yes  (points and motions, sometimes whispers)   Difficulty Eating/Swallowing yes   Eating/Swallowing other (see comments)  (doesn't taste good)   Walking or Climbing Stairs Difficulty no   Dressing/Bathing Difficulty no   Toileting issues no   Doing Errands Independently Difficulty (such as shopping) no   Fall history within last six months yes   Number of times patient has fallen within last six months 2   Change in Functional Status Since Onset of Current Illness/Injury yes   General Information   Onset of Illness/Injury or Date of Surgery 12/02/20   Referring Physician Dr Coronado   Patient/Family Therapy Goals Statement (PT) does not verbalize a goal   Pertinent History of Current Problem (include personal factors and/or comorbidities that impact the POC) Per P&H:  Alvaro Horton is a 80 year old male admitted on 12/2/2020. He was initially admitted 11/26-11/30/20 with pneumonia and hypoxemic respiratory failure due to Covid 19 .Presented with weakness, fatigue, shortness of breath, cough and hypoxemia to 84% in ED. CXR revealed hypo aerated lungs but otherwise  clear.   He was discharge home on O2 2LPM. He returns with persistent hypoxemia, dyspnea, confusion. Son is unable to take care of patient because he is also ill with Covid 19.    Cognition   Orientation Status (Cognition) oriented to;person   Affect/Mental Status (Cognition) agitated   Follows Commands (Cognition) follows one-step commands   Behavioral Issues uncooperative   Posture    Posture Forward head position;Protracted shoulders   Range of Motion (ROM)   ROM Quick Adds ROM WFL   Strength   Manual Muscle Testing Quick Adds Strength WFL   Bed Mobility   Comment (Bed Mobility) sit <> supine independent   Transfers   Transfer Safety Comments sit <> stand independent   Gait/Stairs (Locomotion)   [place] Level (Gait) contact guard   Distance in Feet (Required for LE Total Joints) 15' x 2  (Amb from bed<>BR. Refused to go further.)   Pattern (Gait) step-through   Comment (Gait/Stairs) normal gait pattern, no unsteadiness   Balance   Balance no deficits were identified   Clinical Impression   Criteria for Skilled Therapeutic Intervention evaluation only;patient/family refuse skilled intervention at this time   Clinical Presentation Stable/Uncomplicated   Clinical Presentation Rationale clinical judgement   Clinical Decision Making (Complexity) low complexity   Therapy Frequency (PT) Evaluation only   Risk & Benefits of therapy have been explained evaluation/treatment results reviewed;participants included;patient   PT Discharge Planning    PT Discharge Recommendation (DC Rec) Transitional Care Facility   PT Rationale for DC Rec son cannot take care of pt due to illness   PT Brief overview of current status  Transfers independently. Amb with no AD 15' x 2 with CGA .   Total Evaluation Time   Total Evaluation Time (Minutes) 15     Kim Yee PT

## 2020-12-05 NOTE — PROGRESS NOTES
"Care Management Follow Up    Length of Stay (days): 2    Expected Discharge Date: 12/05/20     Concerns to be Addressed: discharge planning     Patient plan of care discussed at interdisciplinary rounds: Yes; pt removed from 1:1 at 10:30 AM today & appears to be medically improving.    Anticipated Discharge Disposition: Skilled Nursing Facilty     Anticipated Discharge Services: TCU  Anticipated Discharge DME: Oxygen    Patient/family educated on Medicare website which has current facility and service quality ratings: yes  Education Provided on the Discharge Plan:  Yes  Patient/Family in Agreement with the Plan:  Yes, previous conversation with son, Jae & he is in agreement with TCU cares.  This writer also called & left message for pt's daughter, Sujatha, & requested a return call.    Referrals Placed by CM/SW: Post Acute Facilities  Private pay costs discussed: transportation costs    Additional Information:  CTS continues to follow for discharge planning.  Calls were made to the following facilities for cares:     [facility] (phone:[phone] Fax:[phone] )--left message for Traci in admissions informing that pt off 1:1 as of 1030.  Potential bed availability this weekend.  Per previous conversation with Soila unit is locked unit, so pt would need to be stable with \"close observation or frequent checks.\"      [facility] ( Main: [phone] Admissions: [phone] Fax: [phone])--pending--left message with admission staff on cellphone of [phone]     [facility] (Main Phone: [phone] Admissons Phone: [phone] Fax: [phone]) ---pending--left message with admission team     [facility] (Admissions Phone: [phone] Main Phone: [phone] Fax: [phone])--pending--left message with admission team     [facility]) (Admissions: [phone] Main " Phone: [phone] Fax: [phone])--pending--left message with admission team     [facility][phone]--pending--left message with admission team     [facility] (Admissions Phone: [phone] Main Phone: [phone] Fax: [phone])--pending--left message with admission team     Referral placed to central intake @ [facility]--pending.  Weekend cell is [phone]      [facility]--pending     DECLINED:  [facility] Phone: (Admissions: [phone] RN Report: [phone] Fax: [phone]) --declined-not taking COVID patients     [facility] (Main Phone: [phone] Admissions Phone: [phone] Fax: [phone])--declined due to COVID status     [facility] (Admission phone: [phone] Main Phone: [phone] Fax: [phone])--declined due to behaviors     Escalated to [place] repJanae.  ---no [facility] contracted with [facility] that accept COVID patients     CTS to continue to follow for TCU placement.    JESUS Escalona

## 2020-12-05 NOTE — PLAN OF CARE
Pt alert/oriented to self, place. Denies pain. Lungs diminished, coarse in bases. Has occasional loose non-prod cough. Sats on 2 liters 98%. Pt up with 1 assist to the bathroom. Also inconintent & wearing a brief. Pt has 1:1 sitter, is impulsive at times, pulls off oxygen tubing, asking to be taken back home.

## 2020-12-06 NOTE — PLAN OF CARE
"Pt alert, oriented to self, \"hospital\", \"December\", \"Trump\". Denies pain. Sats on 2 liters 92%, lungs diminished with crackles in bases. Pt less confused tonight, using urinal in bed, using call light approp. Repositions self in bed.   "

## 2020-12-06 NOTE — PROGRESS NOTES
[facility] DISCHARGE NOTE    Patient discharged to transitional care unit at 3:34 PM via cart. Accompanied by other:EMS techs and staff. Discharge instructions reviewed with Report given to Reji at [facility] TCU, opportunity offered to ask questions. Prescriptions - None ordered for discharge. All belongings sent with patient.    Writer called [facility] and gave report to Reji in Admissions.    Yong Cook RN

## 2020-12-06 NOTE — PROGRESS NOTES
[facility]ist Progress Note           Assessment & Plan      Alvaro Horton is a 80 year old male admitted on 12/2/2020. He was initially admitted 11/26-11/30/20 with pneumonia and hypoxemic respiratory failure due to Covid 19 .Presented with weakness, fatigue, shortness of breath, cough and hypoxemia to 84% in ED. CXR revealed hypo aerated lungs but otherwise clear.   He was discharge home on O2 2LPM. He returns with persistent hypoxemia, dyspnea, confusion. Son is unable to take care of patient because he is also ill with Covid 19.         Acute respiratory failure with hypoxia due to 2019 novel coronavirus infection    Onset of symptoms: 11/25    Test positive: 11/26    Initially presented with weakness, fatigue, shortness of breath, cough and hypoxemia. Was hypoxemic to 84% in ED. Was placed on 2 L NC and has remained on this. CXR revealed hypo aerated lungs but otherwise clear.    Dexamethasone 6 mg daily started 11/27, discharged with dexamethasone daily - plan to continue through 12/5/20.    Remdesivir 11/27- 11/30/20     CT 12/2/2020 - Patchy ground-glass opacities are noted in both lungs, with areas of mild consolidation at both lung bases. Commonly reported imaging features of COVID-19 pneumonia are present. Procalcitonin on readmission 12/2/2020 0.17 and BNP 2,274.      Inflammatory markers (including last hospitalization)    CRP : 22.1 ->-> 81.0 (peak) ->-> 33.0 --> 75.8 12/2/2020     IL-6: 36.85    Ferritin 696 ---> 583 12/2/2020     LDH: 348 - 311 - 338    Fibrinogen 540 - 483 - 492 - 614 12/2/2020     CPK: 765    D-Dimer 1.4 - 2.0 - 1.2 - 0.7    Retic count 1.3 - 1.0 - 0.8   Currently on O2 by NC at 2 lpm and stable. Doubt secondary bacterial pneumonia  - Continue dexamethasone (day =   -Continue supplemental oxygen and wean as able but likely will still need 2L on discharge.    12/3/2020 -- inflammatory markers up slightly but respiratory status stable, continue with above  treatments.  Does appear to have encephalopathy as below.   Restarted Remdesivir (received 3 days in initial hospital stay), but per ID recommendations stopped 12/3.    12/5/2020 -- unchanged.  Wean oxygen as able.    Continue dexamethasone through 12/5.          Acute COVID-induced encephalopathy   Memory loss/Early onset Alzheimer dementia   12/2/20 - 12/3/20 -- Recent diagnosis per son, not on medications.  acutely worse with current infection consistent with acute encephalopathy due to COVID.   Was highly functional prior to infection, this appears to be a clear change from aleena.   Here has needed frequent redirection throughout admission so far - starting zyprexa 5mg daily at bedtime, continue to treat COVID as above.  I think this is very unlikely to be due to dexamethasone but only has 2 days left of this anyway.     12/4/2020 -- slightly improved.  Continue zyprexa  With prn seroquel.    12/5/2020 -- slowly improving - needing some prn seroquel - will try increasing zyprexa to twice daily.    Hope to wean off this as an outpatient as mental status improves.       Falls frequently  Generalized weakness  Son notes generalized weakness, patient was found down early am hours of 11/25.   - PT/OT/SW consults, likely needs TCU on discharge as below.       Suspect type 2 MI, demand ischemia  Moderate RV dilatation and decreased RV function  Moderate pulmonary hypertension   12/2/20 --  During last hospialization Troponin 0.044-->0.069-->0.107-->0.141-->0.216-->0.101-->0.047. Suspected due to a combination of atrial fibrillation, demand ischemia, and possible COVID-19 infection. Echo showed normal LV size and function, LVEF 60-65%, no RWMA, RV mod dilated and mod decreased systolic function, RV volume overload and mod pulmonary hypertension. Previous RV dilatation, decreased function and pulmonary hypertension were mild in May 2019. Appears to be progression of pulmonary hypertension and RV dysfunction likely due  to underlying COPD.   BNP elevated, has mild edema, weight stable.   - Recheck Troponin 0.043  - Trial of Lasix 40mg IV x 1, reasess in AM  - Hold usual lasix   - I/o , daily weights  12/3/2020 -- troponin slightly up again, ECG unchanged, does not look like ACS.  Follow.  Weight down, creatinine stable, resuming home lasix for now.    12/5/2020 -- remains asymptomatic.  No changes.      COPD (chronic obstructive pulmonary disease)  Asthma  Baker's Lung    Patient managed prior to admission with Trelegy Ellipta daily and as needed albuterol. PFTs in 04/2018 revealed severe airflow obstruction. Suspect this is baseline.  - Continue home therapies.      Atrial fibrillation   12/2/20 --   Noted in history as transient episode following CABG in 2009. Had  Seen cards 3/2020 and had ziopatch - no a fib seen. EKG 8/2020 with NSR with long 1st degree AVB. Not on anticoagulation prior to last admission.  Does take metoprolol 25 mg twice daily. Takes 325 mg aspirin daily.     On previous admission, patient in atrial fibrillation, rate controlled. Likely exacerbated by acute illness but could be due to worsening RV dysfunction as well. CHADS-Vasc score 6. Does have falls at home. Started on Eliquis prior to discharge (also partly due to Covid 19)  - Telemetry  - Continue Eliquis  12/5/2020 -- rate controlled so far.       Type 2 diabetes mellitus with diabetic chronic kidney disease   12/2/20 -- A1c of 8.0. Patient managed prior to admission with  Metformin and NPH BID. Creatinine is stable.   Renal fx slightly worse. BS >250  - Hold usual NPH 70/30, metformin   - Start lantus 15, use medium insulin sliding scale until PO intake acertained  12/3/2020 -- running high, increased to novolog 3/3/3 with meals and high dose sliding scale insulin, increase lantus to 17u.    12/4/2020 --  increasing novolog to 6/6/6.   12/5/2020 -- still up, increasing lantus to 22       Obstructive sleep apnea  Patient uses CPAP at home, but kimber  reports with illness and dementia he has not been compliant recently.      History of CVA (cerebrovascular accident)  CVA at time of CABG, 2009.    - Continue aspirin     CAD, s/p coronary bypass grafting in 11/2009 with LIMA to LAD and SVG to OM1 and 2  Elevated troponin    12/2/20 -- Follows with cards, last seen at virtual visit in 03/2020.  Stress echo 06/2019 with EF >70%.   - Hold usual lasix.   - Continue aspirin  - Continue home Lisinopril 10, metorolol 25 BID.  12/3/2020 -- troponin slightly up this AM, see discussion above,  Resumed home lasix as above.     12/5/2020 -- doing well.       Hypertension goal BP (blood pressure) < 140/90  12/2/20 -- Stable.  Continue home Lisinopril 10, metorolol 25 BID.  12/5/2020 -- no change.         Hypertrophy of prostate without urinary obstruction   - Continue prior to admission flomax and finasteride.     Hyperlipidemia LDL goal <70   - Continue prior to admission atorvastatin 80 mg daily.        Esophageal reflux   - Continue prior to admission ranitidine twice daily.        Prophylaxis   On eliquis           Disposition  Slowly improving, likely TCU in 1-2 days.              Interval History:   Improving.  Breathing stable, patient more redirectable and less impulsive.  No new concerns. No pain or dyspnea on oxygen              Review of Systems:    ROS: 10 point ROS neg other than the symptoms noted above in the HPI.           Medications:   Current active medications and PTA medications reviewed, see medication list for details.            Physical Exam:   Vitals were reviewed  Patient Vitals for the past 24 hrs:   BP Temp Temp src Pulse Resp SpO2   12/05/20 1613 109/48 97.2  F (36.2  C) Axillary 52 20 90 %   12/05/20 1519 -- -- -- (!) 46 -- --   12/05/20 0756 (!) 170/91 98.1  F (36.7  C) Axillary 65 20 91 %   12/05/20 0755 -- -- -- 65 -- --   12/05/20 0400 (!) 128/95 98.2  F (36.8  C) Axillary 61 20 93 %   12/05/20 0000 -- -- -- 65 -- --   12/04/20 2300 -- 98  F  (36.7  C) Axillary 69 16 93 %   20 110/48 97.8  F (36.6  C) Axillary 55 18 98 %       Temperatures:  Current - Temp: 97.2  F (36.2  C); Max - Temp  Av.9  F (36.6  C)  Min: 97.2  F (36.2  C)  Max: 98.2  F (36.8  C)  Respiration range: Resp  Av.8  Min: 16  Max: 20  Pulse range: Pulse  Av.8  Min: 46  Max: 69  Blood pressure range: Systolic (24hrs), Av , Min:109 , Max:170   ; Diastolic (24hrs), Av, Min:48, Max:95    Pulse oximetry range: SpO2  Av %  Min: 90 %  Max: 98 %  I/O last 3 completed shifts:  In: 820 [P.O.:820]  Out: 1400 [Urine:1400]    Intake/Output Summary (Last 24 hours) at 2020 1818  Last data filed at 2020 1600  Gross per 24 hour   Intake 820 ml   Output 1125 ml   Net -305 ml     .EXAM:  General: awake and alert, NAD, oriented x 2-3  Head: normocephalic  Neck: unremarkable, no lymphadenopathy   HEENT: oropharynx pink and moist    Heart: Regular rate and rhythm, no murmurs, rubs, or gallops  Lungs: clear to auscultation bilaterally with good air movement throughout  Abdomen: soft, non-tender, no masses or organomegaly  Extremities: no edema in lower extremities   Skin unremarkable.               Data:     Results for orders placed or performed during the hospital encounter of 20 (from the past 24 hour(s))   Glucose by meter   Result Value Ref Range    Glucose 303 (H) 70 - 99 mg/dL   Glucose by meter   Result Value Ref Range    Glucose 284 (H) 70 - 99 mg/dL   D dimer quantitative   Result Value Ref Range    D Dimer 0.7 (H) 0.0 - 0.50 ug/ml FEU   Lactate Dehydrogenase   Result Value Ref Range    Lactate Dehydrogenase 250 (H) 85 - 227 U/L   CRP inflammation   Result Value Ref Range    CRP Inflammation 48.9 (H) 0.0 - 8.0 mg/L   CBC with platelets differential   Result Value Ref Range    WBC 8.9 4.0 - 11.0 10e9/L    RBC Count 4.08 (L) 4.4 - 5.9 10e12/L    Hemoglobin 12.0 (L) 13.3 - 17.7 g/dL    Hematocrit 36.2 (L) 40.0 - 53.0 %    MCV 89 78 - 100 fl    MCH  29.4 26.5 - 33.0 pg    MCHC 33.1 31.5 - 36.5 g/dL    RDW 14.1 10.0 - 15.0 %    Platelet Count 227 150 - 450 10e9/L    Diff Method Automated Method     % Neutrophils 77.2 %    % Lymphocytes 11.4 %    % Monocytes 9.6 %    % Eosinophils 0.0 %    % Basophils 0.1 %    % Immature Granulocytes 1.7 %    Nucleated RBCs 0 0 /100    Absolute Neutrophil 6.8 1.6 - 8.3 10e9/L    Absolute Lymphocytes 1.0 0.8 - 5.3 10e9/L    Absolute Monocytes 0.9 0.0 - 1.3 10e9/L    Absolute Eosinophils 0.0 0.0 - 0.7 10e9/L    Absolute Basophils 0.0 0.0 - 0.2 10e9/L    Abs Immature Granulocytes 0.2 0 - 0.4 10e9/L    Absolute Nucleated RBC 0.0    Comprehensive metabolic panel   Result Value Ref Range    Sodium 139 133 - 144 mmol/L    Potassium 4.4 3.4 - 5.3 mmol/L    Chloride 105 94 - 109 mmol/L    Carbon Dioxide 29 20 - 32 mmol/L    Anion Gap 5 3 - 14 mmol/L    Glucose 277 (H) 70 - 99 mg/dL    Urea Nitrogen 45 (H) 7 - 30 mg/dL    Creatinine 1.35 (H) 0.66 - 1.25 mg/dL    GFR Estimate 49 (L) >60 mL/min/[1.73_m2]    GFR Estimate If Black 57 (L) >60 mL/min/[1.73_m2]    Calcium 8.0 (L) 8.5 - 10.1 mg/dL    Bilirubin Total 0.8 0.2 - 1.3 mg/dL    Albumin 2.2 (L) 3.4 - 5.0 g/dL    Protein Total 5.5 (L) 6.8 - 8.8 g/dL    Alkaline Phosphatase 85 40 - 150 U/L    ALT 27 0 - 70 U/L    AST 10 0 - 45 U/L   Glucose by meter   Result Value Ref Range    Glucose 242 (H) 70 - 99 mg/dL   Glucose by meter   Result Value Ref Range    Glucose 385 (H) 70 - 99 mg/dL   Glucose by meter   Result Value Ref Range    Glucose 316 (H) 70 - 99 mg/dL     *Note: Due to a large number of results and/or encounters for the requested time period, some results have not been displayed. A complete set of results can be found in Results Review.     \      Attestation:  I have reviewed today's vital signs, notes, medications, labs and imaging.  Amount of time spent in direct patient care: 25 minutes.     Joseph Miller MD, MD

## 2020-12-06 NOTE — PLAN OF CARE
"Patient more alert and oriented this shift.  Still confused about situation.  Patient is agitated about disturbing his sleep for cares.  Stated; \"Don't bother me until 7 in the morning, I have a right to sleep!\" Refused to allow me to flush his IV lock.  Plan to try and get 0200 blood sugar. Patient lung sounds are diminished and have fine crackles in bases.  Heart rate is irregular.  Bowel sounds normal.  Patient voiding well and using bedside urinal.  Urine is clear yellow.  Patient has used call light appropriately last shift.  Bed alarm is activated for patient safety.    "

## 2020-12-06 NOTE — PROGRESS NOTES
Care Management Follow Up    Length of Stay (days): 3    Expected Discharge Date: 12/05/20     Concerns to be Addressed: discharge planning     Patient plan of care discussed at interdisciplinary rounds: Yes, per IDT rounds, pt is medically stable for discharge today.      Anticipated Discharge Disposition: Skilled Nursing Facilty as son is unable to bring pt home & PT/OT states pt could benefit from services.     Anticipated Discharge Services: None  Anticipated Discharge DME: Oxygen, walker    Patient/family educated on Medicare website which has current facility and service quality ratings: yes  Education Provided on the Discharge Plan:  Yes  Patient/Family in Agreement with the Plan:  Yes, son Jae, unable to care for pt at home.    Referrals Placed by CM/RYAN: Post Acute Facilities  Private pay costs discussed: transportation costs    Additional Information:  SW spoke with son, Jae, & he continues to be ill at home & unable to care for pt & states pt needs a TCU.      SW placed call to the following facilities for cares:     1.  [facility] (phone:[phone] Fax:[phone] )--left message for Traci in admissions informing that pt is medically stable for discharge today.  Awaiting return call.     2.  [facility], [place] ( Main: [phone] Admissions: [phone] Fax: [phone])--Spoke with Prema [facility] cell phone [phone], they have an open bed, resent referral & she isreviewing pt for admission.    3.  [facility], [place] (Main Phone: [phone] #3 Admissons Phone: [phone] Fax: [phone]) ---  11:38 AM:  KG called back & stated he is considering the pt now as he realizes the pt will return home after TCU.  Will call me back with an update soon.    4.  [facility] (Admissions Phone: [phone] Main Phone: [phone] Fax: [phone])--left message for admission team & resent referral now that pt is off  1:1.    5.  [facility]- [phone]--left message with admission team requesting return call.    6.  Referral placed to central intake @ [facility]-- Eusebio, weekend cell is [phone], no beds until next week.     7.  [facility] (Admissions Phone: [phone] Main Phone: [phone] Fax: [phone])--pending--left message with admission team     8.  [facility]) (Admissions: [phone] Main Phone: [phone] Fax: [phone])-- told me no admissions staff today.     9.  Referral placed to central intake @ [facility]-- Eusebio, weekend cell is [phone], no beds until next week.     10.  [facility]- ([phone] admissions cellphone) - left message requesting return call.       CTS to continue to follow for TCU placement.      Pt will require [facility] stretcher upon discharge due to Covid status.    JESUS Escalona

## 2020-12-06 NOTE — DISCHARGE SUMMARY
[facility] Discharge Summary    Alvaro Horton MRN# [id number]   Age: 80 year old YOB: 1940     Date of Admission:  12/2/2020  Date of Discharge::  12/6/2020  Admitting Physician:  Joseph Miller MD  Discharge Physician:  Joseph Miller MD, MD             Admission Diagnoses:   Acute respiratory failure with hypoxia (H) [J96.01]  Chronic obstructive pulmonary disease with acute exacerbation (H) [J44.1]  Alzheimer's dementia without behavioral disturbance, unspecified timing of dementia onset (H) [G30.9, F02.80]  COVID-19 virus infection [U07.1]          Principle Discharge Diagnosis:       Acute respiratory failure with hypoxia due to 2019 novel coronavirus infection    See hospital course for further active diagnoses addressed during this admission.            Procedures:   No procedures performed during this admission          Medications Prior to Admission:     Medications Prior to Admission   Medication Sig Dispense Refill Last Dose     albuterol (PROAIR HFA/PROVENTIL HFA/VENTOLIN HFA) 108 (90 Base) MCG/ACT inhaler Inhale 2 puffs into the lungs every 6 hours as needed for shortness of breath / dyspnea or wheezing 1 Inhaler 11 12/1/2020 at Unknown time     apixaban ANTICOAGULANT (ELIQUIS) 5 MG tablet Take 1 tablet (5 mg) by mouth 2 times daily 60 tablet 1 11/30/2020 at Unknown time     aspirin (ASA) 325 MG EC tablet Take 325 mg by mouth daily   11/30/2020 at Unknown time     atorvastatin (LIPITOR) 80 MG tablet Take 1 tablet by mouth once daily (Patient taking differently: Take 80 mg by mouth daily ) 90 tablet 0 11/30/2020 at Unknown time     cyanocobalamin (BL VITAMIN B-12) 500 MCG TABS Take 500 mcg by mouth daily. (Patient taking differently: Take 2,500 mcg by mouth daily ) 90 tablet 4 11/30/2020 at Unknown time     finasteride (PROSCAR) 5 MG tablet Take 1 tablet by mouth once daily (Patient taking differently: Take 5 mg by mouth daily ) 90 tablet 0 11/30/2020 at Unknown time      furosemide (LASIX) 20 MG tablet Take 1 tablet (20 mg) by mouth daily 90 tablet 3 11/30/2020 at Unknown time     lisinopril (ZESTRIL) 10 MG tablet Take 1 tablet (10 mg) by mouth daily 90 tablet 3 11/30/2020 at Unknown time     metFORMIN (GLUCOPHAGE) 1000 MG tablet TAKE 1 TABLET BY MOUTH TWICE DAILY WITH MEALS (Patient taking differently: Take 1,000 mg by mouth 2 times daily (with meals) ) 180 tablet 0 11/30/2020 at Unknown time     metoprolol tartrate (LOPRESSOR) 25 MG tablet Take 1 tablet by mouth twice daily (Patient taking differently: Take 25 mg by mouth 2 times daily ) 180 tablet 3 11/30/2020 at Unknown time     NOVOLIN 70/30 FLEXPEN RELION susp INJECT 22 UNITS SUBCUTANEOUSLY 30 MINUTES BEFORE BREAKFAST AND 22 UNITS 30 MINUTES BEFORE DINNER (Patient taking differently: 20 in am and 21 in pm) 15 mL 1 12/1/2020 at evening     ranitidine (RANITIDINE 150 MAX STRENGTH) 150 MG tablet Take 1 tablet (150 mg) by mouth 2 times daily 180 tablet 0 11/30/2020 at Unknown time     tamsulosin (FLOMAX) 0.4 MG capsule TAKE 1 CAPSULE BY MOUTH ONCE DAILY . APPOINTMENT REQUIRED FOR FUTURE REFILLS (Patient taking differently: Take 0.4 mg by mouth daily ) 90 capsule 0 11/30/2020 at Unknown time     TRELEGY ELLIPTA 100-62.5-25 MCG/INH oral inhaler Inhale 1 puff into the lungs daily   11/30/2020 at Unknown time     blood glucose (NO BRAND SPECIFIED) test strip Use to test blood sugar 3 times daily 100 strip 0      blood glucose monitoring (NO BRAND SPECIFIED) meter device kit Use to test blood sugar 3 times daily or as directed. 1 kit 0      cholecalciferol 2000 UNITS tablet Take 1 tablet by mouth daily. (Patient taking differently: Take 1 tablet by mouth every evening ) 90 tablet 3 11/30/2020     insulin pen needle (BD ABRAM U/F) 32G X 4 MM miscellaneous Use 2 daily as directed. 100 each 11      nitroGLYcerin (NITROSTAT) 0.4 MG sublingual tablet Place 1 tablet (0.4 mg) under the tongue every 5 minutes as needed for chest pain (CALL 911  IF NOT IMPROVED AFTER THREE CONSECUTIVE DOSES) 25 tablet 5 Unknown at Unknown time     order for DME Equipment being ordered: CPAP  AIRSENSE 10  11-15 CM H2O  QUATTRO AIR SIZE MED  SN# [id number]  DN# [id number]        [DISCONTINUED] albuterol (PROVENTIL) (2.5 MG/3ML) 0.083% neb solution Take 3 mLs by nebulization every 6 hours as needed   Past Week at Unknown time     [DISCONTINUED] dexamethasone (DECADRON) 6 MG tablet Take 1 tablet (6 mg) by mouth daily 5 tablet 0 12/1/2020             Discharge Medications:     Current Discharge Medication List      START taking these medications    Details   !! insulin aspart (NOVOLOG PEN) 100 UNIT/ML pen Inject 1-10 Units Subcutaneous 3 times daily (before meals) Correction Scale - HIGH INSULIN RESISTANCE DOSING     Do Not give Correction Insulin if Pre-Meal BG less than 140.   For Pre-Meal  - 164 give 1 unit.   For Pre-Meal  - 189 give 2 units.   For Pre-Meal  - 214 give 3 units.   For Pre-Meal  - 239 give 4 units.   For Pre-Meal  - 264 give 5 units.   For Pre-Meal  - 289 give 6 units.   For Pre-Meal  - 314 give 7 units.   For Pre-Meal  - 339 give 8 units.   For Pre-Meal  - 364 give 9 units.   For Pre-Meal BG greater than or equal to 365 give 10 units  To be given with prandial insulin, and based on pre-meal blood glucose.   Notify provider if glucose greater than or equal to 350 mg/dL after administration of correction dose. If given at mealtime, administer within 30 minutes of start of meal  Qty:      Associated Diagnoses: Type 2 diabetes mellitus with stage 3 chronic kidney disease, with long-term current use of insulin, unspecified whether stage 3a or 3b CKD (H)      !! insulin aspart (NOVOLOG PEN) 100 UNIT/ML pen Inject 1-7 Units Subcutaneous At Bedtime HIGH INSULIN RESISTANCE DOSING    Do Not give Bedtime Correction Insulin if BG less than 200.   For  - 224 give 1 units.   For  - 249 give 2 units.   For  -  274 give 3 units.   For  - 299 give 4 units.   For  - 324 give 5 units.   For  - 349 give 6 units.   For BG greater than or equal to 350 give 7 units.   Notify provider if glucose greater than or equal to 350 mg/dL after administration of correction dose. If given at mealtime, administer within 30 minutes of start of meal  Qty:      Associated Diagnoses: Type 2 diabetes mellitus with stage 3 chronic kidney disease, with long-term current use of insulin, unspecified whether stage 3a or 3b CKD (H)      OLANZapine (ZYPREXA) 5 MG tablet Take 1 tablet (5 mg) by mouth 2 times daily  Qty:      Associated Diagnoses: Encephalopathy due to COVID-19 virus       !! - Potential duplicate medications found. Please discuss with provider.      CONTINUE these medications which have NOT CHANGED    Details   albuterol (PROAIR HFA/PROVENTIL HFA/VENTOLIN HFA) 108 (90 Base) MCG/ACT inhaler Inhale 2 puffs into the lungs every 6 hours as needed for shortness of breath / dyspnea or wheezing  Qty: 1 Inhaler, Refills: 11    Comments: Pharmacy may dispense brand covered by insurance (Proair, or proventil or ventolin or generic albuterol inhaler)  Associated Diagnoses: Chronic obstructive pulmonary disease, unspecified COPD type (H)      apixaban ANTICOAGULANT (ELIQUIS) 5 MG tablet Take 1 tablet (5 mg) by mouth 2 times daily  Qty: 60 tablet, Refills: 1    Associated Diagnoses: Infection due to 2019 novel coronavirus; Paroxysmal atrial fibrillation (H)      aspirin (ASA) 325 MG EC tablet Take 325 mg by mouth daily      atorvastatin (LIPITOR) 80 MG tablet Take 1 tablet by mouth once daily  Qty: 90 tablet, Refills: 0    Associated Diagnoses: Hyperlipidemia LDL goal <70      cyanocobalamin (BL VITAMIN B-12) 500 MCG TABS Take 500 mcg by mouth daily.  Qty: 90 tablet, Refills: 4    Associated Diagnoses: Memory loss      finasteride (PROSCAR) 5 MG tablet Take 1 tablet by mouth once daily  Qty: 90 tablet, Refills: 0    Associated  Diagnoses: Hypertrophy of prostate without urinary obstruction      furosemide (LASIX) 20 MG tablet Take 1 tablet (20 mg) by mouth daily  Qty: 90 tablet, Refills: 3    Associated Diagnoses: Stable angina pectoris (H)      lisinopril (ZESTRIL) 10 MG tablet Take 1 tablet (10 mg) by mouth daily  Qty: 90 tablet, Refills: 3    Associated Diagnoses: Hypertension goal BP (blood pressure) < 140/90      metFORMIN (GLUCOPHAGE) 1000 MG tablet TAKE 1 TABLET BY MOUTH TWICE DAILY WITH MEALS  Qty: 180 tablet, Refills: 0    Associated Diagnoses: Type 2 diabetes mellitus with stage 3 chronic kidney disease, with long-term current use of insulin, unspecified whether stage 3a or 3b CKD (H)      metoprolol tartrate (LOPRESSOR) 25 MG tablet Take 1 tablet by mouth twice daily  Qty: 180 tablet, Refills: 3    Associated Diagnoses: Chronic ischemic heart disease      NOVOLIN 70/30 FLEXPEN RELION susp INJECT 22 UNITS SUBCUTANEOUSLY 30 MINUTES BEFORE BREAKFAST AND 22 UNITS 30 MINUTES BEFORE DINNER  Qty: 15 mL, Refills: 1    Associated Diagnoses: Type 2 diabetes mellitus with stage 3 chronic kidney disease, with long-term current use of insulin (H)      ranitidine (RANITIDINE 150 MAX STRENGTH) 150 MG tablet Take 1 tablet (150 mg) by mouth 2 times daily  Qty: 180 tablet, Refills: 0    Associated Diagnoses: Gastroesophageal reflux disease without esophagitis      tamsulosin (FLOMAX) 0.4 MG capsule TAKE 1 CAPSULE BY MOUTH ONCE DAILY . APPOINTMENT REQUIRED FOR FUTURE REFILLS  Qty: 90 capsule, Refills: 0    Associated Diagnoses: Benign non-nodular prostatic hyperplasia with lower urinary tract symptoms      TRELEGY ELLIPTA 100-62.5-25 MCG/INH oral inhaler Inhale 1 puff into the lungs daily      blood glucose (NO BRAND SPECIFIED) test strip Use to test blood sugar 3 times daily  Qty: 100 strip, Refills: 0    Associated Diagnoses: Type 2 diabetes mellitus with stage 3 chronic kidney disease, with long-term current use of insulin (H)      blood  glucose monitoring (NO BRAND SPECIFIED) meter device kit Use to test blood sugar 3 times daily or as directed.  Qty: 1 kit, Refills: 0    Comments: Requesting Accu-check guide meter  Associated Diagnoses: Type 2 diabetes mellitus with stage 3 chronic kidney disease, with long-term current use of insulin (H)      cholecalciferol 2000 UNITS tablet Take 1 tablet by mouth daily.  Qty: 90 tablet, Refills: 3    Associated Diagnoses: Memory loss      insulin pen needle (BD ABRAM U/F) 32G X 4 MM miscellaneous Use 2 daily as directed.  Qty: 100 each, Refills: 11    Associated Diagnoses: Type 2 diabetes mellitus with stage 3 chronic kidney disease, with long-term current use of insulin (H)      nitroGLYcerin (NITROSTAT) 0.4 MG sublingual tablet Place 1 tablet (0.4 mg) under the tongue every 5 minutes as needed for chest pain (CALL 911 IF NOT IMPROVED AFTER THREE CONSECUTIVE DOSES)  Qty: 25 tablet, Refills: 5    Associated Diagnoses: Chronic ischemic heart disease      order for DME Equipment being ordered: CPAP  AIRSENSE 10  11-15 CM H2O  QUATTRO AIR SIZE MED  SN# [id number]  DN# [id number]         STOP taking these medications       albuterol (PROVENTIL) (2.5 MG/3ML) 0.083% neb solution Comments:   Reason for Stopping:         dexamethasone (DECADRON) 6 MG tablet Comments:   Reason for Stopping:                       Brief History of Illness:     From Admission H+P:   Alvaro Horton is a 80 year old male who was initially admitted 11/26-11/30/20 with pneumonia and hypoxemic respiratory failure due to Covid 19. He presented with weakness, fatigue, shortness of breath, cough and hypoxemia to 84% in ED. CXR revealed hypo-aerated lungs but otherwise clear.   He was discharged home on O2 2LPM.      He returns with persistent hypoxemia, dyspnea, confusion. Son is unable to take care of patient because he is also ill with Covid 19.                TODAY:     Subjective:  Mental status improving, clear and appropriate today.  Not  needing redirection  No other pain.  Doing well on 2LNC.  No new concerns.     ROS:   ROS: 10 point ROS neg other than the symptoms noted above in the HPI.   /66 (BP Location: Left arm)   Pulse 62   Temp 98.7  F (37.1  C) (Axillary)   Resp 20   Wt 79.7 kg (175 lb 11.3 oz)   SpO2 92%   BMI 26.72 kg/m     EXAM:  General: awake and alert, NAD, oriented x 3  Head: normocephalic  Neck: unremarkable, no lymphadenopathy   HEENT: oropharynx pink and moist    Heart: Regular rate and rhythm, no murmurs, rubs, or gallops  Lungs: clear to auscultation bilaterally with good air movement throughout  Abdomen: soft, non-tender, no masses or organomegaly  Extremities: no edema in lower extremities   Skin unremarkable.            Hospital Course:     Alvaro Horton is a 80 year old male admitted on 12/2/2020. He was initially admitted 11/26-11/30/20 with pneumonia and hypoxemic respiratory failure due to Covid 19 .Presented with weakness, fatigue, shortness of breath, cough and hypoxemia to 84% in ED. CXR revealed hypo aerated lungs but otherwise clear.   He was discharge home on O2 2LPM. He returns with persistent hypoxemia, dyspnea, confusion. Son is unable to take care of patient because he is also ill with Covid 19.         Acute respiratory failure with hypoxia due to 2019 novel coronavirus infection    Onset of symptoms: 11/25    Test positive: 11/26    Initially presented with weakness, fatigue, shortness of breath, cough and hypoxemia. Was hypoxemic to 84% in ED. Was placed on 2 L NC and has remained on this. CXR revealed hypo aerated lungs but otherwise clear.    Dexamethasone 6 mg daily started 11/27, discharged with dexamethasone daily - plan to continue through 12/5/20.    Remdesivir 11/27- 11/30/20     CT 12/2/2020 - Patchy ground-glass opacities are noted in both lungs, with areas of mild consolidation at both lung bases. Commonly reported imaging features of COVID-19 pneumonia are  present. Procalcitonin on readmission 12/2/2020 0.17 and BNP 2,274.      Inflammatory markers (including last hospitalization)    CRP : 22.1 ->-> 81.0 (peak) ->-> 33.0 --> 75.8 12/2/2020     IL-6: 36.85    Ferritin 696 ---> 583 12/2/2020     LDH: 348 - 311 - 338    Fibrinogen 540 - 483 - 492 - 614 12/2/2020     CPK: 765    D-Dimer 1.4 - 2.0 - 1.2 - 0.7    Retic count 1.3 - 1.0 - 0.8   Currently on O2 by NC at 2 lpm and stable. Doubt secondary bacterial pneumonia  - Continue dexamethasone (day =   -Continue supplemental oxygen and wean as able but likely will still need 2L on discharge.    12/3/2020 -- inflammatory markers up slightly but respiratory status stable, continue with above treatments.  Does appear to have encephalopathy as below.   Restarted Remdesivir (received 3 days in initial hospital stay), but per ID recommendations stopped 12/3.    12/5/2020 -- unchanged.  Wean oxygen as able.    Continued dexamethasone through 12/5.    12/6/2020 -- stable on 2LNC.  Off dexamethasone and remdesivir.          Acute COVID-induced encephalopathy   Memory loss/Early onset Alzheimer dementia   12/2/20 - 12/3/20 -- Recent diagnosis per son, not on medications.  acutely worse with current infection consistent with acute encephalopathy due to COVID.   Was highly functional prior to infection, this appears to be a clear change from basleine.   Here has needed frequent redirection throughout admission so far - starting zyprexa 5mg daily at bedtime, continue to treat COVID as above.  I think this is very unlikely to be due to dexamethasone but only has 2 days left of this anyway.     12/4/2020 -- slightly improved.  Continue zyprexa  With prn seroquel.    12/6/2020 -- mental status clearly improving, doing well off 1:1 and no longer needing any redirection  Continue zyprexa twice daily on discharge but hope to wean off this as an outpatient as mental status improves.       Falls frequently  Generalized weakness  Son notes  generalized weakness, patient was found down early am hours of 11/25.   - PT/OT/SW consults, likely needs TCU on discharge as below.       Suspect type 2 MI, demand ischemia  Moderate RV dilatation and decreased RV function  Moderate pulmonary hypertension   12/2/20 --  During last hospialization Troponin 0.044-->0.069-->0.107-->0.141-->0.216-->0.101-->0.047. Suspected due to a combination of atrial fibrillation, demand ischemia, and possible COVID-19 infection. Echo showed normal LV size and function, LVEF 60-65%, no RWMA, RV mod dilated and mod decreased systolic function, RV volume overload and mod pulmonary hypertension. Previous RV dilatation, decreased function and pulmonary hypertension were mild in May 2019. Appears to be progression of pulmonary hypertension and RV dysfunction likely due to underlying COPD.   BNP elevated, has mild edema, weight stable.   - Recheck Troponin 0.043  - Trial of Lasix 40mg IV x 1, reasess in AM  - Hold usual lasix   - I/o , daily weights  12/3/2020 -- troponin slightly up again, ECG unchanged, does not look like ACS.  Follow.  Weight down, creatinine stable, resuming home lasix for now.    12/6/2020 -- remains asymptomatic.  continue prior to admission medications.       COPD (chronic obstructive pulmonary disease)  Asthma  Baker's Lung    Patient managed prior to admission with Trelegy Ellipta daily and as needed albuterol. PFTs in 04/2018 revealed severe airflow obstruction. Suspect this is baseline.  - Continue home therapies.      Atrial fibrillation   12/2/20 --   Noted in history as transient episode following CABG in 2009. Had  Seen cards 3/2020 and had ziopatch - no a fib seen. EKG 8/2020 with NSR with long 1st degree AVB. Not on anticoagulation prior to last admission.  Does take metoprolol 25 mg twice daily. Takes 325 mg aspirin daily.     On previous admission, patient in atrial fibrillation, rate controlled. Likely exacerbated by acute illness but could be due to  worsening RV dysfunction as well. CHADS-Vasc score 6. Does have falls at home. Started on Eliquis prior to discharge (also partly due to Covid 19)  - Telemetry  - Continue Eliquis  12/6/2020 -- no changes on discharge.       Type 2 diabetes mellitus with diabetic chronic kidney disease   12/2/20 -- A1c of 8.0. Patient managed prior to admission with  Metformin and NPH BID. Creatinine is stable.   Renal fx slightly worse. BS >250  - Hold usual NPH 70/30, metformin   - Start lantus 15, use medium insulin sliding scale until PO intake acertained  12/3/2020 -- running high, increased to novolog 3/3/3 with meals and high dose sliding scale insulin, increase lantus to 17u.    12/4/2020 --  increasing novolog to 6/6/6.   12/5/2020 -- still up, increasing lantus to 22    12/6/2020 -- glucose still running high, but ok to discharge on home novolin with high dose sliding scale insulin for now, glucose should improve significantly over the next couple of days now that he's off the dexamethasone.       Obstructive sleep apnea  Patient uses CPAP at home, but son reports with illness and dementia he has not been compliant recently.       History of CVA (cerebrovascular accident)  CVA at time of CABG, 2009.    - Continue aspirin     CAD, s/p coronary bypass grafting in 11/2009 with LIMA to LAD and SVG to OM1 and 2  Elevated troponin    12/2/20 -- Follows with cards, last seen at virtual visit in 03/2020.  Stress echo 06/2019 with EF >70%.   - Hold usual lasix.   - Continue aspirin  - Continue home Lisinopril 10, metorolol 25 BID.  12/3/2020 -- troponin slightly up this AM, see discussion above,  Resumed home lasix as above.     12/6/2020 -- doing well.  no change.       Hypertension goal BP (blood pressure) < 140/90  12/2/20 -- Stable.  Continue home Lisinopril 10, metorolol 25 BID.  12/6/2020 -- no change.         Hypertrophy of prostate without urinary obstruction   - Continue prior to admission flomax and  finasteride.     Hyperlipidemia LDL goal <70   - Continue prior to admission atorvastatin 80 mg daily.        Esophageal reflux   - Continue prior to admission ranitidine twice daily.         Prophylaxis   On eliquis                  Discharge Instructions and Follow-Up:     Discharge diet: Orders Placed This Encounter      Regular Diet Adult       Discharge activity: Activity as tolerated   Discharge follow-up: Follow-up with primary care provider or at TCU within 1 week.            Discharge Disposition:     Discharged to rehabilitation facility      Attestation:  I have reviewed today's vital signs, notes, medications, labs and imaging.  Amount of time performed on this discharge summary: 60 minutes.    Joseph Miller MD, MD

## 2020-12-06 NOTE — PROGRESS NOTES
Patient is oriented x 4 today.  Using call light appropriately.  LS still diminished,  Coarse    Yong Cook RN

## 2020-12-07 NOTE — PLAN OF CARE
Occupational Therapy Discharge Summary    Reason for therapy discharge:    Discharged to transitional care facility.    Progress towards therapy goal(s). See goals on Care Plan in [facility] electronic health record for goal details.  Goals partially met.  Barriers to achieving goals:   discharge from facility.    Therapy recommendation(s):    Continued therapy is recommended.  Rationale/Recommendations:  TCU to increase strength/activity tolerance for ADLs/IADLs.

## 2020-12-07 NOTE — PROGRESS NOTES
Clinic Care Coordination Contact  Care Coordination Transition Communication    Referral Source: IP Report    Clinical Data: Patient was hospitalized at [facility] from 12/2/20 to 12/6/20 with diagnosis of covid-19 psoitive.     Transition to Facility:              Facility Name: [facility]              Contact name and phone number/fax: Gabriela DAVIS [phone]    Plan: RN/SW Care Coordinator will await notification from facility staff informing RN/SW Care Coordinator of patient's discharge plans/needs. RN/SW Care Coordinator will review chart and outreach to facility staff every 4 weeks and as needed.     Colette Edward RN, CCM - Primary Care Clinic RN Coordinator  [facility]   12/7/2020    8:56 AM  [phone]

## 2020-12-21 NOTE — TELEPHONE ENCOUNTER
Since he has a history of coronary artery disease and atrial fibrillation, I suggest continuing aspirin plus the apixaban (Eliquis).   OWEN AMATO MD

## 2020-12-21 NOTE — TELEPHONE ENCOUNTER
[facility] now requests orders and shares plan of care/discharge summaries for some patients through [employer].  Please REPLY TO THIS MESSAGE OR ROUTE BACK TO THE AUTHOR in order to give authorization for orders when needed.  This is considered a verbal order, you will still receive a faxed copy of orders for signature.  Thank you for your assistance in improving collaboration for our patients.    Patient had level 2 medication interaction flagged between aspirin and apixaban, please advise if okay to continue or change recommended. Please call with any questions or concerns [phone].    Thank you,  Dunia ROSALES   [facility]

## 2020-12-22 NOTE — TELEPHONE ENCOUNTER
"Requested Prescriptions   Pending Prescriptions Disp Refills     atorvastatin (LIPITOR) 80 MG tablet [Pharmacy Med Name: Atorvastatin Calcium 80 MG Oral Tablet] 90 tablet 0     Sig: Take 1 tablet by mouth once daily       Statins Protocol Failed - 12/20/2020  5:02 PM        Failed - LDL on file in past 12 months     Recent Labs   Lab Test 07/29/19  1137   LDL 61             Passed - No abnormal creatine kinase in past 12 months     Recent Labs   Lab Test 12/03/20  0425                   Passed - Recent (12 mo) or future (30 days) visit within the authorizing provider's specialty     Patient has had an office visit with the authorizing provider or a provider within the authorizing providers department within the previous 12 mos or has a future within next 30 days. See \"Patient Info\" tab in inbasket, or \"Choose Columns\" in Meds & Orders section of the refill encounter.              Passed - Medication is active on med list        Passed - Patient is age 18 or older           tamsulosin (FLOMAX) 0.4 MG capsule [Pharmacy Med Name: Tamsulosin HCl 0.4 MG Oral Capsule] 90 capsule 0     Sig: TAKE 1 CAPSULE BY MOUTH ONCE DAILY . APPOINTMENT REQUIRED FOR FUTURE REFILLS       Alpha Blockers Passed - 12/20/2020  5:02 PM        Passed - Blood pressure under 140/90 in past 12 months     BP Readings from Last 3 Encounters:   12/06/20 115/66   11/30/20 124/49   11/23/20 (!) 145/79                 Passed - Recent (12 mo) or future (30 days) visit within the authorizing provider's specialty     Patient has had an office visit with the authorizing provider or a provider within the authorizing providers department within the previous 12 mos or has a future within next 30 days. See \"Patient Info\" tab in inbasket, or \"Choose Columns\" in Meds & Orders section of the refill encounter.              Passed - Patient does not have Tadalafil, Vardenafil, or Sildenafil on their medication list        Passed - Medication is active on med " list        Passed - Patient is 18 years of age or older

## 2020-12-22 NOTE — TELEPHONE ENCOUNTER
[facility] now requests orders and shares plan of care/discharge summaries for some patients through [employer].  Please REPLY TO THIS MESSAGE OR ROUTE BACK TO THE AUTHOR in order to give authorization for orders when needed.  This is considered a verbal order, you will still receive a faxed copy of orders for signature.  Thank you for your assistance in improving collaboration for our patients.    ORDER  PT 1w1, 2w3 for gait, balance, LE strengthening and to improve activity tolerance.    Honey Oviedo, PT, DPT  [email or web address]  [phone]

## 2020-12-23 NOTE — RESULT ENCOUNTER NOTE
Please call.  Glucose is high.   The creatinine, a blood test to measure kidney function is elevated indicating some decrease in kidney function..  It is a little worse.    PLAN: No new changes in treatment recommended.

## 2020-12-25 PROBLEM — R53.1 WEAKNESS: Status: ACTIVE | Noted: 2020-01-01

## 2020-12-25 NOTE — PROGRESS NOTES
Patient up to chair with gait belt, walker and assist x 1. Denies pain or shortenss of air. IV antibiotic administered. Chair alarm on for safety.

## 2020-12-25 NOTE — ED PROVIDER NOTES
"  History     Chief Complaint   Patient presents with     Altered Mental Status     COVID POS     Fall     HPI  Alvaro Horton is a 80 year old male who has past medical history significant for multiple medical issues including history of coronary artery disease, hypertension, COPD, recent hospitalizations for coronavirus infection, now presenting to the emergency department with concerns regarding altered mental status in addition to fall.  Initial history obtained from EMS as patient is difficult historian, with history of dementia.  Patient had been sitting on the toilet, and subsequently fell off the toilet.  He was unable to get back up.  Upon EMS arrival, patient was noted to be covered in feces.  He is supposed to be on home oxygen, however home oxygen was sitting off of his face.  Patient was cool to the touch.  Family member stated that they are unable to care for the patient at home.    Upon arrival, patient denies any significant current complaints.  He is aware it is [place] day.  Denies any other concerns.    I reviewed previous medical records.  Notably, patient had been hospitalized at the end of November, and subsequently December 2 through December 6.  Patient had hospitalization secondary to COPD with exacerbation, Alzheimer's dementia, with acute respiratory failure with hypoxia.  He had been with COVID-19 virus infection at that time.  Patient had been ultimately discharged home on home oxygen at 2 L/min.  Patient had return during his second hospitalization as his son was unable to take care of the patient because the son was also ill with COVID-19.    Allergies:  Allergies   Allergen Reactions     Prednisone Other (See Comments)     Severe interaction with DM       Problem List:    Patient Active Problem List    Diagnosis Date Noted     Hypertension goal BP (blood pressure) < 140/90 10/09/2006     Priority: High     Bakers' asthma (H)      Priority: High     Dx possible \"Baker's Lung\" " 2001 [facility]. RAST for bakers yeast negative 2002. Has occupational exposures with related worsening asthmatic symptoms. CT 2/08- no fibrosis.       Moderate persistent asthma      Priority: High     PFTS 1997 - FEV1- 3.39 (64%), ratio 0.74, 24% change hsw78-52% with bronchodilators,  TLC 86%, %, DLCO 78%. Methacholine challenge positive at level 7 2001.  PFTS 2004 - FEV1- 1.93 (63%), ratio 0.77. PFTS 4/08 - FEV1- 2.13 (72%), ratio 0.71, no change with bronchodilators,  TLC 78%, RV 96%, DLCO 64%          Coronary artery disease involving native coronary artery of native heart      Priority: High     atypical chest pain 2001 with GXT echo- decreased LVF, angiogram 2001- nonobstructing 3 vessel disease.   Repeat angio 2004- prox RCA 50-60%,  OM2- 40%, D1 30-40%, LAD 30-40%.   Cardiolite 2005 during hospitalization for SOB in [place] reported to reveal no ischemia.   Adenosine cardiolite 1/08- small slightly reversible inferior defect, most likely consistent with diaphragm attenuation with bowel uptake artifact. USA, Angio 11/09- Severe LM disease, severe subtotal occlusion of a large branch OM1.    S/p CABG x2 11/09- LIMA-D1, SVG to OM1, OM2. GXT 8/10- 5.8 mets, 121 (80%) HR, normal ekg, cardiolite- small fixed inferior/basal defect with small area khurram-infarct ischemia extending into the mid ventricle. EF 67%.  4/25/13:adenosine cardiolyte stress test through [facility] with normal EF and no ischemia.   1/16/2016 nuclear stress test:1.  Myocardial perfusion imaging using single isotope technique demonstrated no evidence for myocardial ischemia. 2. Gated images demonstrated normal wall motion with a calculated ejection fraction of 61%.  3. Compared to the prior study from 2011 there are no significant changes .           Weakness 12/25/2020     Priority: Medium     Alzheimer's dementia without behavioral disturbance, unspecified timing of dementia onset (H) 12/03/2020     Priority: Medium     COVID-19 virus  infection 12/02/2020     Priority: Medium     Type 2 diabetes mellitus (H) 12/02/2020     Priority: Medium     Acute respiratory failure with hypoxia (H) 11/27/2020     Priority: Medium     History of CVA (cerebrovascular accident) 11/27/2020     Priority: Medium     Mild Broca's aphasia, confusion, right hand dysesthesia after angiogram 2009. MRA new small area of restricted diffusion in the white matter of the medial right temporal lobe,  consistent with a recent small infarct. Speech problems resolved, still mild right hand problems.         Falls frequently 11/27/2020     Priority: Medium     Generalized weakness 11/27/2020     Priority: Medium     Thrombocytopenia (H) 11/27/2020     Priority: Medium     Claudication of calf muscles (H) 08/27/2020     Priority: Medium     Type 2 diabetes mellitus with diabetic chronic kidney disease (H) 10/30/2015     Priority: Medium     [facility] 08/11/2014     Priority: Medium     State Tier Level:    Status:  Unable to re-connect  Care Coordinator:  Kecia Mills    See Letters for H Care Plan  Date:  August 11, 2014             Chronic kidney disease, stage 2 (mild) 05/06/2014     Priority: Medium     9/9/2015:He has had two abnormal MAC in the past.        COPD (chronic obstructive pulmonary disease) (H) 11/18/2013     Priority: Medium     PFT 11/15/13 results:FVC=59%, FEV1=54%, FEV1/FVC=92%.  His DLCO was 58%1. Spirometry: FEV1 moderately reduced. FVC moderately reduced. FEV1/FVC ratio reduced. 2. Bronchodilator Response: A 14% improvement is seen in FEV1 with bronchodilators. 3. Lung Volumes: Total lung capacity normal. Residual volume increased. Residual volume total lung capacity ratio increased. 4. DLCO: Moderately reduced.   INTERPRETATION: Moderate obstructive lung disease. Reversibility with bronchodilators is seen on testing today. Lung volumes show evidence for air trapping. DLCO is reduced, consistent with loss of capillary-alveolar exchange as in  emphysema, pulmonary hypertension, chronic pulmonary embolus, pulmonary fibrosis or other pulmonary vascular disease. A concomitant restrictive lung disease process may be suspected on the basis of moderate reductions in FEV1 and FVC, with a fairly well-preserved FEV1/FVC ratio and a borderline low normal total lung capacity.   11/15/13:exercise oximetry did not show significant desaturation below 91%.  PFT February, 2014 results:FVC=54%, FEV1=48%, FEV1/FVC=64%.  DLCO=59%  9/6/2016 pulmonary consult:COPD with restrictional components and diffusion defect, history Baker's lung.          Hyperlipidemia LDL goal <70 04/17/2013     Priority: Medium     Atrial fibrillation (H) 12/14/2009     Priority: Medium     Afib after CABG 11/2009  Afib during hospitalization 11/2020, 12/2020       Obstructive sleep apnea- mild (AHI 11) 04/14/2008     Priority: Medium     ESS 20/24. Sleep study [facility]: 4/8/08 (196#) AHI 11.2, REM AHI 49.6, low O2 87%.         Hypertrophy of prostate without urinary obstruction      Priority: Medium     Elevated PSA. Bx negative 2004.       Advanced care planning/counseling discussion 09/24/2012     Priority: Low     Does not have a directive 9/12       Dyslexia 11/11/2010     Priority: Low     essentially unable to read       CTS (carpal tunnel syndrome) 05/12/2010     Priority: Low     EMG 5/10-  median neuropathy at the right wrist, moderate in severity electrically, right ulnar neuropathy localizing to the elbow, mild chronic right C6 radiculopathy without evidence for acute denervation.        Alzheimer's dementia (H) 05/03/2010     Priority: Low     MMSE 27/30 (0/3 attention recall, ?history of dylexia), PHQ9-16 7/09. Recommended neurocogntive testing, did not complete.  MMSE 23/30 6/10 (attention, county, if/and/buts).Neurocogntive evaluation  1/10- not suspicious for emerging dementia. Felt likely due hearing loss/dyslexia.   10/29/2018:He was seen at [facility] clinic 10/10/2018.   Dr. Oksana Mnuoz.   He had an EEG.   Diagnosed with Alzheimer's dementia.   Records not yet reviewed.        Benign paroxysmal vertigo 02/03/2009     Priority: Low     Admit 2/09. MRI/MRA head and neck negative.       Sensorineural hearing loss, bilateral 10/08/2007     Priority: Low     Esophageal reflux 11/06/2006     Priority: Low     PSORIASIS      Priority: Low     OVERACTIVE BLADDER      Priority: Low     PVR 84 2004.          Past Medical History:    Past Medical History:   Diagnosis Date     Acute calculous cholecystitis 03/02/2019     Altered mental status 12/31/2014     Calculus of kidney 1990     COPD (chronic obstructive pulmonary disease) (H)      Depression 1979     Diabetes (H)      Elevated troponin 11/27/2020     Encephalopathy 09/03/2018     Heart disease      Left hand weakness 12/14/2009     PNEUMONIA, ORGANISM NOS 02/14/2008     Sleep apnea        Past Surgical History:    Past Surgical History:   Procedure Laterality Date     C ANESTH,DX ARTHROSCOPIC PROC KNEE JOINT  1994, 1998     Left     CARDIAC SURGERY  11/09    CABG x2 - LIMA-D1, SVG to OM1, OM2.     GENITOURINARY SURGERY  1990    ESWL and percutaneous nephrolithotomy     LAPAROSCOPIC CHOLECYSTECTOMY N/A 3/4/2019    Procedure: LAPAROSCOPIC CHOLECYSTECTOMY;  Surgeon: Burt Mendieta MD;  Location: [facility] OR     ORTHOPEDIC SURGERY  8/12    right CTR     SURGICAL HISTORY OF -   1998    Hernia repair     SURGICAL HISTORY OF -   2004    NormalColonoscopy      SURGICAL HISTORY OF -   2006    Normal Colonoscopy       Family History:    Family History   Problem Relation Age of Onset     C.A.D. Father         40s     Hypertension Father      C.A.D. Paternal Grandfather      Heart Disease Paternal Grandfather      Diabetes Brother      C.A.D. Brother      Heart Disease Brother      Hypertension Brother      Gastrointestinal Disease Son         Crohn's disease     Gastrointestinal Disease Other         2 grandaughters with Crohn's disease     Cancer -  colorectal No family hx of        Social History:  Marital Status:   [5]  Social History     Tobacco Use     Smoking status: Never Smoker     Smokeless tobacco: Never Used   Substance Use Topics     Alcohol use: No     Alcohol/week: 0.0 standard drinks     Drug use: No        Medications:         albuterol (PROAIR HFA/PROVENTIL HFA/VENTOLIN HFA) 108 (90 Base) MCG/ACT inhaler       apixaban ANTICOAGULANT (ELIQUIS) 5 MG tablet       aspirin (ASA) 325 MG EC tablet       atorvastatin (LIPITOR) 80 MG tablet       blood glucose (NO BRAND SPECIFIED) test strip       blood glucose monitoring (NO BRAND SPECIFIED) meter device kit       cholecalciferol 2000 UNITS tablet       cyanocobalamin (BL VITAMIN B-12) 500 MCG TABS       famotidine (PEPCID) 20 MG tablet       finasteride (PROSCAR) 5 MG tablet       furosemide (LASIX) 20 MG tablet       insulin aspart (NOVOLOG PEN) 100 UNIT/ML pen       insulin aspart (NOVOLOG PEN) 100 UNIT/ML pen       insulin pen needle (BD ABRAM U/F) 32G X 4 MM miscellaneous       lisinopril (ZESTRIL) 10 MG tablet       metFORMIN (GLUCOPHAGE) 1000 MG tablet       metoprolol tartrate (LOPRESSOR) 25 MG tablet       nitroGLYcerin (NITROSTAT) 0.4 MG sublingual tablet       NOVOLIN 70/30 FLEXPEN RELION susp       OLANZapine (ZYPREXA) 5 MG tablet       order for DME       tamsulosin (FLOMAX) 0.4 MG capsule       TRELEGY ELLIPTA 100-62.5-25 MCG/INH oral inhaler          Review of Systems   Constitutional: Negative for fever.   Respiratory: Negative for shortness of breath.    Cardiovascular: Negative for chest pain.   Gastrointestinal: Negative for abdominal pain.   Psychiatric/Behavioral: Positive for confusion.   All other systems reviewed and are negative.      Physical Exam   BP: 115/65  Pulse: 60  Temp: 98.4  F (36.9  C)  Resp: 22  Weight: 79.4 kg (175 lb)  SpO2: 98 %(86% RA)      Physical Exam  /77   Pulse 50   Temp 98.4  F (36.9  C) (Oral)   Resp 22   Wt 79.4 kg (175 lb)   SpO2 98%    BMI 26.61 kg/m    General: alert, interactive, in no apparent distress.  Covered in feces.  Moving all extremities.  Alert and oriented to person, and place  Head: atraumatic  Nose: no rhinorrhea or epistaxis  Ears: no external auditory canal discharge or bleeding.    Eyes: Sclera nonicteric. Conjunctiva noninjected. PERRL   Mouth: no tonsillar erythema, edema, or exudate  Neck: supple, no palp LAD  Lungs: CTAB  CV: RRR, S1/S2; peripheral pulses palpable and symmetric  Abdomen: soft, nt, nd, no guarding or rebound. Positive bowel sounds  Extremities: no cyanosis    Skin: A couple of various bruises on the upper extremities of various ages  Neuro:  strength 5/5 in UE and LEs bilaterally, sensation intact to light touch in UE and LEs bilaterally;       ED Course        Procedures  EKG, reviewed by myself shows atrial fibrillation.  Rate 56 bpm.  Bifascicular block is present, with nonspecific T wave changes with flattening of T waves.             Critical Care time:  none               Results for orders placed or performed during the hospital encounter of 12/25/20 (from the past 24 hour(s))   CBC with platelets differential   Result Value Ref Range    WBC 9.7 4.0 - 11.0 10e9/L    RBC Count 3.33 (L) 4.4 - 5.9 10e12/L    Hemoglobin 10.0 (L) 13.3 - 17.7 g/dL    Hematocrit 32.7 (L) 40.0 - 53.0 %    MCV 98 78 - 100 fl    MCH 30.0 26.5 - 33.0 pg    MCHC 30.6 (L) 31.5 - 36.5 g/dL    RDW 16.8 (H) 10.0 - 15.0 %    Platelet Count 207 150 - 450 10e9/L    Diff Method Automated Method     % Neutrophils 69.9 %    % Lymphocytes 20.4 %    % Monocytes 7.7 %    % Eosinophils 1.0 %    % Basophils 0.5 %    % Immature Granulocytes 0.5 %    Nucleated RBCs 0 0 /100    Absolute Neutrophil 6.8 1.6 - 8.3 10e9/L    Absolute Lymphocytes 2.0 0.8 - 5.3 10e9/L    Absolute Monocytes 0.8 0.0 - 1.3 10e9/L    Absolute Eosinophils 0.1 0.0 - 0.7 10e9/L    Absolute Basophils 0.1 0.0 - 0.2 10e9/L    Abs Immature Granulocytes 0.1 0 - 0.4 10e9/L    Absolute  Nucleated RBC 0.0    Comprehensive metabolic panel   Result Value Ref Range    Sodium 145 (H) 133 - 144 mmol/L    Potassium 4.5 3.4 - 5.3 mmol/L    Chloride 112 (H) 94 - 109 mmol/L    Carbon Dioxide 30 20 - 32 mmol/L    Anion Gap 3 3 - 14 mmol/L    Glucose 41 (LL) 70 - 99 mg/dL    Urea Nitrogen 44 (H) 7 - 30 mg/dL    Creatinine 1.62 (H) 0.66 - 1.25 mg/dL    GFR Estimate 39 (L) >60 mL/min/[1.73_m2]    GFR Estimate If Black 45 (L) >60 mL/min/[1.73_m2]    Calcium 8.3 (L) 8.5 - 10.1 mg/dL    Bilirubin Total 0.4 0.2 - 1.3 mg/dL    Albumin 2.5 (L) 3.4 - 5.0 g/dL    Protein Total 6.3 (L) 6.8 - 8.8 g/dL    Alkaline Phosphatase 89 40 - 150 U/L    ALT 37 0 - 70 U/L    AST 15 0 - 45 U/L   Troponin I   Result Value Ref Range    Troponin I ES 0.032 0.000 - 0.045 ug/L   Nt probnp inpatient (BNP)   Result Value Ref Range    N-Terminal Pro BNP Inpatient 12,656 (H) 0 - 1,800 pg/mL     *Note: Due to a large number of results and/or encounters for the requested time period, some results have not been displayed. A complete set of results can be found in Results Review.       Medications   dextrose 50 % injection 50 mL (has no administration in time range)       Assessments & Plan (with Medical Decision Making)  80 year old male who has past medical history significant for dementia, and recent hospitalization secondary to encephalopathy due to COVID-19 infection, COPD, with chronic respiratory failure requiring 2 L nasal cannula oxygen at home.  Patient presents to the emergency department via EMS for concerns regarding fall off of toilet this morning.  He had been found laying in the bathroom, covered in feces, with oxygen not on his nose.  According to EMS, family members are not able to care for patient any longer.  Patient denies any significant focal areas of complaint, specifically no pain, however is tender in the abdomen.  Given the fall, with uncertain trauma, decision made for CT of the head, chest, abdomen, and pelvis.  CT  images are pending at this time.    Laboratory work-up showing CBC which is unremarkable.  CMP showing glucose of 41.  Patient was given juice, and only had minimal increase of glucose value.  Given 1 amp of D50.    Uncertain if patient's fall to the ground was related to hypoglycemia versus other cause.  BNP is elevated.  Troponin is not significantly elevated..    At this point, patient is pending further monitoring of glucose, in addition to CTA of head, chest, abdomen, and pelvis.    I did discuss with Dr. Price at 05:50am.  Plan is for hospital observation admission assuming CT scans do not identify any other potential injuries or ailments which may require transfer.  Patient will be signed out to oncoming ED provider pending CT imaging studies.    At this point it is not safe for patient to be discharged back to his home as family is unable to care for him at home.     I have reviewed the nursing notes.    I have reviewed the findings, diagnosis, plan and need for follow up with the patient.       New Prescriptions    No medications on file       Final diagnoses:   Generalized muscle weakness   Other fatigue   Hypertension goal BP (blood pressure) < 140/90   Alzheimer's dementia without behavioral disturbance, unspecified timing of dementia onset (H)   Chronic obstructive pulmonary disease with acute exacerbation (H)   Falls frequently       12/25/2020   [facility] EMERGENCY DEPT     Burt Stevenson MD  12/25/20 0596       Burt Stevenson MD  12/25/20 0600

## 2020-12-25 NOTE — ED NOTES
Pt refusing to get onto the CT table. Pt states he wants to go home and that he knows that nothing is broken. Tried to talk pt into the scan and explained the benefits. Pt still refusing. MD notified.

## 2020-12-25 NOTE — ED TRIAGE NOTES
Pt Presents via ambulance where he lives with his son. Pt fell off the toilet and 911 was called to scene by son for a fall and altered mental status. Pt was covered in stool when they arrived on scene. Pt has hx of dementia but was able to answer all  Orientation questions correctly. Pt was diagnosed with covid in November and hospitalized. Went to TCU and then back home with son. Pt denied any pain after fall. Pt was placed on 10 L non-re breather by EMS because of O2 saturation of 86%. Pt has home O2 due to COPD but unknown how many liters is baseline. Son reported that pt has not been acting like himself for 3 days.

## 2020-12-25 NOTE — PROGRESS NOTES
"[facility] ADMISSION NOTE    Patient admitted to room 2304 at approximately 0800 via cart from emergency room. Patient was accompanied by nurse.     Verbal SBAR report received from BOBBY Foy prior to patient arrival.     Patient trasferred to bed via air leia. Patient alert and oriented X 3. The patient is not having any pain.  . Admission vital signs: Blood pressure 113/52, pulse 59, temperature 98.4  F (36.9  C), temperature source Oral, resp. rate 18, height 1.727 m (5' 8\"), weight 83.6 kg (184 lb 4.9 oz), SpO2 98 %. Patient was oriented to plan of care, call light, bed controls, tv, telephone, bathroom and visiting hours.     Risk Assessment    The following safety risks were identified during admission: fall. Yellow risk band applied: YES.     Skin Initial Assessment    This writer admitted this patient and completed a full skin assessment and Ricardo score in the Adult PCS flowsheet. Appropriate interventions initiated as needed.     Secondary skin check completed by BOBBY Benson.    Ricardo Risk Assessment  Sensory Perception: 3-->slightly limited  Moisture: 3-->occasionally moist  Activity: 3-->walks occasionally  Mobility: 3-->slightly limited  Nutrition: 3-->adequate  Friction and Shear: 3-->no apparent problem  Ricardo Score: 18  Bed Support Surface: Atmos Air mattress  Ricardo Intervention(s) Implemented: draw sheets, HOB elevated 30 degrees or less, incontinence care    Education    Patient has a [place] to Observation order: Yes  Observation education completed and documented: Yes      Xochitl Magallon RN    "

## 2020-12-25 NOTE — ED NOTES
DATE:  12/25/2020   TIME OF RECEIPT FROM LAB:  0549  LAB TEST:  glucose  LAB VALUE:  41  RESULTS GIVEN WITH READ-BACK TO (PROVIDER):  Dr. Stevenson  TIME LAB VALUE REPORTED TO PROVIDER:   0551

## 2020-12-25 NOTE — ED NOTES
"Patient has  [place] to Observation  order. Patient has been given the Observation brochure -  What does Observation mean to me.\"  Patient has been given the opportunity to ask questions about observation status and their plan of care.        "

## 2020-12-25 NOTE — H&P
[facility]     History and Physical - Hospitalist Service       Date of Admission:  12/25/2020    Assessment & Plan   Alvaro Horton is a 80 year old male admitted on 12/25/2020. He has history of coronary artery disease, chronic obstructive pulmonary disease, atrial fibrillation, benign prostatic hypertrophy, CKD, recent COVID diagnosis and memory loss. He presents after a fall off the toilet at home.    Fall with history of frequent falls  Generalized Weakness  Fall off toilet this morning. May be multifactorial with weakness due to recurrent recent hospitalizations in the setting of recent COVID, possible UTI, hypoglycemia vs vasovagal syncope associated with BM (patient denies any fainting or lightheadedness/dizziness).   - PT/OT consult  - treat UTI as below  - monitor blood sugars as below    Altered Speech  Noted by son to have difficult to understand speech and at times not making sense. Intermittent in past week. Not appreciated on admission by provider or during nurse intake.  - monitor for recurrence    Memory loss/Dementia  Memory issues noted in the past 6 months or so, not on any medications for this. Started on Zyprexa during last hospitalization due to encephalopathy during COVID infection initially needing frequent redirection and 1:1 sitter prior to initiation, improved on medication.   - monitor mental status   - continue prior to admission Zyprexa    Diarrhea with noted constipation on CT  Suspect overflow diarrhea surrounding constipation. Will try to clear out.  - senna and miralax    UTI  Urine shows leukocyte esterase, 39 WBC and bacteria. No clear symptoms but given weakness and noted changes since TCU will treat while culture is pending.   - IV ceftriaxone  - follow pending culture     Hypoglycemia with history of Diabetes Mellitus, Type II  Glucose 40 on arrival, received dextrose with improvement. Son states he is unsure if his dad is doing his insulin  "properly with his memory issues. Patient notes elevated blood sugars recently and good compliance including ability to recall dosage amount. Son states evening blood sugar was 170s.  Last a1C 8.0% on 11/2020.   - continue home insulin, monitor for hypoglycemia on current regimen  - low sliding scale insulin  - may need to consider alternate regimen if patient unable to safely give insulin with memory issues  - recheck hemoglobin A1c  - hypo/hyperglycemia protocol with blood glucose monitoring    Recovered COVID 19 Infection with ongoing hypoxia  Diagnosed 11/26/20 with COVID 19, hospitalized x 2 and discharged on 2 LPM of oxygen. Currently considered recovered from infectious standpoint. Ongoing shortness of breath, occasional cough though does have underlying COPD so some chronic issues with this too. CT showed \"Small bilateral pleural effusions and associated basilar atelectasis/consolidation\", compared to CT 12/2/20 the ground glass changes are no longer noted.  - oxygen titrated to O2>90%  - No precautions needed    Coronary Artery Disease  Moderate Pulmonary Hypertension  Previously diagnosed, no current concerning symptoms. History of CABG in 2009 with LIMA to LAD and SVG to OM1 and 2. Follows with cardiology, last seen 3/2020. Last Echo 11/2020 EF 60-65%, no wall motion changes, decreased RV systolic function with elevated RV pressure.   - continue home ASA, statin, beta blocker, ACEi     Hypertension  Chronic. Blood pressures reviewed, mildly low -115.   - continue home furosemide, lisinopril, metoprolol    COPD (chronic obstructive pulmonary disease)  Asthma  Baker's Lung  Lungs faint wheeze, occasionally coarse sounding. Suspect recovering still from COVID 19. PFTs in 04/2018 revealed severe airflow obstruction.   - Continue home Trelegy Ellipta if available from family, prn albuterol     Atrial fibrillation  Transient following CABG in 2009. ZioPatch 3/2020 did not show atrial fibrillation. ECG " "8/2020 showed NSR. During admission 11/2020 for COVID 19 atrial fibrillation seen at home. Started on anticoagulation with Eliquis after discussion with patient and son due to fall risk and also CHADsVAC of 6 - possibly just short term but plan was to discuss with PCP on follow up in late December/early January.  - continue home metoprolol  - continue home Eliquis for now     History of CVA  Hyperlipidemia  Occurred at time of CABG in 2009.  - continue home aspirin, statin    Benign Prostatic Hyperplasia  Chronic.  - continue home finasteride, tamsulosin     Diet: Moderate Consistent CHO Diet    DVT Prophylaxis: home Eliquis  Olivo Catheter: not present  Code Status: No CPR- Do NOT Intubate , discussed with patient directly on admission. Also made son aware of the patient's new wishes, he is supportive of his Dad's decision.       Disposition Plan   Expected discharge: 1-3 days, recommended to transitional care unit once antibiotic plan established, mental status at baseline and safe disposition plan/ TCU bed available. Discussed with patient's son, hopeful that this could be a temporary living situation, felt that LTC would be \"last resort\" though does note that he cannot currently manage the patient with his current needs and decreased mobility.  Entered: Herminia Skinner PA-C 12/25/2020, 5:18 PM     The patient's care was discussed with the Attending Physician, Dr. Wilfrid Price, Patient and Patient's Family.    Herminia Skinner PA-C  [facility]   Contact information available via [facility] Paging/Directory  ______________________________________________________________________    Chief Complaint   Fall, generalized weakness.    History is obtained from the patient, review of chart, and patient's family, son Jae    History of Present Illness   Alvaro Horton is a 80 year old male who presents following a fall.    He was recently admitted from 11/27/20 - 11/30/20 with COVID-19 and " "then readmitted 12/2/20-12/6/20 with ongoing concerns of COVID as well as family being unable to care for him at home. He went to TCU from there until one week ago.     He has been at home since then and the son states that he has lost some of the progress he made at the TCU in terms of strength. He has been refusing to get up to use the restroom and instead urinating in a urinal. He has been. His son has noted some intermittent changes in his speech, at times he is difficult to understand and at other times he does not make sense. He does have issues with memory but this all seems worse since the COVID diagnosis. He calls out many times during the night, sometimes he is dreaming but most of the time he is requesting various things.     He has had a few days of loose stools and difficulty making it to the bathroom in time. This morning he was on the toilet and the patient reports he had difficulty getting up as it is too low to the ground which resulted in a fall. His son was unable to get him up off the floor so EMS was called.    He has been persistently short of breath and had a cough since being diagnosed with COVID. He does not feel this is much better or worse.    The patient denies fever, myalgias, lightheadedness, dizziness, palpitations, chest pain, abdominal pain, nausea, vomiting, changes in urination.    Review of Systems    The 10 point Review of Systems is negative other than noted in the HPI or here.     Past Medical History    I have reviewed this patient's medical history and updated it with pertinent information if needed.   Past Medical History:   Diagnosis Date     Acute calculous cholecystitis 03/02/2019     Altered mental status 12/31/2014 12/27/2014:episode confusion for 20 minutes.  Seen at [facility]/-hospitalized.  Head CT, MRI/MRA all normal.  PET cardiac perfusion stress test normal.  Neurology consult-diagnosis= possible hypoglycemia, migraine variant.  \"Acute ischemic cerebral event " "was excluded\".      Calculus of kidney 1990 1990. s/p ESWL and percutaneous nephrolithotomy     COPD (chronic obstructive pulmonary disease) (H)      Depression 1979    Hospitalized 1979     Diabetes (H)      Elevated troponin 11/27/2020     Encephalopathy 09/03/2018     Heart disease      Left hand weakness 12/14/2009    Afterbypass surgery, CVA, improved.      PNEUMONIA, ORGANISM NOS 02/14/2008    CT 2/08-  Pulmonary atelectasis and infiltrate in the posterior left lower lung base.      Sleep apnea      Past Surgical History   I have reviewed this patient's surgical history and updated it with pertinent information if needed.  Past Surgical History:   Procedure Laterality Date     C ANESTH,DX ARTHROSCOPIC PROC KNEE JOINT  1994, 1998     Left     CARDIAC SURGERY  11/09    CABG x2 - LIMA-D1, SVG to OM1, OM2.     GENITOURINARY SURGERY  1990    ESWL and percutaneous nephrolithotomy     LAPAROSCOPIC CHOLECYSTECTOMY N/A 3/4/2019    Procedure: LAPAROSCOPIC CHOLECYSTECTOMY;  Surgeon: Burt Mendieta MD;  Location: [facility] OR     ORTHOPEDIC SURGERY  8/12    right CTR     SURGICAL HISTORY OF -   1998    Hernia repair     SURGICAL HISTORY OF -   2004    NormalColonoscopy      SURGICAL HISTORY OF -   2006    Normal Colonoscopy     Social History   I have reviewed this patient's social history and updated it with pertinent information if needed.  Lives with his son Jae in [place].  Social History     Tobacco Use     Smoking status: Never Smoker     Smokeless tobacco: Never Used   Substance Use Topics     Alcohol use: No     Alcohol/week: 0.0 standard drinks     Drug use: No     Family History   I have reviewed this patient's family history and updated it with pertinent information if needed.  Family History   Problem Relation Age of Onset     C.A.VIRI. Father         40s     Hypertension Father      C.A.VIRI. Paternal Grandfather      Heart Disease Paternal Grandfather      Diabetes Brother      C.A.D. Brother      Heart Disease " Brother      Hypertension Brother      Gastrointestinal Disease Son         Crohn's disease     Gastrointestinal Disease Other         2 grandaughters with Crohn's disease     Cancer - colorectal No family hx of      Prior to Admission Medications   Prior to Admission Medications   Prescriptions Last Dose Informant Patient Reported? Taking?   NOVOLIN 70/30 FLEXPEN RELION susp Unknown at Unknown time Son No No   Sig: INJECT 22 UNITS SUBCUTANEOUSLY 30 MINUTES BEFORE BREAKFAST AND 22 UNITS 30 MINUTES BEFORE DINNER   Patient taking differently: 20 in am and 21 in pm   OLANZapine (ZYPREXA) 5 MG tablet 12/24/2020 at am Son No Yes   Sig: Take 1 tablet (5 mg) by mouth 2 times daily   TRELEGY ELLIPTA 100-62.5-25 MCG/INH oral inhaler 12/24/2020 at am Son Yes Yes   Sig: Inhale 1 puff into the lungs daily   albuterol (PROAIR HFA/PROVENTIL HFA/VENTOLIN HFA) 108 (90 Base) MCG/ACT inhaler Unknown at Unknown time Son No No   Sig: Inhale 2 puffs into the lungs every 6 hours as needed for shortness of breath / dyspnea or wheezing   apixaban ANTICOAGULANT (ELIQUIS) 5 MG tablet 12/24/2020 at pm Son No Yes   Sig: Take 1 tablet (5 mg) by mouth 2 times daily   aspirin (ASA) 325 MG EC tablet 12/24/2020 at am Son Yes Yes   Sig: Take 325 mg by mouth daily   atorvastatin (LIPITOR) 80 MG tablet 12/24/2020 at am Son No Yes   Sig: Take 1 tablet (80 mg) by mouth daily   blood glucose (NO BRAND SPECIFIED) test strip  Son No Yes   Sig: Use to test blood sugar 3 times daily   blood glucose monitoring (NO BRAND SPECIFIED) meter device kit  Son No Yes   Sig: Use to test blood sugar 3 times daily or as directed.   cholecalciferol 2000 UNITS tablet 12/23/2020 at pm Son No Yes   Sig: Take 1 tablet by mouth daily.   Patient taking differently: Take 1 tablet by mouth every evening    famotidine (PEPCID) 20 MG tablet  Son No Yes   Sig: Take 1 tablet (20 mg) by mouth 2 times daily   finasteride (PROSCAR) 5 MG tablet  Son No Yes   Sig: Take 1 tablet by mouth  once daily   Patient taking differently: Take 5 mg by mouth daily    furosemide (LASIX) 20 MG tablet 12/24/2020 at am Son No Yes   Sig: Take 1 tablet (20 mg) by mouth daily   insulin pen needle (BD ABRAM U/F) 32G X 4 MM miscellaneous  Son No No   Sig: Use 2 daily as directed.   lisinopril (ZESTRIL) 10 MG tablet 12/24/2020 at am Son No Yes   Sig: Take 1 tablet (10 mg) by mouth daily   metFORMIN (GLUCOPHAGE) 1000 MG tablet 12/24/2020 at am Son No Yes   Sig: TAKE 1 TABLET BY MOUTH TWICE DAILY WITH MEALS   Patient taking differently: Take 1,000 mg by mouth 2 times daily (with meals)    metoprolol tartrate (LOPRESSOR) 25 MG tablet 12/24/2020 at am Son No Yes   Sig: Take 1 tablet by mouth twice daily   Patient taking differently: Take 25 mg by mouth 2 times daily    nitroGLYcerin (NITROSTAT) 0.4 MG sublingual tablet Unknown at Unknown time Son No No   Sig: Place 1 tablet (0.4 mg) under the tongue every 5 minutes as needed for chest pain (CALL 911 IF NOT IMPROVED AFTER THREE CONSECUTIVE DOSES)   order for DME  Son Yes Yes   Sig: Equipment being ordered: CPAP  AIRSENSE 10  11-15 CM H2O  QUATTRO AIR SIZE MED  SN# [id number]  DN# [id number]   tamsulosin (FLOMAX) 0.4 MG capsule 12/24/2020 at am Son No Yes   Sig: Take 1 capsule (0.4 mg) by mouth daily   vitamin B-12 (CYANOCOBALAMIN) 2500 MCG sublingual tablet 12/24/2020 at am Son Yes Yes   Sig: Take 2,500 mcg by mouth daily      Facility-Administered Medications: None     Allergies   Allergies   Allergen Reactions     Prednisone Other (See Comments)     Severe interaction with DM     Physical Exam   Vital Signs: Temp: 97.5  F (36.4  C) Temp src: Oral BP: 109/45 Pulse: 69   Resp: 16 SpO2: 93 % O2 Device: Nasal cannula Oxygen Delivery: 2 LPM  Weight: 184 lbs 4.87 oz    Constitutional: elderly gentleman laying down in bed, mildly ill appearing, awake, alert, cooperative, no apparent distress, and appears stated age  ENT: Normocephalic, without obvious abnormality, atraumatic, oral pharynx  with moist mucous membranes.  Hematologic / Lymphatic: no cervical lymphadenopathy and no supraclavicular lymphadenopathy  Respiratory: No increased work of breathing, good air exchange, faint wheeze appreciated throughout, occasional coarse sounding respiration that clears with cough.  Cardiovascular: irregular rhythm with regular rate, and no murmur noted. No lower extremity edema.  GI: normal bowel sounds, soft, non-distended, non-tender  Genitounirinary: deferred  Skin: normal skin color, texture, turgor  Musculoskeletal: Moves all 4 extremities appropriately. Normal bulk and tone.  Neurologic: Awake, alert, oriented to name, place and time. Speech is clear and appropriate.  Neuropsychiatric: calm, pleasant, cooperative, appropriate thought process and content, able to recall recent events of multiple hospitalizations and TCU stay but seems to be a little forgetful of specific conversations (ie code status on prior admission and with son).    Data   Data reviewed today: I reviewed all medications, new labs and imaging results over the last 24 hours. I personally reviewed the EKG tracing showing atrial fibrillation with RBBB, no acute ishcemic changes noted, similar to prior.    Recent Labs   Lab 12/25/20  0506 12/22/20  1045   WBC 9.7  --    HGB 10.0*  --    MCV 98  --      --    * 140   POTASSIUM 4.5 4.5   CHLORIDE 112* 103   CO2 30 31   BUN 44* 47*   CR 1.62* 1.61*   ANIONGAP 3 6   EVELINA 8.3* 8.7   GLC 41* 224*   ALBUMIN 2.5*  --    PROTTOTAL 6.3*  --    BILITOTAL 0.4  --    ALKPHOS 89  --    ALT 37  --    AST 15  --    TROPI 0.032  --      Recent Results (from the past 24 hour(s))   CT Head w/o Contrast    Narrative    EXAM: CT HEAD W/O CONTRAST  LOCATION: [facility]  DATE/TIME: 12/25/2020 6:40 AM    INDICATION: Altered level of consciousness (LOC), unexplained  COMPARISON: CT head dated 12/2/2020 and 11/26/2020.  TECHNIQUE: Routine CT Head without IV contrast. Multiplanar reformats.  Dose reduction techniques were used.    FINDINGS:  INTRACRANIAL CONTENTS: No intracranial hemorrhage, extraaxial collection, or mass effect.  No CT evidence of acute infarct. Asymmetric hypodensity in the left occipital lobe is unchanged from multiple prior exams, and favored to be due to beam hardening   artifact from the occiput. Mild presumed chronic small vessel ischemic changes. Mild generalized volume loss. No hydrocephalus.     VISUALIZED ORBITS/SINUSES/MASTOIDS: Prior bilateral cataract surgery. Visualized portions of the orbits are otherwise unremarkable. No paranasal sinus mucosal disease. No middle ear or mastoid effusion.    BONES/SOFT TISSUES: No acute abnormality.      Impression    IMPRESSION:  1.  No CT evidence for acute intracranial process.  2.  Brain atrophy and presumed chronic microvascular ischemic changes as above.   CT Chest Abdomen Pelvis w/o Contrast    Narrative    CT CHEST, ABDOMEN, PELVIS WITHOUT CONTRAST  12/25/2020 6:58 AM    HISTORY:  Chest, abdomen, pelvis trauma, minor, blunt. Fall off  toilet.  Abdominal pain.  COVID recovered. COPD on home oxygen.    TECHNIQUE: CT scan obtained of the chest, abdomen, and pelvis without  IV contrast. Radiation dose for this scan was reduced using automated  exposure control, adjustment of the mA and/or kV according to patient  size, or iterative reconstruction technique.    COMPARISON:  Chest CTA on 12/2/2020.    FINDINGS:  Chest: Atrophic thyroid gland. Cardiomegaly. Moderate atherosclerotic  vascular calcification of the coronary arteries and thoracic aorta.  Foci of gas within the pulmonary artery, right atrium and right  ventricle. Multiple prominent mediastinal and hilar lymph nodes,  indeterminate, could be reactive. Small bilateral pleural effusions  and associated basilar atelectasis/consolidation.    Abdomen/pelvis: Limited evaluation of the abdominal organs due to lack  of IV contrast. Right upper quadrant post cholecystectomy  clips.  Multiple calcified splenic granulomas. The unenhanced liver, pancreas  and adrenal glands are grossly unremarkable. Mild atrophic appearance  of the kidneys. No radiodense kidney stones or hydronephrosis in  either kidney.    Large stool ball in the rectum, can be seen with constipation. No  abnormally dilated bowel loops. The appendix is visualized and appears  normal. No significant free fluid in the abdomen or pelvis. No free  peritoneal or portal venous gas.    Bones and soft tissue: Multilevel degenerative changes of the spine.      Impression    IMPRESSION:   1. Within the limitations of noncontrast exam, there is no evidence of  acute pathology in the chest, abdomen or pelvis.  2. Cardiomegaly and moderate atherosclerotic vascular calcification of  the coronary arteries and thoracic aorta.  3. Foci of gas within the right side of the heart (pulmonary artery,  right atrium and ventricle), of indeterminate etiology, sometimes can  be seen with medication injections.  4. Small bilateral pleural effusions and associated basilar  atelectasis/consolidation.  5. Large stool ball in the rectum, can be seen with constipation.    BOYD MCBRIDE MD

## 2020-12-26 NOTE — PROGRESS NOTES
12/26/20 0844   Quick Adds   Quick Adds Certification   Type of Visit Initial Occupational Therapy Evaluation   Living Environment   People in home child(katherin), adult   Current Living Arrangements house   Transportation Anticipated family or friend will provide   Living Environment Comments Pt lives with son who is no longer able to care for father with falls   Disability/Function   Hearing Difficulty or Deaf yes   Describe hearing loss hearing loss on right side;hearing loss on left side   Were auxiliary aids offered? yes   The following aids were provided; pocket talker   Wear Glasses or Blind yes   Vision Management glasses   Concentrating, Remembering or Making Decisions Difficulty yes   Difficulty Communicating no   Difficulty Eating/Swallowing no   Dressing/Bathing Difficulty yes   Dressing/Bathing bathing difficulty, requires equipment   Dressing/Bathing Management Pt requiring more assist after D/C from TCU son states   Change in Functional Status Since Onset of Current Illness/Injury yes   General Information   Onset of Illness/Injury or Date of Surgery 12/25/20   Patient/Family Therapy Goal Statement (OT) increased help with BADLS   Cognitive Status Examination   Orientation Status person;place   Affect/Mental Status (Cognitive) WNL   Safety Deficit minimal deficit   Memory Deficit moderate deficit   Pain Assessment   Patient Currently in Pain No   Range of Motion Comprehensive   General Range of Motion no range of motion deficits identified   Strength Comprehensive (MMT)   Comment, General Manual Muscle Testing (MMT) Assessment generalized 4-/5 BUEs   Coordination   Upper Extremity Coordination No deficits were identified   Bed Mobility   Bed Mobility supine-sit;sit-supine   Supine-Sit [place] (Bed Mobility) minimum assist (75% patient effort)   Sit-Supine [place] (Bed Mobility) supervision   Transfers   Transfers bed-chair transfer;toilet transfer   Transfer Skill: Bed to Chair/Chair to Bed    Bed-Chair [place] (Transfers) contact guard   Toilet Transfer   Type (Toilet Transfer) stand-sit   [place] Level (Toilet Transfer) supervision;verbal cues;contact guard   Activities of Daily Living   BADL Assessment/Intervention lower body dressing   Lower Body Dressing Assessment/Training   [place] Level (Lower Body Dressing) minimum assist (75% patient effort)   Grooming Assessment/Training   [place] Level (Grooming) contact guard assist   Comment (Grooming) only able to linda 3 min stand at sink to brush teeth   Toileting   [place] Level (Toileting) minimum assist (75% patient effort)   Clinical Impression   Criteria for Skilled Therapeutic Interventions Met (OT) yes   OT Diagnosis weakness    OT Problem List-Impairments impacting ADL cognition   Assessment of Occupational Performance 3-5 Performance Deficits   Identified Performance Deficits Pt requires assist for initiation and next step at times for BADLs and functional transfers for safety   Planned Therapy Interventions (OT) ADL retraining;cognition;strengthening;transfer training   Clinical Decision Making Complexity (OT) moderate complexity   Therapy Frequency (OT) Daily   Predicted Duration of Therapy 3   Risks and Benefits of Treatment have been explained. Yes   Patient, Family & other staff in agreement with plan of care Yes   Comment-Clinical Impression Pt appears to be a good skilled care OT canidate for increased safety and ind with BADLs with transition to LTC   OT Discharge Planning    OT Discharge Recommendation (DC Rec) Transitional Care Facility   OT Rationale for DC Rec Pt requires assist with all BADLs and safety with transfers   Therapy Certification   Start of Care Date 12/26/20   Certification date from 12/26/20   Certification date to 01/09/21   Medical Diagnosis weakness   Total Evaluation Time (Minutes)   Total Evaluation Time (Minutes) 15

## 2020-12-26 NOTE — CONSULTS
Addendum:  RYAN received return call from Zhen at [facility] and informed that pt is accepted at [facility] for cares tomorrow.  Gilbert a [facility] (Phone: [phone]/ Fax:  [phone]).  RYAN text paged MD via [employer] and informed of bed acceptance for tomorrow.    RYAN called and informed son, Jae, and informed of above.  Jae will bring his dad his cell phone,  & a bag of clothes for the TCU/LTC up to the hospital today.    Pt will need [facility] stretcher due to inability to maintain his own oxygen.  Pt found without oxygen on at home & does not realize it's importance.  Ride on 12-27-20 at 2:30 PM.  PCS form completed.    PAS-RR  Per DHS regulation, CTS team completed and submitted PAS-RR to [place] Board on Aging Direct Connect via the Senior [employer] Line. CTS team advised SNF and they are aware a PAS-RR has been submitted.     CTS team reviewed with pt or health care agent that they may be contacted for a follow up appointment within 10 days of hospital discharge if SNF stay is <30 days. Contact information for [employer] was also provided.     Pt or health care agent verbalized understanding.     PAS-RR # [id number]    JESUS Escalona on 12/26/2020 at 2:46 PM    Care Management Initial Consult    General Information  Assessment completed with: Family, Jae  Type of CM/SW Visit: Initial Assessment    Primary Care Provider verified and updated as needed: Yes   Readmission within the last 30 days: lack of support, previous discharge plan unsuccessful   Return Category: Progression of disease(Dementia)  Reason for Consult: discharge planning  Advance Care Planning:     NA     Communication Assessment  Patient's communication style: spoken language (English or Bilingual)    Hearing Difficulty or Deaf: yes   Wear Glasses or Blind: no    Cognitive  Cognitive/Neuro/Behavioral: WDL  Level of Consciousness: alert  Arousal Level: opens eyes spontaneously  Orientation: oriented x 4   Mood/Behavior: calm, cooperative  Best Language: 0 - No aphasia  Speech: clear    Living Environment:   People in home: child(katherin), adult  Jae  Current living Arrangements: house      Able to return to prior arrangements: no  Living Arrangement Comments: Unsafe living conditions based on pt's dementia    Family/Social Support:  Care provided by: self, child(katherin)  Provides care for: no one, unable/limited ability to care for self  Marital Status:   Children          Description of Support System: Supportive, Involved    Support Assessment: Lacks necessary supervision and assistance, Lacks adequate physical care, Caregiver experiencing high stress, Caregiver difficulty providing physical care/safety    Current Resources:   Skilled Home Care Services:    Community Resources: None  Equipment currently used at home: none  Supplies currently used at home: Oxygen Tubing/Supplies    Employment/Financial:  Employment Status: retired        Financial Concerns: No concerns identified      Lifestyle & Psychosocial Needs:        Socioeconomic History     Marital status:      Spouse name: Not on file     Number of children: Not on file     Years of education: Not on file     Highest education level: Not on file   Occupational History     Occupation:      Employer: RETIRED     Occupation:      Tobacco Use     Smoking status: Never Smoker     Smokeless tobacco: Never Used   Substance and Sexual Activity     Alcohol use: No     Alcohol/week: 0.0 standard drinks     Drug use: No     Sexual activity: Never     Functional Status:  Prior to admission patient needed assistance:   Dependent ADLs:: Bathing, Incontinence, Toileting  Dependent IADLs:: Cleaning, Cooking, Laundry, Shopping, Meal Preparation, Medication Management, Transportation, Incontinence     Mental Health Status:  Mental Health Status: No Current Concerns       Chemical Dependency Status:  Chemical Dependency Status: No Current  Concerns         Values/Beliefs:  Spiritual, Cultural Beliefs, Confucianism Practices, Values that affect care: yes             Additional Information:  SW received referral for discharge planning.  Pt was previously hospitalized from 12/2-12/6 with Covid & was discharged to [facility] ( Main: [phone] Admissions: [phone] Fax: [phone]).  Pt has been home 1 week and re-hospitalized.    Per discussion with son, Jae, the pt will not listen to him at home, will not allow assistance with medications & sadly, Jae states the pt is unable to care for self at home any longer.   Jae wishes to explore TCU cares with the possibility of LTC.  He requested pt return to [facility] for cares.    SW placed call to [facility] & spoke with Ruby.  She has no male beds & is also concerned as pt & son assured their staff that they would privately pay for services to keep pt in his home, but obviously, this was not done.    PT with [employer].  Referrals sent to:  1.  [facility] (Main: [phone] Admissions: [phone] Fax: [phone])  2.  [facility] (Phone: [phone] Admissions: [phone] Fax: [phone])  3.  [facility] Phone: (Admissions: [phone] RN Report: [phone] Fax: [phone])   4.  [facility] (Admissions phone: [phone] Main phone: [phone] Fax: [phone])  5.  [facility]   6.  [facility] (Admissions Phone: [phone] Main Phone: [phone] Fax: [phone])  7.  [facility] (Admissions Phone: [phone] Main Phone: [phone] Fax: [phone])  8.  [phone]/ fax: [phone]  9.  [facility] (Main phone: [phone] Fax: [phone])  10.  [facility] (Admissions Phone: [phone] Main Phone: [phone] Fax: [phone])    SW to  continue to follow for discharge planning.  JESUS Escalona

## 2020-12-27 NOTE — PROGRESS NOTES
[facility] Medicine Progress Note  Date of Service: 12/26/2020     Assessment & Plan     Alvaro Horton is a 80 year old male admitted on 12/25/2020. He has history of coronary artery disease, chronic obstructive pulmonary disease, atrial fibrillation, benign prostatic hypertrophy, CKD, recent COVID diagnosis and memory loss. He presents after a fall off the toilet at home.    Fall with history of frequent falls  Generalized Weakness    Fall off toilet morning of admission. May be multifactorial with weakness due to recurrent recent hospitalizations in the setting of recent COVID, possible UTI, hypoglycemia vs vasovagal syncope associated with BM (patient denies any fainting or lightheadedness/dizziness).     Continues to appear weak. May be over-medicated   - see below for changes to olanzapine, insulin, lisinopril  - PT/OT consult  - treat UTI as below  - monitor blood sugars as below    Altered Speech    Noted by son to have difficult to understand speech and at times not making sense. Intermittent in past week. Not appreciated on admission by provider or during nurse intake.  - monitor for recurrence    Memory loss/Dementia    Memory issues noted in the past 6 months or so, not on any medications for this. Started on Zyprexa during last hospitalization due to encephalopathy during COVID infection initially needing frequent redirection and 1:1 sitter prior to initiation, improved on medication.    Now recovered from COVID, patient may not need as much olanzapine.  - monitor mental status    - continue PM olanzapine 5 mg but will stop morning dose and follow mental status    Diarrhea with noted constipation on CT  Suspect overflow diarrhea surrounding constipation. Will try to clear out.  - senna and miralax    UTI    Urine shows leukocyte esterase, 39 WBC and bacteria. No clear symptoms but given weakness and noted changes since TCU will treat while culture is  "pending.     UC positive > 100,000 Staph epi with sensitivities in process.    - IV ceftriaxone  - follow pending culture     Hypoglycemia with history of Diabetes Mellitus, Type II    Glucose 40 on arrival, received dextrose with improvement. Son states he is unsure if his dad is doing his insulin properly with his memory issues. Patient notes elevated blood sugars recently and good compliance including ability to recall dosage amount. Son states evening blood sugar was 170s.  Last a1C 8.0% on 11/2020.     HgbA1c 8.3% this admit. Some relatively low BG at times. Patient at risk of symptomatic hypoglycemia and aggressive control not likely beneficial for him.    - continue metformin 1000 mg BID   - decrease insulin 70/30 from 20 AM and 21 PM to 14 units BID   - change to medium sliding scale    - may need to consider alternate regimen if patient unable to safely give insulin with memory issues  - hypo/hyperglycemia protocol with blood glucose monitoring    Recovered COVID 19 Infection with ongoing hypoxia    Diagnosed 11/26/20 with COVID 19, hospitalized x 2 and discharged on 2 LPM of oxygen. Currently considered recovered from infectious standpoint. Ongoing shortness of breath, occasional cough though does have underlying COPD so some chronic issues with this too. CT showed \"Small bilateral pleural effusions and associated basilar atelectasis/consolidation\", compared to CT 12/2/20 the ground glass changes are no longer noted.  - oxygen titrated to O2>90%  - No precautions needed    Coronary Artery Disease  Moderate Pulmonary Hypertension    Previously diagnosed, no current concerning symptoms. History of CABG in 2009 with LIMA to LAD and SVG to OM1 and 2. Follows with cardiology, last seen 3/2020. Last Echo 11/2020 EF 60-65%, no wall motion changes, decreased RV systolic function with elevated RV pressure.     - continue home ASA, statin, beta blocker   - reduce lisinopril to 5 mg    Hypertension    Chronic. Blood " pressures reviewed, mildly low -115.     BP running a bit soft.   - continue home furosemide, metoprolol   - reduce lisinopril to 5 mg    COPD (chronic obstructive pulmonary disease)  Asthma  Baker's Lung  Lungs faint wheeze, occasionally coarse sounding. Suspect recovering still from COVID 19. PFTs in 04/2018 revealed severe airflow obstruction.   - Continue home Trelegy Ellipta if available from family, prn albuterol     Atrial fibrillation  Transient following CABG in 2009. ZioPatch 3/2020 did not show atrial fibrillation. ECG 8/2020 showed NSR. During admission 11/2020 for COVID 19 atrial fibrillation seen at home. Started on anticoagulation with Eliquis after discussion with patient and son due to fall risk and also CHADsVAC of 6 - possibly just short term but plan was to discuss with PCP on follow up in late December/early January.  - continue home metoprolol  - continue home Eliquis for now     History of CVA  Hyperlipidemia  Occurred at time of CABG in 2009.  - continue home aspirin, statin    Benign Prostatic Hyperplasia  Chronic.  - continue home finasteride, tamsulosin     Diet: Moderate Consistent CHO Diet    DVT Prophylaxis: home Eliquis  Olivo Catheter: not present  Code Status: No CPR- Do NOT Intubate , discussed with patient directly on admission. Also made son aware of the patient's new wishes, he is supportive of his Dad's decision.       Discussion: Patient does appear particularly weak compared to prior to his COVID-19.  Looking for reversible issues we can change and making some medication adjustments.    Disposition: Anticipate discharge to TCU when bed available    Attestation:  Total time: 40 minutes    Keenan Brice MD  Hospital Medicine        Interval History   Complains of feeling weak.  No specific pain complaints.  Denies chest pain specifically.  Denies abdominal pain.  Does feel some shortness of breath but unsure if this is changed.    Physical Exam   Temp:  [97.3  F (36.3   C)-97.9  F (36.6  C)] 97.5  F (36.4  C)  Pulse:  [43-62] 48  Resp:  [18] 18  BP: ()/(44-54) 99/45  SpO2:  [94 %-98 %] 94 %    Weights:   Vitals:    12/25/20 0414 12/25/20 0752 12/26/20 0411   Weight: 79.4 kg (175 lb) 83.6 kg (184 lb 4.9 oz) 83.7 kg (184 lb 8.4 oz)    Body mass index is 28.06 kg/m .    Constitutional: alert, oriented being in the hospital, appears pale and weak but otherwise nontoxic  CV: Regular, JVP normal, no edema  Respiratory: CTA bilaterally  GI: Soft, non-tender, bowel sounds normal  Skin: Warm and dry    Data   Recent Labs   Lab 12/26/20  0541 12/25/20  0506 12/22/20  1045   WBC 8.8 9.7  --    HGB 9.9* 10.0*  --    MCV 98 98  --     207  --     145* 140   POTASSIUM 4.8 4.5 4.5   CHLORIDE 107 112* 103   CO2 30 30 31   BUN 39* 44* 47*   CR 1.58* 1.62* 1.61*   ANIONGAP 3 3 6   EVELINA 8.4* 8.3* 8.7   * 41* 224*   ALBUMIN  --  2.5*  --    PROTTOTAL  --  6.3*  --    BILITOTAL  --  0.4  --    ALKPHOS  --  89  --    ALT  --  37  --    AST  --  15  --    TROPI  --  0.032  --        Recent Labs   Lab 12/26/20  1636 12/26/20  1108 12/26/20  0740 12/26/20  0541 12/26/20  0221 12/25/20  2125 12/25/20  1630 12/25/20  0506 12/25/20  0506 12/22/20  1045   GLC  --   --   --  101*  --   --   --   --  41* 224*   BGM 78 188* 82  --  78 146* 178*   < >  --   --     < > = values in this interval not displayed.        Unresulted Labs Ordered in the Past 30 Days of this Admission     Date and Time Order Name Status Description    12/25/2020 1519 Urine Culture Preliminary            Imaging: No results found for this or any previous visit (from the past 24 hour(s)).     I reviewed all new labs and imaging results over the last 24 hours. I personally reviewed no images or EKG's today.    Medications       apixaban ANTICOAGULANT  5 mg Oral BID     aspirin  325 mg Oral Daily     atorvastatin  80 mg Oral Daily     cefTRIAXone  1 g Intravenous Q24H     finasteride  5 mg Oral Daily     furosemide   20 mg Oral Daily     insulin aspart  1-3 Units Subcutaneous TID AC     insulin aspart  1-3 Units Subcutaneous At Bedtime     insulin NPH-Regular  21 Units Subcutaneous Daily with supper     insulin NPH-Regular  20 Units Subcutaneous QAM     lisinopril  10 mg Oral Daily     metFORMIN  1,000 mg Oral BID w/meals     metoprolol tartrate  25 mg Oral BID     OLANZapine  5 mg Oral BID     polyethylene glycol  17 g Oral Daily     senna-docusate  1 tablet Oral BID    Or     senna-docusate  2 tablet Oral BID     tamsulosin  0.4 mg Oral Daily   Reviewed meds    Keenan Brice MD  [facility] Medicine

## 2020-12-27 NOTE — PROGRESS NOTES
Physical Therapy Evaluation       12/27/20 0922   Quick Adds   Type of Visit Initial PT Evaluation   Living Environment   People in home child(katherin), adult   Current Living Arrangements house   Home Accessibility stairs to enter home   Number of Stairs, Main Entrance 4   Stair Railings, Main Entrance none   Transportation Anticipated family or friend will provide   Disability/Function   Hearing Difficulty or Deaf yes   Patient's preferred means of communication verbal   Describe hearing loss hearing loss on right side;hearing loss on left side   Use of hearing assistive devices bilateral hearing aids   Were auxiliary aids offered? yes   The following aids were provided; pocket talker   Wear Glasses or Blind yes   Concentrating, Remembering or Making Decisions Difficulty yes   Difficulty Communicating no   Difficulty Eating/Swallowing no   Dressing/Bathing Difficulty yes   Dressing/Bathing bathing difficulty, requires equipment   General Information   Onset of Illness/Injury or Date of Surgery 12/25/20   Referring Physician Herminia Skinner PA-C   Patient/Family Therapy Goals Statement (PT) wants to go home   Pertinent History of Current Problem (include personal factors and/or comorbidities that impact the POC) Per H&P:  Alvaro Horton is a 80 year old male admitted on 12/25/2020. He has history of coronary artery disease, chronic obstructive pulmonary disease, atrial fibrillation, benign prostatic hypertrophy, CKD, recent COVID diagnosis and memory loss. He presents after a fall off the toilet at home.   Cognition   Orientation Status (Cognition) person   Affect/Mental Status (Cognition) confused   Follows Commands (Cognition) follows one-step commands   Behavioral Issues uncooperative   Posture    Posture Forward head position;Protracted shoulders   Range of Motion (ROM)   ROM Quick Adds ROM WFL   Strength   Manual Muscle Testing Quick Adds Strength WFL   Bed Mobility   Comment (Bed Mobility) sit <>  supine independent   Transfers   Transfer Safety Comments sit <> stand independent   Gait/Stairs (Locomotion)   [place] Level (Gait) contact guard   Assistive Device (Gait) walker, front-wheeled   Distance in Feet (Required for LE Total Joints) 40   Pattern (Gait) step-through   Deviations/Abnormal Patterns (Gait) base of support, wide;blossom decreased   Comment (Gait/Stairs) normal gait pattern, no unsteadiness, however, pt became very SOB afterwards. Amb on 1L O2, SaO2 mid 80s after amb.   Balance   Balance Comments good with RW   Clinical Impression   Criteria for Skilled Therapeutic Intervention evaluation only   Clinical Presentation Stable/Uncomplicated   Clinical Presentation Rationale clinical judgement   Clinical Decision Making (Complexity) low complexity   Therapy Frequency (PT) Evaluation only   Risk & Benefits of therapy have been explained evaluation/treatment results reviewed;participants included;patient   PT Discharge Planning    PT Discharge Recommendation (DC Rec) Transitional Care Facility   PT Rationale for DC Rec pt desats with minimal activity limiting activity tolerance   PT Brief overview of current status  Transfers independently. Amb with RW 40' with CGA on 1L O2 becoming very SOB afterwards.   Total Evaluation Time   Total Evaluation Time (Minutes) 30   Skin WDL   Skin WDL X   Skin Temperature warm   Skin Moisture dry   Skin Elasticity quick return to original state   Skin Integrity bruised (ecchymotic)     Kim Yee PT

## 2020-12-27 NOTE — DISCHARGE SUMMARY
[facility]   Discharge Summary  Hospital Medicine       Date of Admission:  12/25/2020  Date of Discharge:  12/27/2020  2:35 PM  Discharging Provider: Keenan Brice MD      Identification and Chief Compaint: Alvaro Horton is a 80 year old male with history of coronary artery disease, chronic obstructive pulmonary disease, atrial fibrillation, benign prostatic hypertrophy, CKD, recent COVID diagnosis and memory loss who presented on 12/25/2020 with complaint of fall off the toilet at home.    Discharge Diagnoses   Fall with history of frequent falls  Generalized Weakness  Altered Speech  Memory loss/Dementia  Constipation  Diarrhea, overflow associated with constipation  UTI  Hypoglycemia with history of Diabetes Mellitus, Type II  Recovered COVID 19 Infection with ongoing hypoxia  Coronary Artery Disease  Moderate Pulmonary Hypertension  COPD (chronic obstructive pulmonary disease)  Asthma  Baker's Lung  Hypertension  Atrial fibrillation, paroxysmal  History of CVA  Benign Prostatic Hyperplasia     Follow-ups Needed After Discharge   Follow-up Appointments     Follow Up and recommended labs and tests      Follow up with Nursing home physician.  No follow up labs or test are   needed.           Hospital Course      Fall with history of frequent falls  Generalized Weakness    Fall off toilet morning of admission. May be multifactorial with weakness due to recurrent recent hospitalizations in the setting of recent COVID, possible UTI, hypoglycemia vs vasovagal syncope associated with BM (patient denies any fainting or lightheadedness/dizziness).     Found to have UTI which has been treated (see below).     Continues to appear weak. May be over-medicated and changes were made to insulin and lisinopril (see below).    Patient is not felt to be safe for discharge home without 24 hours care, and son is unable to provide that. Also, the patient would not listen to the son regarding  exercise to maintain mobility or with taking medications as prescribed when he was home prior to this admission. Patient is being discharged to TCU for rehabilitation to improve independence but he may need memory care unit following that.      Altered Speech    Noted by son to have difficult to understand speech and at times not making sense. Intermittent in past week PTA. Not appreciated on admission by provider or during nurse intake. No recurrence during admission.      Memory loss/Dementia    Memory issues noted in the past 6 months or so, not on any medications for this. Started on Zyprexa during last hospitalization due to encephalopathy during COVID infection initially needing frequent redirection and 1:1 sitter prior to initiation, improved on medication.    Now recovered from COVID, his morning olanzapine was initially held on day of discharge as possibly not needing. However, patient began to ramp up his agitation somewhat this morning, and his olanzapine was resumed as PTA.     Continuing olanzapine 5 mg BID on discharge.      Constipation  Diarrhea, overflow associated with constipation    Suspect overflow diarrhea surrounding constipation. Constipation seen on CT abdomen/pelvis. He is started on MiraLax and senokot.      UTI    Urine shows leukocyte esterase, 39 WBC and bacteria. No clear symptoms but given weakness and noted changes since TCU will treat while culture is pending.     UC positive > 100,000 Staph epi which is pan-sensitive. Treated with ceftriaxone in the hospital and discharged on ciprofloxacin for  More days.       Hypoglycemia with history of Diabetes Mellitus, Type II    Glucose 40 on arrival, received dextrose with improvement. Son states he is unsure if his dad is doing his insulin properly with his memory issues. Patient notes elevated blood sugars recently and good compliance including ability to recall dosage amount. Son states evening blood sugar was 170s.   Last a1C 8.0% on  "11/2020.     HgbA1c 8.3% this admit. Some relatively low BG at times. Patient at risk of symptomatic hypoglycemia and aggressive control not likely beneficial for him. His insulin 70/30 is decreased from 20 units AM and 21 units PM to 14 units twice daily. He will continue metformin 1000 mg BID. BG is 124 and 146 on day of discharge.      Recovered COVID 19 Infection with ongoing hypoxia    Diagnosed 11/26/20 with COVID 19, hospitalized x 2 and discharged on 2 LPM of oxygen. Currently considered recovered from infectious standpoint. Ongoing shortness of breath, occasional cough though does have underlying COPD so some chronic issues with this too. CT showed \"Small bilateral pleural effusions and associated basilar atelectasis/consolidation\", compared to CT 12/2/20 the ground glass changes are no longer noted.    Continues to require oxygen supplementation at time of discharge.      Coronary Artery Disease  Moderate Pulmonary Hypertension  Hypertension    Previously diagnosed, no current concerning symptoms. History of CABG in 2009 with LIMA to LAD and SVG to OM1 and 2. Follows with cardiology, last seen 3/2020. Last Echo 11/2020 EF 60-65%, no wall motion changes, decreased RV systolic function with elevated RV pressure.     Continue home furosemide, aspirin, statin and metoprolol as PTA.     's/40's. Lisinopril is adjusted down to 5 mg daily, and BP at time of discharge is 126/53.      COPD (chronic obstructive pulmonary disease)  Asthma  Baker's Lung  Lungs faint wheeze, occasionally coarse sounding. Suspect recovering still from COVID 19. PFTs in 04/2018 revealed severe airflow obstruction.   - Continue home Trelegy Ellipta if available from family, prn albuterol      Atrial fibrillation, paroxysmal    Transient following CABG in 2009. ZioPatch 3/2020 did not show atrial fibrillation. ECG 8/2020 showed NSR. During admission 11/2020 for COVID 19 atrial fibrillation seen at home. Started on anticoagulation " with Eliquis after discussion with patient and son due to fall risk and also CHADsVAC of 6 - possibly just short term but plan was to discuss with PCP on follow up in late December/early January.  - continue home metoprolol  - continue home Eliquis for now      History of CVA  Occurred at time of CABG in 2009.  - continue home aspirin, statin     Benign Prostatic Hyperplasia  Chronic.  - continue home finasteride, tamsulosin    Consultations This Hospital Stay   PHYSICAL THERAPY ADULT IP CONSULT  OCCUPATIONAL THERAPY ADULT IP CONSULT  CARE MANAGEMENT / SOCIAL WORK IP CONSULT  PHYSICAL THERAPY ADULT IP CONSULT  OCCUPATIONAL THERAPY ADULT IP CONSULT    Code Status   No CPR- Do NOT Intubate    The discharge plan was discussed with the patient who is very unhappy with plan for TCU, but he does not show insight into his repeated failures to mange his care at home. The plan was discussed with his son and daughter by phone, and they each expressed understanding.     Time Spent on this Encounter   Total time on this discharge was 50 minutes.       Keenan Brice MD  [facility]   ______________________________________________________________________    Physical Exam   Vital Signs: Temp: 98  F (36.7  C) Temp src: Oral BP: 126/53 Pulse: 94   Resp: 18 SpO2: 94 % O2 Device: Nasal cannula Oxygen Delivery: 2 LPM  Weight: 184 lbs 8.4 oz  Constitutional: alert, oriented to being in the hospital, not oriented to recent events, unhappy with plan to go to TCU, argues coherently but lacks insight, nontoxic  CV: Regular, 2+ bilateral lower extremity edema   Respiratory: CTA bilaterally, no wheezes  GI: Soft, non-tender   Skin: Warm and dry       Primary Care Physician   Be Carreno  [id number] [facility] [address]     Discharge Disposition   Discharged to TCU  Condition at discharge: Stable    Significant Results and Procedures   Results for orders placed or performed during the hospital  encounter of 12/25/20   CT Head w/o Contrast    Narrative    EXAM: CT HEAD W/O CONTRAST  LOCATION: [facility]  DATE/TIME: 12/25/2020 6:40 AM    INDICATION: Altered level of consciousness (LOC), unexplained  COMPARISON: CT head dated 12/2/2020 and 11/26/2020.  TECHNIQUE: Routine CT Head without IV contrast. Multiplanar reformats. Dose reduction techniques were used.    FINDINGS:  INTRACRANIAL CONTENTS: No intracranial hemorrhage, extraaxial collection, or mass effect.  No CT evidence of acute infarct. Asymmetric hypodensity in the left occipital lobe is unchanged from multiple prior exams, and favored to be due to beam hardening   artifact from the occiput. Mild presumed chronic small vessel ischemic changes. Mild generalized volume loss. No hydrocephalus.     VISUALIZED ORBITS/SINUSES/MASTOIDS: Prior bilateral cataract surgery. Visualized portions of the orbits are otherwise unremarkable. No paranasal sinus mucosal disease. No middle ear or mastoid effusion.    BONES/SOFT TISSUES: No acute abnormality.      Impression    IMPRESSION:  1.  No CT evidence for acute intracranial process.  2.  Brain atrophy and presumed chronic microvascular ischemic changes as above.   CT Chest Abdomen Pelvis w/o Contrast    Narrative    CT CHEST, ABDOMEN, PELVIS WITHOUT CONTRAST  12/25/2020 6:58 AM    HISTORY:  Chest, abdomen, pelvis trauma, minor, blunt. Fall off  toilet.  Abdominal pain.  COVID recovered. COPD on home oxygen.    TECHNIQUE: CT scan obtained of the chest, abdomen, and pelvis without  IV contrast. Radiation dose for this scan was reduced using automated  exposure control, adjustment of the mA and/or kV according to patient  size, or iterative reconstruction technique.    COMPARISON:  Chest CTA on 12/2/2020.    FINDINGS:  Chest: Atrophic thyroid gland. Cardiomegaly. Moderate atherosclerotic  vascular calcification of the coronary arteries and thoracic aorta.  Foci of gas within the pulmonary artery, right  atrium and right  ventricle. Multiple prominent mediastinal and hilar lymph nodes,  indeterminate, could be reactive. Small bilateral pleural effusions  and associated basilar atelectasis/consolidation.    Abdomen/pelvis: Limited evaluation of the abdominal organs due to lack  of IV contrast. Right upper quadrant post cholecystectomy clips.  Multiple calcified splenic granulomas. The unenhanced liver, pancreas  and adrenal glands are grossly unremarkable. Mild atrophic appearance  of the kidneys. No radiodense kidney stones or hydronephrosis in  either kidney.    Large stool ball in the rectum, can be seen with constipation. No  abnormally dilated bowel loops. The appendix is visualized and appears  normal. No significant free fluid in the abdomen or pelvis. No free  peritoneal or portal venous gas.    Bones and soft tissue: Multilevel degenerative changes of the spine.      Impression    IMPRESSION:   1. Within the limitations of noncontrast exam, there is no evidence of  acute pathology in the chest, abdomen or pelvis.  2. Cardiomegaly and moderate atherosclerotic vascular calcification of  the coronary arteries and thoracic aorta.  3. Foci of gas within the right side of the heart (pulmonary artery,  right atrium and ventricle), of indeterminate etiology, sometimes can  be seen with medication injections.  4. Small bilateral pleural effusions and associated basilar  atelectasis/consolidation.  5. Large stool ball in the rectum, can be seen with constipation.    BOYD MCBRIDE MD     *Note: Due to a large number of results and/or encounters for the requested time period, some results have not been displayed. A complete set of results can be found in Results Review.     Procedures    None    Discharge Orders      CARE COORDINATION REFERRAL      General info for SNF    Length of Stay Estimate: Short Term Care: Estimated # of Days <30  Condition at Discharge: Stable  Level of care:skilled   Rehabilitation  Potential: Fair  Admission H&P remains valid and up-to-date: Yes  Recent Chemotherapy: N/A  Use Nursing Home Standing Orders: Yes     Mantoux instructions    Give two-step Mantoux (PPD) Per Facility Policy Yes     Reason for your hospital stay    Generalized weakness and unable to care for self. Possible bladder infection.     Glucose monitor nursing POCT    Before meals and at bedtime     Follow Up and recommended labs and tests    Follow up with Nursing home physician.  No follow up labs or test are needed.     Activity - Up with nursing assistance     Physical Therapy Adult Consult    Evaluate and treat as clinically indicated.    Reason:  Generalized weakness     Occupational Therapy Adult Consult    Evaluate and treat as clinically indicated.    Reason:  Weakness, cognitive decline     Fall precautions     Oxygen Adult/Peds    Oxygen Documentation:   I certify that this patient, Alvaro Horton has been under my care (or a nurse practitioner or physican's assistant working with me). This is the face-to-face encounter for oxygen medical necessity.      Alvaro Horton is now in a chronic stable state and continues to require supplemental oxygen. Patient has continued oxygen desaturation due to Chronic Respiratory Failure with Hypoxia J93.11  COPD J44.9.    Alternative treatment(s) tried or considered and deemed clinically infective for treatment of Chronic Respiratory Failure with Hypoxia J93.11  COPD J44.9 include inhalers.  If portability is ordered, is the patient mobile within the home? yes    **Patients who qualify for home O2 coverage under the CMS guidelines require ABG tests or O2 sat readings obtained closest to, but no earlier than 2 days prior to the discharge, as evidence of the need for home oxygen therapy. Testing must be performed while patient is in the chronic stable state. See notes for O2 sats.**     Discharge Medications   Current Discharge Medication List      START taking these  medications    Details   ciprofloxacin (CIPRO) 250 MG tablet Take 1 tablet (250 mg) by mouth 2 times daily Start with evening dose 12/27/2020 through morning dose 1/1/2021  Qty: 10 tablet, Refills: 0    Associated Diagnoses: Acute cystitis without hematuria         CONTINUE these medications which have CHANGED    Details   cholecalciferol 50 MCG (2000 UT) tablet Take 1 tablet (50 mcg) by mouth every evening    Associated Diagnoses: Memory loss      furosemide (LASIX) 20 MG tablet 40 mg by mouth daily on Mon-Wed-Fri, 20 mg by mouth daily Tue-Thu-Sat-Sun  Qty: 90 tablet, Refills: 3    Associated Diagnoses: Stable angina pectoris (H)      insulin NPH-Regular (NOVOLIN 70/30 FLEXPEN RELION) susp Inject 14 Units Subcutaneous 2 times daily (with meals)  Qty: 15 mL, Refills: 1    Associated Diagnoses: Type 2 diabetes mellitus with stage 3 chronic kidney disease, with long-term current use of insulin, unspecified whether stage 3a or 3b CKD (H)      lisinopril (ZESTRIL) 10 MG tablet Take 0.5 tablets (5 mg) by mouth daily  Qty: 90 tablet, Refills: 3    Associated Diagnoses: Hypertension goal BP (blood pressure) < 140/90         CONTINUE these medications which have NOT CHANGED    Details   apixaban ANTICOAGULANT (ELIQUIS) 5 MG tablet Take 1 tablet (5 mg) by mouth 2 times daily  Qty: 60 tablet, Refills: 1    Associated Diagnoses: Infection due to 2019 novel coronavirus; Paroxysmal atrial fibrillation (H)      atorvastatin (LIPITOR) 80 MG tablet Take 1 tablet (80 mg) by mouth daily  Qty: 90 tablet, Refills: 3    Associated Diagnoses: Hyperlipidemia LDL goal <70      blood glucose (NO BRAND SPECIFIED) test strip Use to test blood sugar 3 times daily  Qty: 100 strip, Refills: 0    Associated Diagnoses: Type 2 diabetes mellitus with stage 3 chronic kidney disease, with long-term current use of insulin (H)      blood glucose monitoring (NO BRAND SPECIFIED) meter device kit Use to test blood sugar 3 times daily or as directed.  Qty:  1 kit, Refills: 0    Comments: Requesting Accu-check guide meter  Associated Diagnoses: Type 2 diabetes mellitus with stage 3 chronic kidney disease, with long-term current use of insulin (H)      famotidine (PEPCID) 20 MG tablet Take 1 tablet (20 mg) by mouth 2 times daily  Qty: 60 tablet, Refills: 1    Associated Diagnoses: Gastroesophageal reflux disease without esophagitis      finasteride (PROSCAR) 5 MG tablet Take 1 tablet by mouth once daily  Qty: 90 tablet, Refills: 0    Associated Diagnoses: Hypertrophy of prostate without urinary obstruction      metFORMIN (GLUCOPHAGE) 1000 MG tablet TAKE 1 TABLET BY MOUTH TWICE DAILY WITH MEALS  Qty: 180 tablet, Refills: 0    Associated Diagnoses: Type 2 diabetes mellitus with stage 3 chronic kidney disease, with long-term current use of insulin, unspecified whether stage 3a or 3b CKD (H)      metoprolol tartrate (LOPRESSOR) 25 MG tablet Take 1 tablet by mouth twice daily  Qty: 180 tablet, Refills: 3    Associated Diagnoses: Chronic ischemic heart disease      OLANZapine (ZYPREXA) 5 MG tablet Take 1 tablet (5 mg) by mouth 2 times daily  Qty: 60 tablet, Refills: 1    Associated Diagnoses: Encephalopathy due to COVID-19 virus      order for DME Equipment being ordered: CPAP  AIRSENSE 10  11-15 CM H2O  QUATTRO AIR SIZE MED  SN# [id number]  DN# [id number]      tamsulosin (FLOMAX) 0.4 MG capsule Take 1 capsule (0.4 mg) by mouth daily  Qty: 90 capsule, Refills: 3    Associated Diagnoses: Benign non-nodular prostatic hyperplasia with lower urinary tract symptoms      TRELEGY ELLIPTA 100-62.5-25 MCG/INH oral inhaler Inhale 1 puff into the lungs daily      vitamin B-12 (CYANOCOBALAMIN) 2500 MCG sublingual tablet Take 2,500 mcg by mouth daily      albuterol (PROAIR HFA/PROVENTIL HFA/VENTOLIN HFA) 108 (90 Base) MCG/ACT inhaler Inhale 2 puffs into the lungs every 6 hours as needed for shortness of breath / dyspnea or wheezing  Qty: 1 Inhaler, Refills: 11    Comments: Pharmacy may dispense  brand covered by insurance (Proair, or proventil or ventolin or generic albuterol inhaler)  Associated Diagnoses: Chronic obstructive pulmonary disease, unspecified COPD type (H)      insulin pen needle (BD ABRAM U/F) 32G X 4 MM miscellaneous Use 2 daily as directed.  Qty: 100 each, Refills: 11    Associated Diagnoses: Type 2 diabetes mellitus with stage 3 chronic kidney disease, with long-term current use of insulin (H)      nitroGLYcerin (NITROSTAT) 0.4 MG sublingual tablet Place 1 tablet (0.4 mg) under the tongue every 5 minutes as needed for chest pain (CALL 911 IF NOT IMPROVED AFTER THREE CONSECUTIVE DOSES)  Qty: 25 tablet, Refills: 5    Associated Diagnoses: Chronic ischemic heart disease         STOP taking these medications       aspirin (ASA) 325 MG EC tablet Comments:   Reason for Stopping:             Allergies   Allergies   Allergen Reactions     Prednisone Other (See Comments)     Severe interaction with DM

## 2020-12-27 NOTE — PROGRESS NOTES
Care Management Discharge Note    Discharge Date: 12/27/20       Discharge Disposition: Transitional Care with the option of Long Term Care    Discharge Services:  [facility] (Phone: [phone]/ Fax:  [phone])    Discharge DME: Oxygen, Walker, Wheelchair    Discharge Transportation: agency due to pt's inability to regulate his own oxygen due to dementia    Private pay costs discussed: transportation costs    PAS Confirmation Code: [id number]  Patient/family educated on Medicare website which has current facility and service quality ratings: yes    Education Provided on the Discharge Plan:  Yes  Persons Notified of Discharge Plans: Pt's son is aware of discharge plan of care.  Patient/Family in Agreement with the Plan: yes    Handoff Referral Completed: Yes    Additional Information:  Pt will discharge to [facility] today via [facility] stretcher at 2:30 PM.      JESUS Escalona

## 2020-12-28 NOTE — PROGRESS NOTES
Clinic Care Coordination Contact  Care Coordination Transition Communication    Referral Source: IP Report    Clinical Data: Patient was hospitalized at [facility]  from 12/28/20 to 12/27/20 with diagnosis of fall, weakness and recent Covid-19.     Transition to Facility:              Facility Name: [facility] in [place], [place]              Contact name and phone number/fax: Krystal is  [phone]    Plan: RN/SW Care Coordinator will await notification from facility staff informing RN/SW Care Coordinator of patient's discharge plans/needs. RN/SW Care Coordinator will review chart and outreach to facility staff every 4 weeks and as needed.     Colette Edward RN, [facility] - Primary Care Clinic RN Coordinator  [facility]   12/28/2020    [phone]

## 2021-01-01 ENCOUNTER — TELEPHONE (OUTPATIENT)
Dept: FAMILY MEDICINE | Facility: CLINIC | Age: 81
End: 2021-01-01

## 2021-01-01 ENCOUNTER — HOSPITAL LABORATORY (OUTPATIENT)
Facility: OTHER | Age: 81
End: 2021-01-01

## 2021-01-01 ENCOUNTER — APPOINTMENT (OUTPATIENT)
Dept: OCCUPATIONAL THERAPY | Facility: CLINIC | Age: 81
DRG: 377 | End: 2021-01-01
Payer: COMMERCIAL

## 2021-01-01 ENCOUNTER — APPOINTMENT (OUTPATIENT)
Dept: PHYSICAL THERAPY | Facility: CLINIC | Age: 81
DRG: 193 | End: 2021-01-01
Payer: COMMERCIAL

## 2021-01-01 ENCOUNTER — APPOINTMENT (OUTPATIENT)
Dept: CT IMAGING | Facility: CLINIC | Age: 81
DRG: 377 | End: 2021-01-01
Attending: EMERGENCY MEDICINE
Payer: COMMERCIAL

## 2021-01-01 ENCOUNTER — NURSING HOME VISIT (OUTPATIENT)
Dept: GERIATRICS | Facility: CLINIC | Age: 81
End: 2021-01-01
Payer: COMMERCIAL

## 2021-01-01 ENCOUNTER — PATIENT OUTREACH (OUTPATIENT)
Dept: NURSING | Facility: CLINIC | Age: 81
End: 2021-01-01
Payer: COMMERCIAL

## 2021-01-01 ENCOUNTER — DISCHARGE SUMMARY NURSING HOME (OUTPATIENT)
Dept: GERIATRICS | Facility: CLINIC | Age: 81
End: 2021-01-01
Payer: COMMERCIAL

## 2021-01-01 ENCOUNTER — OFFICE VISIT (OUTPATIENT)
Dept: FAMILY MEDICINE | Facility: CLINIC | Age: 81
End: 2021-01-01
Payer: COMMERCIAL

## 2021-01-01 ENCOUNTER — APPOINTMENT (OUTPATIENT)
Dept: CARDIOLOGY | Facility: CLINIC | Age: 81
DRG: 193 | End: 2021-01-01
Attending: PHYSICIAN ASSISTANT
Payer: COMMERCIAL

## 2021-01-01 ENCOUNTER — PATIENT OUTREACH (OUTPATIENT)
Dept: CARE COORDINATION | Facility: CLINIC | Age: 81
End: 2021-01-01

## 2021-01-01 ENCOUNTER — APPOINTMENT (OUTPATIENT)
Dept: PHYSICAL THERAPY | Facility: CLINIC | Age: 81
DRG: 377 | End: 2021-01-01
Payer: COMMERCIAL

## 2021-01-01 ENCOUNTER — TELEPHONE (OUTPATIENT)
Dept: CARE COORDINATION | Facility: CLINIC | Age: 81
End: 2021-01-01

## 2021-01-01 ENCOUNTER — TELEPHONE (OUTPATIENT)
Dept: GERIATRICS | Facility: CLINIC | Age: 81
End: 2021-01-01

## 2021-01-01 ENCOUNTER — ANESTHESIA EVENT (OUTPATIENT)
Dept: GASTROENTEROLOGY | Facility: CLINIC | Age: 81
DRG: 377 | End: 2021-01-01
Payer: COMMERCIAL

## 2021-01-01 ENCOUNTER — HOSPITAL ENCOUNTER (INPATIENT)
Facility: CLINIC | Age: 81
LOS: 8 days | Discharge: NURSING, REHAB OR LONG-TERM CARE | DRG: 377 | End: 2021-03-06
Attending: EMERGENCY MEDICINE | Admitting: INTERNAL MEDICINE
Payer: COMMERCIAL

## 2021-01-01 ENCOUNTER — APPOINTMENT (OUTPATIENT)
Dept: GENERAL RADIOLOGY | Facility: CLINIC | Age: 81
DRG: 193 | End: 2021-01-01
Attending: EMERGENCY MEDICINE
Payer: COMMERCIAL

## 2021-01-01 ENCOUNTER — APPOINTMENT (OUTPATIENT)
Dept: GENERAL RADIOLOGY | Facility: CLINIC | Age: 81
DRG: 377 | End: 2021-01-01
Attending: FAMILY MEDICINE
Payer: COMMERCIAL

## 2021-01-01 ENCOUNTER — APPOINTMENT (OUTPATIENT)
Dept: GENERAL RADIOLOGY | Facility: CLINIC | Age: 81
DRG: 377 | End: 2021-01-01
Attending: INTERNAL MEDICINE
Payer: COMMERCIAL

## 2021-01-01 ENCOUNTER — APPOINTMENT (OUTPATIENT)
Dept: OCCUPATIONAL THERAPY | Facility: CLINIC | Age: 81
DRG: 193 | End: 2021-01-01
Payer: COMMERCIAL

## 2021-01-01 ENCOUNTER — ANESTHESIA (OUTPATIENT)
Dept: GASTROENTEROLOGY | Facility: CLINIC | Age: 81
DRG: 377 | End: 2021-01-01
Payer: COMMERCIAL

## 2021-01-01 ENCOUNTER — HOSPITAL ENCOUNTER (INPATIENT)
Facility: CLINIC | Age: 81
LOS: 4 days | Discharge: HOME HEALTH | DRG: 193 | End: 2021-05-13
Attending: EMERGENCY MEDICINE | Admitting: INTERNAL MEDICINE
Payer: COMMERCIAL

## 2021-01-01 VITALS
SYSTOLIC BLOOD PRESSURE: 100 MMHG | BODY MASS INDEX: 25.76 KG/M2 | OXYGEN SATURATION: 97 % | HEART RATE: 74 BPM | TEMPERATURE: 97.3 F | WEIGHT: 170 LBS | RESPIRATION RATE: 18 BRPM | HEIGHT: 68 IN | DIASTOLIC BLOOD PRESSURE: 50 MMHG

## 2021-01-01 VITALS
SYSTOLIC BLOOD PRESSURE: 119 MMHG | OXYGEN SATURATION: 96 % | DIASTOLIC BLOOD PRESSURE: 51 MMHG | WEIGHT: 176 LBS | HEART RATE: 72 BPM | HEIGHT: 68 IN | TEMPERATURE: 98 F | RESPIRATION RATE: 16 BRPM | BODY MASS INDEX: 26.67 KG/M2

## 2021-01-01 VITALS
HEART RATE: 64 BPM | BODY MASS INDEX: 26.11 KG/M2 | SYSTOLIC BLOOD PRESSURE: 123 MMHG | HEIGHT: 68 IN | DIASTOLIC BLOOD PRESSURE: 61 MMHG | WEIGHT: 172.3 LBS | TEMPERATURE: 97.9 F | RESPIRATION RATE: 16 BRPM | OXYGEN SATURATION: 93 %

## 2021-01-01 VITALS
TEMPERATURE: 97.6 F | DIASTOLIC BLOOD PRESSURE: 56 MMHG | WEIGHT: 184.75 LBS | RESPIRATION RATE: 18 BRPM | BODY MASS INDEX: 28.09 KG/M2 | HEART RATE: 72 BPM | SYSTOLIC BLOOD PRESSURE: 131 MMHG | OXYGEN SATURATION: 92 %

## 2021-01-01 VITALS
TEMPERATURE: 97.4 F | SYSTOLIC BLOOD PRESSURE: 113 MMHG | HEIGHT: 68 IN | WEIGHT: 173.8 LBS | HEART RATE: 70 BPM | OXYGEN SATURATION: 90 % | RESPIRATION RATE: 16 BRPM | BODY MASS INDEX: 26.34 KG/M2 | DIASTOLIC BLOOD PRESSURE: 53 MMHG

## 2021-01-01 VITALS
DIASTOLIC BLOOD PRESSURE: 58 MMHG | TEMPERATURE: 97.3 F | OXYGEN SATURATION: 95 % | HEART RATE: 72 BPM | RESPIRATION RATE: 20 BRPM | SYSTOLIC BLOOD PRESSURE: 113 MMHG | BODY MASS INDEX: 26.8 KG/M2 | WEIGHT: 176.81 LBS | HEIGHT: 68 IN

## 2021-01-01 VITALS
HEIGHT: 68 IN | DIASTOLIC BLOOD PRESSURE: 49 MMHG | HEART RATE: 59 BPM | TEMPERATURE: 97.4 F | BODY MASS INDEX: 26.75 KG/M2 | OXYGEN SATURATION: 92 % | SYSTOLIC BLOOD PRESSURE: 109 MMHG | WEIGHT: 176.5 LBS | RESPIRATION RATE: 14 BRPM

## 2021-01-01 VITALS
RESPIRATION RATE: 18 BRPM | HEIGHT: 68 IN | SYSTOLIC BLOOD PRESSURE: 107 MMHG | OXYGEN SATURATION: 90 % | HEART RATE: 64 BPM | WEIGHT: 172.2 LBS | TEMPERATURE: 96.8 F | DIASTOLIC BLOOD PRESSURE: 59 MMHG | BODY MASS INDEX: 26.1 KG/M2

## 2021-01-01 VITALS
BODY MASS INDEX: 26.34 KG/M2 | TEMPERATURE: 97.8 F | SYSTOLIC BLOOD PRESSURE: 116 MMHG | DIASTOLIC BLOOD PRESSURE: 54 MMHG | RESPIRATION RATE: 16 BRPM | OXYGEN SATURATION: 89 % | WEIGHT: 173.8 LBS | HEIGHT: 68 IN | HEART RATE: 73 BPM

## 2021-01-01 DIAGNOSIS — K21.9 GASTROESOPHAGEAL REFLUX DISEASE WITHOUT ESOPHAGITIS: ICD-10-CM

## 2021-01-01 DIAGNOSIS — I48.21 PERMANENT ATRIAL FIBRILLATION (H): ICD-10-CM

## 2021-01-01 DIAGNOSIS — R65.20 SEVERE SEPSIS (H): ICD-10-CM

## 2021-01-01 DIAGNOSIS — K92.1 GASTROINTESTINAL HEMORRHAGE WITH MELENA: ICD-10-CM

## 2021-01-01 DIAGNOSIS — I10 HYPERTENSION GOAL BP (BLOOD PRESSURE) < 140/90: ICD-10-CM

## 2021-01-01 DIAGNOSIS — I25.10 CORONARY ARTERY DISEASE INVOLVING NATIVE CORONARY ARTERY OF NATIVE HEART WITHOUT ANGINA PECTORIS: Chronic | ICD-10-CM

## 2021-01-01 DIAGNOSIS — G93.41 METABOLIC ENCEPHALOPATHY: ICD-10-CM

## 2021-01-01 DIAGNOSIS — E11.59 TYPE 2 DIABETES MELLITUS WITH OTHER CIRCULATORY COMPLICATION, WITH LONG-TERM CURRENT USE OF INSULIN (H): ICD-10-CM

## 2021-01-01 DIAGNOSIS — Z11.52 ENCOUNTER FOR SCREENING LABORATORY TESTING FOR SEVERE ACUTE RESPIRATORY SYNDROME CORONAVIRUS 2 (SARS-COV-2): ICD-10-CM

## 2021-01-01 DIAGNOSIS — J44.9 CHRONIC OBSTRUCTIVE PULMONARY DISEASE, UNSPECIFIED COPD TYPE (H): ICD-10-CM

## 2021-01-01 DIAGNOSIS — F22 DELUSIONS (H): ICD-10-CM

## 2021-01-01 DIAGNOSIS — R63.0 ANOREXIA SYMPTOM: Primary | ICD-10-CM

## 2021-01-01 DIAGNOSIS — K92.1 GASTROINTESTINAL HEMORRHAGE WITH MELENA: Primary | ICD-10-CM

## 2021-01-01 DIAGNOSIS — F02.818 EARLY ONSET ALZHEIMER'S DISEASE WITH BEHAVIORAL DISTURBANCE (H): ICD-10-CM

## 2021-01-01 DIAGNOSIS — E11.22 TYPE 2 DIABETES MELLITUS WITH STAGE 3A CHRONIC KIDNEY DISEASE, WITH LONG-TERM CURRENT USE OF INSULIN (H): ICD-10-CM

## 2021-01-01 DIAGNOSIS — N17.9 AKI (ACUTE KIDNEY INJURY) (H): ICD-10-CM

## 2021-01-01 DIAGNOSIS — N18.30 TYPE 2 DIABETES MELLITUS WITH STAGE 3 CHRONIC KIDNEY DISEASE, WITH LONG-TERM CURRENT USE OF INSULIN, UNSPECIFIED WHETHER STAGE 3A OR 3B CKD (H): Primary | ICD-10-CM

## 2021-01-01 DIAGNOSIS — F41.9 ANXIETY: ICD-10-CM

## 2021-01-01 DIAGNOSIS — N39.0 URINARY TRACT INFECTION WITHOUT HEMATURIA, SITE UNSPECIFIED: ICD-10-CM

## 2021-01-01 DIAGNOSIS — G30.0 EARLY ONSET ALZHEIMER'S DISEASE WITH BEHAVIORAL DISTURBANCE (H): ICD-10-CM

## 2021-01-01 DIAGNOSIS — Z53.9 DIAGNOSIS NOT YET DEFINED: Primary | ICD-10-CM

## 2021-01-01 DIAGNOSIS — E87.70 HYPERVOLEMIA, UNSPECIFIED HYPERVOLEMIA TYPE: ICD-10-CM

## 2021-01-01 DIAGNOSIS — R79.89 ELEVATED TROPONIN: ICD-10-CM

## 2021-01-01 DIAGNOSIS — N18.31 TYPE 2 DIABETES MELLITUS WITH STAGE 3A CHRONIC KIDNEY DISEASE, WITH LONG-TERM CURRENT USE OF INSULIN (H): ICD-10-CM

## 2021-01-01 DIAGNOSIS — N40.0 HYPERTROPHY OF PROSTATE WITHOUT URINARY OBSTRUCTION: ICD-10-CM

## 2021-01-01 DIAGNOSIS — Z79.4 TYPE 2 DIABETES MELLITUS WITH STAGE 3 CHRONIC KIDNEY DISEASE, WITH LONG-TERM CURRENT USE OF INSULIN, UNSPECIFIED WHETHER STAGE 3A OR 3B CKD (H): ICD-10-CM

## 2021-01-01 DIAGNOSIS — Z79.4 TYPE 2 DIABETES MELLITUS WITH STAGE 3A CHRONIC KIDNEY DISEASE, WITH LONG-TERM CURRENT USE OF INSULIN (H): ICD-10-CM

## 2021-01-01 DIAGNOSIS — E11.22 TYPE 2 DIABETES MELLITUS WITH STAGE 3 CHRONIC KIDNEY DISEASE, WITH LONG-TERM CURRENT USE OF INSULIN, UNSPECIFIED WHETHER STAGE 3A OR 3B CKD (H): ICD-10-CM

## 2021-01-01 DIAGNOSIS — I73.9 CLAUDICATION OF CALF MUSCLES (H): ICD-10-CM

## 2021-01-01 DIAGNOSIS — Z79.01 LONG TERM (CURRENT) USE OF ANTICOAGULANTS: ICD-10-CM

## 2021-01-01 DIAGNOSIS — G30.9 ALZHEIMER'S DEMENTIA WITHOUT BEHAVIORAL DISTURBANCE, UNSPECIFIED TIMING OF DEMENTIA ONSET: ICD-10-CM

## 2021-01-01 DIAGNOSIS — R53.1 GENERALIZED WEAKNESS: ICD-10-CM

## 2021-01-01 DIAGNOSIS — I25.10 CORONARY ARTERY DISEASE INVOLVING NATIVE CORONARY ARTERY OF NATIVE HEART WITHOUT ANGINA PECTORIS: ICD-10-CM

## 2021-01-01 DIAGNOSIS — J18.9 PNEUMONIA OF LEFT LOWER LOBE DUE TO INFECTIOUS ORGANISM: Primary | ICD-10-CM

## 2021-01-01 DIAGNOSIS — L89.152 STAGE II PRESSURE ULCER OF SACRAL REGION (H): ICD-10-CM

## 2021-01-01 DIAGNOSIS — D62 ANEMIA DUE TO BLOOD LOSS, ACUTE: ICD-10-CM

## 2021-01-01 DIAGNOSIS — Z79.4 TYPE 2 DIABETES MELLITUS WITH OTHER CIRCULATORY COMPLICATION, WITH LONG-TERM CURRENT USE OF INSULIN (H): ICD-10-CM

## 2021-01-01 DIAGNOSIS — N18.30 TYPE 2 DIABETES MELLITUS WITH STAGE 3 CHRONIC KIDNEY DISEASE, WITH LONG-TERM CURRENT USE OF INSULIN, UNSPECIFIED WHETHER STAGE 3A OR 3B CKD (H): ICD-10-CM

## 2021-01-01 DIAGNOSIS — R48.0 DYSLEXIA: Chronic | ICD-10-CM

## 2021-01-01 DIAGNOSIS — D50.9 IRON DEFICIENCY ANEMIA, UNSPECIFIED IRON DEFICIENCY ANEMIA TYPE: ICD-10-CM

## 2021-01-01 DIAGNOSIS — J96.21 ACUTE ON CHRONIC RESPIRATORY FAILURE WITH HYPOXIA (H): ICD-10-CM

## 2021-01-01 DIAGNOSIS — J45.40 MODERATE PERSISTENT ASTHMA WITHOUT COMPLICATION: Primary | Chronic | ICD-10-CM

## 2021-01-01 DIAGNOSIS — L89.152 PRESSURE INJURY OF SACRAL REGION, STAGE 2 (H): ICD-10-CM

## 2021-01-01 DIAGNOSIS — N18.31 STAGE 3A CHRONIC KIDNEY DISEASE (H): ICD-10-CM

## 2021-01-01 DIAGNOSIS — A41.9 SEPSIS WITH ACUTE HYPOXIC RESPIRATORY FAILURE WITHOUT SEPTIC SHOCK, DUE TO UNSPECIFIED ORGANISM (H): ICD-10-CM

## 2021-01-01 DIAGNOSIS — M25.521 RIGHT ELBOW PAIN: ICD-10-CM

## 2021-01-01 DIAGNOSIS — Z99.81 DEPENDENCE ON SUPPLEMENTAL OXYGEN: ICD-10-CM

## 2021-01-01 DIAGNOSIS — G30.9 ALZHEIMER'S DEMENTIA WITHOUT BEHAVIORAL DISTURBANCE, UNSPECIFIED TIMING OF DEMENTIA ONSET: Chronic | ICD-10-CM

## 2021-01-01 DIAGNOSIS — I10 HYPERTENSION GOAL BP (BLOOD PRESSURE) < 140/90: Chronic | ICD-10-CM

## 2021-01-01 DIAGNOSIS — J96.01 SEPSIS WITH ACUTE HYPOXIC RESPIRATORY FAILURE WITHOUT SEPTIC SHOCK, DUE TO UNSPECIFIED ORGANISM (H): ICD-10-CM

## 2021-01-01 DIAGNOSIS — J96.01 ACUTE RESPIRATORY FAILURE WITH HYPOXIA (H): ICD-10-CM

## 2021-01-01 DIAGNOSIS — J45.40 MODERATE PERSISTENT ASTHMA WITHOUT COMPLICATION: Chronic | ICD-10-CM

## 2021-01-01 DIAGNOSIS — Z86.73 HISTORY OF CVA (CEREBROVASCULAR ACCIDENT): Primary | Chronic | ICD-10-CM

## 2021-01-01 DIAGNOSIS — E78.5 HYPERLIPIDEMIA LDL GOAL <70: ICD-10-CM

## 2021-01-01 DIAGNOSIS — J67.8: ICD-10-CM

## 2021-01-01 DIAGNOSIS — F02.80 ALZHEIMER'S DEMENTIA WITHOUT BEHAVIORAL DISTURBANCE, UNSPECIFIED TIMING OF DEMENTIA ONSET: ICD-10-CM

## 2021-01-01 DIAGNOSIS — G93.41 SEPTIC ENCEPHALOPATHY: ICD-10-CM

## 2021-01-01 DIAGNOSIS — I27.20 PULMONARY HYPERTENSION, UNSPECIFIED (H): ICD-10-CM

## 2021-01-01 DIAGNOSIS — Z79.01 CHRONIC ANTICOAGULATION: ICD-10-CM

## 2021-01-01 DIAGNOSIS — J44.9 CHRONIC OBSTRUCTIVE PULMONARY DISEASE, UNSPECIFIED COPD TYPE (H): Chronic | ICD-10-CM

## 2021-01-01 DIAGNOSIS — D64.9 ANEMIA, UNSPECIFIED TYPE: ICD-10-CM

## 2021-01-01 DIAGNOSIS — Z79.4 TYPE 2 DIABETES MELLITUS WITH STAGE 3 CHRONIC KIDNEY DISEASE, WITH LONG-TERM CURRENT USE OF INSULIN, UNSPECIFIED WHETHER STAGE 3A OR 3B CKD (H): Primary | ICD-10-CM

## 2021-01-01 DIAGNOSIS — K92.2 GASTROINTESTINAL HEMORRHAGE, UNSPECIFIED GASTROINTESTINAL HEMORRHAGE TYPE: ICD-10-CM

## 2021-01-01 DIAGNOSIS — K92.2 ACUTE GASTROINTESTINAL HEMORRHAGE: ICD-10-CM

## 2021-01-01 DIAGNOSIS — J44.89 OBSTRUCTIVE CHRONIC BRONCHITIS WITHOUT EXACERBATION (H): ICD-10-CM

## 2021-01-01 DIAGNOSIS — A41.9 SEVERE SEPSIS (H): ICD-10-CM

## 2021-01-01 DIAGNOSIS — R65.20 SEPSIS WITH ACUTE HYPOXIC RESPIRATORY FAILURE WITHOUT SEPTIC SHOCK, DUE TO UNSPECIFIED ORGANISM (H): ICD-10-CM

## 2021-01-01 DIAGNOSIS — Z87.09 HISTORY OF COPD: ICD-10-CM

## 2021-01-01 DIAGNOSIS — E11.22 TYPE 2 DIABETES MELLITUS WITH STAGE 3 CHRONIC KIDNEY DISEASE, WITH LONG-TERM CURRENT USE OF INSULIN, UNSPECIFIED WHETHER STAGE 3A OR 3B CKD (H): Primary | ICD-10-CM

## 2021-01-01 DIAGNOSIS — I25.9 CHRONIC ISCHEMIC HEART DISEASE: ICD-10-CM

## 2021-01-01 DIAGNOSIS — I20.89 STABLE ANGINA PECTORIS (H): ICD-10-CM

## 2021-01-01 DIAGNOSIS — D62 ANEMIA DUE TO BLOOD LOSS, ACUTE: Primary | ICD-10-CM

## 2021-01-01 DIAGNOSIS — F02.80 ALZHEIMER'S DEMENTIA WITHOUT BEHAVIORAL DISTURBANCE, UNSPECIFIED TIMING OF DEMENTIA ONSET: Chronic | ICD-10-CM

## 2021-01-01 DIAGNOSIS — R63.0 ANOREXIA SYMPTOM: ICD-10-CM

## 2021-01-01 LAB
ABO + RH BLD: NORMAL
ABO + RH BLD: NORMAL
ALBUMIN SERPL-MCNC: 2.4 G/DL (ref 3.4–5)
ALBUMIN SERPL-MCNC: 2.5 G/DL (ref 3.4–5)
ALBUMIN SERPL-MCNC: 2.5 G/DL (ref 3.4–5)
ALBUMIN SERPL-MCNC: 2.9 G/DL (ref 3.4–5)
ALBUMIN UR-MCNC: 100 MG/DL
ALBUMIN UR-MCNC: NEGATIVE MG/DL
ALBUMIN UR-MCNC: NEGATIVE MG/DL
ALP SERPL-CCNC: 102 U/L (ref 40–150)
ALP SERPL-CCNC: 109 U/L (ref 40–150)
ALP SERPL-CCNC: 92 U/L (ref 40–150)
ALP SERPL-CCNC: 96 U/L (ref 40–150)
ALT SERPL W P-5'-P-CCNC: 23 U/L (ref 0–70)
ALT SERPL W P-5'-P-CCNC: 26 U/L (ref 0–70)
ALT SERPL W P-5'-P-CCNC: 39 U/L (ref 0–70)
ALT SERPL W P-5'-P-CCNC: 42 U/L (ref 0–70)
ANION GAP SERPL CALCULATED.3IONS-SCNC: 10 MMOL/L (ref 3–14)
ANION GAP SERPL CALCULATED.3IONS-SCNC: 11 MMOL/L (ref 3–14)
ANION GAP SERPL CALCULATED.3IONS-SCNC: 2 MMOL/L (ref 3–14)
ANION GAP SERPL CALCULATED.3IONS-SCNC: 3 MMOL/L (ref 3–14)
ANION GAP SERPL CALCULATED.3IONS-SCNC: 4 MMOL/L (ref 3–14)
ANION GAP SERPL CALCULATED.3IONS-SCNC: 5 MMOL/L (ref 3–14)
ANION GAP SERPL CALCULATED.3IONS-SCNC: 6 MMOL/L (ref 3–14)
ANION GAP SERPL CALCULATED.3IONS-SCNC: 7 MMOL/L (ref 3–14)
ANION GAP SERPL CALCULATED.3IONS-SCNC: 7 MMOL/L (ref 3–14)
APPEARANCE UR: ABNORMAL
APPEARANCE UR: ABNORMAL
APPEARANCE UR: CLEAR
AST SERPL W P-5'-P-CCNC: 12 U/L (ref 0–45)
AST SERPL W P-5'-P-CCNC: 18 U/L (ref 0–45)
AST SERPL W P-5'-P-CCNC: 32 U/L (ref 0–45)
AST SERPL W P-5'-P-CCNC: 41 U/L (ref 0–45)
BACTERIA #/AREA URNS HPF: ABNORMAL /HPF
BACTERIA SPEC CULT: ABNORMAL
BACTERIA SPEC CULT: NO GROWTH
BACTERIA SPEC CULT: NO GROWTH
BACTERIA SPEC CULT: NORMAL
BASE EXCESS BLDV CALC-SCNC: 4.7 MMOL/L
BASE EXCESS BLDV CALC-SCNC: 6.8 MMOL/L
BASE EXCESS BLDV CALC-SCNC: 7.2 MMOL/L
BASOPHILS # BLD AUTO: 0 10E9/L (ref 0–0.2)
BASOPHILS NFR BLD AUTO: 0.1 %
BASOPHILS NFR BLD AUTO: 0.1 %
BASOPHILS NFR BLD AUTO: 0.2 %
BASOPHILS NFR BLD AUTO: 0.2 %
BILIRUB SERPL-MCNC: 0.5 MG/DL (ref 0.2–1.3)
BILIRUB SERPL-MCNC: 0.8 MG/DL (ref 0.2–1.3)
BILIRUB SERPL-MCNC: 1.2 MG/DL (ref 0.2–1.3)
BILIRUB SERPL-MCNC: 1.2 MG/DL (ref 0.2–1.3)
BILIRUB UR QL STRIP: NEGATIVE
BLD GP AB SCN SERPL QL: NORMAL
BLD PROD TYP BPU: NORMAL
BLD UNIT ID BPU: 0
BLOOD BANK CMNT PATIENT-IMP: NORMAL
BLOOD PRODUCT CODE: NORMAL
BPU ID: NORMAL
BUN SERPL-MCNC: 20 MG/DL (ref 7–30)
BUN SERPL-MCNC: 25 MG/DL (ref 7–30)
BUN SERPL-MCNC: 27 MG/DL (ref 7–30)
BUN SERPL-MCNC: 28 MG/DL (ref 7–30)
BUN SERPL-MCNC: 35 MG/DL (ref 7–30)
BUN SERPL-MCNC: 38 MG/DL (ref 7–30)
BUN SERPL-MCNC: 42 MG/DL (ref 7–30)
BUN SERPL-MCNC: 42 MG/DL (ref 7–30)
BUN SERPL-MCNC: 52 MG/DL (ref 7–30)
BUN SERPL-MCNC: 58 MG/DL (ref 7–30)
BUN SERPL-MCNC: 61 MG/DL (ref 7–30)
BUN SERPL-MCNC: 62 MG/DL (ref 7–30)
BUN SERPL-MCNC: 70 MG/DL (ref 7–30)
BUN SERPL-MCNC: 78 MG/DL (ref 7–30)
C DIFF TOX B STL QL: NEGATIVE
CALCIUM SERPL-MCNC: 7.4 MG/DL (ref 8.5–10.1)
CALCIUM SERPL-MCNC: 7.5 MG/DL (ref 8.5–10.1)
CALCIUM SERPL-MCNC: 7.6 MG/DL (ref 8.5–10.1)
CALCIUM SERPL-MCNC: 7.7 MG/DL (ref 8.5–10.1)
CALCIUM SERPL-MCNC: 7.8 MG/DL (ref 8.5–10.1)
CALCIUM SERPL-MCNC: 8.1 MG/DL (ref 8.5–10.1)
CALCIUM SERPL-MCNC: 8.1 MG/DL (ref 8.5–10.1)
CALCIUM SERPL-MCNC: 8.2 MG/DL (ref 8.5–10.1)
CALCIUM SERPL-MCNC: 8.3 MG/DL (ref 8.5–10.1)
CALCIUM SERPL-MCNC: 8.4 MG/DL (ref 8.5–10.1)
CALCIUM SERPL-MCNC: 8.5 MG/DL (ref 8.5–10.1)
CALCIUM SERPL-MCNC: 8.7 MG/DL (ref 8.5–10.1)
CALCIUM SERPL-MCNC: 8.8 MG/DL (ref 8.5–10.1)
CALCIUM SERPL-MCNC: 9 MG/DL (ref 8.5–10.1)
CAOX CRY #/AREA URNS HPF: ABNORMAL /HPF
CHLORIDE SERPL-SCNC: 100 MMOL/L (ref 94–109)
CHLORIDE SERPL-SCNC: 102 MMOL/L (ref 94–109)
CHLORIDE SERPL-SCNC: 103 MMOL/L (ref 94–109)
CHLORIDE SERPL-SCNC: 104 MMOL/L (ref 94–109)
CHLORIDE SERPL-SCNC: 106 MMOL/L (ref 94–109)
CHLORIDE SERPL-SCNC: 106 MMOL/L (ref 94–109)
CHLORIDE SERPL-SCNC: 107 MMOL/L (ref 94–109)
CHLORIDE SERPL-SCNC: 92 MMOL/L (ref 94–109)
CHLORIDE SERPL-SCNC: 94 MMOL/L (ref 94–109)
CHLORIDE SERPL-SCNC: 95 MMOL/L (ref 94–109)
CHLORIDE SERPL-SCNC: 96 MMOL/L (ref 94–109)
CHLORIDE SERPL-SCNC: 96 MMOL/L (ref 94–109)
CO2 SERPL-SCNC: 26 MMOL/L (ref 20–32)
CO2 SERPL-SCNC: 29 MMOL/L (ref 20–32)
CO2 SERPL-SCNC: 29 MMOL/L (ref 20–32)
CO2 SERPL-SCNC: 30 MMOL/L (ref 20–32)
CO2 SERPL-SCNC: 31 MMOL/L (ref 20–32)
CO2 SERPL-SCNC: 31 MMOL/L (ref 20–32)
CO2 SERPL-SCNC: 32 MMOL/L (ref 20–32)
CO2 SERPL-SCNC: 33 MMOL/L (ref 20–32)
CO2 SERPL-SCNC: 34 MMOL/L (ref 20–32)
CO2 SERPL-SCNC: 35 MMOL/L (ref 20–32)
CO2 SERPL-SCNC: 36 MMOL/L (ref 20–32)
CO2 SERPL-SCNC: 37 MMOL/L (ref 20–32)
COLOR UR AUTO: YELLOW
COPATH REPORT: NORMAL
CREAT SERPL-MCNC: 1.39 MG/DL (ref 0.66–1.25)
CREAT SERPL-MCNC: 1.56 MG/DL (ref 0.66–1.25)
CREAT SERPL-MCNC: 1.61 MG/DL (ref 0.66–1.25)
CREAT SERPL-MCNC: 1.64 MG/DL (ref 0.66–1.25)
CREAT SERPL-MCNC: 1.65 MG/DL (ref 0.66–1.25)
CREAT SERPL-MCNC: 1.65 MG/DL (ref 0.66–1.25)
CREAT SERPL-MCNC: 1.66 MG/DL (ref 0.66–1.25)
CREAT SERPL-MCNC: 1.73 MG/DL (ref 0.66–1.25)
CREAT SERPL-MCNC: 1.73 MG/DL (ref 0.66–1.25)
CREAT SERPL-MCNC: 1.78 MG/DL (ref 0.66–1.25)
CREAT SERPL-MCNC: 1.81 MG/DL (ref 0.66–1.25)
CREAT SERPL-MCNC: 2.09 MG/DL (ref 0.66–1.25)
CREAT SERPL-MCNC: 2.15 MG/DL (ref 0.66–1.25)
CREAT SERPL-MCNC: 2.22 MG/DL (ref 0.66–1.25)
CRP SERPL-MCNC: 137 MG/L (ref 0–8)
CRP SERPL-MCNC: 200 MG/L (ref 0–8)
CRP SERPL-MCNC: 206 MG/L (ref 0–8)
CRP SERPL-MCNC: 234 MG/L (ref 0–8)
CRP SERPL-MCNC: 89.8 MG/L (ref 0–8)
DIFFERENTIAL METHOD BLD: ABNORMAL
EOSINOPHIL # BLD AUTO: 0 10E9/L (ref 0–0.7)
EOSINOPHIL # BLD AUTO: 0.1 10E9/L (ref 0–0.7)
EOSINOPHIL NFR BLD AUTO: 0 %
EOSINOPHIL NFR BLD AUTO: 0 %
EOSINOPHIL NFR BLD AUTO: 0.2 %
EOSINOPHIL NFR BLD AUTO: 0.9 %
ERYTHROCYTE [DISTWIDTH] IN BLOOD BY AUTOMATED COUNT: 14.8 % (ref 10–15)
ERYTHROCYTE [DISTWIDTH] IN BLOOD BY AUTOMATED COUNT: 15 % (ref 10–15)
ERYTHROCYTE [DISTWIDTH] IN BLOOD BY AUTOMATED COUNT: 15.2 % (ref 10–15)
ERYTHROCYTE [DISTWIDTH] IN BLOOD BY AUTOMATED COUNT: 15.5 % (ref 10–15)
ERYTHROCYTE [DISTWIDTH] IN BLOOD BY AUTOMATED COUNT: 15.6 % (ref 10–15)
ERYTHROCYTE [DISTWIDTH] IN BLOOD BY AUTOMATED COUNT: 15.9 % (ref 10–15)
ERYTHROCYTE [DISTWIDTH] IN BLOOD BY AUTOMATED COUNT: 17.1 % (ref 10–15)
ERYTHROCYTE [DISTWIDTH] IN BLOOD BY AUTOMATED COUNT: 17.2 % (ref 10–15)
ERYTHROCYTE [DISTWIDTH] IN BLOOD BY AUTOMATED COUNT: 17.4 % (ref 10–15)
ERYTHROCYTE [DISTWIDTH] IN BLOOD BY AUTOMATED COUNT: 18 % (ref 10–15)
ERYTHROCYTE [DISTWIDTH] IN BLOOD BY AUTOMATED COUNT: 18.3 % (ref 10–15)
ERYTHROCYTE [DISTWIDTH] IN BLOOD BY AUTOMATED COUNT: 18.7 % (ref 10–15)
FERRITIN SERPL-MCNC: 45 NG/ML (ref 26–388)
GFR SERPL CREATININE-BSD FRML MDRD: 27 ML/MIN/{1.73_M2}
GFR SERPL CREATININE-BSD FRML MDRD: 28 ML/MIN/{1.73_M2}
GFR SERPL CREATININE-BSD FRML MDRD: 29 ML/MIN/{1.73_M2}
GFR SERPL CREATININE-BSD FRML MDRD: 34 ML/MIN/{1.73_M2}
GFR SERPL CREATININE-BSD FRML MDRD: 35 ML/MIN/{1.73_M2}
GFR SERPL CREATININE-BSD FRML MDRD: 36 ML/MIN/{1.73_M2}
GFR SERPL CREATININE-BSD FRML MDRD: 36 ML/MIN/{1.73_M2}
GFR SERPL CREATININE-BSD FRML MDRD: 38 ML/MIN/{1.73_M2}
GFR SERPL CREATININE-BSD FRML MDRD: 39 ML/MIN/{1.73_M2}
GFR SERPL CREATININE-BSD FRML MDRD: 41 ML/MIN/{1.73_M2}
GFR SERPL CREATININE-BSD FRML MDRD: 47 ML/MIN/{1.73_M2}
GLUCOSE BLDC GLUCOMTR-MCNC: 104 MG/DL (ref 70–99)
GLUCOSE BLDC GLUCOMTR-MCNC: 131 MG/DL (ref 70–99)
GLUCOSE BLDC GLUCOMTR-MCNC: 134 MG/DL (ref 70–99)
GLUCOSE BLDC GLUCOMTR-MCNC: 137 MG/DL (ref 70–99)
GLUCOSE BLDC GLUCOMTR-MCNC: 138 MG/DL (ref 70–99)
GLUCOSE BLDC GLUCOMTR-MCNC: 143 MG/DL (ref 70–99)
GLUCOSE BLDC GLUCOMTR-MCNC: 143 MG/DL (ref 70–99)
GLUCOSE BLDC GLUCOMTR-MCNC: 144 MG/DL (ref 70–99)
GLUCOSE BLDC GLUCOMTR-MCNC: 146 MG/DL (ref 70–99)
GLUCOSE BLDC GLUCOMTR-MCNC: 175 MG/DL (ref 70–99)
GLUCOSE BLDC GLUCOMTR-MCNC: 181 MG/DL (ref 70–99)
GLUCOSE BLDC GLUCOMTR-MCNC: 190 MG/DL (ref 70–99)
GLUCOSE BLDC GLUCOMTR-MCNC: 194 MG/DL (ref 70–99)
GLUCOSE BLDC GLUCOMTR-MCNC: 200 MG/DL (ref 70–99)
GLUCOSE BLDC GLUCOMTR-MCNC: 201 MG/DL (ref 70–99)
GLUCOSE BLDC GLUCOMTR-MCNC: 202 MG/DL (ref 70–99)
GLUCOSE BLDC GLUCOMTR-MCNC: 209 MG/DL (ref 70–99)
GLUCOSE BLDC GLUCOMTR-MCNC: 217 MG/DL (ref 70–99)
GLUCOSE BLDC GLUCOMTR-MCNC: 221 MG/DL (ref 70–99)
GLUCOSE BLDC GLUCOMTR-MCNC: 226 MG/DL (ref 70–99)
GLUCOSE BLDC GLUCOMTR-MCNC: 228 MG/DL (ref 70–99)
GLUCOSE BLDC GLUCOMTR-MCNC: 233 MG/DL (ref 70–99)
GLUCOSE BLDC GLUCOMTR-MCNC: 240 MG/DL (ref 70–99)
GLUCOSE BLDC GLUCOMTR-MCNC: 241 MG/DL (ref 70–99)
GLUCOSE BLDC GLUCOMTR-MCNC: 243 MG/DL (ref 70–99)
GLUCOSE BLDC GLUCOMTR-MCNC: 250 MG/DL (ref 70–99)
GLUCOSE BLDC GLUCOMTR-MCNC: 253 MG/DL (ref 70–99)
GLUCOSE BLDC GLUCOMTR-MCNC: 256 MG/DL (ref 70–99)
GLUCOSE BLDC GLUCOMTR-MCNC: 258 MG/DL (ref 70–99)
GLUCOSE BLDC GLUCOMTR-MCNC: 260 MG/DL (ref 70–99)
GLUCOSE BLDC GLUCOMTR-MCNC: 261 MG/DL (ref 70–99)
GLUCOSE BLDC GLUCOMTR-MCNC: 265 MG/DL (ref 70–99)
GLUCOSE BLDC GLUCOMTR-MCNC: 269 MG/DL (ref 70–99)
GLUCOSE BLDC GLUCOMTR-MCNC: 276 MG/DL (ref 70–99)
GLUCOSE BLDC GLUCOMTR-MCNC: 282 MG/DL (ref 70–99)
GLUCOSE BLDC GLUCOMTR-MCNC: 283 MG/DL (ref 70–99)
GLUCOSE BLDC GLUCOMTR-MCNC: 289 MG/DL (ref 70–99)
GLUCOSE BLDC GLUCOMTR-MCNC: 292 MG/DL (ref 70–99)
GLUCOSE BLDC GLUCOMTR-MCNC: 292 MG/DL (ref 70–99)
GLUCOSE BLDC GLUCOMTR-MCNC: 305 MG/DL (ref 70–99)
GLUCOSE BLDC GLUCOMTR-MCNC: 305 MG/DL (ref 70–99)
GLUCOSE BLDC GLUCOMTR-MCNC: 308 MG/DL (ref 70–99)
GLUCOSE BLDC GLUCOMTR-MCNC: 316 MG/DL (ref 70–99)
GLUCOSE BLDC GLUCOMTR-MCNC: 319 MG/DL (ref 70–99)
GLUCOSE BLDC GLUCOMTR-MCNC: 323 MG/DL (ref 70–99)
GLUCOSE BLDC GLUCOMTR-MCNC: 326 MG/DL (ref 70–99)
GLUCOSE BLDC GLUCOMTR-MCNC: 327 MG/DL (ref 70–99)
GLUCOSE BLDC GLUCOMTR-MCNC: 329 MG/DL (ref 70–99)
GLUCOSE BLDC GLUCOMTR-MCNC: 338 MG/DL (ref 70–99)
GLUCOSE BLDC GLUCOMTR-MCNC: 341 MG/DL (ref 70–99)
GLUCOSE BLDC GLUCOMTR-MCNC: 343 MG/DL (ref 70–99)
GLUCOSE BLDC GLUCOMTR-MCNC: 351 MG/DL (ref 70–99)
GLUCOSE BLDC GLUCOMTR-MCNC: 357 MG/DL (ref 70–99)
GLUCOSE BLDC GLUCOMTR-MCNC: 368 MG/DL (ref 70–99)
GLUCOSE BLDC GLUCOMTR-MCNC: 370 MG/DL (ref 70–99)
GLUCOSE BLDC GLUCOMTR-MCNC: 377 MG/DL (ref 70–99)
GLUCOSE BLDC GLUCOMTR-MCNC: 378 MG/DL (ref 70–99)
GLUCOSE BLDC GLUCOMTR-MCNC: 379 MG/DL (ref 70–99)
GLUCOSE BLDC GLUCOMTR-MCNC: 424 MG/DL (ref 70–99)
GLUCOSE BLDC GLUCOMTR-MCNC: 426 MG/DL (ref 70–99)
GLUCOSE BLDC GLUCOMTR-MCNC: 450 MG/DL (ref 70–99)
GLUCOSE BLDC GLUCOMTR-MCNC: 48 MG/DL (ref 70–99)
GLUCOSE BLDC GLUCOMTR-MCNC: 59 MG/DL (ref 70–99)
GLUCOSE BLDC GLUCOMTR-MCNC: 73 MG/DL (ref 70–99)
GLUCOSE BLDC GLUCOMTR-MCNC: 81 MG/DL (ref 70–99)
GLUCOSE BLDC GLUCOMTR-MCNC: 99 MG/DL (ref 70–99)
GLUCOSE SERPL-MCNC: 129 MG/DL (ref 70–99)
GLUCOSE SERPL-MCNC: 144 MG/DL (ref 70–99)
GLUCOSE SERPL-MCNC: 180 MG/DL (ref 70–99)
GLUCOSE SERPL-MCNC: 188 MG/DL (ref 70–99)
GLUCOSE SERPL-MCNC: 198 MG/DL (ref 70–99)
GLUCOSE SERPL-MCNC: 203 MG/DL (ref 70–99)
GLUCOSE SERPL-MCNC: 212 MG/DL (ref 70–99)
GLUCOSE SERPL-MCNC: 215 MG/DL (ref 70–99)
GLUCOSE SERPL-MCNC: 230 MG/DL (ref 70–99)
GLUCOSE SERPL-MCNC: 291 MG/DL (ref 70–99)
GLUCOSE SERPL-MCNC: 295 MG/DL (ref 70–99)
GLUCOSE SERPL-MCNC: 309 MG/DL (ref 70–99)
GLUCOSE SERPL-MCNC: 310 MG/DL (ref 70–99)
GLUCOSE SERPL-MCNC: 95 MG/DL (ref 70–99)
GLUCOSE UR STRIP-MCNC: NEGATIVE MG/DL
HBA1C MFR BLD: 5.7 % (ref 0–5.6)
HCO3 BLDV-SCNC: 31 MMOL/L (ref 21–28)
HCO3 BLDV-SCNC: 33 MMOL/L (ref 21–28)
HCO3 BLDV-SCNC: 34 MMOL/L (ref 21–28)
HCT VFR BLD AUTO: 19.1 % (ref 40–53)
HCT VFR BLD AUTO: 23.9 % (ref 40–53)
HCT VFR BLD AUTO: 24 % (ref 40–53)
HCT VFR BLD AUTO: 24.6 % (ref 40–53)
HCT VFR BLD AUTO: 27.2 % (ref 40–53)
HCT VFR BLD AUTO: 27.9 % (ref 40–53)
HCT VFR BLD AUTO: 28.4 % (ref 40–53)
HCT VFR BLD AUTO: 28.5 % (ref 40–53)
HCT VFR BLD AUTO: 28.7 % (ref 40–53)
HCT VFR BLD AUTO: 28.8 % (ref 40–53)
HCT VFR BLD AUTO: 29.1 % (ref 40–53)
HCT VFR BLD AUTO: 29.9 % (ref 40–53)
HCT VFR BLD AUTO: 30.4 % (ref 40–53)
HCT VFR BLD AUTO: 32.9 % (ref 40–53)
HEMOCCULT STL QL: NORMAL
HGB BLD-MCNC: 10.1 G/DL (ref 13.3–17.7)
HGB BLD-MCNC: 5.5 G/DL (ref 13.3–17.7)
HGB BLD-MCNC: 6.9 G/DL (ref 13.3–17.7)
HGB BLD-MCNC: 7.2 G/DL (ref 13.3–17.7)
HGB BLD-MCNC: 7.4 G/DL (ref 13.3–17.7)
HGB BLD-MCNC: 7.9 G/DL (ref 13.3–17.7)
HGB BLD-MCNC: 7.9 G/DL (ref 13.3–17.7)
HGB BLD-MCNC: 8.1 G/DL (ref 13.3–17.7)
HGB BLD-MCNC: 8.3 G/DL (ref 13.3–17.7)
HGB BLD-MCNC: 8.4 G/DL (ref 13.3–17.7)
HGB BLD-MCNC: 8.5 G/DL (ref 13.3–17.7)
HGB BLD-MCNC: 8.7 G/DL (ref 13.3–17.7)
HGB BLD-MCNC: 8.8 G/DL (ref 13.3–17.7)
HGB BLD-MCNC: 8.8 G/DL (ref 13.3–17.7)
HGB BLD-MCNC: 8.9 G/DL (ref 13.3–17.7)
HGB BLD-MCNC: 9.3 G/DL (ref 13.3–17.7)
HGB UR QL STRIP: ABNORMAL
HGB UR QL STRIP: NEGATIVE
HGB UR QL STRIP: NEGATIVE
HYALINE CASTS #/AREA URNS LPF: 1 /LPF (ref 0–2)
IMM GRANULOCYTES # BLD: 0 10E9/L (ref 0–0.4)
IMM GRANULOCYTES # BLD: 0 10E9/L (ref 0–0.4)
IMM GRANULOCYTES # BLD: 0.1 10E9/L (ref 0–0.4)
IMM GRANULOCYTES # BLD: 0.2 10E9/L (ref 0–0.4)
IMM GRANULOCYTES NFR BLD: 0.4 %
IMM GRANULOCYTES NFR BLD: 0.6 %
IMM GRANULOCYTES NFR BLD: 0.8 %
IMM GRANULOCYTES NFR BLD: 1.5 %
INTERNAL QC OK POCT: YES
IRON SATN MFR SERPL: 9 % (ref 15–46)
IRON SERPL-MCNC: 26 UG/DL (ref 35–180)
KETONES UR STRIP-MCNC: NEGATIVE MG/DL
LABORATORY COMMENT REPORT: ABNORMAL
LABORATORY COMMENT REPORT: NORMAL
LABORATORY COMMENT REPORT: NORMAL
LACTATE BLD-SCNC: 1.1 MMOL/L (ref 0.7–2)
LACTATE BLD-SCNC: 1.1 MMOL/L (ref 0.7–2)
LACTATE BLD-SCNC: 2 MMOL/L (ref 0.7–2)
LACTATE BLD-SCNC: 2.3 MMOL/L (ref 0.7–2)
LACTATE BLD-SCNC: 2.4 MMOL/L (ref 0.7–2)
LACTATE BLD-SCNC: 4.6 MMOL/L (ref 0.7–2)
LEUKOCYTE ESTERASE UR QL STRIP: ABNORMAL
LEUKOCYTE ESTERASE UR QL STRIP: ABNORMAL
LEUKOCYTE ESTERASE UR QL STRIP: NEGATIVE
LYMPHOCYTES # BLD AUTO: 0.4 10E9/L (ref 0.8–5.3)
LYMPHOCYTES # BLD AUTO: 0.7 10E9/L (ref 0.8–5.3)
LYMPHOCYTES # BLD AUTO: 1.1 10E9/L (ref 0.8–5.3)
LYMPHOCYTES # BLD AUTO: 1.3 10E9/L (ref 0.8–5.3)
LYMPHOCYTES NFR BLD AUTO: 14 %
LYMPHOCYTES NFR BLD AUTO: 16.7 %
LYMPHOCYTES NFR BLD AUTO: 2.7 %
LYMPHOCYTES NFR BLD AUTO: 5.5 %
Lab: ABNORMAL
Lab: NORMAL
MAGNESIUM SERPL-MCNC: 1.8 MG/DL (ref 1.6–2.3)
MCH RBC QN AUTO: 25.7 PG (ref 26.5–33)
MCH RBC QN AUTO: 26.1 PG (ref 26.5–33)
MCH RBC QN AUTO: 26.2 PG (ref 26.5–33)
MCH RBC QN AUTO: 26.6 PG (ref 26.5–33)
MCH RBC QN AUTO: 26.6 PG (ref 26.5–33)
MCH RBC QN AUTO: 27.4 PG (ref 26.5–33)
MCH RBC QN AUTO: 27.8 PG (ref 26.5–33)
MCH RBC QN AUTO: 28.3 PG (ref 26.5–33)
MCH RBC QN AUTO: 28.5 PG (ref 26.5–33)
MCH RBC QN AUTO: 28.6 PG (ref 26.5–33)
MCH RBC QN AUTO: 28.6 PG (ref 26.5–33)
MCH RBC QN AUTO: 28.7 PG (ref 26.5–33)
MCH RBC QN AUTO: 29.1 PG (ref 26.5–33)
MCH RBC QN AUTO: 29.1 PG (ref 26.5–33)
MCHC RBC AUTO-ENTMCNC: 28.8 G/DL (ref 31.5–36.5)
MCHC RBC AUTO-ENTMCNC: 28.8 G/DL (ref 31.5–36.5)
MCHC RBC AUTO-ENTMCNC: 29 G/DL (ref 31.5–36.5)
MCHC RBC AUTO-ENTMCNC: 29.3 G/DL (ref 31.5–36.5)
MCHC RBC AUTO-ENTMCNC: 29.4 G/DL (ref 31.5–36.5)
MCHC RBC AUTO-ENTMCNC: 29.6 G/DL (ref 31.5–36.5)
MCHC RBC AUTO-ENTMCNC: 30.1 G/DL (ref 31.5–36.5)
MCHC RBC AUTO-ENTMCNC: 30.1 G/DL (ref 31.5–36.5)
MCHC RBC AUTO-ENTMCNC: 30.2 G/DL (ref 31.5–36.5)
MCHC RBC AUTO-ENTMCNC: 30.2 G/DL (ref 31.5–36.5)
MCHC RBC AUTO-ENTMCNC: 30.5 G/DL (ref 31.5–36.5)
MCHC RBC AUTO-ENTMCNC: 30.7 G/DL (ref 31.5–36.5)
MCHC RBC AUTO-ENTMCNC: 32.4 G/DL (ref 31.5–36.5)
MCHC RBC AUTO-ENTMCNC: 33.7 G/DL (ref 31.5–36.5)
MCV RBC AUTO: 100 FL (ref 78–100)
MCV RBC AUTO: 85 FL (ref 78–100)
MCV RBC AUTO: 86 FL (ref 78–100)
MCV RBC AUTO: 87 FL (ref 78–100)
MCV RBC AUTO: 89 FL (ref 78–100)
MCV RBC AUTO: 95 FL (ref 78–100)
MCV RBC AUTO: 98 FL (ref 78–100)
MONOCYTES # BLD AUTO: 0.7 10E9/L (ref 0–1.3)
MONOCYTES # BLD AUTO: 0.9 10E9/L (ref 0–1.3)
MONOCYTES NFR BLD AUTO: 10.2 %
MONOCYTES NFR BLD AUTO: 10.2 %
MONOCYTES NFR BLD AUTO: 5.5 %
MONOCYTES NFR BLD AUTO: 7.2 %
MUCOUS THREADS #/AREA URNS LPF: PRESENT /LPF
NEUTROPHILS # BLD AUTO: 10.4 10E9/L (ref 1.6–8.3)
NEUTROPHILS # BLD AUTO: 14.6 10E9/L (ref 1.6–8.3)
NEUTROPHILS # BLD AUTO: 4.7 10E9/L (ref 1.6–8.3)
NEUTROPHILS # BLD AUTO: 6.9 10E9/L (ref 1.6–8.3)
NEUTROPHILS NFR BLD AUTO: 71.4 %
NEUTROPHILS NFR BLD AUTO: 75 %
NEUTROPHILS NFR BLD AUTO: 86.4 %
NEUTROPHILS NFR BLD AUTO: 90.2 %
NITRATE UR QL: NEGATIVE
NRBC # BLD AUTO: 0 10*3/UL
NRBC # BLD AUTO: 0.1 10*3/UL
NRBC BLD AUTO-RTO: 0 /100
NRBC BLD AUTO-RTO: 0 /100
NRBC BLD AUTO-RTO: 1 /100
NRBC BLD AUTO-RTO: 1 /100
NT-PROBNP SERPL-MCNC: ABNORMAL PG/ML (ref 0–1800)
NUM BPU REQUESTED: 3
O2/TOTAL GAS SETTING VFR VENT: 15 %
O2/TOTAL GAS SETTING VFR VENT: 4 %
PCO2 BLDV: 52 MM HG (ref 40–50)
PCO2 BLDV: 55 MM HG (ref 40–50)
PCO2 BLDV: 58 MM HG (ref 40–50)
PH BLDV: 7.37 PH (ref 7.32–7.43)
PH BLDV: 7.38 PH (ref 7.32–7.43)
PH BLDV: 7.39 PH (ref 7.32–7.43)
PH UR STRIP: 5 PH (ref 5–7)
PH UR STRIP: 5 PH (ref 5–7)
PH UR STRIP: 6 PH (ref 5–7)
PLATELET # BLD AUTO: 166 10E9/L (ref 150–450)
PLATELET # BLD AUTO: 168 10E9/L (ref 150–450)
PLATELET # BLD AUTO: 170 10E9/L (ref 150–450)
PLATELET # BLD AUTO: 171 10E9/L (ref 150–450)
PLATELET # BLD AUTO: 172 10E9/L (ref 150–450)
PLATELET # BLD AUTO: 178 10E9/L (ref 150–450)
PLATELET # BLD AUTO: 187 10E9/L (ref 150–450)
PLATELET # BLD AUTO: 187 10E9/L (ref 150–450)
PLATELET # BLD AUTO: 190 10E9/L (ref 150–450)
PLATELET # BLD AUTO: 191 10E9/L (ref 150–450)
PLATELET # BLD AUTO: 193 10E9/L (ref 150–450)
PLATELET # BLD AUTO: 198 10E9/L (ref 150–450)
PLATELET # BLD AUTO: 210 10E9/L (ref 150–450)
PLATELET # BLD AUTO: 233 10E9/L (ref 150–450)
PO2 BLDV: 34 MM HG (ref 25–47)
PO2 BLDV: 48 MM HG (ref 25–47)
PO2 BLDV: 54 MM HG (ref 25–47)
POTASSIUM SERPL-SCNC: 3.3 MMOL/L (ref 3.4–5.3)
POTASSIUM SERPL-SCNC: 3.5 MMOL/L (ref 3.4–5.3)
POTASSIUM SERPL-SCNC: 3.5 MMOL/L (ref 3.4–5.3)
POTASSIUM SERPL-SCNC: 3.6 MMOL/L (ref 3.4–5.3)
POTASSIUM SERPL-SCNC: 3.6 MMOL/L (ref 3.4–5.3)
POTASSIUM SERPL-SCNC: 3.7 MMOL/L (ref 3.4–5.3)
POTASSIUM SERPL-SCNC: 3.8 MMOL/L (ref 3.4–5.3)
POTASSIUM SERPL-SCNC: 3.8 MMOL/L (ref 3.4–5.3)
POTASSIUM SERPL-SCNC: 3.9 MMOL/L (ref 3.4–5.3)
POTASSIUM SERPL-SCNC: 3.9 MMOL/L (ref 3.4–5.3)
POTASSIUM SERPL-SCNC: 4 MMOL/L (ref 3.4–5.3)
POTASSIUM SERPL-SCNC: 4 MMOL/L (ref 3.4–5.3)
POTASSIUM SERPL-SCNC: 4.1 MMOL/L (ref 3.4–5.3)
PROCALCITONIN SERPL-MCNC: 0.52 NG/ML
PROT SERPL-MCNC: 5.4 G/DL (ref 6.8–8.8)
PROT SERPL-MCNC: 6.1 G/DL (ref 6.8–8.8)
PROT SERPL-MCNC: 6.2 G/DL (ref 6.8–8.8)
PROT SERPL-MCNC: 6.4 G/DL (ref 6.8–8.8)
RBC # BLD AUTO: 1.92 10E12/L (ref 4.4–5.9)
RBC # BLD AUTO: 2.52 10E12/L (ref 4.4–5.9)
RBC # BLD AUTO: 2.53 10E12/L (ref 4.4–5.9)
RBC # BLD AUTO: 2.79 10E12/L (ref 4.4–5.9)
RBC # BLD AUTO: 2.85 10E12/L (ref 4.4–5.9)
RBC # BLD AUTO: 2.89 10E12/L (ref 4.4–5.9)
RBC # BLD AUTO: 3.11 10E12/L (ref 4.4–5.9)
RBC # BLD AUTO: 3.16 10E12/L (ref 4.4–5.9)
RBC # BLD AUTO: 3.21 10E12/L (ref 4.4–5.9)
RBC # BLD AUTO: 3.24 10E12/L (ref 4.4–5.9)
RBC # BLD AUTO: 3.27 10E12/L (ref 4.4–5.9)
RBC # BLD AUTO: 3.33 10E12/L (ref 4.4–5.9)
RBC # BLD AUTO: 3.43 10E12/L (ref 4.4–5.9)
RBC # BLD AUTO: 3.79 10E12/L (ref 4.4–5.9)
RBC #/AREA URNS AUTO: 1 /HPF (ref 0–2)
RBC #/AREA URNS AUTO: 2 /HPF (ref 0–2)
RBC #/AREA URNS AUTO: 7 /HPF (ref 0–2)
SARS-COV-2 RNA RESP QL NAA+PROBE: NEGATIVE
SARS-COV-2 RNA RESP QL NAA+PROBE: NEGATIVE
SARS-COV-2 RNA RESP QL NAA+PROBE: POSITIVE
SODIUM SERPL-SCNC: 130 MMOL/L (ref 133–144)
SODIUM SERPL-SCNC: 133 MMOL/L (ref 133–144)
SODIUM SERPL-SCNC: 134 MMOL/L (ref 133–144)
SODIUM SERPL-SCNC: 134 MMOL/L (ref 133–144)
SODIUM SERPL-SCNC: 135 MMOL/L (ref 133–144)
SODIUM SERPL-SCNC: 139 MMOL/L (ref 133–144)
SODIUM SERPL-SCNC: 141 MMOL/L (ref 133–144)
SODIUM SERPL-SCNC: 142 MMOL/L (ref 133–144)
SODIUM SERPL-SCNC: 143 MMOL/L (ref 133–144)
SODIUM SERPL-SCNC: 143 MMOL/L (ref 133–144)
SOURCE: ABNORMAL
SP GR UR STRIP: 1.01 (ref 1–1.03)
SP GR UR STRIP: 1.01 (ref 1–1.03)
SP GR UR STRIP: 1.02 (ref 1–1.03)
SPECIMEN EXP DATE BLD: NORMAL
SPECIMEN SOURCE: ABNORMAL
SPECIMEN SOURCE: ABNORMAL
SPECIMEN SOURCE: NORMAL
SQUAMOUS #/AREA URNS AUTO: 3 /HPF (ref 0–1)
SQUAMOUS #/AREA URNS AUTO: <1 /HPF (ref 0–1)
TEST CARD LOT NUMBER: NORMAL
TIBC SERPL-MCNC: 298 UG/DL (ref 240–430)
TRANSFUSION STATUS PATIENT QL: NORMAL
TROPONIN I SERPL-MCNC: 0.03 UG/L (ref 0–0.04)
TROPONIN I SERPL-MCNC: 0.1 UG/L (ref 0–0.04)
TROPONIN I SERPL-MCNC: 0.11 UG/L (ref 0–0.04)
TROPONIN I SERPL-MCNC: 0.14 UG/L (ref 0–0.04)
TROPONIN I SERPL-MCNC: 0.2 UG/L (ref 0–0.04)
TROPONIN I SERPL-MCNC: 0.21 UG/L (ref 0–0.04)
TROPONIN I SERPL-MCNC: 0.24 UG/L (ref 0–0.04)
UPPER GI ENDOSCOPY: NORMAL
UROBILINOGEN UR STRIP-MCNC: 0 MG/DL (ref 0–2)
VIT B12 SERPL-MCNC: 947 PG/ML (ref 193–986)
WBC # BLD AUTO: 10.6 10E9/L (ref 4–11)
WBC # BLD AUTO: 12 10E9/L (ref 4–11)
WBC # BLD AUTO: 13.8 10E9/L (ref 4–11)
WBC # BLD AUTO: 16.1 10E9/L (ref 4–11)
WBC # BLD AUTO: 6.5 10E9/L (ref 4–11)
WBC # BLD AUTO: 7.4 10E9/L (ref 4–11)
WBC # BLD AUTO: 7.4 10E9/L (ref 4–11)
WBC # BLD AUTO: 8.1 10E9/L (ref 4–11)
WBC # BLD AUTO: 8.7 10E9/L (ref 4–11)
WBC # BLD AUTO: 8.9 10E9/L (ref 4–11)
WBC # BLD AUTO: 8.9 10E9/L (ref 4–11)
WBC # BLD AUTO: 9.2 10E9/L (ref 4–11)
WBC # BLD AUTO: 9.2 10E9/L (ref 4–11)
WBC # BLD AUTO: 9.3 10E9/L (ref 4–11)
WBC #/AREA URNS AUTO: 1 /HPF (ref 0–5)
WBC #/AREA URNS AUTO: 1 /HPF (ref 0–5)
WBC #/AREA URNS AUTO: >182 /HPF (ref 0–5)
WBC CLUMPS #/AREA URNS HPF: PRESENT /HPF

## 2021-01-01 PROCEDURE — 250N000011 HC RX IP 250 OP 636: Performed by: INTERNAL MEDICINE

## 2021-01-01 PROCEDURE — 120N000001 HC R&B MED SURG/OB

## 2021-01-01 PROCEDURE — 99233 SBSQ HOSP IP/OBS HIGH 50: CPT | Performed by: INTERNAL MEDICINE

## 2021-01-01 PROCEDURE — 250N000013 HC RX MED GY IP 250 OP 250 PS 637: Performed by: INTERNAL MEDICINE

## 2021-01-01 PROCEDURE — 250N000011 HC RX IP 250 OP 636: Performed by: FAMILY MEDICINE

## 2021-01-01 PROCEDURE — 96375 TX/PRO/DX INJ NEW DRUG ADDON: CPT | Performed by: EMERGENCY MEDICINE

## 2021-01-01 PROCEDURE — 87635 SARS-COV-2 COVID-19 AMP PRB: CPT | Performed by: EMERGENCY MEDICINE

## 2021-01-01 PROCEDURE — 250N000009 HC RX 250: Performed by: PHYSICIAN ASSISTANT

## 2021-01-01 PROCEDURE — 94640 AIRWAY INHALATION TREATMENT: CPT

## 2021-01-01 PROCEDURE — 80048 BASIC METABOLIC PNL TOTAL CA: CPT | Performed by: INTERNAL MEDICINE

## 2021-01-01 PROCEDURE — 97535 SELF CARE MNGMENT TRAINING: CPT | Mod: GO

## 2021-01-01 PROCEDURE — 250N000011 HC RX IP 250 OP 636: Performed by: PHYSICIAN ASSISTANT

## 2021-01-01 PROCEDURE — C9803 HOPD COVID-19 SPEC COLLECT: HCPCS | Performed by: EMERGENCY MEDICINE

## 2021-01-01 PROCEDURE — 85027 COMPLETE CBC AUTOMATED: CPT | Performed by: INTERNAL MEDICINE

## 2021-01-01 PROCEDURE — 83036 HEMOGLOBIN GLYCOSYLATED A1C: CPT | Performed by: FAMILY MEDICINE

## 2021-01-01 PROCEDURE — 999N001017 HC STATISTIC GLUCOSE BY METER IP

## 2021-01-01 PROCEDURE — 84484 ASSAY OF TROPONIN QUANT: CPT | Performed by: PHYSICIAN ASSISTANT

## 2021-01-01 PROCEDURE — 85025 COMPLETE CBC W/AUTO DIFF WBC: CPT | Performed by: EMERGENCY MEDICINE

## 2021-01-01 PROCEDURE — 97110 THERAPEUTIC EXERCISES: CPT | Mod: GP

## 2021-01-01 PROCEDURE — 99223 1ST HOSP IP/OBS HIGH 75: CPT | Mod: AI | Performed by: INTERNAL MEDICINE

## 2021-01-01 PROCEDURE — 97530 THERAPEUTIC ACTIVITIES: CPT | Mod: GP

## 2021-01-01 PROCEDURE — C9113 INJ PANTOPRAZOLE SODIUM, VIA: HCPCS | Performed by: INTERNAL MEDICINE

## 2021-01-01 PROCEDURE — 86140 C-REACTIVE PROTEIN: CPT | Performed by: PHYSICIAN ASSISTANT

## 2021-01-01 PROCEDURE — 250N000012 HC RX MED GY IP 250 OP 636 PS 637: Performed by: PHYSICIAN ASSISTANT

## 2021-01-01 PROCEDURE — 99285 EMERGENCY DEPT VISIT HI MDM: CPT | Mod: 25 | Performed by: EMERGENCY MEDICINE

## 2021-01-01 PROCEDURE — 85027 COMPLETE CBC AUTOMATED: CPT | Performed by: FAMILY MEDICINE

## 2021-01-01 PROCEDURE — 97110 THERAPEUTIC EXERCISES: CPT | Mod: GO

## 2021-01-01 PROCEDURE — 36415 COLL VENOUS BLD VENIPUNCTURE: CPT | Performed by: FAMILY MEDICINE

## 2021-01-01 PROCEDURE — 370N000017 HC ANESTHESIA TECHNICAL FEE, PER MIN: Performed by: SURGERY

## 2021-01-01 PROCEDURE — 250N000013 HC RX MED GY IP 250 OP 250 PS 637: Performed by: NURSE PRACTITIONER

## 2021-01-01 PROCEDURE — 258N000003 HC RX IP 258 OP 636: Performed by: EMERGENCY MEDICINE

## 2021-01-01 PROCEDURE — 71045 X-RAY EXAM CHEST 1 VIEW: CPT

## 2021-01-01 PROCEDURE — 255N000002 HC RX 255 OP 636: Performed by: FAMILY MEDICINE

## 2021-01-01 PROCEDURE — 96361 HYDRATE IV INFUSION ADD-ON: CPT | Performed by: EMERGENCY MEDICINE

## 2021-01-01 PROCEDURE — 71250 CT THORAX DX C-: CPT

## 2021-01-01 PROCEDURE — 99292 CRITICAL CARE ADDL 30 MIN: CPT | Performed by: EMERGENCY MEDICINE

## 2021-01-01 PROCEDURE — 93306 TTE W/DOPPLER COMPLETE: CPT | Mod: 26 | Performed by: INTERNAL MEDICINE

## 2021-01-01 PROCEDURE — 99214 OFFICE O/P EST MOD 30 MIN: CPT | Performed by: FAMILY MEDICINE

## 2021-01-01 PROCEDURE — 36415 COLL VENOUS BLD VENIPUNCTURE: CPT | Performed by: INTERNAL MEDICINE

## 2021-01-01 PROCEDURE — 250N000012 HC RX MED GY IP 250 OP 636 PS 637: Performed by: FAMILY MEDICINE

## 2021-01-01 PROCEDURE — 99358 PROLONG SERVICE W/O CONTACT: CPT | Performed by: NURSE PRACTITIONER

## 2021-01-01 PROCEDURE — 99310 SBSQ NF CARE HIGH MDM 45: CPT | Performed by: NURSE PRACTITIONER

## 2021-01-01 PROCEDURE — 258N000003 HC RX IP 258 OP 636: Performed by: INTERNAL MEDICINE

## 2021-01-01 PROCEDURE — 86850 RBC ANTIBODY SCREEN: CPT | Performed by: EMERGENCY MEDICINE

## 2021-01-01 PROCEDURE — 81001 URINALYSIS AUTO W/SCOPE: CPT | Performed by: EMERGENCY MEDICINE

## 2021-01-01 PROCEDURE — 250N000013 HC RX MED GY IP 250 OP 250 PS 637: Performed by: FAMILY MEDICINE

## 2021-01-01 PROCEDURE — 85027 COMPLETE CBC AUTOMATED: CPT | Performed by: EMERGENCY MEDICINE

## 2021-01-01 PROCEDURE — G0180 MD CERTIFICATION HHA PATIENT: HCPCS | Performed by: FAMILY MEDICINE

## 2021-01-01 PROCEDURE — 99233 SBSQ HOSP IP/OBS HIGH 50: CPT | Performed by: FAMILY MEDICINE

## 2021-01-01 PROCEDURE — 99309 SBSQ NF CARE MODERATE MDM 30: CPT | Performed by: NURSE PRACTITIONER

## 2021-01-01 PROCEDURE — 250N000012 HC RX MED GY IP 250 OP 636 PS 637: Performed by: INTERNAL MEDICINE

## 2021-01-01 PROCEDURE — 80053 COMPREHEN METABOLIC PANEL: CPT | Performed by: INTERNAL MEDICINE

## 2021-01-01 PROCEDURE — 85018 HEMOGLOBIN: CPT | Performed by: INTERNAL MEDICINE

## 2021-01-01 PROCEDURE — 84484 ASSAY OF TROPONIN QUANT: CPT | Performed by: EMERGENCY MEDICINE

## 2021-01-01 PROCEDURE — 83605 ASSAY OF LACTIC ACID: CPT | Performed by: EMERGENCY MEDICINE

## 2021-01-01 PROCEDURE — 999N000157 HC STATISTIC RCP TIME EA 10 MIN

## 2021-01-01 PROCEDURE — 87040 BLOOD CULTURE FOR BACTERIA: CPT | Performed by: EMERGENCY MEDICINE

## 2021-01-01 PROCEDURE — 93010 ELECTROCARDIOGRAM REPORT: CPT | Performed by: EMERGENCY MEDICINE

## 2021-01-01 PROCEDURE — 86140 C-REACTIVE PROTEIN: CPT | Performed by: FAMILY MEDICINE

## 2021-01-01 PROCEDURE — 96365 THER/PROPH/DIAG IV INF INIT: CPT | Performed by: EMERGENCY MEDICINE

## 2021-01-01 PROCEDURE — 96374 THER/PROPH/DIAG INJ IV PUSH: CPT | Performed by: EMERGENCY MEDICINE

## 2021-01-01 PROCEDURE — 999N000123 HC STATISTIC OXYGEN O2DAILY TECH TIME

## 2021-01-01 PROCEDURE — 999N000097 HC STATISTIC MECHANICAL IN-EXSUFFLATION TREATMENT

## 2021-01-01 PROCEDURE — 250N000013 HC RX MED GY IP 250 OP 250 PS 637: Performed by: PHYSICIAN ASSISTANT

## 2021-01-01 PROCEDURE — 99359 PROLONG SERV W/O CONTACT ADD: CPT | Performed by: NURSE PRACTITIONER

## 2021-01-01 PROCEDURE — 97161 PT EVAL LOW COMPLEX 20 MIN: CPT | Mod: GP

## 2021-01-01 PROCEDURE — 80048 BASIC METABOLIC PNL TOTAL CA: CPT | Performed by: FAMILY MEDICINE

## 2021-01-01 PROCEDURE — 86901 BLOOD TYPING SEROLOGIC RH(D): CPT | Performed by: EMERGENCY MEDICINE

## 2021-01-01 PROCEDURE — 250N000009 HC RX 250: Performed by: FAMILY MEDICINE

## 2021-01-01 PROCEDURE — 96366 THER/PROPH/DIAG IV INF ADDON: CPT | Performed by: EMERGENCY MEDICINE

## 2021-01-01 PROCEDURE — 250N000011 HC RX IP 250 OP 636: Performed by: EMERGENCY MEDICINE

## 2021-01-01 PROCEDURE — 94640 AIRWAY INHALATION TREATMENT: CPT | Mod: 76

## 2021-01-01 PROCEDURE — 84145 PROCALCITONIN (PCT): CPT | Performed by: PHYSICIAN ASSISTANT

## 2021-01-01 PROCEDURE — 83605 ASSAY OF LACTIC ACID: CPT | Performed by: FAMILY MEDICINE

## 2021-01-01 PROCEDURE — 88305 TISSUE EXAM BY PATHOLOGIST: CPT | Mod: 26 | Performed by: PATHOLOGY

## 2021-01-01 PROCEDURE — 250N000011 HC RX IP 250 OP 636: Performed by: NURSE ANESTHETIST, CERTIFIED REGISTERED

## 2021-01-01 PROCEDURE — P9016 RBC LEUKOCYTES REDUCED: HCPCS | Performed by: EMERGENCY MEDICINE

## 2021-01-01 PROCEDURE — 80053 COMPREHEN METABOLIC PANEL: CPT | Performed by: FAMILY MEDICINE

## 2021-01-01 PROCEDURE — 85025 COMPLETE CBC W/AUTO DIFF WBC: CPT | Performed by: INTERNAL MEDICINE

## 2021-01-01 PROCEDURE — 85025 COMPLETE CBC W/AUTO DIFF WBC: CPT | Performed by: FAMILY MEDICINE

## 2021-01-01 PROCEDURE — 88305 TISSUE EXAM BY PATHOLOGIST: CPT | Mod: TC | Performed by: SURGERY

## 2021-01-01 PROCEDURE — 999N000208 ECHOCARDIOGRAM COMPLETE

## 2021-01-01 PROCEDURE — 97165 OT EVAL LOW COMPLEX 30 MIN: CPT | Mod: GO

## 2021-01-01 PROCEDURE — 43239 EGD BIOPSY SINGLE/MULTIPLE: CPT | Performed by: SURGERY

## 2021-01-01 PROCEDURE — 99291 CRITICAL CARE FIRST HOUR: CPT | Mod: 25 | Performed by: EMERGENCY MEDICINE

## 2021-01-01 PROCEDURE — 99223 1ST HOSP IP/OBS HIGH 75: CPT | Mod: AI | Performed by: PHYSICIAN ASSISTANT

## 2021-01-01 PROCEDURE — 36415 COLL VENOUS BLD VENIPUNCTURE: CPT | Performed by: PHYSICIAN ASSISTANT

## 2021-01-01 PROCEDURE — 99316 NF DSCHRG MGMT 30 MIN+: CPT | Performed by: NURSE PRACTITIONER

## 2021-01-01 PROCEDURE — G0179 MD RECERTIFICATION HHA PT: HCPCS | Performed by: FAMILY MEDICINE

## 2021-01-01 PROCEDURE — 88342 IMHCHEM/IMCYTCHM 1ST ANTB: CPT | Mod: 26 | Performed by: PATHOLOGY

## 2021-01-01 PROCEDURE — 80048 BASIC METABOLIC PNL TOTAL CA: CPT | Performed by: EMERGENCY MEDICINE

## 2021-01-01 PROCEDURE — 82272 OCCULT BLD FECES 1-3 TESTS: CPT | Performed by: EMERGENCY MEDICINE

## 2021-01-01 PROCEDURE — 82803 BLOOD GASES ANY COMBINATION: CPT | Performed by: PHYSICIAN ASSISTANT

## 2021-01-01 PROCEDURE — 86900 BLOOD TYPING SEROLOGIC ABO: CPT | Performed by: EMERGENCY MEDICINE

## 2021-01-01 PROCEDURE — 70450 CT HEAD/BRAIN W/O DYE: CPT

## 2021-01-01 PROCEDURE — 36415 COLL VENOUS BLD VENIPUNCTURE: CPT | Performed by: EMERGENCY MEDICINE

## 2021-01-01 PROCEDURE — 88342 IMHCHEM/IMCYTCHM 1ST ANTB: CPT | Mod: TC | Performed by: SURGERY

## 2021-01-01 PROCEDURE — 83880 ASSAY OF NATRIURETIC PEPTIDE: CPT | Performed by: EMERGENCY MEDICINE

## 2021-01-01 PROCEDURE — 99222 1ST HOSP IP/OBS MODERATE 55: CPT | Performed by: NURSE PRACTITIONER

## 2021-01-01 PROCEDURE — 93005 ELECTROCARDIOGRAM TRACING: CPT | Performed by: EMERGENCY MEDICINE

## 2021-01-01 PROCEDURE — G0463 HOSPITAL OUTPT CLINIC VISIT: HCPCS

## 2021-01-01 PROCEDURE — 87186 SC STD MICRODIL/AGAR DIL: CPT | Performed by: INTERNAL MEDICINE

## 2021-01-01 PROCEDURE — 87086 URINE CULTURE/COLONY COUNT: CPT | Performed by: INTERNAL MEDICINE

## 2021-01-01 PROCEDURE — 99239 HOSP IP/OBS DSCHRG MGMT >30: CPT | Performed by: FAMILY MEDICINE

## 2021-01-01 PROCEDURE — 97161 PT EVAL LOW COMPLEX 20 MIN: CPT | Mod: GP | Performed by: PHYSICAL THERAPIST

## 2021-01-01 PROCEDURE — 97110 THERAPEUTIC EXERCISES: CPT | Mod: GP | Performed by: PHYSICAL THERAPIST

## 2021-01-01 PROCEDURE — C9113 INJ PANTOPRAZOLE SODIUM, VIA: HCPCS | Performed by: EMERGENCY MEDICINE

## 2021-01-01 PROCEDURE — 99233 SBSQ HOSP IP/OBS HIGH 50: CPT | Performed by: PHYSICIAN ASSISTANT

## 2021-01-01 PROCEDURE — 71046 X-RAY EXAM CHEST 2 VIEWS: CPT

## 2021-01-01 PROCEDURE — 0DB68ZX EXCISION OF STOMACH, VIA NATURAL OR ARTIFICIAL OPENING ENDOSCOPIC, DIAGNOSTIC: ICD-10-PCS | Performed by: SURGERY

## 2021-01-01 PROCEDURE — 258N000003 HC RX IP 258 OP 636: Performed by: NURSE ANESTHETIST, CERTIFIED REGISTERED

## 2021-01-01 PROCEDURE — 99306 1ST NF CARE HIGH MDM 50: CPT | Mod: AI | Performed by: FAMILY MEDICINE

## 2021-01-01 PROCEDURE — 99232 SBSQ HOSP IP/OBS MODERATE 35: CPT | Performed by: INTERNAL MEDICINE

## 2021-01-01 PROCEDURE — 999N000156 HC STATISTIC RCP CONSULT EA 30 MIN

## 2021-01-01 PROCEDURE — 84484 ASSAY OF TROPONIN QUANT: CPT | Performed by: FAMILY MEDICINE

## 2021-01-01 PROCEDURE — 82803 BLOOD GASES ANY COMBINATION: CPT | Performed by: EMERGENCY MEDICINE

## 2021-01-01 PROCEDURE — 250N000009 HC RX 250: Performed by: NURSE ANESTHETIST, CERTIFIED REGISTERED

## 2021-01-01 PROCEDURE — 87635 SARS-COV-2 COVID-19 AMP PRB: CPT | Performed by: SURGERY

## 2021-01-01 PROCEDURE — 83605 ASSAY OF LACTIC ACID: CPT | Performed by: INTERNAL MEDICINE

## 2021-01-01 PROCEDURE — 88341 IMHCHEM/IMCYTCHM EA ADD ANTB: CPT | Mod: TC | Performed by: SURGERY

## 2021-01-01 PROCEDURE — 87088 URINE BACTERIA CULTURE: CPT | Performed by: INTERNAL MEDICINE

## 2021-01-01 PROCEDURE — 80053 COMPREHEN METABOLIC PANEL: CPT | Performed by: EMERGENCY MEDICINE

## 2021-01-01 PROCEDURE — 84132 ASSAY OF SERUM POTASSIUM: CPT | Performed by: FAMILY MEDICINE

## 2021-01-01 PROCEDURE — 86923 COMPATIBILITY TEST ELECTRIC: CPT | Performed by: EMERGENCY MEDICINE

## 2021-01-01 RX ORDER — AZITHROMYCIN 250 MG/1
250 TABLET, FILM COATED ORAL DAILY
Status: COMPLETED | OUTPATIENT
Start: 2021-01-01 | End: 2021-01-01

## 2021-01-01 RX ORDER — FUROSEMIDE 40 MG
40 TABLET ORAL 2 TIMES DAILY
Status: ON HOLD | DISCHARGE
Start: 2021-01-01 | End: 2021-01-01

## 2021-01-01 RX ORDER — HYDROMORPHONE HYDROCHLORIDE 1 MG/ML
0.6 SOLUTION ORAL
Status: DISCONTINUED | OUTPATIENT
Start: 2021-01-01 | End: 2021-01-01

## 2021-01-01 RX ORDER — FUROSEMIDE 10 MG/ML
40 INJECTION INTRAMUSCULAR; INTRAVENOUS EVERY 6 HOURS
Status: COMPLETED | OUTPATIENT
Start: 2021-01-01 | End: 2021-01-01

## 2021-01-01 RX ORDER — AMOXICILLIN 250 MG
2 CAPSULE ORAL 2 TIMES DAILY PRN
Status: DISCONTINUED | OUTPATIENT
Start: 2021-01-01 | End: 2021-01-01 | Stop reason: HOSPADM

## 2021-01-01 RX ORDER — PANTOPRAZOLE SODIUM 40 MG/1
40 TABLET, DELAYED RELEASE ORAL 2 TIMES DAILY
Status: DISCONTINUED | OUTPATIENT
Start: 2021-01-01 | End: 2021-01-01 | Stop reason: HOSPADM

## 2021-01-01 RX ORDER — SENNOSIDES 8.6 MG
1 TABLET ORAL AT BEDTIME
Status: DISCONTINUED | OUTPATIENT
Start: 2021-01-01 | End: 2021-01-01 | Stop reason: HOSPADM

## 2021-01-01 RX ORDER — GLUCOSAMINE HCL/CHONDROITIN SU 500-400 MG
CAPSULE ORAL
Qty: 100 EACH | Refills: 3 | Status: SHIPPED | OUTPATIENT
Start: 2021-01-01

## 2021-01-01 RX ORDER — LIDOCAINE 40 MG/G
CREAM TOPICAL
Status: DISCONTINUED | OUTPATIENT
Start: 2021-01-01 | End: 2021-01-01 | Stop reason: HOSPADM

## 2021-01-01 RX ORDER — OLANZAPINE 2.5 MG/1
2.5 TABLET, FILM COATED ORAL 2 TIMES DAILY
Status: DISCONTINUED | OUTPATIENT
Start: 2021-01-01 | End: 2021-01-01

## 2021-01-01 RX ORDER — SODIUM CHLORIDE, SODIUM LACTATE, POTASSIUM CHLORIDE, CALCIUM CHLORIDE 600; 310; 30; 20 MG/100ML; MG/100ML; MG/100ML; MG/100ML
INJECTION, SOLUTION INTRAVENOUS CONTINUOUS PRN
Status: DISCONTINUED | OUTPATIENT
Start: 2021-01-01 | End: 2021-01-01

## 2021-01-01 RX ORDER — ONDANSETRON 2 MG/ML
4 INJECTION INTRAMUSCULAR; INTRAVENOUS EVERY 6 HOURS PRN
Status: DISCONTINUED | OUTPATIENT
Start: 2021-01-01 | End: 2021-01-01

## 2021-01-01 RX ORDER — TAMSULOSIN HYDROCHLORIDE 0.4 MG/1
0.4 CAPSULE ORAL EVERY EVENING
Status: DISCONTINUED | OUTPATIENT
Start: 2021-01-01 | End: 2021-01-01 | Stop reason: HOSPADM

## 2021-01-01 RX ORDER — ACETAMINOPHEN 325 MG/1
650 TABLET ORAL EVERY 4 HOURS PRN
Status: DISCONTINUED | OUTPATIENT
Start: 2021-01-01 | End: 2021-01-01

## 2021-01-01 RX ORDER — IPRATROPIUM BROMIDE AND ALBUTEROL SULFATE 2.5; .5 MG/3ML; MG/3ML
3 SOLUTION RESPIRATORY (INHALATION)
Status: DISCONTINUED | OUTPATIENT
Start: 2021-01-01 | End: 2021-01-01 | Stop reason: HOSPADM

## 2021-01-01 RX ORDER — TAMSULOSIN HYDROCHLORIDE 0.4 MG/1
0.4 CAPSULE ORAL DAILY
Status: DISCONTINUED | OUTPATIENT
Start: 2021-01-01 | End: 2021-01-01 | Stop reason: HOSPADM

## 2021-01-01 RX ORDER — DEXTROSE MONOHYDRATE 25 G/50ML
25-50 INJECTION, SOLUTION INTRAVENOUS
Status: DISCONTINUED | OUTPATIENT
Start: 2021-01-01 | End: 2021-01-01 | Stop reason: HOSPADM

## 2021-01-01 RX ORDER — CEFTRIAXONE SODIUM 1 G/50ML
1 INJECTION, SOLUTION INTRAVENOUS EVERY 24 HOURS
Status: DISCONTINUED | OUTPATIENT
Start: 2021-01-01 | End: 2021-01-01 | Stop reason: HOSPADM

## 2021-01-01 RX ORDER — ONDANSETRON 2 MG/ML
4 INJECTION INTRAMUSCULAR; INTRAVENOUS EVERY 6 HOURS PRN
Status: DISCONTINUED | OUTPATIENT
Start: 2021-01-01 | End: 2021-01-01 | Stop reason: HOSPADM

## 2021-01-01 RX ORDER — UREA 10 %
500 LOTION (ML) TOPICAL DAILY
COMMUNITY
Start: 2021-01-01

## 2021-01-01 RX ORDER — ESCITALOPRAM OXALATE 10 MG/1
10 TABLET ORAL DAILY
COMMUNITY

## 2021-01-01 RX ORDER — HYDROXYZINE PAMOATE 25 MG/1
25 CAPSULE ORAL 4 TIMES DAILY PRN
COMMUNITY
End: 2021-01-01

## 2021-01-01 RX ORDER — ONDANSETRON 4 MG/1
4 TABLET, ORALLY DISINTEGRATING ORAL EVERY 6 HOURS PRN
Status: DISCONTINUED | OUTPATIENT
Start: 2021-01-01 | End: 2021-01-01

## 2021-01-01 RX ORDER — FUROSEMIDE 10 MG/ML
40 INJECTION INTRAMUSCULAR; INTRAVENOUS ONCE
Status: COMPLETED | OUTPATIENT
Start: 2021-01-01 | End: 2021-01-01

## 2021-01-01 RX ORDER — NALOXONE HYDROCHLORIDE 0.4 MG/ML
0.4 INJECTION, SOLUTION INTRAMUSCULAR; INTRAVENOUS; SUBCUTANEOUS
Status: DISCONTINUED | OUTPATIENT
Start: 2021-01-01 | End: 2021-01-01 | Stop reason: HOSPADM

## 2021-01-01 RX ORDER — ASPIRIN 81 MG/1
81 TABLET ORAL DAILY
Status: DISCONTINUED | OUTPATIENT
Start: 2021-01-01 | End: 2021-01-01 | Stop reason: HOSPADM

## 2021-01-01 RX ORDER — HYDROMORPHONE HYDROCHLORIDE 1 MG/ML
0.5 SOLUTION ORAL 4 TIMES DAILY
Status: DISCONTINUED | OUTPATIENT
Start: 2021-01-01 | End: 2021-01-01

## 2021-01-01 RX ORDER — POTASSIUM CHLORIDE 1500 MG/1
20 TABLET, EXTENDED RELEASE ORAL ONCE
Status: COMPLETED | OUTPATIENT
Start: 2021-01-01 | End: 2021-01-01

## 2021-01-01 RX ORDER — NICOTINE POLACRILEX 4 MG
15-30 LOZENGE BUCCAL
Status: DISCONTINUED | OUTPATIENT
Start: 2021-01-01 | End: 2021-01-01 | Stop reason: HOSPADM

## 2021-01-01 RX ORDER — METFORMIN HCL 500 MG
500 TABLET, EXTENDED RELEASE 24 HR ORAL 2 TIMES DAILY WITH MEALS
Status: DISCONTINUED | OUTPATIENT
Start: 2021-01-01 | End: 2021-01-01 | Stop reason: HOSPADM

## 2021-01-01 RX ORDER — HYDROMORPHONE HYDROCHLORIDE 1 MG/ML
0.5 SOLUTION ORAL
Status: DISCONTINUED | OUTPATIENT
Start: 2021-01-01 | End: 2021-01-01

## 2021-01-01 RX ORDER — FUROSEMIDE 40 MG
40 TABLET ORAL
Status: DISCONTINUED | OUTPATIENT
Start: 2021-01-01 | End: 2021-01-01 | Stop reason: HOSPADM

## 2021-01-01 RX ORDER — MIRTAZAPINE 7.5 MG/1
7.5 TABLET, FILM COATED ORAL AT BEDTIME
Qty: 30 TABLET | Refills: 1 | DISCHARGE
Start: 2021-01-01 | End: 2021-01-01

## 2021-01-01 RX ORDER — PANTOPRAZOLE SODIUM 40 MG/1
40 TABLET, DELAYED RELEASE ORAL DAILY
COMMUNITY
Start: 2021-01-01 | End: 2021-01-01

## 2021-01-01 RX ORDER — CONTAINER,EMPTY
1 EACH MISCELLANEOUS PRN
Qty: 1 EACH | Refills: 0 | Status: SHIPPED | OUTPATIENT
Start: 2021-01-01

## 2021-01-01 RX ORDER — METFORMIN HCL 500 MG
500 TABLET, EXTENDED RELEASE 24 HR ORAL 2 TIMES DAILY WITH MEALS
Qty: 60 TABLET | Refills: 11 | Status: SHIPPED | OUTPATIENT
Start: 2021-01-01

## 2021-01-01 RX ORDER — FINASTERIDE 5 MG/1
TABLET, FILM COATED ORAL
Qty: 90 TABLET | Refills: 0 | Status: SHIPPED | OUTPATIENT
Start: 2021-01-01

## 2021-01-01 RX ORDER — ONDANSETRON 4 MG/1
4 TABLET, ORALLY DISINTEGRATING ORAL EVERY 6 HOURS PRN
Status: DISCONTINUED | OUTPATIENT
Start: 2021-01-01 | End: 2021-01-01 | Stop reason: HOSPADM

## 2021-01-01 RX ORDER — ATORVASTATIN CALCIUM 20 MG/1
40 TABLET, FILM COATED ORAL EVERY EVENING
Status: DISCONTINUED | OUTPATIENT
Start: 2021-01-01 | End: 2021-01-01 | Stop reason: HOSPADM

## 2021-01-01 RX ORDER — FAMOTIDINE 20 MG/1
20 TABLET, FILM COATED ORAL DAILY
Qty: 60 TABLET | Refills: 1 | DISCHARGE
Start: 2021-01-01 | End: 2021-01-01

## 2021-01-01 RX ORDER — FUROSEMIDE 10 MG/ML
40 INJECTION INTRAMUSCULAR; INTRAVENOUS 2 TIMES DAILY
Status: DISCONTINUED | OUTPATIENT
Start: 2021-01-01 | End: 2021-01-01

## 2021-01-01 RX ORDER — LANCETS
EACH MISCELLANEOUS
Qty: 100 EACH | Refills: 6 | Status: SHIPPED | OUTPATIENT
Start: 2021-01-01

## 2021-01-01 RX ORDER — ACETAMINOPHEN 325 MG/1
650 TABLET ORAL EVERY 4 HOURS PRN
Status: DISCONTINUED | OUTPATIENT
Start: 2021-01-01 | End: 2021-01-01 | Stop reason: HOSPADM

## 2021-01-01 RX ORDER — NALOXONE HYDROCHLORIDE 0.4 MG/ML
0.2 INJECTION, SOLUTION INTRAMUSCULAR; INTRAVENOUS; SUBCUTANEOUS
Status: DISCONTINUED | OUTPATIENT
Start: 2021-01-01 | End: 2021-01-01 | Stop reason: HOSPADM

## 2021-01-01 RX ORDER — POTASSIUM CHLORIDE 1500 MG/1
20 TABLET, EXTENDED RELEASE ORAL ONCE
Status: DISCONTINUED | OUTPATIENT
Start: 2021-01-01 | End: 2021-01-01 | Stop reason: HOSPADM

## 2021-01-01 RX ORDER — GABAPENTIN 100 MG/1
100 CAPSULE ORAL 4 TIMES DAILY PRN
Start: 2021-01-01 | End: 2021-01-01

## 2021-01-01 RX ORDER — AMOXICILLIN 250 MG
1 CAPSULE ORAL 2 TIMES DAILY PRN
Status: DISCONTINUED | OUTPATIENT
Start: 2021-01-01 | End: 2021-01-01 | Stop reason: HOSPADM

## 2021-01-01 RX ORDER — FINASTERIDE 5 MG/1
5 TABLET, FILM COATED ORAL DAILY
Status: DISCONTINUED | OUTPATIENT
Start: 2021-01-01 | End: 2021-01-01 | Stop reason: HOSPADM

## 2021-01-01 RX ORDER — HYDROMORPHONE HYDROCHLORIDE 1 MG/ML
0.2 SOLUTION ORAL
Status: DISCONTINUED | OUTPATIENT
Start: 2021-01-01 | End: 2021-01-01

## 2021-01-01 RX ORDER — ATORVASTATIN CALCIUM 80 MG/1
40 TABLET, FILM COATED ORAL DAILY
Qty: 90 TABLET | Refills: 3
Start: 2021-01-01

## 2021-01-01 RX ORDER — PROPOFOL 10 MG/ML
INJECTION, EMULSION INTRAVENOUS PRN
Status: DISCONTINUED | OUTPATIENT
Start: 2021-01-01 | End: 2021-01-01

## 2021-01-01 RX ORDER — ALBUTEROL SULFATE 5 MG/ML
2.5 SOLUTION RESPIRATORY (INHALATION)
Status: DISCONTINUED | OUTPATIENT
Start: 2021-01-01 | End: 2021-01-01 | Stop reason: HOSPADM

## 2021-01-01 RX ORDER — METOPROLOL TARTRATE 25 MG/1
12.5 TABLET, FILM COATED ORAL 2 TIMES DAILY
Qty: 90 TABLET | Refills: 3 | Status: SHIPPED | OUTPATIENT
Start: 2021-01-01

## 2021-01-01 RX ORDER — FUROSEMIDE 20 MG
20 TABLET ORAL DAILY
Qty: 90 TABLET | Refills: 3 | Status: ON HOLD | OUTPATIENT
Start: 2021-01-01 | End: 2021-01-01

## 2021-01-01 RX ORDER — FERROUS SULFATE 325(65) MG
325 TABLET ORAL
Status: ON HOLD | COMMUNITY
End: 2021-01-01

## 2021-01-01 RX ORDER — FUROSEMIDE 10 MG/ML
20 INJECTION INTRAMUSCULAR; INTRAVENOUS ONCE
Status: COMPLETED | OUTPATIENT
Start: 2021-01-01 | End: 2021-01-01

## 2021-01-01 RX ORDER — PROCHLORPERAZINE MALEATE 5 MG
5 TABLET ORAL EVERY 6 HOURS PRN
Status: DISCONTINUED | OUTPATIENT
Start: 2021-01-01 | End: 2021-01-01 | Stop reason: HOSPADM

## 2021-01-01 RX ORDER — CEFDINIR 300 MG/1
300 CAPSULE ORAL 2 TIMES DAILY
Qty: 8 CAPSULE | Refills: 0 | Status: SHIPPED | OUTPATIENT
Start: 2021-01-01

## 2021-01-01 RX ORDER — ESCITALOPRAM OXALATE 10 MG/1
10 TABLET ORAL DAILY
Status: DISCONTINUED | OUTPATIENT
Start: 2021-01-01 | End: 2021-01-01 | Stop reason: HOSPADM

## 2021-01-01 RX ORDER — ATORVASTATIN CALCIUM 80 MG/1
80 TABLET, FILM COATED ORAL DAILY
Status: DISCONTINUED | OUTPATIENT
Start: 2021-01-01 | End: 2021-01-01 | Stop reason: HOSPADM

## 2021-01-01 RX ORDER — FERROUS SULFATE 325(65) MG
325 TABLET ORAL
Status: DISCONTINUED | OUTPATIENT
Start: 2021-01-01 | End: 2021-01-01 | Stop reason: HOSPADM

## 2021-01-01 RX ORDER — HYDROMORPHONE HYDROCHLORIDE 1 MG/ML
0.2 SOLUTION ORAL
Refills: 0 | Status: SHIPPED | DISCHARGE
Start: 2021-01-01 | End: 2021-01-01

## 2021-01-01 RX ORDER — AZITHROMYCIN 250 MG/1
500 TABLET, FILM COATED ORAL ONCE
Status: COMPLETED | OUTPATIENT
Start: 2021-01-01 | End: 2021-01-01

## 2021-01-01 RX ORDER — ACETAMINOPHEN 325 MG/1
650 TABLET ORAL EVERY 6 HOURS PRN
Status: DISCONTINUED | OUTPATIENT
Start: 2021-01-01 | End: 2021-01-01 | Stop reason: HOSPADM

## 2021-01-01 RX ORDER — FUROSEMIDE 10 MG/ML
40 INJECTION INTRAMUSCULAR; INTRAVENOUS EVERY 8 HOURS
Status: DISCONTINUED | OUTPATIENT
Start: 2021-01-01 | End: 2021-01-01

## 2021-01-01 RX ORDER — MULTIPLE VITAMINS W/ MINERALS TAB 9MG-400MCG
1 TAB ORAL DAILY
Status: DISCONTINUED | OUTPATIENT
Start: 2021-01-01 | End: 2021-01-01 | Stop reason: HOSPADM

## 2021-01-01 RX ORDER — LIDOCAINE HYDROCHLORIDE 10 MG/ML
INJECTION, SOLUTION INFILTRATION; PERINEURAL PRN
Status: DISCONTINUED | OUTPATIENT
Start: 2021-01-01 | End: 2021-01-01

## 2021-01-01 RX ORDER — PROCHLORPERAZINE 25 MG
12.5 SUPPOSITORY, RECTAL RECTAL EVERY 12 HOURS PRN
Status: DISCONTINUED | OUTPATIENT
Start: 2021-01-01 | End: 2021-01-01 | Stop reason: HOSPADM

## 2021-01-01 RX ORDER — OLANZAPINE 2.5 MG/1
2.5 TABLET, FILM COATED ORAL DAILY
Status: DISCONTINUED | OUTPATIENT
Start: 2021-01-01 | End: 2021-01-01

## 2021-01-01 RX ORDER — SODIUM CHLORIDE, SODIUM LACTATE, POTASSIUM CHLORIDE, CALCIUM CHLORIDE 600; 310; 30; 20 MG/100ML; MG/100ML; MG/100ML; MG/100ML
INJECTION, SOLUTION INTRAVENOUS CONTINUOUS
Status: DISCONTINUED | OUTPATIENT
Start: 2021-01-01 | End: 2021-01-01

## 2021-01-01 RX ORDER — HYDROMORPHONE HYDROCHLORIDE 1 MG/ML
SOLUTION ORAL
Refills: 0 | Status: SHIPPED | DISCHARGE
Start: 2021-01-01 | End: 2021-01-01

## 2021-01-01 RX ORDER — OLANZAPINE 2.5 MG/1
2.5 TABLET, FILM COATED ORAL AT BEDTIME
Status: DISCONTINUED | OUTPATIENT
Start: 2021-01-01 | End: 2021-01-01 | Stop reason: HOSPADM

## 2021-01-01 RX ORDER — FUROSEMIDE 40 MG
40 TABLET ORAL
Status: DISCONTINUED | OUTPATIENT
Start: 2021-01-01 | End: 2021-01-01

## 2021-01-01 RX ORDER — OLANZAPINE 2.5 MG/1
2.5 TABLET, FILM COATED ORAL DAILY
Qty: 30 TABLET | Refills: 1 | DISCHARGE
Start: 2021-01-01 | End: 2021-01-01

## 2021-01-01 RX ORDER — FAMOTIDINE 20 MG/1
20 TABLET, FILM COATED ORAL DAILY
Status: DISCONTINUED | OUTPATIENT
Start: 2021-01-01 | End: 2021-01-01 | Stop reason: HOSPADM

## 2021-01-01 RX ORDER — PREDNISONE 20 MG/1
20 TABLET ORAL DAILY
Qty: 5 TABLET | Refills: 0 | Status: SHIPPED | OUTPATIENT
Start: 2021-01-01

## 2021-01-01 RX ORDER — IPRATROPIUM BROMIDE AND ALBUTEROL SULFATE 2.5; .5 MG/3ML; MG/3ML
3 SOLUTION RESPIRATORY (INHALATION)
Status: DISCONTINUED | OUTPATIENT
Start: 2021-01-01 | End: 2021-01-01

## 2021-01-01 RX ORDER — FUROSEMIDE 40 MG
60 TABLET ORAL 2 TIMES DAILY
DISCHARGE
Start: 2021-01-01 | End: 2021-01-01

## 2021-01-01 RX ORDER — CEFDINIR 300 MG/1
300 CAPSULE ORAL 2 TIMES DAILY
DISCHARGE
Start: 2021-01-01 | End: 2021-01-01

## 2021-01-01 RX ORDER — PREDNISONE 20 MG/1
20 TABLET ORAL DAILY
DISCHARGE
Start: 2021-01-01 | End: 2021-01-01

## 2021-01-01 RX ORDER — OLANZAPINE 5 MG/1
5 TABLET ORAL AT BEDTIME
Status: DISCONTINUED | OUTPATIENT
Start: 2021-01-01 | End: 2021-01-01

## 2021-01-01 RX ORDER — ASPIRIN 325 MG
325 TABLET ORAL DAILY
Status: ON HOLD | COMMUNITY
End: 2021-01-01

## 2021-01-01 RX ORDER — OLANZAPINE 5 MG/1
5 TABLET ORAL AT BEDTIME
Qty: 30 TABLET | Refills: 1 | Status: SHIPPED | OUTPATIENT
Start: 2021-01-01 | End: 2021-01-01

## 2021-01-01 RX ORDER — OLANZAPINE 5 MG/1
2.5 TABLET ORAL AT BEDTIME
Qty: 30 TABLET | Refills: 1 | DISCHARGE
Start: 2021-01-01 | End: 2021-01-01

## 2021-01-01 RX ORDER — MIRTAZAPINE 7.5 MG/1
7.5 TABLET, FILM COATED ORAL AT BEDTIME
Status: DISCONTINUED | OUTPATIENT
Start: 2021-01-01 | End: 2021-01-01 | Stop reason: HOSPADM

## 2021-01-01 RX ORDER — FUROSEMIDE 20 MG
20 TABLET ORAL
Status: DISCONTINUED | OUTPATIENT
Start: 2021-01-01 | End: 2021-01-01

## 2021-01-01 RX ORDER — FUROSEMIDE 40 MG
60 TABLET ORAL 2 TIMES DAILY
Qty: 60 TABLET | Refills: 0 | Status: SHIPPED | OUTPATIENT
Start: 2021-01-01

## 2021-01-01 RX ORDER — ALBUTEROL SULFATE 90 UG/1
2 AEROSOL, METERED RESPIRATORY (INHALATION) EVERY 6 HOURS PRN
Status: DISCONTINUED | OUTPATIENT
Start: 2021-01-01 | End: 2021-01-01 | Stop reason: HOSPADM

## 2021-01-01 RX ORDER — NICOTINE POLACRILEX 4 MG/1
20 GUM, CHEWING ORAL 2 TIMES DAILY
COMMUNITY

## 2021-01-01 RX ORDER — AMPICILLIN AND SULBACTAM 2; 1 G/1; G/1
3 INJECTION, POWDER, FOR SOLUTION INTRAMUSCULAR; INTRAVENOUS EVERY 6 HOURS
Status: COMPLETED | OUTPATIENT
Start: 2021-01-01 | End: 2021-01-01

## 2021-01-01 RX ORDER — POLYETHYLENE GLYCOL 3350 17 G/17G
17 POWDER, FOR SOLUTION ORAL DAILY PRN
Status: DISCONTINUED | OUTPATIENT
Start: 2021-01-01 | End: 2021-01-01 | Stop reason: HOSPADM

## 2021-01-01 RX ORDER — PEN NEEDLE, DIABETIC 30 GX3/16"
1 NEEDLE, DISPOSABLE MISCELLANEOUS 2 TIMES DAILY
Qty: 100 EACH | Refills: 0 | Status: SHIPPED | OUTPATIENT
Start: 2021-01-01

## 2021-01-01 RX ORDER — FERROUS SULFATE 325(65) MG
325 TABLET ORAL
COMMUNITY

## 2021-01-01 RX ORDER — SENNOSIDES 8.6 MG
1 TABLET ORAL AT BEDTIME
Qty: 100 TABLET | Refills: 1 | Status: SHIPPED | OUTPATIENT
Start: 2021-01-01 | End: 2021-01-01

## 2021-01-01 RX ORDER — PREDNISONE 20 MG/1
20 TABLET ORAL DAILY
Status: DISCONTINUED | OUTPATIENT
Start: 2021-01-01 | End: 2021-01-01 | Stop reason: HOSPADM

## 2021-01-01 RX ADMIN — TAZOBACTAM SODIUM AND PIPERACILLIN SODIUM 3.38 G: 375; 3 INJECTION, SOLUTION INTRAVENOUS at 03:51

## 2021-01-01 RX ADMIN — FAMOTIDINE 20 MG: 20 TABLET, FILM COATED ORAL at 09:35

## 2021-01-01 RX ADMIN — PANTOPRAZOLE SODIUM 40 MG: 40 TABLET, DELAYED RELEASE ORAL at 09:15

## 2021-01-01 RX ADMIN — OLANZAPINE 2.5 MG: 2.5 TABLET, FILM COATED ORAL at 23:00

## 2021-01-01 RX ADMIN — INSULIN HUMAN 10 UNITS: 100 INJECTION, SUSPENSION SUBCUTANEOUS at 17:46

## 2021-01-01 RX ADMIN — METFORMIN HYDROCHLORIDE 500 MG: 500 TABLET, EXTENDED RELEASE ORAL at 08:24

## 2021-01-01 RX ADMIN — OLANZAPINE 2.5 MG: 2.5 TABLET, FILM COATED ORAL at 09:35

## 2021-01-01 RX ADMIN — FLUTICASONE FUROATE AND VILANTEROL TRIFENATATE 1 PUFF: 100; 25 POWDER RESPIRATORY (INHALATION) at 09:17

## 2021-01-01 RX ADMIN — Medication 12.5 MG: at 09:15

## 2021-01-01 RX ADMIN — PANTOPRAZOLE SODIUM 40 MG: 40 TABLET, DELAYED RELEASE ORAL at 19:45

## 2021-01-01 RX ADMIN — PANTOPRAZOLE SODIUM 40 MG: 40 TABLET, DELAYED RELEASE ORAL at 09:03

## 2021-01-01 RX ADMIN — PROPOFOL 60 MG: 10 INJECTION, EMULSION INTRAVENOUS at 11:30

## 2021-01-01 RX ADMIN — INSULIN HUMAN 10 UNITS: 100 INJECTION, SUSPENSION SUBCUTANEOUS at 08:19

## 2021-01-01 RX ADMIN — METFORMIN HYDROCHLORIDE 500 MG: 500 TABLET, EXTENDED RELEASE ORAL at 17:13

## 2021-01-01 RX ADMIN — OLANZAPINE 2.5 MG: 2.5 TABLET, FILM COATED ORAL at 10:16

## 2021-01-01 RX ADMIN — ASPIRIN 81 MG: 81 TABLET, COATED ORAL at 17:39

## 2021-01-01 RX ADMIN — INSULIN ASPART 3 UNITS: 100 INJECTION, SOLUTION INTRAVENOUS; SUBCUTANEOUS at 11:51

## 2021-01-01 RX ADMIN — ATORVASTATIN CALCIUM 80 MG: 80 TABLET, FILM COATED ORAL at 09:12

## 2021-01-01 RX ADMIN — FUROSEMIDE 40 MG: 10 INJECTION, SOLUTION INTRAVENOUS at 09:44

## 2021-01-01 RX ADMIN — INSULIN ASPART 4 UNITS: 100 INJECTION, SOLUTION INTRAVENOUS; SUBCUTANEOUS at 12:20

## 2021-01-01 RX ADMIN — CEFTRIAXONE SODIUM 1 G: 1 INJECTION, SOLUTION INTRAVENOUS at 12:26

## 2021-01-01 RX ADMIN — ESCITALOPRAM OXALATE 10 MG: 10 TABLET ORAL at 09:04

## 2021-01-01 RX ADMIN — FAMOTIDINE 20 MG: 20 TABLET, FILM COATED ORAL at 08:07

## 2021-01-01 RX ADMIN — FUROSEMIDE 40 MG: 40 TABLET ORAL at 09:15

## 2021-01-01 RX ADMIN — FINASTERIDE 5 MG: 5 TABLET, FILM COATED ORAL at 09:16

## 2021-01-01 RX ADMIN — INSULIN ASPART 5 UNITS: 100 INJECTION, SOLUTION INTRAVENOUS; SUBCUTANEOUS at 17:34

## 2021-01-01 RX ADMIN — UMECLIDINIUM 1 PUFF: 62.5 AEROSOL, POWDER ORAL at 18:17

## 2021-01-01 RX ADMIN — AMPICILLIN SODIUM AND SULBACTAM SODIUM 3 G: 2; 1 INJECTION, POWDER, FOR SOLUTION INTRAMUSCULAR; INTRAVENOUS at 08:12

## 2021-01-01 RX ADMIN — MICONAZOLE NITRATE: 20 POWDER TOPICAL at 09:04

## 2021-01-01 RX ADMIN — ATORVASTATIN CALCIUM 80 MG: 80 TABLET, FILM COATED ORAL at 08:08

## 2021-01-01 RX ADMIN — AMPICILLIN SODIUM AND SULBACTAM SODIUM 3 G: 2; 1 INJECTION, POWDER, FOR SOLUTION INTRAMUSCULAR; INTRAVENOUS at 20:42

## 2021-01-01 RX ADMIN — CEFTRIAXONE SODIUM 1 G: 1 INJECTION, SOLUTION INTRAVENOUS at 11:28

## 2021-01-01 RX ADMIN — FINASTERIDE 5 MG: 5 TABLET, FILM COATED ORAL at 08:07

## 2021-01-01 RX ADMIN — Medication 12.5 MG: at 08:42

## 2021-01-01 RX ADMIN — Medication 12.5 MG: at 20:10

## 2021-01-01 RX ADMIN — INSULIN HUMAN 10 UNITS: 100 INJECTION, SUSPENSION SUBCUTANEOUS at 08:24

## 2021-01-01 RX ADMIN — TAMSULOSIN HYDROCHLORIDE 0.4 MG: 0.4 CAPSULE ORAL at 09:35

## 2021-01-01 RX ADMIN — SODIUM CHLORIDE, POTASSIUM CHLORIDE, SODIUM LACTATE AND CALCIUM CHLORIDE: 600; 310; 30; 20 INJECTION, SOLUTION INTRAVENOUS at 02:44

## 2021-01-01 RX ADMIN — Medication 12.5 MG: at 08:25

## 2021-01-01 RX ADMIN — FLUTICASONE FUROATE AND VILANTEROL TRIFENATATE 1 PUFF: 100; 25 POWDER RESPIRATORY (INHALATION) at 09:02

## 2021-01-01 RX ADMIN — TAMSULOSIN HYDROCHLORIDE 0.4 MG: 0.4 CAPSULE ORAL at 09:01

## 2021-01-01 RX ADMIN — FUROSEMIDE 40 MG: 10 INJECTION, SOLUTION INTRAMUSCULAR; INTRAVENOUS at 17:42

## 2021-01-01 RX ADMIN — MICONAZOLE NITRATE: 20 POWDER TOPICAL at 19:10

## 2021-01-01 RX ADMIN — SODIUM CHLORIDE, POTASSIUM CHLORIDE, SODIUM LACTATE AND CALCIUM CHLORIDE: 600; 310; 30; 20 INJECTION, SOLUTION INTRAVENOUS at 21:38

## 2021-01-01 RX ADMIN — Medication 12.5 MG: at 19:10

## 2021-01-01 RX ADMIN — HYDROMORPHONE HYDROCHLORIDE 0.6 MG: 5 SOLUTION ORAL at 13:41

## 2021-01-01 RX ADMIN — Medication 12.5 MG: at 19:42

## 2021-01-01 RX ADMIN — INSULIN ASPART 4 UNITS: 100 INJECTION, SOLUTION INTRAVENOUS; SUBCUTANEOUS at 12:08

## 2021-01-01 RX ADMIN — FERROUS SULFATE TAB 325 MG (65 MG ELEMENTAL FE) 325 MG: 325 (65 FE) TAB at 09:15

## 2021-01-01 RX ADMIN — INSULIN ASPART 4 UNITS: 100 INJECTION, SOLUTION INTRAVENOUS; SUBCUTANEOUS at 17:46

## 2021-01-01 RX ADMIN — IPRATROPIUM BROMIDE AND ALBUTEROL SULFATE 3 ML: .5; 3 SOLUTION RESPIRATORY (INHALATION) at 07:38

## 2021-01-01 RX ADMIN — INSULIN HUMAN 12 UNITS: 100 INJECTION, SUSPENSION SUBCUTANEOUS at 08:49

## 2021-01-01 RX ADMIN — AMPICILLIN SODIUM AND SULBACTAM SODIUM 3 G: 2; 1 INJECTION, POWDER, FOR SOLUTION INTRAMUSCULAR; INTRAVENOUS at 01:13

## 2021-01-01 RX ADMIN — MULTIPLE VITAMINS W/ MINERALS TAB 1 TABLET: TAB at 09:15

## 2021-01-01 RX ADMIN — SODIUM CHLORIDE, POTASSIUM CHLORIDE, SODIUM LACTATE AND CALCIUM CHLORIDE: 600; 310; 30; 20 INJECTION, SOLUTION INTRAVENOUS at 03:51

## 2021-01-01 RX ADMIN — INSULIN ASPART 2 UNITS: 100 INJECTION, SOLUTION INTRAVENOUS; SUBCUTANEOUS at 08:36

## 2021-01-01 RX ADMIN — PANTOPRAZOLE SODIUM 40 MG: 40 INJECTION, POWDER, FOR SOLUTION INTRAVENOUS at 19:35

## 2021-01-01 RX ADMIN — TAMSULOSIN HYDROCHLORIDE 0.4 MG: 0.4 CAPSULE ORAL at 17:32

## 2021-01-01 RX ADMIN — MICONAZOLE NITRATE: 20 POWDER TOPICAL at 19:05

## 2021-01-01 RX ADMIN — AZITHROMYCIN MONOHYDRATE 250 MG: 250 TABLET ORAL at 09:03

## 2021-01-01 RX ADMIN — METFORMIN HYDROCHLORIDE 500 MG: 500 TABLET, EXTENDED RELEASE ORAL at 17:29

## 2021-01-01 RX ADMIN — SODIUM CHLORIDE, POTASSIUM CHLORIDE, SODIUM LACTATE AND CALCIUM CHLORIDE 1000 ML: 600; 310; 30; 20 INJECTION, SOLUTION INTRAVENOUS at 16:34

## 2021-01-01 RX ADMIN — INSULIN ASPART 2 UNITS: 100 INJECTION, SOLUTION INTRAVENOUS; SUBCUTANEOUS at 08:42

## 2021-01-01 RX ADMIN — ASPIRIN 81 MG: 81 TABLET, COATED ORAL at 09:15

## 2021-01-01 RX ADMIN — FUROSEMIDE 20 MG: 20 TABLET ORAL at 08:14

## 2021-01-01 RX ADMIN — MICONAZOLE NITRATE: 20 POWDER TOPICAL at 08:13

## 2021-01-01 RX ADMIN — ATORVASTATIN CALCIUM 40 MG: 20 TABLET, FILM COATED ORAL at 17:37

## 2021-01-01 RX ADMIN — FLUTICASONE FUROATE AND VILANTEROL TRIFENATATE 1 PUFF: 100; 25 POWDER RESPIRATORY (INHALATION) at 17:38

## 2021-01-01 RX ADMIN — METFORMIN HYDROCHLORIDE 500 MG: 500 TABLET, EXTENDED RELEASE ORAL at 17:39

## 2021-01-01 RX ADMIN — LIDOCAINE HYDROCHLORIDE 30 MG: 10 INJECTION, SOLUTION INFILTRATION; PERINEURAL at 11:30

## 2021-01-01 RX ADMIN — ESCITALOPRAM OXALATE 10 MG: 10 TABLET ORAL at 09:15

## 2021-01-01 RX ADMIN — IPRATROPIUM BROMIDE AND ALBUTEROL SULFATE 3 ML: .5; 3 SOLUTION RESPIRATORY (INHALATION) at 19:45

## 2021-01-01 RX ADMIN — FUROSEMIDE 40 MG: 40 TABLET ORAL at 16:51

## 2021-01-01 RX ADMIN — SODIUM CHLORIDE, POTASSIUM CHLORIDE, SODIUM LACTATE AND CALCIUM CHLORIDE: 600; 310; 30; 20 INJECTION, SOLUTION INTRAVENOUS at 14:53

## 2021-01-01 RX ADMIN — Medication 12.5 MG: at 10:16

## 2021-01-01 RX ADMIN — IPRATROPIUM BROMIDE AND ALBUTEROL SULFATE 3 ML: .5; 3 SOLUTION RESPIRATORY (INHALATION) at 11:27

## 2021-01-01 RX ADMIN — ATORVASTATIN CALCIUM 40 MG: 20 TABLET, FILM COATED ORAL at 17:39

## 2021-01-01 RX ADMIN — PANTOPRAZOLE SODIUM 40 MG: 40 TABLET, DELAYED RELEASE ORAL at 08:19

## 2021-01-01 RX ADMIN — INSULIN ASPART 4 UNITS: 100 INJECTION, SOLUTION INTRAVENOUS; SUBCUTANEOUS at 11:36

## 2021-01-01 RX ADMIN — PANTOPRAZOLE SODIUM 40 MG: 40 INJECTION, POWDER, FOR SOLUTION INTRAVENOUS at 09:47

## 2021-01-01 RX ADMIN — IPRATROPIUM BROMIDE AND ALBUTEROL SULFATE 3 ML: .5; 3 SOLUTION RESPIRATORY (INHALATION) at 16:05

## 2021-01-01 RX ADMIN — INSULIN ASPART 3 UNITS: 100 INJECTION, SOLUTION INTRAVENOUS; SUBCUTANEOUS at 09:03

## 2021-01-01 RX ADMIN — FLUTICASONE FUROATE AND VILANTEROL TRIFENATATE 1 PUFF: 100; 25 POWDER RESPIRATORY (INHALATION) at 18:17

## 2021-01-01 RX ADMIN — INSULIN ASPART 5 UNITS: 100 INJECTION, SOLUTION INTRAVENOUS; SUBCUTANEOUS at 17:30

## 2021-01-01 RX ADMIN — METFORMIN HYDROCHLORIDE 500 MG: 500 TABLET, EXTENDED RELEASE ORAL at 17:48

## 2021-01-01 RX ADMIN — ACETAMINOPHEN 650 MG: 325 TABLET, FILM COATED ORAL at 23:53

## 2021-01-01 RX ADMIN — AMOXICILLIN AND CLAVULANATE POTASSIUM 1 TABLET: 875; 125 TABLET, FILM COATED ORAL at 08:26

## 2021-01-01 RX ADMIN — INSULIN HUMAN 12 UNITS: 100 INJECTION, SUSPENSION SUBCUTANEOUS at 08:21

## 2021-01-01 RX ADMIN — IPRATROPIUM BROMIDE AND ALBUTEROL SULFATE 3 ML: .5; 3 SOLUTION RESPIRATORY (INHALATION) at 15:24

## 2021-01-01 RX ADMIN — FLUTICASONE FUROATE AND VILANTEROL TRIFENATATE 1 PUFF: 100; 25 POWDER RESPIRATORY (INHALATION) at 11:50

## 2021-01-01 RX ADMIN — AMPICILLIN SODIUM AND SULBACTAM SODIUM 3 G: 2; 1 INJECTION, POWDER, FOR SOLUTION INTRAMUSCULAR; INTRAVENOUS at 08:11

## 2021-01-01 RX ADMIN — FINASTERIDE 5 MG: 5 TABLET, FILM COATED ORAL at 09:12

## 2021-01-01 RX ADMIN — METFORMIN HYDROCHLORIDE 500 MG: 500 TABLET, EXTENDED RELEASE ORAL at 09:13

## 2021-01-01 RX ADMIN — ATORVASTATIN CALCIUM 80 MG: 80 TABLET, FILM COATED ORAL at 09:34

## 2021-01-01 RX ADMIN — TAZOBACTAM SODIUM AND PIPERACILLIN SODIUM 3.38 G: 375; 3 INJECTION, SOLUTION INTRAVENOUS at 22:42

## 2021-01-01 RX ADMIN — PREDNISONE 20 MG: 20 TABLET ORAL at 09:03

## 2021-01-01 RX ADMIN — UMECLIDINIUM 1 PUFF: 62.5 AEROSOL, POWDER ORAL at 08:10

## 2021-01-01 RX ADMIN — MICONAZOLE NITRATE: 20 POWDER TOPICAL at 21:09

## 2021-01-01 RX ADMIN — MULTIPLE VITAMINS W/ MINERALS TAB 1 TABLET: TAB at 17:37

## 2021-01-01 RX ADMIN — TAZOBACTAM SODIUM AND PIPERACILLIN SODIUM 3.38 G: 375; 3 INJECTION, SOLUTION INTRAVENOUS at 17:24

## 2021-01-01 RX ADMIN — AMPICILLIN SODIUM AND SULBACTAM SODIUM 3 G: 2; 1 INJECTION, POWDER, FOR SOLUTION INTRAMUSCULAR; INTRAVENOUS at 02:20

## 2021-01-01 RX ADMIN — TAMSULOSIN HYDROCHLORIDE 0.4 MG: 0.4 CAPSULE ORAL at 08:07

## 2021-01-01 RX ADMIN — PANTOPRAZOLE SODIUM 40 MG: 40 INJECTION, POWDER, FOR SOLUTION INTRAVENOUS at 22:42

## 2021-01-01 RX ADMIN — FAMOTIDINE 20 MG: 20 TABLET, FILM COATED ORAL at 09:12

## 2021-01-01 RX ADMIN — PANTOPRAZOLE SODIUM 40 MG: 40 TABLET, DELAYED RELEASE ORAL at 20:10

## 2021-01-01 RX ADMIN — FLUTICASONE FUROATE AND VILANTEROL TRIFENATATE 1 PUFF: 100; 25 POWDER RESPIRATORY (INHALATION) at 08:10

## 2021-01-01 RX ADMIN — MICONAZOLE NITRATE: 20 POWDER TOPICAL at 20:10

## 2021-01-01 RX ADMIN — AZITHROMYCIN MONOHYDRATE 250 MG: 250 TABLET ORAL at 08:42

## 2021-01-01 RX ADMIN — OLANZAPINE 5 MG: 5 TABLET, FILM COATED ORAL at 21:06

## 2021-01-01 RX ADMIN — SODIUM CHLORIDE, POTASSIUM CHLORIDE, SODIUM LACTATE AND CALCIUM CHLORIDE: 600; 310; 30; 20 INJECTION, SOLUTION INTRAVENOUS at 11:26

## 2021-01-01 RX ADMIN — FERROUS SULFATE TAB 325 MG (65 MG ELEMENTAL FE) 325 MG: 325 (65 FE) TAB at 08:43

## 2021-01-01 RX ADMIN — TAMSULOSIN HYDROCHLORIDE 0.4 MG: 0.4 CAPSULE ORAL at 10:16

## 2021-01-01 RX ADMIN — FINASTERIDE 5 MG: 5 TABLET, FILM COATED ORAL at 09:03

## 2021-01-01 RX ADMIN — MICONAZOLE NITRATE: 20 POWDER TOPICAL at 08:42

## 2021-01-01 RX ADMIN — UMECLIDINIUM 1 PUFF: 62.5 AEROSOL, POWDER ORAL at 11:50

## 2021-01-01 RX ADMIN — METFORMIN HYDROCHLORIDE 500 MG: 500 TABLET, EXTENDED RELEASE ORAL at 08:25

## 2021-01-01 RX ADMIN — MICONAZOLE NITRATE: 20 POWDER TOPICAL at 08:07

## 2021-01-01 RX ADMIN — ESCITALOPRAM OXALATE 10 MG: 10 TABLET ORAL at 08:19

## 2021-01-01 RX ADMIN — AMOXICILLIN AND CLAVULANATE POTASSIUM 1 TABLET: 875; 125 TABLET, FILM COATED ORAL at 21:06

## 2021-01-01 RX ADMIN — FINASTERIDE 5 MG: 5 TABLET, FILM COATED ORAL at 08:43

## 2021-01-01 RX ADMIN — MICONAZOLE NITRATE: 20 POWDER TOPICAL at 08:26

## 2021-01-01 RX ADMIN — AZITHROMYCIN MONOHYDRATE 500 MG: 250 TABLET ORAL at 17:39

## 2021-01-01 RX ADMIN — OLANZAPINE 2.5 MG: 2.5 TABLET, FILM COATED ORAL at 21:59

## 2021-01-01 RX ADMIN — OLANZAPINE 2.5 MG: 2.5 TABLET, FILM COATED ORAL at 19:05

## 2021-01-01 RX ADMIN — TAMSULOSIN HYDROCHLORIDE 0.4 MG: 0.4 CAPSULE ORAL at 08:13

## 2021-01-01 RX ADMIN — Medication 12.5 MG: at 19:45

## 2021-01-01 RX ADMIN — FUROSEMIDE 20 MG: 10 INJECTION, SOLUTION INTRAMUSCULAR; INTRAVENOUS at 18:56

## 2021-01-01 RX ADMIN — HYDROMORPHONE HYDROCHLORIDE 0.2 MG: 5 SOLUTION ORAL at 18:12

## 2021-01-01 RX ADMIN — UMECLIDINIUM 1 PUFF: 62.5 AEROSOL, POWDER ORAL at 17:39

## 2021-01-01 RX ADMIN — UMECLIDINIUM 1 PUFF: 62.5 AEROSOL, POWDER ORAL at 17:33

## 2021-01-01 RX ADMIN — ESCITALOPRAM OXALATE 10 MG: 10 TABLET ORAL at 08:42

## 2021-01-01 RX ADMIN — INSULIN ASPART 3 UNITS: 100 INJECTION, SOLUTION INTRAVENOUS; SUBCUTANEOUS at 08:14

## 2021-01-01 RX ADMIN — MICONAZOLE NITRATE: 20 POWDER TOPICAL at 09:01

## 2021-01-01 RX ADMIN — UMECLIDINIUM 1 PUFF: 62.5 AEROSOL, POWDER ORAL at 09:02

## 2021-01-01 RX ADMIN — FUROSEMIDE 40 MG: 10 INJECTION, SOLUTION INTRAVENOUS at 14:11

## 2021-01-01 RX ADMIN — TAMSULOSIN HYDROCHLORIDE 0.4 MG: 0.4 CAPSULE ORAL at 09:12

## 2021-01-01 RX ADMIN — INSULIN HUMAN 12 UNITS: 100 INJECTION, SUSPENSION SUBCUTANEOUS at 08:36

## 2021-01-01 RX ADMIN — Medication 12.5 MG: at 09:12

## 2021-01-01 RX ADMIN — SODIUM CHLORIDE, POTASSIUM CHLORIDE, SODIUM LACTATE AND CALCIUM CHLORIDE 1000 ML: 600; 310; 30; 20 INJECTION, SOLUTION INTRAVENOUS at 19:35

## 2021-01-01 RX ADMIN — FUROSEMIDE 40 MG: 40 TABLET ORAL at 09:03

## 2021-01-01 RX ADMIN — Medication 12.5 MG: at 19:03

## 2021-01-01 RX ADMIN — MICONAZOLE NITRATE: 20 POWDER TOPICAL at 10:09

## 2021-01-01 RX ADMIN — ATORVASTATIN CALCIUM 40 MG: 20 TABLET, FILM COATED ORAL at 16:51

## 2021-01-01 RX ADMIN — INSULIN ASPART 3 UNITS: 100 INJECTION, SOLUTION INTRAVENOUS; SUBCUTANEOUS at 17:22

## 2021-01-01 RX ADMIN — MICONAZOLE NITRATE: 20 POWDER TOPICAL at 20:03

## 2021-01-01 RX ADMIN — INSULIN ASPART 2 UNITS: 100 INJECTION, SOLUTION INTRAVENOUS; SUBCUTANEOUS at 13:08

## 2021-01-01 RX ADMIN — INSULIN ASPART 2 UNITS: 100 INJECTION, SOLUTION INTRAVENOUS; SUBCUTANEOUS at 08:20

## 2021-01-01 RX ADMIN — FAMOTIDINE 20 MG: 20 TABLET, FILM COATED ORAL at 08:08

## 2021-01-01 RX ADMIN — FUROSEMIDE 40 MG: 10 INJECTION, SOLUTION INTRAMUSCULAR; INTRAVENOUS at 14:29

## 2021-01-01 RX ADMIN — TAZOBACTAM SODIUM AND PIPERACILLIN SODIUM 3.38 G: 375; 3 INJECTION, SOLUTION INTRAVENOUS at 12:45

## 2021-01-01 RX ADMIN — PANTOPRAZOLE SODIUM 40 MG: 40 INJECTION, POWDER, FOR SOLUTION INTRAVENOUS at 12:44

## 2021-01-01 RX ADMIN — Medication 12.5 MG: at 09:01

## 2021-01-01 RX ADMIN — UMECLIDINIUM 1 PUFF: 62.5 AEROSOL, POWDER ORAL at 09:39

## 2021-01-01 RX ADMIN — ATORVASTATIN CALCIUM 80 MG: 80 TABLET, FILM COATED ORAL at 08:07

## 2021-01-01 RX ADMIN — UMECLIDINIUM 1 PUFF: 62.5 AEROSOL, POWDER ORAL at 08:14

## 2021-01-01 RX ADMIN — FUROSEMIDE 40 MG: 10 INJECTION, SOLUTION INTRAMUSCULAR; INTRAVENOUS at 08:18

## 2021-01-01 RX ADMIN — FUROSEMIDE 40 MG: 40 TABLET ORAL at 08:25

## 2021-01-01 RX ADMIN — FLUTICASONE FUROATE AND VILANTEROL TRIFENATATE 1 PUFF: 100; 25 POWDER RESPIRATORY (INHALATION) at 17:33

## 2021-01-01 RX ADMIN — FAMOTIDINE 20 MG: 20 TABLET, FILM COATED ORAL at 08:25

## 2021-01-01 RX ADMIN — TAMSULOSIN HYDROCHLORIDE 0.4 MG: 0.4 CAPSULE ORAL at 17:37

## 2021-01-01 RX ADMIN — TAZOBACTAM SODIUM AND PIPERACILLIN SODIUM 3.38 G: 375; 3 INJECTION, SOLUTION INTRAVENOUS at 20:37

## 2021-01-01 RX ADMIN — ATORVASTATIN CALCIUM 40 MG: 20 TABLET, FILM COATED ORAL at 17:32

## 2021-01-01 RX ADMIN — PREDNISONE 20 MG: 20 TABLET ORAL at 09:15

## 2021-01-01 RX ADMIN — IPRATROPIUM BROMIDE AND ALBUTEROL SULFATE 3 ML: .5; 3 SOLUTION RESPIRATORY (INHALATION) at 15:54

## 2021-01-01 RX ADMIN — ATORVASTATIN CALCIUM 80 MG: 80 TABLET, FILM COATED ORAL at 10:16

## 2021-01-01 RX ADMIN — INSULIN ASPART 4 UNITS: 100 INJECTION, SOLUTION INTRAVENOUS; SUBCUTANEOUS at 17:38

## 2021-01-01 RX ADMIN — PANTOPRAZOLE SODIUM 40 MG: 40 INJECTION, POWDER, FOR SOLUTION INTRAVENOUS at 11:13

## 2021-01-01 RX ADMIN — UMECLIDINIUM 1 PUFF: 62.5 AEROSOL, POWDER ORAL at 08:29

## 2021-01-01 RX ADMIN — FLUTICASONE FUROATE AND VILANTEROL TRIFENATATE 1 PUFF: 100; 25 POWDER RESPIRATORY (INHALATION) at 16:55

## 2021-01-01 RX ADMIN — OLANZAPINE 2.5 MG: 2.5 TABLET, FILM COATED ORAL at 08:07

## 2021-01-01 RX ADMIN — PREDNISONE 20 MG: 20 TABLET ORAL at 10:59

## 2021-01-01 RX ADMIN — AMPICILLIN SODIUM AND SULBACTAM SODIUM 3 G: 2; 1 INJECTION, POWDER, FOR SOLUTION INTRAMUSCULAR; INTRAVENOUS at 02:24

## 2021-01-01 RX ADMIN — TAMSULOSIN HYDROCHLORIDE 0.4 MG: 0.4 CAPSULE ORAL at 16:51

## 2021-01-01 RX ADMIN — IPRATROPIUM BROMIDE AND ALBUTEROL SULFATE 3 ML: .5; 3 SOLUTION RESPIRATORY (INHALATION) at 19:27

## 2021-01-01 RX ADMIN — AZITHROMYCIN MONOHYDRATE 250 MG: 250 TABLET ORAL at 09:15

## 2021-01-01 RX ADMIN — INSULIN ASPART 3 UNITS: 100 INJECTION, SOLUTION INTRAVENOUS; SUBCUTANEOUS at 16:53

## 2021-01-01 RX ADMIN — AMOXICILLIN AND CLAVULANATE POTASSIUM 1 TABLET: 875; 125 TABLET, FILM COATED ORAL at 19:42

## 2021-01-01 RX ADMIN — MIRTAZAPINE 7.5 MG: 7.5 TABLET, FILM COATED ORAL at 21:06

## 2021-01-01 RX ADMIN — OLANZAPINE 2.5 MG: 2.5 TABLET, FILM COATED ORAL at 20:15

## 2021-01-01 RX ADMIN — FINASTERIDE 5 MG: 5 TABLET, FILM COATED ORAL at 09:34

## 2021-01-01 RX ADMIN — MICONAZOLE NITRATE: 20 POWDER TOPICAL at 22:32

## 2021-01-01 RX ADMIN — OLANZAPINE 2.5 MG: 2.5 TABLET, FILM COATED ORAL at 08:08

## 2021-01-01 RX ADMIN — UMECLIDINIUM 1 PUFF: 62.5 AEROSOL, POWDER ORAL at 09:17

## 2021-01-01 RX ADMIN — Medication 12.5 MG: at 08:07

## 2021-01-01 RX ADMIN — PANTOPRAZOLE SODIUM 40 MG: 40 TABLET, DELAYED RELEASE ORAL at 19:39

## 2021-01-01 RX ADMIN — Medication 12.5 MG: at 09:34

## 2021-01-01 RX ADMIN — OLANZAPINE 2.5 MG: 2.5 TABLET, FILM COATED ORAL at 08:25

## 2021-01-01 RX ADMIN — AMOXICILLIN AND CLAVULANATE POTASSIUM 1 TABLET: 875; 125 TABLET, FILM COATED ORAL at 19:59

## 2021-01-01 RX ADMIN — IPRATROPIUM BROMIDE AND ALBUTEROL SULFATE 3 ML: .5; 3 SOLUTION RESPIRATORY (INHALATION) at 08:03

## 2021-01-01 RX ADMIN — AZITHROMYCIN MONOHYDRATE 250 MG: 250 TABLET ORAL at 08:19

## 2021-01-01 RX ADMIN — FLUTICASONE FUROATE AND VILANTEROL TRIFENATATE 1 PUFF: 100; 25 POWDER RESPIRATORY (INHALATION) at 08:14

## 2021-01-01 RX ADMIN — MULTIPLE VITAMINS W/ MINERALS TAB 1 TABLET: TAB at 09:03

## 2021-01-01 RX ADMIN — FUROSEMIDE 40 MG: 10 INJECTION, SOLUTION INTRAMUSCULAR; INTRAVENOUS at 06:42

## 2021-01-01 RX ADMIN — FUROSEMIDE 40 MG: 10 INJECTION, SOLUTION INTRAMUSCULAR; INTRAVENOUS at 12:07

## 2021-01-01 RX ADMIN — MICONAZOLE NITRATE: 20 POWDER TOPICAL at 09:13

## 2021-01-01 RX ADMIN — INSULIN HUMAN 12 UNITS: 100 INJECTION, SUSPENSION SUBCUTANEOUS at 17:38

## 2021-01-01 RX ADMIN — Medication 12.5 MG: at 20:02

## 2021-01-01 RX ADMIN — PANTOPRAZOLE SODIUM 40 MG: 40 TABLET, DELAYED RELEASE ORAL at 08:43

## 2021-01-01 RX ADMIN — ACETAMINOPHEN 650 MG: 325 TABLET, FILM COATED ORAL at 02:33

## 2021-01-01 RX ADMIN — FERROUS SULFATE TAB 325 MG (65 MG ELEMENTAL FE) 325 MG: 325 (65 FE) TAB at 09:03

## 2021-01-01 RX ADMIN — MICONAZOLE NITRATE: 20 POWDER TOPICAL at 08:21

## 2021-01-01 RX ADMIN — AMOXICILLIN AND CLAVULANATE POTASSIUM 1 TABLET: 875; 125 TABLET, FILM COATED ORAL at 09:35

## 2021-01-01 RX ADMIN — FINASTERIDE 5 MG: 5 TABLET, FILM COATED ORAL at 08:19

## 2021-01-01 RX ADMIN — MICONAZOLE NITRATE: 20 POWDER TOPICAL at 11:50

## 2021-01-01 RX ADMIN — FUROSEMIDE 40 MG: 10 INJECTION, SOLUTION INTRAMUSCULAR; INTRAVENOUS at 20:10

## 2021-01-01 RX ADMIN — MICONAZOLE NITRATE: 20 POWDER TOPICAL at 08:18

## 2021-01-01 RX ADMIN — IPRATROPIUM BROMIDE AND ALBUTEROL SULFATE 3 ML: .5; 3 SOLUTION RESPIRATORY (INHALATION) at 16:50

## 2021-01-01 RX ADMIN — ATORVASTATIN CALCIUM 80 MG: 80 TABLET, FILM COATED ORAL at 09:01

## 2021-01-01 RX ADMIN — MICONAZOLE NITRATE: 20 POWDER TOPICAL at 09:16

## 2021-01-01 RX ADMIN — TAZOBACTAM SODIUM AND PIPERACILLIN SODIUM 3.38 G: 375; 3 INJECTION, SOLUTION INTRAVENOUS at 08:54

## 2021-01-01 RX ADMIN — FUROSEMIDE 40 MG: 40 TABLET ORAL at 17:32

## 2021-01-01 RX ADMIN — INSULIN ASPART 5 UNITS: 100 INJECTION, SOLUTION INTRAVENOUS; SUBCUTANEOUS at 11:44

## 2021-01-01 RX ADMIN — AMPICILLIN SODIUM AND SULBACTAM SODIUM 3 G: 2; 1 INJECTION, POWDER, FOR SOLUTION INTRAMUSCULAR; INTRAVENOUS at 14:55

## 2021-01-01 RX ADMIN — ASPIRIN 81 MG: 81 TABLET, COATED ORAL at 08:19

## 2021-01-01 RX ADMIN — FINASTERIDE 5 MG: 5 TABLET, FILM COATED ORAL at 10:16

## 2021-01-01 RX ADMIN — IPRATROPIUM BROMIDE AND ALBUTEROL SULFATE 3 ML: .5; 3 SOLUTION RESPIRATORY (INHALATION) at 11:33

## 2021-01-01 RX ADMIN — MICONAZOLE NITRATE: 20 POWDER TOPICAL at 19:49

## 2021-01-01 RX ADMIN — ATORVASTATIN CALCIUM 80 MG: 80 TABLET, FILM COATED ORAL at 08:25

## 2021-01-01 RX ADMIN — MICONAZOLE NITRATE: 20 POWDER TOPICAL at 20:15

## 2021-01-01 RX ADMIN — TAZOBACTAM SODIUM AND PIPERACILLIN SODIUM 3.38 G: 375; 3 INJECTION, SOLUTION INTRAVENOUS at 14:53

## 2021-01-01 RX ADMIN — FUROSEMIDE 40 MG: 10 INJECTION, SOLUTION INTRAMUSCULAR; INTRAVENOUS at 09:36

## 2021-01-01 RX ADMIN — FUROSEMIDE 40 MG: 10 INJECTION, SOLUTION INTRAMUSCULAR; INTRAVENOUS at 05:49

## 2021-01-01 RX ADMIN — FINASTERIDE 5 MG: 5 TABLET, FILM COATED ORAL at 09:01

## 2021-01-01 RX ADMIN — AMOXICILLIN AND CLAVULANATE POTASSIUM 1 TABLET: 875; 125 TABLET, FILM COATED ORAL at 09:12

## 2021-01-01 RX ADMIN — MIRTAZAPINE 7.5 MG: 7.5 TABLET, FILM COATED ORAL at 19:41

## 2021-01-01 RX ADMIN — MICONAZOLE NITRATE: 20 POWDER TOPICAL at 19:42

## 2021-01-01 RX ADMIN — OLANZAPINE 2.5 MG: 2.5 TABLET, FILM COATED ORAL at 08:13

## 2021-01-01 RX ADMIN — METFORMIN HYDROCHLORIDE 500 MG: 500 TABLET, EXTENDED RELEASE ORAL at 08:08

## 2021-01-01 RX ADMIN — INSULIN HUMAN 12 UNITS: 100 INJECTION, SUSPENSION SUBCUTANEOUS at 17:33

## 2021-01-01 RX ADMIN — METFORMIN HYDROCHLORIDE 500 MG: 500 TABLET, EXTENDED RELEASE ORAL at 09:34

## 2021-01-01 RX ADMIN — PANTOPRAZOLE SODIUM 40 MG: 40 TABLET, DELAYED RELEASE ORAL at 19:11

## 2021-01-01 RX ADMIN — Medication 1 MG: at 02:00

## 2021-01-01 RX ADMIN — FUROSEMIDE 40 MG: 10 INJECTION, SOLUTION INTRAMUSCULAR; INTRAVENOUS at 13:47

## 2021-01-01 RX ADMIN — FINASTERIDE 5 MG: 5 TABLET, FILM COATED ORAL at 08:25

## 2021-01-01 RX ADMIN — IPRATROPIUM BROMIDE AND ALBUTEROL SULFATE 3 ML: .5; 3 SOLUTION RESPIRATORY (INHALATION) at 21:13

## 2021-01-01 RX ADMIN — TAMSULOSIN HYDROCHLORIDE 0.4 MG: 0.4 CAPSULE ORAL at 08:25

## 2021-01-01 RX ADMIN — FERROUS SULFATE TAB 325 MG (65 MG ELEMENTAL FE) 325 MG: 325 (65 FE) TAB at 08:19

## 2021-01-01 RX ADMIN — FUROSEMIDE 40 MG: 10 INJECTION, SOLUTION INTRAMUSCULAR; INTRAVENOUS at 11:44

## 2021-01-01 RX ADMIN — POTASSIUM CHLORIDE 20 MEQ: 20 TABLET, EXTENDED RELEASE ORAL at 10:02

## 2021-01-01 RX ADMIN — CEFTRIAXONE SODIUM 1 G: 1 INJECTION, SOLUTION INTRAVENOUS at 12:08

## 2021-01-01 RX ADMIN — INSULIN ASPART 2 UNITS: 100 INJECTION, SOLUTION INTRAVENOUS; SUBCUTANEOUS at 17:42

## 2021-01-01 RX ADMIN — DOCUSATE SODIUM AND SENNOSIDES 1 TABLET: 8.6; 5 TABLET ORAL at 13:06

## 2021-01-01 RX ADMIN — INSULIN ASPART 2 UNITS: 100 INJECTION, SOLUTION INTRAVENOUS; SUBCUTANEOUS at 08:24

## 2021-01-01 RX ADMIN — OLANZAPINE 2.5 MG: 2.5 TABLET, FILM COATED ORAL at 09:01

## 2021-01-01 RX ADMIN — FLUTICASONE FUROATE AND VILANTEROL TRIFENATATE 1 PUFF: 100; 25 POWDER RESPIRATORY (INHALATION) at 08:29

## 2021-01-01 RX ADMIN — MICONAZOLE NITRATE: 20 POWDER TOPICAL at 19:46

## 2021-01-01 RX ADMIN — TAZOBACTAM SODIUM AND PIPERACILLIN SODIUM 3.38 G: 375; 3 INJECTION, SOLUTION INTRAVENOUS at 02:48

## 2021-01-01 RX ADMIN — ASPIRIN 81 MG: 81 TABLET, COATED ORAL at 08:43

## 2021-01-01 RX ADMIN — SENNOSIDES 1 TABLET: 8.6 TABLET, FILM COATED ORAL at 21:59

## 2021-01-01 RX ADMIN — UMECLIDINIUM 1 PUFF: 62.5 AEROSOL, POWDER ORAL at 16:55

## 2021-01-01 RX ADMIN — TAMSULOSIN HYDROCHLORIDE 0.4 MG: 0.4 CAPSULE ORAL at 17:39

## 2021-01-01 RX ADMIN — Medication 12.5 MG: at 08:20

## 2021-01-01 RX ADMIN — CEFTRIAXONE SODIUM 1 G: 1 INJECTION, SOLUTION INTRAVENOUS at 11:21

## 2021-01-01 RX ADMIN — SENNOSIDES 1 TABLET: 8.6 TABLET, FILM COATED ORAL at 21:06

## 2021-01-01 RX ADMIN — MIRTAZAPINE 7.5 MG: 7.5 TABLET, FILM COATED ORAL at 20:00

## 2021-01-01 RX ADMIN — AMPICILLIN SODIUM AND SULBACTAM SODIUM 3 G: 2; 1 INJECTION, POWDER, FOR SOLUTION INTRAMUSCULAR; INTRAVENOUS at 13:47

## 2021-01-01 RX ADMIN — HYDROMORPHONE HYDROCHLORIDE 0.5 MG: 5 SOLUTION ORAL at 10:56

## 2021-01-01 RX ADMIN — FINASTERIDE 5 MG: 5 TABLET, FILM COATED ORAL at 08:13

## 2021-01-01 RX ADMIN — FLUTICASONE FUROATE AND VILANTEROL TRIFENATATE 1 PUFF: 100; 25 POWDER RESPIRATORY (INHALATION) at 09:39

## 2021-01-01 RX ADMIN — CEFTRIAXONE SODIUM 1 G: 1 INJECTION, SOLUTION INTRAVENOUS at 10:59

## 2021-01-01 RX ADMIN — OLANZAPINE 2.5 MG: 2.5 TABLET, FILM COATED ORAL at 20:12

## 2021-01-01 RX ADMIN — Medication 12.5 MG: at 19:39

## 2021-01-01 RX ADMIN — MULTIPLE VITAMINS W/ MINERALS TAB 1 TABLET: TAB at 08:19

## 2021-01-01 RX ADMIN — INSULIN HUMAN 10 UNITS: 100 INJECTION, SUSPENSION SUBCUTANEOUS at 17:30

## 2021-01-01 RX ADMIN — ASPIRIN 81 MG: 81 TABLET, COATED ORAL at 09:03

## 2021-01-01 RX ADMIN — OLANZAPINE 2.5 MG: 2.5 TABLET, FILM COATED ORAL at 20:42

## 2021-01-01 RX ADMIN — Medication 12.5 MG: at 09:03

## 2021-01-01 RX ADMIN — PANTOPRAZOLE SODIUM 40 MG: 40 INJECTION, POWDER, FOR SOLUTION INTRAVENOUS at 20:12

## 2021-01-01 RX ADMIN — FUROSEMIDE 40 MG: 10 INJECTION, SOLUTION INTRAMUSCULAR; INTRAVENOUS at 23:45

## 2021-01-01 RX ADMIN — ATORVASTATIN CALCIUM 80 MG: 80 TABLET, FILM COATED ORAL at 08:13

## 2021-01-01 RX ADMIN — AMOXICILLIN AND CLAVULANATE POTASSIUM 1 TABLET: 875; 125 TABLET, FILM COATED ORAL at 08:05

## 2021-01-01 RX ADMIN — HUMAN ALBUMIN MICROSPHERES AND PERFLUTREN 2 ML: 10; .22 INJECTION, SOLUTION INTRAVENOUS at 10:15

## 2021-01-01 RX ADMIN — AMPICILLIN SODIUM AND SULBACTAM SODIUM 3 G: 2; 1 INJECTION, POWDER, FOR SOLUTION INTRAMUSCULAR; INTRAVENOUS at 20:15

## 2021-01-01 RX ADMIN — FUROSEMIDE 40 MG: 40 TABLET ORAL at 08:19

## 2021-01-01 SDOH — ECONOMIC STABILITY: FOOD INSECURITY: WITHIN THE PAST 12 MONTHS, THE FOOD YOU BOUGHT JUST DIDN'T LAST AND YOU DIDN'T HAVE MONEY TO GET MORE.: NEVER TRUE

## 2021-01-01 SDOH — ECONOMIC STABILITY: TRANSPORTATION INSECURITY
IN THE PAST 12 MONTHS, HAS THE LACK OF TRANSPORTATION KEPT YOU FROM MEDICAL APPOINTMENTS OR FROM GETTING MEDICATIONS?: NO

## 2021-01-01 SDOH — ECONOMIC STABILITY: TRANSPORTATION INSECURITY
IN THE PAST 12 MONTHS, HAS LACK OF TRANSPORTATION KEPT YOU FROM MEETINGS, WORK, OR FROM GETTING THINGS NEEDED FOR DAILY LIVING?: NO

## 2021-01-01 SDOH — ECONOMIC STABILITY: FOOD INSECURITY: WITHIN THE PAST 12 MONTHS, YOU WORRIED THAT YOUR FOOD WOULD RUN OUT BEFORE YOU GOT MONEY TO BUY MORE.: NEVER TRUE

## 2021-01-01 SDOH — ECONOMIC STABILITY: INCOME INSECURITY: HOW HARD IS IT FOR YOU TO PAY FOR THE VERY BASICS LIKE FOOD, HOUSING, MEDICAL CARE, AND HEATING?: NOT HARD AT ALL

## 2021-01-01 ASSESSMENT — ACTIVITIES OF DAILY LIVING (ADL)
DRESSING/BATHING: BATHING DIFFICULTY, ASSISTANCE 1 PERSON
DOING_ERRANDS_INDEPENDENTLY_DIFFICULTY: YES
ADLS_ACUITY_SCORE: 23
ADLS_ACUITY_SCORE: 23
ADLS_ACUITY_SCORE: 20
ADLS_ACUITY_SCORE: 23
ADLS_ACUITY_SCORE: 25
ADLS_ACUITY_SCORE: 23
ADLS_ACUITY_SCORE: 20
ADLS_ACUITY_SCORE: 21
ADLS_ACUITY_SCORE: 22
ADLS_ACUITY_SCORE: 23
ADLS_ACUITY_SCORE: 23
ADLS_ACUITY_SCORE: 25
ADLS_ACUITY_SCORE: 21
WALKING_OR_CLIMBING_STAIRS: AMBULATION DIFFICULTY, REQUIRES EQUIPMENT;AMBULATION DIFFICULTY, ASSISTANCE 1 PERSON;TRANSFERRING DIFFICULTY, ASSISTANCE 1 PERSON
ADLS_ACUITY_SCORE: 23
ADLS_ACUITY_SCORE: 20
ADLS_ACUITY_SCORE: 23
ADLS_ACUITY_SCORE: 22
ADLS_ACUITY_SCORE: 20
ADLS_ACUITY_SCORE: 22
ADLS_ACUITY_SCORE: 23
ADLS_ACUITY_SCORE: 22
ADLS_ACUITY_SCORE: 21
DEPENDENT_IADLS:: CLEANING;COOKING;LAUNDRY;SHOPPING;MEAL PREPARATION;MEDICATION MANAGEMENT;MONEY MANAGEMENT;TRANSPORTATION;INCONTINENCE
DEPENDENT_IADLS:: CLEANING;COOKING;LAUNDRY;SHOPPING;MEAL PREPARATION;MEDICATION MANAGEMENT;MONEY MANAGEMENT;TRANSPORTATION
ADLS_ACUITY_SCORE: 23
ADLS_ACUITY_SCORE: 23
ADLS_ACUITY_SCORE: 21
DRESSING/BATHING_DIFFICULTY: YES
ADLS_ACUITY_SCORE: 23
ADLS_ACUITY_SCORE: 20
ADLS_ACUITY_SCORE: 20
ADLS_ACUITY_SCORE: 21
ADLS_ACUITY_SCORE: 21
ADLS_ACUITY_SCORE: 20
DEPENDENT_IADLS:: CLEANING;COOKING;LAUNDRY;SHOPPING;MEAL PREPARATION;MEDICATION MANAGEMENT;MONEY MANAGEMENT;TRANSPORTATION;INCONTINENCE
ADLS_ACUITY_SCORE: 22
ADLS_ACUITY_SCORE: 23
ADLS_ACUITY_SCORE: 21
WALKING_OR_CLIMBING_STAIRS_DIFFICULTY: YES
ADLS_ACUITY_SCORE: 23
CONCENTRATING,_REMEMBERING_OR_MAKING_DECISIONS_DIFFICULTY: YES
DIFFICULTY_COMMUNICATING: YES
EQUIPMENT_CURRENTLY_USED_AT_HOME: OTHER (SEE COMMENTS)
ADLS_ACUITY_SCORE: 20
ADLS_ACUITY_SCORE: 21
ADLS_ACUITY_SCORE: 22
ADLS_ACUITY_SCORE: 20
TOILETING_ISSUES: NO
ADLS_ACUITY_SCORE: 21
ADLS_ACUITY_SCORE: 25
ADLS_ACUITY_SCORE: 22
ADLS_ACUITY_SCORE: 25
FALL_HISTORY_WITHIN_LAST_SIX_MONTHS: YES
DEPENDENT_IADLS:: CLEANING;COOKING;LAUNDRY;SHOPPING;MEAL PREPARATION;MEDICATION MANAGEMENT;MONEY MANAGEMENT;TRANSPORTATION;INCONTINENCE
ADLS_ACUITY_SCORE: 22
ADLS_ACUITY_SCORE: 25
ADLS_ACUITY_SCORE: 23
ADLS_ACUITY_SCORE: 23
ADLS_ACUITY_SCORE: 20
ADLS_ACUITY_SCORE: 23
DIFFICULTY_EATING/SWALLOWING: NO
ADLS_ACUITY_SCORE: 22
DEPENDENT_IADLS:: CLEANING;COOKING;LAUNDRY;SHOPPING;MEAL PREPARATION;MEDICATION MANAGEMENT;MONEY MANAGEMENT;TRANSPORTATION;INCONTINENCE
ADLS_ACUITY_SCORE: 22
ADLS_ACUITY_SCORE: 20
ADLS_ACUITY_SCORE: 22
ADLS_ACUITY_SCORE: 23
ADLS_ACUITY_SCORE: 23
WEAR_GLASSES_OR_BLIND: NO
ADLS_ACUITY_SCORE: 21
ADLS_ACUITY_SCORE: 25
ADLS_ACUITY_SCORE: 20
DEPENDENT_IADLS:: INDEPENDENT
ADLS_ACUITY_SCORE: 21
ADLS_ACUITY_SCORE: 20
ADLS_ACUITY_SCORE: 22
ADLS_ACUITY_SCORE: 21
ADLS_ACUITY_SCORE: 22
ADLS_ACUITY_SCORE: 25

## 2021-01-01 ASSESSMENT — MIFFLIN-ST. JEOR
SCORE: 1464.5
SCORE: 1452.65
SCORE: 1450.61
SCORE: 1459.91
SCORE: 1459.91
SCORE: 1469.89
SCORE: 1481.5
SCORE: 1434.5
SCORE: 1480.1
SCORE: 1470.5
SCORE: 1453.11

## 2021-01-01 ASSESSMENT — ENCOUNTER SYMPTOMS
PSYCHIATRIC NEGATIVE: 1
CARDIOVASCULAR NEGATIVE: 1
CONSTITUTIONAL NEGATIVE: 1
SHORTNESS OF BREATH: 1
HEMATOLOGIC/LYMPHATIC NEGATIVE: 1
DYSRHYTHMIAS: 1
NEUROLOGICAL NEGATIVE: 1
COUGH: 1
GASTROINTESTINAL NEGATIVE: 1
ALLERGIC/IMMUNOLOGIC NEGATIVE: 1
ENDOCRINE NEGATIVE: 1
EYES NEGATIVE: 1

## 2021-01-01 ASSESSMENT — COPD QUESTIONNAIRES: COPD: 1

## 2021-01-15 NOTE — TELEPHONE ENCOUNTER
Reason for Call: Request for an order or referral:    Order or referral being requested:   Requesting Verbal orders  Skilled Nursing Visits 2 x week  PT Eval and Treat  OT Eval and Treat  Home health aide 2 x week    Phone number Patient can be reached at:  Other phone number:  [place] [phone] *    Best Time:  Any Time      Can we leave a detailed message on this number?  YES    Call taken on 1/15/2021 at 12:43 PM by Elsa Cain

## 2021-01-22 NOTE — TELEPHONE ENCOUNTER
Per Home Care, Assisted Living or Nursing Home Evaluations and Treatments (Including After Hours) - [facility] Policy:    Okay for requested orders:    PT eval completed today. PT starting week 1/24/21: 2w3 for gait, balance, LE strengthening and training for safety with mobility.     Routed back to PT and PCP.    Deb VALLE RN, BSN

## 2021-01-27 NOTE — LETTER
[facility] CARE COORDINATION  [id number] [facility] [address]    January 28, 2021    Alvaro Horton  [address]  [place] [address]      Dear Alvaro,    I am a clinic care coordinator who works with Be Carreno MD at [facility]. I recently tried to call and was unable to reach you. Below is a description of clinic care coordination and how I can further assist you.      The clinic care coordination team is made up of a registered nurse,  and community health worker who understand the health care system. The goal of clinic care coordination is to help you manage your health and improve access to the health care system in the most efficient manner. The team can assist you in meeting your health care goals by providing education, coordinating services, strengthening the communication among your providers and supporting you with any resource needs.    Please feel free to contact me at [phone] with any questions or concerns. We are focused on providing you with the highest-quality healthcare experience possible and that all starts with you.     Sincerely,     Colette Edward RN, [facility] - Primary Care Clinic RN Coordinator  [facility]   [phone]

## 2021-01-27 NOTE — PROGRESS NOTES
Clinic Care Coordination Contact  [facility]/Voicemail    Referral Source: IP Report    Clinical Data: Care Coordinator Outreach    Outreach attempted x 1.  Left message on patient's voicemail with call back information and requested return call.    Patient discharged from TCU without notification.  Patient is receiving home care services with [facility] for nursing, HHA, PT and OT    Plan:  Care Coordinator will try to reach patient again in 1-2 business days.    Colette Edward RN, [facility] - Primary Care Clinic RN Coordinator  [facility]   1/27/2021    3:12 PM  [phone]

## 2021-01-28 NOTE — PROGRESS NOTES
Clinic Care Coordination Contact  [facility]/Voicemail    Referral Source: IP Report    Clinical Data: Care Coordinator Outreach    Outreach attempted x 2.  Left message on patient's voicemail with call back information and requested return call.    Patient is scheduled to see PCP on 2/10/21.    Plan: Care Coordinator will send care coordination introduction letter with care coordinator contact information and explanation of care coordination services via mail. Care Coordinator will do no further outreaches at this time.    Colette Edward RN, [facility] RN Coordinator  [facility]   1/28/2021    9:31 AM  [phone]

## 2021-01-28 NOTE — TELEPHONE ENCOUNTER
[facility] now requests orders and shares plan of care/discharge summaries for some patients through [employer].  Please REPLY TO THIS MESSAGE OR ROUTE BACK TO THE AUTHOR in order to give authorization for orders when needed.  This is considered a verbal order, you will still receive a faxed copy of orders for signature.  Thank you for your assistance in improving collaboration for our patients.    ORDERS:    For Moisture Associated Skin Damage, gluteal fold    1. Clean area with wound spray or in shower/with bathing and gently dry.  2. Apply moisture barrier cream to affected area.  No need to clean barrier to skin level, ok to remove soiled layer and reapply.    Reapply PRN, at least daily.  OK for Pt/caregiver and HHA to do on non nurse visit days.    Olive Hugo RN CWOCN  [facility]  [phone]  [email or web address]

## 2021-02-08 NOTE — TELEPHONE ENCOUNTER
"Call form in home PT. Reports patient did not check glucose this am but took his insulin as prescribed. He is \"out of sorts\" glucose checked and reading was ( 20 minutes ago) 40. He was given 2 glucose tablets and a \"protein shake\" . H refused to call 911. His repeat glucose now is 35. Instructed PT to call 911 and give more glucose tablets immediately. Patient is at this time alert and responsive.  Neha LANCASTER RN  "

## 2021-02-10 NOTE — PATIENT INSTRUCTIONS
ASSESSMENT:   (E11.22,  N18.30,  Z79.4) Type 2 diabetes mellitus with stage 3 chronic kidney disease, with long-term current use of insulin, unspecified whether stage 3a or 3b CKD (H)  (primary encounter diagnosis)  Comment: having lows.   Plan: insulin NPH-Regular (NOVOLIN 70/30 FLEXPEN         RELION) susp, metFORMIN (GLUCOPHAGE-XR) 500 MG         24 hr tablet        Cut back on insulin to 10 units twice daily.   Continue to monitor glucometers twice daily   Send me the readings in a week to review.   Switch to long acting metformin at 500mg twice daily.  If diarrhea continues, we may need to cut back further.   Schedule an appointment with diabetic educator   If you continue to have lows, let me know or often over 200;.     (I25.9) Chronic ischemic heart disease  Comment: doing OK  Plan: metoprolol tartrate (LOPRESSOR) 25 MG tablet        Cut back on metoprolol to 1/2 pill (12.5mg) twice daily due to low blood pressure.     (J45.40) Moderate persistent asthma without complication  Comment: doing OK on oxygen.   Plan: Continue the Trelegy daily.   Albuteral inhaler can be used taking 2 inhalations every 4 hours as needed for coughing, wheezing or shortness of breath.     (I10) Hypertension goal BP (blood pressure) < 140/90  Comment: has been low  Plan: furosemide (LASIX) 20 MG tablet, metoprolol         tartrate (LOPRESSOR) 25 MG tablet        Cut back on metoprolol as above.  furosemide once daily.     (R53.1) Generalized weakness    (G30.9,  F02.80) Alzheimer's dementia without behavioral disturbance, unspecified timing of dementia onset (H)

## 2021-02-10 NOTE — PROGRESS NOTES
ASSESSMENT:   (E11.22,  N18.30,  Z79.4) Type 2 diabetes mellitus with stage 3 chronic kidney disease, with long-term current use of insulin, unspecified whether stage 3a or 3b CKD (H)  (primary encounter diagnosis)  Comment: having lows.   Plan: insulin NPH-Regular (NOVOLIN 70/30 FLEXPEN         RELION) susp, metFORMIN (GLUCOPHAGE-XR) 500 MG         24 hr tablet        Cut back on insulin to 10 units twice daily.   Continue to monitor glucometers twice daily   Send me the readings in a week to review.   Switch to long acting metformin at 500mg twice daily.  If diarrhea continues, we may need to cut back further.   Schedule an appointment with diabetic educator   If you continue to have lows, let me know or often over 200;.     (I25.9) Chronic ischemic heart disease  Comment: doing OK  Plan: metoprolol tartrate (LOPRESSOR) 25 MG tablet        Cut back on metoprolol to 1/2 pill (12.5mg) twice daily due to low blood pressure.     (J45.40) Moderate persistent asthma without complication  Comment: doing OK on oxygen.   Plan: Continue the Trelegy daily.   Albuteral inhaler can be used taking 2 inhalations every 4 hours as needed for coughing, wheezing or shortness of breath.     (I10) Hypertension goal BP (blood pressure) < 140/90  Comment: has been low  Plan: furosemide (LASIX) 20 MG tablet, metoprolol         tartrate (LOPRESSOR) 25 MG tablet        Cut back on metoprolol as above.  furosemide once daily.     (R53.1) Generalized weakness    (G30.9,  F02.80) Alzheimer's dementia without behavioral disturbance, unspecified timing of dementia onset (H)           Kadie Reid is a 80 year old who presents for the following health issues   Chief Complaint   Patient presents with     Consult     Son is with today, they are not sure of his medications.  Would like to get a glucose monitor such as a Viki so he doesn't need to poke his fingers.         Hospital Follow-up Visit:    Hospital[facility]Nursing Home/IP Rehab [facility]  Date of Admission:   Date of Discharge:   Reason(s) for Admission: COVID      Was your hospitalization related to COVID-19? YES   How are you feeling today? Walking  Is hard   In the past 24 hours have you had shortness of breath when speaking, walking, or climbing stairs? yes  Do you have a cough? I don't have a cough  When is the last time you had a fever greater than 100? none  Are you having any other symptoms? Fatigue and Emotional distress   Do you have any other stressors you would like to discuss with your provider?          Was the patient in the ICU or did the patient experience delirium during hospitalization?      Problems taking medications regularly:  Has help from son   Medication changes since discharge:   Problems adhering to non-medication therapy:      Summary of hospitalization:  [place] hospital discharge summary reviewed  See abstracted notes from recent hospital stay below.   Diagnostic Tests/Treatments reviewed.  Follow up needed: none  Other Healthcare Providers Involved in Patient s Care:         None  Update since discharge: stable. Post Discharge Medication Reconciliation: discharge medications reconciled, continue medications without change.  Plan of care communicated with patient and son Jae          Hospitalized 11/26 with COVID-19 virus  Hospitalized again as/2 with encephalopathy.   Hospitalized a third time on 12/25.    His most recent hospital stay was for a fall from the toilet the morning of admission.  He was weak, he had a urinary tract infection.    See abstracted notes from recent hospital stay below.     Living at his home.    Son, Jae has been living with him.    He was in 2 different TCU about 5 weeks.  Home now for a couple weeks.     Diabetes numbers have been low.  Checks glucometers twice daily.    Mornings have been low.  Ambulance came one AM for 41 within the past couple days.  Often 70s-90.  Highest in AM has been 170.  Checks 1-2 hours  "before bed and they have been in a similar range.   Sometimes refuses glucometers.     On insulin 20 units AM and 22 units pM.   Takes metformin 1000mg twice daily.    Lab Results   Component Value Date    A1C 8.3 12/25/2020       Poor appetite.    Weight loss.  Not weighed today-declined.     On oxygen continuous at 2 liters.    Breathing he states is \"pretty good\".  He does have dyspnea on exertion.   oxygen sats 92-97% on the oxygen.  If off the oxygen mid to high 80s.   Misses Trelegy often.  He refuses.    Uses albuteral inhaler maybe once a day.     Walks a little in house.    Sometimes uses urinal.   Needing help with meals, toileting, bathing.  Can get out of bed.  Can get to bathroom on his own sometimes.  Sometimes does not realize the incontinence.   Wears pads all the time.  Incontinece of stool and urine at times.   He has had nursing help.  Perhaps will last another week.     Weakness.   No falls in the past 2 weeks.  Fell a couple weeks ago.  Sometimes gets up during the night.       Patient Active Problem List   Diagnosis     Coronary artery disease involving native coronary artery of native heart     Moderate persistent asthma     PSORIASIS     Hypertrophy of prostate without urinary obstruction     OVERACTIVE BLADDER     Bakers' asthma (H)     Hypertension goal BP (blood pressure) < 140/90     Esophageal reflux     Sensorineural hearing loss, bilateral     Obstructive sleep apnea- mild (AHI 11)     Benign paroxysmal vertigo     Atrial fibrillation (H)     Alzheimer's dementia (H)     CTS (carpal tunnel syndrome)     Dyslexia     Advanced care planning/counseling discussion     Hyperlipidemia LDL goal <70     COPD (chronic obstructive pulmonary disease) (H)     Chronic kidney disease, stage 2 (mild)     [facility]     Type 2 diabetes mellitus with diabetic chronic kidney disease (H)     Claudication of calf muscles (H)     Acute respiratory failure with hypoxia (H)     History of CVA " "(cerebrovascular accident)     Falls frequently     Generalized weakness     Thrombocytopenia (H)     COVID-19 virus infection     Type 2 diabetes mellitus (H)     Alzheimer's dementia without behavioral disturbance, unspecified timing of dementia onset (H)     Weakness      ROS:CV: No chest pains or palpitations  GI: POSITIVE for:, loose most of the time.   : POSITIVE for:, incontinence  Musculoskeletal: POSITIVE  for:, buttock.  No other pain.   Psychiatric: POSITIVE for:, a little crabby.  Mood mostly OK.  Says no a lot to meds, shots, glucometer testing.   Skin sore buttocks.     OBJECTIVE:Blood pressure 100/50, pulse 74, temperature 97.3  F (36.3  C), temperature source Tympanic, resp. rate 18, height 1.727 m (5' 8\"), weight 77.1 kg (170 lb), SpO2 97 %. BMI=Body mass index is 25.85 kg/m .  GENERAL APPEARANCE ADULT: Alert, no acute distress, seated in a wheelchair, oxygen by nasal cannula.  NECK: No adenopathy,masses or thyromegaly  RESP: lungs clear to auscultation , no respiratory distress  CV: normal rate, regular rhythm, no murmur or gallop  MS: extremities normal, no peripheral edema  He had abrasions on the anterior right leg some of which she had bled right after he came into the exam room.  They are not bleeding at the time of my exam.  There were several superficial ulcerations along his anterior tibia.  She stated that he has been scratching the scabs off.     =====================================  See abstracted notes from recent hospital stay below.     [facility]   Discharge Summary  Hospital Medicine       Date of Admission:  12/25/2020  Date of Discharge:  12/27/2020    Identification and Chief Compaint: Alvaro Horton is a 80 year old male with history of coronary artery disease, chronic obstructive pulmonary disease, atrial fibrillation, benign prostatic hypertrophy, CKD, recent COVID diagnosis and memory loss who presented on 12/25/2020 with complaint of fall " off the toilet at home.    Discharge Diagnoses     Fall with history of frequent falls  Generalized Weakness  Altered Speech  Memory loss/Dementia  Constipation  Diarrhea, overflow associated with constipation  UTI  Hypoglycemia with history of Diabetes Mellitus, Type II  Recovered COVID 19 Infection with ongoing hypoxia  Coronary Artery Disease  Moderate Pulmonary Hypertension  COPD (chronic obstructive pulmonary disease)  Asthma  Baker's Lung  Hypertension  Atrial fibrillation, paroxysmal  History of CVA  Benign Prostatic Hyperplasia    Follow-ups Needed After Discharge     Follow-up Appointments     Follow Up and recommended labs and tests      Follow up with Nursing home physician.  No follow up labs or test are   needed.       Hospital Course         Fall with history of frequent falls  Generalized Weakness    Fall off toilet morning of admission. May be multifactorial with weakness due to recurrent recent hospitalizations in the setting of recent COVID, possible UTI, hypoglycemia vs vasovagal syncope associated with BM (patient denies any fainting or lightheadedness/dizziness).     Found to have UTI which has been treated (see below).     Continues to appear weak. May be over-medicated and changes were made to insulin and lisinopril (see below).    Patient is not felt to be safe for discharge home without 24 hours care, and son is unable to provide that. Also, the patient would not listen to the son regarding exercise to maintain mobility or with taking medications as prescribed when he was home prior to this admission. Patient is being discharged to TCU for rehabilitation to improve independence but he may need memory care unit following that.      Altered Speech    Noted by son to have difficult to understand speech and at times not making sense. Intermittent in past week PTA. Not appreciated on admission by provider or during nurse intake. No recurrence during admission.      Memory loss/Dementia    Memory  issues noted in the past 6 months or so, not on any medications for this. Started on Zyprexa during last hospitalization due to encephalopathy during COVID infection initially needing frequent redirection and 1:1 sitter prior to initiation, improved on medication.    Now recovered from COVID, his morning olanzapine was initially held on day of discharge as possibly not needing. However, patient began to ramp up his agitation somewhat this morning, and his olanzapine was resumed as PTA.     Continuing olanzapine 5 mg BID on discharge.      Constipation  Diarrhea, overflow associated with constipation    Suspect overflow diarrhea surrounding constipation. Constipation seen on CT abdomen/pelvis. He is started on MiraLax and senokot.      UTI    Urine shows leukocyte esterase, 39 WBC and bacteria. No clear symptoms but given weakness and noted changes since TCU will treat while culture is pending.     UC positive > 100,000 Staph epi which is pan-sensitive. Treated with ceftriaxone in the hospital and discharged on ciprofloxacin for  More days.       Hypoglycemia with history of Diabetes Mellitus, Type II    Glucose 40 on arrival, received dextrose with improvement. Son states he is unsure if his dad is doing his insulin properly with his memory issues. Patient notes elevated blood sugars recently and good compliance including ability to recall dosage amount. Son states evening blood sugar was 170s.   Last a1C 8.0% on 11/2020.     HgbA1c 8.3% this admit. Some relatively low BG at times. Patient at risk of symptomatic hypoglycemia and aggressive control not likely beneficial for him. His insulin 70/30 is decreased from 20 units AM and 21 units PM to 14 units twice daily. He will continue metformin 1000 mg BID. BG is 124 and 146 on day of discharge.      Recovered COVID 19 Infection with ongoing hypoxia    Diagnosed 11/26/20 with COVID 19, hospitalized x 2 and discharged on 2 LPM of oxygen. Currently considered recovered  "from infectious standpoint. Ongoing shortness of breath, occasional cough though does have underlying COPD so some chronic issues with this too. CT showed \"Small bilateral pleural effusions and associated basilar atelectasis/consolidation\", compared to CT 12/2/20 the ground glass changes are no longer noted.    Continues to require oxygen supplementation at time of discharge.      Coronary Artery Disease  Moderate Pulmonary Hypertension  Hypertension    Previously diagnosed, no current concerning symptoms. History of CABG in 2009 with LIMA to LAD and SVG to OM1 and 2. Follows with cardiology, last seen 3/2020. Last Echo 11/2020 EF 60-65%, no wall motion changes, decreased RV systolic function with elevated RV pressure.     Continue home furosemide, aspirin, statin and metoprolol as PTA.     's/40's. Lisinopril is adjusted down to 5 mg daily, and BP at time of discharge is 126/53.      COPD (chronic obstructive pulmonary disease)  Asthma  Baker's Lung  Lungs faint wheeze, occasionally coarse sounding. Suspect recovering still from COVID 19. PFTs in 04/2018 revealed severe airflow obstruction.   - Continue home Trelegy Ellipta if available from family, prn albuterol      Atrial fibrillation, paroxysmal    Transient following CABG in 2009. ZioPatch 3/2020 did not show atrial fibrillation. ECG 8/2020 showed NSR. During admission 11/2020 for COVID 19 atrial fibrillation seen at home. Started on anticoagulation with Eliquis after discussion with patient and son due to fall risk and also CHADsVAC of 6 - possibly just short term but plan was to discuss with PCP on follow up in late December/early January.  - continue home metoprolol  - continue home Eliquis for now      History of CVA  Occurred at time of CABG in 2009.  - continue home aspirin, statin     Benign Prostatic Hyperplasia  Chronic.  - continue home finasteride, tamsulosin       "

## 2021-02-15 NOTE — TELEPHONE ENCOUNTER
Reason for Call:  Medication or medication refill:    Do you use a [facility]?  Name of the pharmacy and phone number for the current request:  [facility]    Name of the medication requested: Metformin - Per Pharmacy - Pt was previously on 1,000 mg BID- Did Provider want to decrease dose and change to ER?  Please Advise.     Can we leave a detailed message on this number? YES    Best Time: Any Time      Call taken on 2/15/2021 at 8:55 AM by Elsa Cain

## 2021-02-26 PROBLEM — K92.2 GASTROINTESTINAL HEMORRHAGE, UNSPECIFIED GASTROINTESTINAL HEMORRHAGE TYPE: Status: ACTIVE | Noted: 2021-01-01

## 2021-02-26 PROBLEM — D69.6 THROMBOCYTOPENIA (H): Status: RESOLVED | Noted: 2020-01-01 | Resolved: 2021-01-01

## 2021-02-26 PROBLEM — E11.9 TYPE 2 DIABETES MELLITUS (H): Chronic | Status: ACTIVE | Noted: 2020-01-01

## 2021-02-26 PROBLEM — U07.1 COVID-19 VIRUS INFECTION: Status: RESOLVED | Noted: 2020-01-01 | Resolved: 2021-01-01

## 2021-02-26 PROBLEM — N17.9 AKI (ACUTE KIDNEY INJURY) (H): Status: ACTIVE | Noted: 2021-01-01

## 2021-02-26 PROBLEM — Z79.01 CHRONIC ANTICOAGULATION: Chronic | Status: ACTIVE | Noted: 2021-01-01

## 2021-02-26 PROBLEM — N39.0 URINARY TRACT INFECTION WITHOUT HEMATURIA, SITE UNSPECIFIED: Status: ACTIVE | Noted: 2021-01-01

## 2021-02-26 PROBLEM — A41.9 SEVERE SEPSIS (H): Status: ACTIVE | Noted: 2021-01-01

## 2021-02-26 PROBLEM — Z79.01 CHRONIC ANTICOAGULATION: Status: ACTIVE | Noted: 2021-01-01

## 2021-02-26 PROBLEM — J96.01 ACUTE RESPIRATORY FAILURE WITH HYPOXIA (H): Status: RESOLVED | Noted: 2020-01-01 | Resolved: 2021-01-01

## 2021-02-26 PROBLEM — R65.20 SEVERE SEPSIS (H): Status: ACTIVE | Noted: 2021-01-01

## 2021-02-26 PROBLEM — D64.9 ANEMIA, UNSPECIFIED TYPE: Status: ACTIVE | Noted: 2021-01-01

## 2021-02-26 NOTE — ED NOTES
Bed: ED11  Expected date: 2/26/21  Expected time: 3:11 PM  Means of arrival:   Comments:  Ambulance

## 2021-02-26 NOTE — LETTER
[facility] MED[facility]  [address] [address]  [phone]        March 5, 2021    Regarding:  Alvaro Horton  [address]  [facility] [place] [facility] [place] [address]              To Whom It May Concern;  Alvaro Horton  was seen in the hospital on 3/4/2021 by myself and assessed.  This patient has significant cognitive disabilities, which however have been improving during this hospitalization.  He ordinarily lives with his son Jae Horton, who he sees as his essential caregiver..Jae's  assistance in keeping the patient's emotional status as stable as possible, as well as his ability to help assess the patient's cognitive abilities and improvements are both essential to his ongoing care  Please take this into consideration and allow him to physically visit and be a part of the patient's care team.          Sincerely,        Tutu Zavala MD  3/5/2021

## 2021-02-26 NOTE — ED PROVIDER NOTES
"  History     Chief Complaint   Patient presents with     Altered Mental Status     septic     HPI  [place]utherfordtonjean pierre Horton is a 80 year old male who presents from home by ambulance secondary to generalized weakness and confusion.  Lives with his son, I spoke with his son over the phone regarding history, patient is extremely poor historian.  Son tells me that Mr. Horton has been generally weak for the past for 5 days not really getting out of bed requiring his help with any transfers.  Patient has had loss control of his bladder over the past few days urinating frequently.  Been very confused and mumbling.  Blood sugars reportedly have been in the 100-200 range.  Very poor solid intake minimal fluid intake.  3 days ago slid off the side of his bed, son had to get him back up.  No known injury.  Patient is chronically anticoagulated apixaban secondary to atrial fibrillation and also takes full aspirin daily.  Currently denies headache, back pain, difficulty breathing, abdominal pain, pain in his extremities.    Allergies:  Allergies   Allergen Reactions     Prednisone Other (See Comments)     Severe interaction with DM       Problem List:    Patient Active Problem List    Diagnosis Date Noted     Hypertension goal BP (blood pressure) < 140/90 10/09/2006     Priority: High     Bakers' asthma (H)      Priority: High     Dx possible \"Baker's Lung\" 2001 [facility]. RAST for bakers yeast negative 2002. Has occupational exposures with related worsening asthmatic symptoms. CT 2/08- no fibrosis.       Moderate persistent asthma      Priority: High     PFTS 1997 - FEV1- 3.39 (64%), ratio 0.74, 24% change ire82-97% with bronchodilators,  TLC 86%, %, DLCO 78%. Methacholine challenge positive at level 7 2001.  PFTS 2004 - FEV1- 1.93 (63%), ratio 0.77. PFTS 4/08 - FEV1- 2.13 (72%), ratio 0.71, no change with bronchodilators,  TLC 78%, RV 96%, DLCO 64%          Coronary artery disease involving native coronary artery of " native heart      Priority: High     atypical chest pain 2001 with GXT echo- decreased LVF, angiogram 2001- nonobstructing 3 vessel disease.   Repeat angio 2004- prox RCA 50-60%,  OM2- 40%, D1 30-40%, LAD 30-40%.   Cardiolite 2005 during hospitalization for SOB in [place] reported to reveal no ischemia.   Adenosine cardiolite 1/08- small slightly reversible inferior defect, most likely consistent with diaphragm attenuation with bowel uptake artifact. USA, Angio 11/09- Severe LM disease, severe subtotal occlusion of a large branch OM1.    S/p CABG x2 11/09- LIMA-D1, SVG to OM1, OM2. GXT 8/10- 5.8 mets, 121 (80%) HR, normal ekg, cardiolite- small fixed inferior/basal defect with small area khurram-infarct ischemia extending into the mid ventricle. EF 67%.  4/25/13:adenosine cardiolyte stress test through [facility] [place] with normal EF and no ischemia.   1/16/2016 nuclear stress test:1.  Myocardial perfusion imaging using single isotope technique demonstrated no evidence for myocardial ischemia. 2. Gated images demonstrated normal wall motion with a calculated ejection fraction of 61%.  3. Compared to the prior study from 2011 there are no significant changes .           Chronic anticoagulation 02/26/2021     Priority: Medium     Sepsis (H) 02/26/2021     Priority: Medium     Urinary tract infection without hematuria, site unspecified 02/26/2021     Priority: Medium     Gastrointestinal hemorrhage, unspecified gastrointestinal hemorrhage type 02/26/2021     Priority: Medium     Anemia, unspecified type 02/26/2021     Priority: Medium     Type 2 diabetes mellitus (H) 12/02/2020     Priority: Medium     History of CVA (cerebrovascular accident) 11/27/2020     Priority: Medium     Mild Broca's aphasia, confusion, right hand dysesthesia after angiogram 2009. MRA new small area of restricted diffusion in the white matter of the medial right temporal lobe,  consistent with a recent small infarct. Speech problems resolved, still mild  right hand problems.         Falls frequently 11/27/2020     Priority: Medium     Generalized weakness 11/27/2020     Priority: Medium     Claudication of calf muscles (H) 08/27/2020     Priority: Medium     Type 2 diabetes mellitus with diabetic chronic kidney disease (H) 10/30/2015     Priority: Medium     [facility] 08/11/2014     Priority: Medium     State Tier Level:    Status:  Unable to re-connect  Care Coordinator:  Kecia Mills    See Letters for H Care Plan  Date:  August 11, 2014             Chronic kidney disease, stage 2 (mild) 05/06/2014     Priority: Medium     9/9/2015:He has had two abnormal MAC in the past.        COPD (chronic obstructive pulmonary disease) (H) 11/18/2013     Priority: Medium     PFT 11/15/13 results:FVC=59%, FEV1=54%, FEV1/FVC=92%.  His DLCO was 58%1. Spirometry: FEV1 moderately reduced. FVC moderately reduced. FEV1/FVC ratio reduced. 2. Bronchodilator Response: A 14% improvement is seen in FEV1 with bronchodilators. 3. Lung Volumes: Total lung capacity normal. Residual volume increased. Residual volume total lung capacity ratio increased. 4. DLCO: Moderately reduced.   INTERPRETATION: Moderate obstructive lung disease. Reversibility with bronchodilators is seen on testing today. Lung volumes show evidence for air trapping. DLCO is reduced, consistent with loss of capillary-alveolar exchange as in emphysema, pulmonary hypertension, chronic pulmonary embolus, pulmonary fibrosis or other pulmonary vascular disease. A concomitant restrictive lung disease process may be suspected on the basis of moderate reductions in FEV1 and FVC, with a fairly well-preserved FEV1/FVC ratio and a borderline low normal total lung capacity.   11/15/13:exercise oximetry did not show significant desaturation below 91%.  PFT February, 2014 results:FVC=54%, FEV1=48%, FEV1/FVC=64%.  DLCO=59%  9/6/2016 pulmonary consult:COPD with restrictional components and diffusion defect, history Baker's  lung.          Hyperlipidemia LDL goal <70 04/17/2013     Priority: Medium     Atrial fibrillation (H) 12/14/2009     Priority: Medium     Afib after CABG 11/2009  Afib during hospitalization 11/2020, 12/2020       Obstructive sleep apnea- mild (AHI 11) 04/14/2008     Priority: Medium     ESS 20/24. Sleep study [facility]: 4/8/08 (196#) AHI 11.2, REM AHI 49.6, low O2 87%.         Hypertrophy of prostate without urinary obstruction      Priority: Medium     Elevated PSA. Bx negative 2004.       Advanced care planning/counseling discussion 09/24/2012     Priority: Low     Does not have a directive 9/12       Dyslexia 11/11/2010     Priority: Low     essentially unable to read       CTS (carpal tunnel syndrome) 05/12/2010     Priority: Low     EMG 5/10-  median neuropathy at the right wrist, moderate in severity electrically, right ulnar neuropathy localizing to the elbow, mild chronic right C6 radiculopathy without evidence for acute denervation.        Alzheimer's dementia (H) 05/03/2010     Priority: Low     MMSE 27/30 (0/3 attention recall, ?history of dylexia), PHQ9-16 7/09. Recommended neurocogntive testing, did not complete.  MMSE 23/30 6/10 (attention, county, if/and/buts).Neurocogntive evaluation  1/10- not suspicious for emerging dementia. Felt likely due hearing loss/dyslexia.   10/29/2018:He was seen at [facility] 10/10/2018.  Dr. Oksana Munoz.   He had an EEG.   Diagnosed with Alzheimer's dementia.   Records not yet reviewed.        Benign paroxysmal vertigo 02/03/2009     Priority: Low     Admit 2/09. MRI/MRA head and neck negative.       Sensorineural hearing loss, bilateral 10/08/2007     Priority: Low     Esophageal reflux 11/06/2006     Priority: Low     PSORIASIS      Priority: Low     OVERACTIVE BLADDER      Priority: Low     PVR 84 2004.          Past Medical History:    Past Medical History:   Diagnosis Date     Acute calculous cholecystitis 03/02/2019     Acute respiratory failure  with hypoxia (H) 11/27/2020     Altered mental status 12/31/2014     Calculus of kidney 1990     COPD (chronic obstructive pulmonary disease) (H)      COVID-19 virus infection 12/2/2020     Depression 1979     Diabetes (H)      Elevated troponin 11/27/2020     Encephalopathy 09/03/2018     Heart disease      Left hand weakness 12/14/2009     PNEUMONIA, ORGANISM NOS 02/14/2008     Sleep apnea        Past Surgical History:    Past Surgical History:   Procedure Laterality Date     C ANESTH,DX ARTHROSCOPIC PROC KNEE JOINT  1994, 1998     Left     CARDIAC SURGERY  11/09    CABG x2 - LIMA-D1, SVG to OM1, OM2.     GENITOURINARY SURGERY  1990    ESWL and percutaneous nephrolithotomy     LAPAROSCOPIC CHOLECYSTECTOMY N/A 3/4/2019    Procedure: LAPAROSCOPIC CHOLECYSTECTOMY;  Surgeon: Brut Mendieta MD;  Location: [facility] OR     ORTHOPEDIC SURGERY  8/12    right CTR     SURGICAL HISTORY OF -   1998    Hernia repair     SURGICAL HISTORY OF -   2004    NormalColonoscopy      SURGICAL HISTORY OF -   2006    Normal Colonoscopy       Family History:    Family History   Problem Relation Age of Onset     C.A.D. Father         40s     Hypertension Father      C.A.D. Paternal Grandfather      Heart Disease Paternal Grandfather      Diabetes Brother      C.A.D. Brother      Heart Disease Brother      Hypertension Brother      Gastrointestinal Disease Son         Crohn's disease     Gastrointestinal Disease Other         2 grandaughters with Crohn's disease     Cancer - colorectal No family hx of        Social History:  Marital Status:   [5]  Social History     Tobacco Use     Smoking status: Never Smoker     Smokeless tobacco: Never Used   Substance Use Topics     Alcohol use: No     Alcohol/week: 0.0 standard drinks     Drug use: No        Medications:    albuterol (PROAIR HFA/PROVENTIL HFA/VENTOLIN HFA) 108 (90 Base) MCG/ACT inhaler  apixaban ANTICOAGULANT (ELIQUIS) 5 MG tablet  aspirin (ASA) 325 MG tablet  atorvastatin (LIPITOR) 80  MG tablet  blood glucose (NO BRAND SPECIFIED) test strip  cyanocobalamin (VITAMIN B-12) 1000 MCG tablet  famotidine (PEPCID) 20 MG tablet  finasteride (PROSCAR) 5 MG tablet  furosemide (LASIX) 20 MG tablet  insulin NPH-Regular (NOVOLIN 70/30 FLEXPEN RELION) susp  metFORMIN (GLUCOPHAGE-XR) 500 MG 24 hr tablet  metoprolol tartrate (LOPRESSOR) 25 MG tablet  OLANZapine (ZYPREXA) 5 MG tablet  tamsulosin (FLOMAX) 0.4 MG capsule  TRELEGY ELLIPTA 100-62.5-25 MCG/INH oral inhaler  Vitamin D3 (CHOLECALCIFEROL) 125 MCG (5000 UT) tablet  blood glucose monitoring (NO BRAND SPECIFIED) meter device kit  insulin pen needle (BD ABRAM U/F) 32G X 4 MM miscellaneous  nitroGLYcerin (NITROSTAT) 0.4 MG sublingual tablet  order for DME          Review of Systems  All other systems reviewed and are negative.    Physical Exam   BP: 93/44  Pulse: 70  Temp: 98.5  F (36.9  C)  Resp: 18  SpO2: 90 %      Physical Exam  Nontoxic-appearing , mild respiratory distress in the form of tachypnea, no audible stridor or wheeze.  He is oriented to self not place or date.  Skin is pale.  Mumbling speech difficult to understand at times    Head atraumatic normocephalic    Cranial nerves; vision baseline fields intact, PERRL, EOMI, facial sensation intact to light touch, facial muscle tone intact and symmetrical, hearing grossly intact,swallowing without difficulty, voice baseline and normal, SCM  strength intact, tongue protrudes midline.  Palatal elevation symmetric    Oropharynx moist without lesions or erythema.    Neck supple full active painless range of motion no posterior midline tenderness.    No cervical adenopathy    Spine nontender to palpation    There is tenderness over the mid posterior chest wall nauseated with ecchymoses, approximately rib #8, no bony step-off or crepitus    Pelvis stable nontender    No other outward sign of trauma to the chest back or abdomen then noted above    Lungs diminished    Heart irregularly irregular no murmur S3  or rub    Abdomen soft no tenderness left upper quadrant without guarding or rebound, bowel sounds positive, nondistended    Strength and sensation intact throughout the extremities, skin clear from rash or lesion.    Extremities are atraumatic, full painless active range of motion all joints    Superficial breakdown over sacrum measuring approximately 1 x 2 cm on the left 1 x 1 cm on the right.  Remainder of skin exam is unrevealing.    Rectal exam normal tone no mass appreciated, scant dark stool that is guaiac positive      ED Course        Procedures               Critical Care time:  none                If one the following conditions is present, a 30 mL/kg bolus is recommended as part of the 6 hour bundle (IBW can be used for BMI >30, or document refusal/contraindication):      1.   Initial hypotension  defined as 2 bps < 90 or map < 65 in the 6hrs before or 6hrs after time zero.     2.  Lactate >4.     The patient has signs of Severe Sepsis as evidenced by:    1. 2 SIRS criteria, AND  2. Suspected infection, AND   3. Organ dysfunction:      Time severe sepsis diagnosis confirmed:   02/26/21 as this was the time when Lactate resulted, and the level was > 2.0    3 Hour Severe Sepsis Bundle Completion:    1. Initial Lactic Acid Result:   Recent Labs   Lab Test 02/26/21  1601 11/26/20  1832 03/02/19  2302   LACT 4.6* 1.5 1.6     2. Blood Cultures before Antibiotics: Yes  3. Broad Spectrum Antibiotics Administered:  yes       Anti-infectives (From admission through now)    Start     Dose/Rate Route Frequency Ordered Stop    02/26/21 1800  piperacillin-tazobactam (ZOSYN) infusion 3.375 g      3.375 g  over 30 Minutes Intravenous EVERY 6 HOURS 02/26/21 1755            4. Fluid volume administered in ED: Working on 2000 cc bolus at time of dictation    BMI Readings from Last 1 Encounters:   02/10/21 25.85 kg/m      30 mL/kg fluids based on weight: 2,310 mL  30 mL/kg fluids based on IBW (must be >= 60 inches tall):  2,050 mL                Severe Sepsis reassessment:  1. Repeat Lactic Acid Level: Pending at time of dictation  2. MAP>65 after initial IVF bolus, will continue to monitor fluid status and vital signs    I attest to having performed a repeat sepsis exam and assessment of perfusion         Results for orders placed or performed during the hospital encounter of 02/26/21 (from the past 24 hour(s))   Glucose by meter   Result Value Ref Range    Glucose 138 (H) 70 - 99 mg/dL   CBC with platelets differential   Result Value Ref Range    WBC 9.2 4.0 - 11.0 10e9/L    RBC Count 1.92 (L) 4.4 - 5.9 10e12/L    Hemoglobin 5.5 (LL) 13.3 - 17.7 g/dL    Hematocrit 19.1 (L) 40.0 - 53.0 %     78 - 100 fl    MCH 28.6 26.5 - 33.0 pg    MCHC 28.8 (L) 31.5 - 36.5 g/dL    RDW 17.2 (H) 10.0 - 15.0 %    Platelet Count 233 150 - 450 10e9/L    Diff Method Automated Method     % Neutrophils 75.0 %    % Lymphocytes 14.0 %    % Monocytes 10.2 %    % Eosinophils 0.2 %    % Basophils 0.2 %    % Immature Granulocytes 0.4 %    Nucleated RBCs 1 (H) 0 /100    Absolute Neutrophil 6.9 1.6 - 8.3 10e9/L    Absolute Lymphocytes 1.3 0.8 - 5.3 10e9/L    Absolute Monocytes 0.9 0.0 - 1.3 10e9/L    Absolute Eosinophils 0.0 0.0 - 0.7 10e9/L    Absolute Basophils 0.0 0.0 - 0.2 10e9/L    Abs Immature Granulocytes 0.0 0 - 0.4 10e9/L    Absolute Nucleated RBC 0.1    Comprehensive metabolic panel   Result Value Ref Range    Sodium 139 133 - 144 mmol/L    Potassium 3.9 3.4 - 5.3 mmol/L    Chloride 102 94 - 109 mmol/L    Carbon Dioxide 26 20 - 32 mmol/L    Anion Gap 11 3 - 14 mmol/L    Glucose 129 (H) 70 - 99 mg/dL    Urea Nitrogen 78 (H) 7 - 30 mg/dL    Creatinine 2.22 (H) 0.66 - 1.25 mg/dL    GFR Estimate 27 (L) >60 mL/min/[1.73_m2]    GFR Estimate If Black 31 (L) >60 mL/min/[1.73_m2]    Calcium 8.7 8.5 - 10.1 mg/dL    Bilirubin Total 0.8 0.2 - 1.3 mg/dL    Albumin 2.9 (L) 3.4 - 5.0 g/dL    Protein Total 6.2 (L) 6.8 - 8.8 g/dL    Alkaline Phosphatase 109 40 -  150 U/L    ALT 42 0 - 70 U/L    AST 41 0 - 45 U/L   Troponin I   Result Value Ref Range    Troponin I ES 0.025 0.000 - 0.045 ug/L   ABO/Rh type and screen   Result Value Ref Range    Units Ordered 2     ABO B     RH(D) Neg     Antibody Screen Neg     Test Valid Only At [facility]        Specimen Expires 03/01/2021     Crossmatch Red Blood Cells    Lactic acid whole blood   Result Value Ref Range    Lactic Acid 4.6 (HH) 0.7 - 2.0 mmol/L   UA reflex to Microscopic   Result Value Ref Range    Color Urine Yellow     Appearance Urine Cloudy     Glucose Urine Negative NEG^Negative mg/dL    Bilirubin Urine Negative NEG^Negative    Ketones Urine Negative NEG^Negative mg/dL    Specific Gravity Urine 1.012 1.003 - 1.035    Blood Urine Small (A) NEG^Negative    pH Urine 5.0 5.0 - 7.0 pH    Protein Albumin Urine Negative NEG^Negative mg/dL    Urobilinogen mg/dL 0.0 0.0 - 2.0 mg/dL    Nitrite Urine Negative NEG^Negative    Leukocyte Esterase Urine Large (A) NEG^Negative    Source Catheterized Urine     RBC Urine 7 (H) 0 - 2 /HPF    WBC Urine >182 (H) 0 - 5 /HPF    WBC Clumps Present (A) NEG^Negative /HPF    Bacteria Urine Few (A) NEG^Negative /HPF   Asymptomatic SARS-CoV-2 COVID-19 Virus (Coronavirus) by PCR    Specimen: Nasopharyngeal   Result Value Ref Range    SARS-CoV-2 Virus Specimen Source Nasopharyngeal     SARS-CoV-2 PCR Result NEGATIVE     SARS-CoV-2 PCR Comment (Note)    Occult blood stool POCT   Result Value Ref Range    Occult Blood pos neg    Internal QC OK Yes     Test Card Lot Number [id number]    Head CT w/o contrast    Narrative    EXAM: CT HEAD W/O CONTRAST  LOCATION: [facility]  DATE/TIME: 2/26/2021 4:50 PM    INDICATION: Mental status change, unknown cause.  COMPARISON: 12/25/2020.  TECHNIQUE: Routine CT Head without IV contrast. Multiplanar reformats. Dose reduction techniques were used.    FINDINGS:  INTRACRANIAL CONTENTS: No intracranial hemorrhage, extraaxial collection, or mass  effect. No CT evidence of acute infarct. Mild to moderate presumed chronic small vessel ischemic changes. Mild generalized volume loss. No hydrocephalus. Cerebellar   tonsillar position is satisfactory. Sella appears within normal limits.     VISUALIZED ORBITS/SINUSES/MASTOIDS: Procedural changes both globes with no acute orbital process. No paranasal sinus mucosal disease. No middle ear or mastoid effusion.    BONES/SOFT TISSUES: No acute fracture of the calvarium or skull base. Mild degenerative changes right TMJ. No significant swelling the facial or scalp tissues is evident.      Impression    IMPRESSION:  1.  No CT evidence for acute intracranial process.    2.  Brain atrophy and presumed chronic microvascular ischemic changes as above.    3.  Stable intracranial appearance from 12/25/2020.   CT Chest Abdomen Pelvis w/o Contrast    Narrative    EXAM: CT CHEST ABDOMEN PELVIS W/O CONTRAST  LOCATION: [facility]  DATE/TIME: 2/26/2021 4:51 PM    INDICATION: Weight loss, unintended. Left-sided chest pain. Left upper quadrant pain.  COMPARISON: 12/25/2020  TECHNIQUE: CT scan of the chest, abdomen, and pelvis was performed without IV contrast. Multiplanar reformats were obtained. Dose reduction techniques were used.   CONTRAST: None.    FINDINGS:   LUNGS AND PLEURA: Slight improvement in the small bilateral pleural effusions as well as bibasilar atelectasis. Modest linear atelectasis or scarring at lung bases persists. No new infiltrate.    MEDIASTINUM/AXILLAE: Previous CABG. No adenopathy.    CORONARY ARTERY CALCIFICATION: Mild.    HEPATOBILIARY: Prior cholecystectomy.    PANCREAS: Normal.    SPLEEN: Stable benign granulomata.    ADRENAL GLANDS: Normal.    KIDNEYS/BLADDER: Vascular calcifications. No hydronephrosis or mass evident.  There is wall thickening and trabeculation of bladder.    BOWEL: No obstruction or inflammatory change.    LYMPH NODES: No lymphadenopathy.    VASCULATURE: Heavy  atherosclerotic plaque.    PELVIC ORGANS: Moderate hypertrophy of prostate.    MUSCULOSKELETAL: No suspicious bony lesion. No rib fracture evident.      Impression    IMPRESSION:  1.  No etiology for symptoms. No mass or adenopathy. Nothing obstructive or inflammatory.  2.  Improved in previously identified lung scarring and pleural effusions, no new infiltrate.  3.  Modest bladder wall thickening and trabeculation with prostatic enlargement.     *Note: Due to a large number of results and/or encounters for the requested time period, some results have not been displayed. A complete set of results can be found in Results Review.       Medications   piperacillin-tazobactam (ZOSYN) infusion 3.375 g (has no administration in time range)   pantoprazole (PROTONIX) IV push injection 40 mg (has no administration in time range)   lactated ringers BOLUS 1,000 mL (has no administration in time range)   lactated ringers BOLUS 1,000 mL (1,000 mLs Intravenous New Bag 2/26/21 1634)       Assessments & Plan (with Medical Decision Making)  80-year-old male chronic anticoagulation apixaban secondary to atrial fibrillation presents with generalized weakness and encephalopathy from home where he lives with his son.  Recent fall, urine appears infected, lactate elevated findings consistent with severe sepsis, treated with Zosyn and vancomycin and LR fluid bolus.  Also found to be profoundly anemic hemoglobin of 5.5, guaiac positive stool, type and cross 2 units packed red cells, patient is B- requiring lab to order blood.  This is reasonable, no indication for acute transfusion, we can always give O- blood if necessary.  Protonix 40 mg IV.  Patient requires admission for ongoing antibiotics, IV fluids, transfusion.  Reviewed with  accepts patient for admission.     I have reviewed the nursing notes.    I have reviewed the findings, diagnosis, plan and need for follow up with the patient.     Critical Care Addendum    My initial  assessment, based on my review of nursing observations, review of vital signs, focused history, physical exam, review of cardiac rhythm monitor and 12 lead ECG analysis, established that Alvaro Horton has altered mental status, which requires immediate intervention, and therefore he is critically ill.     After the initial assessment, the care team initiated multiple lab tests, initiated IV fluid administration and initiated medication therapy with Zosyn and vancomycin, Protonix to provide stabilization care. Due to the critical nature of this patient, I reassessed nursing observations, vital signs, physical exam, review of cardiac rhythm monitor, mental status and respiratory status multiple times prior to his disposition.     Time also spent performing documentation, discussion with family to obtain medical information for decision making, reviewing test results, discussion with consultants and coordination of care.     Critical care time (excluding teaching time and procedures): 45 minutes.     New Prescriptions    No medications on file       Final diagnoses:   Severe sepsis (H)   Urinary tract infection without hematuria, site unspecified   Gastrointestinal hemorrhage, unspecified gastrointestinal hemorrhage type   Chronic anticoagulation   Anemia, unspecified type   Pressure injury of sacral region, stage 2 (H)   Septic encephalopathy       2/26/2021   [facility] EMERGENCY DEPT     Harshil Yee MD  02/26/21 8018

## 2021-02-26 NOTE — TELEPHONE ENCOUNTER
Reason for Call: Request for an order or referral:    Order or referral being requested: Jae the son is calling saying that he is very sick they want him in the hospital but cant because of patient has no one to care for him. Jae is asking for a order for Hospice for patient to come out and help with him again.     Date needed: as soon as possible    Has the patient been seen by the PCP for this problem? YES    Phone number Patient can be reached at:  Home number on file [phone] is Suraj number  Best Time:  any    Can we leave a detailed message on this number?  YES    Call taken on 2/26/2021 at 9:46 AM by Kelle Hamm

## 2021-02-26 NOTE — TELEPHONE ENCOUNTER
Spoke with Jae who is primary caregiver for dad. States having difficulty caring for him due to own medical conditions. States has not been able to move/ transfer pt, has not had bath in 1 wk. Decreased food intake. BG's in 190's. Pt mostly yells, intentionally cut 02 tubing with knife. Son has arrange for [facility] care, unable to start care for 2 wks.  Due to situation, pt status advised 911 for ambulance transport to ED for eval.  Son voices understanding/ agreement.  KALLI Fung RN

## 2021-02-27 NOTE — H&P
[facility]    History and Physical - Hospitalist Service       Date of Admission:  2/26/2021    Assessment & Plan   Alvaro Horton is a 80 year old male admitted on 2/26/2021. He presents from home where he lives with son with weakness and confusion        UTI with possible sepsis   Presents with weaks, confusion, tachypnea, pyuria, lactate elevated 4.6, afebrile, WBC 9.2. CT chest abdomen pelvis notable for Modest bladder wall thickening and trabeculation with prostatic enlargement. In ED given 2 Li IVF, started on zosyn.   - Continue Zosyn  - Await urine cultures   - No blood cultures sent before antibiotics      Gastrointestinal hemorrhage, suspect upper    Anemia, unspecified type    Admission HGB 5.5. Occult blood positive in ED. Last colonoscopy 8/2017 notable for adenomatous polyp and biopsy positive for lymphocytic colitis. Baseline HGB 12-13, but last HGB 12/25/20 was 10.   - Protonix IV, hold usual pepcid  - Hold ASA, eliquis  - Transfuse 2 units  - Consider EGD when stable      Acute kidney injury  Likely due to anemia, volume depletion. Baseline creatine 1.1 in 2019, but running 1.1-1.4 since 11/2020.   - Follow with fluid, transfusion      Type 2 diabetes mellitus  A1c 8.3 12/25/20.   - Hold usual metformin and 70/30 insulin while NPO  - Medium insulin sliding scale     Memory loss/Dementia    Memory issues noted in the past 6 months or so. Started on Zyprexa during hospitalization 12/2020 due to encephalopathy during COVID infection  - monitor mental status    - continue PM olanzapine 5 mg BID, consider trial of reduced dosing     Coronary Artery Disease  History of CABG in 2009 with LIMA to LAD and SVG to OM1 and 2. Follows with cardiology, last seen 3/2020. Last Echo 11/2020 EF 60-65%, no wall motion changes, decreased RV systolic function with elevated RV pressure.   - continue statin, beta blocker  - Hold ASA due to GI bleeding     Hypertension    Chronic.  BP  running a bit soft.   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   - Continue home Trelegy Ellipta, prn albuterol      Atrial fibrillation  Transient following CABG in 2009. ZioPatch 3/2020 did not show atrial fibrillation. ECG 8/2020 showed NSR. During admission 11/2020 for COVID 19 atrial fibrillation seen. Started on anticoagulation with Eliquis after discussion with patient and son due to fall risk and also CHADsVAC of 6 - possibly just short term. Now has apparent persistent atrial fibrillation , slow vetricular response  - continue home metoprolol, may need to reduce dose  - hold home Eliquis due to GI bleeding  - telemetry due to bradycardia     History of CVA  Hyperlipidemia  Occurred at time of CABG in 2009.  - Continue home Lipitor 80  - Hold ASA due to GI bleeding     Benign Prostatic Hyperplasia  Chronic.  - continue home finasteride, tamsulosin     Recovered COVID 19 Infection with ongoing hypoxia    Diagnosed 11/26/20 with COVID 19, hospitalized x 2 and discharged on 2 LPM of oxygen. Still considered recovered from infectious standpoint.  - No precautions needed  - Repeat Covid PCR negative 2/26/2021.     Discussed with son Jae [phone]         Diet:   NPO  DVT Prophylaxis: Eliquis (on hold)  Olivo Catheter: not present  Code Status:   DNR/DNI         Disposition Plan   Expected discharge: 2 - 3 days, recommended to prior living arrangement once adequate pain management/ tolerating PO medications and hemoglobin stable.  Entered: Kingston Coronado MD 02/26/2021, 9:04 PM     The patient's care was discussed with the Bedside Nurse and Patient's Family.    Kingston Coronado MD  [facility]  Contact information available via [facility] Paging/Directory      ______________________________________________________________________    Chief Complaint   Chief  "Complaint   Patient presents with     Altered Mental Status     septic      History is obtained from the patient, electronic health record and emergency department physician    History of Present Illness   Alvaro Horton is a 80 year old male who presents with weakness and confusion. He is hard of hearing and unable to provide many details    He denies shortness of breath, notes some suprapubic tenderness     Per ED notes:    Lives with his son, I spoke with his son over the phone regarding history, patient is extremely poor historian.  Son tells me that Mr. Horton has been generally weak for the past for 5 days not really getting out of bed requiring his help with any transfers.  Patient has had loss control of his bladder over the past few days urinating frequently.  Been very confused and mumbling.  Blood sugars reportedly have been in the 100-200 range.  Very poor solid intake minimal fluid intake.  3 days ago slid off the side of his bed, son had to get him back up.  No known injury.        Review of Systems    Review of systems not obtained due to patient factors - age and confusion    Past Medical History    I have reviewed this patient's medical history and updated it with pertinent information if needed.   Past Medical History:   Diagnosis Date     Acute calculous cholecystitis 03/02/2019     Acute respiratory failure with hypoxia (H) 11/27/2020     Altered mental status 12/31/2014 12/27/2014:episode confusion for 20 minutes.  Seen at [facility]/-hospitalized.  Head CT, MRI/MRA all normal.  PET cardiac perfusion stress test normal.  Neurology consult-diagnosis= possible hypoglycemia, migraine variant.  \"Acute ischemic cerebral event was excluded\".      Calculus of kidney 1990 1990. s/p ESWL and percutaneous nephrolithotomy     COPD (chronic obstructive pulmonary disease) (H)      COVID-19 virus infection 12/2/2020     Depression 1979    Hospitalized 1979     Diabetes (H)      Elevated troponin " 11/27/2020     Encephalopathy 09/03/2018     Heart disease      Left hand weakness 12/14/2009    Afterbypass surgery, CVA, improved.      PNEUMONIA, ORGANISM NOS 02/14/2008    CT 2/08-  Pulmonary atelectasis and infiltrate in the posterior left lower lung base.      Sleep apnea        Past Surgical History   I have reviewed this patient's surgical history and updated it with pertinent information if needed.  Past Surgical History:   Procedure Laterality Date     C ANESTH,DX ARTHROSCOPIC PROC KNEE JOINT  1994, 1998     Left     CARDIAC SURGERY  11/09    CABG x2 - LIMA-D1, SVG to OM1, OM2.     GENITOURINARY SURGERY  1990    ESWL and percutaneous nephrolithotomy     LAPAROSCOPIC CHOLECYSTECTOMY N/A 3/4/2019    Procedure: LAPAROSCOPIC CHOLECYSTECTOMY;  Surgeon: Burt Mendieta MD;  Location: [facility] OR     ORTHOPEDIC SURGERY  8/12    right CTR     SURGICAL HISTORY OF -   1998    Hernia repair     SURGICAL HISTORY OF -   2004    NormalColonoscopy      SURGICAL HISTORY OF -   2006    Normal Colonoscopy       Social History   I have reviewed this patient's social history and updated it with pertinent information if needed.  Social History     Tobacco Use     Smoking status: Never Smoker     Smokeless tobacco: Never Used   Substance Use Topics     Alcohol use: No     Alcohol/week: 0.0 standard drinks     Drug use: No       Family History   I have reviewed this patient's family history and updated it with pertinent information if needed.  Family History   Problem Relation Age of Onset     C.A.D. Father         40s     Hypertension Father      C.A.D. Paternal Grandfather      Heart Disease Paternal Grandfather      Diabetes Brother      C.A.D. Brother      Heart Disease Brother      Hypertension Brother      Gastrointestinal Disease Son         Crohn's disease     Gastrointestinal Disease Other         2 grandaughters with Crohn's disease     Cancer - colorectal No family hx of        Prior to Admission Medications   Prior to  Admission Medications   Prescriptions Last Dose Informant Patient Reported? Taking?   OLANZapine (ZYPREXA) 5 MG tablet 2/26/2021 at am Son No Yes   Sig: Take 1 tablet (5 mg) by mouth 2 times daily   Patient taking differently: Take 2.5 mg by mouth 2 times daily    TRELEGY ELLIPTA 100-62.5-25 MCG/INH oral inhaler 2/25/2021 at Unknown time Son Yes Yes   Sig: Inhale 1 puff into the lungs daily   Vitamin D3 (CHOLECALCIFEROL) 125 MCG (5000 UT) tablet 2/26/2021 at am Son Yes Yes   Sig: Take 1 tablet by mouth daily   albuterol (PROAIR HFA/PROVENTIL HFA/VENTOLIN HFA) 108 (90 Base) MCG/ACT inhaler 2/25/2021 at Unknown time Son No Yes   Sig: Inhale 2 puffs into the lungs every 6 hours as needed for shortness of breath / dyspnea or wheezing   apixaban ANTICOAGULANT (ELIQUIS) 5 MG tablet 2/26/2021 at am Son No Yes   Sig: Take 1 tablet (5 mg) by mouth 2 times daily   aspirin (ASA) 325 MG tablet 2/26/2021 at am Son Yes Yes   Sig: Take 325 mg by mouth daily   atorvastatin (LIPITOR) 80 MG tablet 2/26/2021 at am Son No Yes   Sig: Take 1 tablet (80 mg) by mouth daily   blood glucose (NO BRAND SPECIFIED) test strip 2/26/2021 at 0900 #192 Son No Yes   Sig: Use to test blood sugar 3 times daily   blood glucose monitoring (NO BRAND SPECIFIED) meter device kit  Son No No   Sig: Use to test blood sugar 3 times daily or as directed.   cyanocobalamin (VITAMIN B-12) 1000 MCG tablet 2/26/2021 at am Son Yes Yes   Sig: Take 1,000 mcg by mouth daily   famotidine (PEPCID) 20 MG tablet 2/26/2021 at am Son No Yes   Sig: Take 1 tablet (20 mg) by mouth 2 times daily   finasteride (PROSCAR) 5 MG tablet 2/26/2021 at am Son No Yes   Sig: Take 1 tablet by mouth once daily   furosemide (LASIX) 20 MG tablet 2/26/2021 at am Son No Yes   Sig: Take 1 tablet (20 mg) by mouth daily 40 mg by mouth daily on Mon-Wed-Fri, 20 mg by mouth daily Tue-Thu-Sat-Sun   insulin NPH-Regular (NOVOLIN 70/30 FLEXPEN RELION) susp 2/26/2021 at am Son No Yes   Sig: Inject 10 Units  Subcutaneous 2 times daily (with meals)   insulin pen needle (BD ABRAM U/F) 32G X 4 MM miscellaneous  Son No No   Sig: Use 2 daily as directed.   metFORMIN (GLUCOPHAGE-XR) 500 MG 24 hr tablet 2/26/2021 at am Son No Yes   Sig: Take 1 tablet (500 mg) by mouth 2 times daily (with meals)   metoprolol tartrate (LOPRESSOR) 25 MG tablet 2/26/2021 at am Son No Yes   Sig: Take 0.5 tablets (12.5 mg) by mouth 2 times daily   nitroGLYcerin (NITROSTAT) 0.4 MG sublingual tablet Unknown at Unknown time Son No No   Sig: Place 1 tablet (0.4 mg) under the tongue every 5 minutes as needed for chest pain (CALL 911 IF NOT IMPROVED AFTER THREE CONSECUTIVE DOSES)   order for DME More than a month at Unknown time Son Yes No   Sig: Equipment being ordered: CPAP  AIRSENSE 10  11-15 CM H2O  QUATTRO AIR SIZE MED  SN# [id number]  DN# [id number]   tamsulosin (FLOMAX) 0.4 MG capsule 2/26/2021 at am Son No Yes   Sig: Take 1 capsule (0.4 mg) by mouth daily      Facility-Administered Medications: None     Allergies   Allergies   Allergen Reactions     Prednisone Other (See Comments)     Severe interaction with DM       Physical Exam   Vital Signs: Temp: 98.5  F (36.9  C) Temp src: Oral BP: 95/47 Pulse: 58   Resp: (!) 32 SpO2: 95 %      Weight: 0 lbs 0 oz    SpO2 Readings from Last 4 Encounters:   02/26/21 96%   02/10/21 97%   12/27/20 94%   12/06/20 92%       GENERAL APPEARANCE: alert and no distress, [id number], cooperative, confused but recognizes me after propmted  EYES: Eyes grossly normal to inspection  HENT: mouth without ulcers or lesions, dry  LUNGS: no shortness of breath , cough  CV: irregular   ABD : Soft  NEURO: speech normal and cranial nerves 2-12 appear intact  PSYCH: affect bright      Data   Data reviewed today: I reviewed all medications, new labs and imaging results over the last 24 hours. I personally reviewed the EKG tracing showing slow afib RBBB.    Lab Results   Component Value Date    TROPI 0.025 02/26/2021     Heart Failure  Labs  Outpatient:   Lab Results   Component Value Date    NTBNP 696 (H) 05/19/2020    NTBNP 910 (H) 12/22/2009     CMP  Recent Labs   Lab 02/26/21  1600      POTASSIUM 3.9   CHLORIDE 102   CO2 26   ANIONGAP 11   *   BUN 78*   CR 2.22*   GFRESTIMATED 27*   GFRESTBLACK 31*   EVELINA 8.7   PROTTOTAL 6.2*   ALBUMIN 2.9*   BILITOTAL 0.8   ALKPHOS 109   AST 41   ALT 42     CBC  Recent Labs   Lab 02/26/21  1600   WBC 9.2   RBC 1.92*   HGB 5.5*   HCT 19.1*      MCH 28.6   MCHC 28.8*   RDW 17.2*        INRNo lab results found in last 7 days.  Arterial Blood GasNo lab results found in last 7 days.  Results for orders placed or performed during the hospital encounter of 02/26/21   Head CT w/o contrast    Narrative    EXAM: CT HEAD W/O CONTRAST  LOCATION: [facility]  DATE/TIME: 2/26/2021 4:50 PM    INDICATION: Mental status change, unknown cause.  COMPARISON: 12/25/2020.  TECHNIQUE: Routine CT Head without IV contrast. Multiplanar reformats. Dose reduction techniques were used.    FINDINGS:  INTRACRANIAL CONTENTS: No intracranial hemorrhage, extraaxial collection, or mass effect. No CT evidence of acute infarct. Mild to moderate presumed chronic small vessel ischemic changes. Mild generalized volume loss. No hydrocephalus. Cerebellar   tonsillar position is satisfactory. Sella appears within normal limits.     VISUALIZED ORBITS/SINUSES/MASTOIDS: Procedural changes both globes with no acute orbital process. No paranasal sinus mucosal disease. No middle ear or mastoid effusion.    BONES/SOFT TISSUES: No acute fracture of the calvarium or skull base. Mild degenerative changes right TMJ. No significant swelling the facial or scalp tissues is evident.      Impression    IMPRESSION:  1.  No CT evidence for acute intracranial process.    2.  Brain atrophy and presumed chronic microvascular ischemic changes as above.    3.  Stable intracranial appearance from 12/25/2020.     CT Chest Abdomen Pelvis  w/o Contrast    Narrative    EXAM: CT CHEST ABDOMEN PELVIS W/O CONTRAST  LOCATION: [facility]  DATE/TIME: 2/26/2021 4:51 PM    INDICATION: Weight loss, unintended. Left-sided chest pain. Left upper quadrant pain.  COMPARISON: 12/25/2020  TECHNIQUE: CT scan of the chest, abdomen, and pelvis was performed without IV contrast. Multiplanar reformats were obtained. Dose reduction techniques were used.   CONTRAST: None.    FINDINGS:   LUNGS AND PLEURA: Slight improvement in the small bilateral pleural effusions as well as bibasilar atelectasis. Modest linear atelectasis or scarring at lung bases persists. No new infiltrate.    MEDIASTINUM/AXILLAE: Previous CABG. No adenopathy.    CORONARY ARTERY CALCIFICATION: Mild.    HEPATOBILIARY: Prior cholecystectomy.    PANCREAS: Normal.    SPLEEN: Stable benign granulomata.    ADRENAL GLANDS: Normal.    KIDNEYS/BLADDER: Vascular calcifications. No hydronephrosis or mass evident.  There is wall thickening and trabeculation of bladder.    BOWEL: No obstruction or inflammatory change.    LYMPH NODES: No lymphadenopathy.    VASCULATURE: Heavy atherosclerotic plaque.    PELVIC ORGANS: Moderate hypertrophy of prostate.    MUSCULOSKELETAL: No suspicious bony lesion. No rib fracture evident.      Impression    IMPRESSION:  1.  No etiology for symptoms. No mass or adenopathy. Nothing obstructive or inflammatory.  2.  Improved in previously identified lung scarring and pleural effusions, no new infiltrate.  3.  Modest bladder wall thickening and trabeculation with prostatic enlargement.     *Note: Due to a large number of results and/or encounters for the requested time period, some results have not been displayed. A complete set of results can be found in Results Review.      I spent >90 minutes with patient including counseling, chart review, and documentation

## 2021-02-27 NOTE — PLAN OF CARE
"Patient received two units of blood. Hemoglobin draw scheduled for 0600. Patient is oriented to self, uncooperative. Calls out, \"Help me\" and wants to \"get out of this hole, time to go swimming.\" No complaints of pain. Patient did sleep until 0500. Patient is continent of urine, uses urinal in bed with help.  0540: Patient was found sitting up on side of bed, and had ripped IV out of his right hand. Alarm is on zone 2 now.  "

## 2021-02-27 NOTE — PROGRESS NOTES
02/27/21 1339   Quick Adds   Type of Visit Initial PT Evaluation   Living Environment   People in home child(katherin), adult   Current Living Arrangements house   Home Accessibility stairs to enter home   Self-Care   Current Activity Tolerance poor   Equipment Currently Used at Home cane, straight   Activity/Exercise/Self-Care Comment has had decline in mobility recently, not ambulating much just doing transfers, son not able to care for him   Disability/Function   Walking or Climbing Stairs Difficulty yes   General Information   Onset of Illness/Injury or Date of Surgery 02/26/21   Referring Physician Keenan Brice MD   Patient/Family Therapy Goals Statement (PT) pt states he does not know   Pertinent History of Current Problem (include personal factors and/or comorbidities that impact the POC) decline reecently and has been in hospital and TCU several times, just returned to sons home and now son not able to care for him, brought in for weakness and confusion, behaviors, found to have UTI with possible sepsis   Existing Precautions/Restrictions fall   General Observations 2 alarms on in chair, pt impulsive and setting alarms off   Cognition   Orientation Status (Cognition) person;place   Cognitive Status Comments knew he is in the hospital   Pain Assessment   Patient Currently in Pain Yes, see Vital Sign flowsheet  (butt hurts per pt)   Bed Mobility   Comment (Bed Mobility) min A sit to supine, required HOB raised immediatlely for breathing   Transfers   Transfer Safety Comments SBA sit to stand from chair, impulsive, then needed immedialy to grab furniture for stability and to take steps bed to chair min A and twisting in lines and sat down before lines straightened and they were pulled   Gait/Stairs (Locomotion)   Comment (Gait/Stairs) declined to go further than 2 ft chair to bed due to pain   Balance   Balance Comments min A and unsteady needs UE support, was not using walker at home   Clinical  Impression   Criteria for Skilled Therapeutic Intervention yes, treatment indicated   PT Diagnosis (PT) difficulty walking, weakness from  UTI, unsteady    Influenced by the following impairments impaired balance, decraesed activity tolerance, general weakness   Functional limitations due to impairments impaired gait and transfers   Clinical Presentation Stable/Uncomplicated   Clinical Decision Making (Complexity) low complexity   Therapy Frequency (PT) Daily   Predicted Duration of Therapy Intervention (days/wks) 3 days   Planned Therapy Interventions (PT) balance training;bed mobility training;gait training;strengthening   Anticipated Equipment Needs at Discharge (PT) walker, rolling  (unsure if has one at home)   Risk & Benefits of therapy have been explained evaluation/treatment results reviewed;care plan/treatment goals reviewed;risks/benefits reviewed;patient   PT Discharge Planning    PT Discharge Recommendation (DC Rec) Long term care facility;Transitional Care Facility   PT Rationale for DC Rec impulsive and decreased endurance, moves with Ax1, did not want to walkk more than chair to bed due to butt hurts from sitting and wanted to lie down, confusion but knew he was in the hospital   PT Brief overview of current status  Ax1 2 ft needs walker due to imbalance   Total Evaluation Time   Total Evaluation Time (Minutes) 10

## 2021-02-27 NOTE — PROGRESS NOTES
"CLINICAL NUTRITION SERVICES - ASSESSMENT NOTE     Nutrition Prescription    RECOMMENDATIONS FOR MDs/PROVIDERS TO ORDER:  None at this time     Malnutrition Status:    Unable to determine at this time     Recommendations already ordered by Registered Dietitian (RD):  Boost Plus BID     Future/Additional Recommendations:  Monitor weights, labs, po and supplement intake      REASON FOR ASSESSMENT  Alvaro Horton is a/an 80 year old male assessed by the dietitian for MST score of 3: unsure for wt loss  and positive  for poor PO intake R/t decreased appetite    Patient is a 80 year old male admitted on 2/26/2021. He presents from home where he lives with son with weakness and confusion.   Chart reviewed, as pt is oriented to self only.   -PMH: frequent falls, COPD, A-fib, hx of CVA, chronic ischemic heart disease, HTN, hyperlipidemia, type 2 diabetes managed with metformin and NPH BID, and recent dx of early onset Alzheimer's.     NUTRITION HISTORY  -Spoke with pt's son Jae over the phone today. Jae reports a decreased appetite over the past few months PTA.   -Pt mostly eats 2 meals/day, but a small amount   -Pt was drinking boost BID, however Jae reports that pt would sometimes only drink 1.     CURRENT NUTRITION ORDERS  Diet: High Consistent Carbohydrate  Intake/Tolerance: No intakes documented     LABS  Recent Labs   Lab Test 02/27/21  0637 02/26/21  1600    139   POTASSIUM 3.7 3.9   CHLORIDE 106 102   CO2 29 26   ANIONGAP 6 11   * 129*   BUN 70* 78*   CR 2.15* 2.22*   EVELINA 8.1* 8.7     MEDICATIONS  Medications reviewed    ANTHROPOMETRICS  Height:172.7 cm (5' 8\")  Most Recent Weight: 78.1 kg (172 lb 2.9 oz)    IBW: 70 kg  BMI: Overweight BMI 25-29.9    Weight History:   Wt Readings from Last 15 Encounters:   02/26/21 78.1 kg (172 lb 2.9 oz)   02/10/21 77.1 kg (170 lb)   12/26/20 83.7 kg (184 lb 8.4 oz)   12/06/20 79.7 kg (175 lb 11.3 oz)   11/30/20 85.5 kg (188 lb 9.6 oz)   11/23/20 88.9 kg (196 lb) "   08/10/20 88.9 kg (196 lb)   08/05/20 83.9 kg (185 lb)   03/23/20 83.9 kg (185 lb)   03/06/20 89.5 kg (197 lb 6.4 oz)   02/26/20 86.2 kg (190 lb)   09/25/19 88.9 kg (196 lb)   08/06/19 87.2 kg (192 lb 3.2 oz)   07/29/19 88 kg (194 lb)   06/06/19 87.2 kg (192 lb 3.2 oz)   12 lb wt loss noted since Dec 26th, 6.5% wt loss  Per chart review: Pt's UBW is 180-190 lbs pre report from pt's son Jae.     Dosing Weight: 78 kg    ASSESSED NUTRITION NEEDS  Estimated Energy Needs: 8603-8523 kcals/day (25 - 30 kcals/kg)  Justification: Maintenance  Estimated Protein Needs: 78-94 grams protein/day (1 - 1.2 grams of pro/kg)  Justification: Maintenance  Estimated Fluid Needs: 3308-6691 mL/day (1 mL/kcal)   Justification: Maintenance    PHYSICAL FINDINGS  Unable to determine due to RD off-site and unable to conduct NFPA    MALNUTRITION  % Intake: unable to determine   % Weight Loss: > 5% in 1 month (severe)  Subcutaneous Fat Loss: Unable to assess  Muscle Loss: Unable to assess  Fluid Accumulation/Edema: Unable to assess  Malnutrition Diagnosis: Unable to determine due to lack of information and no NFPA     NUTRITION DIAGNOSIS  Inadequate oral intake related to decreased appetite as evidenced by report of eating less and weight loss     INTERVENTIONS  Implementation  Medical food supplement therapy: Vanilla Boost BID   Diet as ordered, RN staff to encourage good po intake and assist with meals as needed.      Goals  Patient to consume % of nutritionally adequate meal trays TID, or the equivalent with supplements/snacks.     Monitoring/Evaluation  Progress toward goals will be monitored and evaluated per protocol.    Caitlyn Vega RD, LD   Clinical Dietitian   On Call Weekend Pager: [phone]

## 2021-02-27 NOTE — PROGRESS NOTES
[facility]    History and Physical - Hospitalist Service       Date of Admission:  2/26/2021    Assessment & Plan   Alvaro Horton is a 80 year old male admitted on 2/26/2021. He presents from home where he lives with son with weakness and confusion    UTI with possible sepsis     Presents with weaks, confusion, tachypnea, pyuria, lactate elevated 4.6, afebrile, WBC 9.2. CT chest abdomen pelvis notable for Modest bladder wall thickening and trabeculation with prostatic enlargement. In ED given 2 Li IVF, started on zosyn. No blood cultures sent before antibiotics    Improving on antibiotics. UC pending.   - Continue Zosyn  - Await urine cultures     Gastrointestinal hemorrhage, suspect upper, on anticoagulation  Anemia, unspecified type      Admission HGB 5.5. Occult blood positive in ED. On aspirin and apixaban. Last colonoscopy 8/2017 notable for adenomatous polyp and biopsy positive for lymphocytic colitis. Baseline HGB 12-13, but last HGB 12/25/20 was 10.     Hgb 5.5 -> 7.2 -> 7.9. Stable post transfusion.  - Protonix IV, hold usual pepcid  - Holding ASA, eliquis  - Consider EGD tomorrow once off apixaban 2 days    Acute kidney injury    Admit creatinine 2.22 with BUN 78. Likely due to anemia, volume depletion. Baseline creatine 1.1 in 2019, but running 1.1-1.4 since 11/2020.      Creatinine 2.15.    Type 2 diabetes mellitus    A1c 8.3 12/25/20.   - Hold usual metformin and 70/30 insulin while NPO  - Medium insulin sliding scale     Memory loss/Dementia    Memory issues noted in the past 6 months or so. Started on Zyprexa during hospitalization 12/2020 due to encephalopathy during COVID infection  - monitor mental status    - continue PM olanzapine 5 mg BID, consider trial of reduced dosing     Coronary Artery Disease    History of CABG in 2009 with LIMA to LAD and SVG to OM1 and 2. Follows with cardiology, last seen 3/2020. Last Echo 11/2020 EF 60-65%, no wall motion changes,  decreased RV systolic function with elevated RV pressure.   - continue statin, beta blocker  - Hold ASA due to GI bleeding     Hypertension    Chronic.  BP running a bit soft.   - Hold home furosemide,   - Continue metoprolol     COPD (chronic obstructive pulmonary disease)  Asthma  Baker's Lung    PFTs in 04/2018 revealed severe airflow obstruction.   - Continue home Trelegy Ellipta, prn albuterol      Atrial fibrillation    Transient following CABG in 2009. ZioPatch 3/2020 did not show atrial fibrillation. ECG 8/2020 showed NSR. During admission 11/2020 for COVID 19 atrial fibrillation seen. Started on anticoagulation with Eliquis after discussion with patient and son due to fall risk and also CHADsVAC of 6 - possibly just short term. Now has apparent persistent atrial fibrillation , slow vetricular response    HR decent 60's.   - continue home metoprolol  - holding home Eliquis due to GI bleeding  - telemetry due to bradycardia     History of CVA  Hyperlipidemia    Occurred at time of CABG in 2009.  - Continue home Lipitor 80  - Hold ASA due to GI bleeding     Benign Prostatic Hyperplasia    Chronic.  - continue home finasteride, tamsulosin     Recovered COVID 19 Infection with ongoing hypoxia    Diagnosed 11/26/20 with COVID 19, hospitalized x 2 and discharged on 2 LPM of oxygen. Still considered recovered from infectious standpoint.  - No precautions needed  - Repeat Covid PCR negative 2/26/2021.     Son Jae [phone]     Diet: NPO for Medical/Clinical Reasons Except for: Ice Chips, Meds   DVT Prophylaxis: Eliquis (on hold)  Olivo Catheter: not present  Code Status: No CPR- Do NOT Intubate       Discussion: Appears improving. COnsidering EGD tomorrow. Discussed with son by phone.     Disposition: Anticipate discharge 2-3 days.      Attestation:  Total time: 40 minutes    Keenan Brice MD  Hospital Medicine      Interval History   Denies chest pain.  Complains of some mild shortness of breath.  Currently on  home oxygen dose of 2 L.  No abdominal pain or nausea or vomiting.  Complains of legs being very weak and was unable to stand again today.    Physical Exam   Temp:  [96.6  F (35.9  C)-98.5  F (36.9  C)] 97.5  F (36.4  C)  Pulse:  [53-76] 55  Resp:  [16-32] 18  BP: ()/(38-62) 105/50  SpO2:  [90 %-100 %] 93 %    Weights:   Vitals:    02/26/21 2115   Weight: 78.1 kg (172 lb 2.9 oz)    Body mass index is 26.18 kg/m .    Constitutional: alert and oriented to place but not situation, easily disoriented but redirectable. Pale otherwise nontoxic.  CV: irregular, 1-2+ bilateral lower extremity edema  Respiratory: CTA bilaterally  GI: Soft, non-tender, bowel sounds normal  Skin: Warm and dry    Data   Recent Labs   Lab 02/27/21  0637 02/26/21  1600   WBC 6.5 9.2   HGB 7.2* 5.5*   MCV 95 100    233    139   POTASSIUM 3.7 3.9   CHLORIDE 106 102   CO2 29 26   BUN 70* 78*   CR 2.15* 2.22*   ANIONGAP 6 11   EVELINA 8.1* 8.7   * 129*   ALBUMIN 2.5* 2.9*   PROTTOTAL 5.4* 6.2*   BILITOTAL 1.2 0.8   ALKPHOS 96 109   ALT 39 42   AST 32 41   TROPI  --  0.025       Recent Labs   Lab 02/27/21  1117 02/27/21  0740 02/27/21  0637 02/27/21  0148 02/26/21  2136 02/26/21  1600 02/26/21  1532   GLC  --   --  144*  --   --  129*  --    * 137*  --  81 144*  --  138*        Unresulted Labs Ordered in the Past 30 Days of this Admission     Date and Time Order Name Status Description    2/26/2021 2051 Urine Culture Aerobic Bacterial Preliminary            Imaging:   Recent Results (from the past 24 hour(s))   Head CT w/o contrast    Narrative    EXAM: CT HEAD W/O CONTRAST  LOCATION: [facility]  DATE/TIME: 2/26/2021 4:50 PM    INDICATION: Mental status change, unknown cause.  COMPARISON: 12/25/2020.  TECHNIQUE: Routine CT Head without IV contrast. Multiplanar reformats. Dose reduction techniques were used.    FINDINGS:  INTRACRANIAL CONTENTS: No intracranial hemorrhage, extraaxial collection, or mass effect.  No CT evidence of acute infarct. Mild to moderate presumed chronic small vessel ischemic changes. Mild generalized volume loss. No hydrocephalus. Cerebellar   tonsillar position is satisfactory. Sella appears within normal limits.     VISUALIZED ORBITS/SINUSES/MASTOIDS: Procedural changes both globes with no acute orbital process. No paranasal sinus mucosal disease. No middle ear or mastoid effusion.    BONES/SOFT TISSUES: No acute fracture of the calvarium or skull base. Mild degenerative changes right TMJ. No significant swelling the facial or scalp tissues is evident.      Impression    IMPRESSION:  1.  No CT evidence for acute intracranial process.    2.  Brain atrophy and presumed chronic microvascular ischemic changes as above.    3.  Stable intracranial appearance from 12/25/2020.   CT Chest Abdomen Pelvis w/o Contrast    Narrative    EXAM: CT CHEST ABDOMEN PELVIS W/O CONTRAST  LOCATION: [facility]  DATE/TIME: 2/26/2021 4:51 PM    INDICATION: Weight loss, unintended. Left-sided chest pain. Left upper quadrant pain.  COMPARISON: 12/25/2020  TECHNIQUE: CT scan of the chest, abdomen, and pelvis was performed without IV contrast. Multiplanar reformats were obtained. Dose reduction techniques were used.   CONTRAST: None.    FINDINGS:   LUNGS AND PLEURA: Slight improvement in the small bilateral pleural effusions as well as bibasilar atelectasis. Modest linear atelectasis or scarring at lung bases persists. No new infiltrate.    MEDIASTINUM/AXILLAE: Previous CABG. No adenopathy.    CORONARY ARTERY CALCIFICATION: Mild.    HEPATOBILIARY: Prior cholecystectomy.    PANCREAS: Normal.    SPLEEN: Stable benign granulomata.    ADRENAL GLANDS: Normal.    KIDNEYS/BLADDER: Vascular calcifications. No hydronephrosis or mass evident.  There is wall thickening and trabeculation of bladder.    BOWEL: No obstruction or inflammatory change.    LYMPH NODES: No lymphadenopathy.    VASCULATURE: Heavy atherosclerotic  plaque.    PELVIC ORGANS: Moderate hypertrophy of prostate.    MUSCULOSKELETAL: No suspicious bony lesion. No rib fracture evident.      Impression    IMPRESSION:  1.  No etiology for symptoms. No mass or adenopathy. Nothing obstructive or inflammatory.  2.  Improved in previously identified lung scarring and pleural effusions, no new infiltrate.  3.  Modest bladder wall thickening and trabeculation with prostatic enlargement.        I reviewed all new labs and imaging results over the last 24 hours. I personally reviewed no images or EKG's today.    Medications     lactated ringers 75 mL/hr at 02/27/21 0244       atorvastatin  80 mg Oral Daily     finasteride  5 mg Oral Daily     fluticasone-vilanterol  1 puff Inhalation Daily    And     umeclidinium  1 puff Inhalation Daily     insulin aspart  1-6 Units Subcutaneous Q4H     metoprolol tartrate  12.5 mg Oral BID     miconazole   Topical BID     OLANZapine  2.5 mg Oral BID     pantoprazole (PROTONIX) IV  40 mg Intravenous Daily with breakfast     piperacillin-tazobactam  3.375 g Intravenous Q6H     tamsulosin  0.4 mg Oral Daily   Reviewed    Keenan Brice MD  [facility]

## 2021-02-27 NOTE — PLAN OF CARE
Patient is alert but oriented to self only.  Vitals are stable, blood pressure is soft.  Patient denies any pain, denies abdominal pain.  No bowel movement.  Incontinent of urine sometimes, other times asks for urinal.  Ambulated in room with assist of one and gait belt/walker.  Patient does have generalized weakness.  Therapy is to see patient.  Hemoglobin was 7.2 this morning after receiving 2 units of blood over night.  Rechecking hemoglobin again this afternoon.

## 2021-02-27 NOTE — CONSULTS
Care Management Initial Consult    General Information  Assessment completed with: Family, Jae, son & caregiver  Type of CM/SW Visit: Initial Assessment    Primary Care Provider verified and updated as needed: Yes   Readmission within the last 30 days: no previous admission in last 30 days      Reason for Consult: discharge planning  Advance Care Planning:     No documents      Communication Assessment  Patient's communication style: spoken language (English or Bilingual)    Hearing Difficulty or Deaf: yes   Wear Glasses or Blind: no    Cognitive  Cognitive/Neuro/Behavioral: .WDL except, orientation, speech  Level of Consciousness: confused  Arousal Level: opens eyes spontaneously  Orientation: disoriented to, place, time, situation   Speech: (Mumbling)    Living Environment:   People in home: child(katherin), adult  Jae  Current living Arrangements: house      Able to return to prior arrangements: no     Family/Social Support:  Care provided by: self, child(katherin), homecare agency  Provides care for: no one, unable/limited ability to care for self  Marital Status:   Children          Description of Support System: Supportive, Involved    Support Assessment: Lacks necessary supervision and assistance, Lacks adequate physical care, Caregiver experiencing high stress, Caregiver difficulty providing physical care/safety    Current Resources:   Patient receiving home care services: Yes  Skilled Home Care Services: Skilled Nursing, Home Health Aid, Physicial Therapy, Occupational Therapy  Community Resources: Home Care  Equipment currently used at home: cane, straight, grab bar, tub/shower, shower chair, walker, rolling  Supplies currently used at home: Incontinence Supplies, Diabetic Supplies, Oxygen Tubing/Supplies    Employment/Financial:  Employment Status: retired     Financial Concerns: No concerns identified     Lifestyle & Psychosocial Needs:  Social Needs     Financial resource strain: Not hard at all     Food  insecurity     Worry: Never true     Inability: Never true     Transportation needs     Medical: No     Non-medical: No     Socioeconomic History     Marital status:      Spouse name: Not on file     Number of children: 3     Years of education: Not on file     Highest education level: Not on file   Occupational History     Occupation: [employer]     Employer: RETIRED     Occupation:      Tobacco Use     Smoking status: Never Smoker     Smokeless tobacco: Never Used   Substance and Sexual Activity     Alcohol use: No     Alcohol/week: 0.0 standard drinks     Drug use: No     Sexual activity: Not Currently     Functional Status:  Prior to admission patient needed assistance:    Dependent ADLS:  Ambulation: Cane & Incontinence supplies  Dependent I/ADLS:  Cleaning, cooking, laundry, shopping, meals, medications, incontinence, and transportation.     Mental Health Status:  Mental Health Status: No Current Concerns       Chemical Dependency Status:  Chemical Dependency Status: No Current Concerns           Values/Beliefs:  Spiritual, Cultural Beliefs, Scientologist Practices, Values that affect care: yes              Additional Information:  Care Management received referral to assist pt with discharge planning.  RYAN placed call to pt's son, Jae to introduce self/role, perform assessment, and discuss resources.  Pt lives with dementia & unable to complete assessment.    RYAN is familiar with pt & son from previous hospitalizations in November- December 2020.  Pt with previous Covid + status & required TCU cares twice.    Jae shared that he is struggling with his own health issues at home & the past week has been very difficult caring for his dad due to pt's weakness, incontinence & behaviors.    Jae shared that on 2-26-21 he signed up with [facility] to provide private pay cares at home, however, was told that services cannot be started for 2+ weeks due to staffing.    Jae requests that pt go  to a TCU with the understanding that pt may be able to return home with [facility] care providers vs LTC placement.      Per discussion with Jae, pt has [employer] & will need a PA & facility that accepts that insurance.  Jae requests that referrals be sent to:  1.  [facility] (Main: [phone] Admissions: [phone] Fax: [phone])  2.  [facility] (Phone: [phone] Admissions: [phone] Fax: [phone])  3.  [facility] TCU Phone: (Admissions: [phone] RN Report: [phone] Fax: [phone])   4.  [facility] (Main phone: [phone] Fax: [phone])-notified Janae, admissions for [place].  5.  [facility] (Main phone: [phone] Fax: [phone])- notified Janae, admissions for [place]  6.  [facility] (Admissions phone: [phone] Main phone: [phone] Fax: [phone])  7.  [facility] (Phone: [phone] Fax: [phone])- notified Janae, [place] admissions.    Care Management team to follow for discharge plans.    JESUS Escalona

## 2021-02-27 NOTE — PROGRESS NOTES
[facility] [place]    Patient admitted to room 2401 at approximately 2050 via cart from emergency room. Patient was accompanied by transport tech.     Verbal SBAR report received from BOBBY Yanez prior to patient arrival.     Patient trasferred to bed via air leia. Patient alert and oriented X 1. The patient is not having any pain.  . Admission vital signs: Blood pressure 105/44, pulse 56, temperature 97.8  F (36.6  C), temperature source Axillary, resp. rate 30, weight 78.1 kg (172 lb 2.9 oz), SpO2 97 %. Patient was oriented to plan of care, call light, bed controls, tv, telephone, bathroom and visiting hours.     Risk Assessment    The following safety risks were identified during admission: fall. Yellow risk band applied: YES.     Skin Initial Assessment    This writer admitted this patient and completed a full skin assessment and Ricardo score in the Adult PCS flowsheet. Appropriate interventions initiated as needed.     Secondary skin check completed by BOBBY Silva.    Ricardo Risk Assessment  Sensory Perception: 3-->slightly limited  Moisture: 4-->rarely moist  Activity: 3-->walks occasionally  Mobility: 3-->slightly limited  Nutrition: 2-->probably inadequate  Friction and Shear: 2-->potential problem  Ricardo Score: 17  Nutrition Interventions: Nutrition consult  Friction/Shear Interventions: HOB 30 degrees or less, Silicone foam sacral dressing  Mattress: Standard Hospital Mattress (Foam)  Bed Frame: Standard width and length    Education    Patient has a [place] to Observation order: No  Observation education completed and documented: N/A      Amira Tavares RN

## 2021-02-28 PROBLEM — K92.1 GASTROINTESTINAL HEMORRHAGE WITH MELENA: Status: ACTIVE | Noted: 2021-01-01

## 2021-02-28 NOTE — PLAN OF CARE
0030 Hgb=7.4 (previous Hgb 7.9). pt incontinent of medium loose black stool at this time. Denies pain. Lungs with fine crackles in bases. Sats on 2 liters 93%. Pt npo for possible procedure today. Mouth cares done. Pt repositions self, refusing to turn on side. New sacral dressing placed.

## 2021-02-28 NOTE — PLAN OF CARE
OT cancel: Attempted to see Pt for OT eval. Pt refusing and asking therapist to come back tonight or tomorrow.  Will plan to initiate OT services tomorrow.

## 2021-02-28 NOTE — PLAN OF CARE
Pt had one stool this shift. Incont of urine. Denies pain. Tele med MD ordered 1 unit PRBC. Pt refused to reposition this shift. Was ok with having pillows under BLE. Right heel mamta red/mushy, skin intact. Will encourage pt to shift weight &will keep heels elevated.

## 2021-02-28 NOTE — BRIEF OP NOTE
[facility]   Brief Operative Note    Pre-operative diagnosis: Gastrointestinal hemorrhage, unspecified gastrointestinal hemorrhage type [K92.2]   Post-operative diagnosis normal EGD, nop bleeding source identified     Procedure: Procedure(s):  ENTEROSCOPY, WITH BIOPSY   Surgeon(s): Surgeon(s) and Role:     * Burt Mendieta MD - Primary   Estimated blood loss: * No values recorded between 2/28/2021 11:34 AM and 2/28/2021 11:43 AM *    Specimens: ID Type Source Tests Collected by Time Destination   A : H. Pylori Biopsy Stomach, Antrum SURGICAL PATHOLOGY EXAM Burt Mendieta MD 2/28/2021 11:38 AM       Findings: 1. Normal esophagus  2. z-line regular, no HH  3. Normal stomach - biopsied for h. Pylori  4. Normal duodenum

## 2021-02-28 NOTE — OR NURSING
Patient is at baseline with vitals, back on O2 of 2L without issue Lungs sounds crackles in the bases and left lung bseline at start of case, saturation is 98%.  Patient denies any pain.  Gave report to Kim ROSALES on floor regarding case and recovery.

## 2021-02-28 NOTE — ANESTHESIA PREPROCEDURE EVALUATION
"Anesthesia Pre-Procedure Evaluation    Patient: Alvaro Horton   MRN: [id number] : 1940        Preoperative Diagnosis: Gastrointestinal hemorrhage, unspecified gastrointestinal hemorrhage type [K92.2]   Procedure : Procedure(s):  ESOPHAGOGASTRODUODENOSCOPY (EGD)     Past Medical History:   Diagnosis Date     Acute calculous cholecystitis 2019     Acute respiratory failure with hypoxia (H) 2020     Altered mental status 2014:episode confusion for 20 minutes.  Seen at [facility]/-hospitalized.  Head CT, MRI/MRA all normal.  PET cardiac perfusion stress test normal.  Neurology consult-diagnosis= possible hypoglycemia, migraine variant.  \"Acute ischemic cerebral event was excluded\".      Calculus of kidney 1990. s/p ESWL and percutaneous nephrolithotomy     COPD (chronic obstructive pulmonary disease) (H)      COVID-19 virus infection 2020     Depression 1979    Hospitalized 1979     Diabetes (H)      Elevated troponin 2020     Encephalopathy 2018     Heart disease      Left hand weakness 2009    Afterbypass surgery, CVA, improved.      PNEUMONIA, ORGANISM NOS 2008    CT -  Pulmonary atelectasis and infiltrate in the posterior left lower lung base.      Sleep apnea       Past Surgical History:   Procedure Laterality Date     C ANESTH,DX ARTHROSCOPIC PROC KNEE JOINT  ,      Left     CARDIAC SURGERY      CABG x2 - LIMA-D1, SVG to OM1, OM2.     GENITOURINARY SURGERY      ESWL and percutaneous nephrolithotomy     LAPAROSCOPIC CHOLECYSTECTOMY N/A 3/4/2019    Procedure: LAPAROSCOPIC CHOLECYSTECTOMY;  Surgeon: Burt Mendieta MD;  Location: [facility]     ORTHOPEDIC SURGERY      right CTR     SURGICAL HISTORY OF -       Hernia repair     SURGICAL HISTORY OF -       NormalColonoscopy      SURGICAL HISTORY OF -       Normal Colonoscopy      Allergies   Allergen Reactions     Prednisone Other (See Comments)     Severe " interaction with DM      Social History     Tobacco Use     Smoking status: Never Smoker     Smokeless tobacco: Never Used   Substance Use Topics     Alcohol use: No     Alcohol/week: 0.0 standard drinks      Wt Readings from Last 1 Encounters:   02/28/21 79.5 kg (175 lb 4.3 oz)        Anesthesia Evaluation   Pt has had prior anesthetic. Type: General and MAC.    No history of anesthetic complications       ROS/MED HX  ENT/Pulmonary:     (+) sleep apnea, asthma Treatment: Inhaler daily,  COPD, O2 dependent, during Both,     Neurologic:     (+) dementia (alzheimer's), CVA,     Cardiovascular:     (+) Dyslipidemia hypertension--CAD -CABG--Taking blood thinners dysrhythmias, a-fib, Previous cardiac testing   Echo: Date: 11/30/2020 Results:  Interpretation Summary     The left ventricle is normal in size.  Left ventricular systolic function is normal.  The visual ejection fraction is estimated at 60-65%.  No regional wall motion abnormalities noted.  Paradoxical septal motion is consistent with right ventricular volume  overload.  The right ventricle is moderately dilated.  Moderately decreased right ventricular systolic function  Right ventricular systolic pressure is elevated, consistent with moderate  pulmonary hypertension.     Compared to the prior study, the RVSP is higher, RV dysfunction is now present  and afib is new. The study was technically difficult.  _____________________________________________________________________________  __        Left Ventricle  The left ventricle is normal in size. There is normal left ventricular wall  thickness. Left ventricular systolic function is normal. The visual ejection  fraction is estimated at 60-65%. Diastolic Doppler findings (E/E' ratio and/or  other parameters) suggest left ventricular filling pressures are  indeterminate. No regional wall motion abnormalities noted. Paradoxical septal  motion is consistent with right ventricular volume overload.     Right  Ventricle  The right ventricle is moderately dilated. Moderately decreased right  ventricular systolic function.     Atria  The left atrium is moderately dilated. The right atrium is moderately dilated.  Intact atrial septum.     Mitral Valve  The mitral valve is normal in structure and function. There is trace mitral  regurgitation.        Tricuspid Valve  The tricuspid valve is normal in structure and function. There is trace  tricuspid regurgitation. The right ventricular systolic pressure is  approximated at 41mmHg plus the right atrial pressure. Right ventricular  systolic pressure is elevated, consistent with moderate pulmonary  hypertension.     Aortic Valve  The aortic valve is normal in structure and function. No aortic regurgitation  is present. No aortic stenosis is present.     Pulmonic Valve  The pulmonic valve is not well seen, but is grossly normal. There is trace  pulmonic valvular regurgitation.     Vessels  The aortic root is normal size.     Pericardium  There is no pericardial effusion.        Rhythm  The rhythm was atrial fibrillation.  Stress Test: Date: Results:    ECG Reviewed: Date: 2/26/21 Results:  Possible atrial fibrillation   -Right bundle branch block.  Cath: Date: Results:      METS/Exercise Tolerance:     Hematologic:     (+) anemia (2/28/21 hgb6.9),     Musculoskeletal:       GI/Hepatic: Comment: melena    (+) GERD, Asymptomatic on medication,     Renal/Genitourinary:     (+) renal disease, type: CRI, BPH,     Endo:     (+) type II DM,     Psychiatric/Substance Use:     (+) psychiatric history depression     Infectious Disease: Comment: covid+      Malignancy:  - neg malignancy ROS     Other:            Physical Exam    Airway        Mallampati: III   TM distance: > 3 FB   Neck ROM: full   Mouth opening: > 3 cm    Respiratory Devices and Support         Dental  no notable dental history         Cardiovascular   cardiovascular exam normal          Pulmonary               Other  findings: Crackles bilat bases    OUTSIDE LABS:  CBC:   Lab Results   Component Value Date    WBC 9.2 02/28/2021    WBC 6.5 02/27/2021    HGB 6.9 (LL) 02/28/2021    HGB 7.4 (L) 02/28/2021    HCT 24.0 (L) 02/28/2021    HCT 23.9 (L) 02/27/2021     02/28/2021     02/27/2021     BMP:   Lab Results   Component Value Date     02/28/2021     02/27/2021    POTASSIUM 3.7 02/28/2021    POTASSIUM 3.7 02/27/2021    CHLORIDE 106 02/28/2021    CHLORIDE 106 02/27/2021    CO2 30 02/28/2021    CO2 29 02/27/2021    BUN 62 (H) 02/28/2021    BUN 70 (H) 02/27/2021    CR 2.09 (H) 02/28/2021    CR 2.15 (H) 02/27/2021     (H) 02/28/2021     (H) 02/27/2021     COAGS:   Lab Results   Component Value Date    PTT 32 12/02/2020    INR 1.39 (H) 12/02/2020    FIBR 607 (H) 12/03/2020     POC:   Lab Results   Component Value Date     (H) 02/28/2021     HEPATIC:   Lab Results   Component Value Date    ALBUMIN 2.5 (L) 02/27/2021    PROTTOTAL 5.4 (L) 02/27/2021    ALT 39 02/27/2021    AST 32 02/27/2021    GGT 23 08/13/2007    ALKPHOS 96 02/27/2021    BILITOTAL 1.2 02/27/2021    MARCELL 23 11/26/2020     OTHER:   Lab Results   Component Value Date    PH 7.36 11/27/2009    LACT 1.1 02/27/2021    A1C 8.3 (H) 12/25/2020    EVELINA 7.7 (L) 02/28/2021    PHOS 2.7 06/15/2018    MAG 1.9 06/15/2018    LIPASE 117 03/02/2019    TSH 0.89 11/26/2020    T4 1.35 01/18/2010    CRP <2.9 12/27/2020    SED 10 03/31/2011       Anesthesia Plan    ASA Status:  4   NPO Status:  NPO Appropriate    Anesthesia Type: MAC.              Consents    Anesthesia Plan(s) and associated risks, benefits, and realistic alternatives discussed. Questions answered and patient/representative(s) expressed understanding.     - Discussed with:  Patient, Other (See Comment) (son)         Postoperative Care            Comments:                THOMPSON Hay CRNA

## 2021-02-28 NOTE — PROGRESS NOTES
DATE:  2/28/2021   TIME OF RECEIPT FROM LAB:  0515  LAB TEST:  Hemoglobin  LAB VALUE:  6.9  RESULTS GIVEN WITH READ-BACK TO (PROVIDER):  SHAYAN Calle  TIME LAB VALUE REPORTED TO PROVIDER:   Javier Copeland paged: Critical lab this morning. Hgb 6.9. He was admitted yesterday night for 5.5 hemoglobin and given 2 units and bounced up to 7.9. Last pm, he started having black stools. We did a hgb check it was 7.4 and this morning is the 6.9. He is NPO and they are hoping to do a possible scope today. -Ricardo

## 2021-02-28 NOTE — PROGRESS NOTES
[facility]    History and Physical - Hospitalist Service       Date of Admission:  2/26/2021    Assessment & Plan   Alvaro Horton is a 80 year old male admitted on 2/26/2021. He presents from home where he lives with son with weakness and confusion      Gastrointestinal hemorrhage, suspect upper, on anticoagulation  Anemia, unspecified type      Admission HGB 5.5. Occult blood positive in ED. On aspirin and apixaban. Last colonoscopy 8/2017 notable for adenomatous polyp and biopsy positive for lymphocytic colitis. Baseline HGB 12-13, but last HGB 12/25/20 was 10.     Hgb on admission 5.5 -> 7.2 after 2 units prbc -> 7.9 -> 7.4 -> 6.9 this morning. Eliquis now held > 48 hours. Should be stable for EGD.   - transfused 1 unit PRBC (3rd this admission)  - Protonix IV, hold usual pepcid  - Holding ASA, eliquis   - EGD today: completed and is normal with no bleeding source.    UTI with possible sepsis     Presents with weaks, confusion, tachypnea, pyuria, lactate elevated 4.6, afebrile, WBC 9.2. CT chest abdomen pelvis notable for Modest bladder wall thickening and trabeculation with prostatic enlargement. In ED given 2 Li IVF, started on zosyn. No blood cultures sent before antibiotics    Unclear if sepsis as acute presentation more likely due to GIB above.     Started on piperacillin-tazobactam, UCx positive Enterococcus with sensitivities pending.   - change to Unasyn, await sensitivities    Acute kidney injury    Admit creatinine 2.22 with BUN 78. Likely due to anemia, volume depletion. Baseline creatine 1.1 in 2019, but running 1.1-1.4 since 11/2020.      Creatinine 2. -> 2.15 -> 2.09. Good urine output.    - continue to follow    Type 2 diabetes mellitus    A1c 8.3 12/25/20. Glucose 201-283  - Held usual metformin due to acute kidney injury    - held 70/30 insulin while NPO, can resume     Memory loss/Dementia    Memory issues noted in the past 6 months or so. Started on Zyprexa  during hospitalization 12/2020 due to encephalopathy during COVID infection  - monitor mental status    - continue PM olanzapine 5 mg BID, consider trial of reduced dosing     Coronary Artery Disease    History of CABG in 2009 with LIMA to LAD and SVG to OM1 and 2. Follows with cardiology, last seen 3/2020. Last Echo 11/2020 EF 60-65%, no wall motion changes, decreased RV systolic function with elevated RV pressure.   - continue statin, beta blocker  - Holding ASA due to GI bleeding     Hypertension    Chronic.  BP running a bit soft.   - Hold home furosemide,   - Continue metoprolol     COPD (chronic obstructive pulmonary disease)  Asthma  Baker's Lung    PFTs in 04/2018 revealed severe airflow obstruction.   - Continue home Trelegy Ellipta, prn albuterol      Atrial fibrillation    Transient following CABG in 2009. ZioPatch 3/2020 did not show atrial fibrillation. ECG 8/2020 showed NSR. During admission 11/2020 for COVID 19 atrial fibrillation seen. Started on anticoagulation with Eliquis after discussion with patient and son due to fall risk and also CHADsVAC of 6 - possibly just short term. Now has apparent persistent atrial fibrillation , slow vetricular response    HR decent 60's. BP soft.   - will hold metoprolol for low BP  - holding home Eliquis due to GI bleeding     History of CVA  Hyperlipidemia    Occurred at time of CABG in 2009.  - Continue home Lipitor 80  - Hold ASA due to GI bleeding     Benign Prostatic Hyperplasia    Chronic.  - continue home finasteride, tamsulosin     Recovered COVID 19 Infection with ongoing hypoxia    Diagnosed 11/26/20 with COVID 19, hospitalized x 2 and discharged on 2 LPM of oxygen. Still considered recovered from infectious standpoint.    No precautions needed    Repeat Covid PCR negative 2/26/2021.     SARS-CoV-2 PCR was rechecked today pre-procedure, 2/28, and returned positive. Patient is just beyond 90 days from known COVID onset late November and was negative just  two days ago on admission. This is unlikely to represent re-infection with COVID.     Addendum possible volume overload    This evening, RN reported increase resp effort and crackles. On exam, mild increased work of breathing, some mild-moderate coarse crackles right >> left, no apparent JVD.    - CXR shows some vasc congestion and small effusions that are increased. Will give IV furosemide x 1 and stop IVF.     Son Jae [phone]     Diet: Snacks/Supplements Adult: Boost Plus; With Meals  Regular Diet Adult   DVT Prophylaxis: Eliquis (on hold)  Olivo Catheter: not present  Code Status: No CPR- Do NOT Intubate       Discussion: EGD today negative. Continue supportive cares and increase activity.    Disposition: Anticipate discharge 2-3 days to TCU or LTC likely     Attestation:  Total time: 35 minutes    Keenan Brice MD  [facility]        Interval History   Melena x 1 last night. Transfusion started for hgb 6.9 this morning. Patient shortness of breath same, no chest pain. No other complaints except wants to go home.     Physical Exam   Temp:  [95.9  F (35.5  C)-97.8  F (36.6  C)] 97.5  F (36.4  C)  Pulse:  [49-75] 60  Resp:  [18-20] 18  BP: ()/(42-56) 116/51  SpO2:  [93 %-99 %] 97 %    Weights:   Vitals:    02/26/21 2115 02/28/21 0404   Weight: 78.1 kg (172 lb 2.9 oz) 79.5 kg (175 lb 4.3 oz)    Body mass index is 26.65 kg/m .    Constitutional: Sleepy but arousable, appears weak, mild increased work of breathing, nontoxic otherwise  CV: irregular, 2+ bilateral lower extremity edema  Respiratory: mild scattered bibasilar crackles, no wheezes   GI: Soft, non-tender, bowel sounds normal active  Skin: Warm and dry    Data   Recent Labs   Lab 02/28/21  0454 02/28/21  0032 02/27/21  1406 02/27/21  0637 02/26/21  1600   WBC 9.2  --   --  6.5 9.2   HGB 6.9* 7.4* 7.9* 7.2* 5.5*   MCV 95  --   --  95 100     --   --  187 233     --   --  141 139   POTASSIUM 3.7  --   --  3.7 3.9   CHLORIDE 106  --    --  106 102   CO2 30  --   --  29 26   BUN 62*  --   --  70* 78*   CR 2.09*  --   --  2.15* 2.22*   ANIONGAP 5  --   --  6 11   EVELINA 7.7*  --   --  8.1* 8.7   *  --   --  144* 129*   ALBUMIN  --   --   --  2.5* 2.9*   PROTTOTAL  --   --   --  5.4* 6.2*   BILITOTAL  --   --   --  1.2 0.8   ALKPHOS  --   --   --  96 109   ALT  --   --   --  39 42   AST  --   --   --  32 41   TROPI  --   --   --   --  0.025       Recent Labs   Lab 02/28/21  1234 02/28/21  0747 02/28/21  0454 02/28/21  0222 02/27/21  2118 02/27/21  1622 02/27/21  1117 02/27/21  0637 02/27/21  0637 02/26/21  1600 02/26/21  1600   GLC  --   --  309*  --   --   --   --   --  144*  --  129*   * 283*  --  323* 289* 258* 146*   < >  --    < >  --     < > = values in this interval not displayed.        Unresulted Labs Ordered in the Past 30 Days of this Admission     Date and Time Order Name Status Description    2/28/2021 1138 Surgical pathology exam In process     2/26/2021 2051 Urine Culture Aerobic Bacterial Preliminary            Imaging: No results found for this or any previous visit (from the past 24 hour(s)).     I reviewed all new labs and imaging results over the last 24 hours. I personally reviewed the chest x-ray image(s) showing Vascular congestion and small effusions this evening.    Medications     lactated ringers 125 mL/hr at 02/28/21 0351       atorvastatin  80 mg Oral Daily     finasteride  5 mg Oral Daily     fluticasone-vilanterol  1 puff Inhalation Daily    And     umeclidinium  1 puff Inhalation Daily     insulin aspart  1-7 Units Subcutaneous TID AC     insulin aspart  1-5 Units Subcutaneous At Bedtime     metoprolol tartrate  12.5 mg Oral BID     miconazole   Topical BID     OLANZapine  2.5 mg Oral BID     pantoprazole (PROTONIX) IV  40 mg Intravenous Q12H     piperacillin-tazobactam  3.375 g Intravenous Q6H     tamsulosin  0.4 mg Oral Daily   Reviewed meds    Keenan Brice MD  [facility]

## 2021-02-28 NOTE — ANESTHESIA CARE TRANSFER NOTE
Patient: Alvaro Horton    Procedure(s):  ENTEROSCOPY, WITH BIOPSY    Diagnosis: Gastrointestinal hemorrhage, unspecified gastrointestinal hemorrhage type [K92.2]  Diagnosis Additional Information: No value filed.    Anesthesia Type:   MAC     Note:    Oropharynx: spontaneously breathing  Level of Consciousness: drowsy  Oxygen Supplementation: face mask  Level of Supplemental Oxygen (L/min / FiO2): 8  Independent Airway: airway patency satisfactory and stable  Dentition: dentition unchanged  Vital Signs Stable: post-procedure vital signs reviewed and stable  Report to RN Given: handoff report given  Patient transferred to: Phase II    Handoff Report: Identifed the Patient, Identified the Reponsible Provider, Reviewed the pertinent medical history, Discussed the surgical course, Reviewed Intra-OP anesthesia mangement and issues during anesthesia, Set expectations for post-procedure period and Allowed opportunity for questions and acknowledgement of understanding      Vitals: (Last set prior to Anesthesia Care Transfer)  CRNA VITALS  2/28/2021 1115 - 2/28/2021 1145      2/28/2021             Pulse:  50    SpO2:  99 %        Electronically Signed By: THOMPSON Hay CRNA  February 28, 2021  11:45 AM

## 2021-02-28 NOTE — ANESTHESIA POSTPROCEDURE EVALUATION
Patient: Alvaro Horton    Procedure(s):  ENTEROSCOPY, WITH BIOPSY    Diagnosis:Gastrointestinal hemorrhage, unspecified gastrointestinal hemorrhage type [K92.2]  Diagnosis Additional Information: No value filed.    Anesthesia Type:  MAC    Note:  Disposition: Inpatient   Postop Pain Control: Uneventful            Sign Out: Well controlled pain   PONV: No   Neuro/Psych: Uneventful            Sign Out: Acceptable/Baseline neuro status   Airway/Respiratory: Uneventful            Sign Out: Acceptable/Baseline resp. status   CV/Hemodynamics: Uneventful            Sign Out: Acceptable CV status   Other NRE: NONE   DID A NON-ROUTINE EVENT OCCUR? No         Last vitals:  Vitals:    02/28/21 1210 02/28/21 1235 02/28/21 1359   BP: 95/47 116/44    Pulse: 57 65 52   Resp: 18 18    Temp:  36.4  C (97.5  F)    SpO2: 97% 95%        Last vitals prior to Anesthesia Care Transfer:  CRNA VITALS  2/28/2021 1115 - 2/28/2021 1215      2/28/2021             Pulse:  50    SpO2:  99 %          Electronically Signed By: THOMPSON Hay CRNA  February 28, 2021  2:39 PM

## 2021-02-28 NOTE — PROGRESS NOTES
Care Management Follow Up    Length of Stay (days): 2    Expected Discharge Date: 03/02/21     Concerns to be Addressed: discharge planning      SonJae, ill at home & unable to care for pt at this time as pt is too weak to assist with cares.    Patient plan of care discussed at interdisciplinary rounds: Yes; pt to remain hospitalized for another 1-3 days due to medical status.      Anticipated Discharge Disposition: Transitional Care & possibly LTC     Anticipated Discharge Services:  TCU  Anticipated Discharge DME: Oxygen, Walker    Patient/family educated on Medicare website which has current facility and service quality ratings: yes  Education Provided on the Discharge Plan:  [facility]  Patient/Family in Agreement with the Plan: yes    Referrals Placed by CM/SW: Internal Clinic Care Coordination, Post Acute Facilities    Private pay costs discussed: transportation costs    Additional Information:  At the time TCU referrals were placed there were no therapy notes in EMR.  RYAN updated facilities below with PT notes on 2-28-21.    Per discussion with Jae quiroga, pt has [employer] & will need a PA & facility that accepts that insurance.  Jae requests that referrals be sent to:  1.  [facility] (Main: [phone] Admissions: [phone] Fax: [phone])-notified Janae, admissions for [place].    2.  [facility] (Phone: [phone] Admissions: [phone] Fax: [phone])    3.  [facility] TCU Phone: (Admissions: [phone] RN Report: [phone] Fax: [phone]) -  zita Ortiz,  requested therapy notes & stated pt is a good candidate once therapies assessed.  RYAN returned message & informed PT notes were faxed.    4.  [facility] (Main phone: [phone] Fax: [phone])-notified Janae, admissions for [place].    5.  [facility] (Main phone: [phone] Fax: [phone])- notified Janae, admissions for [place]    6.  [facility]  [place] (Admissions phone: [phone] Main phone: [phone] Fax: [phone])    7.  [facility] (Phone: [phone] Fax: [phone])- notified [facility] admissions.     Care Management team to follow for discharge plans.     JESUS Escalona

## 2021-02-28 NOTE — PLAN OF CARE
1 unit of PRBC started at 0630. VSS, lungs with crackles in bases. Pt has had 2+ BLE edema. Sats 98%on 2 liters.

## 2021-02-28 NOTE — SIGNIFICANT EVENT
Significant Event Note    Time of event: 5:26 AM February 28, 2021    Description of event:  Hb came back 6.9 per nursing report this morning.  Note patient already received 2 units of pRBC at night with some melena per report.  Patient remain hemodynamically stable per nursing staff.    Plan:  Transfuse another unit and monitor GIB and blood pressure.  Plan for endoscopy today per report.    Discussed with: bedside nurse    Paul Perez MD

## 2021-02-28 NOTE — PLAN OF CARE
BP remains soft, on 2L, other vital signs stable. Confused, but redirectable. Denies pain and nausea. Tolerated small amount of oral intake. Voiding using urinal. Black loose BM x 2, Dr. Perez updated, hemoglobin recheck ordered for 0030. Open area x 2 on buttocks, mepilex applied. IV antibiotics and fluids infusing. NPO at midnight for possible procedure tomorrow. Refusing to turn and reposition in bed.  and 289.

## 2021-03-01 NOTE — PROGRESS NOTES
"   03/01/21 1400   Quick Adds   Type of Visit Initial Occupational Therapy Evaluation       Present no   Living Environment   People in home child(katherin), adult   Current Living Arrangements house   Home Accessibility stairs to enter home   Number of Stairs, Main Entrance 1   Disability/Function   Use of hearing assistive devices right hearing aid   Were auxiliary aids offered? no   Dressing/Bathing Difficulty yes   Dressing/Bathing bathing difficulty, requires equipment;bathing difficulty, assistance 1 person;dressing difficulty, assistance 1 person   Dressing/Bathing Management son has been needing to assist patient with lower body dressing the last 2 weeks d/t fatigue and weakness    Toileting issues yes   Toileting Assistance toileting difficulty, requires equipment   Fall history within last six months yes   Number of times patient has fallen within last six months 2   General Information   Onset of Illness/Injury or Date of Surgery 02/26/21   Referring Physician Keenan Brice MD   Patient/Family Therapy Goal Statement (OT) to increase strength prior ot returning home.    Additional Occupational Profile Info/Pertinent History of Current Problem decline reecently and has been in hospital and TCU several times, just returned to sons home and now son not able to care for him, brought in for weakness and confusion, behaviors, found to have UTI with possible sepsis   Existing Precautions/Restrictions fall   Cognitive Status Examination   Orientation Status person;place  (states \"2001\" for year and \"January\" for month. )   Affect/Mental Status (Cognitive) WNL   Pain Assessment   Patient Currently in Pain No   Range of Motion Comprehensive   Comment, General Range of Motion B UE ROM: WNL   Strength Comprehensive (MMT)   Comment, General Manual Muscle Testing (MMT) Assessment B UE strength: grossly 4-/5.    Bed Mobility   Comment (Bed Mobility) Min A for supine to sit and SBA for sit to " supine.    Transfers   Transfer Comments CGA for sit to stand from EOB    Upper Body Dressing Assessment/Training   [place] Level (Upper Body Dressing) independent   Lower Body Dressing Assessment/Training   La[place] Level (Lower Body Dressing) moderate assist (50% patient effort)   Grooming Assessment/Training   [place] Level (Grooming) set up;supervision   Toileting   Laurel Level (Toileting) contact guard assist   Instrumental Activities of Daily Living (IADL)   IADL Comments son completes/assists    Clinical Impression   Criteria for Skilled Therapeutic Interventions Met (OT) yes;meets criteria;skilled treatment is necessary   OT Diagnosis decreased independence with ADLs and functional mobility    OT Problem List-Impairments impacting ADL cognition;strength;activity tolerance impaired   Assessment of Occupational Performance 3-5 Performance Deficits   Identified Performance Deficits dressing, toileting, showering, functional mobility    Planned Therapy Interventions (OT) ADL retraining;strengthening;progressive activity/exercise   Clinical Decision Making Complexity (OT) low complexity   Therapy Frequency (OT) Daily   Predicted Duration of Therapy 2-3 days   Risk & Benefits of therapy have been explained evaluation/treatment results reviewed;care plan/treatment goals reviewed;risks/benefits reviewed;current/potential barriers reviewed;participants voiced agreement with care plan;participants included;patient   OT Discharge Planning    OT Discharge Recommendation (DC Rec) Transitional Care Facility   OT Rationale for DC Rec decreased independence with ADLs and functional mobility    OT Brief overview of current status  AX1 for all ADLs and related mobility. LImited by weakness/decreased activity tolerance.    Total Evaluation Time (Minutes)   Total Evaluation Time (Minutes) 6

## 2021-03-01 NOTE — PLAN OF CARE
Patient received 1 unit of blood this morning for  hemoglobin 6.9.  vital signs stable.  Down for EGD - no source of bleeding found.  Patient has some fine crackles, breathing appears more labored today.  Patient was saline locked, chest x-ray completed this evening, patient received 20mg IV lasix.  O2 sat has been good on 2L/nc oxygen.  Patient sleepy following EGD, then became restless and agitated this evening, calling out from room, confused.  Unable to re-orientate. Was assisted out of bed and into chair for a while, back to bed now and is calmer.  Planning for TCU at discharge.

## 2021-03-01 NOTE — PROGRESS NOTES
Care Management Follow Up    Length of Stay (days): 3    Expected Discharge Date: 03/03/21     Concerns to be Addressed: discharge planning  SonJae, ill at home & unable to care for pt at this time as pt is too weak to assist with cares  Patient plan of care discussed at interdisciplinary rounds: Yes-not ready for discharge, 2 more days per MD.    Anticipated Discharge Disposition: Skilled Nursing Facilty, Transitional Care     Anticipated Discharge Services: None  Anticipated Discharge DME: Oxygen, Walker    Patient/family educated on Medicare website which has current facility and service quality ratings: yes  Education Provided on the Discharge Plan:  Yes  Patient/Family in Agreement with the Plan: yes, Jae quiroga    Referrals Placed by CM/SW: Post Acute Facilities  Private pay costs discussed: transportation costs and LTC cost    Additional Information:  Spoke with patient's sonJae via phone.  Discussed discharge plans and long term plan.  Jae is in agreement for TCU and is hopeful patient will regain some strength to return home with him with increased services.  He is unsure whether he will be able to manage, but Jae is hopeful patient will regain strength.      Discussed potential LTC cost at SNF (approximately $6000-$8000 down payment and daily fee costs).  Jae states he is able to manage financial costs if needed.      Patient has been accepted at [facility] (Main: [phone] Admissions: [phone] Fax: [phone]) TCU pending [employer] prior authorization.  Jae is agreeable with facility.      Discussed transportation upon discharge.  Jae prefers CTS arrange transportation upon discharge.  Discussed private pay cost.  Jae is agreeable.      CTS to discuss discharge plans with patient when appropriate.      PLAN:  [facility] (Main: [phone] Admissions: [phone] Fax: [phone]) TCU pending prior authorization      Rosa RODRIGUEZ RN  Inpatient Care Coordinator  [facility]  [facility] [phone]  [facility] [phone]

## 2021-03-01 NOTE — PLAN OF CARE
0440: Patient has been sleeping most of the night, waking only to use urinal with help. Refuses positioning. IV unasyn administered per MAR. Saline locked now. 0200 blood glucose 265. O2 97% on 2L NC. No BM overnight.

## 2021-03-02 NOTE — PLAN OF CARE
Hemoglobin this morning was 8.3, received a unit of blood yesterday.  No stool today.  Vital signs stable.  Patient continues on 2L/nc oxygen.  Still appears more short of breath today, especially with activity.  Received oral lasix dose this morning, has had adequate urine output.  Receiving IV antibiotic every 6 hours for UTI.  Up in room with assist of one and walker, needs encouragement to increase activity, up in chair a few times today but demands to go back to bed shortly after.  Appetite has been good, blood sugars in the 200's and 300's today.  Patient alert, oriented to self and place.  Will need TCU at discharge.

## 2021-03-02 NOTE — PLAN OF CARE
Vital signs stable. Remains disoriented to time and situation. Denies pain, nausea and shortness of breath. Tolerating regular diet.  and 276. Up to chair with assist x 2 and walker. Pulsate mattress placed, blanchable redness on spine, open area on bilateral buttocks, mepilex placed. IV antibiotics and furosemide given. Voiding large amounts. Large black BM x 2, MD updated, hgb check at noon 8.4.

## 2021-03-02 NOTE — PLAN OF CARE
Patient alert, but confused to time and situation. [place]. Patient using his call light frequently. Vital signs stable. On 2L of oxygen via nasal cannula. Afebrile. Denied any pain.     Patient sat up in the chair for dinner with assist of 1 with a walker and gait belt. Patient's son was here when the MD rounded. Palliative consult was ordered.    BG check completed. Patient using the urinal independently to void. Pulsate mattress in use.     Discharge planned for tomorrow to [facility] TCU at 1400.

## 2021-03-02 NOTE — PROGRESS NOTES
Care Management Follow Up    Length of Stay (days): 4    Expected Discharge Date: 03/03/21     Concerns to be Addressed: discharge planning  Jae Luevano, ill at home & unable to care for pt at this time as pt is too weak to assist with cares  Patient plan of care discussed at interdisciplinary rounds: Yes    Anticipated Discharge Disposition:  Transitional Care     Anticipated Discharge Services: None  Anticipated Discharge DME: Oxygen, Walker    Patient/family educated on Medicare website which has current facility and service quality ratings: yes  Education Provided on the Discharge Plan:    Patient/Family in Agreement with the Plan: yes    Referrals Placed by CM/SW: Post Acute Facilities  Private pay costs discussed: transportation costs    Additional Information:  Per IDT rounds, pt may be medically stable for discharge to TCU tomorrow.      Pt has been accepted for TCU cares at [facility] (Main: [phone] Admissions: [phone] Fax: [phone]).  Per discussion with Jae luevano, he asked us to use [employer] (Ph: [phone]) services & is aware he will receive a bill for this service.    Ride is set for 2 PM, per MD request.  RYAN updated Janae, admissions at [facility], that pt is not medically stable today.  Janae stated they are still awaiting prior authorization from [facility] in order to accept the pt for cares.    Care Management team to follow for discharge plans.    JESUS Escalona

## 2021-03-02 NOTE — PLAN OF CARE
Patient declines all PT today despite encouragement. Will recheck on 3/3.    Jacob Ridley, PT, DPT

## 2021-03-02 NOTE — PROGRESS NOTES
[facility]    History and Physical - Hospitalist Service       Date of Admission:  2/26/2021    Assessment & Plan   Alvaro Horton is a 80 year old male admitted on 2/26/2021. He presents from home where he lives with son with weakness and confusion      Gastrointestinal hemorrhage, suspect upper, on anticoagulation  Anemia, unspecified type      Admission HGB 5.5. Occult blood positive in ED. On aspirin and apixaban. Last colonoscopy 8/2017 notable for adenomatous polyp and biopsy positive for lymphocytic colitis. Baseline HGB 12-13, but last HGB 12/25/20 was 10.     Hgb on admission 5.5 -> 7.2 after 2 units prbc -> 7.9 -> 7.4 -> 6.9 this morning. Eliquis now held > 48 hours. Should be stable for EGD.   - transfused 1 unit PRBC (3rd this admission)  - Protonix IV, hold usual pepcid  - Holding ASA, eliquis   - EGD today: completed and is normal with no bleeding source.    UTI   Sepsis ruled out    Presents with weaks, confusion, tachypnea, pyuria, lactate elevated 4.6, afebrile, WBC 9.2. CT chest abdomen pelvis notable for [place] bladder wall thickening and trabeculation with prostatic enlargement. In ED given 2 Li IVF, started on zosyn. No blood cultures sent before antibiotics    Acute presentation more likely due to GIB above and not sepsis    Started on piperacillin-tazobactam, UCx positive Enterococcus pansensitive. Changed to Unasyn.   - change to oral Augmentin in the morning    Acute kidney injury    Admit creatinine 2.22 with BUN 78. Likely due to anemia, volume depletion. Baseline creatine 1.1 in 2019, but running 1.1-1.4 since 11/2020.      Creatinine 2. -> 2.15 -> 2.09 -> 1.81 -> 1.65. Good urine output.    - continue to follow    Type 2 diabetes mellitus    A1c 8.3 12/25/20. Glucose 221-310 on 70/30 10 units BID  -  resumed metformin    - increase 70/30 insulin to 16 units BID   - high sliding scale insulin     Memory loss/Dementia    Memory issues noted in the past 6  months or so. Started on Zyprexa during hospitalization 12/2020 due to encephalopathy during COVID infection  - monitor mental status    - continue PM olanzapine 5 mg BID, consider trial of reduced dosing     Coronary Artery Disease    History of CABG in 2009 with LIMA to LAD and SVG to OM1 and 2. Follows with cardiology, last seen 3/2020. Last Echo 11/2020 EF 60-65%, no wall motion changes, decreased RV systolic function with elevated RV pressure.   - continue statin, beta blocker  - Holding ASA due to GI bleeding     Hypertension    Chronic.  BP running a bit soft.   - Hold home furosemide,   - Continue metoprolol     COPD (chronic obstructive pulmonary disease)  Asthma  Baker's Lung    PFTs in 04/2018 revealed severe airflow obstruction.   - Continue home Trelegy Ellipta, prn albuterol      Atrial fibrillation    Transient following CABG in 2009. ZioPatch 3/2020 did not show atrial fibrillation. ECG 8/2020 showed NSR. During admission 11/2020 for COVID 19 atrial fibrillation seen. Started on anticoagulation with Eliquis after discussion with patient and son due to fall risk and also CHADsVAC of 6 - possibly just short term. Now has apparent persistent atrial fibrillation , slow vetricular response    HR decent 70's. BP normalizing   - will hold metoprolol for low BP  - holding home Eliquis due to GI bleeding     History of CVA  Hyperlipidemia    Occurred at time of CABG in 2009.  - Continue home Lipitor 80  - Hold ASA due to GI bleeding     Benign Prostatic Hyperplasia    Chronic.  - continue home finasteride, tamsulosin     Recovered COVID 19 Infection with ongoing hypoxia    Diagnosed 11/26/20 with COVID 19, hospitalized x 2 and discharged on 2 LPM of oxygen. Still considered recovered from infectious standpoint.    No precautions needed    Repeat Covid PCR negative 2/26/2021.     SARS-CoV-2 PCR was rechecked today pre-procedure, 2/28, and returned positive. Patient is just beyond 90 days from known COVID onset  "late November and was negative just two days ago on admission. This is unlikely to represent re-infection with COVID.     Possible volume overload    CXR 2/28 showed some vasc congestion and small effusions that are increased. IV furosemide x 1 and stopped IVF.     IV furosemide x 2 today with good urine output. Less dyspneic    - resume home furosemide tomorrow    Poor appetite    Past few weeks or longer per son. \"Food tastes like dirt!\" per patient.    - palliative care consult for appetite and shortness of breath, patient open to this    Son Jae [phone]     Diet: Snacks/Supplements Adult: Boost Plus; With Meals  Regular Diet Adult   DVT Prophylaxis: Eliquis (on hold)  Olivo Catheter: not present  Code Status: No CPR- Do NOT Intubate         Discussion: Discussed with patient need for rehab and also for him to push himself laterally in rehab but when he is home.  Son Jae at bedside. Patient agreeable to going to [facility] tomorrow.     Disposition: Anticipate discharge tomorrow to [facility]     Attestation:  Total time: 30 minutes    Keenan Brice MD  Hospital Medicine        Interval History   Complains of poor appetite    Physical Exam   Temp:  [97.4  F (36.3  C)-98.8  F (37.1  C)] 98.8  F (37.1  C)  Pulse:  [65-81] 81  Resp:  [16-20] 18  BP: (103-138)/(39-66) 121/45  SpO2:  [94 %-95 %] 94 %    Weights:   Vitals:    02/26/21 2115 02/28/21 0404 03/01/21 1143   Weight: 78.1 kg (172 lb 2.9 oz) 79.5 kg (175 lb 4.3 oz) 80.1 kg (176 lb 9.4 oz)    Body mass index is 26.85 kg/m .    Constitutional: alert, oriented to self, hospital, son at bedside, some recent events and plan for discharge to [facility]. Nontoxic. Mild increased work of breathing when talking.   CV: Irregular, 1-2+ bilateral lower extremity edema   Respiratory: rare bibasilar crackles otherwise CTA bilaterally  GI: Soft, non-tender, bowel sounds present  Skin: Warm and dry    Data   Recent Labs   Lab 03/02/21  1206 03/01/21  1046 02/28/21  0454 " 02/27/21  0637 02/27/21  0637 02/26/21  1600   WBC 8.7 7.4 9.2  --  6.5 9.2   HGB 8.4* 8.3* 6.9*   < > 7.2* 5.5*   MCV 98 86 95  --  95 100    166 187  --  187 233    142 141  --  141 139   POTASSIUM 3.6 3.7 3.7  --  3.7 3.9   CHLORIDE 104 107 106  --  106 102   CO2 34* 31 30  --  29 26   BUN 35* 42* 62*  --  70* 78*   CR 1.65* 1.81* 2.09*  --  2.15* 2.22*   ANIONGAP 3 4 5  --  6 11   EVELINA 7.6* 7.5* 7.7*  --  8.1* 8.7   * 291* 309*  --  144* 129*   ALBUMIN  --   --   --   --  2.5* 2.9*   PROTTOTAL  --   --   --   --  5.4* 6.2*   BILITOTAL  --   --   --   --  1.2 0.8   ALKPHOS  --   --   --   --  96 109   ALT  --   --   --   --  39 42   AST  --   --   --   --  32 41   TROPI  --   --   --   --   --  0.025    < > = values in this interval not displayed.       Recent Labs   Lab 03/02/21  1616 03/02/21  1206 03/02/21  1125 03/02/21  0758 03/02/21  0156 03/01/21  2049 03/01/21  1658 03/01/21  1046 03/01/21  1046 02/28/21  0454 02/28/21  0454 02/27/21  0637 02/27/21  0637 02/26/21  1600 02/26/21  1600   GLC  --  310*  --   --   --   --   --   --  291*  --  309*  --  144*  --  129*   *  --  276* 221* 200* 327* 338*   < >  --    < >  --    < >  --    < >  --     < > = values in this interval not displayed.        Unresulted Labs Ordered in the Past 30 Days of this Admission     Date and Time Order Name Status Description    2/28/2021 1138 Surgical pathology exam In process            Imaging: No results found for this or any previous visit (from the past 24 hour(s)).     I reviewed all new labs and imaging results over the last 24 hours. I personally reviewed no images or EKG's today.    Medications       ampicillin-sulbactam (UNASYN) IV  3 g Intravenous Q6H     atorvastatin  80 mg Oral Daily     famotidine  20 mg Oral Daily     finasteride  5 mg Oral Daily     fluticasone-vilanterol  1 puff Inhalation Daily    And     umeclidinium  1 puff Inhalation Daily     [Held by provider] furosemide  20 mg  Oral Once per day on Sun Tue Thu Sat     [Held by provider] furosemide  40 mg Oral Once per day on Mon Wed Fri     insulin aspart  1-7 Units Subcutaneous TID AC     insulin aspart  1-5 Units Subcutaneous At Bedtime     insulin NPH-Regular  10 Units Subcutaneous BID w/meals     metFORMIN  500 mg Oral BID w/meals     [Held by provider] metoprolol tartrate  12.5 mg Oral BID     miconazole   Topical BID     OLANZapine  2.5 mg Oral BID     tamsulosin  0.4 mg Oral Daily   Reviewed medeliceo Brice MD  [facility]

## 2021-03-02 NOTE — PROGRESS NOTES
[facility]    History and Physical - Hospitalist Service       Date of Admission:  2/26/2021    Assessment & Plan   Alvaro Horton is a 80 year old male admitted on 2/26/2021. He presents from home where he lives with son with weakness and confusion      Gastrointestinal hemorrhage, suspect upper, on anticoagulation  Anemia, unspecified type      Admission HGB 5.5. Occult blood positive in ED. On aspirin and apixaban. Last colonoscopy 8/2017 notable for adenomatous polyp and biopsy positive for lymphocytic colitis. Baseline HGB 12-13, but last HGB 12/25/20 was 10.     Hgb on admission 5.5 -> 7.2 after 2 units prbc -> 7.9 -> 7.4 -> 6.9 this morning. Eliquis now held > 48 hours. Should be stable for EGD.   - transfused 1 unit PRBC (3rd this admission)  - Protonix IV, hold usual pepcid  - Holding ASA, eliquis   - EGD today: completed and is normal with no bleeding source.    UTI with possible sepsis     Presents with weaks, confusion, tachypnea, pyuria, lactate elevated 4.6, afebrile, WBC 9.2. CT chest abdomen pelvis notable for Modest bladder wall thickening and trabeculation with prostatic enlargement. In ED given 2 Li IVF, started on zosyn. No blood cultures sent before antibiotics    Unclear if sepsis as acute presentation more likely due to GIB above.     Started on piperacillin-tazobactam, UCx positive Enterococcus with sensitivities pending.   - change to Unasyn, await sensitivities    Acute kidney injury    Admit creatinine 2.22 with BUN 78. Likely due to anemia, volume depletion. Baseline creatine 1.1 in 2019, but running 1.1-1.4 since 11/2020.      Creatinine 2. -> 2.15 -> 2.09 -> 1.81. Good urine output.    - continue to follow    Type 2 diabetes mellitus    A1c 8.3 12/25/20. Glucose 240-329  - Held usual metformin due to acute kidney injury, can resume in morning   - resuming 70/30 insulin today    Memory loss/Dementia    Memory issues noted in the past 6 months or so.  Started on Zyprexa during hospitalization 12/2020 due to encephalopathy during COVID infection  - monitor mental status    - continue PM olanzapine 5 mg BID, consider trial of reduced dosing     Coronary Artery Disease    History of CABG in 2009 with LIMA to LAD and SVG to OM1 and 2. Follows with cardiology, last seen 3/2020. Last Echo 11/2020 EF 60-65%, no wall motion changes, decreased RV systolic function with elevated RV pressure.   - continue statin, beta blocker  - Holding ASA due to GI bleeding     Hypertension    Chronic.  BP running a bit soft.   - Hold home furosemide,   - Continue metoprolol     COPD (chronic obstructive pulmonary disease)  Asthma  Baker's Lung    PFTs in 04/2018 revealed severe airflow obstruction.   - Continue home Trelegy Ellipta, prn albuterol      Atrial fibrillation    Transient following CABG in 2009. ZioPatch 3/2020 did not show atrial fibrillation. ECG 8/2020 showed NSR. During admission 11/2020 for COVID 19 atrial fibrillation seen. Started on anticoagulation with Eliquis after discussion with patient and son due to fall risk and also CHADsVAC of 6 - possibly just short term. Now has apparent persistent atrial fibrillation , slow vetricular response    HR decent 70's. BP normalizing   - will hold metoprolol for low BP  - holding home Eliquis due to GI bleeding     History of CVA  Hyperlipidemia    Occurred at time of CABG in 2009.  - Continue home Lipitor 80  - Hold ASA due to GI bleeding     Benign Prostatic Hyperplasia    Chronic.  - continue home finasteride, tamsulosin     Recovered COVID 19 Infection with ongoing hypoxia    Diagnosed 11/26/20 with COVID 19, hospitalized x 2 and discharged on 2 LPM of oxygen. Still considered recovered from infectious standpoint.    No precautions needed    Repeat Covid PCR negative 2/26/2021.     SARS-CoV-2 PCR was rechecked today pre-procedure, 2/28, and returned positive. Patient is just beyond 90 days from known COVID onset late November  and was negative just two days ago on admission. This is unlikely to represent re-infection with COVID.     Addendum possible volume overload    CXR 2/28 showed some vasc congestion and small effusions that are increased. IV furosemide x 1 and stopped IVF.    - additional IV furosemide in the morning    Son Jae [phone]     Diet: Snacks/Supplements Adult: Boost Plus; With Meals  Regular Diet Adult   DVT Prophylaxis: Eliquis (on hold)  Olivo Catheter: not present  Code Status: No CPR- Do NOT Intubate         Discussion: Appears improved from yesterday. Discussed with son, Jake, with patient permission. Jake will discussed with his sister if there is anything they can do to help Jae with their father's care.    Disposition: Anticipate discharge 1-2 days     Attestation:  Total time: 35 minutes with > 50% coordination of care with family    Keenan Brice MD  [facility]        Interval History   Complains of wanting to go home soon.  Complains of no appetite.  No nausea or vomiting however.  No abdominal pain.  No chest pain.  Does not think his breathing is worse than normal.    Physical Exam   Temp:  [95.2  F (35.1  C)-98.1  F (36.7  C)] 97.3  F (36.3  C)  Pulse:  [60-71] 71  Resp:  [18] 18  BP: (110-127)/(48-59) 127/59  SpO2:  [90 %-97 %] 91 %    Weights:   Vitals:    02/26/21 2115 02/28/21 0404 03/01/21 1143   Weight: 78.1 kg (172 lb 2.9 oz) 79.5 kg (175 lb 4.3 oz) 80.1 kg (176 lb 9.4 oz)    Body mass index is 26.85 kg/m .    Constitutional: Alert and oriented to place but not reason for hospitalization. Much brighter with better color. Mild - mod increased work of breathing   CV: Regular, JVD is present, there is 1-2+ bilateral lower extremity edema   Respiratory: decreased in bases otherwise CTA bilaterally  GI: Soft, non-tender, bowel sounds normal  Skin: Warm and dry    Data   Recent Labs   Lab 03/01/21  1046 02/28/21  0454 02/28/21  0032 02/27/21  0637 02/27/21  0637 02/26/21  1600   WBC 7.4 9.2  --    --  6.5 9.2   HGB 8.3* 6.9* 7.4*   < > 7.2* 5.5*   MCV 86 95  --   --  95 100    187  --   --  187 233    141  --   --  141 139   POTASSIUM 3.7 3.7  --   --  3.7 3.9   CHLORIDE 107 106  --   --  106 102   CO2 31 30  --   --  29 26   BUN 42* 62*  --   --  70* 78*   CR 1.81* 2.09*  --   --  2.15* 2.22*   ANIONGAP 4 5  --   --  6 11   EVELINA 7.5* 7.7*  --   --  8.1* 8.7   * 309*  --   --  144* 129*   ALBUMIN  --   --   --   --  2.5* 2.9*   PROTTOTAL  --   --   --   --  5.4* 6.2*   BILITOTAL  --   --   --   --  1.2 0.8   ALKPHOS  --   --   --   --  96 109   ALT  --   --   --   --  39 42   AST  --   --   --   --  32 41   TROPI  --   --   --   --   --  0.025    < > = values in this interval not displayed.       Recent Labs   Lab 03/01/21  1658 03/01/21  1142 03/01/21  1046 03/01/21  0751 03/01/21  0238 02/28/21  2137 02/28/21 2017 02/28/21  0454 02/28/21  0454 02/27/21  0637 02/27/21  0637 02/26/21  1600 02/26/21  1600   GLC  --   --  291*  --   --   --   --   --  309*  --  144*  --  129*   * 329*  --  240* 265* 269* 305*   < >  --    < >  --    < >  --     < > = values in this interval not displayed.        Unresulted Labs Ordered in the Past 30 Days of this Admission     Date and Time Order Name Status Description    2/28/2021 1138 Surgical pathology exam In process            Imaging: No results found for this or any previous visit (from the past 24 hour(s)).     I reviewed all new labs and imaging results over the last 24 hours. I personally reviewed no images or EKG's today.    Medications       ampicillin-sulbactam (UNASYN) IV  3 g Intravenous Q6H     atorvastatin  80 mg Oral Daily     finasteride  5 mg Oral Daily     fluticasone-vilanterol  1 puff Inhalation Daily    And     umeclidinium  1 puff Inhalation Daily     [START ON 3/2/2021] furosemide  20 mg Oral Once per day on Sun Tue Thu Sat     furosemide  40 mg Oral Once per day on Mon Wed Fri     insulin aspart  1-7 Units Subcutaneous TID  AC     insulin aspart  1-5 Units Subcutaneous At Bedtime     insulin NPH-Regular  10 Units Subcutaneous BID w/meals     [Held by provider] metoprolol tartrate  12.5 mg Oral BID     miconazole   Topical BID     OLANZapine  2.5 mg Oral BID     pantoprazole (PROTONIX) IV  40 mg Intravenous Q12H     tamsulosin  0.4 mg Oral Daily   Reviewed meds    Keenan Brice MD  [facility]

## 2021-03-03 NOTE — PLAN OF CARE
Incontinent of large amounts of urine requiring partial bed bath and linen change. Buttocks drsg changed due to loosening. Wounds are pink, clean, no drainage. Areas cleansed and mepilex foam drsg applied. Partial bed bath given.   Alternating air mattress in place. Pt assisted with repositioning. Heels dangled off bed with pillow.

## 2021-03-03 NOTE — CONSULTS
[facility]    Palliative Care Consultation--Inpatient  Admission Date: 2/26/2021   Visit Date: March 3, 2021  PCP: Be Carreno   Requested by: Keenan Brice      HISTORY of PRESENT ILLNESS:  Alvaro Horton is a 80 year old year old male with a history of blood-loss anemia, dementia, Diabetes mellitus type 2, Afib/Eliquis on hold 2o anemia, and persistent hypoxia since he survived Covid first diagnosed in November 2020.  He presented to the ED from home; his son Jae, on disability, has recently been living with [place] to assist in his cares and recovery from Covid.  This is his 4th hospitalization since Nov '20.  Palliative care was consulted to assist with managing his dyspnea and anorexia.        ASSESSMENT & PLAN:    Hypoxia, new on continuous O2 since his Covid infection  Dyspnea at rest, worse with exertion  - oral dilaudid 0.6 mg 5x/day plus q3h PRN breathlessness  - Senna 1 tab at bedtime to prevent constipation    Anorexia  Dysgeusia (though as a retired , he might simply dislike the taste of food cooked by others)  Unplanned weight loss since November (see Exam), 19 lb/10%  - Remeron 7.5 mg at bedtime  - Megace 625 mg daily is probably more effective than Remeron, but since it is prothrombotic, I wouldn't recommend it before it is safe from him to resume anticoagulation.      Advance Care Planning:  - Understanding of condition:  encounter too brief to assess his insight  - Decisional capacity:  Probably advisable to include son Jae in discussions  - Code status:  DNR / DNI  - Health Care Directive: NO; he stated he would want doctors to speak with Jae if he was unable to speak for himself  - POLST:  No    Goals of Care:  - wants to get stronger so he can spend more time with his grandchildren and great grandchildren  - this is the first time he has been agreeable to going to TCU ([facility]), in part because his son is currently too ill to care for him  - doubtful that he'll remember, but  I explained that he's at a point in life where he must either push himself to get stronger through therapy, or he'll get weaker and may not be able to remain in his own home      Thank you for the opportunity to be of service to this patient and family.      Zeeshan Orozco CNP ([facility])  Palliative Care  Private cell:  [phone]     Face to face:   0826-5053 and [id number], 45 min, > 50% spent discussing past medical/social history, reviewing most bothersome symptoms.   Non face to face:   5920-7283 and 1330 - 1455, 120 min, > 50% spent coordinating care with  attending, nursing and Care Transitions; reviewing his history due to his h/o dementia and my assumption that he might be incapable to providing me the history I need.       = = = = = = = = = = = = = = = =      PROBLEM LIST  Patient Active Problem List   Diagnosis     Coronary artery disease involving native coronary artery of native heart     Moderate persistent asthma     PSORIASIS     Hypertrophy of prostate without urinary obstruction     OVERACTIVE BLADDER     Bakers' asthma (H)     Hypertension goal BP (blood pressure) < 140/90     Esophageal reflux     Sensorineural hearing loss, bilateral     Obstructive sleep apnea- mild (AHI 11)     Benign paroxysmal vertigo     Atrial fibrillation (H)     Alzheimer's dementia (H)     CTS (carpal tunnel syndrome)     Dyslexia     Advanced care planning/counseling discussion     Hyperlipidemia LDL goal <70     COPD (chronic obstructive pulmonary disease) (H)     Chronic kidney disease, stage 2 (mild)     [facility]     Type 2 diabetes mellitus with diabetic chronic kidney disease (H)     Claudication of calf muscles (H)     History of CVA (cerebrovascular accident)     Falls frequently     Type 2 diabetes mellitus (H)     Chronic anticoagulation     Sepsis (H)     Urinary tract infection without hematuria, site unspecified     Gastrointestinal hemorrhage, unspecified gastrointestinal hemorrhage type     Anemia,  "unspecified type     BEBETO (acute kidney injury) (H)     Gastrointestinal hemorrhage with melena       PAST MEDICAL HISTORY  Past Medical History:   Diagnosis Date     Acute calculous cholecystitis 03/02/2019     Acute respiratory failure with hypoxia (H) 11/27/2020     Altered mental status 12/31/2014 12/27/2014:episode confusion for 20 minutes.  Seen at [facility]/-hospitalized.  Head CT, MRI/MRA all normal.  PET cardiac perfusion stress test normal.  Neurology consult-diagnosis= possible hypoglycemia, migraine variant.  \"Acute ischemic cerebral event was excluded\".      Calculus of kidney 1990 1990. s/p ESWL and percutaneous nephrolithotomy     COPD (chronic obstructive pulmonary disease) (H)      COVID-19 virus infection 12/2/2020     Depression 1979    Hospitalized 1979     Diabetes (H)      Elevated troponin 11/27/2020     Encephalopathy 09/03/2018     Heart disease      Left hand weakness 12/14/2009    Afterbypass surgery, CVA, improved.      PNEUMONIA, ORGANISM NOS 02/14/2008    CT 2/08-  Pulmonary atelectasis and infiltrate in the posterior left lower lung base.      Sleep apnea        PAST SURGICAL HISTORY  Past Surgical History:   Procedure Laterality Date     C ANESTH,DX ARTHROSCOPIC PROC KNEE JOINT  1994, 1998     Left     CARDIAC SURGERY  11/09    CABG x2 - LIMA-D1, SVG to OM1, OM2.     ESOPHAGOSCOPY, GASTROSCOPY, DUODENOSCOPY (EGD), COMBINED N/A 2/28/2021    Procedure: ESOPHAGOGASTRODUODENOSCOPY, WITH BIOPSY;  Surgeon: Burt Mendieta MD;  Location: [facility]     GENITOURINARY SURGERY  1990    ESWL and percutaneous nephrolithotomy     LAPAROSCOPIC CHOLECYSTECTOMY N/A 3/4/2019    Procedure: LAPAROSCOPIC CHOLECYSTECTOMY;  Surgeon: Burt Mendieta MD;  Location: [facility]     ORTHOPEDIC SURGERY  8/12    right CTR     SURGICAL HISTORY OF -   1998    Hernia repair     SURGICAL HISTORY OF -   2004    NormalColonoscopy      SURGICAL HISTORY OF -   2006    Normal Colonoscopy       FAMILY MEDICAL HISTORY  Family " "History   Problem Relation Age of Onset     SURYAYELENA. Father         40s     Hypertension Father      GITA.A.VIRI. Paternal Grandfather      Heart Disease Paternal Grandfather      Diabetes Brother      DUNCAN. Brother      Heart Disease Brother      Hypertension Brother      Gastrointestinal Disease Son         Crohn's disease     Gastrointestinal Disease Other         2 grandaughters with Crohn's disease     Cancer - colorectal No family hx of        SOCIAL HISTORY  History   Smoking Status     Never Smoker   Smokeless Tobacco     Never Used     Social History    Substance and Sexual Activity      Alcohol use: No        Alcohol/week: 0.0 standard drinks    History   Drug Use No     Social History     Social History Narrative    March 3, 2021: history provided by patient.  He never served in the .  After 60 years of marriage, his wife  5 years ago.  He worked as a  his entire career, retiring at age 61.  He believes men make the \"best ; it's not work for women.\"  He has 2 sons and 1 daughter.  His son Jae is currently residing with him in [facility]'s home, but is currently ill and unable to care for his father.  States he is affiliated with a [employer]; reads his Bible often.      Zeeshan Orozco CNP ([facility])    [facility]    Pager:  [phone]            SPIRITUAL HISTORY  As above    ALLERGIES  Allergies   Allergen Reactions     Prednisone Other (See Comments)     Severe interaction with DM       MEDICATIONS  Medications Prior to Admission  Current Facility-Administered Medications   Medication     acetaminophen (TYLENOL) tablet 650 mg     albuterol (PROAIR HFA/PROVENTIL HFA/VENTOLIN HFA) 108 (90 Base) MCG/ACT inhaler 2 puff     amoxicillin-clavulanate (AUGMENTIN) 875-125 MG per tablet 1 tablet     atorvastatin (LIPITOR) tablet 80 mg     glucose gel 15-30 g    Or     dextrose 50 % injection 25-50 mL    Or     glucagon injection 1 mg     famotidine (PEPCID) " tablet 20 mg     finasteride (PROSCAR) tablet 5 mg     fluticasone-vilanterol (BREO ELLIPTA) 100-25 MCG/INH inhaler 1 puff    And     umeclidinium (INCRUSE ELLIPTA) 62.5 MCG/INH inhaler 1 puff     furosemide (LASIX) tablet 20 mg     furosemide (LASIX) tablet 40 mg     HYDROmorphone (STANDARD CONC) (DILAUDID) oral solution 0.6 mg     HYDROmorphone (STANDARD CONC) (DILAUDID) oral solution 0.6 mg     insulin aspart (NovoLOG) injection (RAPID ACTING)     insulin aspart (NovoLOG) injection (RAPID ACTING)     insulin NPH-Regular (HUMULIN 70/30;NOVOLIN 70/30) injection 16 Units     lidocaine (LMX4) kit     lidocaine 1 % 0.1-1 mL     melatonin tablet 1 mg     metFORMIN (GLUCOPHAGE-XR) 24 hr tablet 500 mg     metoprolol tartrate (LOPRESSOR) half-tab 12.5 mg     miconazole (MICATIN) 2 % powder     mirtazapine (REMERON) tablet TABS 7.5 mg     naloxone (NARCAN) injection 0.2 mg    Or     naloxone (NARCAN) injection 0.4 mg    Or     naloxone (NARCAN) injection 0.2 mg    Or     naloxone (NARCAN) injection 0.4 mg     OLANZapine (zyPREXA) tablet 2.5 mg     ondansetron (ZOFRAN-ODT) ODT tab 4 mg    Or     ondansetron (ZOFRAN) injection 4 mg     senna-docusate (SENOKOT-S/PERICOLACE) 8.6-50 MG per tablet 1 tablet    Or     senna-docusate (SENOKOT-S/PERICOLACE) 8.6-50 MG per tablet 2 tablet     sennosides (SENOKOT) tablet 1 tablet     sodium chloride (PF) 0.9% PF flush 3 mL     sodium chloride (PF) 0.9% PF flush 3 mL     tamsulosin (FLOMAX) capsule 0.4 mg       Current Medications    amoxicillin-clavulanate  1 tablet Oral Q12H ZOIE     atorvastatin  80 mg Oral Daily     famotidine  20 mg Oral Daily     finasteride  5 mg Oral Daily     fluticasone-vilanterol  1 puff Inhalation Daily    And     umeclidinium  1 puff Inhalation Daily     furosemide  20 mg Oral Once per day on Sun Tue Thu Sat     furosemide  40 mg Oral Once per day on Mon Wed Fri     HYDROmorphone (STANDARD CONC)  0.6 mg Oral 5x Daily     insulin aspart  1-10 Units  "Subcutaneous TID AC     insulin aspart  1-7 Units Subcutaneous At Bedtime     insulin NPH-Regular  16 Units Subcutaneous BID w/meals     metFORMIN  500 mg Oral BID w/meals     metoprolol tartrate  12.5 mg Oral BID     miconazole   Topical BID     mirtazapine  7.5 mg Oral At Bedtime     OLANZapine  2.5 mg Oral BID     sennosides  1 tablet Oral At Bedtime     tamsulosin  0.4 mg Oral Daily       PRN Medications  acetaminophen, albuterol, glucose **OR** dextrose **OR** glucagon, HYDROmorphone (STANDARD CONC), lidocaine 4%, lidocaine (buffered or not buffered), melatonin, naloxone **OR** naloxone **OR** naloxone **OR** naloxone, ondansetron **OR** ondansetron, senna-docusate **OR** senna-docusate, sodium chloride (PF), sodium chloride (PF)      REVIEW OF SYSTEMS  Review of systems is limited due to patient factors - memory loss.  Denies constipation but explains his bowels move \"as often as they need to.\"  Food has lost its taste.  Incontinent of urine and stool but urgently requests bedside commode when he has the urge tto defecate.  States he won't take the medication I've prescribed unless he knows that either Dr Brice or Dr Carreno approve of it.  Per OT, needs assistance with functional ADLs.  States he and son Jae share cooking at home, but often eat out.  Did not use O2 prior to Covid.  Denies breathlessness despite tachypnea at rest. Has had 2 recent falls which appear to have alarmed him.     Performance Assessment:    Palliative Performance Scale, v.2     40%  Extensive disease. Mainly in bed w/little ambulation. ADLs/activities mainly w/assistance. Normal or reduced intake. Full LOC or drowsy or confused.        PHYSICAL EXAMINATION  Patient Vitals for the past 24 hrs:   BP Temp Temp src Pulse Resp SpO2 Weight   03/03/21 1135 128/61 98  F (36.7  C) Oral 69 16 97 % --   03/03/21 0726 131/63 97.9  F (36.6  C) Oral 73 16 96 % --   03/03/21 0553 -- -- -- -- -- -- 80.4 kg (177 lb 4 oz)   03/03/21 0208 138/71 97.1  F " "(36.2  C) Axillary 78 18 97 % --   03/02/21 2202 112/53 97.9  F (36.6  C) Oral 72 16 96 % --   03/02/21 1909 126/63 97  F (36.1  C) Oral 77 14 97 % --   03/02/21 1512 121/45 98.8  F (37.1  C) Oral 81 18 94 % --      Wt Readings from Last 15 Encounters:   03/03/21 80.4 kg (177 lb 4 oz)   02/10/21 77.1 kg (170 lb)   12/26/20 83.7 kg (184 lb 8.4 oz)   12/06/20 79.7 kg (175 lb 11.3 oz)   11/30/20 85.5 kg (188 lb 9.6 oz)   11/23/20 88.9 kg (196 lb)   08/10/20 88.9 kg (196 lb)   08/05/20 83.9 kg (185 lb)   03/23/20 83.9 kg (185 lb)   03/06/20 89.5 kg (197 lb 6.4 oz)   02/26/20 86.2 kg (190 lb)   09/25/19 88.9 kg (196 lb)   08/06/19 87.2 kg (192 lb 3.2 oz)   07/29/19 88 kg (194 lb)   06/06/19 87.2 kg (192 lb 3.2 oz)     Constitutional:  Alert, guarded, easily confused  Eyes:  Resisted exam; sclera nnonicteric  HEENT:  OP pink, moist, no lesions seen in limited exam; hoarse soft voice  Lymph/Hematologic:  No epitrochlear, axillary, anterior or posterior cervical, or supraclavicular lymphadenopathy is appreciated  Cardiovascular:  RRR w/o m/r/g, trace edema R ankle only  Respiratory:  Seems to have difficulty clearing throat through cough; lungs w/faint crackles in bases, shallow rapid (24) breaths  GI: Soft, non-tender, normal bowel sounds, no hepatosplenomegaly  Genitourinary:  deferred  Musculoskeletal:  Able to push up, with assistance, from seated to standing position.  Able to lift each leg about 6\" above mattress.  Weak overall.   Skin:  Warm, dry  Neurological:  No myoclonus or tremor  Psych:  Poor memory; strikes me as being suspicious, at least of me.        DATA  ROUTINE ICU LABS (Last four results)  CMP  Recent Labs   Lab 03/03/21  0519 03/02/21  1206 03/01/21  1046 02/28/21  0454 02/27/21  0637 02/26/21  1600    141 142 141 141 139   POTASSIUM 3.5 3.6 3.7 3.7 3.7 3.9   CHLORIDE 104 104 107 106 106 102   CO2 35* 34* 31 30 29 26   ANIONGAP 4 3 4 5 6 11   * 310* 291* 309* 144* 129*   BUN 28 35* 42* 62* " 70* 78*   CR 1.61* 1.65* 1.81* 2.09* 2.15* 2.22*   GFRESTIMATED 39* 38* 34* 29* 28* 27*   GFRESTBLACK 46* 44* 40* 33* 32* 31*   EVELINA 7.4* 7.6* 7.5* 7.7* 8.1* 8.7   PROTTOTAL  --   --   --   --  5.4* 6.2*   ALBUMIN  --   --   --   --  2.5* 2.9*   BILITOTAL  --   --   --   --  1.2 0.8   ALKPHOS  --   --   --   --  96 109   AST  --   --   --   --  32 41   ALT  --   --   --   --  39 42     CBC  Recent Labs   Lab 03/03/21  0519 03/02/21  1206 03/01/21  1046 02/28/21  0454   WBC 7.4 8.7 7.4 9.2   RBC 2.79* 2.89* 2.85* 2.52*   HGB 7.9* 8.4* 8.3* 6.9*   HCT 27.2* 28.4* 24.6* 24.0*   MCV 98 98 86 95   MCH 28.3 29.1 29.1 27.4   MCHC 29.0* 29.6* 33.7 28.8*   RDW 15.5* 15.9* 15.6* 17.4*    171 166 187     INRNo lab results found in last 7 days.  Arterial Blood GasNo lab results found in last 7 days.      No results found for this or any previous visit (from the past 48 hour(s)).

## 2021-03-03 NOTE — PLAN OF CARE
Vital signs stable. Denies pain or shortness of breath. Shallow breathing, scheduled PO dilaudid initiated per palliative care. Poor appetite. BG continue to be elevated at 190 and 377. Black stool x 1. Up with assist x 1 and walker to chair. Mepilex dressing on buttocks.

## 2021-03-03 NOTE — PROGRESS NOTES
Addendum:  [facility], that pt's PA is now going to the Medical Director of [facility], as pt was declining or self limiting participatation in PT/OT cares the past day or so.  RYAN updated MD of this information & prepared him for a possible peer to peer review.  JESUS Escalona on 3/3/2021 at 4:02 PM    Addendum:  Per IDT rounds, discharge is cancelled due to suspected gi bleed, low hgb.  Will remain hospitalized.  RYAN notified admissions at [facility].    JESUS Escalona on 3/3/2021 at 10:13 AM    Care Management Discharge Note    Discharge Date: 03/03/21  Expected Time of Departure: 11:30 AM ride    Discharge Disposition: Transitional Care; pt has been accepted for TCU cares at [facility] (Main: [phone] Admissions: [phone] Fax: [phone])    Discharge Services: None    Discharge DME: Oxygen, Walker    Discharge Transportation: agency    Private pay costs discussed: transportation costs    PAS Confirmation Code: [id number]  Patient/family educated on Medicare website which has current facility and service quality ratings: yes    Handoff Referral Completed: Yes    Additional Information:  Pt has been accepted at [facility] for TCU vs LTC services.  It is the pt & family goals to have the pt regain strength and return home with private pay services.    JESUS Escalona

## 2021-03-03 NOTE — PLAN OF CARE
Patient alert with confusion. Continues on ABX treatment, tolerating well. IV dressing CDI, no s/sx of infection or infiltration. Patient sleeping better this shift with new mattress. Occasionally calling out to staff for assistance with positioning. Patient allows staff to offload from side to side. Patient reports pain to coccyx and educated on necessity to reposition periodically. Patient does not understand. Sliding scale insulin adjusted by provider. 7 units administered per prescribed order resulting in blood sugar of 181. Dressing changed to coccyx by RN after bed change r/t incontinent episode. Continues on 2L O2 via NC. Vitals stable.

## 2021-03-03 NOTE — PROGRESS NOTES
[facility] Medicine Progress Note  Date of Service: 03/03/2021     Assessment & Plan   Alvaro Horton is a 80 year old male admitted on 2/26/2021. He presents from home where he lives with son with weakness and confusion      Gastrointestinal hemorrhage, suspect upper, on anticoagulation  Anemia, acute blood loss      Admission HGB 5.5. Occult blood positive in ED. On aspirin and apixaban. Last colonoscopy 8/2017 notable for adenomatous polyp and biopsy positive for lymphocytic colitis. Baseline HGB 12-13, but last HGB 12/25/20 was 10.     Eliquis held > 48 hours and EGD on 2/28 was normal with no bleeding source.     Hgb on admission 5.5 -> 7.2 after 2 units prbc -> 7.9 -> 7.4 -> 6.9 -> 8.3 after third unit PRBC -> 8.4 -> 7.9 today 3/3.     Resumed home pantoprazole.     Stopped aspirin and apixaban.     Reportedly had melenotic stool overnight. Hgb drifting down today. Hemodynamics stable.    - recheck hemoglobin this afternoon and then again in the morning   - hold on discharge for today    UTI   Sepsis ruled out    Presents with weaknesss, confusion, tachypnea, pyuria, lactate elevated 4.6, afebrile, WBC 9.2. CT chest abdomen pelvis notable for Modest bladder wall thickening and trabeculation with prostatic enlargement. In ED given 2 Li IVF, started on zosyn. No blood cultures sent before antibiotics.    After further assessment, acute presentation most likely due to GIB above and not sepsis.     Started on piperacillin-tazobactam, UCx positive Enterococcus pan-sensitive. Changed to Unasyn and now to oral Augmentin.   - Continue Augmentin through 3/8 (10 days) for complex UTI    Acute kidney injury    Admit creatinine 2.22 with BUN 78. Likely due to anemia, volume depletion. Baseline creatine 1.1 in 2019, but running 1.1-1.4 since 11/2020.      Creatinine 2. -> 2.15 -> 2.09 -> 1.81 -> 1.65 -> 1.61.     Type 2 diabetes mellitus    A1c 8.3 12/25/20. -377  -   Continue metformin    - increase 70/30 insulin to 24 units in AM on 3/4, increased to 16 units PM today   - high sliding scale insulin     Memory loss/Dementia    Memory issues noted in the past 6 months or so. Started on Zyprexa during hospitalization 12/2020 due to encephalopathy during COVID infection  - monitor mental status    - continue PM olanzapine 5 mg BID, consider trial of reduced dosing     Coronary Artery Disease    History of CABG in 2009 with LIMA to LAD and SVG to OM1 and 2. Follows with cardiology, last seen 3/2020. Last Echo 11/2020 EF 60-65%, no wall motion changes, decreased RV systolic function with elevated RV pressure.   - continue statin, beta blocker  - Holding ASA due to GI bleeding, consider restarting in 1-2 weeks if bleeding resolved     Hypertension    Chronic.  BP running a bit soft this admission but improving.   - Have resumed home fursemide  - Continue metoprolol     COPD (chronic obstructive pulmonary disease)  Asthma  Baker's Lung    PFTs in 04/2018 revealed severe airflow obstruction.   - Continue home Trelegy Ellipta, prn albuterol      Atrial fibrillation    Transient following CABG in 2009. ZioPatch 3/2020 did not show atrial fibrillation. ECG 8/2020 showed NSR. During admission 11/2020 for COVID 19 atrial fibrillation seen. Started on anticoagulation with Eliquis after discussion with patient and son due to fall risk and also CHADsVAC of 6 - possibly just short term. Now has apparent persistent atrial fibrillation , slow vetricular response    HR decent 70's. BP normalizing   - resumed home metoprolol 12.5 mg BID  - Stopped Eliquis due to GI bleeding and will not resume due to ongoing bleeding risk     History of CVA    Occurred at time of CABG in 2009.  - Continue home Lipitor 80  - Hold ASA/Eliquis due to GI bleeding     Benign Prostatic Hyperplasia    Chronic.  - continue home finasteride, tamsulosin     Recovered COVID 19 Infection with ongoing hypoxia    Diagnosed  "11/26/20 with COVID 19, hospitalized x 2 and discharged on 2 LPM of oxygen. Still considered recovered from infectious standpoint.    No precautions needed    Repeat Covid PCR negative 2/26/2021.     SARS-CoV-2 PCR was rechecked today pre-procedure, 2/28, and returned positive. Patient is just beyond 90 days from known COVID onset late November and was negative just two days ago on admission. This is unlikely to represent re-infection with COVID.     Possible volume overload    CXR 2/28 showed some vasc congestion and small effusions that are increased. IV furosemide x 1 and stopped IVF.     IV furosemide x 2 today with good urine output. Less dyspneic    - resumed home furosemide today 3/3    Poor appetite    Past few weeks or longer per son. \"Food tastes like dirt!\" per patient. Palliative care consult for appetite and shortness of breath, patient open to this.     Discussed with palliative care NP and increasing the evening olanzapine recommended to see if will increase appetite.   - increase olanzapine from 2.5 mg BID to 2.5 mg AM and 5 mg HS    Dyspnea    Due to chronic heart failure and deconditioning. Palliative care consulted 3/3. Started low dose hydromorphone 0.6 mg oral 5 times daily for dyspnea. Patient rather sleepy on this dose as I see him in the afternoon.    - appreciate palliative care recommendations   - will decrease oral hydromorphone to 0.2 mg 5 times daily    Bassem Rowe [phone]     Diet: Snacks/Supplements Adult: Boost Plus; With Meals  Regular Diet Adult   DVT Prophylaxis: Eliquis (on hold)  Olivo Catheter: not present  Code Status: No CPR- Do NOT Intubate       Discussion: Postponed transfer to TCU today due to possible ongoing bleeding. Patient did not participate much with therapies but may be due to sleepiness from hydromorphone. Discussed with sonJae, at bedside.     Disposition: Anticipate discharge tomorrow to TCU if participating in therapies.     Attestation:  Total time: 40 " minutes    Keenan Brice MD  [facility] Medicine      Interval History   Melenotic stool reported overnight.   Patient seen by palliative care and he wanted to try something for dyspnea. On low dose hydromorphone, he denies feeling short of breath. Denies abdomen pain. No appetite.     Physical Exam   Temp:  [97  F (36.1  C)-98.8  F (37.1  C)] 98.3  F (36.8  C)  Pulse:  [65-81] 65  Resp:  [14-18] 18  BP: (112-138)/(45-71) 124/52  SpO2:  [94 %-97 %] 94 %    Weights:   Vitals:    02/28/21 0404 03/01/21 1143 03/03/21 0553   Weight: 79.5 kg (175 lb 4.3 oz) 80.1 kg (176 lb 9.4 oz) 80.4 kg (177 lb 4 oz)    Body mass index is 26.95 kg/m .    Constitutional: sleepy, arouses to voice, weak voice, oriented to place and somewhat to situation but mentation slowed, nontoxic, NAD  CV: Irregular, 2+ bilateral lower extremity edema   Respiratory: mild bibasilar crackles otherwise CTA bilaterally  GI: Soft, non-tender, bowel sounds normal  Skin: Warm and dry    Data   Recent Labs   Lab 03/03/21  0519 03/02/21  1206 03/01/21  1046 02/27/21  0637 02/27/21  0637 02/26/21  1600   WBC 7.4 8.7 7.4   < > 6.5 9.2   HGB 7.9* 8.4* 8.3*   < > 7.2* 5.5*   MCV 98 98 86   < > 95 100    171 166   < > 187 233    141 142   < > 141 139   POTASSIUM 3.5 3.6 3.7   < > 3.7 3.9   CHLORIDE 104 104 107   < > 106 102   CO2 35* 34* 31   < > 29 26   BUN 28 35* 42*   < > 70* 78*   CR 1.61* 1.65* 1.81*   < > 2.15* 2.22*   ANIONGAP 4 3 4   < > 6 11   EVELINA 7.4* 7.6* 7.5*   < > 8.1* 8.7   * 310* 291*   < > 144* 129*   ALBUMIN  --   --   --   --  2.5* 2.9*   PROTTOTAL  --   --   --   --  5.4* 6.2*   BILITOTAL  --   --   --   --  1.2 0.8   ALKPHOS  --   --   --   --  96 109   ALT  --   --   --   --  39 42   AST  --   --   --   --  32 41   TROPI  --   --   --   --   --  0.025    < > = values in this interval not displayed.       Recent Labs   Lab 03/03/21  1112 03/03/21  0723 03/03/21  0519 03/03/21  0207 03/02/21  2037 03/02/21  1616 03/02/21  1206  03/02/21  1125 03/01/21  1046 03/01/21  1046 02/28/21  0454 02/28/21  0454 02/27/21  0637 02/27/21  0637 02/26/21  1600 02/26/21  1600   GLC  --   --  188*  --   --   --  310*  --   --  291*  --  309*  --  144*  --  129*   * 190*  --  181* 368* 343*  --  276*   < >  --    < >  --    < >  --    < >  --     < > = values in this interval not displayed.        Unresulted Labs Ordered in the Past 30 Days of this Admission     Date and Time Order Name Status Description    2/28/2021 1138 Surgical pathology exam In process            Imaging: No results found for this or any previous visit (from the past 24 hour(s)).     I reviewed all new labs and imaging results over the last 24 hours. I personally reviewed no images or EKG's today.    Medications       amoxicillin-clavulanate  1 tablet Oral Q12H ZOIE     atorvastatin  80 mg Oral Daily     famotidine  20 mg Oral Daily     finasteride  5 mg Oral Daily     fluticasone-vilanterol  1 puff Inhalation Daily    And     umeclidinium  1 puff Inhalation Daily     furosemide  20 mg Oral Once per day on Sun Tue Thu Sat     furosemide  40 mg Oral Once per day on Mon Wed Fri     HYDROmorphone (STANDARD CONC)  0.6 mg Oral 5x Daily     insulin aspart  1-10 Units Subcutaneous TID AC     insulin aspart  1-7 Units Subcutaneous At Bedtime     insulin NPH-Regular  16 Units Subcutaneous Daily with supper     [START ON 3/4/2021] insulin NPH-Regular  24 Units Subcutaneous QAM     metFORMIN  500 mg Oral BID w/meals     metoprolol tartrate  12.5 mg Oral BID     miconazole   Topical BID     mirtazapine  7.5 mg Oral At Bedtime     OLANZapine  2.5 mg Oral BID     sennosides  1 tablet Oral At Bedtime     tamsulosin  0.4 mg Oral Daily   Reviewed     Keenan Brice MD  [facility] Medicine

## 2021-03-04 NOTE — PROGRESS NOTES
BS-48. Drinking a Boost suppl drink and eating a sandwich.   Alert and oriented/ conversing at baseline for past 48 hours.

## 2021-03-04 NOTE — PROGRESS NOTES
Care Management Follow Up    Length of Stay (days): 6    Expected Discharge Date: 03/03/21  Expected Time of Departure: 11:30 AM ride  Concerns to be Addressed: discharge planning  Jae Luevano, ill at home & unable to care for pt at this time as pt is too weak to assist with his own cares.    Patient plan of care discussed at interdisciplinary rounds: Yes    Anticipated Discharge Disposition: Transitional Care; pt has been accepted at [facility] (Main: [phone] Admissions: [phone] Fax: [phone]) for TCU cares, pending PA approval.     Anticipated Discharge Services: None  Anticipated Discharge DME: Oxygen, Walker    Patient/family educated on Medicare website which has current facility and service quality ratings: yes  Education Provided on the Discharge Plan:    Patient/Family in Agreement with the Plan: yes    Referrals Placed by CM/SW: Internal Clinic Care Coordination, Post Acute Facilities  Private pay costs discussed: transportation costs    Additional Information:  Pt has been accepted at [facility] (Main: [phone] Admissions: [phone] Fax: [phone]) for TCU cares, however, the pt has [employer] & the prior authorization is pending [facility]'s Medical Director at this time due to pt not engaging with PT/OT for past day or so.    Per IDT rounds, pt will remain hospitalized.  Medication changes yesterday affected pt's ability to participate in therapies, per MD.  RYAN notified Janae, admissions at [facility] for the PA process.  Awaiting notification from Janae re: PA status.    Pt will need [facility] wheelchair transport upon discharge.  Jae Luevano, is in agreement & is aware this is a private pay fee & agrees to pay the bill.    Care Management team to follow for discharge plans.    JESUS Escalona

## 2021-03-04 NOTE — PLAN OF CARE
Pt denies pain.  He says he feels occasional SOA, mostly with activity.  He is alert, oriented to place, forgetful.  Pt sometimes uses call light, other times calls out for staff.  No attempts made to get up on own.  He needs encouragement to increase activity.  Up with assist of 1 and walker, ambulated in room and sat in chair for breakfast, refuses out of bed this afternoon.  IV lasix administered.  Pt incontinent of urine, no accurate I & O. O2 sats 97% on 2L.  Attempted to wean O2, pt drops to 88-89% on RA.

## 2021-03-04 NOTE — PROGRESS NOTES
Assessment & Plan     Alvaro Horton is a 80 year old male admitted on 2/26/2021. He presents from home where he lives with son with weakness and confusion        Gastrointestinal hemorrhage, suspect upper, on anticoagulation  Anemia, acute blood loss      Admission HGB 5.5. Occult blood positive in ED. On aspirin and apixaban. Last colonoscopy 8/2017 notable for adenomatous polyp and biopsy positive for lymphocytic colitis. Baseline HGB 12-13, but last HGB 12/25/20 was 10.     Eliquis held > 48 hours and EGD on 2/28 was normal with no bleeding source.     Hgb on admission 5.5 -> 7.2 after 2 units prbc -> 7.9 -> 7.4 -> 6.9 -> 8.3 after third unit PRBC -> 8.4 -> 7.9 8.7-->8.9.     Resumed home pantoprazole.     Stopped aspirin and apixaban.     Reportedly had melenotic stool overnight 3/2 but dark brown on 3/4    Bleeding appears to be resolving.  Did not do colonoscopy due to trend toward palliative care and unlikely to pursue aggressive therapy if we find something, but if bleeding persists may consider.     -Would stay off anticoagulants indefinitely, risk outweighs benefits     UTI   Sepsis ruled out    Presents with weaknesss, confusion, tachypnea, pyuria, lactate elevated 4.6, afebrile, WBC 9.2. CT chest abdomen pelvis notable for Modest bladder wall thickening and trabeculation with prostatic enlargement. In ED given 2 Li IVF, started on zosyn. No blood cultures sent before antibiotics.    After further assessment, acute presentation most likely due to GIB above and not sepsis.     Started on piperacillin-tazobactam, UCx positive Enterococcus pan-sensitive. Changed to Unasyn and now to oral Augmentin.   - Continue Augmentin through 3/8 (10 days) for complex UTI     Acute kidney injury    Admit creatinine 2.22 with BUN 78. Likely due to anemia, volume depletion. Baseline creatine 1.1 in 2019, but running 1.1-1.4 since 11/2020.      Creatinine 2. -> 2.15 -> 2.09 -> 1.81 -> 1.65 -> 1.61.      Type 2 diabetes  mellitus    A1c 8.3 12/25/20. -377  -  Continue metformin    - increase 70/30 insulin to 24 units in AM on 3/4, increased to 16 units PM today   - high sliding scale insulin      Memory loss/Dementia/ post covid encephalopathy     Memory issues noted in the past 3-4 months, dating from the time of his Covid diagnosis. - Started on Zyprexa during hospitalization 12/2020 due to encephalopathy during COVID infection  - son Jae actually feels the mental deterioration start with Covid in November.    - this is likely some post covid encephaloopathy which may  get better with time and therapy, but may never get better..    - he was driving before the covid.   -He was started on some oral Dilaudid for shortness of breath, got it on the day of 3/3, which clearly worsened his mental status and ability to do things.  This was stopped on the evening of 3/3   -Slums 14/30     Coronary Artery Disease    History of CABG in 2009 with LIMA to LAD and SVG to OM1 and 2. Follows with cardiology, last seen 3/2020. Last Echo 11/2020 EF 60-65%, no wall motion changes, decreased RV systolic function with elevated RV pressure.   - continue statin, beta blocker  - Holding ASA due to GI bleeding, consider restarting in 1-2 weeks if bleeding resolved     Hypertension    Chronic.  BP running a bit soft this admission but improving.   - Have resumed home fursemide  - Continue metoprolol     COPD (chronic obstructive pulmonary disease)  Asthma  Baker's Lung    PFTs in 04/2018 revealed severe airflow obstruction.   - Continue home Trelegy Ellipta, prn albuterol      Atrial fibrillation    Transient following CABG in 2009. ZioPatch 3/2020 did not show atrial fibrillation. ECG 8/2020 showed NSR. During admission 11/2020 for COVID 19 atrial fibrillation seen. Started on anticoagulation with Eliquis after discussion with patient and son due to fall risk and also CHADsVAC of 6 - possibly just short term. Now has apparent persistent atrial  "fibrillation , slow vetricular response    HR decent 70's. BP normalizing   - resumed home metoprolol 12.5 mg BID  - Stopped Eliquis due to GI bleeding and will not resume due to ongoing bleeding risk     History of CVA    Occurred at time of CABG in 2009.  - Continue home Lipitor 80  - Hold ASA/Eliquis due to GI bleeding     Benign Prostatic Hyperplasia    Chronic.  - continue home finasteride, tamsulosin     Recovered COVID 19 Infection with ongoing hypoxia    Diagnosed 11/26/20 with COVID 19, hospitalized x 2 and discharged on 2 LPM of oxygen. Still considered recovered from infectious standpoint.    No precautions needed    Repeat Covid PCR negative 2/26/2021.     SARS-CoV-2 PCR was rechecked today pre-procedure, 2/28, and returned positive. Patient is just beyond 90 days from known COVID onset late November and was negative just two days ago on admission. This is unlikely to represent re-infection with COVID.      Possible volume overload    CXR 2/28 showed some vasc congestion and small effusions that are increased. IV furosemide x 1 and stopped IVF.     IV furosemide x 2 today with good urine output. Less dyspneic   - wt up 6 kg by 3/4.  Will try some IV lasix again today         Poor appetite    Past few weeks or longer per son. \"Food tastes like dirt!\" per patient. Palliative care consult for appetite and shortness of breath, patient open to this.     Discussed with palliative care NP and increasing the evening olanzapine recommended to see if will increase appetite.   - increase olanzapine from 2.5 mg BID to 2.5 mg AM and 5 mg HS     Dyspnea    Due to chronic heart failure and deconditioning. Palliative care consulted 3/3. Started low dose hydromorphone 0.6 mg oral 5 times daily for dyspnea. Patient rather sleepy on this dose as I see him in the afternoon.    - appreciate palliative care recommendations   - will decrease oral hydromorphone to 0.2 mg 5 times daily     DISPO: Bassem Rowe [phone]  Another " son Jake, was going to fly in to help assessment.  I would strongly push to allow him to see his dad because we are heading down the road of making some goals of care assessments which will be difficult to make without family input.  Nothing on chest x-ray, or CT to explain his shortness of breath.  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        Diet: Snacks/Supplements Adult: Boost Plus; With Meals  Regular Diet Adult   DVT Prophylaxis: Eliquis (on hold)  Olivo Catheter: not present  Code Status: No CPR- Do NOT Intubate              Attestation:  Total time: 40 minutes      SUBJECTIVE:   Call  Denies pain  Denies shortness of breath at this time       ROS:4 point ROS including Respiratory, CV, GI and , other than that noted in the HPI,  is negative     OBJECTIVE:   /50 (BP Location: Right arm)   Pulse 73   Temp 97.8  F (36.6  C) (Oral)   Resp 18   Wt 83 kg (182 lb 15.7 oz)   SpO2 94%   BMI 27.82 kg/m      GENERAL APPEARANCE: Calm, awake, oriented x2.     RESP: Clear     CV: regular rate and rhythm, no murmur , edema: None     Abdomen: soft, nontender, no liver or spleen enlargement, no masses, BSs normal   Skin: no cyanosis, pallor, or jaundice    CMP  Recent Labs   Lab 03/04/21  0438 03/03/21  0519 03/02/21  1206 03/01/21  1046 02/27/21  0637 02/27/21  0637 02/26/21  1600    143 141 142   < > 141 139   POTASSIUM 3.6 3.5 3.6 3.7   < > 3.7 3.9   CHLORIDE 103 104 104 107   < > 106 102   CO2 34* 35* 34* 31   < > 29 26   ANIONGAP 4 4 3 4   < > 6 11   * 188* 310* 291*   < > 144* 129*   BUN 27 28 35* 42*   < > 70* 78*   CR 1.66* 1.61* 1.65* 1.81*   < > 2.15* 2.22*   GFRESTIMATED 38* 39* 38* 34*   < > 28* 27*   GFRESTBLACK 44* 46* 44* 40*   < > 32* 31*   EVELINA 7.8* 7.4* 7.6* 7.5*   < > 8.1* 8.7   PROTTOTAL   --   --   --   --   --  5.4* 6.2*   ALBUMIN  --   --   --   --   --  2.5* 2.9*   BILITOTAL  --   --   --   --   --  1.2 0.8   ALKPHOS  --   --   --   --   --  96 109   AST  --   --   --   --   --  32 41   ALT  --   --   --   --   --  39 42    < > = values in this interval not displayed.     CBC  Recent Labs   Lab 03/04/21  0438 03/03/21  1552 03/03/21  0519 03/02/21  1206 03/01/21  1046   WBC 10.6  --  7.4 8.7 7.4   RBC 3.11*  --  2.79* 2.89* 2.85*   HGB 8.9* 8.7* 7.9* 8.4* 8.3*   HCT 30.4*  --  27.2* 28.4* 24.6*   MCV 98  --  98 98 86   MCH 28.6  --  28.3 29.1 29.1   MCHC 29.3*  --  29.0* 29.6* 33.7   RDW 15.2*  --  15.5* 15.9* 15.6*     --  172 171 166     INRNo lab results found in last 7 days.  Arterial BloodGas  No lab results found in last 7 days.   Venous Blood Gas  No lab results found in last 7 days.    Medications     amoxicillin-clavulanate  1 tablet Oral Q12H ZOIE     atorvastatin  80 mg Oral Daily     famotidine  20 mg Oral Daily     finasteride  5 mg Oral Daily     fluticasone-vilanterol  1 puff Inhalation Daily    And     umeclidinium  1 puff Inhalation Daily     insulin aspart  1-10 Units Subcutaneous TID AC     insulin aspart  1-7 Units Subcutaneous At Bedtime     insulin NPH-Regular  16 Units Subcutaneous Daily with supper     insulin NPH-Regular  24 Units Subcutaneous QAM     metFORMIN  500 mg Oral BID w/meals     metoprolol tartrate  12.5 mg Oral BID     miconazole   Topical BID     mirtazapine  7.5 mg Oral At Bedtime     OLANZapine  2.5 mg Oral Daily     OLANZapine  5 mg Oral At Bedtime     sennosides  1 tablet Oral At Bedtime     tamsulosin  0.4 mg Oral Daily       Intake/Output Summary (Last 24 hours) at 3/4/2021 0829  Last data filed at 3/4/2021 0650  Gross per 24 hour   Intake --   Output 325 ml   Net -325 ml

## 2021-03-04 NOTE — PLAN OF CARE
Patient alert, but disorientated x2. Vital signs stable. On 2L of oxygen via nasal cannula. Afebrile. Denied any pain.     Patient's son, Jae, felt the patient was acting sleepy and out of it when he visited this evening. MD decreased scheduled dilaudid dosage.     Patient refused to sit in the chair for dinner. Patient only ate about 25%. Patient voided using the urinal. Pulsate mattress in use.     [facility]

## 2021-03-04 NOTE — PROGRESS NOTES
In consultation with Dr Zavala, who has assumed care from Dr Brice, dilaudid has been discontinued due to somnolence interfering with therapies participation.     Zeeshan Orozco CNP (Ann)  [facility] Palliative Care  Pager:  [phone]

## 2021-03-04 NOTE — PROGRESS NOTES
Called out to the rogers for assistance to the bathroom, urgently attempting to get out of bed, requesting to go to the bathroom. Pt sat self up in the bed and was attempting to scoot to the end of the bed. Assisted to standing position and walked into the bathroom with walker, SBA only for safety. After toilet, walked to the chair and sitting up in recliner. Sat up for two minutes and frequently placed call light on for assistance into bed and would yell out for every one who walked by to place him back into bed. Pt encouraged to sit-up and deep breath and cough to work his lungs. Needs on-going encouragement.

## 2021-03-04 NOTE — PLAN OF CARE
Patient alert and oriented x2. Declines HS medication. Patient slept majority of shift. Upon patient waking BG at 48, patient given boost and sandwich. Patient drank 100% of boost and only a bite of sandwich. Patient offered pudding and yogurt and ate 100% of snack.  at 0358. Resting in bed comfortably. Continues on 2L O2 via NC.

## 2021-03-05 NOTE — PROGRESS NOTES
Assessment & Plan     Alvaro Horton is a 80 year old male admitted on 2/26/2021. He presents from home where he lives with son with weakness and confusion        Gastrointestinal hemorrhage, suspect upper, on anticoagulation  Anemia, acute blood loss      Admission HGB 5.5. Occult blood positive in ED. On aspirin and apixaban. Last colonoscopy 8/2017 notable for adenomatous polyp and biopsy positive for lymphocytic colitis. Baseline HGB 12-13, but last HGB 12/25/20 was 10.     Eliquis held > 48 hours and EGD on 2/28 was normal with no bleeding source.     Hgb on admission 5.5 -> 7.2 after 2 units prbc -> 7.9 -> 7.4 -> 6.9 -> 8.3 after third unit PRBC -> 8.4 -> 7.9 8.7-->8.9.     Resumed home pantoprazole.     Stopped aspirin and apixaban.     Reportedly had melenotic stool overnight 3/2 but dark brown on 3/4    Bleeding appears to be resolving.  Did not do colonoscopy as unlikely to pursue aggressive therapy if we find something, but if bleeding persists may consider.     -Would stay off anticoagulants indefinitely, risk outweighs benefits     UTI   Sepsis ruled out    Presents with weaknesss, confusion, tachypnea, pyuria, lactate elevated 4.6, afebrile, WBC 9.2. CT chest abdomen pelvis notable for Modest bladder wall thickening and trabeculation with prostatic enlargement. In ED given 2 Li IVF, started on zosyn. No blood cultures sent before antibiotics.    After further assessment, acute presentation most likely due to GIB above and not sepsis.     Started on piperacillin-tazobactam, UCx positive Enterococcus pan-sensitive. Changed to Unasyn and now to oral Augmentin.   - Continue Augmentin through 3/8 (10 days) for complex UTI  - the UTI seems to be less likely the cause of his initial weakness complaints.  More likely is the GI bleed with significant blood loss        Acute kidney injury    Admit creatinine 2.22 with BUN 78. Likely due to anemia, volume depletion. Baseline creatine 1.1 in 2019, but running  1.1-1.4 since 11/2020.      Creatinine 2. -> 2.15 -> 2.09 -> 1.81 -> 1.65 -> 1.61, close to baseline .      Type 2 diabetes mellitus    A1c 8.3 12/25/20. -377  -  Continue metformin    - increase 70/30 insulin to 24 units in AM on 3/4, increased to 16 units PM today   - high sliding scale insulin   - had some marked lows in the mornings after increasing the NPH to 16 PM.  Will go back to 12 and 12     Memory loss/Dementia/ post covid encephalopathy     Memory issues noted in the past 3-4 months, dating from the time of his Covid diagnosis. - Started on Zyprexa during hospitalization 12/2020 due to encephalopathy during COVID infection  - son Jae actually feels the mental deterioration start with Covid in November.    - this is likely some post covid encephaloopathy which may  get better with time and therapy, but may never get better..    - he was driving before the covid.   -He was started on some oral Dilaudid for shortness of breath, got it on the day of 3/3, which clearly worsened his mental status and ability to do things.  This was stopped on the evening of 3/3.  Also had his olanzapine increased to try to improve appetite, which further caused some deterioration in mental capacity for 2 days.    - much clearer by 3/5    -Slums 14/30 on 3/4     Coronary Artery Disease    History of CABG in 2009 with LIMA to LAD and SVG to OM1 and 2. Follows with cardiology, last seen 3/2020. Last Echo 11/2020 EF 60-65%, no wall motion changes, decreased RV systolic function with elevated RV pressure.   - continue statin, beta blocker  - Holding ASA due to GI bleeding, consider restarting in 1-2 weeks if bleeding resolved     Hypertension    Chronic.  BP running a bit soft this admission but improving.   - Have resumed home fursemide  - Continue metoprolol     COPD (chronic obstructive pulmonary disease)  Asthma  Baker's Lung    PFTs in 04/2018 revealed severe airflow obstruction.   - Continue home Trelegy Ellipta, prn  "albuterol      Atrial fibrillation    Transient following CABG in 2009. ZioPatch 3/2020 did not show atrial fibrillation. ECG 8/2020 showed NSR. During admission 11/2020 for COVID 19 atrial fibrillation seen. Started on anticoagulation with Eliquis after discussion with patient and son due to fall risk and also CHADsVAC of 6 - possibly just short term. Now has apparent persistent atrial fibrillation , slow vetricular response    HR decent 70's. BP normalizing   - resumed home metoprolol 12.5 mg BID  - Stopped Eliquis due to GI bleeding and will not resume due to ongoing bleeding risk     History of CVA    Occurred at time of CABG in 2009.  - Continue home Lipitor 80  - Hold ASA/Eliquis due to GI bleeding     Benign Prostatic Hyperplasia    Chronic.  - continue home finasteride, tamsulosin     Recovered COVID 19 Infection with ongoing hypoxia    Diagnosed 11/26/20 with COVID 19, hospitalized x 2 and discharged on 2 LPM of oxygen. Still considered recovered from infectious standpoint.    No precautions needed    Repeat Covid PCR negative 2/26/2021.     SARS-CoV-2 PCR was rechecked today pre-procedure, 2/28, and returned positive. Patient is just beyond 90 days from known COVID onset late November and was negative just two days ago on admission. This is unlikely to represent re-infection with COVID.      Possible volume overload    CXR 2/28 showed some vasc congestion and small effusions that are increased. IV furosemide x 1 and stopped IVF.     IV furosemide x 2 today with good urine output. Less dyspneic   - wt up 6 kg by 3/4.  Will try some IV lasix again today          Poor appetite    Past few weeks or longer per son. \"Food tastes like dirt!\" per patient. Palliative care consult for appetite and shortness of breath, patient open to this.     Discussed with palliative care NP and increasing the evening olanzapine recommended to see if will increase appetite.   - increase olanzapine from 2.5 mg BID to 2.5 mg AM and " 5 mg HS  -  Marked increase in confusion on increased olazapine dose.  Went back down to 2.5 bid and will go to just daily on discharge.      Dyspnea    Due to chronic heart failure and deconditioning. Palliative care consulted 3/3. Started low dose hydromorphone 0.6 mg oral 5 times daily for dyspnea. Patient rather sleepy on this dose as I see him in the afternoon.    - appreciate palliative care recommendations   - will decrease oral hydromorphone to 0.2 mg 5 times daily     DISPO: Bassem Rowe [phone]  Another son Jake, was going to fly in to help assessment.  I would strongly push to allow him to see his dad because we are heading down the road of making some goals of care assessments which will be difficult to make without family input.  Nothing on chest x-ray, or CT to explain his shortness of breath.  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  His weakness is multifactorial: GIBleed, UTI, encephalopathy and deconditioning from all the above.    Per discussion with PT today, he would benefit from further rehab/strengthening.      Discharge instructions for when he is able to be discharged to TCU    For the GI bleed  - no clear source was found but it clearly stopped after aspirin and apixaban were stopped.   - would stay off the apixaban indefinitely, the risks are greater than the benefit  - would consider restarting the aspirin after 2 weeks if no further evidence of bleeding    For the UTI  -augmentin 875 bid for 4 more days    For the low oxygen levels  -this is thought primarily due to underlying COPD but may also be some fluid overload on top of it.   -will need O2 at 2 L for now, but may be able to taper off as he improves  - will continue to try to diurese some fluid off over the next week and see if he improves.    - he was still 6-8 lb up from his baseline, and we increased baseline lasix from average 30 mg/day to 40 mg 2 times/day for now until back down 6-8 lb    For the confusion/weakness  -SLUMs score was 18/30 here but we can see some definite improvement since he has been here  -the acute worsening after his Covid infection suggests some prolonged covid encephalopathy on top of some milder underlying dementia  -we are hoping for some improvement over time, but may be limited.    - he initially needed high dose olanzepine for control of agitation, but we have been tapering down on that and would consider stopping after 4 more days of the 2.5 mg at bedtime.     Diet: Snacks/Supplements Adult: Boost Plus; With Meals  Regular Diet Adult   DVT Prophylaxis: Eliquis (on hold)  Olivo Catheter: not present  Code Status: No CPR- Do NOT Intubate       SUBJECTIVE:   More alert,   Still needing help with ambulation,   No pain  Appetite better  Voiding a lot with lasix      ROS:4 point ROS including Respiratory, CV, GI and , other than that noted in the HPI,  is negative     OBJECTIVE:   /49 (BP Location: Right arm)   Pulse 72   Temp 97.6  F (36.4  C) (Oral)   Resp 16   Wt 82 kg (180 lb 12.4 oz)   SpO2 92%   BMI 27.49 kg/m      GENERAL APPEARANCE:  Alert, NAD, Ox2, doesn't know month or date.       RESP:clear      CV: regular rates and rhythm,no murmur, no click or rub - no edema     Abdomen: soft, nontender, no liver or spleen enlargement, no masses, BSs normal   Skin: no cyanosis, pallor, or jaundice     CMP  Recent Labs   Lab 03/05/21  0555 03/04/21  0438 03/03/21  0519 03/02/21  1206 02/27/21  0637 02/27/21  0637 02/26/21  1600    141 143 141   < > 141 139   POTASSIUM 3.9 3.6 3.5 3.6   < > 3.7 3.9   CHLORIDE 104 103 104 104   < > 106 102   CO2 37* 34* 35* 34*   < > 29 26   ANIONGAP 2* 4 4 3   < > 6 11   GLC 95 180* 188* 310*   < > 144* 129*   BUN 25 27 28 35*   < > 70* 78*   CR 1.65* 1.66* 1.61* 1.65*   < >  2.15* 2.22*   GFRESTIMATED 38* 38* 39* 38*   < > 28* 27*   GFRESTBLACK 44* 44* 46* 44*   < > 32* 31*   EVELINA 8.2* 7.8* 7.4* 7.6*   < > 8.1* 8.7   PROTTOTAL 6.1*  --   --   --   --  5.4* 6.2*   ALBUMIN 2.4*  --   --   --   --  2.5* 2.9*   BILITOTAL 0.5  --   --   --   --  1.2 0.8   ALKPHOS 92  --   --   --   --  96 109   AST 12  --   --   --   --  32 41   ALT 23  --   --   --   --  39 42    < > = values in this interval not displayed.     CBC  Recent Labs   Lab 03/05/21  0555 03/04/21  0438 03/03/21  1552 03/03/21  0519 03/02/21  1206   WBC 9.3 10.6  --  7.4 8.7   RBC 3.24* 3.11*  --  2.79* 2.89*   HGB 9.3* 8.9* 8.7* 7.9* 8.4*   HCT 28.7* 30.4*  --  27.2* 28.4*   MCV 89 98  --  98 98   MCH 28.7 28.6  --  28.3 29.1   MCHC 32.4 29.3*  --  29.0* 29.6*   RDW 15.0 15.2*  --  15.5* 15.9*    170  --  172 171     INRNo lab results found in last 7 days.  Arterial BloodGas  No lab results found in last 7 days.   Venous Blood Gas  No lab results found in last 7 days.    Medications     amoxicillin-clavulanate  1 tablet Oral Q12H ZOIE     atorvastatin  80 mg Oral Daily     famotidine  20 mg Oral Daily     finasteride  5 mg Oral Daily     fluticasone-vilanterol  1 puff Inhalation Daily    And     umeclidinium  1 puff Inhalation Daily     insulin aspart  1-10 Units Subcutaneous TID AC     insulin aspart  1-7 Units Subcutaneous At Bedtime     insulin NPH-Regular  12 Units Subcutaneous Daily with supper     insulin NPH-Regular  12 Units Subcutaneous QAM     metFORMIN  500 mg Oral BID w/meals     metoprolol tartrate  12.5 mg Oral BID     miconazole   Topical BID     mirtazapine  7.5 mg Oral At Bedtime     OLANZapine  2.5 mg Oral Daily     OLANZapine  5 mg Oral At Bedtime     sennosides  1 tablet Oral At Bedtime     tamsulosin  0.4 mg Oral Daily       Intake/Output Summary (Last 24 hours) at 3/5/2021 1420  Last data filed at 3/5/2021 1249  Gross per 24 hour   Intake 960 ml   Output 575 ml   Net 385 ml

## 2021-03-05 NOTE — PROGRESS NOTES
Palliative Care--Brief Note  March 5, 2021 8:15 AM    If consistent with goals, I believe patient would qualify for hospice enrollment based on a primary hospice diagnosis of dementia.  Contributing factors include:    10% weight loss since November    Dependence in ADLs including dressing, toileting    Post-Covid encephalopathy    UTIs in December, February    Zeeshan Orozco CNP (Ann)  [facility] Palliative Care  Pager:  [phone]

## 2021-03-05 NOTE — PLAN OF CARE
Patient was restless this evening but has settled down and has been sleeping for a few hours. 0200 blood sugar was 59. Patient drank two OJ's with 4 sugar packs. Blood sugar rechecked at 0300 was 99. Pt remains on 2L O2.

## 2021-03-05 NOTE — PLAN OF CARE
Physical Therapy Discharge Summary    Reason for therapy discharge:    Discharged to transitional care facility.    Progress towards therapy goal(s). See goals on Care Plan in [facility] electronic health record for goal details.  Goals partially met.  Barriers to achieving goals:   discharge from facility.    Therapy recommendation(s):    Continued therapy is recommended.  Rationale/Recommendations:  TCU to help patient progress to PLOF and improve strength and mobility.

## 2021-03-05 NOTE — DISCHARGE SUMMARY
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   - would stay off the apixaban indefinitely, the risks are greater than the benefit  - would consider restarting the aspirin after 2 weeks if no further evidence of bleeding    For the UTI  -augmentin 875 bid for 3 more days    For the low oxygen levels  -this is thought primarily due to underlying COPD but may also be some fluid overload on top of it.   -will need O2 at 2 L for now, but may be able to taper off as he improves  - will continue to try to diurese some fluid off over the next week and see if he improves.   - he was still 6-8 lb up from his baseline, and we increased baseline lasix from average 30 mg/day to 40 mg 2 times/day for now until back down 6-8 lb    For the confusion/weakness  -SLUMs score was 18/30 here but we can see some definite improvement since he has been here  -the acute worsening after his Covid infection suggests some prolonged covid encephalopathy on top of some milder underlying dementia  -we are hoping for some improvement over time, but may be  limited.    - he initially needed high dose olanzepine for control of agitation, but we have been tapering down on that and would consider stopping after 4 more days of the 2.5 mg at bedtime.    For the pressure sores on the buttocks  -he came in from home with these  -would use mepelex dressing and change q3 days unless needing earlier if soiled      Follow up with primary care provider in 7 days        Procedures:   Transfusion x 3     Results for orders placed or performed during the hospital encounter of 02/26/21   Head CT w/o contrast    Narrative    EXAM: CT HEAD W/O CONTRAST  LOCATION: Health system  DATE/TIME: 2/26/2021 4:50 PM    INDICATION: Mental status change, unknown cause.  COMPARISON: 12/25/2020.  TECHNIQUE: Routine CT Head without IV contrast. Multiplanar reformats. Dose reduction techniques were used.    FINDINGS:  INTRACRANIAL CONTENTS: No intracranial hemorrhage, extraaxial collection, or mass effect. No CT evidence of acute infarct. Mild to moderate presumed chronic small vessel ischemic changes. Mild generalized volume loss. No hydrocephalus. Cerebellar   tonsillar position is satisfactory. Sella appears within normal limits.     VISUALIZED ORBITS/SINUSES/MASTOIDS: Procedural changes both globes with no acute orbital process. No paranasal sinus mucosal disease. No middle ear or mastoid effusion.    BONES/SOFT TISSUES: No acute fracture of the calvarium or skull base. Mild degenerative changes right TMJ. No significant swelling the facial or scalp tissues is evident.      Impression    IMPRESSION:  1.  No CT evidence for acute intracranial process.    2.  Brain atrophy and presumed chronic microvascular ischemic changes as above.    3.  Stable intracranial appearance from 12/25/2020.   CT Chest Abdomen Pelvis w/o Contrast    Narrative    EXAM: CT CHEST ABDOMEN PELVIS W/O CONTRAST  LOCATION: Health system  DATE/TIME: 2/26/2021 4:51 PM    INDICATION: Weight loss, unintended.  Left-sided chest pain. Left upper quadrant pain.  COMPARISON: 12/25/2020  TECHNIQUE: CT scan of the chest, abdomen, and pelvis was performed without IV contrast. Multiplanar reformats were obtained. Dose reduction techniques were used.   CONTRAST: None.    FINDINGS:   LUNGS AND PLEURA: Slight improvement in the small bilateral pleural effusions as well as bibasilar atelectasis. Modest linear atelectasis or scarring at lung bases persists. No new infiltrate.    MEDIASTINUM/AXILLAE: Previous CABG. No adenopathy.    CORONARY ARTERY CALCIFICATION: Mild.    HEPATOBILIARY: Prior cholecystectomy.    PANCREAS: Normal.    SPLEEN: Stable benign granulomata.    ADRENAL GLANDS: Normal.    KIDNEYS/BLADDER: Vascular calcifications. No hydronephrosis or mass evident.  There is wall thickening and trabeculation of bladder.    BOWEL: No obstruction or inflammatory change.    LYMPH NODES: No lymphadenopathy.    VASCULATURE: Heavy atherosclerotic plaque.    PELVIC ORGANS: Moderate hypertrophy of prostate.    MUSCULOSKELETAL: No suspicious bony lesion. No rib fracture evident.      Impression    IMPRESSION:  1.  No etiology for symptoms. No mass or adenopathy. Nothing obstructive or inflammatory.  2.  Improved in previously identified lung scarring and pleural effusions, no new infiltrate.  3.  Modest bladder wall thickening and trabeculation with prostatic enlargement.   XR Chest Port 1 View    Narrative    CHEST ONE VIEW  2/28/2021 4:17 PM     HISTORY: Increasing shortness of breath. Evaluate for infiltrates or  heart failure.    COMPARISON: 11/26/2020      Impression    IMPRESSION: Small-to-moderate bilateral effusions with associated  atelectasis and/or infiltrate increased from previous. Question some  vascular congestion.    NATALIA ELIZALDE MD   Chest 2 Views*    Narrative    CHEST TWO VIEWS  3/4/2021 9:19 AM     HISTORY: Shortness of breath.    COMPARISON: 2/20/2021.       Impression    IMPRESSION: Cardiac silhouette is at the  upper limits of normal. Small  bilateral pleural effusions are present with bibasilar atelectasis.  Overall there is better expansion of the lungs, but no significant  change in size of the effusions. Sternotomy wires are noted.  Degenerative changes are noted through the spine.    YELENA MOJICA MD     *Note: Due to a large number of results and/or encounters for the requested time period, some results have not been displayed. A complete set of results can be found in Results Review.                    Allergies:      Allergies   Allergen Reactions     Prednisone Other (See Comments)     Severe interaction with DM                  Discharge Medications:     Current Discharge Medication List      START taking these medications    Details   amoxicillin-clavulanate (AUGMENTIN) 875-125 MG tablet Take 1 tablet by mouth every 12 hours Through 3/8/2021  Qty:      Associated Diagnoses: Urinary tract infection without hematuria, site unspecified      mirtazapine (REMERON) 7.5 MG tablet Take 1 tablet (7.5 mg) by mouth At Bedtime For APPETITE  Qty: 30 tablet, Refills: 1    Associated Diagnoses: Anorexia symptom         CONTINUE these medications which have CHANGED    Details   famotidine (PEPCID) 20 MG tablet Take 1 tablet (20 mg) by mouth daily  Qty: 60 tablet, Refills: 1    Associated Diagnoses: Gastroesophageal reflux disease without esophagitis      furosemide (LASIX) 40 MG tablet Take 1 tablet (40 mg) by mouth 2 times daily    Associated Diagnoses: Hypervolemia, unspecified hypervolemia type      insulin NPH-Regular (NOVOLIN 70/30 FLEXPEN RELION) susp Inject 12 Units Subcutaneous 2 times daily (with meals)  Qty: 15 mL, Refills: 1    Associated Diagnoses: Type 2 diabetes mellitus with stage 3 chronic kidney disease, with long-term current use of insulin, unspecified whether stage 3a or 3b CKD (H)      OLANZapine (ZYPREXA) 5 MG tablet Take 0.5 tablets (2.5 mg) by mouth At Bedtime  Qty: 30 tablet, Refills: 1     Associated Diagnoses: Metabolic encephalopathy         CONTINUE these medications which have NOT CHANGED    Details   albuterol (PROAIR HFA/PROVENTIL HFA/VENTOLIN HFA) 108 (90 Base) MCG/ACT inhaler Inhale 2 puffs into the lungs every 6 hours as needed for shortness of breath / dyspnea or wheezing  Qty: 1 Inhaler, Refills: 11    Comments: Pharmacy may dispense brand covered by insurance (Proair, or proventil or ventolin or generic albuterol inhaler)  Associated Diagnoses: Chronic obstructive pulmonary disease, unspecified COPD type (H)      atorvastatin (LIPITOR) 80 MG tablet Take 1 tablet (80 mg) by mouth daily  Qty: 90 tablet, Refills: 3    Associated Diagnoses: Hyperlipidemia LDL goal <70      blood glucose (NO BRAND SPECIFIED) test strip Use to test blood sugar 3 times daily  Qty: 100 strip, Refills: 0    Associated Diagnoses: Type 2 diabetes mellitus with stage 3 chronic kidney disease, with long-term current use of insulin (H)      cyanocobalamin (VITAMIN B-12) 1000 MCG tablet Take 1,000 mcg by mouth daily      finasteride (PROSCAR) 5 MG tablet Take 1 tablet by mouth once daily  Qty: 90 tablet, Refills: 0    Associated Diagnoses: Hypertrophy of prostate without urinary obstruction      metFORMIN (GLUCOPHAGE-XR) 500 MG 24 hr tablet Take 1 tablet (500 mg) by mouth 2 times daily (with meals)  Qty: 60 tablet, Refills: 11    Associated Diagnoses: Type 2 diabetes mellitus with stage 3 chronic kidney disease, with long-term current use of insulin, unspecified whether stage 3a or 3b CKD (H)      metoprolol tartrate (LOPRESSOR) 25 MG tablet Take 0.5 tablets (12.5 mg) by mouth 2 times daily  Qty: 90 tablet, Refills: 3    Associated Diagnoses: Chronic ischemic heart disease; Hypertension goal BP (blood pressure) < 140/90      tamsulosin (FLOMAX) 0.4 MG capsule Take 1 capsule (0.4 mg) by mouth daily  Qty: 90 capsule, Refills: 3    Associated Diagnoses: Benign non-nodular prostatic hyperplasia with lower urinary tract  symptoms      TRELEGY ELLIPTA 100-62.5-25 MCG/INH oral inhaler Inhale 1 puff into the lungs daily      Vitamin D3 (CHOLECALCIFEROL) 125 MCG (5000 UT) tablet Take 1 tablet by mouth daily      blood glucose monitoring (NO BRAND SPECIFIED) meter device kit Use to test blood sugar 3 times daily or as directed.  Qty: 1 kit, Refills: 0    Comments: Requesting Accu-check guide meter  Associated Diagnoses: Type 2 diabetes mellitus with stage 3 chronic kidney disease, with long-term current use of insulin (H)      insulin pen needle (BD ABRAM U/F) 32G X 4 MM miscellaneous Use 2 daily as directed.  Qty: 100 each, Refills: 11    Associated Diagnoses: Type 2 diabetes mellitus with stage 3 chronic kidney disease, with long-term current use of insulin (H)      nitroGLYcerin (NITROSTAT) 0.4 MG sublingual tablet Place 1 tablet (0.4 mg) under the tongue every 5 minutes as needed for chest pain (CALL 911 IF NOT IMPROVED AFTER THREE CONSECUTIVE DOSES)  Qty: 25 tablet, Refills: 5    Associated Diagnoses: Chronic ischemic heart disease      order for DME Equipment being ordered: CPAP  AIRSENSE 10  11-15 CM H2O  QUATTRO AIR SIZE MED  SN# [id number]  DN# [id number]         STOP taking these medications       apixaban ANTICOAGULANT (ELIQUIS) 5 MG tablet Comments:   Reason for Stopping:         aspirin (ASA) 325 MG tablet Comments:   Reason for Stopping:                     Consultations:   Palliative care           Brief History of Presenting Illness:   Alvaro Horton is a 80 year old male who presents with weakness and confusion. He is hard of hearing and unable to provide many details     He denies shortness of breath, notes some suprapubic tenderness      Per ED notes:    Lives with his son, I spoke with his son over the phone regarding history, patient is extremely poor historian.  Son tells me that Mr. Horton has been generally weak for the past for 5 days not really getting out of bed requiring his help with any transfers.  Patient  has had loss control of his bladder over the past few days urinating frequently.  Been very confused and mumbling.  Blood sugars reportedly have been in the 100-200 range.  Very poor solid intake minimal fluid intake.  3 days ago slid off the side of his bed, son had to get him back up.  No known injury.              Hospital Course:   Gastrointestinal hemorrhage, suspect upper, on anticoagulation  Anemia, acute blood loss anemia      Admission HGB 5.5. Occult blood positive in ED. On aspirin and apixaban. Last colonoscopy 8/2017 notable for adenomatous polyp and biopsy positive for lymphocytic colitis. Baseline HGB 12-13, but last HGB 12/25/20 was 10.     Eliquis held > 48 hours and EGD on 2/28 was normal with no bleeding source.     Hgb on admission 5.5 -> 7.2 after 2 units prbc -> 7.9 -> 7.4 -> 6.9 -> 8.3 after third unit PRBC -> 8.4 -> 7.9 8.7-->8.9.     Resumed home pantoprazole.     Stopped aspirin and apixaban.     Reportedly had melenotic stool overnight 3/2 but dark brown on 3/4    Bleeding appears to be resolving.  Did not do colonoscopy as unlikely to pursue aggressive therapy if we find something, but if bleeding persists may consider.     -Would stay off anticoagulants indefinitely, risk outweighs benefits     UTI   Sepsis ruled out    Presents with weaknesss, confusion, tachypnea, pyuria, lactate elevated 4.6, afebrile, WBC 9.2. CT chest abdomen pelvis notable for Modest bladder wall thickening and trabeculation with prostatic enlargement. In ED given 2 Li IVF, started on zosyn. No blood cultures sent before antibiotics.    After further assessment, acute presentation most likely due to GIB above and not sepsis.     Started on piperacillin-tazobactam, UCx positive Enterococcus pan-sensitive. Changed to Unasyn and now to oral Augmentin.   - Continue Augmentin through 3/8 (10 days) for complex UTI  - the UTI seems to be less likely the cause of his initial weakness complaints.  More likely is the GI  bleed with significant blood loss         Acute kidney injury    Admit creatinine 2.22 with BUN 78. Likely due to anemia, volume depletion. Baseline creatine 1.1 in 2019, but running 1.1-1.4 since 11/2020.      Creatinine 2. -> 2.15 -> 2.09 -> 1.81 -> 1.65 -> 1.61, close to baseline and stable        Type 2 diabetes mellitus    A1c 8.3 12/25/20. -377  -  Continue metformin    - increase 70/30 insulin to 24 units in AM on 3/4, increased to 16 units PM today   - high sliding scale insulin   - had some marked lows in the mornings after increasing the NPH to 16 PM.  Will go back to 12 and 12     Memory loss/Dementia/ post covid encephalopathy     Memory issues noted in the past 3-4 months, dating from the time of his Covid diagnosis. - Started on Zyprexa during hospitalization 12/2020 due to encephalopathy during COVID infection  - son Jae actually feels the mental deterioration start with Covid in November.    - this is likely some post covid encephaloopathy which may  get better with time and therapy, but may never get better..    - he was driving before the covid.   -He was started on some oral Dilaudid for shortness of breath, got it on the day of 3/3, which clearly worsened his mental status and ability to do things.  This was stopped on the evening of 3/3.  Also had his olanzapine increased to try to improve appetite, which further caused some deterioration in mental capacity for 2 days.    - much clearer by 3/5    -Slums 18/30 on 3/4     Coronary Artery Disease    History of CABG in 2009 with LIMA to LAD and SVG to OM1 and 2. Follows with cardiology, last seen 3/2020. Last Echo 11/2020 EF 60-65%, no wall motion changes, decreased RV systolic function with elevated RV pressure.   - continue statin, beta blocker  - Holding ASA due to GI bleeding, consider restarting in 1-2 weeks if bleeding resolved     Hypertension    Chronic.  BP running a bit soft this admission but improving.   - Have resumed home  fursemide  - Continue metoprolol     COPD (chronic obstructive pulmonary disease)  Asthma  Baker's Lung    PFTs in 04/2018 revealed severe airflow obstruction.   - Continue home Trelegy Ellipta, prn albuterol      Atrial fibrillation    Transient following CABG in 2009. ZioPatch 3/2020 did not show atrial fibrillation. ECG 8/2020 showed NSR. During admission 11/2020 for COVID 19 atrial fibrillation seen. Started on anticoagulation with Eliquis after discussion with patient and son due to fall risk and also CHADsVAC of 6 - possibly just short term. Now has apparent persistent atrial fibrillation , slow vetricular response    HR decent 70's. BP normalizing   - resumed home metoprolol 12.5 mg BID  - Stopped Eliquis due to GI bleeding and will not resume due to ongoing bleeding risk     History of CVA    Occurred at time of CABG in 2009.  - Continue home Lipitor 80  - Hold ASA/Eliquis due to GI bleeding     Benign Prostatic Hyperplasia    Chronic.  - continue home finasteride, tamsulosin     Recovered COVID 19 Infection with ongoing hypoxia    Diagnosed 11/26/20 with COVID 19, hospitalized x 2 and discharged on 2 LPM of oxygen. Still considered recovered from infectious standpoint.    No precautions needed    Repeat Covid PCR negative 2/26/2021.     SARS-CoV-2 PCR was rechecked today pre-procedure, 2/28, and returned positive. Patient is just beyond 90 days from known COVID onset late November and was negative just two days ago on admission. This is unlikely to represent re-infection with COVID.      Possible volume overload    CXR 2/28 showed some vasc congestion and small effusions that are increased. IV furosemide x 1 and stopped IVF.     IV furosemide x 2 today with good urine output. Less dyspneic   - wt up 6 kg by 3/4.  Will try some IV lasix again   - wt still up 4 Kg on discharge and some edema.  CXR less reliable for volume status in the setting of underlying COPD.  Will continue to try to diurese   "         Poor appetite    Past few weeks or longer per son. \"Food tastes like dirt!\" per patient. Palliative care consult for appetite and shortness of breath, patient open to this.     Discussed with palliative care NP and increasing the evening olanzapine recommended to see if will increase appetite.   - increase olanzapine from 2.5 mg BID to 2.5 mg AM and 5 mg HS  -  Marked increase in confusion on increased olazapine dose.  Went back down to 2.5 bid and will go to just daily on discharge.      Dyspnea    Due to chronic heart failure and deconditioning. Palliative care consulted 3/3. Started low dose hydromorphone 0.6 mg oral 5 times daily for dyspnea. Patient rather sleepy and more obtunded.  This was stopped after one day.    Underlying cause most likely his chronic COPD but some volume overload on top.       DISPO: Son Jae [phone]  Another son Jake, was going to fly in to help assessment.  I would strongly push to allow him to see his dad because we are heading down the road of making some goals of care assessments which will be difficult to make without family input.  Nothing on chest x-ray, or CT to explain his shortness of breath.  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  His weakness is multifactorial: GIBleed, UTI, encephalopathy and deconditioning from all the above.    Per discussion with PT today, he would benefit from further rehab/strengthening.                 Discharge Exam:   OBJECTIVE:   /56 (BP Location: Right arm)   Pulse 72   Temp 97.6  F (36.4  C) (Oral)   Resp 18   Wt 83.8 kg (184 lb 11.9 oz)   SpO2 92%   BMI 28.09 kg/m      GENERAL APPEARANCE:  Alert, NAD, Ox3     RESP:quiet, few faint rales bases       CV: irregular rates and rhythm,no murmur, no click or rub - 2+ edema     Abdomen: " soft, nontender, no liver or spleen enlargement, no masses, BSs normal   Skin: no cyanosis, pallor, or jaundice            Pending Tests at Discharge:     Unresulted Labs Ordered in the Past 30 Days of this Admission     No orders found from 1/27/2021 to 2/27/2021.                   Discharge Disposition:   Discharged to rehabilitation facility      Attestation:  Amount of time performed on this discharge : 45 minutes.    Tutu Zavala MD MD

## 2021-03-06 NOTE — PROGRESS NOTES
Care Management Discharge Note    Discharge Date: 03/06/21    Discharge Disposition: Transitional Care    Discharge Services: None    Discharge DME: Oxygen, Walker    Discharge Transportation: agency    Private pay costs discussed: transportation costs    PAS Confirmation Code: [id number]  Patient/family educated on Medicare website which has current facility and service quality ratings: yes    Education Provided on the Discharge Plan:  Yes  Persons Notified of Discharge Plans: Patient and son, Jae  Patient/Family in Agreement with the Plan: yes    Handoff Referral Completed: Yes    Additional Information:    [employer] Authororization approved for [facility].  Authorization Number: [id number].    Plan:  Critical access hospital TCU.  Transportation provided by [facility].      Zainab Jenkins RN

## 2021-03-06 NOTE — PROGRESS NOTES
No acute changes overnight. Patient rested well. Refused repos as he likes to be on back. Planning for discharge as long as everything is okay'd. Will continue to monitor.

## 2021-03-06 NOTE — PLAN OF CARE
Physical Therapy Discharge Summary     Reason for therapy discharge:    Discharge note from yesterday deleted earlier due to patient needing to stay however, patient's insurance has now been authorized and he plans to discharge today.  No further IP therapy needs as patient plans to discharge to TCU today.      Progress towards therapy goal(s). See goals on Care Plan in [facility] electronic health record for goal details.  Goals partially met.  Barriers to achieving goals:   discharge from facility.     Therapy recommendation(s):    Continued therapy is recommended.  Rationale/Recommendations:  TCU to help patient progress to PLOF and improve strength and mobility.

## 2021-03-06 NOTE — PROGRESS NOTES
Patient dressed ready to go for transport at 1300. Dressing on buttocks C/D/I, powder applied to groin. Patient has bilat hearing aids on and glasses. Phone, ID and jewelry packed in bag to be sent with patient. Report called to Eli nurse at TCU. Sitting up in chair waiting for ride at this time. Chair alarm on for safety.

## 2021-03-06 NOTE — PLAN OF CARE
Attempted to wean oxygen from 2 l to 1 L via n/c. Oxygen saturations decreased to 86-88%. No distress noted. Oxygen remains at 2. Lung sounds with crackles in the LLL. Receiving IV Lasix. BM today. Trace edema in both ankles. Patient was not discharged to [facility] as approval not completed., Dr. Zavala aware.

## 2021-03-06 NOTE — PROGRESS NOTES
Oneyda cares provided this evening and patient tolerated. Using urinal at bedside, but also incontinent of urine. Remains on 2L per NC and tolerating this. Barrier cream applied to coccyx and mepilex remains in place. Patient alert to self only, but pleasant and cooperative. Will continue to monitor overnight.

## 2021-03-06 NOTE — PLAN OF CARE
Occupational Therapy Discharge Summary    Reason for therapy discharge:    Discharged to TCU. Anticipated further hospitalization however he is being dc'd today    Progress towards therapy goal(s). See goals on Care Plan in [facility] electronic health record for goal details.  Goals partially met.  Barriers to achieving goals:   discharge from facility.    Therapy recommendation(s):    Continued therapy is recommended.  Rationale/Recommendations:  To increase strength for ADLs I.

## 2021-03-07 NOTE — TELEPHONE ENCOUNTER
New patient on ABX, now having loose stool through evening and day, refusing ABX, confused.  -Cdiff test

## 2021-03-08 NOTE — PROGRESS NOTES
[facility] Admission  PCP & CLINIC: Be Carreno MD, [address] [place] [address]; Phone: [phone]; Fax: [phone]  Chief Complaint   Patient presents with     Hospital F/U     [facility] 2/26/2021 - 3/6/2021    [place] MRN: [id number]. Place of Service where encounter took place:  [facility] ON [facility] (TCU) [[id number]]. Alvaro Horton  is a 80 year old  (1940), admitted to the above facility from [facility]. Hospital stay 2/26/21 through 3/6/21. Admitted to this facility for rehab, medical management, and nursing care. HPI information obtained from: facility chart records, facility staff, patient report, [facility] chart review, and Care Everywhere [facility] chart review.     Brief Summary of Hospital Course: Franklin presented to [facility] on 2/26 w/ GI bleeding. Bleeding thought to be upper GIB and aspirin/Eliquis were stopped. GIB subsided.  He received blood products. He was also found to have a UTI, and was treated w/ Augmentin.     Updates since admission to transitional care unit: Franklin presented to TCU on 3/6 s/p the above hospitalization.  Today, Franklin is apprehensive and confused.  He is able to answer all questions, but is very hard of hearing.  He denies pain, headache, dizziness, shortness of breath, chest pain, palpitations, constipation, or diarrhea.  He states he has a good appetite.  He states that he is sleeping pretty well.  He denies any bladder problems despite UTI, and he denies blood in stool.  We note that his cognition may cloud HPI.  Chart review shows that vital signs have been stable.    CODE STATUS/ADVANCE DIRECTIVES DISCUSSION: DNR only. Patient's living condition: lives with family, son (adult) . ALLERGIES: PrednisonePAST MEDICAL HISTORY:  has a past medical history of Acute calculous cholecystitis (03/02/2019), Acute respiratory failure with hypoxia (H) (11/27/2020), Altered mental status (12/31/2014), Calculus of kidney (1990), COPD  (chronic obstructive pulmonary disease) (H), COVID-19 virus infection (12/2/2020), Depression (1979), Diabetes (H), Elevated troponin (11/27/2020), Encephalopathy (09/03/2018), Heart disease, Left hand weakness (12/14/2009), PNEUMONIA, ORGANISM NOS (02/14/2008), and Sleep apnea. He also has no past medical history of Basal cell carcinoma, Complication of anesthesia, Malignant melanoma (H), PONV (postoperative nausea and vomiting), or Squamous cell carcinoma.. PAST SURGICAL HISTORY:   has a past surgical history that includes ANESTH,DX ARTHROSCOPIC PROC KNEE JOINT (1994, 1998); surgical history of -  (1998); surgical history of -  (2004); surgical history of -  (2006); Cardiac surgery (11/09); Abdomen surgery (1990); orthopedic surgery (8/12); Laparoscopic cholecystectomy (N/A, 3/4/2019); and Esophagoscopy, gastroscopy, duodenoscopy (EGD), combined (N/A, 2/28/2021).. FAMILY HISTORY: family history includes C.A.D. in his brother, father, and paternal grandfather; Diabetes in his brother; Gastrointestinal Disease in his son and another family member; Heart Disease in his brother and paternal grandfather; Hypertension in his brother and father.. SOCIAL HISTORY:   reports that he has never smoked. He has never used smokeless tobacco. He reports that he does not drink alcohol or use drugs.  Post Discharge Medication Reconciliation Status: discharge medications reconciled and changed, per note/orders  Current Outpatient Medications   Medication Sig Dispense Refill     pantoprazole (PROTONIX) 40 MG EC tablet Take 40 mg by mouth daily       albuterol (PROAIR HFA/PROVENTIL HFA/VENTOLIN HFA) 108 (90 Base) MCG/ACT inhaler Inhale 2 puffs into the lungs every 6 hours as needed for shortness of breath / dyspnea or wheezing 1 Inhaler 11     amoxicillin-clavulanate (AUGMENTIN) 875-125 MG tablet Take 1 tablet by mouth every 12 hours Through 3/8/2021       atorvastatin (LIPITOR) 80 MG tablet Take 1 tablet (80 mg) by mouth daily 90  tablet 3     blood glucose (NO BRAND SPECIFIED) test strip Use to test blood sugar 3 times daily 100 strip 0     blood glucose monitoring (NO BRAND SPECIFIED) meter device kit Use to test blood sugar 3 times daily or as directed. 1 kit 0     cyanocobalamin (VITAMIN B-12) 500 MCG tablet Take 500 mcg by mouth daily        famotidine (PEPCID) 20 MG tablet Take 1 tablet (20 mg) by mouth daily (Patient not taking: Reported on 3/9/2021) 60 tablet 1     finasteride (PROSCAR) 5 MG tablet Take 1 tablet by mouth once daily 90 tablet 0     furosemide (LASIX) 40 MG tablet Take 1 tablet (40 mg) by mouth 2 times daily       insulin NPH-Regular (NOVOLIN 70/30 FLEXPEN RELION) susp Inject 12 Units Subcutaneous 2 times daily (with meals) 15 mL 1     insulin pen needle (BD ABRAM U/F) 32G X 4 MM miscellaneous Use 2 daily as directed. 100 each 11     metFORMIN (GLUCOPHAGE-XR) 500 MG 24 hr tablet Take 1 tablet (500 mg) by mouth 2 times daily (with meals) 60 tablet 11     metoprolol tartrate (LOPRESSOR) 25 MG tablet Take 0.5 tablets (12.5 mg) by mouth 2 times daily 90 tablet 3     mirtazapine (REMERON) 7.5 MG tablet Take 1 tablet (7.5 mg) by mouth At Bedtime For APPETITE 30 tablet 1     nitroGLYcerin (NITROSTAT) 0.4 MG sublingual tablet Place 1 tablet (0.4 mg) under the tongue every 5 minutes as needed for chest pain (CALL 911 IF NOT IMPROVED AFTER THREE CONSECUTIVE DOSES) 25 tablet 5     OLANZapine (ZYPREXA) 5 MG tablet Take 0.5 tablets (2.5 mg) by mouth At Bedtime 30 tablet 1     order for DME Equipment being ordered: CPAP  AIRSENSE 10  11-15 CM H2O  QUATTRO AIR SIZE MED  SN# [id number]  DN# [id number]       tamsulosin (FLOMAX) 0.4 MG capsule Take 1 capsule (0.4 mg) by mouth daily 90 capsule 3     TRELEGY ELLIPTA 100-62.5-25 MCG/INH oral inhaler Inhale 1 puff into the lungs daily       Vitamin D3 (CHOLECALCIFEROL) 125 MCG (5000 UT) tablet Take 1 tablet by mouth daily       ROS: Limited secondary to cognitive impairment but today pt reports the  "above and 10 point ROS of systems including Constitutional, Eyes, Respiratory, Cardiovascular, Gastroenterology, Genitourinary, Integumentary, Musculoskeletal, Psychiatric were all negative except for pertinent positives noted in my HPI.    Vitals: /49   Pulse 59   Temp 97.4  F (36.3  C)   Resp 14   Ht 1.727 m (5' 8\")   Wt 80.1 kg (176 lb 8 oz)   SpO2 92%   BMI 26.84 kg/m    Exam:  GENERAL APPEARANCE: Alert, in no distress, cooperative.   ENT: Mouth/posterior oropharynx intact w/ moist mucous membranes, hearing acuity [place].  EYES: EOM, conjunctivae, lids, pupils and irises normal, PERRL2.   RESP: Respiratory effort unlabored, no respiratory distress, Lung sounds clear/diminished. On 2LO2 via NC.   CV: Auscultation of heart reveals S1, S2, rate-controlled and rhythmirregular, no murmur, no rub or gallop, Edema trace BLE. Peripheral pulses are 2+.  ABDOMEN: Normal bowel sounds, soft, non-tender abdomen, and no masses palpated.  SKIN: Inspection/Palpation of skin and subcutaneous tissue baseline w/ fragility. No wounds/rashes noted.   NEURO: CN II-XII at patient's baseline, sensation baseline PPS.  PSYCH: Insight, judgement, and memory are impaired at baseline, affect and mood are happy/engaged.    Lab/Diagnostic data: Recent labs in [facility] reviewed by me today.     ASSESSMENT/PLAN:  Gastrointestinal hemorrhage with melena  Coronary artery disease involving native coronary artery of native heart without angina pectoris  Stable angina pectoris (H)  Permanent atrial fibrillation (H)  Hypertension goal BP (blood pressure) < 140/90  BEBETO (acute kidney injury) (H)  Stage 3a chronic kidney disease  Pulmonary hypertension, unspecified (H)  Type 2 diabetes mellitus with other circulatory complication, with long-term current use of insulin (H)  Chronic obstructive pulmonary disease, unspecified COPD type (H)  Claudication of calf muscles (H)  Alzheimer's dementia without behavioral disturbance, unspecified timing of " dementia onset (H)  Acute-on-chronic. Tenuous.    Franklin is utilizing oxygen, but satting 95% on 2 L.  We note his underlying COPD and that his parameter can be lower.  We will write orders for nursing to wean O2.    We do not note a recent B12 level, and that this dosing may contribute to polypharmacy.  We will trend B12 level, and decrease this supplementation and possibly discontinuing the future.    We reviewed hospital notes, and although they mention use of PPI, no PPI has been ordered despite GI bleeding.  We do note some low magnesium in the past secondary to PPI use, but that use of PPI in this case is best treatment for Franklin.  Therefore we will trend magnesium level, discontinue Pepcid, and start Protonix to protect from GI bleeding.    Given GI bleeding and medication changes, we will trend blood work with CBC and CMP to check for his anemia and renal function/electrolytes.    We coordinated care with nursing around order clarification.  We also coordinated care with PharmD around the use of H2 versus PPI in Franklin's case.  It was PharmD that agreed that the benefit of PPI in this case outweighs the risk of having a low magnesium, which we can replace if needed.  Follow-up w/in 1 week or as needed.     Orders written by provider at facility and transcribed by : Sol Bermeo  1. Wean O2 Q shift to keep sats greater than or equal to 88%% - dx: COPD  2. Decrease B12 dose to 500 mcg PO every day - dx: supplement  3. Discontinue Pepcid  4. Protonix 40 mg PO every day - dx: GIB  5. CBC, CMP, B12, Mag x 1 on 3/11 - dx: GIB     Total time spent with patient visit was 35 min including patient visit and review of past records. Greater than 50% of total time spent with counseling and coordinating care with nursing and PharmD as noted above.     Electronically signed by:  Dr. Celena Kuhn, APRN CNP DNP

## 2021-03-08 NOTE — PROGRESS NOTES
Clinic Care Coordination Contact  Care Coordination Transition Communication    Referral Source: IP Handoff    Clinical Data: Patient was hospitalized at [facility] from 2/26/21 to 3/6/21 with diagnosis of acute GI bleed, weakness.post covid encephalopathy .     Transition to Facility:              Facility Name: [facility]               Contact name and phone number/fax: Katey is  at [phone]    Plan: RN/SW Care Coordinator will await notification from facility staff informing RN/SW Care Coordinator of patient's discharge plans/needs. RN/SW Care Coordinator will review chart and outreach to facility staff every 4 weeks and as needed.     Colette Edward RN, [facility] RN Coordinator  [facility]   3/8/2021    [phone]

## 2021-03-09 PROBLEM — N18.30 CHRONIC KIDNEY DISEASE, STAGE 3 (H): Status: ACTIVE | Noted: 2021-01-01

## 2021-03-09 PROBLEM — I27.20 PULMONARY HYPERTENSION, UNSPECIFIED (H): Status: ACTIVE | Noted: 2021-01-01

## 2021-03-09 PROBLEM — I20.89 STABLE ANGINA PECTORIS (H): Status: ACTIVE | Noted: 2021-01-01

## 2021-03-09 NOTE — LETTER
3/9/2021        RE: Alvaro Horton  [address] [facility] [address]        [facility] Admission  PCP & CLINIC: Be Carreno MD, [address] [place] [address] [place] [address]; Phone: [phone]; Fax: [phone]  Chief Complaint   Patient presents with     Hospital F/U     [facility] 2/26/2021 - 3/6/2021    [place] MRN: [id number]. Place of Service where encounter took place:  [facility] (TCU) [[id number]]. Alvaro Horton  is a 80 year old  (1940), admitted to the above facility from [facility]. Hospital stay 2/26/21 through 3/6/21. Admitted to this facility for rehab, medical management, and nursing care. HPI information obtained from: facility chart records, facility staff, patient report, [facility] chart review, and Care Everywhere [facility] chart review.     Brief Summary of Hospital Course: Franklin presented to [facility] on 2/26 w/ GI bleeding. Bleeding thought to be upper GIB and aspirin/Eliquis were stopped. GIB subsided.  He received blood products. He was also found to have a UTI, and was treated w/ Augmentin.     Updates since admission to transitional care unit: Franklin presented to TCU on 3/6 s/p the above hospitalization.  Today, Franklin is apprehensive and confused.  He is able to answer all questions, but is very hard of hearing.  He denies pain, headache, dizziness, shortness of breath, chest pain, palpitations, constipation, or diarrhea.  He states he has a good appetite.  He states that he is sleeping pretty well.  He denies any bladder problems despite UTI, and he denies blood in stool.  We note that his cognition may cloud HPI.  Chart review shows that vital signs have been stable.    CODE STATUS/ADVANCE DIRECTIVES DISCUSSION: DNR only. Patient's living condition: lives with family, son (adult) . ALLERGIES: PrednisonePAST MEDICAL HISTORY:  has a past medical history of Acute calculous cholecystitis (03/02/2019), Acute respiratory  failure with hypoxia (H) (11/27/2020), Altered mental status (12/31/2014), Calculus of kidney (1990), COPD (chronic obstructive pulmonary disease) (H), COVID-19 virus infection (12/2/2020), Depression (1979), Diabetes (H), Elevated troponin (11/27/2020), Encephalopathy (09/03/2018), Heart disease, Left hand weakness (12/14/2009), PNEUMONIA, ORGANISM NOS (02/14/2008), and Sleep apnea. He also has no past medical history of Basal cell carcinoma, Complication of anesthesia, Malignant melanoma (H), PONV (postoperative nausea and vomiting), or Squamous cell carcinoma.. PAST SURGICAL HISTORY:   has a past surgical history that includes ANESTH,DX ARTHROSCOPIC PROC KNEE JOINT (1994, 1998); surgical history of -  (1998); surgical history of -  (2004); surgical history of -  (2006); Cardiac surgery (11/09); Abdomen surgery (1990); orthopedic surgery (8/12); Laparoscopic cholecystectomy (N/A, 3/4/2019); and Esophagoscopy, gastroscopy, duodenoscopy (EGD), combined (N/A, 2/28/2021).. FAMILY HISTORY: family history includes C.A.D. in his brother, father, and paternal grandfather; Diabetes in his brother; Gastrointestinal Disease in his son and another family member; Heart Disease in his brother and paternal grandfather; Hypertension in his brother and father.. SOCIAL HISTORY:   reports that he has never smoked. He has never used smokeless tobacco. He reports that he does not drink alcohol or use drugs.  Post Discharge Medication Reconciliation Status: discharge medications reconciled and changed, per note/orders  Current Outpatient Medications   Medication Sig Dispense Refill     pantoprazole (PROTONIX) 40 MG EC tablet Take 40 mg by mouth daily       albuterol (PROAIR HFA/PROVENTIL HFA/VENTOLIN HFA) 108 (90 Base) MCG/ACT inhaler Inhale 2 puffs into the lungs every 6 hours as needed for shortness of breath / dyspnea or wheezing 1 Inhaler 11     amoxicillin-clavulanate (AUGMENTIN) 875-125 MG tablet Take 1 tablet by mouth every 12  hours Through 3/8/2021       atorvastatin (LIPITOR) 80 MG tablet Take 1 tablet (80 mg) by mouth daily 90 tablet 3     blood glucose (NO BRAND SPECIFIED) test strip Use to test blood sugar 3 times daily 100 strip 0     blood glucose monitoring (NO BRAND SPECIFIED) meter device kit Use to test blood sugar 3 times daily or as directed. 1 kit 0     cyanocobalamin (VITAMIN B-12) 500 MCG tablet Take 500 mcg by mouth daily        famotidine (PEPCID) 20 MG tablet Take 1 tablet (20 mg) by mouth daily (Patient not taking: Reported on 3/9/2021) 60 tablet 1     finasteride (PROSCAR) 5 MG tablet Take 1 tablet by mouth once daily 90 tablet 0     furosemide (LASIX) 40 MG tablet Take 1 tablet (40 mg) by mouth 2 times daily       insulin NPH-Regular (NOVOLIN 70/30 FLEXPEN RELION) susp Inject 12 Units Subcutaneous 2 times daily (with meals) 15 mL 1     insulin pen needle (BD ABRAM U/F) 32G X 4 MM miscellaneous Use 2 daily as directed. 100 each 11     metFORMIN (GLUCOPHAGE-XR) 500 MG 24 hr tablet Take 1 tablet (500 mg) by mouth 2 times daily (with meals) 60 tablet 11     metoprolol tartrate (LOPRESSOR) 25 MG tablet Take 0.5 tablets (12.5 mg) by mouth 2 times daily 90 tablet 3     mirtazapine (REMERON) 7.5 MG tablet Take 1 tablet (7.5 mg) by mouth At Bedtime For APPETITE 30 tablet 1     nitroGLYcerin (NITROSTAT) 0.4 MG sublingual tablet Place 1 tablet (0.4 mg) under the tongue every 5 minutes as needed for chest pain (CALL 911 IF NOT IMPROVED AFTER THREE CONSECUTIVE DOSES) 25 tablet 5     OLANZapine (ZYPREXA) 5 MG tablet Take 0.5 tablets (2.5 mg) by mouth At Bedtime 30 tablet 1     order for DME Equipment being ordered: CPAP  AIRSENSE 10  11-15 CM H2O  QUATTRO AIR SIZE MED  SN# [id number]  DN# [id number]       tamsulosin (FLOMAX) 0.4 MG capsule Take 1 capsule (0.4 mg) by mouth daily 90 capsule 3     TRELEGY ELLIPTA 100-62.5-25 MCG/INH oral inhaler Inhale 1 puff into the lungs daily       Vitamin D3 (CHOLECALCIFEROL) 125 MCG (5000 UT) tablet  "Take 1 tablet by mouth daily       ROS: Limited secondary to cognitive impairment but today pt reports the above and 10 point ROS of systems including Constitutional, Eyes, Respiratory, Cardiovascular, Gastroenterology, Genitourinary, Integumentary, Musculoskeletal, Psychiatric were all negative except for pertinent positives noted in my HPI.    Vitals: /49   Pulse 59   Temp 97.4  F (36.3  C)   Resp 14   Ht 1.727 m (5' 8\")   Wt 80.1 kg (176 lb 8 oz)   SpO2 92%   BMI 26.84 kg/m    Exam:  GENERAL APPEARANCE: Alert, in no distress, cooperative.   ENT: Mouth/posterior oropharynx intact w/ moist mucous membranes, hearing acuity [place].  EYES: EOM, conjunctivae, lids, pupils and irises normal, PERRL2.   RESP: Respiratory effort unlabored, no respiratory distress, Lung sounds clear/diminished. On 2LO2 via NC.   CV: Auscultation of heart reveals S1, S2, rate-controlled and rhythmirregular, no murmur, no rub or gallop, Edema trace BLE. Peripheral pulses are 2+.  ABDOMEN: Normal bowel sounds, soft, non-tender abdomen, and no masses palpated.  SKIN: Inspection/Palpation of skin and subcutaneous tissue baseline w/ fragility. No wounds/rashes noted.   NEURO: CN II-XII at patient's baseline, sensation baseline PPS.  PSYCH: Insight, judgement, and memory are impaired at baseline, affect and mood are happy/engaged.    Lab/Diagnostic data: Recent labs in [facility] reviewed by me today.     ASSESSMENT/PLAN:  Gastrointestinal hemorrhage with melena  Coronary artery disease involving native coronary artery of native heart without angina pectoris  Stable angina pectoris (H)  Permanent atrial fibrillation (H)  Hypertension goal BP (blood pressure) < 140/90  BEBETO (acute kidney injury) (H)  Stage 3a chronic kidney disease  Pulmonary hypertension, unspecified (H)  Type 2 diabetes mellitus with other circulatory complication, with long-term current use of insulin (H)  Chronic obstructive pulmonary disease, unspecified COPD type " (H)  Claudication of calf muscles (H)  Alzheimer's dementia without behavioral disturbance, unspecified timing of dementia onset (H)  Acute-on-chronic. Tenuous.    Franklin is utilizing oxygen, but satting 95% on 2 L.  We note his underlying COPD and that his parameter can be lower.  We will write orders for nursing to wean O2.    We do not note a recent B12 level, and that this dosing may contribute to polypharmacy.  We will trend B12 level, and decrease this supplementation and possibly discontinuing the future.    We reviewed hospital notes, and although they mention use of PPI, no PPI has been ordered despite GI bleeding.  We do note some low magnesium in the past secondary to PPI use, but that use of PPI in this case is best treatment for Franklin.  Therefore we will trend magnesium level, discontinue Pepcid, and start Protonix to protect from GI bleeding.    Given GI bleeding and medication changes, we will trend blood work with CBC and CMP to check for his anemia and renal function/electrolytes.    We coordinated care with nursing around order clarification.  We also coordinated care with PharmD around the use of H2 versus PPI in Franklin's case.  It was PharmD that agreed that the benefit of PPI in this case outweighs the risk of having a low magnesium, which we can replace if needed.  Follow-up w/in 1 week or as needed.     Orders written by provider at facility and transcribed by : Sol Bermeo  1. Wean O2 Q shift to keep sats greater than or equal to 88%% - dx: COPD  2. Decrease B12 dose to 500 mcg PO every day - dx: supplement  3. Discontinue Pepcid  4. Protonix 40 mg PO every day - dx: GIB  5. CBC, CMP, B12, Mag x 1 on 3/11 - dx: GIB     Total time spent with patient visit was 35 min including patient visit and review of past records. Greater than 50% of total time spent with counseling and coordinating care with nursing and PharmD as noted above.     Electronically signed by:  Dr. [place]  THOMPSON Kuhn CNP DNP                        Sincerely,        THOMPSON Parrish CNP

## 2021-03-10 NOTE — TELEPHONE ENCOUNTER
[place] DOCUMENTATION ENCOUNTER    Alvaro Horton is a 80 year old  (1940), Nurse reported   Labs back       Results for orders placed or performed in visit on 03/10/21   CBC with platelets     Status: Abnormal   Result Value Ref Range    WBC 8.9 4.0 - 11.0 10e9/L    RBC Count 3.16 (L) 4.4 - 5.9 10e12/L    Hemoglobin 8.8 (L) 13.3 - 17.7 g/dL    Hematocrit 29.9 (L) 40.0 - 53.0 %    MCV 95 78 - 100 fl    MCH 27.8 26.5 - 33.0 pg    MCHC 29.4 (L) 31.5 - 36.5 g/dL    RDW 14.8 10.0 - 15.0 %    Platelet Count 198 150 - 450 10e9/L   Comprehensive metabolic panel     Status: Abnormal   Result Value Ref Range    Sodium 141 133 - 144 mmol/L    Potassium 4.0 3.4 - 5.3 mmol/L    Chloride 100 94 - 109 mmol/L    Carbon Dioxide 36 (H) 20 - 32 mmol/L    Anion Gap 5 3 - 14 mmol/L    Glucose 198 (H) 70 - 99 mg/dL    Urea Nitrogen 20 7 - 30 mg/dL    Creatinine 1.39 (H) 0.66 - 1.25 mg/dL    GFR Estimate 47 (L) >60 mL/min/[1.73_m2]    GFR Estimate If Black 54 (L) >60 mL/min/[1.73_m2]    Calcium 8.8 8.5 - 10.1 mg/dL    Bilirubin Total 1.2 0.2 - 1.3 mg/dL    Albumin 2.5 (L) 3.4 - 5.0 g/dL    Protein Total 6.4 (L) 6.8 - 8.8 g/dL    Alkaline Phosphatase 102 40 - 150 U/L    ALT 26 0 - 70 U/L    AST 18 0 - 45 U/L   Magnesium     Status: None   Result Value Ref Range    Magnesium 1.8 1.6 - 2.3 mg/dL       ASSESSMENT/PLAN  Anemia - appears stable in last weeks - noted now on higher PPI  Will rehceck 3/15      Electronically signed by:   THOMPSON Benavides CNP

## 2021-03-10 NOTE — PROGRESS NOTES
"[facility]  [place] Medical Record Number: [id number]  Place of Service where encounter took place: [facility] (TCU) [[id number]]  Chief Complaint   Patient presents with     RECHECK     HPI:    Alvaro Horton is a 80 year old (1940), who is being seen today for an episodic care visit. HPI information obtained from: facility chart records, facility staff, patient report and [facility] chart review. Today's concern is: Nsg asking me to see pt r/t anxiety. Noted labs are back    [facility] is rehabbing s/p FVL 2/26 - 3/6 for upper GI bleed. ASA/Eliquis stopped and treated with PRBC transfusion. UTI tx with Augmentin  At [facility]:  3/12: wean o2 (confused thought to be CO2 retention), decrease B 12, stop pepcid and start protonix, labs 3/11    Today noted Cr improving, HGB stable, but lower than desired at 8.8 from 9.3    Chart review shows zyprexia started for encephalopathy during covid and was decreased in the hospital, but he became more anxious and therefore was replaced to PTA dose of 5 BID>   Hx of seroquel and haldol in hospital during COVID for anxiety also. No use of selective serotonin reuptake inhibitor, on remeron for appetite enhancement    Today he is very anxious in his room and wanting to go home aSAP. Denies any concerns/problems.     Past Medical and Surgical History reviewed in Epic today.    MEDICATIONS: Reviewed.    REVIEW OF SYSTEMS:  4 point ROS including Respiratory, CV, GI and , other than that noted in the HPI,  is negative    Objective:  /51   Pulse 72   Temp 98  F (36.7  C)   Resp 16   Ht 1.715 m (5' 7.5\")   Wt 79.8 kg (176 lb)   SpO2 96%   BMI 27.16 kg/m    Exam:  GENERAL APPEARANCE:  Alert, in no distress  RESP:  respiratory effort and palpation of chest normal, auscultation of lungs clear , no respiratory distress  CV:  Palpation and auscultation of heart done , rate and rhythm reg, no murmur, no peripheral edema  ABDOMEN:  normal bowel sounds, soft, " nontender, no hepatosplenomegaly or other masses  M/S:   Gait and station indep, Digits and nails intact  SKIN:  Inspection and Palpation of skin and subcutaneous tissue intact  NEURO: 2-12 in normal limits and at patient's baseline  - yeast on glanis of penis  PSYCH:  insight and judgement, memory poor , affect and mood Pleasant but anxious/upset.     Labs:   Recent labs in [facility] reviewed by me today.  and   Most Recent 3 CBC's:  Recent Labs   Lab Test 03/10/21  0825 03/05/21  0555 03/04/21  0438   WBC 8.9 9.3 10.6   HGB 8.8* 9.3* 8.9*   MCV 95 89 98    168 170     Most Recent 3 BMP's:  Recent Labs   Lab Test 03/10/21  0825 03/05/21  0555 03/04/21  0438    143 141   POTASSIUM 4.0 3.9 3.6   CHLORIDE 100 104 103   CO2 36* 37* 34*   BUN 20 25 27   CR 1.39* 1.65* 1.66*   ANIONGAP 5 2* 4   EVELINA 8.8 8.2* 7.8*   * 95 180*     Most Recent 2 LFT's:  Recent Labs   Lab Test 03/10/21  0825 03/05/21  0555   AST 18 12   ALT 26 23   ALKPHOS 102 92   BILITOTAL 1.2 0.5     Lab Results   Component Value Date    B12 947 03/10/2021     Accuchecks reviewed - generally well controlled under 200 - occ higher.   ASSESSMENT/PLAN:  Gastrointestinal hemorrhage with melena and Anemia due to blood loss, acute  Cont PPI  Recheck HGB on Monday.     Chronic obstructive pulmonary disease, unspecified COPD type (H)  Still on 2 L NC - I backed him down to 1 =   Goal to wean off.     Stage 3a chronic kidney disease  Stable.     Delusions (H) and Early onset Alzheimer's disease with behavioral disturbance (H) and Anxiety  Not having delusions - likely not needing antipsychotic, but will try to help anxiety with selective serotonin reuptake inhibitor and vistaril.   Given polypharmacy - can stop remeron as not needed for appetite.     Yeast  On penis- will treat with nystatin     Orders written by provider at facility  HGB on 3/15  Lexapro 10 mg po q day  Vistaril 25 mg po q 6 hrs prn x 14 days  discontinue remeron on 3/14  Start  nystatin cream to glanis TID x7 then prn   Wean O2 to off - goal off by 3/15 - OK to have sats > 88 %.     Electronically signed by:  THOMPSON Benavides CNP

## 2021-03-12 NOTE — PROGRESS NOTES
"[facility]  Admission    PRIMARY CARE PROVIDER AND CLINIC: Be Carreno MD, [address] [place] [address]: Phone: [phone]; Fax: [phone]  Chief Complaint   Patient presents with     Hospital F/U     [facility] 2/26/2021 - 3/6/2021      [place] Medical Record Number: [id number]  Place of Service where encounter took place: [facility] access hospital [facility] (TCU) [[id number]]    Alvaro Horton is a 80 year old (1940), admitted to the above facility from  [facility]. Hospital stay 2/26/21 through 3/6/21. Admitted to this facility for rehab, medical management and nursing care.    HPI:    HPI information obtained from: facility chart records, facility staff, patient report and [facility] chart review.   Brief Summary of Hospital Course:   Pt with PMH notable for T2D, GERD, hospitalized with GIB,  EGD did not reveal the source of bleeding. ASA and Eliquis stopped, requried PRBCs transfusion, started on PPI.   HOpsital stay c/w:  -  UTI treated with Augmentin.   - Acute hypoxia started on O2.   - volume overload, lasix increased from 30 mg to 40 mg bid.   - delirium: started on remeron 7.5 mg, and zyprexa      Updates on Status Since Skilled nursing Admission:   - 3/9: wean O2 (confused thought to be CO2 retention), B12, reduced to 500 mcg, Pepcid discontinued and started on Protonix 40 mg.   - 3/12: anxiety started on vistaril 25 mg Q6hr, lexapro 10 mg. Nystatin for penile yeast infection.   - 3/14: remeron stopped.   - 3/15: accu check reduced to tid, started on diabetic diet, tamsulosin changed to 0.4 mg HS.       Today:  - GIB: pt reports doing fine. denies n/v/blood in the stool. Reports appetite is fine.   - Adjustment: pt reports mood is depressed  Because he cannot go home. \" I was told I have to get stronger before I can go home\".   - Rehab: reports feeling much stronger than when he came in. \" I want to go home\".  Reports uses walker with two " "assist.   - COPD/O2 use: denies wheezing or cough, reports SOB sometimes, \" I have copd\" reports was not using O2 at home.   - pressure injury:   -       CODE STATUS/ADVANCE DIRECTIVES DISCUSSION: DNR only  Patient's living condition: lives with family, son (adult)   ALLERGIES: Prednisone  PAST MEDICAL HISTORY:  has a past medical history of Acute calculous cholecystitis (03/02/2019), Acute respiratory failure with hypoxia (H) (11/27/2020), Altered mental status (12/31/2014), Calculus of kidney (1990), COPD (chronic obstructive pulmonary disease) (H), COVID-19 virus infection (12/2/2020), Depression (1979), Diabetes (H), Elevated troponin (11/27/2020), Encephalopathy (09/03/2018), Heart disease, Left hand weakness (12/14/2009), PNEUMONIA, ORGANISM NOS (02/14/2008), and Sleep apnea. He also has no past medical history of Basal cell carcinoma, Complication of anesthesia, Malignant melanoma (H), PONV (postoperative nausea and vomiting), or Squamous cell carcinoma.  PAST SURGICAL HISTORY:  has a past surgical history that includes ANESTH,DX ARTHROSCOPIC PROC KNEE JOINT (1994, 1998); surgical history of -  (1998); surgical history of -  (2004); surgical history of -  (2006); Cardiac surgery (11/09); Abdomen surgery (1990); orthopedic surgery (8/12); Laparoscopic cholecystectomy (N/A, 3/4/2019); and Esophagoscopy, gastroscopy, duodenoscopy (EGD), combined (N/A, 2/28/2021).  FAMILY HISTORY: family history includes C.A.D. in his brother, father, and paternal grandfather; Diabetes in his brother; Gastrointestinal Disease in his son and another family member; Heart Disease in his brother and paternal grandfather; Hypertension in his brother and father.  SOCIAL HISTORY:  reports that he has never smoked. He has never used smokeless tobacco. He reports that he does not drink alcohol or use drugs.    Post Discharge Medication Reconciliation Status: discharge medications reconciled and changed, per note/orders    Current " Outpatient Medications   Medication Sig Dispense Refill     atorvastatin (LIPITOR) 80 MG tablet Take 0.5 tablets (40 mg) by mouth daily 90 tablet 3     albuterol (PROAIR HFA/PROVENTIL HFA/VENTOLIN HFA) 108 (90 Base) MCG/ACT inhaler Inhale 2 puffs into the lungs every 6 hours as needed for shortness of breath / dyspnea or wheezing 1 Inhaler 11     alcohol swab prep pads Use to swab area of injection/candi as directed. 100 each 3     blood glucose (NO BRAND SPECIFIED) test strip Use to test blood sugar 2 times daily or as directed. To accompany: Blood Glucose Monitor Brands: per insurance. 100 strip 6     blood glucose monitoring (NO BRAND SPECIFIED) meter device kit Use to test blood sugar 2 times daily or as directed. Preferred blood glucose meter OR supplies to accompany: Blood Glucose Monitor Brands: per insurance. 1 kit 0     cyanocobalamin (VITAMIN B-12) 500 MCG tablet Take 500 mcg by mouth daily        escitalopram (LEXAPRO) 10 MG tablet Take 10 mg by mouth daily       ferrous sulfate (FEROSUL) 325 (65 Fe) MG tablet Take 325 mg by mouth daily (with breakfast)       ferrous sulfate (FEROSUL) 325 (65 Fe) MG tablet Take 325 mg by mouth daily (with breakfast)       finasteride (PROSCAR) 5 MG tablet Take 1 tablet by mouth once daily 90 tablet 0     furosemide (LASIX) 40 MG tablet Take 1 tablet (40 mg) by mouth 2 times daily       insulin NPH-Regular (NOVOLIN 70/30 FLEXPEN) susp Inject 12 Units Subcutaneous 2 times daily (with meals) 30 mL 0     Insulin Pen Needle (PEN NEEDLES) 32G X 4 MM MISC 1 pen 2 times daily 100 each 0     metFORMIN (GLUCOPHAGE-XR) 500 MG 24 hr tablet Take 1 tablet (500 mg) by mouth 2 times daily (with meals) 60 tablet 11     metoprolol tartrate (LOPRESSOR) 25 MG tablet Take 0.5 tablets (12.5 mg) by mouth 2 times daily 90 tablet 3     nitroGLYcerin (NITROSTAT) 0.4 MG sublingual tablet Place 1 tablet (0.4 mg) under the tongue every 5 minutes as needed for chest pain (CALL 911 IF NOT IMPROVED  "AFTER THREE CONSECUTIVE DOSES) 25 tablet 5     omeprazole 20 MG tablet Take 20 mg by mouth 2 times daily       order for DME Equipment being ordered: CPAP  AIRSENSE 10  11-15 CM H2O  QUATTRO AIR SIZE MED  SN# [id number]  DN# [id number]       Sharps Container MISC 1 Container as needed 1 each 0     tamsulosin (FLOMAX) 0.4 MG capsule Take 1 capsule (0.4 mg) by mouth daily 90 capsule 3     thin (NO BRAND SPECIFIED) lancets Use with lanceting device. To accompany: Blood Glucose Monitor Brands: per insurance. 100 each 6     TRELEGY ELLIPTA 100-62.5-25 MCG/INH oral inhaler Inhale 1 puff into the lungs daily       Vitamin D3 (CHOLECALCIFEROL) 125 MCG (5000 UT) tablet Take 1 tablet by mouth daily       ROS:  Limited secondary to cognitive impairment but today pt reports- see HPI    Vitals:  /53   Pulse 70   Temp 97.4  F (36.3  C)   Resp 16   Ht 1.715 m (5' 7.5\")   Wt 78.8 kg (173 lb 12.8 oz)   SpO2 90%   BMI 26.82 kg/m    Exam:  GENERAL APPEARANCE:  in no distress, cooperative  ENT:  NC in place  EYES:  EOMI, Pupil rounded and equal.  RESP coarse sound over left mid lung area, diminished over the bases.   CV:  S1S2 audible, regular HR, no murmur appreciated.   ABDOMEN:  soft, NT/ND, BS audible. no mass appreciated on palpation.   M/S:   no joint deformity noted on observation. kyphosis.   SKIN:  Pale facial skin. Thin and frail skin over arms. Linear scab over right shin  NEURO:   No NFD appreciated on observation. Hand  5/5 b/l  PSYCH:  fair insight, judgement and memory, flat affect and  Anxious mood        Lab/Diagnostic data: Reviewed in the chart and EHR.      ASSESSMENT/PLAN:  ------------------------------  UGIB and acute blood loss anemia  - EGD did not reveal source, but stopped after ASA and Eliquis stopped. recommendation to be off eliquis indefinitely given risk outweigh benefits. Agree.   - colonoscopy was done for no intervention will be done in case study is positive.   - requried PRBCs x3 while " hospitalized.  HH dropping slowly. On iron supplement.   - on pantoprazole 40 mg  - continue to monitor HH. If dropping, check occult stool, and increase Protonix to 40 mg bid, and repeat in one week, is still dropping, discuss with pt/family next step (c-scope vs palliative care).     Chronic obstructive pulmonary disease, unspecified COPD type (H)  Recent Acute respiratory failure, hypoxic/hypercapnic  - continue to be on O2; challenged weaning off O2. Now on 1.5 Liter of O2.     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 On zyprexa from home. Hx of sundowning. Will discontinue vistaril (Not recommended in elderly with cognitive impairment.). Will start gabapentin prn  - goal is to GDR zyprexa given high risk for CVA in pt with hx of CAD/CVA.   - Noted initial PRN orders for non antipsychotic psychotropic medications  are limited to 14 days. Start new psychotropic medication gabapentin 100 mg qid 14 days for delirium and PRN agitation/physical aggression. Nsg to update [facility] provider of effect in 5 days.   -  brother dying, social isolation, started on lexapro (3/12), remeron stopped (3/14)           Type 2 diabetes mellitus with stage 3a chronic kidney disease, with long-term current use of insulin (H)   A1C 5.7 03/05/2021    A1C 8.3 12/25/2020   - over-controlled. Recommended level is 8-9% given limited life expectancy to less than 5 years.   - on Novolin 70/30 12 U bid, metformin  mg  - continue to monitor BG      Permanent Atrial Fibrillation (H):  - CVR, on metoprolol. Noted resting bradycardia. Will set parameters to  Metoprolol. On Eliquis      CKD stage 3a, GFR 45-59 ml/mi: renal function improving. - Avoid nephrotoxic  medications. Renally dose medications. Monitor electrolytes, and dehydration status      Physical Deconditioning:  - 2/2 senility, recent covid19 infection, recent hospitalization.   - started rehab program, making a progress, continue until desired  goal is achieved.     Pressure Injury, acquired from home: over oxycodone area, stage 2, improving with skin prep with Calmoseptine.     BPH: on finasteride and flomax 0.4 mg (changed to HS due to soft BP)      Discharge Plan: to next door detention with 24 hours supervision.       CHRONIC STABLE CONDITIONS:  - CABG : on lipitor 80 mg (max high intensity). LDL 61 (July 2019). Will reduce lipitor to 40 mg.   - CHF: on lasix 40 mg bid.   - History of CVA      Order: See above, otherwise, continue the rest of the current POC.       Electronically signed by:  Kelly Barcenas MD

## 2021-03-12 NOTE — LETTER
"    3/12/2021        RE: Alvaro Horton  [address] [address] [facility] [address]        [place] [facility]  [place] Medical Record Number: [id number]  Place of Service where encounter took place: [facility] (U) [[id number]]  Chief Complaint   Patient presents with     RECHECK     HPI:    Alvaro Horton is a 80 year old (1940), who is being seen today for an episodic care visit. HPI information obtained from: facility chart records, facility staff, patient report and [facility] chart review. Today's concern is: Nsg asking me to see pt r/t anxiety. Noted labs are back    [facility] is rehabbing s/p FVL 2/26 - 3/6 for upper GI bleed. ASA/Eliquis stopped and treated with PRBC transfusion. UTI tx with Augmentin  At SNF:  3/12: wean o2 (confused thought to be CO2 retention), decrease B 12, stop pepcid and start protonix, labs 3/11    Today noted Cr improving, HGB stable, but lower than desired at 8.8 from 9.3    Chart review shows zyprexia started for encephalopathy during covid and was decreased in the hospital, but he became more anxious and therefore was replaced to PTA dose of 5 BID>   Hx of seroquel and haldol in hospital during COVID for anxiety also. No use of selective serotonin reuptake inhibitor, on remeron for appetite enhancement    Today he is very anxious in his room and wanting to go home aSAP. Denies any concerns/problems.     Past Medical and Surgical History reviewed in Epic today.    MEDICATIONS: Reviewed.    REVIEW OF SYSTEMS:  4 point ROS including Respiratory, CV, GI and , other than that noted in the HPI,  is negative    Objective:  /51   Pulse 72   Temp 98  F (36.7  C)   Resp 16   Ht 1.715 m (5' 7.5\")   Wt 79.8 kg (176 lb)   SpO2 96%   BMI 27.16 kg/m    Exam:  GENERAL APPEARANCE:  Alert, in no distress  RESP:  respiratory effort and palpation of chest normal, auscultation of lungs clear , no respiratory distress  CV:  Palpation and auscultation " of heart done , rate and rhythm reg, no murmur, no peripheral edema  ABDOMEN:  normal bowel sounds, soft, nontender, no hepatosplenomegaly or other masses  M/S:   Gait and station indep, Digits and nails intact  SKIN:  Inspection and Palpation of skin and subcutaneous tissue intact  NEURO: 2-12 in normal limits and at patient's baseline  - yeast on glanis of penis  PSYCH:  insight and judgement, memory poor , affect and mood Pleasant but anxious/upset.     Labs:   Recent labs in [facility] reviewed by me today.  and   Most Recent 3 CBC's:  Recent Labs   Lab Test 03/10/21  0825 03/05/21  0555 03/04/21  0438   WBC 8.9 9.3 10.6   HGB 8.8* 9.3* 8.9*   MCV 95 89 98    168 170     Most Recent 3 BMP's:  Recent Labs   Lab Test 03/10/21  0825 03/05/21  0555 03/04/21  0438    143 141   POTASSIUM 4.0 3.9 3.6   CHLORIDE 100 104 103   CO2 36* 37* 34*   BUN 20 25 27   CR 1.39* 1.65* 1.66*   ANIONGAP 5 2* 4   EVELINA 8.8 8.2* 7.8*   * 95 180*     Most Recent 2 LFT's:  Recent Labs   Lab Test 03/10/21  0825 03/05/21  0555   AST 18 12   ALT 26 23   ALKPHOS 102 92   BILITOTAL 1.2 0.5     Lab Results   Component Value Date    B12 947 03/10/2021     Accuchecks reviewed - generally well controlled under 200 - occ higher.   ASSESSMENT/PLAN:  Gastrointestinal hemorrhage with melena and Anemia due to blood loss, acute  Cont PPI  Recheck HGB on Monday.     Chronic obstructive pulmonary disease, unspecified COPD type (H)  Still on 2 L NC - I backed him down to 1 =   Goal to wean off.     Stage 3a chronic kidney disease  Stable.     Delusions (H) and Early onset Alzheimer's disease with behavioral disturbance (H) and Anxiety  Not having delusions - likely not needing antipsychotic, but will try to help anxiety with selective serotonin reuptake inhibitor and vistaril.   Given polypharmacy - can stop remeron as not needed for appetite.     Yeast  On penis- will treat with nystatin     Orders written by provider at facility  HGB on  3/15  Lexapro 10 mg po q day  Vistaril 25 mg po q 6 hrs prn x 14 days  discontinue remeron on 3/14  Start nystatin cream to glanis TID x7 then prn   Wean O2 to off - goal off by 3/15 - OK to have sats > 88 %.     Electronically signed by:  THOMPSON Benavides CNP               Sincerely,        THOMPSON Benavides CNP

## 2021-03-14 NOTE — PROGRESS NOTES
"[place] [facility]    Chief Complaint   Patient presents with     RECHECK     HPI:    Alvaro Horton is a 80 year old  (1940), who is being seen today for an episodic care visit at: [facility] (U) [[id number]]  Today's concern is: f/u on anxiety and COPD with weaning O2 - noted pt still on 2 L  NC. Nsg notes show no use of PRN vistaril over weekend or any documentation of O2 wea and All O2 sats above 90% (not documented L flow)  - talked to nsg and she notes she was on all weekend - no problem with anxiety, but unable to wean - drops sats below 88%.   Also new information about brother dying and high PHQ   - pt is not at all anxious at the moment - denies concerns and does not bring up wanting to go home.     [facility] is rehabbing s/p FVL 2/26 - 3/6 for upper GI bleed. ASA/Eliquis stopped and treated with PRBC transfusion. UTI tx with Augmentin  At SNF:  3/9: wean o2 (confused thought to be CO2 retention), decrease B 12, stop pepcid and start protonix, labs 3/11  3/12: HGB on 3/15, lexapro 10 mg started, Vistaril 25 mg po q 6 hrs PRN x 14 d started, discontinue remeron on 3/14, wean O2 off.     PMH/PSH reviewed in EPIC today.  REVIEW OF SYSTEMS:  4 point ROS including Respiratory, CV, GI and , other than that noted in the HPI,  is negative    EXAM:  /54   Pulse 73   Temp 97.8  F (36.6  C)   Resp 16   Ht 1.715 m (5' 7.5\")   Wt 78.8 kg (173 lb 12.8 oz)   SpO2 (!) 89%   BMI 26.82 kg/m     GENERAL APPEARANCE:  Alert, in no distress  RESP:  respiratory effort and palpation of chest normal, auscultation of lungs clear , no respiratory distress  CV:  Palpation and auscultation of heart done , rate and rhythm reg, no peripheral edema  ABDOMEN:  normal bowel sounds, soft, nontender, no hepatosplenomegaly or other masses  M/S:   Gait and station sBA, Digits and nails intadt  SKIN:  Inspection and Palpation of skin and subcutaneous tissue intact  NEURO: 2-12 in normal limits and at patient's " baseline  PSYCH:  insight and judgement, memory STM loss noted , affect and mood Pleasant calm - non anxious at this time.     Recent labs in [facility] reviewed by me today.    Hemoglobin   Date Value Ref Range Status   03/15/2021 8.5 (L) 13.3 - 17.7 g/dL Final   03/10/2021 8.8 (L) 13.3 - 17.7 g/dL Final         Assessment/Plan:  Gastrointestinal hemorrhage with melena and Anemia due to blood loss, acute  Slightly lower, but stable   Will rehceck on friday    Chronic obstructive pulmonary disease, unspecified COPD type (H)  Needing to get off O2 - not on at home  Will talk to nsg again to really try to wean and document response/outcome    Early onset Alzheimer's disease with behavioral disturbance (H) and Anxiety  Appears sig improved - but highly doubt current medication of starting lexapro is the reason for improvement - will cont for long term effects and use prn vistaril. Now off remeron -   Goal is to stop zyprezia once lexapro is effective.     Noted news of brother dying may exacerbate both depression and or anxiety.     Type 2 diabetes mellitus with stage 3a chronic kidney disease, with long-term current use of insulin (H)  Stable - will reduce accuchecks to tid from qid.   On metformin and NPH     BPH  On proscar and flomax- move flomax to pm to help prevent orthostatic bp's - noted soft sbps    Orders:  Decrease accuchecks to TID   Change to DMII diet per facility equivalent   Change tamsulosin to 0.4 HS (starting 3/16)   HGB on Friday.     Electronically signed by:  THOMPSON Benavides CNP

## 2021-03-15 NOTE — LETTER
"    3/15/2021        RE: Alvaro Horton  [address] [facility] [address]        [place] [facility]    Chief Complaint   Patient presents with     RECHECK     HPI:    Alvaro Horton is a 80 year old  (1940), who is being seen today for an episodic care visit at: [facility] (U) [[id number]]  Today's concern is: f/u on anxiety and COPD with weaning O2 - noted pt still on 2 L  NC. Nsg notes show no use of PRN vistaril over weekend or any documentation of O2 wea and All O2 sats above 90% (not documented L flow)  - talked to nsg and she notes she was on all weekend - no problem with anxiety, but unable to wean - drops sats below 88%.   Also new information about brother dying and high PHQ   - pt is not at all anxious at the moment - denies concerns and does not bring up wanting to go home.     [place] is rehabbing s/p FVL 2/26 - 3/6 for upper GI bleed. ASA/Eliquis stopped and treated with PRBC transfusion. UTI tx with Augmentin  At SNF:  3/9: wean o2 (confused thought to be CO2 retention), decrease B 12, stop pepcid and start protonix, labs 3/11  3/12: HGB on 3/15, lexapro 10 mg started, Vistaril 25 mg po q 6 hrs PRN x 14 d started, discontinue remeron on 3/14, wean O2 off.     PMH/PSH reviewed in [facility] today.  REVIEW OF SYSTEMS:  4 point ROS including Respiratory, CV, GI and , other than that noted in the HPI,  is negative    EXAM:  /54   Pulse 73   Temp 97.8  F (36.6  C)   Resp 16   Ht 1.715 m (5' 7.5\")   Wt 78.8 kg (173 lb 12.8 oz)   SpO2 (!) 89%   BMI 26.82 kg/m     GENERAL APPEARANCE:  Alert, in no distress  RESP:  respiratory effort and palpation of chest normal, auscultation of lungs clear , no respiratory distress  CV:  Palpation and auscultation of heart done , rate and rhythm reg, no peripheral edema  ABDOMEN:  normal bowel sounds, soft, nontender, no hepatosplenomegaly or other masses  M/S:   Gait and station sBA, Digits and nails intadt  SKIN:  " Inspection and Palpation of skin and subcutaneous tissue intact  NEURO: 2-12 in normal limits and at patient's baseline  PSYCH:  insight and judgement, memory STM loss noted , affect and mood Pleasant calm - non anxious at this time.     Recent labs in [facility] reviewed by me today.    Hemoglobin   Date Value Ref Range Status   03/15/2021 8.5 (L) 13.3 - 17.7 g/dL Final   03/10/2021 8.8 (L) 13.3 - 17.7 g/dL Final         Assessment/Plan:  Gastrointestinal hemorrhage with melena and Anemia due to blood loss, acute  Slightly lower, but stable   Will rehceck on friday    Chronic obstructive pulmonary disease, unspecified COPD type (H)  Needing to get off O2 - not on at home  Will talk to nsg again to really try to wean and document response/outcome    Early onset Alzheimer's disease with behavioral disturbance (H) and Anxiety  Appears sig improved - but highly doubt current medication of starting lexapro is the reason for improvement - will cont for long term effects and use prn vistaril. Now off remeron -   Goal is to stop zyprezia once lexapro is effective.     Noted news of brother dying may exacerbate both depression and or anxiety.     Type 2 diabetes mellitus with stage 3a chronic kidney disease, with long-term current use of insulin (H)  Stable - will reduce accuchecks to tid from qid.   On metformin and NPH     BPH  On proscar and flomax- move flomax to pm to help prevent orthostatic bp's - noted soft sbps    Orders:  Decrease accuchecks to TID   Change to DMII diet per facility equivalent   Change tamsulosin to 0.4 HS (starting 3/16)   HGB on Friday.     Electronically signed by:  THOMPSNO Benavides CNP            Sincerely,        THOMPSON Benavides CNP
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
## 2021-03-16 NOTE — LETTER
"    3/16/2021        RE: Alvaro Horton  [address] [facility] [address]        [place] [facility]  Admission    PRIMARY CARE PROVIDER AND CLINIC: Be Carreno MD, [address] [place] [address] [place] [address]: Phone: [phone]; Fax: [phone]  Chief Complaint   Patient presents with     Hospital F/U     [facility] 2/26/2021 - 3/6/2021      [place] Medical Record Number: [id number]  Place of Service where encounter took place: [facility] (TCU) [[id number]]    Alvaro Horton is a 80 year old (1940), admitted to the above facility from  [facility]. Hospital stay 2/26/21 through 3/6/21. Admitted to this facility for rehab, medical management and nursing care.    HPI:    HPI information obtained from: facility chart records, facility staff, patient report and [facility] chart review.   Brief Summary of Hospital Course:   Pt with PMH notable for T2D, GERD, hospitalized with GIB,  EGD did not reveal the source of bleeding. ASA and Eliquis stopped, requried PRBCs transfusion, started on PPI.   HOpsital stay c/w:  -  UTI treated with Augmentin.   - Acute hypoxia started on O2.   - volume overload, lasix increased from 30 mg to 40 mg bid.   - delirium: started on remeron 7.5 mg, and zyprexa      Updates on Status Since Skilled nursing Admission:   - 3/9: wean O2 (confused thought to be CO2 retention), B12, reduced to 500 mcg, Pepcid discontinued and started on Protonix 40 mg.   - 3/12: anxiety started on vistaril 25 mg Q6hr, lexapro 10 mg. Nystatin for penile yeast infection.   - 3/14: remeron stopped.   - 3/15: accu check reduced to tid, started on diabetic diet, tamsulosin changed to 0.4 mg HS.       Today:  - GIB: pt reports doing fine. denies n/v/blood in the stool. Reports appetite is fine.   - Adjustment: pt reports mood is depressed  Because he cannot go home. \" I was told I have to get stronger before I can go home\".   - Rehab: " "reports feeling much stronger than when he came in. \" I want to go home\".  Reports uses walker with two assist.   - COPD/O2 use: denies wheezing or cough, reports SOB sometimes, \" I have copd\" reports was not using O2 at home.   - pressure injury:   -       CODE STATUS/ADVANCE DIRECTIVES DISCUSSION: DNR only  Patient's living condition: lives with family, son (adult)   ALLERGIES: Prednisone  PAST MEDICAL HISTORY:  has a past medical history of Acute calculous cholecystitis (03/02/2019), Acute respiratory failure with hypoxia (H) (11/27/2020), Altered mental status (12/31/2014), Calculus of kidney (1990), COPD (chronic obstructive pulmonary disease) (H), COVID-19 virus infection (12/2/2020), Depression (1979), Diabetes (H), Elevated troponin (11/27/2020), Encephalopathy (09/03/2018), Heart disease, Left hand weakness (12/14/2009), PNEUMONIA, ORGANISM NOS (02/14/2008), and Sleep apnea. He also has no past medical history of Basal cell carcinoma, Complication of anesthesia, Malignant melanoma (H), PONV (postoperative nausea and vomiting), or Squamous cell carcinoma.  PAST SURGICAL HISTORY:  has a past surgical history that includes ANESTH,DX ARTHROSCOPIC PROC KNEE JOINT (1994, 1998); surgical history of -  (1998); surgical history of -  (2004); surgical history of -  (2006); Cardiac surgery (11/09); Abdomen surgery (1990); orthopedic surgery (8/12); Laparoscopic cholecystectomy (N/A, 3/4/2019); and Esophagoscopy, gastroscopy, duodenoscopy (EGD), combined (N/A, 2/28/2021).  FAMILY HISTORY: family history includes C.A.D. in his brother, father, and paternal grandfather; Diabetes in his brother; Gastrointestinal Disease in his son and another family member; Heart Disease in his brother and paternal grandfather; Hypertension in his brother and father.  SOCIAL HISTORY:  reports that he has never smoked. He has never used smokeless tobacco. He reports that he does not drink alcohol or use drugs.    Post Discharge Medication " Reconciliation Status: discharge medications reconciled and changed, per note/orders    Current Outpatient Medications   Medication Sig Dispense Refill     atorvastatin (LIPITOR) 80 MG tablet Take 0.5 tablets (40 mg) by mouth daily 90 tablet 3     albuterol (PROAIR HFA/PROVENTIL HFA/VENTOLIN HFA) 108 (90 Base) MCG/ACT inhaler Inhale 2 puffs into the lungs every 6 hours as needed for shortness of breath / dyspnea or wheezing 1 Inhaler 11     alcohol swab prep pads Use to swab area of injection/candi as directed. 100 each 3     blood glucose (NO BRAND SPECIFIED) test strip Use to test blood sugar 2 times daily or as directed. To accompany: Blood Glucose Monitor Brands: per insurance. 100 strip 6     blood glucose monitoring (NO BRAND SPECIFIED) meter device kit Use to test blood sugar 2 times daily or as directed. Preferred blood glucose meter OR supplies to accompany: Blood Glucose Monitor Brands: per insurance. 1 kit 0     cyanocobalamin (VITAMIN B-12) 500 MCG tablet Take 500 mcg by mouth daily        escitalopram (LEXAPRO) 10 MG tablet Take 10 mg by mouth daily       ferrous sulfate (FEROSUL) 325 (65 Fe) MG tablet Take 325 mg by mouth daily (with breakfast)       ferrous sulfate (FEROSUL) 325 (65 Fe) MG tablet Take 325 mg by mouth daily (with breakfast)       finasteride (PROSCAR) 5 MG tablet Take 1 tablet by mouth once daily 90 tablet 0     furosemide (LASIX) 40 MG tablet Take 1 tablet (40 mg) by mouth 2 times daily       insulin NPH-Regular (NOVOLIN 70/30 FLEXPEN) susp Inject 12 Units Subcutaneous 2 times daily (with meals) 30 mL 0     Insulin Pen Needle (PEN NEEDLES) 32G X 4 MM MISC 1 pen 2 times daily 100 each 0     metFORMIN (GLUCOPHAGE-XR) 500 MG 24 hr tablet Take 1 tablet (500 mg) by mouth 2 times daily (with meals) 60 tablet 11     metoprolol tartrate (LOPRESSOR) 25 MG tablet Take 0.5 tablets (12.5 mg) by mouth 2 times daily 90 tablet 3     nitroGLYcerin (NITROSTAT) 0.4 MG sublingual tablet Place 1 tablet  "(0.4 mg) under the tongue every 5 minutes as needed for chest pain (CALL 911 IF NOT IMPROVED AFTER THREE CONSECUTIVE DOSES) 25 tablet 5     omeprazole 20 MG tablet Take 20 mg by mouth 2 times daily       order for DME Equipment being ordered: CPAP  AIRSENSE 10  11-15 CM H2O  QUATTRO AIR SIZE MED  SN# [id number]  DN# [id number]       Sharps Container MISC 1 Container as needed 1 each 0     tamsulosin (FLOMAX) 0.4 MG capsule Take 1 capsule (0.4 mg) by mouth daily 90 capsule 3     thin (NO BRAND SPECIFIED) lancets Use with lanceting device. To accompany: Blood Glucose Monitor Brands: per insurance. 100 each 6     TRELEGY ELLIPTA 100-62.5-25 MCG/INH oral inhaler Inhale 1 puff into the lungs daily       Vitamin D3 (CHOLECALCIFEROL) 125 MCG (5000 UT) tablet Take 1 tablet by mouth daily       ROS:  Limited secondary to cognitive impairment but today pt reports- see HPI    Vitals:  /53   Pulse 70   Temp 97.4  F (36.3  C)   Resp 16   Ht 1.715 m (5' 7.5\")   Wt 78.8 kg (173 lb 12.8 oz)   SpO2 90%   BMI 26.82 kg/m    Exam:  GENERAL APPEARANCE:  in no distress, cooperative  ENT:  NC in place  EYES:  EOMI, Pupil rounded and equal.  RESP coarse sound over left mid lung area, diminished over the bases.   CV:  S1S2 audible, regular HR, no murmur appreciated.   ABDOMEN:  soft, NT/ND, BS audible. no mass appreciated on palpation.   M/S:   no joint deformity noted on observation. kyphosis.   SKIN:  Pale facial skin. Thin and frail skin over arms. Linear scab over right shin  NEURO:   No NFD appreciated on observation. Hand  5/5 b/l  PSYCH:  fair insight, judgement and memory, flat affect and  Anxious mood        Lab/Diagnostic data: Reviewed in the chart and EHR.      ASSESSMENT/PLAN:  ------------------------------  UGIB and acute blood loss anemia  - EGD did not reveal source, but stopped after ASA and Eliquis stopped. recommendation to be off eliquis indefinitely given risk outweigh benefits. Agree.   - colonoscopy was " done for no intervention will be done in case study is positive.   - requried PRBCs x3 while hospitalized.  HH dropping slowly. On iron supplement.   - on pantoprazole 40 mg  - continue to monitor HH. If dropping, check occult stool, and increase Protonix to 40 mg bid, and repeat in one week, is still dropping, discuss with pt/family next step (c-scope vs palliative care).     Chronic obstructive pulmonary disease, unspecified COPD type (H)  Recent Acute respiratory failure, hypoxic/hypercapnic  - continue to be on O2; challenged weaning off O2. Now on 1.5 Liter of O2.     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 On zyprexa from home. Hx of sundowning. Will discontinue vistaril (Not recommended in elderly with cognitive impairment.). Will start gabapentin prn  - goal is to GDR zyprexa given high risk for CVA in pt with hx of CAD/CVA.   - Noted initial PRN orders for non antipsychotic psychotropic medications  are limited to 14 days. Start new psychotropic medication gabapentin 100 mg qid 14 days for delirium and PRN agitation/physical aggression. Nsg to update [facility] provider of effect in 5 days.   -  brother dying, social isolation, started on lexapro (3/12), remeron stopped (3/14)           Type 2 diabetes mellitus with stage 3a chronic kidney disease, with long-term current use of insulin (H)   A1C 5.7 03/05/2021    A1C 8.3 12/25/2020   - over-controlled. Recommended level is 8-9% given limited life expectancy to less than 5 years.   - on Novolin 70/30 12 U bid, metformin  mg  - continue to monitor BG      Permanent Atrial Fibrillation (H):  - CVR, on metoprolol. Noted resting bradycardia. Will set parameters to  Metoprolol. On Eliquis      CKD stage 3a, GFR 45-59 ml/mi: renal function improving. - Avoid nephrotoxic  medications. Renally dose medications. Monitor electrolytes, and dehydration status      Physical Deconditioning:  - 2/2 senility, recent covid19 infection,  recent hospitalization.   - started rehab program, making a progress, continue until desired goal is achieved.     Pressure Injury, acquired from home: over oxycodone area, stage 2, improving with skin prep with Calmoseptine.     BPH: on finasteride and flomax 0.4 mg (changed to HS due to soft BP)      Discharge Plan: to next door [facility] with 24 hours supervision.       CHRONIC STABLE CONDITIONS:  - CABG : on lipitor 80 mg (max high intensity). LDL 61 (July 2019). Will reduce lipitor to 40 mg.   - CHF: on lasix 40 mg bid.   - History of CVA      Order: See above, otherwise, continue the rest of the current POC.       Electronically signed by:  Kelly Barcenas MD           Sincerely,        Kelly Barcenas MD

## 2021-03-19 NOTE — LETTER
3/19/2021        RE: Alvaro Horton  [address] [facility] [address]        [place] [facility]  [place] Medical Record Number:  [id number]  Place of Service where encounter took place:  [facility] ([facility]) [[id number]]  Chief Complaint   Patient presents with     RECHECK       HPI:    Alvaro Horton  is a 80 year old (1940), who is being seen today for an episodic care visit at: [facility] ([facility]) [[id number]].     Brief Summary of Hospital Course: Alvaro has a past medical history significant for CAD, COPD, afib, BPH, CKD, CVA in 2009, CHF, anemia, mild memory loss.  He was diagnosed with COVID 19 pneumonia in November 2020 (treatment with dexamethasone and remdesivir and sent home with oxygen due to hypoxia), and has been hospitalized several times since that time with weakness, encephalopathy thought related to COVID19 superimposed on mild dementia.  He required 1:1 supervision during some of his hospital stays, medications were titrated.  Most recently, he presented to [facility] on 2/26 w/ GI bleeding, weakness, confusion, poor oral intake. Bleeding thought to be upper GIB and aspirin/Eliquis were stopped. GIB subsided.  He received blood products. He was also found to have a UTI, and was treated w/ Augmentin. His hospital stay was complicated with acute kidney injury on CKD3 and he was admitted to TCU for strengthening    Recent changes:    3/9/21 - stopped pepcid and startedprotonix 40 daily started, B12 dose decreased    3/12 - started lexapro, vistaril prn, discontinue remeron    3/16 - gabapentin 100 qid prn for delirium, goal to GDR Zyprexa, vistaril discontinued     3/17/21 - MOCA 10/30, BIMS 6/15, PHQ9 6/27, GAD7 15 (high), walking 75 ft with rolling walker     Today's concern is:     Gastrointestinal hemorrhage with melena  Anemia due to blood loss, acute  Chronic obstructive pulmonary disease, unspecified COPD type (H)  Early onset Alzheimer's disease with  "behavioral disturbance (H)  Anxiety  Type 2 diabetes mellitus with stage 3a chronic kidney disease, with long-term current use of insulin (H)    Alvaro reports no new concerns, continues to be dyspnea and unable to be weaned from oxygen. He reports R elbow pain today and difficulty with mobility of R elbow, thinks this has been occurring for a few days but does not remember if he bumped it or what happened.  He reports food is bad, no appetite, reports no chest pain, bowels working well. Nursing reports he continues to have some episodes of agitation, but overall is redirectable.       Past Medical, Surgical, and Family History reviewed in Epic today.    MEDICATIONS:  Current medication list reviewed    REVIEW OF SYSTEMS:  4 point ROS including Respiratory, CV, GI and , other than that noted in the HPI,  is negative    OBJECTIVE:  /61   Pulse 64   Temp 97.9  F (36.6  C)   Resp 16   Ht 1.715 m (5' 7.5\")   Wt 78.2 kg (172 lb 4.8 oz)   SpO2 93%   BMI 26.59 kg/m    Exam:  GENERAL APPEARANCE:  Alert, in no acute distress   CHEST/RESP:  respiratory effort normal, no respiratory distress, lung sounds CTA    CV:  Rate and rhythm reg, no murmur/rub/gallop, no peripheral edema  M/S:   gait & station abnormal-needs assist to ambulate, digits and nails within normal limits   PSYCH:  at baseline mentation, alert and oriented to self with dementia, pleasant, calls out sometimes    Labs:     Most Recent 3 CBC's:  Recent Labs   Lab Test 03/19/21  0720 03/15/21  0555 03/10/21  0825 03/05/21  0555 03/04/21  0438   WBC  --   --  8.9 9.3 10.6   HGB 8.4* 8.5* 8.8* 9.3* 8.9*   MCV  --   --  95 89 98   PLT  --   --  198 168 170     Most Recent 3 BMP's:  Recent Labs   Lab Test 03/10/21  0825 03/05/21  0555 03/04/21  0438    143 141   POTASSIUM 4.0 3.9 3.6   CHLORIDE 100 104 103   CO2 36* 37* 34*   BUN 20 25 27   CR 1.39* 1.65* 1.66*   ANIONGAP 5 2* 4   EVELINA 8.8 8.2* 7.8*   * 95 180*    "     ASSESSMENT/PLAN:  Gastrointestinal hemorrhage with melena  Anemia due to blood loss, acute  Patient with ongoing gradually declining hemoglobin, from 9.3 > 8.8 > 8.5 > 8.4 today. No obvious occult blood in stools reported, did have stools + during hospital stay.  He is on B12, with last B12 level 947 on 3/10/21.  Unclear etiology for ongoing anemia  -increase protonix  -iron studies 3/22/21     Chronic obstructive pulmonary disease, unspecified COPD type (H)  Chronic, compensated with no recent exacerbation. no smoking,  He has not been able to wean off O2 use, will likely need oxygen on discharge from TCU for chronic use. No nebulizer use. On trelegy ellipta, prn albuterol inhaler.    -no new orders today      Early onset Alzheimer's disease with behavioral disturbance (H)  Anxiety  Patient with worsening dementia, anxiety.  This has worsened significantly over last several months (6-9 months per chart review), and significantly worse since COVID 19 pneumonia and presume that this is COVID 19 encephalopathy superimposed on underlying dementia.  He becomes easily anxious, calling out but has shown to be less anxious since start of lexapro.  He has not needed prn gabapentin.  He is reported to be sleeping pretty well at night.  Noted goal to discontinue Zyprexa prior to TCU discharge, due to concerns for side effects with his other comorbid conditions.   -discontinue Zyprexa today  -continue gabapentin with goal to discontinue this if not used over next 3-4 days (prior to discharge to assisted living facility next week)    Type 2 diabetes mellitus with stage 3a chronic kidney disease, with long-term current use of insulin (H)  Fasting blood sugars trending 170 - 200, other blood sugars trending 200+.  On NPH 12 bid, metformin 500 BID. The current medical regimen is effective;  continue present plan and medications.      Right elbow pain  Patient with new onset of R elbow pain, difficulty with mobility of elbow,  without redness, no open areas, no warmth.  Unclear how long this has been present as he is a poor historian.    -monitor, could use prn tylenol as needed.     Orders written by provider at facility    Increase protonix to 40 mg BID    Discontinue Zyprexa (no gabapentin used yet)    Labs on 3/22/21 to include:  Hemoglobin, ferritin, iron, IBC    Electronically signed by:  THOMPSON Funk CNP            Sincerely,        THOMPSON Funk CNP

## 2021-03-19 NOTE — PROGRESS NOTES
[place] [facility]  [place] Medical Record Number:  [id number]  Place of Service where encounter took place:  [facility] ([facility]) [[id number]]  Chief Complaint   Patient presents with     RECHECK       HPI:    Alvaro Horton  is a 80 year old (1940), who is being seen today for an episodic care visit at: [facility] ([facility]) [[id number]].     Brief Summary of Hospital Course: Alvaro has a past medical history significant for CAD, COPD, afib, BPH, CKD, CVA in 2009, CHF, anemia, mild memory loss.  He was diagnosed with COVID 19 pneumonia in November 2020 (treatment with dexamethasone and remdesivir and sent home with oxygen due to hypoxia), and has been hospitalized several times since that time with weakness, encephalopathy thought related to COVID19 superimposed on mild dementia.  He required 1:1 supervision during some of his hospital stays, medications were titrated.  Most recently, he presented to [facility] on 2/26 w/ GI bleeding, weakness, confusion, poor oral intake. Bleeding thought to be upper GIB and aspirin/Eliquis were stopped. GIB subsided.  He received blood products. He was also found to have a UTI, and was treated w/ Augmentin. His hospital stay was complicated with acute kidney injury on CKD3 and he was admitted to TCU for strengthening    Recent changes:    3/9/21 - stopped pepcid and startedprotonix 40 daily started, B12 dose decreased    3/12 - started lexapro, vistaril prn, discontinue remeron    3/16 - gabapentin 100 qid prn for delirium, goal to GDR Zyprexa, vistaril discontinued     3/17/21 - MOCA 10/30, BIMS 6/15, PHQ9 6/27, GAD7 15 (high), walking 75 ft with rolling walker     Today's concern is:     Gastrointestinal hemorrhage with melena  Anemia due to blood loss, acute  Chronic obstructive pulmonary disease, unspecified COPD type (H)  Early onset Alzheimer's disease with behavioral disturbance (H)  Anxiety  Type 2 diabetes mellitus with stage 3a chronic kidney disease, with  "long-term current use of insulin (H)    Alvaro reports no new concerns, continues to be dyspnea and unable to be weaned from oxygen. He reports R elbow pain today and difficulty with mobility of R elbow, thinks this has been occurring for a few days but does not remember if he bumped it or what happened.  He reports food is bad, no appetite, reports no chest pain, bowels working well. Nursing reports he continues to have some episodes of agitation, but overall is redirectable.       Past Medical, Surgical, and Family History reviewed in Epic today.    MEDICATIONS:  Current medication list reviewed    REVIEW OF SYSTEMS:  4 point ROS including Respiratory, CV, GI and , other than that noted in the HPI,  is negative    OBJECTIVE:  /61   Pulse 64   Temp 97.9  F (36.6  C)   Resp 16   Ht 1.715 m (5' 7.5\")   Wt 78.2 kg (172 lb 4.8 oz)   SpO2 93%   BMI 26.59 kg/m    Exam:  GENERAL APPEARANCE:  Alert, in no acute distress   CHEST/RESP:  respiratory effort normal, no respiratory distress, lung sounds CTA    CV:  Rate and rhythm reg, no murmur/rub/gallop, no peripheral edema  M/S:   gait & station abnormal-needs assist to ambulate, digits and nails within normal limits   PSYCH:  at baseline mentation, alert and oriented to self with dementia, pleasant, calls out sometimes    Labs:     Most Recent 3 CBC's:  Recent Labs   Lab Test 03/19/21  0720 03/15/21  0555 03/10/21  0825 03/05/21  0555 03/04/21  0438   WBC  --   --  8.9 9.3 10.6   HGB 8.4* 8.5* 8.8* 9.3* 8.9*   MCV  --   --  95 89 98   PLT  --   --  198 168 170     Most Recent 3 BMP's:  Recent Labs   Lab Test 03/10/21  0825 03/05/21  0555 03/04/21  0438    143 141   POTASSIUM 4.0 3.9 3.6   CHLORIDE 100 104 103   CO2 36* 37* 34*   BUN 20 25 27   CR 1.39* 1.65* 1.66*   ANIONGAP 5 2* 4   EVELINA 8.8 8.2* 7.8*   * 95 180*        ASSESSMENT/PLAN:  Gastrointestinal hemorrhage with melena  Anemia due to blood loss, acute  Patient with ongoing gradually " declining hemoglobin, from 9.3 > 8.8 > 8.5 > 8.4 today. No obvious occult blood in stools reported, did have stools + during hospital stay.  He is on B12, with last B12 level 947 on 3/10/21.  Unclear etiology for ongoing anemia  -increase protonix  -iron studies 3/22/21     Chronic obstructive pulmonary disease, unspecified COPD type (H)  Chronic, compensated with no recent exacerbation. no smoking,  He has not been able to wean off O2 use, will likely need oxygen on discharge from TCU for chronic use. No nebulizer use. On trelegy ellipta, prn albuterol inhaler.    -no new orders today      Early onset Alzheimer's disease with behavioral disturbance (H)  Anxiety  Patient with worsening dementia, anxiety.  This has worsened significantly over last several months (6-9 months per chart review), and significantly worse since COVID 19 pneumonia and presume that this is COVID 19 encephalopathy superimposed on underlying dementia.  He becomes easily anxious, calling out but has shown to be less anxious since start of lexapro.  He has not needed prn gabapentin.  He is reported to be sleeping pretty well at night.  Noted goal to discontinue Zyprexa prior to TCU discharge, due to concerns for side effects with his other comorbid conditions.   -discontinue Zyprexa today  -continue gabapentin with goal to discontinue this if not used over next 3-4 days (prior to discharge to assisted living facility next week)    Type 2 diabetes mellitus with stage 3a chronic kidney disease, with long-term current use of insulin (H)  Fasting blood sugars trending 170 - 200, other blood sugars trending 200+.  On NPH 12 bid, metformin 500 BID. The current medical regimen is effective;  continue present plan and medications.      Right elbow pain  Patient with new onset of R elbow pain, difficulty with mobility of elbow, without redness, no open areas, no warmth.  Unclear how long this has been present as he is a poor historian.    -monitor, could  use prn tylenol as needed.     Orders written by provider at facility    Increase protonix to 40 mg BID    Discontinue Zyprexa (no gabapentin used yet)    Labs on 3/22/21 to include:  Hemoglobin, ferritin, iron, IBC    Electronically signed by:  THOMPSON Funk CNP

## 2021-03-21 NOTE — PROGRESS NOTES
[place] [facility] DISCHARGE SUMMARY  PATIENT'S NAME: Alvaro Horton  YOB: 1940  MEDICAL RECORD NUMBER: [id number]  Place of Service where encounter took place:  [facility] (TCU) [[id number]]    PRIMARY CARE PROVIDER AND CLINIC RESPONSIBLE AFTER TRANSFER: Be Carreno MD, [address] [place] [id number]; Phone: [phone]; Fax: [phone]; [facility] Provider     Transferring providers: THOMPSON Funk CNP, Kelly Barcenas MD  Recent Hospitalization/ED: [facility] stay 2/26/20 to 3/6/21.  Date of SNF Admission: 3/6/2021  Date of SNF (anticipated) Discharge: 3/22/2021  Discharged to: new facility for patient, [facility]  Cognitive Scores: BIMS: 6/15 and MOCA: 10/30  Physical Function: Ambulating 75 ft with rolling walker  DME: [facility] (has both)    CODE STATUS/ADVANCE DIRECTIVES DISCUSSION: DNR   ALLERGIES: Prednisone    DISCHARGE DIAGNOSIS/NURSING FACILITY COURSE:     Brief Summary of Hospital Course: Alvaro has a past medical history significant for CAD, COPD, afib, BPH, CKD, CVA in 2009, CHF, anemia, mild memory loss.  He was diagnosed with COVID 19 pneumonia in November 2020 (treatment with dexamethasone and remdesivir and sent home with oxygen due to hypoxia), and has been hospitalized several times since that time with weakness, encephalopathy thought related to COVID19 superimposed on mild dementia.  He required 1:1 supervision during some of his hospital stays, medications were titrated.  Most recently, he presented to [facility] on 2/26 w/ GI bleeding, weakness, confusion, poor oral intake. Bleeding thought to be upper GIB and aspirin/Eliquis were stopped. GIB subsided.  He received blood products. He was also found to have a UTI, and was treated w/ Augmentin. His hospital stay was complicated with acute kidney injury on CKD3 and he was admitted to TCU for strengthening     Recent changes:    3/9/21 - stopped pepcid and  startedprotonix 40 daily started, B12 dose decreased    3/12 - started lexapro, vistaril prn, discontinue remeron    3/16 - gabapentin 100 qid prn for delirium, goal to GDR Zyprexa, vistaril discontinued     3/17/21 -  PHQ9 6/27, GAD7 15 (high), walking 75 ft with rolling walker, Zyprexa discontinued     Anemia due to blood loss, acute  Gastrointestinal hemorrhage with melena  Iron Deficiency anemia  Baseline hemoglobin 12-14 prior to  COVID diagnosis in 11/2020, then has been trending 10-11 since that time.  Today, hemoglobin noting ongoing decline, at 8.1.  Iron studies indicating low iron stores  -start iron supplement today     Type 2 diabetes mellitus with stage 3a chronic kidney disease, with long-term current use of insulin (H)  Fasting blood sugars trending 180 - 200 fasting.  On NPH 12 unit(s) BID, last A1C 8.3 in 12/2020. The current medical regimen is effective;  continue present plan and medications.      Early onset Alzheimer's disease with behavioral disturbance (H)  Delusions (H)  Patient with significant dementia which has worsened over last several months.  CT Scan shows brain atrophy and presumed chronic microvascular ischemic changes.  He is anxious, confused.  Medications titrated and olanzapine discontinued, mirtazapine discontinued.  He has not use prn gabapentin for behavior management.    -discharge to assisted living facility, will continue to require 24 h supervision    Chronic obstructive pulmonary disease, unspecified COPD type (H)  Pulmonary hypertension, unspecified (H)  Bakers' asthma (H)  Patient with ongoing need for oxygen, unable to be weaned.  Chronic, compensated with no recent exacerbation. no smoking,  chronic O2 use. On Trelegy Ellipta, albuterol inhaler prn.  Stable on current regimen      Permanent atrial fibrillation (H)  Off aspirin and apixaban due to GIB, rate controlled on metoprolol.       Past Medical History:  has a past medical history of Acute calculous  cholecystitis (03/02/2019), Acute respiratory failure with hypoxia (H) (11/27/2020), Altered mental status (12/31/2014), Calculus of kidney (1990), COPD (chronic obstructive pulmonary disease) (H), COVID-19 virus infection (12/2/2020), Depression (1979), Diabetes (H), Elevated troponin (11/27/2020), Encephalopathy (09/03/2018), Heart disease, Left hand weakness (12/14/2009), PNEUMONIA, ORGANISM NOS (02/14/2008), and Sleep apnea. He also has no past medical history of Basal cell carcinoma, Complication of anesthesia, Malignant melanoma (H), PONV (postoperative nausea and vomiting), or Squamous cell carcinoma.    Discharge Medications:  Current Outpatient Medications   Medication Sig Dispense Refill     albuterol (PROAIR HFA/PROVENTIL HFA/VENTOLIN HFA) 108 (90 Base) MCG/ACT inhaler Inhale 2 puffs into the lungs every 6 hours as needed for shortness of breath / dyspnea or wheezing 1 Inhaler 11     atorvastatin (LIPITOR) 80 MG tablet Take 0.5 tablets (40 mg) by mouth daily 90 tablet 3     cyanocobalamin (VITAMIN B-12) 500 MCG tablet Take 500 mcg by mouth daily        escitalopram (LEXAPRO) 10 MG tablet Take 10 mg by mouth daily       ferrous sulfate (FEROSUL) 325 (65 Fe) MG tablet Take 325 mg by mouth daily (with breakfast)       ferrous sulfate (FEROSUL) 325 (65 Fe) MG tablet Take 325 mg by mouth daily (with breakfast)       finasteride (PROSCAR) 5 MG tablet Take 1 tablet by mouth once daily 90 tablet 0     furosemide (LASIX) 40 MG tablet Take 1 tablet (40 mg) by mouth 2 times daily       insulin NPH-Regular (NOVOLIN 70/30 FLEXPEN RELION) susp Inject 12 Units Subcutaneous 2 times daily (with meals) 15 mL 1     metFORMIN (GLUCOPHAGE-XR) 500 MG 24 hr tablet Take 1 tablet (500 mg) by mouth 2 times daily (with meals) 60 tablet 11     metoprolol tartrate (LOPRESSOR) 25 MG tablet Take 0.5 tablets (12.5 mg) by mouth 2 times daily 90 tablet 3     nitroGLYcerin (NITROSTAT) 0.4 MG sublingual tablet Place 1 tablet (0.4 mg) under  "the tongue every 5 minutes as needed for chest pain (CALL 911 IF NOT IMPROVED AFTER THREE CONSECUTIVE DOSES) 25 tablet 5     omeprazole 20 MG tablet Take 20 mg by mouth 2 times daily       tamsulosin (FLOMAX) 0.4 MG capsule Take 1 capsule (0.4 mg) by mouth daily 90 capsule 3     TRELEGY ELLIPTA 100-62.5-25 MCG/INH oral inhaler Inhale 1 puff into the lungs daily       Vitamin D3 (CHOLECALCIFEROL) 125 MCG (5000 UT) tablet Take 1 tablet by mouth daily       insulin pen needle (BD ABRAM U/F) 32G X 4 MM miscellaneous Use 2 daily as directed. 100 each 11     order for DME Equipment being ordered: CPAP  AIRSENSE 10  11-15 CM H2O  [employer]  SN# [id number]  DN# [id number]       Medication Changes/Rationale:     Add Ferrous Sulfate 325 mg daily po for anemia today     Controlled medications sent with patient:   not applicable/none     ROS:   4 point ROS including Respiratory, CV, GI and , other than that noted in the HPI,  is negative    Physical Exam:   Vitals: /59   Pulse 64   Temp 96.8  F (36  C)   Resp 18   Ht 1.715 m (5' 7.5\")   Wt 78.1 kg (172 lb 3.2 oz)   SpO2 90%   BMI 26.57 kg/m    BMI= Body mass index is 26.57 kg/m .  GENERAL APPEARANCE:  Alert, in no acute distress   CHEST/RESP:  respiratory effort normal, lung sounds diminished, CTA  , no respiratory distress  CV:  Rate and rhythm reg, no murmur, no peripheral edema   SKIN:  Mid spine with reddened area without break in skin, buttocks with erythema and blanchable redness  PSYCH:  Alert and oriented to self and surroundings with forgetfulness , affect anxious at times, judgement impaired by cognitive losses      SNF labs:   Most Recent 3 BMP's:  Recent Labs   Lab Test 03/10/21  0825 03/05/21  0555 03/04/21  0438    143 141   POTASSIUM 4.0 3.9 3.6   CHLORIDE 100 104 103   CO2 36* 37* 34*   BUN 20 25 27   CR 1.39* 1.65* 1.66*   ANIONGAP 5 2* 4   EVELINA 8.8 8.2* 7.8*   * 95 180*     Most Recent 3 Hemoglobins:  Recent Labs   Lab Test " 03/22/21  0725 03/19/21  0720 03/15/21  0555   HGB 8.1* 8.4* 8.5*     Most Recent Anemia Panel:  Recent Labs   Lab Test 03/22/21  0725 03/10/21  0825 03/10/21  0825 11/30/20  0621 11/30/20  0621   WBC  --   --  8.9   < > 10.6   HGB 8.1*   < > 8.8*   < > 13.2*   HCT  --   --  29.9*   < > 39.0*   MCV  --   --  95   < > 88   PLT  --   --  198   < > 135*   IRON 26*  --   --   --   --    IRONSAT 9*  --   --   --   --    RETICABSCT  --   --   --   --  35.4   RETP  --   --   --   --  0.8     --   --   --   --    CARROLL 45  --   --    < >  --    B12  --   --  947  --   --     < > = values in this interval not displayed.     DISCHARGE PLAN:    Follow up labs: No labs orders/due    Medical Follow Up:       Follow up with primary provider in 2-4 weeks, or establish care with in-house team    MTM referral needed and placed by this provider: No    Current [place] scheduled appointments: none    Discharge Services: Home Care: Occupational Therapy, Physical Therapy and Speech Therapy     TOTAL DISCHARGE TIME:   Greater than 30 minutes  Electronically signed by:  THOMPSON Funk CNP     Documentation of Face to Face and Certification for Home Health Services    I certify that patient: Alvaro Horton is under my care and that I, or a nurse practitioner or physician's assistant working with me, had a face-to-face encounter that meets the physician face-to-face encounter requirements with this patient on: 3/22/2021.    This encounter with the patient was in whole, or in part, for the following medical condition, which is the primary reason for home health care: Anemia due to blood loss, acute [D62]     I certify that, based on my findings, the following services are medically necessary home health services: Nursing, Occupational Therapy, Physical Therapy.    My clinical findings support the need for the above services because: Nurse is needed: To assess meds, vitals, behaviors after changes in medications or other medical  regimen.., Occupational Therapy Services are needed to assess and treat cognitive ability and address ADL safety due to impairment in cognition, and Physical Therapy Services are needed to assess and treat the following functional impairments: balance, endurance, and strength.    Further, I certify that my clinical findings support that this patient is homebound (i.e. absences from home require considerable and taxing effort and are for medical reasons or Church services or infrequently or of short duration when for other reasons) because: Requires assistance of another person or specialized equipment to access medical services because patient: Requires supervision of another for safe transfer.    Based on the above findings. I certify that this patient is confined to the home and needs intermittent skilled nursing care, physical therapy and/or speech therapy.  The patient is under my care, and I have initiated the establishment of the plan of care.  This patient will be followed by a physician who will periodically review the plan of care.  Physician/Provider to provide follow up care: Be Carreno    Attending hospital physician (the Medicare certified [facility] provider): THOMPSON Funk CNP   Physician Signature: See electronic signature associated with these discharge orders.  Date: 3/21/2021

## 2021-03-22 NOTE — LETTER
3/22/2021        RE: Alvaro Horton  [address] [facility] [address]        [place] [facility] DISCHARGE SUMMARY  PATIENT'S NAME: Alvaro Horton  YOB: 1940  MEDICAL RECORD NUMBER: [id number]  Place of Service where encounter took place:  [facility] (TCU) [[id number]]    PRIMARY CARE PROVIDER AND CLINIC RESPONSIBLE AFTER TRANSFER: Be Carreno MD, [address] [place] [address] [place] [address]; Phone: [phone]; Fax: [phone]; [facility]     Transferring providers: THOMPSON Funk CNP, Kelly Barcenas MD  Recent Hospitalization/ED: [facility] stay 2/26/20 to 3/6/21.  Date of SNF Admission: 3/6/2021  Date of SNF (anticipated) Discharge: 3/22/2021  Discharged to: new facility for patient, [facility]  Cognitive Scores: BIMS: 6/15 and MOCA: 10/30  Physical Function: Ambulating 75 ft with rolling walker  DME: [facility] (has both)    CODE STATUS/ADVANCE DIRECTIVES DISCUSSION: DNR   ALLERGIES: Prednisone    DISCHARGE DIAGNOSIS/NURSING FACILITY COURSE:     Brief Summary of Hospital Course: Alvaro has a past medical history significant for CAD, COPD, afib, BPH, CKD, CVA in 2009, CHF, anemia, mild memory loss.  He was diagnosed with COVID 19 pneumonia in November 2020 (treatment with dexamethasone and remdesivir and sent home with oxygen due to hypoxia), and has been hospitalized several times since that time with weakness, encephalopathy thought related to COVID19 superimposed on mild dementia.  He required 1:1 supervision during some of his hospital stays, medications were titrated.  Most recently, he presented to [facility] on 2/26 w/ GI bleeding, weakness, confusion, poor oral intake. Bleeding thought to be upper GIB and aspirin/Eliquis were stopped. GIB subsided.  He received blood products. He was also found to have a UTI, and was treated w/ Augmentin. His hospital stay was complicated with acute kidney injury  on CKD3 and he was admitted to TCU for strengthening     Recent changes:    3/9/21 - stopped pepcid and startedprotonix 40 daily started, B12 dose decreased    3/12 - started lexapro, vistaril prn, discontinue remeron    3/16 - gabapentin 100 qid prn for delirium, goal to GDR Zyprexa, vistaril discontinued     3/17/21 -  PHQ9 6/27, GAD7 15 (high), walking 75 ft with rolling walker, Zyprexa discontinued     Anemia due to blood loss, acute  Gastrointestinal hemorrhage with melena  Iron Deficiency anemia  Baseline hemoglobin 12-14 prior to  COVID diagnosis in 11/2020, then has been trending 10-11 since that time.  Today, hemoglobin noting ongoing decline, at 8.1.  Iron studies indicating low iron stores  -start iron supplement today     Type 2 diabetes mellitus with stage 3a chronic kidney disease, with long-term current use of insulin (H)  Fasting blood sugars trending 180 - 200 fasting.  On NPH 12 unit(s) BID, last A1C 8.3 in 12/2020. The current medical regimen is effective;  continue present plan and medications.      Early onset Alzheimer's disease with behavioral disturbance (H)  Delusions (H)  Patient with significant dementia which has worsened over last several months.  CT Scan shows brain atrophy and presumed chronic microvascular ischemic changes.  He is anxious, confused.  Medications titrated and olanzapine discontinued, mirtazapine discontinued.  He has not use prn gabapentin for behavior management.    -discharge to assisted living facility, will continue to require 24 h supervision    Chronic obstructive pulmonary disease, unspecified COPD type (H)  Pulmonary hypertension, unspecified (H)  Bakers' asthma (H)  Patient with ongoing need for oxygen, unable to be weaned.  Chronic, compensated with no recent exacerbation. no smoking,  chronic O2 use. On Trelegy Ellipta, albuterol inhaler prn.  Stable on current regimen      Permanent atrial fibrillation (H)  Off aspirin and apixaban due to GIB, rate  controlled on metoprolol.       Past Medical History:  has a past medical history of Acute calculous cholecystitis (03/02/2019), Acute respiratory failure with hypoxia (H) (11/27/2020), Altered mental status (12/31/2014), Calculus of kidney (1990), COPD (chronic obstructive pulmonary disease) (H), COVID-19 virus infection (12/2/2020), Depression (1979), Diabetes (H), Elevated troponin (11/27/2020), Encephalopathy (09/03/2018), Heart disease, Left hand weakness (12/14/2009), PNEUMONIA, ORGANISM NOS (02/14/2008), and Sleep apnea. He also has no past medical history of Basal cell carcinoma, Complication of anesthesia, Malignant melanoma (H), PONV (postoperative nausea and vomiting), or Squamous cell carcinoma.    Discharge Medications:  Current Outpatient Medications   Medication Sig Dispense Refill     albuterol (PROAIR HFA/PROVENTIL HFA/VENTOLIN HFA) 108 (90 Base) MCG/ACT inhaler Inhale 2 puffs into the lungs every 6 hours as needed for shortness of breath / dyspnea or wheezing 1 Inhaler 11     atorvastatin (LIPITOR) 80 MG tablet Take 0.5 tablets (40 mg) by mouth daily 90 tablet 3     cyanocobalamin (VITAMIN B-12) 500 MCG tablet Take 500 mcg by mouth daily        escitalopram (LEXAPRO) 10 MG tablet Take 10 mg by mouth daily       ferrous sulfate (FEROSUL) 325 (65 Fe) MG tablet Take 325 mg by mouth daily (with breakfast)       ferrous sulfate (FEROSUL) 325 (65 Fe) MG tablet Take 325 mg by mouth daily (with breakfast)       finasteride (PROSCAR) 5 MG tablet Take 1 tablet by mouth once daily 90 tablet 0     furosemide (LASIX) 40 MG tablet Take 1 tablet (40 mg) by mouth 2 times daily       insulin NPH-Regular (NOVOLIN 70/30 FLEXPEN RELION) susp Inject 12 Units Subcutaneous 2 times daily (with meals) 15 mL 1     metFORMIN (GLUCOPHAGE-XR) 500 MG 24 hr tablet Take 1 tablet (500 mg) by mouth 2 times daily (with meals) 60 tablet 11     metoprolol tartrate (LOPRESSOR) 25 MG tablet Take 0.5 tablets (12.5 mg) by mouth 2 times  "daily 90 tablet 3     nitroGLYcerin (NITROSTAT) 0.4 MG sublingual tablet Place 1 tablet (0.4 mg) under the tongue every 5 minutes as needed for chest pain (CALL 911 IF NOT IMPROVED AFTER THREE CONSECUTIVE DOSES) 25 tablet 5     omeprazole 20 MG tablet Take 20 mg by mouth 2 times daily       tamsulosin (FLOMAX) 0.4 MG capsule Take 1 capsule (0.4 mg) by mouth daily 90 capsule 3     TRELEGY ELLIPTA 100-62.5-25 MCG/INH oral inhaler Inhale 1 puff into the lungs daily       Vitamin D3 (CHOLECALCIFEROL) 125 MCG (5000 UT) tablet Take 1 tablet by mouth daily       insulin pen needle (BD ABRAM U/F) 32G X 4 MM miscellaneous Use 2 daily as directed. 100 each 11     order for DME Equipment being ordered: CPAP  AIRSENSE 10  11-15 CM H2O  [employer] MED  SN# [id number]  DN# [id number]       Medication Changes/Rationale:     Add Ferrous Sulfate 325 mg daily po for anemia today     Controlled medications sent with patient:   not applicable/none     ROS:   4 point ROS including Respiratory, CV, GI and , other than that noted in the HPI,  is negative    Physical Exam:   Vitals: /59   Pulse 64   Temp 96.8  F (36  C)   Resp 18   Ht 1.715 m (5' 7.5\")   Wt 78.1 kg (172 lb 3.2 oz)   SpO2 90%   BMI 26.57 kg/m    BMI= Body mass index is 26.57 kg/m .  GENERAL APPEARANCE:  Alert, in no acute distress   CHEST/RESP:  respiratory effort normal, lung sounds diminished, CTA  , no respiratory distress  CV:  Rate and rhythm reg, no murmur, no peripheral edema   SKIN:  Mid spine with reddened area without break in skin, buttocks with erythema and blanchable redness  PSYCH:  Alert and oriented to self and surroundings with forgetfulness , affect anxious at times, judgement impaired by cognitive losses      SNF labs:   Most Recent 3 BMP's:  Recent Labs   Lab Test 03/10/21  0825 03/05/21  0555 03/04/21  0438    143 141   POTASSIUM 4.0 3.9 3.6   CHLORIDE 100 104 103   CO2 36* 37* 34*   BUN 20 25 27   CR 1.39* 1.65* 1.66*   ANIONGAP 5 2* " 4   EVELINA 8.8 8.2* 7.8*   * 95 180*     Most Recent 3 Hemoglobins:  Recent Labs   Lab Test 03/22/21  0725 03/19/21  0720 03/15/21  0555   HGB 8.1* 8.4* 8.5*     Most Recent Anemia Panel:  Recent Labs   Lab Test 03/22/21  0725 03/10/21  0825 03/10/21  0825 11/30/20  0621 11/30/20  0621   WBC  --   --  8.9   < > 10.6   HGB 8.1*   < > 8.8*   < > 13.2*   HCT  --   --  29.9*   < > 39.0*   MCV  --   --  95   < > 88   PLT  --   --  198   < > 135*   IRON 26*  --   --   --   --    IRONSAT 9*  --   --   --   --    RETICABSCT  --   --   --   --  35.4   RETP  --   --   --   --  0.8     --   --   --   --    CARROLL 45  --   --    < >  --    B12  --   --  947  --   --     < > = values in this interval not displayed.     DISCHARGE PLAN:    Follow up labs: No labs orders/due    Medical Follow Up:       Follow up with primary provider in 2-4 weeks, or establish care with in-house team    MTM referral needed and placed by this provider: No    Current [place] scheduled appointments: none    Discharge Services: Home Care: Occupational Therapy, Physical Therapy and Speech Therapy     TOTAL DISCHARGE TIME:   Greater than 30 minutes  Electronically signed by:  THOMPSON Funk CNP     Documentation of Face to Face and Certification for Home Health Services    I certify that patient: Alvaro Horton is under my care and that I, or a nurse practitioner or physician's assistant working with me, had a face-to-face encounter that meets the physician face-to-face encounter requirements with this patient on: 3/22/2021.    This encounter with the patient was in whole, or in part, for the following medical condition, which is the primary reason for home health care: Anemia due to blood loss, acute [D62]     I certify that, based on my findings, the following services are medically necessary home health services: Nursing, Occupational Therapy, Physical Therapy.    My clinical findings support the need for the above services because:  Nurse is needed: To assess meds, vitals, behaviors after changes in medications or other medical regimen.., Occupational Therapy Services are needed to assess and treat cognitive ability and address ADL safety due to impairment in cognition, and Physical Therapy Services are needed to assess and treat the following functional impairments: balance, endurance, and strength.    Further, I certify that my clinical findings support that this patient is homebound (i.e. absences from home require considerable and taxing effort and are for medical reasons or [employer] services or infrequently or of short duration when for other reasons) because: Requires assistance of another person or specialized equipment to access medical services because patient: Requires supervision of another for safe transfer.    Based on the above findings. I certify that this patient is confined to the home and needs intermittent skilled nursing care, physical therapy and/or speech therapy.  The patient is under my care, and I have initiated the establishment of the plan of care.  This patient will be followed by a physician who will periodically review the plan of care.  Physician/Provider to provide follow up care: Be Carreno    Attending hospital physician (the Medicare certified [place] provider): THOMPSON Funk CNP   Physician Signature: See electronic signature associated with these discharge orders.  Date: 3/21/2021        Sincerely,        THOMPSON Funk CNP

## 2021-03-22 NOTE — LETTER
3/22/2021        RE: Alvaro Horton  [address] [facility] [address]        [place] [facility]IAT[facility] DISCHARGE SUMMARY  PATIENT'S NAME: Alvaro Horton  YOB: 1940  MEDICAL RECORD NUMBER: [id number]  Place of Service where encounter took place:  [facility] (TCU) [[id number]]    PRIMARY CARE PROVIDER AND CLINIC RESPONSIBLE AFTER TRANSFER: Be Carreno MD, [address] [place] [address] [place] [address]; Phone: [phone]; Fax: [phone]; [facility] [place] Provider     Transferring providers: THOMPSON Funk CNP, Kelly Barcenas MD  Recent Hospitalization/ED: [facility] stay 2/26/20 to 3/6/21.  Date of SNF Admission: 3/6/2021  Date of SNF (anticipated) Discharge: 3/22/2021  Discharged to: new facility for patient, [facility]  Cognitive Scores: BIMS: 6/15 and MOCA: 10/30  Physical Function: Ambulating 75 ft with rolling walker  DME: [facility] (has both)    CODE STATUS/ADVANCE DIRECTIVES DISCUSSION: DNR   ALLERGIES: Prednisone    DISCHARGE DIAGNOSIS/NURSING FACILITY COURSE:     Brief Summary of Hospital Course: Alvaro has a past medical history significant for CAD, COPD, afib, BPH, CKD, CVA in 2009, CHF, anemia, mild memory loss.  He was diagnosed with COVID 19 pneumonia in November 2020 (treatment with dexamethasone and remdesivir and sent home with oxygen due to hypoxia), and has been hospitalized several times since that time with weakness, encephalopathy thought related to COVID19 superimposed on mild dementia.  He required 1:1 supervision during some of his hospital stays, medications were titrated.  Most recently, he presented to [facility] on 2/26 w/ GI bleeding, weakness, confusion, poor oral intake. Bleeding thought to be upper GIB and aspirin/Eliquis were stopped. GIB subsided.  He received blood products. He was also found to have a UTI, and was treated w/ Augmentin. His hospital stay was complicated with acute kidney injury  on CKD3 and he was admitted to TCU for strengthening     Recent changes:    3/9/21 - stopped pepcid and startedprotonix 40 daily started, B12 dose decreased    3/12 - started lexapro, vistaril prn, discontinue remeron    3/16 - gabapentin 100 qid prn for delirium, goal to GDR Zyprexa, vistaril discontinued     3/17/21 -  PHQ9 6/27, GAD7 15 (high), walking 75 ft with rolling walker, Zyprexa discontinued     Anemia due to blood loss, acute  Gastrointestinal hemorrhage with melena  Iron Deficiency anemia  Baseline hemoglobin 12-14 prior to  COVID diagnosis in 11/2020, then has been trending 10-11 since that time.  Today, hemoglobin noting ongoing decline, at 8.1.  Iron studies indicating low iron stores  -start iron supplement today     Type 2 diabetes mellitus with stage 3a chronic kidney disease, with long-term current use of insulin (H)  Fasting blood sugars trending 180 - 200 fasting.  On NPH 12 unit(s) BID, last A1C 8.3 in 12/2020. The current medical regimen is effective;  continue present plan and medications.      Early onset Alzheimer's disease with behavioral disturbance (H)  Delusions (H)  Patient with significant dementia which has worsened over last several months.  CT Scan shows brain atrophy and presumed chronic microvascular ischemic changes.  He is anxious, confused.  Medications titrated and olanzapine discontinued, mirtazapine discontinued.  He has not use prn gabapentin for behavior management.    -discharge to assisted living facility, will continue to require 24 h supervision    Chronic obstructive pulmonary disease, unspecified COPD type (H)  Pulmonary hypertension, unspecified (H)  Bakers' asthma (H)  Patient with ongoing need for oxygen, unable to be weaned.  Chronic, compensated with no recent exacerbation. no smoking,  chronic O2 use. On Trelegy Ellipta, albuterol inhaler prn.  Stable on current regimen      Permanent atrial fibrillation (H)  Off aspirin and apixaban due to GIB, rate  controlled on metoprolol.       Past Medical History:  has a past medical history of Acute calculous cholecystitis (03/02/2019), Acute respiratory failure with hypoxia (H) (11/27/2020), Altered mental status (12/31/2014), Calculus of kidney (1990), COPD (chronic obstructive pulmonary disease) (H), COVID-19 virus infection (12/2/2020), Depression (1979), Diabetes (H), Elevated troponin (11/27/2020), Encephalopathy (09/03/2018), Heart disease, Left hand weakness (12/14/2009), PNEUMONIA, ORGANISM NOS (02/14/2008), and Sleep apnea. He also has no past medical history of Basal cell carcinoma, Complication of anesthesia, Malignant melanoma (H), PONV (postoperative nausea and vomiting), or Squamous cell carcinoma.    Discharge Medications:  Current Outpatient Medications   Medication Sig Dispense Refill     albuterol (PROAIR HFA/PROVENTIL HFA/VENTOLIN HFA) 108 (90 Base) MCG/ACT inhaler Inhale 2 puffs into the lungs every 6 hours as needed for shortness of breath / dyspnea or wheezing 1 Inhaler 11     atorvastatin (LIPITOR) 80 MG tablet Take 0.5 tablets (40 mg) by mouth daily 90 tablet 3     cyanocobalamin (VITAMIN B-12) 500 MCG tablet Take 500 mcg by mouth daily        escitalopram (LEXAPRO) 10 MG tablet Take 10 mg by mouth daily       ferrous sulfate (FEROSUL) 325 (65 Fe) MG tablet Take 325 mg by mouth daily (with breakfast)       ferrous sulfate (FEROSUL) 325 (65 Fe) MG tablet Take 325 mg by mouth daily (with breakfast)       finasteride (PROSCAR) 5 MG tablet Take 1 tablet by mouth once daily 90 tablet 0     furosemide (LASIX) 40 MG tablet Take 1 tablet (40 mg) by mouth 2 times daily       insulin NPH-Regular (NOVOLIN 70/30 FLEXPEN RELION) susp Inject 12 Units Subcutaneous 2 times daily (with meals) 15 mL 1     metFORMIN (GLUCOPHAGE-XR) 500 MG 24 hr tablet Take 1 tablet (500 mg) by mouth 2 times daily (with meals) 60 tablet 11     metoprolol tartrate (LOPRESSOR) 25 MG tablet Take 0.5 tablets (12.5 mg) by mouth 2 times  "daily 90 tablet 3     nitroGLYcerin (NITROSTAT) 0.4 MG sublingual tablet Place 1 tablet (0.4 mg) under the tongue every 5 minutes as needed for chest pain (CALL 911 IF NOT IMPROVED AFTER THREE CONSECUTIVE DOSES) 25 tablet 5     omeprazole 20 MG tablet Take 20 mg by mouth 2 times daily       tamsulosin (FLOMAX) 0.4 MG capsule Take 1 capsule (0.4 mg) by mouth daily 90 capsule 3     TRELEGY ELLIPTA 100-62.5-25 MCG/INH oral inhaler Inhale 1 puff into the lungs daily       Vitamin D3 (CHOLECALCIFEROL) 125 MCG (5000 UT) tablet Take 1 tablet by mouth daily       insulin pen needle (BD ABRAM U/F) 32G X 4 MM miscellaneous Use 2 daily as directed. 100 each 11     order for DME Equipment being ordered: CPAP  AIRSENSE 10  11-15 CM H2O  [employer] MED  SN# [id number]  DN# [id number]       Medication Changes/Rationale:     Add Ferrous Sulfate 325 mg daily po for anemia today     Controlled medications sent with patient:   not applicable/none     ROS:   4 point ROS including Respiratory, CV, GI and , other than that noted in the HPI,  is negative    Physical Exam:   Vitals: /59   Pulse 64   Temp 96.8  F (36  C)   Resp 18   Ht 1.715 m (5' 7.5\")   Wt 78.1 kg (172 lb 3.2 oz)   SpO2 90%   BMI 26.57 kg/m    BMI= Body mass index is 26.57 kg/m .  GENERAL APPEARANCE:  Alert, in no acute distress   CHEST/RESP:  respiratory effort normal, lung sounds diminished, CTA  , no respiratory distress  CV:  Rate and rhythm reg, no murmur, no peripheral edema   SKIN:  Mid spine with reddened area without break in skin, buttocks with erythema and blanchable redness  PSYCH:  Alert and oriented to self and surroundings with forgetfulness , affect anxious at times, judgement impaired by cognitive losses      SNF labs:   Most Recent 3 BMP's:  Recent Labs   Lab Test 03/10/21  0825 03/05/21  0555 03/04/21  0438    143 141   POTASSIUM 4.0 3.9 3.6   CHLORIDE 100 104 103   CO2 36* 37* 34*   BUN 20 25 27   CR 1.39* 1.65* 1.66*   ANIONGAP 5 2* " 4   EVELINA 8.8 8.2* 7.8*   * 95 180*     Most Recent 3 Hemoglobins:  Recent Labs   Lab Test 03/22/21  0725 03/19/21  0720 03/15/21  0555   HGB 8.1* 8.4* 8.5*     Most Recent Anemia Panel:  Recent Labs   Lab Test 03/22/21  0725 03/10/21  0825 03/10/21  0825 11/30/20  0621 11/30/20  0621   WBC  --   --  8.9   < > 10.6   HGB 8.1*   < > 8.8*   < > 13.2*   HCT  --   --  29.9*   < > 39.0*   MCV  --   --  95   < > 88   PLT  --   --  198   < > 135*   IRON 26*  --   --   --   --    IRONSAT 9*  --   --   --   --    RETICABSCT  --   --   --   --  35.4   RETP  --   --   --   --  0.8     --   --   --   --    CARROLL 45  --   --    < >  --    B12  --   --  947  --   --     < > = values in this interval not displayed.     DISCHARGE PLAN:    Follow up labs: No labs orders/due    Medical Follow Up:       Follow up with primary provider in 2-4 weeks, or establish care with in-house team    MTM referral needed and placed by this provider: No    Current [place] scheduled appointments: none    Discharge Services: Home Care: Occupational Therapy, Physical Therapy and Speech Therapy     TOTAL DISCHARGE TIME:   Greater than 30 minutes  Electronically signed by:  THOMPSON Funk CNP     Documentation of Face to Face and Certification for Home Health Services    I certify that patient: Alvaro Horton is under my care and that I, or a nurse practitioner or physician's assistant working with me, had a face-to-face encounter that meets the physician face-to-face encounter requirements with this patient on: 3/22/2021.    This encounter with the patient was in whole, or in part, for the following medical condition, which is the primary reason for home health care: Anemia due to blood loss, acute [D62]     I certify that, based on my findings, the following services are medically necessary home health services: Nursing, Occupational Therapy, Physical Therapy.    My clinical findings support the need for the above services because:  Nurse is needed: To assess meds, vitals, behaviors after changes in medications or other medical regimen.., Occupational Therapy Services are needed to assess and treat cognitive ability and address ADL safety due to impairment in cognition, and Physical Therapy Services are needed to assess and treat the following functional impairments: balance, endurance, and strength.    Further, I certify that my clinical findings support that this patient is homebound (i.e. absences from home require considerable and taxing effort and are for medical reasons or [employer] services or infrequently or of short duration when for other reasons) because: Requires assistance of another person or specialized equipment to access medical services because patient: Requires supervision of another for safe transfer.    Based on the above findings. I certify that this patient is confined to the home and needs intermittent skilled nursing care, physical therapy and/or speech therapy.  The patient is under my care, and I have initiated the establishment of the plan of care.  This patient will be followed by a physician who will periodically review the plan of care.  Physician/Provider to provide follow up care: Be Carreno    Attending hospital physician (the Medicare certified [place] provider): THOMPSON Funk CNP   Physician Signature: See electronic signature associated with these discharge orders.  Date: 3/21/2021        Sincerely,        THOMPSON Funk CNP

## 2021-03-23 NOTE — TELEPHONE ENCOUNTER
Nurse Lalit calling with [facility]    Requesting orders:  1 time week for 3 weeks for skilled nursing for nutritional education, skin integrity maintenance, respiratory cares, and general assessment.     Verbal order given to approve skilled nursing visits until certification received for provider signature.      Requesting approval from the doctor for:    Wound care:   small stage one pressure ulcer on spine (gisell prominence) 0.5cm x 0.5cm purple and non-blanchable, with a  large red blanchable area surround the pressure ulcer.   requested cleanse with wound  apply skin prep, apply foam (meplex) dressing, apply moisturizing cream on surrounding skin, change once weekly or as needed for dislodgement or soiling .     Provider please review and advise. Thank you.

## 2021-03-24 NOTE — PROGRESS NOTES
Clinic Care Coordination Contact    Follow Up Progress Note      Assessment: Per Katey. Patient transitioned to [facility] for memory care assisted living.     Patient is receiving home care in addition for wound care on back, from [facility].     Patient continues to refuse many cares and he has difficult behaviors with the staff.     He continue to use oxygen and a walker for ambulation.      Plan:   Patient will be safe in memory care unit.    Care Coordinator will close to clinic care coordination as all needs, including medication administration, will be met by memory care staff and home care services.     Colette Edward RN, [facility] - Primary Care Clinic RN Coordinator  [facility]   3/24/2021    [phone]

## 2021-03-25 NOTE — CONFIDENTIAL NOTE
Wound care / home care RN calling for orders for the following:    Stage 3 pressure ulcer on buttock - would like verbal order to apply calazime cream as needed    Left great toe diabetic pressure ulcer- would like order for metahoney, foam adhesive dressing twice weekly.    Please call nurse back at [phone] with orders if provider approves.  May leave detailed message.    Olive Weathers, RN

## 2021-04-02 NOTE — TELEPHONE ENCOUNTER
Order or referral being requested: home care needs verbal orders and update. Pt has an opening on back were stage 1 ulser was and to apply media honey and foam Orders fax to facility ok to give pt gulsernal supplament  Date needed: as soon as possible    Has the patient been seen by the PCP for this problem? YES      Phone number Patient can be reached at:  Other phone number: [phone]  [place]   Fax  [phone]    Best Time:  Any time    Can we leave a detailed message on this number?  YES    Call taken on 4/2/2021 at 4:57 PM by Elsa Retana

## 2021-04-06 NOTE — TELEPHONE ENCOUNTER
Orders, HHC and Plan of Care, [facility] for 3/23/21- 5/21/21    Faxed back to: [phone] and sent to scanning.    Liz Escamilla  [facility]

## 2021-04-06 NOTE — TELEPHONE ENCOUNTER
Lalit with [facility] has called back and the clarification is:  Glucerna Supplement, She will need a call back today please.    Liz Escamilla  [facility] [place]   Lalit is at [phone]

## 2021-04-15 NOTE — TELEPHONE ENCOUNTER
Dx COPD, CAD, COVID Nov 2020. Homecare requesting orders for increase 02 to 3 L/M with activity.  Has not had PCP F/U from TCU discharge yet.   Please advise.  KALLI Fung RN

## 2021-04-15 NOTE — TELEPHONE ENCOUNTER
Reason for Call: Request for an order or referral:    Order or referral being requested: pt is at 2L of O2 continuously home care want to up the O2 to 3L with activity. 1 min standing activity he has to sit down with sob O2 is at 87% on average when walking 60ft it drops to 85% then 3 mins to recovery and this is with O2 at 2L.  At rest 2L is good but with activity they need to bump up to 3L.  Date needed: as soon as possible    Has the patient been seen by the PCP for this problem? YES        Phone number Patient can be reached at:  Other phone number:  [phone]   [place]    Best Time:  Any time    Can we leave a detailed message on this number?  YES    Call taken on 4/15/2021 at 10:16 AM by Elsa Retana

## 2021-04-15 NOTE — TELEPHONE ENCOUNTER
[facility], informed OK to increase home 02 to 3 L/M with activity. Will look into coordinating F/U with PCP.  KALLI Fung RN

## 2021-04-19 NOTE — TELEPHONE ENCOUNTER
Tsaneem from PT at [facility] says last Thursday Dr Carreno OK'd increase in O2 to 3L with activities.   Rdedy needs this order so they can include with their plan of care.  Fax this to [phone]

## 2021-04-20 NOTE — TELEPHONE ENCOUNTER
Spoke with patient's son Jonathan who Franklin lives with. Patient will be discharge from [facility] to home on 05/01/21 as his memory has improved after recovering from COVID.  Patient has an oxygen concentrator at home and Jonathan is requesting portability. Home liter flow is 2 L at rest, 3 L with exertion. Oxygen company is [employer].  Verbal orders given for Skilled nurse 1 time this week, then 2 times week for 4 weeks.   Assisted patient's son jonathan to schedule F/U appt on 05/05/21.   Yoselin VALLE RN

## 2021-04-20 NOTE — TELEPHONE ENCOUNTER
Reason for Call: Request for an order or referral:    Order or referral being requested: Verbal Orders   Skilled nurse x 1 visit this week  2 x week for 4 weeks    Pt is being transferred home from [facility] unit. Pt is going to be needing Home Oxygen and Portable Tank.  Does pt need to be seen by Dr. Carreno to have this ordered.  Pt does have a Concentrator at the Facility.  Please Advise.    Date needed: as soon as possible    Has the patient been seen by the PCP for this problem? YES    Phone number Patient can be reached at:  Home number on file  [place] soto  [phone]    Best Time:  Any Time      Can we leave a detailed message on this number?  YES    Call taken on 4/20/2021 at 10:05 AM by Elsa Cain

## 2021-05-06 NOTE — TELEPHONE ENCOUNTER
Yadira ROSALES from [facility] calling for new wound care orders:    Hydrocolloid HD to right and left ischium from shearing  Partial thickness wound right lower shin, honey and optifoam.    Would like to increase visits to three times weekly due to new wounds.      Olive Weathers RN

## 2021-05-06 NOTE — TELEPHONE ENCOUNTER
Call placed to Yadira with approval of orders as requested. She is now asking for a medical sw assessment. Verbal permission per protocol.    Neha LANCASTER RN

## 2021-05-09 PROBLEM — Z86.73 HISTORY OF CVA (CEREBROVASCULAR ACCIDENT): Chronic | Status: ACTIVE | Noted: 2020-01-01

## 2021-05-09 PROBLEM — R79.89 ELEVATED TROPONIN: Status: ACTIVE | Noted: 2021-01-01

## 2021-05-09 PROBLEM — Z87.09 HISTORY OF COPD: Status: ACTIVE | Noted: 2021-01-01

## 2021-05-09 PROBLEM — J96.01 ACUTE RESPIRATORY FAILURE WITH HYPOXIA (H): Status: ACTIVE | Noted: 2021-01-01

## 2021-05-09 NOTE — H&P
[facility]    History and Physical - Hospitalist Service       Date of Admission:  5/9/2021    Assessment & Plan   Alvaro Horton is a 80 year old male admitted on 5/9/2021. He has history of     Acute Respiratory Failure with Hypoxemia  Presented with resp rate 20-30, oxygen saturation reportedly 82% per EMS. Chronically on 1-2 L oxygen at home, initially on 4 L in ED. Appears in mild to moderate respiratory distress on exam. VBG shows respiratory acidosis with compensation pH 7.39, CO2 55, Bicarb 33. VS show tachycardia, afebrile. Labs show leukocytosis, elevated BNP at 97849. CXR shows low lung volumes, effusions, possible infiltrate in left lower lobe. Lungs diminished 3+ bilateral lower extremity edema into the thighs. Suspect new onset CHF as well as possible pneumonia. COPD may be contributing though no definitive wheeze on admit so will observe for now without initiating steroids.  - continue O2 therapy, titrate to maintain sats > 91%  - diuresis as below  - pneumonia treatment as below, CRP and procal pending    Elevated Troponin  Coronary Artery Disease s/p CABG 2009  Moderate Pulmonary Hypertension  Troponin 0.113 --> 0.145. ECG without acute ischemic changes. No anginal symptoms at present. Vague description of chest discomfort evening prior to admission, unable to fully describe. No reported ongoing chest pain or anginal type symptoms on admission. Low suspicion for ACS at this point, suspect elevated in the setting of likely CHF exacerbation.   History of CABG in 2009 with LIMA to LAD and SVG to OM1 and 2. Follows with cardiology, last seen 3/2020. Last Echo 11/2020 EF 60-65%, no wall motion changes, decreased RV systolic function with elevated RV pressure.   - trend troponin Q 6 hours   - ECG prn anginal equivalent  - continue home statin, beta blocker   - restart aspirin     Acute Decompensation of New-Onset Heart Failure  Admission weight 174 lbs, not significantly  changed. BNP 04540. Lungs diminished. Lower extremity edema to the thighs bilaterally. CXR shows low lung volume, small pleural effusions, possible left lower lobe consolidation. Last Echo 11/2020 EF 60-65%, no wall motion changes, decreased RV systolic function with elevated RV pressure. Required diuresis during last hospitalization, currently on furosemide 40 mg twice daily. Appears to be decompensation of heart failure, not previously formally diagnosed though has required diuresis during previous hospitalization. May be post-COVID related, will monitor troponins no signs of ACS at present..  - initiate 40 mg IV lasix, Q6 hours x 3 doses, re-evaluate in AM  - hold home oral furosemide 40 mg BID  - strict I and O's, daily weights  - oxygen titrate to oxygenation > 91%  - order Echo  - telemetry    Suspected Community Acquired Pneumonia  Leukocytosis, elevated lactic  WBC 16.1. Lactic elevated mildly at 2.4, repeat improved at 1.1 without any intervention, suspect may be related to respiratory effort. UA negative. CXR shows low lung volumes, effusions, possible infiltrate in left lower lobe. Concern for community-acquired pneumonia, started on ceftriaxone, added on azithromycin.  - follow CBC, add on CRP and procalcitonin  - antibiotics: azithromycin and ceftriaxone  - scheduled duonebs 4 times daily, prn albuterol   - blood cultures pending, obtained following initiation of ceftriaxone    Falls  Generalized weakness  Noted to have several witness falls at home prior to arrival. No clear injury from this.  - PT/OT/care transitions consulted    Chronic obstructive pulmonary disease  Asthma, Baker's Lung  Chronic hypoxemia following COVID diagnosis  Lungs diminished, no definitive wheeze. PFTs in 04/2018 revealed severe airflow obstruction.   - Continue home Trelegy Ellipta, prn albuterol   - scheduled duonebs  - defer steroid for now, no definitive signs of exacerbation    CKD  Creatinine 1.56, GFR 41. Baseline 1.39  - 1.6. Overall stable compared to recent baseline.  - follow BMP  - avoid nephrotoxic agents    Iron Deficiency Anemia  Recent GI bleed, presumed upper  Hgb 10.1. Recent Hgb baseline 8-9, improved. Hospitalized 2/26/21 - 3/6/21 for GI bleed, presumed upper, no definitive source on EGD. At that time recommended apixaban to be stopped indefinitely, aspirin held, consider restarting ~2 weeks after discharge.  - continue iron  - follow CBC daily  - restart aspirin     Diabetes Mellitus, Type II  Chronic.  Last a1C 5.7% on 3/5/2021.   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 Son feels impairment started around the time of COVID diagnosis. Previously on olanzapine and mirtazapine which were stopped in TCU.   - continue home escitalopram  - may benefit from neurology/neuropsych evaluation    Hypertension  Chronic. Blood pressures reviewed, stable  - continue home metoprolol  - diuretics as above, hold home furosemide     Paroxsymal Atrial fibrillation  Transient following CABG in 2009. ZioPatch 3/2020 did not show atrial fibrillation. ECG 8/2020 showed NSR. During admission 11/2020 for COVID 19 atrial fibrillation seen, apixaban started at that time though consider of short term given fall risk. CHADsVAC of 6. Ap'ixaban stopped as above following GI bleed.   - continue home metoprolol     History of CVA  Hyperlipidemia  Occurred at time of CABG in 2009.  - continue home statin  - restart aspirin     Benign Prostatic Hyperplasia  Chronic.  - continue home finasteride, tamsulosin    COVID 19 Screen negative 5/9/21  History of COVID-19 infection diagnosed 11/26/20 with COVID 19, hospitalized x 2 and discharged on 2 LPM of oxygen.   - oxygen titrated to O2>90%  - No  "precautions needed    Multiple healing wounds  Intertrigo  Multiple wounds on lower extremities, signs of skin breakdown, appears to be improving, wound care being done at home with home health nurse. Intertrigo noted near groin.  - wound care consulted to help guide continued wound cares during hospitalization  - miconazole powder ordered BID     Diet: Combination Diet [id number] Calories: Moderate Consistent CHO (4-6 CHO units/meal); 2 gm NA Diet  DVT Prophylaxis: Pneumatic Compression Devices  Olivo Catheter: not present  Code Status: No CPR- Do NOT Intubate, ED provider discussed with patient's son, this is also continuation of prior code status.    Disposition Plan   Expected discharge: 2 - 3 days, recommended to transitional care unit once respiratory status improved, work up complete and appropriate discharge identified.  Entered: Herminia Skinner PA-C 05/09/2021, 4:41 PM     The patient's care was discussed with the Attending Physician, Dr. Herminia Wasserman, Patient. Called Patient's son Jae who is listed contact in chart but was unable to get a hold of him and no voicemail was set up.     Herminia Skinner PA-C  [facility]  Contact information available via [facility] Paging/Directory  ______________________________________________________________________    Chief Complaint   Falls, weakness    History is obtained from the patient, emergency department physician. Called Patient's son Jae who is listed contact in chart but was unable to get a hold of him and no voicemail was set up.    History of Present Illness   Alvaro Horton is a 80 year old male who presents following falls at home, generalized weakness.     Per ED provider, \"EMS report patient was hypoxic at home \"82% on room air\"-where he has been living after recent hospitalization and care to nursing home for returning home a week prior . Patient  was placed on 10 L and his oxygen saturations improved to 97%.\" Of note, " "he is typically on 1-2 L oxygen chronically per chart review, that patient is unable to tell me his recent flow rate. States that he targets an oxygen level of 95%.    Additional history obtained from son who reported to ED provider, \"patient got home a week prior from [facility]. Jae reports patient was doing well after he was discharged from department and was home for couple of days and then he suddenly declined yesterday he sustained witnessed falls without significant injury on 4 occasions when he got up to go to the bathroom.  He became weak similar to when he was \"hospitalized in November for COVID-19\".  \"     Patient is not a reliable historian, has difficulties giving specific history. States he has been not doing well at home and has been in and out of the hospital since his COVID diagnosis. He denies difficulty breathing although he does appear to have mild to moderate increased work of breathing on evaluation. He does admit to a cough. He states he has not had fevers or chills but has noted a cough as well as swelling in his lower extremities though states he has not been wearing his compression stockings. When asked about chest discomfort he denies any at present but states when he laid down to sleep last night he had some discomfort. He is unable to elaborate or indicate if any other symptoms were associated with this.     He denies lightheadedness, dizziness, abdominal pain, nausea, vomiting, diarrhea, changes in urination.      Review of Systems    The 10 point Review of Systems is negative other than noted in the HPI or here.     Past Medical History    I have reviewed this patient's medical history and updated it with pertinent information if needed.   Past Medical History:   Diagnosis Date     Acute calculous cholecystitis 03/02/2019     Acute respiratory failure with hypoxia (H) 11/27/2020     Altered mental status 12/31/2014 12/27/2014:episode confusion for 20 minutes.  Seen at " "[facility]-hospitalized.  Head CT, MRI/MRA all normal.  PET cardiac perfusion stress test normal.  Neurology consult-diagnosis= possible hypoglycemia, migraine variant.  \"Acute ischemic cerebral event was excluded\".      Calculus of kidney 1990 1990. s/p ESWL and percutaneous nephrolithotomy     COPD (chronic obstructive pulmonary disease) (H)      COVID-19 virus infection 12/2/2020     Depression 1979    Hospitalized 1979     Diabetes (H)      Elevated troponin 11/27/2020     Encephalopathy 09/03/2018     Heart disease      Left hand weakness 12/14/2009    Afterbypass surgery, CVA, improved.      PNEUMONIA, ORGANISM NOS 02/14/2008    CT 2/08-  Pulmonary atelectasis and infiltrate in the posterior left lower lung base.      Sleep apnea      Past Surgical History   I have reviewed this patient's surgical history and updated it with pertinent information if needed.  Past Surgical History:   Procedure Laterality Date     C ANESTH,DX ARTHROSCOPIC PROC KNEE JOINT  1994, 1998     Left     CARDIAC SURGERY  11/09    CABG x2 - LIMA-D1, SVG to OM1, OM2.     ESOPHAGOSCOPY, GASTROSCOPY, DUODENOSCOPY (EGD), COMBINED N/A 2/28/2021    Procedure: ESOPHAGOGASTRODUODENOSCOPY, WITH BIOPSY;  Surgeon: Burt Mendieta MD;  Location: [facility]     GENITOURINARY SURGERY  1990    ESWL and percutaneous nephrolithotomy     LAPAROSCOPIC CHOLECYSTECTOMY N/A 3/4/2019    Procedure: LAPAROSCOPIC CHOLECYSTECTOMY;  Surgeon: Burt Mendieta MD;  Location: [facility]     ORTHOPEDIC SURGERY  8/12    right CTR     SURGICAL HISTORY OF -   1998    Hernia repair     SURGICAL HISTORY OF -   2004    NormalColonoscopy      SURGICAL HISTORY OF -   2006    Normal Colonoscopy     Social History   I have reviewed this patient's social history and updated it with pertinent information if needed.  Social History     Tobacco Use     Smoking status: Never Smoker     Smokeless tobacco: Never Used   Substance Use Topics     Alcohol use: No     Alcohol/week: 0.0 standard " drinks     Drug use: No     Family History   I have reviewed this patient's family history and updated it with pertinent information if needed.  Family History   Problem Relation Age of Onset     C.A.D. Father         40s     Hypertension Father      C.A.D. Paternal Grandfather      Heart Disease Paternal Grandfather      Diabetes Brother      C.A.D. Brother      Heart Disease Brother      Hypertension Brother      Gastrointestinal Disease Son         Crohn's disease     Gastrointestinal Disease Other         2 grandaughters with Crohn's disease     Cancer - colorectal No family hx of      Prior to Admission Medications   Prior to Admission Medications   Prescriptions Last Dose Informant Patient Reported? Taking?   Insulin Pen Needle (PEN NEEDLES) 32G X 4 MM MISC   No No   Si pen 2 times daily   Sharps Container MISC   No No   Si Container as needed   TRELEGY ELLIPTA 100-62.5-25 MCG/INH oral inhaler  Son Yes No   Sig: Inhale 1 puff into the lungs daily   Vitamin D3 (CHOLECALCIFEROL) 125 MCG (5000 UT) tablet  Son Yes No   Sig: Take 1 tablet by mouth daily   albuterol (PROAIR HFA/PROVENTIL HFA/VENTOLIN HFA) 108 (90 Base) MCG/ACT inhaler  Son No No   Sig: Inhale 2 puffs into the lungs every 6 hours as needed for shortness of breath / dyspnea or wheezing   alcohol swab prep pads   No No   Sig: Use to swab area of injection/candi as directed.   atorvastatin (LIPITOR) 80 MG tablet   No No   Sig: Take 0.5 tablets (40 mg) by mouth daily   blood glucose (NO BRAND SPECIFIED) test strip   No No   Sig: Use to test blood sugar 2 times daily or as directed. To accompany: Blood Glucose Monitor Brands: per insurance.   blood glucose monitoring (NO BRAND SPECIFIED) meter device kit   No No   Sig: Use to test blood sugar 2 times daily or as directed. Preferred blood glucose meter OR supplies to accompany: Blood Glucose Monitor Brands: per insurance.   cyanocobalamin (VITAMIN B-12) 500 MCG tablet  Son Yes No   Sig: Take 500  mcg by mouth daily    escitalopram (LEXAPRO) 10 MG tablet   Yes No   Sig: Take 10 mg by mouth daily   ferrous sulfate (FEROSUL) 325 (65 Fe) MG tablet   Yes No   Sig: Take 325 mg by mouth daily (with breakfast)   ferrous sulfate (FEROSUL) 325 (65 Fe) MG tablet   Yes No   Sig: Take 325 mg by mouth daily (with breakfast)   finasteride (PROSCAR) 5 MG tablet  Son No No   Sig: Take 1 tablet by mouth once daily   furosemide (LASIX) 40 MG tablet   No No   Sig: Take 1 tablet (40 mg) by mouth 2 times daily   insulin NPH-Regular (NOVOLIN 70/30 FLEXPEN) susp   No No   Sig: Inject 12 Units Subcutaneous 2 times daily (with meals)   metFORMIN (GLUCOPHAGE-XR) 500 MG 24 hr tablet  Son No No   Sig: Take 1 tablet (500 mg) by mouth 2 times daily (with meals)   metoprolol tartrate (LOPRESSOR) 25 MG tablet  Son No No   Sig: Take 0.5 tablets (12.5 mg) by mouth 2 times daily   nitroGLYcerin (NITROSTAT) 0.4 MG sublingual tablet  Son No No   Sig: Place 1 tablet (0.4 mg) under the tongue every 5 minutes as needed for chest pain (CALL 911 IF NOT IMPROVED AFTER THREE CONSECUTIVE DOSES)   omeprazole 20 MG tablet   Yes No   Sig: Take 20 mg by mouth 2 times daily   order for DME  Son Yes No   Sig: Equipment being ordered: CPAP  AIRSENSE 10  11-15 CM H2O  QUATTRO AIR SIZE MED  SN# [id number]  DN# [id number]   tamsulosin (FLOMAX) 0.4 MG capsule  Son No No   Sig: Take 1 capsule (0.4 mg) by mouth daily   thin (NO BRAND SPECIFIED) lancets   No No   Sig: Use with lanceting device. To accompany: Blood Glucose Monitor Brands: per insurance.      Facility-Administered Medications: None     Allergies   Allergies   Allergen Reactions     Prednisone Other (See Comments)     Severe interaction with DM     Physical Exam   Vital Signs: Temp: 98.1  F (36.7  C) Temp src: Oral BP: 137/72 Pulse: 103   Resp: 25 SpO2: 90 % O2 Device: Nasal cannula Oxygen Delivery: 4 LPM  Weight: 172 lbs 0 oz    Constitutional: elderly gentleman laying down in bed, mildly ill in appearance,  mild to moderate respiratory distress, awake, alert, cooperative, and appears stated age  ENT: oropharynx is somewhat dry.  Respiratory: mild to moderate increased work of breathing, poor air exchange, lungs diminished in the bases, no clear wheeze, rhonchi or crackles appreciated.   Cardiovascular: regular rate and regular rhythm, and no murmur noted. 3+ bilateral lower extremity edema up to the thighs.  GI: normal bowel sounds, soft, non-distended, non-tender  Genitounirinary: deferred  Skin: normal skin color, texture, turgor. Scattered abrasions noted on the shins bilaterally.  Musculoskeletal: normal bulk and tone. Moves all 4 extremities appropriately.  Neurologic: Awake, alert, oriented to name, place though not to time.    Neuropsychiatric: calm, pleasant, cooperative. Difficulties with short term memory, unclear history of recent events.    Data   Data reviewed today: I reviewed all medications, new labs and imaging results over the last 24 hours. I personally reviewed the EKG tracing showing NSR, no acute ST or T wave changes and the chest x-ray image(s) showing low lung volumes, bibasilar pleural effusions, possible left lower lobe infiltrate.    Recent Labs   Lab 05/09/21  1305 05/09/21  1051   WBC  --  16.1*   HGB  --  10.1*   MCV  --  87   PLT  --  191   NA  --  135   POTASSIUM  --  3.8   CHLORIDE  --  96   CO2  --  29   BUN  --  38*   CR  --  1.56*   ANIONGAP  --  10   EVELINA  --  9.0   GLC  --  203*   TROPI 0.145* 0.113*     Recent Results (from the past 24 hour(s))   XR Chest Port 1 View    Narrative    CHEST ONE VIEW PORTABLE   5/9/2021 11:22 AM     HISTORY: Acute hypoxemic respiratory failure.  History of oxygen  dependent COPD. Evaluate for acute cardiopulmonary process including  focal consolidation and/or pleural effusion.    COMPARISON: 3/4/2021      Impression    IMPRESSION: Small bilateral pleural effusions. Left basilar airspace  opacity may represent atelectasis or pneumonia. No  pneumothorax.    ENID JASON MD

## 2021-05-09 NOTE — ED PROVIDER NOTES
"  History     Chief Complaint   Patient presents with     Shortness of Breath     edema     HPI  Alvaro Horton is a 80 year old male who presents by EMS with respiratory distress with hypoxia on room air with underlying history of oxygen dependent COPD-1 to 2 L per nasal cannula.  Patient has multiple medical diagnoses including moderate persistent asthma, obstructive sleep apnea atrial fibrillation was on chronic anticoagulation- recently discontinued due to GI bleeding anemia with, stage 3 chronic renal disease, history of pulmonary hypertension, stable angina, and prior history of GI bleeding and fdvhdi-utldxqtkcnmd-Ivqnqwcd 26 to March 6, 2021.  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  Also noted to have hypoxic respiratory failure secondary to COPD.  The source of patient's GI bleeding during his recent hospital course 2 months earlier was not found however his aspirin and apixaban was discontinued.  EMS reports patient was hypoxic at home \"82% on room air\"-where he has been living after recent hospitalization and care to nursing home for returning home a week prior . Patient  was placed on 10 L and his oxygen saturations improved to 97%.  He arrived and appeared ill but not toxic he was warm to touch tachycardic and cannot provide additional details of history reporting \"I live with my son\".  During his recent hospitalization at the time of discharge on March 5, 2021 patient was placed on furosemide with goal to treat 40 mg twice a day while weight measurements was monitored.   Patient reports has been compliant with his diuretics in discussion with the EMS providers   Patient was seen upon arrival with rapid evaluation.  Patient was in mild to moderate distress.  He had sores about his mid back covered with bandages (patient was noted to have these pressure sores during " "his recent hospital course in March 2021- Mepelex dressing was advised), sores around his left lower leg and symmetric lower extremity edema.    Additional history obtained by phone from son- (Jae Horton at [phone]). Jae reports patient got home a week prior from [facility]. Jae reports patient was doing well after he was discharged from department and was home for couple of days and then he suddenly declined yesterday he sustained witnessed falls without significant injury on 4 occasions when he got up to go to the bathroom.  He became weak similar to when he was \"hospitalized in November for COVID-19\".  Jae reports he currently has home health nursing where he has [place] nurse who is there 3 times a week Monday Wednesday Friday from 9 AM to 2 PM and [place] nurses who are there to help with exercise and wound care.  Jae will prefer to have data at home but understands that he may need to be hospitalized and made to be in transitional care.  Jae confirms that patient is still DNI and no CPR-similar CODE STATUS from when he was most recently hospitalized 5 or 26 2021      Chart Review  Echo complete with contrast November 26, 2020  Procedure  Complete Portable Echo Adult. Optison (NDC #3781-7282) given intravenously.  Complete Portable Bubble Echo Adult.  _____________________________________________________________________________  __        Interpretation Summary     The left ventricle is normal in size.  Left ventricular systolic function is normal.  The visual ejection fraction is estimated at 60-65%.  No regional wall motion abnormalities noted.  Paradoxical septal motion is consistent with right ventricular volume  overload.  The right ventricle is moderately dilated.  Moderately decreased right ventricular systolic function  Right ventricular systolic pressure is elevated, consistent with moderate  pulmonary hypertension.     Compared to the prior study, the RVSP is higher, RV dysfunction is now " "present  and afib is new. The study was technically difficult.    Allergies:  Allergies   Allergen Reactions     Prednisone Other (See Comments)     Severe interaction with DM       Problem List:    Patient Active Problem List    Diagnosis Date Noted     Hypertension goal BP (blood pressure) < 140/90 10/09/2006     Priority: High     Bakers' asthma (H)      Priority: High     Dx possible \"Baker's Lung\" 2001 [facility]. RAST for bakers yeast negative 2002. Has occupational exposures with related worsening asthmatic symptoms. CT 2/08- no fibrosis.       Moderate persistent asthma      Priority: High     PFTS 1997 - FEV1- 3.39 (64%), ratio 0.74, 24% change [id number]% with bronchodilators,  TLC 86%, %, DLCO 78%. Methacholine challenge positive at level 7 2001.  PFTS 2004 - FEV1- 1.93 (63%), ratio 0.77. PFTS 4/08 - FEV1- 2.13 (72%), ratio 0.71, no change with bronchodilators,  TLC 78%, RV 96%, DLCO 64%          Coronary artery disease involving native coronary artery of native heart      Priority: High     atypical chest pain 2001 with GXT echo- decreased LVF, angiogram 2001- nonobstructing 3 vessel disease.   Repeat angio 2004- prox RCA 50-60%,  OM2- 40%, D1 30-40%, LAD 30-40%.   Cardiolite 2005 during hospitalization for SOB in [place] reported to reveal no ischemia.   Adenosine cardiolite 1/08- small slightly reversible inferior defect, most likely consistent with diaphragm attenuation with bowel uptake artifact. USA, Angio 11/09- Severe LM disease, severe subtotal occlusion of a large branch OM1.    S/p CABG x2 11/09- LIMA-D1, SVG to OM1, OM2. GXT 8/10- 5.8 mets, 121 (80%) HR, normal ekg, cardiolite- small fixed inferior/basal defect with small area khurram-infarct ischemia extending into the mid ventricle. EF 67%.  4/25/13:adenosine cardiolyte stress test through [facility] with normal EF and no ischemia.   1/16/2016 nuclear stress test:1.  Myocardial perfusion imaging using single isotope technique demonstrated " no evidence for myocardial ischemia. 2. Gated images demonstrated normal wall motion with a calculated ejection fraction of 61%.  3. Compared to the prior study from 2011 there are no significant changes .           Elevated troponin 05/09/2021     Priority: Medium     History of COPD 05/09/2021     Priority: Medium     Acute respiratory failure with hypoxia (H) 05/09/2021     Priority: Medium     Pulmonary hypertension, unspecified (H) 03/09/2021     Priority: Medium     Stable angina pectoris (H) 03/09/2021     Priority: Medium     Chronic kidney disease, stage 3 03/09/2021     Priority: Medium     Gastrointestinal hemorrhage with melena 02/28/2021     Priority: Medium     Chronic anticoagulation 02/26/2021     Priority: Medium     Sepsis (H) 02/26/2021     Priority: Medium     Urinary tract infection without hematuria, site unspecified 02/26/2021     Priority: Medium     Gastrointestinal hemorrhage, unspecified gastrointestinal hemorrhage type 02/26/2021     Priority: Medium     Anemia, unspecified type 02/26/2021     Priority: Medium     BEBETO (acute kidney injury) (H) 02/26/2021     Priority: Medium     Type 2 diabetes mellitus (H) 12/02/2020     Priority: Medium     History of CVA (cerebrovascular accident) 11/27/2020     Priority: Medium     Mild Broca's aphasia, confusion, right hand dysesthesia after angiogram 2009. MRA new small area of restricted diffusion in the white matter of the medial right temporal lobe,  consistent with a recent small infarct. Speech problems resolved, still mild right hand problems.         Falls frequently 11/27/2020     Priority: Medium     Claudication of calf muscles (H) 08/27/2020     Priority: Medium     Type 2 diabetes mellitus with diabetic chronic kidney disease (H) 10/30/2015     Priority: Medium     Hx of CABG 12/27/2014     Priority: Medium     Formatting of this note might be different from the original.  [facility] Jesus 2009       [facility] 08/11/2014      Priority: Medium     State Tier Level:    Status:  Unable to re-connect  Care Coordinator:  Kecia Mills    See Letters for [facility] Care Plan  Date:  August 11, 2014             Chronic kidney disease, stage 2 (mild) 05/06/2014     Priority: Medium     9/9/2015:He has had two abnormal MAC in the past.        COPD (chronic obstructive pulmonary disease) (H) 11/18/2013     Priority: Medium     PFT 11/15/13 results:FVC=59%, FEV1=54%, FEV1/FVC=92%.  His DLCO was 58%1. Spirometry: FEV1 moderately reduced. FVC moderately reduced. FEV1/FVC ratio reduced. 2. Bronchodilator Response: A 14% improvement is seen in FEV1 with bronchodilators. 3. Lung Volumes: Total lung capacity normal. Residual volume increased. Residual volume total lung capacity ratio increased. 4. DLCO: Moderately reduced.   INTERPRETATION: Moderate obstructive lung disease. Reversibility with bronchodilators is seen on testing today. Lung volumes show evidence for air trapping. DLCO is reduced, consistent with loss of capillary-alveolar exchange as in emphysema, pulmonary hypertension, chronic pulmonary embolus, pulmonary fibrosis or other pulmonary vascular disease. A concomitant restrictive lung disease process may be suspected on the basis of moderate reductions in FEV1 and FVC, with a fairly well-preserved FEV1/FVC ratio and a borderline low normal total lung capacity.   11/15/13:exercise oximetry did not show significant desaturation below 91%.  PFT February, 2014 results:FVC=54%, FEV1=48%, FEV1/FVC=64%.  DLCO=59%  9/6/2016 pulmonary consult:COPD with restrictional components and diffusion defect, history Baker's lung.          Hyperlipidemia LDL goal <70 04/17/2013     Priority: Medium     Atrial fibrillation (H) 12/14/2009     Priority: Medium     Afib after CABG 11/2009  Afib during hospitalization 11/2020, 12/2020       Obstructive sleep apnea- mild (AHI 11) 04/14/2008     Priority: Medium     ESS 20/24. Sleep study [facility]: 4/8/08 (196#) AHI  11.2, REM AHI 49.6, low O2 87%.         Hypertrophy of prostate without urinary obstruction      Priority: Medium     Elevated PSA. Bx negative 2004.       Advanced care planning/counseling discussion 09/24/2012     Priority: Low     Does not have a directive 9/12       Dyslexia 11/11/2010     Priority: Low     essentially unable to read       CTS (carpal tunnel syndrome) 05/12/2010     Priority: Low     EMG 5/10-  median neuropathy at the right wrist, moderate in severity electrically, right ulnar neuropathy localizing to the elbow, mild chronic right C6 radiculopathy without evidence for acute denervation.        Alzheimer's dementia (H) 05/03/2010     Priority: Low     MMSE 27/30 (0/3 attention recall, ?history of dylexia), PHQ9-16 7/09. Recommended neurocogntive testing, did not complete.  MMSE 23/30 6/10 (attention, county, if/and/buts).Neurocogntive evaluation  1/10- not suspicious for emerging dementia. Felt likely due hearing loss/dyslexia.   10/29/2018:He was seen at [facility] 10/10/2018.  Dr. Oksana Munoz.   He had an EEG.   Diagnosed with Alzheimer's dementia.   Records not yet reviewed.        Benign paroxysmal vertigo 02/03/2009     Priority: Low     Admit 2/09. MRI/MRA head and neck negative.       Sensorineural hearing loss, bilateral 10/08/2007     Priority: Low     Esophageal reflux 11/06/2006     Priority: Low     PSORIASIS      Priority: Low     OVERACTIVE BLADDER      Priority: Low     PVR 84 2004.          Past Medical History:    Past Medical History:   Diagnosis Date     Acute calculous cholecystitis 03/02/2019     Acute respiratory failure with hypoxia (H) 11/27/2020     Altered mental status 12/31/2014     Calculus of kidney 1990     COPD (chronic obstructive pulmonary disease) (H)      COVID-19 virus infection 12/2/2020     Depression 1979     Diabetes (H)      Elevated troponin 11/27/2020     Encephalopathy 09/03/2018     Heart disease      Left hand weakness 12/14/2009      PNEUMONIA, ORGANISM NOS 02/14/2008     Sleep apnea        Past Surgical History:    Past Surgical History:   Procedure Laterality Date     C ANESTH,DX ARTHROSCOPIC PROC KNEE JOINT  1994, 1998     Left     CARDIAC SURGERY  11/09    CABG x2 - LIMA-D1, SVG to OM1, OM2.     ESOPHAGOSCOPY, GASTROSCOPY, DUODENOSCOPY (EGD), COMBINED N/A 2/28/2021    Procedure: ESOPHAGOGASTRODUODENOSCOPY, WITH BIOPSY;  Surgeon: Burt Mendieta MD;  Location: [facility]     GENITOURINARY SURGERY  1990    ESWL and percutaneous nephrolithotomy     LAPAROSCOPIC CHOLECYSTECTOMY N/A 3/4/2019    Procedure: LAPAROSCOPIC CHOLECYSTECTOMY;  Surgeon: Burt Mendieta MD;  Location: [facility]     ORTHOPEDIC SURGERY  8/12    right CTR     SURGICAL HISTORY OF -   1998    Hernia repair     SURGICAL HISTORY OF -   2004    NormalColonoscopy      SURGICAL HISTORY OF -   2006    Normal Colonoscopy       Family History:    Family History   Problem Relation Age of Onset     C.A.D. Father         40s     Hypertension Father      C.A.D. Paternal Grandfather      Heart Disease Paternal Grandfather      Diabetes Brother      C.A.D. Brother      Heart Disease Brother      Hypertension Brother      Gastrointestinal Disease Son         Crohn's disease     Gastrointestinal Disease Other         2 grandaughters with Crohn's disease     Cancer - colorectal No family hx of        Social History:  Marital Status:   [5]  Social History     Tobacco Use     Smoking status: Never Smoker     Smokeless tobacco: Never Used   Substance Use Topics     Alcohol use: No     Alcohol/week: 0.0 standard drinks     Drug use: No        Medications:    albuterol (PROAIR HFA/PROVENTIL HFA/VENTOLIN HFA) 108 (90 Base) MCG/ACT inhaler  alcohol swab prep pads  atorvastatin (LIPITOR) 80 MG tablet  blood glucose (NO BRAND SPECIFIED) test strip  blood glucose monitoring (NO BRAND SPECIFIED) meter device kit  cyanocobalamin (VITAMIN B-12) 500 MCG tablet  escitalopram (LEXAPRO) 10 MG tablet  ferrous  sulfate (FEROSUL) 325 (65 Fe) MG tablet  ferrous sulfate (FEROSUL) 325 (65 Fe) MG tablet  finasteride (PROSCAR) 5 MG tablet  furosemide (LASIX) 40 MG tablet  insulin NPH-Regular (NOVOLIN 70/30 FLEXPEN) susp  Insulin Pen Needle (PEN NEEDLES) 32G X 4 MM MISC  metFORMIN (GLUCOPHAGE-XR) 500 MG 24 hr tablet  metoprolol tartrate (LOPRESSOR) 25 MG tablet  nitroGLYcerin (NITROSTAT) 0.4 MG sublingual tablet  omeprazole 20 MG tablet  order for DME  [employer] Container MISC  tamsulosin (FLOMAX) 0.4 MG capsule  thin (NO BRAND SPECIFIED) lancets  TRELEGY ELLIPTA 100-62.5-25 MCG/INH oral inhaler  Vitamin D3 (CHOLECALCIFEROL) 125 MCG (5000 UT) tablet          Review of Systems   Constitutional: Negative.    HENT: Negative.    Eyes: Negative.    Respiratory: Positive for cough and shortness of breath.    Cardiovascular: Negative.    Gastrointestinal: Negative.    Endocrine: Negative.    Genitourinary: Negative.    Musculoskeletal:        Lower extremity swelling   Skin: Negative.    Allergic/Immunologic: Negative.    Neurological: Negative.    Hematological: Negative.    Psychiatric/Behavioral: Negative.    All other systems reviewed and are negative.      Physical Exam   BP: 136/79  Pulse: 107  Temp: 98.1  F (36.7  C)  Resp: (!) 32  Weight: 78 kg (172 lb)  SpO2: 96 %      Physical Exam  Constitutional:       Appearance: He is ill-appearing.   HENT:      Head: Normocephalic and atraumatic.   Neck:      Musculoskeletal: Normal range of motion and neck supple.   Cardiovascular:      Rate and Rhythm: Regular rhythm. Tachycardia present.   Pulmonary:      Effort: Tachypnea and respiratory distress present.      Breath sounds: Examination of the right-middle field reveals rales. Examination of the right-lower field reveals rales. Rales present.   Abdominal:      Tenderness: There is abdominal tenderness.   Musculoskeletal:      Right lower leg: Edema present.      Left lower leg: Edema present.   Skin:     General: Skin is warm.    Neurological:      General: No focal deficit present.      Mental Status: He is alert. He is disoriented.         ED Course        Procedures               EKG Interpretation:      Interpreted by Dylan Dennison MD  Time reviewed: 11:25  Symptoms at time of EKG: Shortness of breath and respiratory distress  Rhythm: Narrow complex tachycardia  Rate: 100-110  Axis: Left Axis Deviation  Ectopy: none  Conduction: bifasicular  ST Segments/ T Waves: Non-specific ST-T wave changes  Q Waves: nonspecific  Comparison to prior: When compared with EKG from February 26, 2021, and EKG from December 25, 2020 tachycardia is present, right bundle branch block is old/ known    Clinical Impression: Right bundle branch block with narrow complex tachycardia.      Critical Care time:  was 60 minutes for this patient excluding procedures.     The Lactic acid level is elevated due to Elevated due to probable acute hypoxemic respiratory failure superimposed on underlying CHF and possible demand ischemia , at this time there is no sign of severe sepsis or septic shock.         ED medications:  Medications   cefTRIAXone in d5w (ROCEPHIN) intermittent infusion 1 g (0 g Intravenous Stopped 5/9/21 1301)   furosemide (LASIX) injection 40 mg (40 mg Intravenous Given 5/9/21 1144)         ED Vitals:  Vitals:    05/09/21 1445 05/09/21 1500 05/09/21 1515 05/09/21 1530   BP: (!) 147/79 (!) 142/94 (!) 143/91    Pulse: 100 103 93 106   Resp: 14 26 22 26   Temp:       TempSrc:       SpO2: 98% 99% 98% 98%   Weight:         Vitals:    05/09/21 1445 05/09/21 1500 05/09/21 1515 05/09/21 1530   BP: (!) 147/79 (!) 142/94 (!) 143/91    Pulse: 100 103 93 106   Resp: 14 26 22 26   Temp:       TempSrc:       SpO2: 98% 99% 98% 98%   Weight:         Vitals:    05/09/21 1445 05/09/21 1500 05/09/21 1515 05/09/21 1530   BP: (!) 147/79 (!) 142/94 (!) 143/91    Pulse: 100 103 93 106   Resp: 14 26 22 26   Temp:       TempSrc:       SpO2: 98% 99% 98% 98%   Weight:              ED labs and imaging:  Results for orders placed or performed during the hospital encounter of 05/09/21   XR Chest Port 1 View     Status: None    Narrative    CHEST ONE VIEW PORTABLE   5/9/2021 11:22 AM     HISTORY: Acute hypoxemic respiratory failure.  History of oxygen  dependent COPD. Evaluate for acute cardiopulmonary process including  focal consolidation and/or pleural effusion.    COMPARISON: 3/4/2021      Impression    IMPRESSION: Small bilateral pleural effusions. Left basilar airspace  opacity may represent atelectasis or pneumonia. No pneumothorax.    ENID JASON MD   CBC with platelets differential     Status: Abnormal   Result Value Ref Range    WBC 16.1 (H) 4.0 - 11.0 10e9/L    RBC Count 3.79 (L) 4.4 - 5.9 10e12/L    Hemoglobin 10.1 (L) 13.3 - 17.7 g/dL    Hematocrit 32.9 (L) 40.0 - 53.0 %    MCV 87 78 - 100 fl    MCH 26.6 26.5 - 33.0 pg    MCHC 30.7 (L) 31.5 - 36.5 g/dL    RDW 18.7 (H) 10.0 - 15.0 %    Platelet Count 191 150 - 450 10e9/L    Diff Method Automated Method     % Neutrophils 90.2 %    % Lymphocytes 2.7 %    % Monocytes 5.5 %    % Eosinophils 0.0 %    % Basophils 0.1 %    % Immature Granulocytes 1.5 %    Nucleated RBCs 0 0 /100    Absolute Neutrophil 14.6 (H) 1.6 - 8.3 10e9/L    Absolute Lymphocytes 0.4 (L) 0.8 - 5.3 10e9/L    Absolute Monocytes 0.9 0.0 - 1.3 10e9/L    Absolute Eosinophils 0.0 0.0 - 0.7 10e9/L    Absolute Basophils 0.0 0.0 - 0.2 10e9/L    Abs Immature Granulocytes 0.2 0 - 0.4 10e9/L    Absolute Nucleated RBC 0.0    Basic metabolic panel     Status: Abnormal   Result Value Ref Range    Sodium 135 133 - 144 mmol/L    Potassium 3.8 3.4 - 5.3 mmol/L    Chloride 96 94 - 109 mmol/L    Carbon Dioxide 29 20 - 32 mmol/L    Anion Gap 10 3 - 14 mmol/L    Glucose 203 (H) 70 - 99 mg/dL    Urea Nitrogen 38 (H) 7 - 30 mg/dL    Creatinine 1.56 (H) 0.66 - 1.25 mg/dL    GFR Estimate 41 (L) >60 mL/min/[1.73_m2]    GFR Estimate If Black 47 (L) >60 mL/min/[1.73_m2]    Calcium  9.0 8.5 - 10.1 mg/dL   Lactic acid whole blood     Status: Abnormal   Result Value Ref Range    Lactic Acid 2.4 (H) 0.7 - 2.0 mmol/L   Blood gas venous     Status: Abnormal   Result Value Ref Range    Ph Venous 7.38 7.32 - 7.43 pH    PCO2 Venous 52 (H) 40 - 50 mm Hg    PO2 Venous 34 25 - 47 mm Hg    Bicarbonate Venous 31 (H) 21 - 28 mmol/L    Base Excess Venous 4.7 mmol/L    FIO2 4    Troponin I     Status: Abnormal   Result Value Ref Range    Troponin I ES 0.113 (H) 0.000 - 0.045 ug/L   Nt probnp inpatient (BNP)     Status: Abnormal   Result Value Ref Range    N-Terminal Pro BNP Inpatient 27,579 (H) 0 - 1,800 pg/mL   Symptomatic SARS-CoV-2 COVID-19 Virus (Coronavirus) by PCR     Status: None    Specimen: Nasopharyngeal   Result Value Ref Range    SARS-CoV-2 Virus Specimen Source Nasopharyngeal     SARS-CoV-2 PCR Result NEGATIVE     SARS-CoV-2 PCR Comment (Note)    UA reflex to Microscopic     Status: Abnormal   Result Value Ref Range    Color Urine Yellow     Appearance Urine Slightly Cloudy     Glucose Urine Negative NEG^Negative mg/dL    Bilirubin Urine Negative NEG^Negative    Ketones Urine Negative NEG^Negative mg/dL    Specific Gravity Urine 1.016 1.003 - 1.035    Blood Urine Negative NEG^Negative    pH Urine 5.0 5.0 - 7.0 pH    Protein Albumin Urine 100 (A) NEG^Negative mg/dL    Urobilinogen mg/dL 0.0 0.0 - 2.0 mg/dL    Nitrite Urine Negative NEG^Negative    Leukocyte Esterase Urine Negative NEG^Negative    Source Catheterized Urine     RBC Urine 2 0 - 2 /HPF    WBC Urine 1 0 - 5 /HPF    Squamous Epithelial /HPF Urine <1 0 - 1 /HPF    Hyaline Casts 1 0 - 2 /LPF   Blood gas venous     Status: Abnormal   Result Value Ref Range    Ph Venous 7.39 7.32 - 7.43 pH    PCO2 Venous 55 (H) 40 - 50 mm Hg    PO2 Venous 54 (H) 25 - 47 mm Hg    Bicarbonate Venous 33 (H) 21 - 28 mmol/L    Base Excess Venous 6.8 mmol/L   Lactic acid whole blood     Status: None   Result Value Ref Range    Lactic Acid 1.1 0.7 - 2.0 mmol/L    Troponin I (second draw)     Status: Abnormal   Result Value Ref Range    Troponin I ES 0.145 (HH) 0.000 - 0.045 ug/L           Assessments & Plan (with Medical Decision Making)   Assessment Summary and Clinical Impression: 80-year-old male who presented by EMS with acute respiratory distress with hypoxia.  He is presenting with symptoms that are likely multifactorial with underlying clinical comorbidities-with likely acute heart failure syndrome and pneumonia/COPD exacerbation and will require hospitalization due to concern for generalized deconditioning, increased oxygen requirement, and discussion about need for more support although family expressed desire to have patient return home and is able to broaden support at home.   He has a history of oxygen dependent COPD-only 1 to 2 L per nasal cannula, paroxysmal atrial fibrillation who was on anticoagulation-however this was stopped due to recent hospitalization for GI bleeding with anemia while he was on aspirin apixaban.  The source of the bleeding was not found during his recent hospitalization 2 months prior, History of Alzheimer's dementia also complicates the ability to obtain direct history from the patient's.  He was noted to be hypoxic prior to arrival by EMS providers 80% on room air.  He was placed on 10 L and improved to 97%.  On arrival here he was on 4 L per oxymizer mask and was 94%.  Patient's was noted to have hypoxic respiratory failure secondary to COPD during his recent hospital course.  He appeared ill but was nontoxic he was warm to touch tachycardic and tachypneic with crackles or rhonchi predominant in the right lung field.  He had sores about his mid thoracic back and lower legs and symmetric lower extremity edema.  Due to concern for sepsis, acute hypoxic respiratory failure, and his associate comorbidities rapid evaluation and resuscitation was initiated.      ED course and Plan;  Reviewed the medical record.  Patient was hospitalized  December 25, 2020 after a fall at [facility] and was hospitalized February 26 through March 6, 2021 for weakness which was felt to be multifactorial with deconditioning anemia and COVID-19 encephalopathy superimposed on underlying mild dementia with hypoxic respiratory failure secondary to COPD.  With rapid evaluation assessment and concern for acuity of illness on presentation his oxygen support was maximized at 4 L per Oxymizer mask.  Sepsis protocol work-up was initiated given warmth to touch and respiratory distress.  Cultures were obtained and was started empirically on antibiotics due to underlying history of COPD now with hypoxia and his arrival lactate of 2.4 (repeat normal-1.1).  He was not acidotic on arrival and his PCO2 is 52.   Arrival troponin was 0.113 which could be secondary to demand ischemia as EKG revealed a known right bundle branch block with narrow complex regular tachycardia.  Patient has prior abnormal troponins most recently was 0.0253 months earlier.  Highest trended troponin was 0.085.  Troponin was trended today.  [place] [address]-rsriw would suggest heart failure syndrome.  Prior BNPs on record most recently in December 2020- 12,656. COVID-19 negative.    With elevated BNP and patient noted to be on home furosemide he was given 40 mg of IV Lasix during his ED course.    I spoke Dr. JESSICA Wasserman- admitting hospitalist at 12.05pm who agreed to accept the patient for furthercare after reviewing presentation to the department, ED course, work-up, interventions and working clinical diagnosis including acute hypoxemic respiratory failure-likely multifactorial with COPD with heart failure and possibly pneumonia.  We discussed patient's pending serial troponins and lactate.  We agreed to admit to the medical floor as patient is not in significant distress and does not require BiPAP currently in the emergency department although there is likelihood that he may require this during his hospital  course.  We also discussed that if his troponin trended upwards that discussion with family about cardiovascular intervention may be warranted.         ED to Inpatient Handoff:    Discussed with Dr. JESSICA Wasserman at 12.05pm  Patient accepted for Inpatient Stay  Pending studies include : Blood cultures.  Code Status: No pre-arrest CPR/ DNI- from 2/26/21-confirmed in discussion by phone with son (Jae Horton- [phone])       I spoke with Dr. Wasserman at 2 PM.  Updated Dr Wasserman on patient's slight delta troponin which does not appear to be clinically significant based on patient's overall clinical picture and working diagnosis.  We agreed that there is no emergent indication for cardiology consultation and the patient was stable for admission to the medicine service for ongoing care.       Disclaimer: This note consists of symbols derived from keyboarding, dictation and/or voice recognition software. As a result, there may be errors in the script that have gone undetected. Please consider this when interpreting information found in this chart.      I have reviewed the nursing notes.    I have reviewed the findings, diagnosis, plan and need for follow up with the patient.       New Prescriptions    No medications on file       Final diagnoses:   Acute respiratory failure with hypoxia (H)   History of COPD - oxygen dependent   Sepsis with acute hypoxic respiratory failure without septic shock, due to unspecified organism (H)   Elevated troponin       5/9/2021   [facility] EMERGENCY DEPT     Dylan Dennison MD  05/09/21 0378

## 2021-05-09 NOTE — PROGRESS NOTES
Writer completed baseline skin assessment with primary nurse. He has open areas right shin, mid back, left great toe, shearing sacrum and intertrigo groin. Kaitlynn ORONEY updated, [facility] nurse ordered by PA, antifungal ointment ordered for groin. Pulsate mattress in use.

## 2021-05-09 NOTE — ED NOTES
DATE:  5/9/2021   TIME OF RECEIPT FROM LAB:  1107  LAB TEST:  Lactic  LAB VALUE:  2.4  RESULTS GIVEN WITH READ-BACK TO (PROVIDER):  Dylan Dennison, *  TIME LAB VALUE REPORTED TO PROVIDER:   1104

## 2021-05-09 NOTE — PROGRESS NOTES
"[place] [facility] ADMISSION NOTE    Patient admitted to room 2305 at approximately 1609 via cart from emergency room. Patient was accompanied by transport tech.     Verbal SBAR report received from Sukhdeep prior to patient arrival.     Patient trasferred to bed via slipp sheet. Patient alert and oriented X 2. The patient is not having any pain.  . Admission vital signs: Blood pressure 137/72, pulse 103, temperature 98.3  F (36.8  C), temperature source Axillary, resp. rate 26, height 1.727 m (5' 8\"), weight 79.1 kg (174 lb 6.1 oz), SpO2 90 %. Patient was oriented to plan of care, call light, bed controls, tv, telephone, bathroom and visiting hours.     Risk Assessment    The following safety risks were identified during admission: fall. Yellow risk band applied: YES.     Skin Initial Assessment    This writer admitted this patient and completed a full skin assessment and Ricardo score in the Adult PCS flowsheet. Appropriate interventions initiated as needed.     Secondary skin check completed by BOBBY Benson.    Ricardo Risk Assessment  Sensory Perception: 3-->slightly limited  Moisture: 3-->occasionally moist  Activity: 3-->walks occasionally  Mobility: 3-->slightly limited  Nutrition: 2-->probably inadequate  Friction and Shear: 1-->problem  Ricardo Score: 15  Moisture Interventions: Incontinence pad  Nutrition Interventions: Encourage protein intake, Maintain adequate hydration  Friction/Shear Interventions: HOB 30 degrees or less, Silicone foam sacral dressing  Mattress: Pulsate (Low air loss)  Bed Frame: Standard width and length    Education    Patient has a [place] to Observation order: No  Observation education completed and documented: No      Rebecca Matthews RN    "

## 2021-05-09 NOTE — ED TRIAGE NOTES
Comes in from long term care due to increased SOA of air since last night, normally on 1-2 liters of oxygen via NC, came in by EMS on 10 L non rebreather, placed on oxymask at 4 L and maintained sats in the mid 90s, increase in LE edema

## 2021-05-10 PROBLEM — J96.01 ACUTE RESPIRATORY FAILURE WITH HYPOXIA (H): Status: RESOLVED | Noted: 2021-01-01 | Resolved: 2021-01-01

## 2021-05-10 PROBLEM — J96.01 ACUTE RESPIRATORY FAILURE WITH HYPOXIA (H): Status: RESOLVED | Noted: 2020-01-01 | Resolved: 2021-01-01

## 2021-05-10 PROBLEM — J96.21 ACUTE ON CHRONIC RESPIRATORY FAILURE WITH HYPOXIA (H): Status: ACTIVE | Noted: 2021-01-01

## 2021-05-10 PROBLEM — J96.01 ACUTE RESPIRATORY FAILURE WITH HYPOXIA (H): Status: ACTIVE | Noted: 2020-01-01

## 2021-05-10 NOTE — PROGRESS NOTES
RN Case Manager from [facility], Lalit, called this evening. She voiced concerns about patient's discharge. Due to his extensive wounds & fragility she recommended that patient go to a TCU @ discharge rather than home w/ son. She left her #[phone] should our care provider or care management wish to speak with her.

## 2021-05-10 NOTE — CONSULTS
Care Management Initial Consult    General Information  Assessment completed with: Family, Patient, Jae and patient   Type of CM/SW Visit: Initial Assessment    Primary Care Provider verified and updated as needed: Yes   Readmission within the last 30 days: no previous admission in last 30 days      Reason for Consult: discharge planning  Advance Care Planning: Advance Care Planning Reviewed: no concerns identified          Communication Assessment  Patient's communication style: spoken language (English or Bilingual)    Hearing Difficulty or Deaf: yes   Wear Glasses or Blind: yes    Cognitive  Cognitive/Neuro/Behavioral: .WDL except  Level of Consciousness: confused  Arousal Level: opens eyes spontaneously  Orientation: disoriented to, place, time, situation  Mood/Behavior: calm, cooperative  Best Language: 0 - No aphasia  Speech: clear, spontaneous    Living Environment:   People in home: child(katherin), adult  Jae  Current living Arrangements: house      Able to return to prior arrangements: yes       Family/Social Support:  Care provided by: homecare agency, child(katherin), self  Provides care for: no one  Marital Status: Single  Children          Description of Support System: Supportive, Involved    Support Assessment: Adequate family and caregiver support    Current Resources:   Patient receiving home care services: Yes  Skilled Home Care Services: Skilled Nursing, Physicial Therapy  Community Resources: None  Equipment currently used at home: walker, rolling  Supplies currently used at home: Incontinence Supplies, Oxygen Tubing/Supplies    Employment/Financial:  Employment Status: , previous service        Financial Concerns: No concerns identified   Referral to Financial Counselor: No       Lifestyle & Psychosocial Needs:     Social Needs     Financial resource strain: Not hard at all     Food insecurity     Worry: Never true     Inability: Never true     Transportation needs     Medical: No      Non-medical: No     Socioeconomic History     Marital status:      Spouse name: Not on file     Number of children: 3     Years of education: Not on file     Highest education level: Not on file   Occupational History     Occupation: [employer]     Employer: RETIRED     Occupation:      Tobacco Use     Smoking status: Never Smoker     Smokeless tobacco: Never Used   Substance and Sexual Activity     Alcohol use: No     Alcohol/week: 0.0 standard drinks     Drug use: No     Sexual activity: Not Currently       Functional Status:  Prior to admission patient needed assistance:   Dependent ADLs:: Ambulation-walker, Bathing, Dressing, Grooming  Dependent IADLs:: Cleaning, Cooking, Laundry, Shopping, Meal Preparation, Medication Management, Money Management, Transportation  Assesssment of Functional Status: Not at  functional baseline    Mental Health Status:  Mental Health Status: No Current Concerns       Chemical Dependency Status:  Chemical Dependency Status: No Current Concerns             Values/Beliefs:  Spiritual, Cultural Beliefs, Yazdanism Practices, Values that affect care: no               Additional Information:  Spoke with son, Jae via phone due to patient's confusion.  Patient was hospitalized at [facility] 2/26-3/6.  [facility]  He transitioned to [facility] care (phone: [phone] Fax: [phone]).  Jae moved patient home on 5/1/21.  Spoke with Yadira Rojas .  Confirmed patient is currently open with [facility]  (phone: [phone] Fax: [phone]), RN for wound care and PT.  Jae and family also pay privately for [facility] home care Monday, Wednesday, Friday from 9am-2pm.  The privately paid home health aide helps with anything patient needs, showers, dressing, grooming.  At baseline, he ambulates with a walker.  He wears 2L oxygen at baseline.  He was able to manage toileting and ambulation  "independently, without assistance.  Following his memory care [facility] staff, Jae reports patient's memory was improved.  He remained forgetful, but improved from his last hospitalization.  Patietnt is never alone.  Jae or home care staff are with 24/7.  Patient's daughter, Sujatha lives in [place].  She visits when she can to support Jae and the patient.      Discussed discharge planning with Jae.  Jae would like for patient to return home if possible.  Discussed home care concern about his fragility and wound care needs.  Jae states he is open to TCU if it is recommended, however his long term plan is for patient to return home with home care and family support.  Jae states, \"that is what he wants.\"  Jae expresses he wants to support his dad's wishes if able.    Discussed need for [employer] prior authorization for TCU.  Preliminary TCU referral placed to [facility] (Main: [phone] Admissions: [phone] Fax: [phone]) and [facility] TCU Phone: (Admissions: [phone] RN Report: [phone] Fax: [phone]) [facility] (Phone: [phone] Admissions: [phone] Fax: [phone]).      CM team to further discuss discharge planning with patient (when able) and Jae as hospitalization progresses.      PLAN:  Return home w/ home care and family vs TCU (ref pend)    Rosa Ambrosio MSN, RN  Inpatient Care Coordinator  [facility] [phone]  [facility] [phone]      "

## 2021-05-10 NOTE — PROGRESS NOTES
Patient up to chair via ceiling lift. Denies pain. Incontinent of urine. Notifies staff when his brief becomes wet. Ate dinner well. Dressing applied by [facility] nurse today, dressing C/D/I. Alarms on for safety.

## 2021-05-10 NOTE — PROGRESS NOTES
Updated NEVIN Layne patient had lactic of 2.0 reported at 1338. No additional orders at this time. Instructed to let MD know if he spikes a fever and possible blood cultures order at that time.

## 2021-05-10 NOTE — PROGRESS NOTES
[facility] Nurse Inpatient Wound Assessment   Reason for consultation: Evaluate and treat  multiple wounds, right lower leg, left toe, buttocks, back    Assessment  Right lower leg wounds due to injury, partial thickness in leg with edema    Left 1st toe wound, scab, dry and stable    Mid-upper back wound, pressure area along spine, clean but non-healing, appears full thickness stage III    Bilateral buttock pressure wounds, stage II    All wounds present on admission    Pt with multiple medical co-morbidities and malnutrition    Status: initial assessment    Treatment Plan  Right lower leg: Cleanse with wound cleanser, dry, cover with Mepilex border lite 3x3 and single layer size E spandagrip.  Change every 3-4 days.    Left toe: open to air    Mid back: Cleanse with wound cleanser, dry, cover with Mepilex border, change every 3-4 days.  Turn side to side in bed, pillow behind back when in chair.    Buttocks: Cleanse with wound cleanser, dry, cover with Mepilex Sacral dressing though in an upside down position.  Following application, apply 3M no-sting skin barrier to edges and allow to try for better retention of bandage.  Change every 2-3 days.  If this cannot be retained for at least 2 days then discontinue and apply a SMALL amount of triad paste to the open areas only and Aquaphor to skin around the open areas. Do this 3 times daily after cleansing area with wound cleanser taking care to not rub aggressively     Heels floating in bed,  Single layer size E spandagrip to left LE also for edema control.    Orders Written  Recommended provider order: None, at this time  WO Nurse follow-up plan:weekly  Nursing to notify the Provider(s) and re-consult the [facility] Nurse if wound(s) deteriorates or new skin concern.    Patient History  According to provider note(s):  Pt admitted with acute respiratory failure    Objective Data  Containment of urine/stool: Diaper    Active Diet Order  Orders Placed This Encounter      Combination  Diet 0795-9958 Calories: Moderate Consistent CHO (4-6 CHO units/meal); 2 gm NA Diet      Output:   I/O last 3 completed shifts:  In: 480 [P.O.:480]  Out: 250 [Urine:250]    Risk Assessment:   Sensory Perception: 3-->slightly limited  Moisture: 3-->occasionally moist  Activity: 2-->chairfast  Mobility: 2-->very limited  Nutrition: 3-->adequate  Friction and Shear: 1-->problem  Ricardo Score: 14                          Labs:   Recent Labs   Lab 05/10/21  0512 05/10/21  0511   HGB  --  8.7*   WBC  --  13.8*   .0*  --        Physical Exam  Areas of skin assessed: focused back, buttocks, legs, toes    Wound #1 Right lower leg partial thickness:  1.2x1.5cm x flush, pink/red with some coagulum, .  Linear dried stable scab on shin above this.  Periwound intact with no signs of infection, drainage is small serous.     Left 1st toe with dried brown scab at tip of toe, this is about 3mm in circumference    Wound #2 Mid upper back along spine, stage III pressure injury:  0.7cmx0.3cmx0.1cm, white/pale based with some red spots, periwound with some tan discoloration and mild blanchable pink, not warm to touch, no sign of infection, drainage is scant serous    Wound #3 bilateral buttocks, distal coccyx proximal to anus stage II:  Left side with 0.5x1x0.1cm pink open area, right side with 0.3x0.1x0.1cm slit, Periwound's with some dry skin, otherwise intact with no signs of infection    Lower legs with 2+ edema lower leg, dependant thigh edema noted as well.  Pedal pulses are palpable bilaterally, capillary refill 2 seconds, feet warm and pink bilaterally  Interventions  Visual inspection and assessment completed   Wound Care Rationale Improve absorptive capacity and Provide protection   Wound Care: done per plan of care  Supplies: gathered and placed at the bedside  Current off-loading measures: Pillows under calves and Pillows  Current support surface: Standard  Atmos Air mattress  Education provided to: n/a pt with some  confusion  Discussed plan of care with no one, orders placed    Chiquis Yee RN, CWOCN

## 2021-05-10 NOTE — PROGRESS NOTES
[facility]    Medicine Progress Note - Hospitalist Service       Date of Admission:  5/9/2021  Assessment & Plan    Alvaro Horton is a 80 year old male admitted on 5/9/2021 who presented with increased shortness of breath and found to have acute on chronic respiratory failure with hypoxia secondary to pneumonia and/or CHF for which he is being admitted for further evaluation and treatment.    Acute on chronic respiratory failure with hypoxemia  Presented with resp rate 20-30, oxygen saturation reportedly 82% per EMS. Chronically on 1-2 L oxygen at home, initially on 4 L in ED. Appeared to be in mild to moderate respiratory distress on admission exam. VBG shows respiratory acidosis with compensation pH 7.39, CO2 55, Bicarb 33. VS show tachycardia, afebrile. Labs show leukocytosis, [place] [address]. CXR shows low lung volumes, effusions, possible infiltrate in left lower lobe. Lungs diminished, 3+ bilateral lower extremity edema into the thighs.    Etiology most consistent with pneumonia, as well as possible new onset CHF. COPD may be contributing though no definitive wheeze on admit so will observe for now without initiating steroids.  Repeat VBGs remains relatively stable, still showing compensated respiratory acidosis.  - Continue O2 therapy, titrate to maintain sats > 91%  - Address pneumonia and CHF as below    Community Acquired Pneumonia  Leukocytosis, elevated lactic - mild sepsis, resolved  Presented with leukocytosis with left shift (WBC 16.1, ANC 14.6) and lactic acid 2.4. Patient initially afebrile, but fever later developed after admission. Lactic normalized to 1.1 without any intervention, suspect may be related to respiratory effort. Meets SIRS criteria for mild sepsis. UA negative. CXR shows low lung volumes, effusions, possible infiltrate in left lower lobe. Initial concern for community-acquired pneumonia, started on ceftriaxone, added on  azithromycin.  Procalcitonin (0.52) and CRP (200) elevated, supports diagnosis of pneumonia  WBC 16.1 -> 13.8  - Antibiotics: azithromycin and ceftriaxone  - CBC in am   - Blood cultures pending, obtained following initiation of ceftriaxone  - Scheduled duonebs 4 times daily, prn albuterol     Acute Decompensation of New-Onset Heart Failure  Admission weight 174 lbs, not significantly changed from baseline. Admit BNP 29939 (previously 696). Lungs diminished. Lower extremity edema to the thighs bilaterally. CXR shows low lung volume, small pleural effusions, possible left lower lobe consolidation. Last Echo 11/2020 EF 60-65%, no wall motion changes, decreased RV systolic function with elevated RV pressure. Required diuresis during last hospitalization, currently on furosemide 40 mg twice daily. Appears to be decompensation of heart failure, not previously formally diagnosed though has required diuresis during previous hospitalization. May be post-COVID related, will monitor troponins although no signs of ACS at present.    Weight 174 ->173  I/O: -10  Echo 5/10/2021 with EF 55-60%, right ventricular systolic function severely reduced with severe dilation of right ventricle. Right ventricular systolic pressure elevation consistent with mild pulmonary hypertension. No significant change in function from echo Nov 2020.     - Received 40 mg IV lasix x 3 doses starting 5/9. Will start lasix 40 mg bid today 5/10.  - Hold home oral furosemide 40 mg BID  - Strict I and O's, daily weights  - Telemetry    Elevated Troponin  Coronary Artery Disease s/p CABG 2009  Moderate Pulmonary Hypertension  History of CABG in 2009 with LIMA to LAD and SVG to OM1 and 2. Follows with cardiology, last seen 3/2020. Last Echo 11/2020 EF 60-65%, no wall motion changes, decreased RV systolic function with elevated RV pressure.  Prior to admission cardiac medications include Lipitor 40 mg daily and metoprolol 12.5 mg bid. Not on antiplatelet meds.      Troponin 0.113 --> 0.145. Admit ECG without acute ischemic changes. Vague description of chest discomfort evening prior to admission, unable to fully describe. No reported ongoing chest pain or anginal type symptoms on admission. Low suspicion for ACS at this point, suspect elevated in the setting of likely CHF exacerbation.   Troponin trend 0.113 -> 0.145 -> 0.213 -> 0.204 -> 0.238  Continues to deny chest pain. Still suspect demand ischemia.    - Repeat troponin in am  - Prn ECG for anginal equivalents  - Continue home statin, beta blocker   - Aspirin 81 mg started 5/9/21     Falls  Generalized weakness  Noted to have several witness falls at home prior to arrival. No clear injury from this.  - PT/OT/care transitions consulted    Chronic obstructive pulmonary disease  Asthma, Baker's Lung  Chronic hypoxemia following COVID diagnosis  Lungs diminished, no definitive wheeze. PFTs in 04/2018 revealed severe airflow obstruction.   - Continue home Trelegy Ellipta (formulary substitute with Breo and Incruse), prn albuterol   - scheduled duonebs  - defer steroid for now, no definitive signs of exacerbation    CKD  Admit creatinine 1.56 (baseline 1.39 - 1.6), GFR 41 (baseline 38 - 41). Stable compared to recent baseline.  Creatinine 1.56 -> 1.64  - Follow BMP closely while on IV diuresis    Iron Deficiency Anemia  Recent GI bleed, presumed upper  Hgb 10.1. Recent Hgb baseline 8-9, improved. Hospitalized 2/26/21 - 3/6/21 for GI bleed, presumed upper, no definitive source on EGD. At that time recommended apixaban to be stopped indefinitely, aspirin held, consider restarting ~2 weeks after discharge.  Hgb 10.1 -> 8.7  - continue iron  - Aspirin restarted 5/9/21  - follow CBC daily    Diabetes Mellitus, Type II  Chronic. Last a1C 5.7% on 3/5/2021. Managed prior to admission with metformin 500 mg bid and NPH insulin 12 U bid.  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 Son feels impairment started around the time of COVID diagnosis. Previously on olanzapine and mirtazapine which were stopped in TCU. Managed prior to admission with Lexapro 10 mg daily.   - continue home escitalopram  - may benefit from neurology/neuropsych evaluation    Hypertension  Chronic. Blood pressures reviewed, stable. Managed prior to admission with metoprolol 12.5 mg bid and lasix 40 mg bid.   - Continue home metoprolol  - diuretics as above, hold home furosemide     Paroxsymal Atrial fibrillation  Transient following CABG in 2009. ZioPatch 3/2020 did not show atrial fibrillation. ECG 8/2020 showed NSR. During admission 11/2020 for COVID 19 atrial fibrillation seen, apixaban started at that time though consider of short term given fall risk. CHADsVAC of 6. Apixaban stopped as above following GI bleed.   - Continue home metoprolol  - No anticoagulation due to recent GI bleed (although aspirin started 5/9/21)     History of CVA  Hyperlipidemia  Occurred at time of CABG in 2009. Managed prior to admission with Lipitor 40 mg daily.  - continue home statin  - restart aspirin     Benign Prostatic Hyperplasia  Chronic. Managed prior to admission with finasteride 5 mg daily and tamsulosin 0.4 mg daily.  - continue home finasteride, tamsulosin    Malnutrition:    - Level of malnutrition: Non-Severe   - Based on: weight loss, reduced intake, mild (or greater) muscle loss      COVID 19 Screen negative 5/9/21  History of prior COVID-19 infection  History of COVID-19 infection diagnosed 11/26/20 with COVID 19, hospitalized x 2 and discharged on 2 LPM of oxygen. PCR negative 5/9/21.  - oxygen titrated to O2>90%  - No precautions needed    Multiple  healing wounds  Intertrigo  Multiple wounds on lower extremities, signs of skin breakdown, appears to be improving, wound care being done at home with home health nurse. Intertrigo noted near groin.  - wound care consulted to help guide continued wound cares during hospitalization  - miconazole powder ordered BID       Diet: Combination Diet 0610-8952 Calories: Moderate Consistent CHO (4-6 CHO units/meal); 2 gm NA Diet  Snacks/Supplements Adult: Boost Glucose Control; With Meals    DVT Prophylaxis: Pneumatic Compression Devices  Olivo Catheter: not present  Code Status: No CPR- Do NOT Intubate           Disposition Plan   Expected discharge: 2 more days, recommended to return home vs TCU once respiratory status improves and weaned back to baseline 2L supplemental O2, antibiotic regimen established, safe disposition plan in place.  Entered: Xi Leong PA-C 05/10/2021, 3:45 PM       The patient's care was discussed with the Attending Physician, Dr. Joseph Miller, Patient and discussed during staff rounds.    Xi Leong PA-C  Hospitalist Service  [facility]  Contact information available via [facility] Paging/Directory    ______________________________________________________________________    Interval History   Patient feeling better today compared to yesterday, but not yet at baseline.   Still short of breath, but improving.  Denies chest pain or palpitations.  Denies pain anywhere.  Denies abdominal pain, nausea, vomiting, diarrhea, constipation.  Urinating well.  Denies dizziness / lightheadedness.    Data reviewed today: I reviewed all medications, new labs and imaging results over the last 24 hours. I personally reviewed the EKG tracing showing sinus tachycardia with RBBB.    Physical Exam   Vital Signs: Temp: 98.4  F (36.9  C) Temp src: Axillary BP: 112/61 Pulse: 91   Resp: 20 SpO2: 91 % O2 Device: Nasal cannula Oxygen Delivery: 4 LPM  Weight: 173 lbs .98  oz    General appearance: Slightly somnolent but rouses easily to voice. Appears fatigued. Alert and in no apparent distress. Pleasant and conversational, speaking in full sentences.  CV: Regular rhythm & rate, no murmurs. Bilateral +2-3 pitting edema. Peripheral pulses intact.  Respiratory: Slight increased work of breathing. Coarse lung sounds with inspiratory and expiratory rhonchi. No wheezes or crackles appreciated.   GI: Non-distended, soft, nontender to palpation. No rebound or guarding. Normoactive bowel sounds.  Skin: Warm, dry, no rashes or ecchymoses. No mottling of skin.  Musculoskeletal / extremities: Moves all extremities equally, no obvious abnormalities. Distal CMS intact.  Neurologic: No focal deficits.      Data   Recent Labs   Lab 05/10/21  0511 05/10/21  0005 21  1804 21  1051 21  1051   WBC 13.8*  --   --   --  16.1*   HGB 8.7*  --   --   --  10.1*   MCV 87  --   --   --  87     --   --   --  191     --   --   --  135   POTASSIUM 3.5  --   --   --  3.8   CHLORIDE 96  --   --   --  96   CO2 31  --   --   --  29   BUN 42*  --   --   --  38*   CR 1.64*  --   --   --  1.56*   ANIONGAP 7  --   --   --  10   EVELINA 8.1*  --   --   --  9.0   *  --   --   --  203*   TROPI 0.238* 0.204* 0.213*   < > 0.113*    < > = values in this interval not displayed.     Recent Results (from the past 24 hour(s))   Echocardiogram Complete    Narrative    [id number]  [id number]  [id number]  [id number]^[email or web address]^FERNANDA^JIGNA     [facility]  Echocardiography Laboratory  [facility] [address] [place] [address]     Name: ABIGAIL LOVELL  MRN: [id number]  : 1940  Study Date: 05/10/2021 09:49 AM  Age: 80 yrs  Gender: Male  Patient Location: [facility]  Reason For Study: CHF  Ordering Physician: FERNANDA DAVIES  Referring Physician: Be Carreno  Performed By: Janette Turner     BSA: 1.9 m2  Height: 68 in  Weight: 172 lb  HR: 82  BP: 148/93  mmHg  ______________________________________________________________________________  Procedure  Complete Portable Echo Adult. Optison (NDC #[id number]) given intravenously.  ______________________________________________________________________________  Interpretation Summary     Left ventricular systolic function is normal.  The visual ejection fraction is estimated at 55-60%.  The right ventricular systolic function is severely reduced.  The right ventricle is severely dilated.  Right ventricular systolic pressure is elevated, consistent with mild  pulmonary hypertension.  Compared to 11/20, there is no significant change. RV size and function are  significantly impaired. The study was technically difficult.  ______________________________________________________________________________  Left Ventricle  The left ventricle is normal in size. There is concentric remodeling present.  Left ventricular systolic function is normal. The visual ejection fraction is  estimated at 55-60%. Left ventricular diastolic function is indeterminate.  Flattened septum consistent with right ventricular volume overload.     Right Ventricle  The right ventricle is severely dilated. The right ventricular systolic  function is severely reduced.     Atria  The left atrium is moderately dilated. The right atrium is severely dilated.     Mitral Valve  There is mild (1+) mitral regurgitation.     Tricuspid Valve  There is mild (1+) tricuspid regurgitation. The right ventricular systolic  pressure is approximated at 36.3 mmHg plus the right atrial pressure. Right  ventricular systolic pressure is elevated, consistent with mild pulmonary  hypertension.     Aortic Valve  There is mild trileaflet aortic sclerosis. No aortic stenosis is present.     Pulmonic Valve  The pulmonic valve is not well visualized.     Vessels  The aortic root is not well visualized. Inferior vena cava not well visualized  for estimation of right atrial pressure.      Pericardium  There is no pericardial effusion.     Rhythm  The rhythm was undetermined.  ______________________________________________________________________________  MMode/2D Measurements & Calculations  IVSd: 1.2 cm     LVIDd: 3.8 cm  LVIDs: 2.3 cm  LVPWd: 1.2 cm  FS: 37.9 %  LV mass(C)d: 152.1 grams  LV mass(C)dI: 79.4 grams/m2  Ao root diam: 3.6 cm  LA dimension: 5.1 cm  LA/Ao: 1.4  LVOT diam: 1.9 cm  LVOT area: 2.8 cm2  LA Volume (BP): 80.0 ml  LA Volume Index (BP): 41.7 ml/m2     LA Volume Indexed (AL/bp): 43.6 ml/m2  RWT: 0.63     Doppler Measurements & Calculations  MV E max dariusz: 106.0 cm/sec  MV A max dariusz: 35.1 cm/sec  MV E/A: 3.0  MV dec time: 0.07 sec  TR max dariusz: 301.3 cm/sec  TR max P.3 mmHg  E/E' av.8  Lateral E/e': 13.5  Medial E/e': 28.0     ______________________________________________________________________________  Report approved by: Neela Valerio 05/10/2021 01:31 PM           Medications       aspirin  81 mg Oral Daily     atorvastatin  40 mg Oral QPM     azithromycin  250 mg Oral Daily     cefTRIAXone  1 g Intravenous Q24H     escitalopram  10 mg Oral Daily     ferrous sulfate  325 mg Oral Daily with breakfast     finasteride  5 mg Oral Daily     fluticasone-vilanterol  1 puff Inhalation QPM    And     umeclidinium  1 puff Inhalation QPM     furosemide  40 mg Intravenous BID     insulin aspart  1-7 Units Subcutaneous TID AC     insulin aspart  1-5 Units Subcutaneous At Bedtime     insulin NPH-Regular  12 Units Subcutaneous BID w/meals     ipratropium - albuterol 0.5 mg/2.5 mg/3 mL  3 mL Nebulization 4x daily     metoprolol tartrate  12.5 mg Oral BID     miconazole   Topical BID     multivitamin w/minerals  1 tablet Oral Daily     pantoprazole  40 mg Oral BID     sodium chloride (PF)  3 mL Intracatheter Q8H     tamsulosin  0.4 mg Oral QPM

## 2021-05-10 NOTE — PLAN OF CARE
Patient is alert, oriented to self only this morning.  Has been on 4-5L of oxygen throughout the day, needing more after activity, has dyspnea on exertion.  Lungs are coarse.  Receiving antibiotics.  Assisted to chair with use of ceiling lift this morning but was able to ambulate from chair back to bed with assist of therapy and walker.  On pulsate mattress.  Dressings are dry and intact to wounds, using miconazole powder to groin.  IV lasix given, patient has been incontinent of urine so accurate I & O is difficult.  Echo completed today, result is pending. Denies pain.  Appetite is fair.

## 2021-05-10 NOTE — PROGRESS NOTES
"Initial IP Physical Therapy Evaluation       05/10/21 1015   Quick Adds   Type of Visit Initial PT Evaluation   Living Environment   People in home child(katherin), adult   Current Living Arrangements house   Home Accessibility stairs to enter home   Living Environment Comments Patient unable to describe home environment set up, kept saying \"I have my own house\"   Self-Care   Usual Activity Tolerance moderate   Current Activity Tolerance poor   Equipment Currently Used at Home walker, standard   Disability/Function   Hearing Difficulty or Deaf yes   Patient's preferred means of communication verbal   Use of hearing assistive devices bilateral hearing aids   Hearing Management Patient able to hear therapist throughout session   Wear Glasses or Blind yes   Vision Management glasses   Concentrating, Remembering or Making Decisions Difficulty yes   Difficulty Communicating no   Walking or Climbing Stairs Difficulty yes   Walking or Climbing Stairs ambulation difficulty, assistance 1 person;transferring difficulty, assistance 1 person;ambulation difficulty, requires equipment;transferring difficulty, requires equipment   Mobility Management FWW, Ax1   Dressing/Bathing Difficulty yes   Dressing/Bathing bathing difficulty, assistance 1 person;dressing difficulty, assistance 1 person   Toileting issues no   Doing Errands Independently Difficulty (such as shopping) yes   Errands Management son assists   Fall history within last six months yes   Number of times patient has fallen within last six months 3   Change in Functional Status Since Onset of Current Illness/Injury yes   General Information   Onset of Illness/Injury or Date of Surgery 05/09/21   Referring Physician Herminia Skinner PABriaC   Patient/Family Therapy Goals Statement (PT) None stated   Pertinent History of Current Problem (include personal factors and/or comorbidities that impact the POC) Alvaro Horton is a 80 year old male admitted on 5/9/2021. He has " history of ARF and increased troponins   Existing Precautions/Restrictions fall   Cognition   Orientation Status (Cognition) oriented to;person;place;disoriented to;situation;time   Affect/Mental Status (Cognition) confused   Behavioral Issues uncooperative   Pain Assessment   Patient Currently in Pain No   Integumentary/Edema   Integumentary/Edema other (describe)   Integumentary/Edema Comments 3+ edema BLE   Posture    Posture Kyphosis;Forward head position   Range of Motion (ROM)   ROM Comment BLE WFL   Strength   Strength Comments Deconditioned BLE all major muscle groups but functional   Bed Mobility   Comment (Bed Mobility) Supine<>sit Luz, difficulty moving legs into bed. Dependent for scooting in bed   Transfers   Transfer Safety Comments Patient attempting to go back to bed without staff upon entry to room. CGA sit to stand with FWW, flexed posture   Gait/Stairs (Locomotion)   [place] Level (Gait) minimum assist (75% patient effort)   Assistive Device (Gait) walker, front-wheeled   Distance in Feet (Required for LE Total Joints) 10   Pattern (Gait) step-through   Comment (Gait/Stairs) Patient minimally responsive to cuing for safety, posture and technique. Luz with FWW. From last admission - only moves when he wants to and is typically minimally receptive to therapy. VC throughout for breathing technique and walker positioning. VSS throughout though had noticeable coarse lung sounds during breathing with mild SOB reported.d   Balance   Balance other (describe)   Balance Comments Ax1 with FWW   Sensory Examination   Sensory Perception WFL   Coordination   Coordination no deficits were identified   Muscle Tone   Muscle Tone no deficits were identified   Clinical Impression   Criteria for Skilled Therapeutic Intervention yes, treatment indicated   PT Diagnosis (PT) Unsteady gait   Influenced by the following impairments Weakness, decreased activity tolerance   Functional limitations due to impairments  Moblity, transfers   Clinical Presentation Stable/Uncomplicated   Clinical Presentation Rationale Clinical judgment   Clinical Decision Making (Complexity) low complexity   Therapy Frequency (PT) 6x/week   Predicted Duration of Therapy Intervention (days/wks) 3 days   Planned Therapy Interventions (PT) bed mobility training;home exercise program;gait training;strengthening;transfer training   Anticipated Equipment Needs at Discharge (PT) walker, rolling   Risk & Benefits of therapy have been explained evaluation/treatment results reviewed;care plan/treatment goals reviewed;risks/benefits reviewed;current/potential barriers reviewed;participants voiced agreement with care plan;participants included;patient   PT Discharge Planning    PT Discharge Recommendation (DC Rec) Transitional Care Facility   PT Rationale for DC Rec Patient is easily fatigued with short transfers, SOB throughout. Demonstrates safety awareness deficits indicating high falls risk. TCU vs. home with 24 hour supervision   PT Brief overview of current status  Luz ambulation FWW 10', Luz bed mobility, CGA sit to stand   Total Evaluation Time   Total Evaluation Time (Minutes) 10     Jacob Ridley, PT, DPT

## 2021-05-10 NOTE — PLAN OF CARE
OT: Upon attempt pt refusing all OOB activity despite encouragement. Will attempt tomorrow as appropriate.     Addendum: attempted again in afternoon. Pt continues to refuse OOB activity despite encouragement and education. Pt is able to state goal is to return home once discharged from hospital- attempted to connect this goal with the benefits of therapy- pt continues to decline. Will attempt tomorrow.

## 2021-05-10 NOTE — PROGRESS NOTES
CLINICAL NUTRITION SERVICES - ASSESSMENT NOTE     Nutrition Prescription    RECOMMENDATIONS FOR MDs/PROVIDERS TO ORDER:  -recommend MVITM to promote wound healing, and due to poor intake. RD to order, but MD to sign per staff recommendation.    Malnutrition Status:    Non-severe malnutrition in the context of acute on chronic illness, and social or environmental circumstances..     Recommendations already ordered by Registered Dietitian (RD):  -small portions.  -vanilla Boost Glucose Control with all meals.    Future/Additional Recommendations:  -daily weights as ordered.  -monitor for WOC RN note and adjust nutrition interventions accordingly.     REASON FOR ASSESSMENT  Alvaro Horton is a/an 80 year old male assessed by the dietitian for admission MST score of 3: unsure for wt loss and positive  for poor PO intake R/t decreased appetite.    -presented with respiratory distress with hypoxia. CXR shows low lung volumes, effusions, possible infiltrate in left lower lobe. Lungs diminished 3+ bilateral lower extremity edema into the thighs. Suspect new onset CHF as well as possible pneumonia.  -PMH: frequent falls, COPD, A-fib, hx of CVA, chronic ischemic heart disease, HTN, hyperlipidemia, type 2 diabetes last a1C 5.7% on 3/5/2021, CKD, recent presumed upper GI bleed, dementia, COVID-19 dx 11/26/20, hospitalized twice and discharged on 2 LPM oxygen.  -previously at [facility] TCU, then transitioned to [facility] care unit on 3/22, discharged back home a week ago and was doing well until sudden decline on 5/8 with 4 falls.    NUTRITION HISTORY  Obtained from patient and OLIVE Lopez from [facility].  -patient reports not eating much, but could not specify. Doesn't have much of an appetite. Drinks vanilla Ensure, twice daily.   -Per OLIVE Lopez at [facility], while in the TCU patient was on a regular diet, ate 0-75% of meals, had Boost Plus with meals.  -per previous RD note from February 2021, patient's son Jae reported a  "decreased appetite over the past few months. Jae reported patient's UBW was 180-190 lbs.    Per DICK Franks at [facility]:  -wasn't a good eater; ate snacks in room; always ate dessert. Had a nutrition supplement (? Glucerna). He was on a regular diet.    CURRENT NUTRITION ORDERS  Diet: Orders Placed This Encounter      Combination Diet 5825-7142 Calories: Moderate Consistent CHO (4-6 CHO units/meal); 2 gm NA Diet    Intake/Tolerance: Ate 25% of dinner yesterday. Patient reports he's not hungry. Reports eating some of the soup, mandarin oranges, and a bite of the sandwich at lunch today.  -per RN note earlier this morning, Water was taken away from patient's bedside due to questionable aspiration. Web paged telemed Dr. Perez with update. No new orders. Sticky note placed for MD requesting swallow evaluation.    LABS  BUN 42 (H), Creatinine 1.64 (H), GFR 38 (L) - CKD  CRP inflammation 234.0 (H)  Troponin 0.238 (HH)  B, 215, 233 and 316    MEDICATIONS  Ferosul  Lasix  Novolog medium sliding scale insulin  Humulin 70/30;Novolin 70/30  Protonix    ANTHROPOMETRICS  Height: 172.7 cm (5' 8\")  Most Recent Weight: 78.5 kg (173 lb 1 oz)    IBW: 70 kg (112% IBW)  BMI: Overweight BMI 25-29.9  Weight History:   Wt Readings from Last 15 Encounters:   05/10/21 78.5 kg (173 lb 1 oz)   21 78.1 kg (172 lb 3.2 oz)   21 78.2 kg (172 lb 4.8 oz)   21 78.8 kg (173 lb 12.8 oz)   03/15/21 78.8 kg (173 lb 12.8 oz)   21 79.8 kg (176 lb)   21 80.1 kg (176 lb 8 oz)   21 83.8 kg (184 lb 11.9 oz) last weight obtained prior to discharge; may be fluid-related. Admit weight was 172 lbs on .   02/10/21 77.1 kg (170 lb) reported by patient.   20 83.7 kg (184 lb 8.4 oz) last weight obtained prior to discharge; weight unchanged throughout 2-day hospitalization.   20 79.7 kg (175 lb 11.3 oz) discharge weight during hospitalization -; admit weight of 188 lbs.   20 85.5 " kg (188 lb 9.6 oz) discharge weight during hospitalization 11/26-11/30; admit weight of 180 lbs.   11/23/20 88.9 kg (196 lb)   08/10/20 88.9 kg (196 lb)   08/05/20 83.9 kg (185 lb) ED visit, may have been reported.   -per care everywhere:  89.4 kg (197 lb) 09/02/2020 9:54 AM CDT       -weight appears stable over the past 2 months. However, patient has chronic weight loss (23 lbs, 12% body weight in the last 6 months, which is clinically significant). Some weight loss may be due to diuretics, but also related to intake.  -patient reports he's gaining weight in his stomach.  -TCU: 173 lbs on 3/20/21 then left 2 days later. Weight was stable.  -Per Tiny, weight was stable:   4/5: 175 lbs  3/22: 172 lbs    Current encounter:  Vitals:    05/09/21 1041 05/09/21 1633 05/10/21 0543   Weight: 78 kg (172 lb) 79.1 kg (174 lb 6.1 oz) 78.5 kg (173 lb 1 oz)   Net IO Since Admission: -10 mL [05/10/21 1236]      Dosing Weight: 79 kg (current weight)    ASSESSED NUTRITION NEEDS  Estimated Energy Needs: 1251-9752 kcals/day (25 - 30 kcals/kg)  Justification: Maintenance  Estimated Protein Needs: 79-95 grams protein/day (1 - 1.2 grams of pro/kg)  Justification: CKD and Increased needs  Estimated Fluid Needs: (1 mL/kcal)   Justification: Per provider pending fluid status    PHYSICAL FINDINGS  See malnutrition section below.  Multiple wounds on lower extremities, signs of skin breakdown, appears to be improving, per H&P. WOC RN consulted.  Edema: 1+ (trace) bilateral legs; 2+ (mild) bilateral ankles and feet.    MALNUTRITION  % Intake: < 75% for >/= 1 month (non-severe)  % Weight Loss: > 10% in 6 months (severe)  Subcutaneous Fat Loss: None observed  Muscle Loss: Temporal:  mild and Dorsal hand: moderate  Fluid Accumulation/Edema: Does not meet criteria  Malnutrition Diagnosis: Non-severe malnutrition in the context of acute on chronic illness, and social or environmental circumstances.    NUTRITION DIAGNOSIS  Inadequate oral intake  related to poor appetite as evidenced by patient and staff report, weight loss of 23 lbs (12% body weight) in the last 6 months, and muscle loss.    INTERVENTIONS  Implementation  Nutrition Education: Not appropriate at this time due to patient condition.        Daily weights as ordered.  Collaboration with other nutrition professionals: discussed intake with TCU RD.  Collaboration with other providers: patient plan of care discussed during IDT rounds this morning. Also discussed intake with RN from memory care unit.  Medical food supplement therapy: vanilla Boost Glucose Control with all meals.  Modify composition of meals/snacks: small portions.  Multivitamin/mineral supplement therapy: recommend MVITM to promote wound healing, and due to poor intake. RD to order, but MD to sign per staff recommendation.    RD to monitor for WOC RN note and adjust nutrition interventions accordingly.    Goals  Patient to consume % of nutritionally adequate meal trays TID, or the equivalent with supplements/snacks.     Monitoring/Evaluation  Progress toward goals will be monitored and evaluated per protocol.    Calli Torres RDN, LD  Clinical Dietitian  Office: [phone]  Saturday/Sunday Pager: [phone]

## 2021-05-11 NOTE — PROGRESS NOTES
[facility] Hospitalist Progress Note           Assessment & Plan      Alvaro Horton is a 80 year old male admitted on 5/9/2021 who presented with increased shortness of breath and found to have acute on chronic respiratory failure with hypoxia secondary to pneumonia and/or CHF for which he is being admitted for further evaluation and treatment.     Acute on chronic respiratory failure with hypoxemia - due to CHF exacerbation, pneumonia and COPD exacerbation as below   5/9/21- Presented with resp rate 20-30, oxygen saturation reportedly 82% per EMS. Chronically on 1-2 L oxygen at home, initially on 4 L in ED. Appeared to be in mild to moderate respiratory distress on admission exam. VBG shows respiratory acidosis with compensation, VS show tachycardia, afebrile. Labs show leukocytosis, [place] at [address]. CXR shows low lung volumes, effusions, possible infiltrate in left lower lobe. Lungs diminished, 3+ bilateral lower extremity edema into the thighs.  Etiology most consistent with pneumonia, as well as possible new onset CHF. COPD may be contributing though no definitive wheeze on admit so will observe for now without initiating steroids.  5/10/21 -- Repeat VBG remains relatively stable, still showing compensated respiratory acidosis.  - Continue O2 therapy, titrate to maintain sats > 91%  - Address pneumonia and CHF as below  5/11/2021 -- down to 3LNC.       Community Acquired Pneumonia  Leukocytosis, elevated lactic - mild sepsis, resolved  5/9/21-5/10/21 - Presented with leukocytosis with left shift (WBC 16.1, ANC 14.6) and lactic acid 2.4. Patient initially afebrile, but fever later developed after admission. Lactic normalized to 1.1 without any intervention, suspect may be related to respiratory effort. Meets SIRS criteria for mild sepsis. UA negative. CXR shows low lung volumes, effusions, possible infiltrate in left lower lobe. Initial concern for community-acquired pneumonia, started on  ceftriaxone, added on azithromycin.  Procalcitonin (0.52) and CRP (200) elevated, supports diagnosis of pneumonia  WBC 16.1 -> 13.8  - Scheduled duonebs 4 times daily, prn albuterol   5/11/2021 -- WBC and CRP improving.  Continue antibiotics.     - Blood cultures pending, obtained following initiation of ceftriaxone     Acute exacerbation of diastolic heart failure   Pulmonary hypertension   5/9/21-5/10/21 - Admission weight 174 lbs, not significantly changed from baseline. Admit BNP 31484 (previously 696). Lungs diminished. Lower extremity edema to the thighs bilaterally. CXR shows low lung volume, small pleural effusions, possible left lower lobe consolidation. Last Echo 11/2020 EF 60-65%, no wall motion changes, decreased RV systolic function with elevated RV pressure. Required diuresis during last hospitalization, currently on furosemide 40 mg twice daily. Appears to be decompensation of heart failure, not previously formally diagnosed though has required diuresis during previous hospitalization. May be post-COVID related, will monitor troponins although no signs of ACS at present.     Weight 174 ->173  I/O: -10  Echo 5/10/2021 with EF 55-60%, right ventricular systolic function severely reduced with severe dilation of right ventricle. Right ventricular systolic pressure elevation consistent with mild pulmonary hypertension. No significant change in function from echo Nov 2020.   - Received 40 mg IV lasix x 3 doses starting 5/9. Will start lasix 40 mg bid 5/10.  - Hold home oral furosemide 40 mg BID  - Strict I and O's, daily weights  5/11/2021 -- creatinine up slightly as below, weight down dramatically but unclear how accurate this is.  OK to slow lasix to prior to admission 40 mg twice daily oral.        Elevated Troponin - suspect due to strain from heart failure/pneumonia, does not look like ACS  Coronary Artery Disease s/p CABG 2009  Moderate Pulmonary Hypertension  5/9/21-5/10/21 - History of CABG in 2009  with LIMA to LAD and SVG to OM1 and 2. Follows with cardiology, last seen 3/2020. Last Echo 11/2020 EF 60-65%, no wall motion changes, decreased RV systolic function with elevated RV pressure.  Prior to admission cardiac medications include Lipitor 40 mg daily and metoprolol 12.5 mg bid. Not on antiplatelet meds.      Troponin 0.113 --> 0.145. Admit ECG without acute ischemic changes. Vague description of chest discomfort evening prior to admission, unable to fully describe. No reported ongoing chest pain or anginal type symptoms on admission. Low suspicion for ACS at this point, suspect elevated in the setting of likely CHF exacerbation.   Troponin trend 0.113 -> 0.145 -> 0.213 -> 0.204 -> 0.238  Continues to deny chest pain. Still suspect demand ischemia.  - Continue home statin, beta blocker   - Aspirin 81 mg started 5/9/21 5/11/2021 -- remains pain free, troponin down to 0.1.     Chronic obstructive pulmonary disease with exacerbation     Asthma, Baker's Lung  Chronic hypoxemia following COVID diagnosis  5/9/21-5/10/21 - Lungs diminished, no definitive wheeze. PFTs in 04/2018 revealed severe airflow obstruction.   - Continue home Trelegy Ellipta (formulary substitute with Breo and Incruse), prn albuterol   - scheduled duonebs  - defer steroid for now  5/11/2021 -- initially thought no exacerbation, but slow to wean oxygen and patient is very tight on exam today - duonebs every 4 hours and will start on prednisone 20 mg daily (try lower dose given history of markedly elevated glucose on prednisone).      Falls  Generalized weakness  Noted to have several witness falls at home prior to arrival. No clear injury from this.  - PT/OT/care transitions consulted, likely TCU on discharge.        CHRONIC KIDNEY DISEASE with BEBETO  Admit creatinine 1.56 (baseline 1.39 - 1.6), GFR 41 (baseline 38 - 41). Stable compared to recent baseline.  Creatinine 1.56 -> 1.64  5/11/2021 -- creatinine up to 1.78 - back to home lasix as  above.      Hypokalemia  5/11/2021 -- ? Due to diuresis, started on replacement.      Iron Deficiency Anemia  Recent GI bleed, presumed upper  Hgb 10.1. Recent Hgb baseline 8-9, improved. Hospitalized 2/26/21 - 3/6/21 for GI bleed, presumed upper, no definitive source on EGD. At that time recommended apixaban to be stopped indefinitely, aspirin held, consider restarting ~2 weeks after discharge.  Hgb 10.1 -> 8.7  - continue iron  - Aspirin restarted 5/9/21 5/11/2021 -- hemoglobin stable at 8.7       Diabetes Mellitus, Type II  5/9/21 -- Chronic. Last a1C 5.7% on 3/5/2021. Managed prior to admission with metformin 500 mg bid and NPH insulin 12 U bid.  - Continue home NPH 12 units BID  - Hold metformin due to lactic acid elevation, consider resuming tomorrow if still improving  - Moderate SSI  - Hypo/hyperglycemia protocol with blood glucose monitoring  - Consistent carbohydrate diet  5/11/2021 -- better today.       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 Son feels impairment started around the time of COVID diagnosis. Previously on olanzapine and mirtazapine which were stopped in TCU. Managed prior to admission with Lexapro 10 mg daily.   - continue home escitalopram  - may benefit from neurology/neuropsych evaluation as outpatient if this persists      Hypertension  Chronic. Blood pressures reviewed, stable. continue prior to admission metoprolol 12.5 mg twice daily, lasix as above      Paroxsymal Atrial fibrillation  Transient following CABG in 2009. ZioPatch 3/2020 did not show atrial fibrillation. ECG 8/2020 showed NSR. During admission 11/2020 for COVID 19 atrial fibrillation seen, apixaban started at that time though consider of short term given fall risk. CHADsVAC of 6. Apixaban stopped as above following GI bleed.   - Continue home metoprolol  - No anticoagulation due to recent GI bleed (although aspirin started  5/9/21)     History of CVA  Hyperlipidemia  Occurred at time of CABG in 2009. Managed prior to admission with Lipitor 40 mg daily.  - continue home statin  - restarted aspirin 5/9     Benign Prostatic Hyperplasia  Chronic. Managed prior to admission with finasteride 5 mg daily and tamsulosin 0.4 mg daily.  - continue home finasteride, tamsulosin     Malnutrition:    - Level of malnutrition: Non-Severe   - Based on: weight loss, reduced intake, mild (or greater) muscle loss  - see nutrition consult for details.         COVID 19 Screen negative 5/9/21  History of prior COVID-19 infection  History of COVID-19 infection diagnosed 11/26/20 with COVID 19, hospitalized and discharged on 2 LPM of oxygen. PCR negative 5/9/21.     Multiple healing wounds  Intertrigo  Multiple wounds on lower extremities, signs of skin breakdown, appears to be improving, wound care being done at home with home health nurse. Intertrigo noted near groin.  - wound care consulted and recommended:  Right lower leg: Cleanse with wound cleanser, dry, cover with Mepilex border lite 3x3 and single layer size E spandagrip.  Change every 3-4 days.  Left toe: open to air  Mid back: Cleanse with wound cleanser, dry, cover with Mepilex border, change every 3-4 days.  Turn side to side in bed, pillow behind back when in chair.   Buttocks: Cleanse with wound cleanser, dry, cover with Mepilex Sacral dressing though in an upside down position.  Following application, apply 3M no-sting skin barrier to edges and allow to try for better retention of bandage.  Change every 2-3 days.  If this cannot be retained for at least 2 days then discontinue and apply a SMALL amount of triad paste to the open areas only and Aquaphor to skin around the open areas. Do this 3 times daily after cleansing area with wound cleanser taking care to not rub aggressively      Heels floating in bed,  Single layer size E spandagrip to left LE also for edema control           Diet:  "Combination Diet 4791-4929 Calories: Moderate Consistent CHO (4-6 CHO units/meal); 2 gm NA Diet  Snacks/Supplements Adult: Boost Glucose Control; With Meals      DVT Prophylaxis: Pneumatic Compression Devices given recent GI bleed     Olivo Catheter: not present    Code Status: No CPR- Do NOT Intubate                Disposition  Slowly improving, at least 1-2 more days inpatient.  Likely TCU on discharge.              Interval History:   Improving. Still somewhat short of breath but better, no pain. No distress. No fever or chills. Edema in legs \"much better\".  No new or worsening symptoms or concerns.   No other pain             Review of Systems:    ROS: 10 point ROS neg other than the symptoms noted above in the HPI.           Medications:   Current active medications and PTA medications reviewed, see medication list for details.            Physical Exam:   Vitals were reviewed  Patient Vitals for the past 24 hrs:   BP Temp Temp src Pulse Resp SpO2 Weight   21 1002 -- -- -- -- -- 91 % --   21 0948 -- -- -- -- -- -- 75.5 kg (166 lb 7.2 oz)   21 0814 -- -- -- 79 -- -- --   21 0734 104/57 97.7  F (36.5  C) Oral 87 18 96 % --   21 0207 108/57 97.8  F (36.6  C) Oral 84 20 93 % --   05/10/21 2340 -- -- -- 79 -- 92 % --   05/10/21 2300 101/57 97.3  F (36.3  C) Oral 77 20 95 % --   05/10/21 1936 101/60 97.9  F (36.6  C) Oral 91 20 94 % --   05/10/21 1500 112/61 98.4  F (36.9  C) Axillary 91 20 91 % --   05/10/21 1455 -- -- -- -- -- 94 % --   05/10/21 1440 -- -- -- 86 -- -- --   05/10/21 1336 -- -- -- -- -- 92 % --   05/10/21 1321 -- -- -- -- -- 93 % --   05/10/21 1311 110/52 98.7  F (37.1  C) Axillary 89 24 91 % --   05/10/21 1206 -- -- -- -- -- 93 % --   05/10/21 1118 115/64 97.8  F (36.6  C) Axillary 86 22 91 % --   05/10/21 1047 -- -- -- -- -- 92 % --   05/10/21 1044 -- -- -- -- -- (!) 84 % --       Temperatures:  Current - Temp: 97.7  F (36.5  C); Max - Temp  Av.9  F (36.6  C)  Min: " 97.3  F (36.3  C)  Max: 98.7  F (37.1  C)  Respiration range: Resp  Av.6  Min: 18  Max: 24  Pulse range: Pulse  Av.9  Min: 77  Max: 91  Blood pressure range: Systolic (24hrs), Av , Min:101 , Max:115   ; Diastolic (24hrs), Av, Min:52, Max:64    Pulse oximetry range: SpO2  Av.1 %  Min: 84 %  Max: 96 %  I/O last 3 completed shifts:  In: 240 [P.O.:240]  Out: -     Intake/Output Summary (Last 24 hours) at 2021 1008  Last data filed at 5/10/2021 1245  Gross per 24 hour   Intake 240 ml   Output --   Net 240 ml     EXAM:  General: awake and alert, NAD, oriented x 3  Head: normocephalic  Neck: unremarkable, no lymphadenopathy   HEENT: oropharynx pink and moist    Heart: Regular rate and rhythm, no murmurs, rubs, or gallops  Lungs: decreased air movement throughout with slight wheezing with forced exhalation but minimal given very poor air movement.    Abdomen: soft, non-tender, no masses or organomegaly  Extremities: trace edema in lower extremities   Skin unremarkable.               Data:     Results for orders placed or performed during the hospital encounter of 21 (from the past 24 hour(s))   Echocardiogram Complete    Narrative    [id number]  [id number]  [id number]  [id number]^KEKE^FERNANDA^JIGNA     [facility]  Echocardiography Laboratory  [facility] [place] [address]     Name: ABIGAIL LOVELL  MRN: [id number]  : 1940  Study Date: 05/10/2021 09:49 AM  Age: 80 yrs  Gender: Male  Patient Location: [facility]  Reason For Study: CHF  Ordering Physician: FERNANDA DAVIES  Referring Physician: Be Carrneo  Performed By: Janette Turner     BSA: 1.9 m2  Height: 68 in  Weight: 172 lb  HR: 82  BP: 148/93 mmHg  ______________________________________________________________________________  Procedure  Complete Portable Echo Adult. [place]ison (NDC #[id number]) given  intravenously.  ______________________________________________________________________________  Interpretation Summary     Left ventricular systolic function is normal.  The visual ejection fraction is estimated at 55-60%.  The right ventricular systolic function is severely reduced.  The right ventricle is severely dilated.  Right ventricular systolic pressure is elevated, consistent with mild  pulmonary hypertension.  Compared to 11/20, there is no significant change. RV size and function are  significantly impaired. The study was technically difficult.  ______________________________________________________________________________  Left Ventricle  The left ventricle is normal in size. There is concentric remodeling present.  Left ventricular systolic function is normal. The visual ejection fraction is  estimated at 55-60%. Left ventricular diastolic function is indeterminate.  Flattened septum consistent with right ventricular volume overload.     Right Ventricle  The right ventricle is severely dilated. The right ventricular systolic  function is severely reduced.     Atria  The left atrium is moderately dilated. The right atrium is severely dilated.     Mitral Valve  There is mild (1+) mitral regurgitation.     Tricuspid Valve  There is mild (1+) tricuspid regurgitation. The right ventricular systolic  pressure is approximated at 36.3 mmHg plus the right atrial pressure. Right  ventricular systolic pressure is elevated, consistent with mild pulmonary  hypertension.     Aortic Valve  There is mild trileaflet aortic sclerosis. No aortic stenosis is present.     Pulmonic Valve  The pulmonic valve is not well visualized.     Vessels  The aortic root is not well visualized. Inferior vena cava not well visualized  for estimation of right atrial pressure.     Pericardium  There is no pericardial effusion.     Rhythm  The rhythm was  undetermined.  ______________________________________________________________________________  MMode/2D Measurements & Calculations  IVSd: 1.2 cm     LVIDd: 3.8 cm  LVIDs: 2.3 cm  LVPWd: 1.2 cm  FS: 37.9 %  LV mass(C)d: 152.1 grams  LV mass(C)dI: 79.4 grams/m2  Ao root diam: 3.6 cm  LA dimension: 5.1 cm  LA/Ao: 1.4  LVOT diam: 1.9 cm  LVOT area: 2.8 cm2  LA Volume (BP): 80.0 ml  LA Volume Index (BP): 41.7 ml/m2     LA Volume Indexed (AL/bp): 43.6 ml/m2  RWT: 0.63     Doppler Measurements & Calculations  MV E max dariusz: 106.0 cm/sec  MV A max dariusz: 35.1 cm/sec  MV E/A: 3.0  MV dec time: 0.07 sec  TR max dariusz: 301.3 cm/sec  TR max P.3 mmHg  E/E' av.8  Lateral E/e': 13.5  Medial E/e': 28.0     ______________________________________________________________________________  Report approved by: Neela Valerio 05/10/2021 01:31 PM         Glucose by meter   Result Value Ref Range    Glucose 316 (H) 70 - 99 mg/dL   Lactic acid level STAT   Result Value Ref Range    Lactate for Sepsis Protocol 2.0 0.7 - 2.0 mmol/L   Glucose by meter   Result Value Ref Range    Glucose 305 (H) 70 - 99 mg/dL   Glucose by meter   Result Value Ref Range    Glucose 308 (H) 70 - 99 mg/dL   Glucose by meter   Result Value Ref Range    Glucose 253 (H) 70 - 99 mg/dL   Troponin I   Result Value Ref Range    Troponin I ES 0.102 (H) 0.000 - 0.045 ug/L   CRP inflammation   Result Value Ref Range    CRP Inflammation 206.0 (H) 0.0 - 8.0 mg/L   CBC with platelets differential   Result Value Ref Range    WBC 12.0 (H) 4.0 - 11.0 10e9/L    RBC Count 3.33 (L) 4.4 - 5.9 10e12/L    Hemoglobin 8.7 (L) 13.3 - 17.7 g/dL    Hematocrit 28.8 (L) 40.0 - 53.0 %    MCV 87 78 - 100 fl    MCH 26.1 (L) 26.5 - 33.0 pg    MCHC 30.2 (L) 31.5 - 36.5 g/dL    RDW 18.3 (H) 10.0 - 15.0 %    Platelet Count 193 150 - 450 10e9/L    Diff Method Automated Method     % Neutrophils 86.4 %    % Lymphocytes 5.5 %    % Monocytes 7.2 %    % Eosinophils 0.0 %    % Basophils 0.1 %    %  Immature Granulocytes 0.8 %    Nucleated RBCs 0 0 /100    Absolute Neutrophil 10.4 (H) 1.6 - 8.3 10e9/L    Absolute Lymphocytes 0.7 (L) 0.8 - 5.3 10e9/L    Absolute Monocytes 0.9 0.0 - 1.3 10e9/L    Absolute Eosinophils 0.0 0.0 - 0.7 10e9/L    Absolute Basophils 0.0 0.0 - 0.2 10e9/L    Abs Immature Granulocytes 0.1 0 - 0.4 10e9/L    Absolute Nucleated RBC 0.0    Basic metabolic panel   Result Value Ref Range    Sodium 130 (L) 133 - 144 mmol/L    Potassium 3.3 (L) 3.4 - 5.3 mmol/L    Chloride 92 (L) 94 - 109 mmol/L    Carbon Dioxide 33 (H) 20 - 32 mmol/L    Anion Gap 5 3 - 14 mmol/L    Glucose 230 (H) 70 - 99 mg/dL    Urea Nitrogen 52 (H) 7 - 30 mg/dL    Creatinine 1.78 (H) 0.66 - 1.25 mg/dL    GFR Estimate 35 (L) >60 mL/min/[1.73_m2]    GFR Estimate If Black 40 (L) >60 mL/min/[1.73_m2]    Calcium 8.4 (L) 8.5 - 10.1 mg/dL   Glucose by meter   Result Value Ref Range    Glucose 226 (H) 70 - 99 mg/dL     *Note: Due to a large number of results and/or encounters for the requested time period, some results have not been displayed. A complete set of results can be found in Results Review.       All imaging studies reviewed by me.    Attestation:  I have reviewed today's vital signs, notes, medications, labs and imaging.  Amount of time spent in direct patient care: 45 minutes.     Joseph Miller MD, MD

## 2021-05-11 NOTE — PROGRESS NOTES
Patient declined all therapies today,even with nurse encouragement. Only would get out of bed if we used the ceiling lift. Has not had bowel movement since admission, senna administered today. Dressings C/D/I. Eating, drinking and swallowing medications without problem. Lungs remain coarse with crackles. incontinent of frequent amounts of urine. Was able to decrease patient to 3L oxygen. Bed alarms on for safety.

## 2021-05-11 NOTE — PLAN OF CARE
Breathing status greatly improved from the night before. No SOA overnight. Did have intermittent cough with production of thick, blood mucous X1.   Drsg intact. Assisted with khurram-cares/ incont cares and turning and repositioning. Pt used call light frequently for needs and to updated staff of incont episodes.

## 2021-05-11 NOTE — PROGRESS NOTES
Addendum @ 1530:  Met with patient and sonJae at bedside. Discussed discharge plans.  Patient expresses his strong desire is to return home with family and home care.  Discussed his immobility and lack of participation with therapy today.  Discussed safety concern at home.  Patient states he plans to work with therapy 5/12.  Discussed [facility] as temporary option for discharge to regain strength prior to returning home with Jae.  Patient is reluctantly agreeable.  Discussed need for [employer] to approve prior authorization for payment, currently PA is pending.        Care Management Follow Up    Length of Stay (days): 2    Expected Discharge Date: 05/12/21     Concerns to be Addressed: discharge planning     Patient plan of care discussed at interdisciplinary rounds: Yes    Anticipated Discharge Disposition: Home, Home Care, Transitional Care, Skilled Nursing Facilty     Anticipated Discharge Services: None  Anticipated Discharge DME: Oxygen    Patient/family educated on Medicare website which has current facility and service quality ratings: yes  Education Provided on the Discharge Plan:  yes  Patient/Family in Agreement with the Plan:  Jae Luevano    Referrals Placed by CM/SW: Post Acute Facilities, Homecare  Private pay costs discussed: Not applicable    Additional Information:  Spoke with patient's sonJae via phone.  Discussed therapy recommendations and evaluation.  Jae prefers to have his dad return home, but if he doesn't improve his functionality and ambulation, he is agreeable to TCU.  Discussed need for [employer] prior authorization for payment.  Jae expresses understanding.      TCU referrals as below:     [facility] (Main: [phone] Admissions: [phone] Fax: [phone])-bed available 5/13.  They are starting humana prior authorization    [facility] (Phone: [phone] Admissions: [phone] Fax: [phone])--declined-not accepting admissions    [facility]  TCU Phone: (Admissions: [phone] RN Report: [phone] Fax: [phone]) --pending    PLAN:  Reddy BRADLEY-pending [employer] prior authorization     Rosa Ambrosio MSN, RN  Inpatient Care Coordinator  [facility] [phone]  [facility] [phone]    PAS-RR    Per [facility] regulation, CTS team completed and submitted PAS-RR to [place] [employer] Direct Connect via the [employer]. CTS team advised SNF and they are aware a PAS-RR has been submitted.     CTS team reviewed with pt or health care agent that they may be contacted for a follow up appointment within 10 days of hospital discharge if SNF stay is <30 days. Contact information for [employer] was also provided.     Pt or health care agent verbalized understanding.     PAS-RR # [id number]

## 2021-05-11 NOTE — PLAN OF CARE
OT: Upon attempt to declining all OOB activity despite Max encouragement/education on discharge plan, benefits of therapy etc. States incontinence and refusing to get out of bed to change briefs -requesting nursing staff complete while he's supine. Updated RN.

## 2021-05-11 NOTE — PLAN OF CARE
PT: Attempted to see patient x2; declines walking while he was up in the chair then declined any activity after staff lifted him back to bed despite encouragement. Will re-attempt 5/12 as appropriate.    Jacob Ridley, PT, DPT

## 2021-05-12 NOTE — PROGRESS NOTES
"Care Management Follow Up    Length of Stay (days): 3    Expected Discharge Date: 05/13/21     Concerns to be Addressed: discharge planning    Son Jae unable to manage Pt's care needs at home.     Home Care feels Pt's needs cannot be safely met at home  -requires AL vs TCU    Patient plan of care discussed at interdisciplinary rounds: Yes    Anticipated Discharge Disposition: Assisted Living  Disposition Comments: Referral to [facility] with plans to resume services with [facility]-RN, PT/OT    Anticipated Discharge Services: skilled RN, PT/OT  Anticipated Discharge DME: Oxygen    Patient/family educated on Medicare website which has current facility and service quality ratings: yes  Education Provided on the Discharge Plan:  yes  Patient/Family in Agreement with the Plan: yes    Referrals Placed by CM/RYAN: Internal Clinic Care Coordination, Post Acute Facilities, Homecare  Private pay costs discussed: transportation costs, Assisted Living costs    Additional Information:  Update received during IDT rounds. Aware Pt continues to lack participation with therapy.     INÉS received a call from Ying with [facility]. Lalit reported significant concerns should the Pt return back home with son on discharge.     Lalit reports that [facility] would need to consider if they would be able to accept him back onto their case load if he decided to return home as they feel strongly that it was not a safe situation for the Pt as he required more care than what the son Jae and Home Care could provide.     INÉS called the Pt's son Jae who confirmed with Ying that it would not be a safe option for the Pt to return back home at this time. Jae stated he wanted to have the Pt discharge to his home with increased services to \"please his dad\" but quickly found out it was more than what he could handle. Jae stated he cannot manage his father's transfer needs, incontinent issues and " skin/wound cares. Jae stated his back is at risk of giving out and knows he cannot continue to care for his father in the home.     Jae requested CM to reach out the Janae-Admissions Liaison at [facility] [place] to discuss options on the [facility]. Due to the pt's lack of therapy participation-[employer] will not authorize TCU placement.     [facility] stated they could offer the Pt his [facility]. [facility] [place] is recommending continuing his Home Care services through -[facility] # [phone] for RN, PT/OT to assist with wound cares and further strengthening if Pt will participate. CM will updated Ying with [facility].     CM spoke with Son Jae who verbalized much appreciation with [facility]'s offer and stated he would very much like to see the Pt admit back into [facility] on discharge. Jae stated he knows the Pt will not be as pleased with going back to [facility] as he would returning home however going home is not a safe option at this time as Jae and [facility] cannot meet the Pt's care needs at this time.     Anticipated Discharge Plan: CM to continue to work with the Pt and son Jae to coordinate plan to discharge to [facility].    Plan to also resume [facility] services in [place]   #[phone] Fax# [phone]  -RN, PT/OT       JESUS Ballard

## 2021-05-12 NOTE — PROGRESS NOTES
Patient's blood glucose 341 after one time dose of 10 units administered per MD Cannon order. Patient having difficulty sleeping and reports feeling sweaty. DM discussed with patient and patient stated understanding. Patient alert and talkative with staff. Reports checking blood sugars at home x2 daily and reports blood sugars at 120-130 range. Patient educated on current plan of care and states understanding.

## 2021-05-12 NOTE — PROGRESS NOTES
HGB lab reported to Dr. Cannon with no further orders at this time.     Mirza Ruff RN on 5/12/2021 at 5:52 AM

## 2021-05-12 NOTE — PROGRESS NOTES
05/12/21 1300   Quick Adds   Type of Visit Initial Occupational Therapy Evaluation       Present no   Living Environment   People in home child(katherin), adult  (son)   Current Living Arrangements house   Home Accessibility stairs to enter home   Living Environment Comments Pt states he has HHA services daily from 9am-1pm. Has 24/7 assist/supervision.    Self-Care   Usual Activity Tolerance moderate   Current Activity Tolerance poor   Equipment Currently Used at Home walker, standard   Disability/Function   Hearing Difficulty or Deaf yes   Patient's preferred means of communication verbal   Use of hearing assistive devices bilateral hearing aids   Wear Glasses or Blind yes   Concentrating, Remembering or Making Decisions Difficulty yes   Difficulty Communicating no   Walking or Climbing Stairs Difficulty yes   Walking or Climbing Stairs ambulation difficulty, assistance 1 person;transferring difficulty, assistance 1 person;ambulation difficulty, requires equipment;transferring difficulty, requires equipment   Dressing/Bathing Difficulty yes   Dressing/Bathing bathing difficulty, requires equipment;bathing difficulty, assistance 1 person;dressing difficulty, assistance 1 person   Dressing/Bathing Management has assist with socks and showers    Toileting issues no   Doing Errands Independently Difficulty (such as shopping) yes   Fall history within last six months yes   Number of times patient has fallen within last six months 3   Change in Functional Status Since Onset of Current Illness/Injury yes   General Information   Onset of Illness/Injury or Date of Surgery 05/09/21   Referring Physician Herminia Skinner PA-C   Patient/Family Therapy Goal Statement (OT) to return home. Pt does not want TCU    Additional Occupational Profile Info/Pertinent History of Current Problem Alvaro Horton is a 80 year old male admitted on 5/9/2021 who presented with increased shortness of breath and found  to have acute on chronic respiratory failure with hypoxia secondary to pneumonia and/or CHF for which he is being admitted for further evaluation and treatment.   Existing Precautions/Restrictions fall   Cognitive Status Examination   Orientation Status orientation to person, place and time   Pain Assessment   Patient Currently in Pain No   Strength Comprehensive (MMT)   Comment, General Manual Muscle Testing (MMT) Assessment becomes fatigued easily.    Bed Mobility   Comment (Bed Mobility) Min A for sit to supine.    Transfers   Transfer Comments CGA for sit to stand and to transfer from chair to bed.    Upper Body Dressing Assessment/Training   Paradox Level (Upper Body Dressing) independent   Lower Body Dressing Assessment/Training   Paradox Level (Lower Body Dressing) minimum assist (75% patient effort)   Toileting   Comment (Toileting) Pt declines wanting to practice toilet transfer, however once in bed pt needing to use the bathroom and requesting to use portable urinal. Declines getting up to bathroom despite encouragement. Per pt he uses portable urinals at baseline.    Instrumental Activities of Daily Living (IADL)   IADL Comments son completes    Clinical Impression   Criteria for Skilled Therapeutic Interventions Met (OT) yes;skilled treatment is necessary   OT Diagnosis decreased independence with ADLs and assist with discharge planning.    OT Problem List-Impairments impacting ADL problems related to;strength   Assessment of Occupational Performance 1-3 Performance Deficits   Identified Performance Deficits toilet transfer, functional mobility    Clinical Decision Making Complexity (OT) low complexity   Therapy Frequency (OT) Daily   Predicted Duration of Therapy 2-3 days   Risk & Benefits of therapy have been explained evaluation/treatment results reviewed;care plan/treatment goals reviewed;risks/benefits reviewed;current/potential barriers reviewed;participants voiced agreement with care  plan;participants included;patient   OT Discharge Planning    OT Discharge Recommendation (DC Rec) home with home care occupational therapy  (home with 24/7 vs. TCU )   OT Rationale for DC Rec Pt declining TCU and is not participating well in therapy (only wanting to return to bed). If pt continues to decline TCU would recommend home with home therapy and 24/7 assist.    OT Brief overview of current status  Ax1 for ADLs and functional mobility using FWW    Total Evaluation Time (Minutes)   Total Evaluation Time (Minutes) 8

## 2021-05-12 NOTE — PROGRESS NOTES
[facility] Hospitalist Progress Note           Assessment & Plan      Alvaro Horton is a 80 year old male admitted on 5/9/2021 who presented with increased shortness of breath and found to have acute on chronic respiratory failure with hypoxia secondary to pneumonia and/or CHF for which he is being admitted for further evaluation and treatment.     Acute on chronic respiratory failure with hypoxemia - due to CHF exacerbation, pneumonia and COPD exacerbation as below   Chronic hypoxemia following COVID infection  5/9/21- Presented with resp rate 20-30, oxygen saturation reportedly 82% per EMS. Chronically on 1-2 L oxygen at home, initially on 4 L in ED. Appeared to be in mild to moderate respiratory distress on admission exam. VBG shows respiratory acidosis with compensation, VS show tachycardia, afebrile. Labs show leukocytosis, [place]NP at [address]. CXR shows low lung volumes, effusions, possible infiltrate in left lower lobe. Lungs diminished, 3+ bilateral lower extremity edema into the thighs.  Etiology most consistent with pneumonia, as well as possible new onset CHF. COPD may be contributing though no definitive wheeze on admit so will observe for now without initiating steroids.  5/10/21 -- Repeat VBG remains relatively stable, still showing compensated respiratory acidosis.  - Continue O2 therapy, titrate to maintain sats > 91%  - Address pneumonia and CHF as below  5/12/2021 -- down to 3LNC at rest, but still needing up to 5L with/after activity.        Community Acquired Pneumonia  Leukocytosis, elevated lactic - mild sepsis, resolved  5/9/21-5/10/21 - Presented with leukocytosis with left shift (WBC 16.1, ANC 14.6) and lactic acid 2.4. Patient initially afebrile, but fever later developed after admission. Lactic normalized to 1.1 without any intervention, suspect may be related to respiratory effort. Meets SIRS criteria for mild sepsis. UA negative. CXR shows low lung volumes, effusions,  possible infiltrate in left lower lobe. Initial concern for community-acquired pneumonia, started on ceftriaxone, added on azithromycin.  Procalcitonin (0.52) and CRP (200) elevated, supports diagnosis of pneumonia  WBC 16.1 -> 13.8  - Scheduled duonebs 4 times daily, prn albuterol   5/12/2021 -- WBC and CRP improving.  Continue antibiotics.     - Blood cultures pending, obtained following initiation of ceftriaxone     Acute exacerbation of diastolic heart failure   Pulmonary hypertension   5/9/21-5/10/21 - Admission weight 174 lbs, not significantly changed from baseline. Admit BNP 25275 (previously 696). Lungs diminished. Lower extremity edema to the thighs bilaterally. CXR shows low lung volume, small pleural effusions, possible left lower lobe consolidation. Last Echo 11/2020 EF 60-65%, no wall motion changes, decreased RV systolic function with elevated RV pressure. Required diuresis during last hospitalization, currently on furosemide 40 mg twice daily. Appears to be decompensation of heart failure, not previously formally diagnosed though has required diuresis during previous hospitalization. May be post-COVID related, will monitor troponins although no signs of ACS at present.  Weight 174 ->173  I/O: -10  Echo 5/10/2021 with EF 55-60%, right ventricular systolic function severely reduced with severe dilation of right ventricle. Right ventricular systolic pressure elevation consistent with mild pulmonary hypertension. No significant change in function from echo Nov 2020.   - Received 40 mg IV lasix x 3 doses starting 5/9. Will start lasix 40 mg bid 5/10.  - Hold home oral furosemide 40 mg BID  - Strict I and O's, daily weights  5/11/2021 -- creatinine up slightly as below, weight down dramatically but unclear how accurate this is.  OK to slow lasix to prior to admission 40 mg twice daily oral.     5/12/2021 -- no change.       Elevated Troponin - suspect due to strain from heart failure/pneumonia, does not  look like ACS  Coronary Artery Disease s/p CABG 2009  Moderate Pulmonary Hypertension  5/9/21-5/10/21 - History of CABG in 2009 with LIMA to LAD and SVG to OM1 and 2. Follows with cardiology, last seen 3/2020. Last Echo 11/2020 EF 60-65%, no wall motion changes, decreased RV systolic function with elevated RV pressure.  Prior to admission cardiac medications include Lipitor 40 mg daily and metoprolol 12.5 mg bid. Not on antiplatelet meds.   Troponin 0.113 --> 0.145. Admit ECG without acute ischemic changes. Vague description of chest discomfort evening prior to admission, unable to fully describe. No reported ongoing chest pain or anginal type symptoms on admission. Low suspicion for ACS at this point, suspect elevated in the setting of likely CHF exacerbation.   Troponin trend 0.113 -> 0.145 -> 0.213 -> 0.204 -> 0.238  Continues to deny chest pain. Still suspect demand ischemia.  - Continue home statin, beta blocker   - Aspirin 81 mg started 5/9/21 5/11/2021 -- remains pain free, troponin down to 0.1.      COPD with exacerbation     Asthma, Baker's Lung  5/9/21-5/10/21 - Lungs diminished, no definitive wheeze. PFTs in 04/2018 revealed severe airflow obstruction.   - Continue home Trelegy Ellipta (formulary substitute with Breo and Incruse), prn albuterol   - scheduled duonebs  - defer steroid for now  5/11/2021 -- initially thought no exacerbation, but slow to wean oxygen and patient is very tight on exam today - duonebs every 4 hours and will start on prednisone 20 mg daily (try lower dose given history of markedly elevated glucose on prednisone).   5/12/2021 -- much better air movement today, less wheezy, continue prednisone for now.        Falls  Generalized weakness  Noted to have several witness falls at home prior to arrival. No clear injury from this.  - PT/OT/care transitions consulted, likely TCU on discharge.        CHRONIC KIDNEY DISEASE with BEBETO  Admit creatinine 1.56 (baseline 1.39 - 1.6),  GFR 41 (baseline 38 - 41). Stable compared to recent baseline.  Creatinine 1.56 -> 1.64  5/11/2021 -- creatinine up to 1.78 - back to home lasix as above.    5/12/2021 -- stable.       Hypokalemia  5/11/2021 -- ? Due to diuresis, started on replacement.    5/12/2021 -- normalized.      Iron Deficiency Anemia  Recent GI bleed, presumed upper  Hgb 10.1. Recent Hgb baseline 8-9, improved. Hospitalized 2/26/21 - 3/6/21 for GI bleed, presumed upper, no definitive source on EGD. At that time recommended apixaban to be stopped indefinitely, aspirin held, consider restarting ~2 weeks after discharge.  Hgb 10.1 -> 8.7  - continue iron  - Aspirin restarted 5/9/21 5/12/2021 -- hemoglobin stable at 8.4      Diabetes Mellitus, Type II  5/9/21 -- Chronic. Last a1C 5.7% on 3/5/2021. Managed prior to admission with metformin 500 mg bid and NPH insulin 12 U bid.  - Continue home NPH 12 units BID  - Hold metformin due to lactic acid elevation, consider resuming tomorrow if still improving  - Moderate SSI  - Hypo/hyperglycemia protocol with blood glucose monitoring  - Consistent carbohydrate diet  5/11/2021 -- better today.    5/12/2021 -- elevated with starting prednisone - increased NPH to 15 twice daily and sliding scale insulin to high dose.        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 Son feels impairment started around the time of COVID diagnosis. Previously on olanzapine and mirtazapine which were stopped in TCU. Managed prior to admission with Lexapro 10 mg daily.   - continue home escitalopram  - may benefit from neurology/neuropsych evaluation as outpatient if this persists      Hypertension  Chronic. Blood pressures stable so far. continue prior to admission metoprolol 12.5 mg twice daily, lasix as above      Paroxsymal Atrial fibrillation  Transient following CABG in 2009. ZioPatch 3/2020 did not show atrial fibrillation. ECG 8/2020  showed NSR. During admission 11/2020 for COVID 19 atrial fibrillation seen, apixaban started at that time though consider of short term given fall risk. CHADsVAC of 6. Apixaban stopped as above following GI bleed.   - Continue home metoprolol  - No anticoagulation due to recent GI bleed (although aspirin started 5/9/21)     History of CVA  Hyperlipidemia  Occurred at time of CABG in 2009. Managed prior to admission with Lipitor 40 mg daily.  - continue home statin  - restarted aspirin 5/9     Benign Prostatic Hyperplasia  Chronic. Managed prior to admission with finasteride 5 mg daily and tamsulosin 0.4 mg daily.  - continue home finasteride, tamsulosin     Malnutrition:    - Level of malnutrition: Non-Severe   - Based on: weight loss, reduced intake, mild (or greater) muscle loss  - see nutrition consult for details.          COVID 19 Screen negative 5/9/21  History of prior COVID-19 infection  History of COVID-19 infection diagnosed 11/26/20 with COVID 19, hospitalized and discharged on 2 LPM of oxygen. PCR negative 5/9/21.     Multiple healing wounds  Intertrigo  Multiple wounds on lower extremities, signs of skin breakdown, appears to be improving, wound care being done at home with home health nurse. Intertrigo noted near groin.  - wound care consulted and recommended:  Right lower leg: Cleanse with wound cleanser, dry, cover with Mepilex border lite 3x3 and single layer size E spandagrip.  Change every 3-4 days.  Left toe: open to air  Mid back: Cleanse with wound cleanser, dry, cover with Mepilex border, change every 3-4 days.  Turn side to side in bed, pillow behind back when in chair.   Buttocks: Cleanse with wound cleanser, dry, cover with Mepilex Sacral dressing though in an upside down position.  Following application, apply 3M no-sting skin barrier to edges and allow to try for better retention of bandage.  Change every 2-3 days.  If this cannot be retained for at least 2 days then discontinue and apply  a SMALL amount of triad paste to the open areas only and Aquaphor to skin around the open areas. Do this 3 times daily after cleansing area with wound cleanser taking care to not rub aggressively      Heels floating in bed,  Single layer size E spandagrip to left LE also for edema control           Diet: Combination Diet 0693-0132 Calories: Moderate Consistent CHO (4-6 CHO units/meal); 2 gm NA Diet  Snacks/Supplements Adult: Boost Glucose Control; With Meals       DVT Prophylaxis: Pneumatic Compression Devices given recent GI bleed      Olivo Catheter: not present     Code Status: No CPR- Do NOT Intubate            Diet  Orders Placed This Encounter      Combination Diet [id number] Calories: Moderate Consistent CHO (4-6 CHO units/meal); 2 gm NA Diet              Disposition  Hope for discharge in 1-2 days, ideally back on home 2LNC.  UPDATE: patient wants to return home, but per home care nurses and son this is not a safe disposition.  See care transitions note for details, but hope to discharge to higher level of care when ready.            Interval History:   Breathing slightly improved - good at rest on 3L but then desats with activity still. No fever or chills. No pain.  Overall very slowly improving.    No other pain             Review of Systems:    ROS: 10 point ROS neg other than the symptoms noted above in the HPI.           Medications:   Current active medications and PTA medications reviewed, see medication list for details.            Physical Exam:   Vitals were reviewed  Patient Vitals for the past 24 hrs:   BP Temp Temp src Pulse Resp SpO2   05/12/21 1300 -- -- -- -- -- (!) 85 %   05/12/21 1259 -- -- -- -- -- (!) 83 %   05/12/21 1138 124/49 97.1  F (36.2  C) Axillary 82 18 91 %   05/12/21 1137 -- -- -- -- -- (!) 77 %   05/12/21 0830 -- -- -- -- -- 90 %   05/12/21 0824 114/61 97.5  F (36.4  C) Oral 77 18 96 %   05/12/21 0757 -- -- -- 72 -- --   05/12/21 0148 111/61 97.9  F (36.6  C) Oral 75 18 93 %    21 0000 -- -- -- 71 -- --   21 2247 111/58 97.3  F (36.3  C) Oral 82 20 93 %   21 1938 105/58 97.3  F (36.3  C) Oral 88 20 90 %   21 1605 -- -- -- -- -- 94 %   21 1537 114/68 97.5  F (36.4  C) Oral 80 18 93 %   21 1519 -- -- -- 84 -- --       Temperatures:  Current - Temp: 97.1  F (36.2  C); Max - Temp  Av.4  F (36.3  C)  Min: 97.1  F (36.2  C)  Max: 97.9  F (36.6  C)  Respiration range: Resp  Av.7  Min: 18  Max: 20  Pulse range: Pulse  Av  Min: 71  Max: 88  Blood pressure range: Systolic (24hrs), Av , Min:105 , Max:124   ; Diastolic (24hrs), Av, Min:49, Max:68    Pulse oximetry range: SpO2  Av.5 %  Min: 77 %  Max: 96 %  I/O last 3 completed shifts:  In: 500 [P.O.:500]  Out: 75 [Urine:75]    Intake/Output Summary (Last 24 hours) at 2021 1323  Last data filed at 2021 1940  Gross per 24 hour   Intake 150 ml   Output 75 ml   Net 75 ml     EXAM:  General: awake and alert, NAD, oriented x 3  Head: normocephalic  Neck: unremarkable, no lymphadenopathy   HEENT: oropharynx pink and moist    Heart: Regular rate and rhythm, no murmurs, rubs, or gallops  Lungs: better air movement today, less wheezy, no focal changes or distress.    Abdomen: soft, non-tender, no masses or organomegaly  Extremities: trace edema in lower extremities - much improved from admission   Skin unremarkable.               Data:     Results for orders placed or performed during the hospital encounter of 21 (from the past 24 hour(s))   Potassium   Result Value Ref Range    Potassium 4.1 3.4 - 5.3 mmol/L   Glucose by meter   Result Value Ref Range    Glucose 357 (H) 70 - 99 mg/dL   Glucose by meter   Result Value Ref Range    Glucose 450 (H) 70 - 99 mg/dL   Glucose by meter   Result Value Ref Range    Glucose 378 (H) 70 - 99 mg/dL   Glucose by meter   Result Value Ref Range    Glucose 341 (H) 70 - 99 mg/dL   Glucose by meter   Result Value Ref Range    Glucose 292 (H) 70 -  99 mg/dL   CBC with platelets   Result Value Ref Range    WBC 8.9 4.0 - 11.0 10e9/L    RBC Count 3.21 (L) 4.4 - 5.9 10e12/L    Hemoglobin 8.4 (L) 13.3 - 17.7 g/dL    Hematocrit 27.9 (L) 40.0 - 53.0 %    MCV 87 78 - 100 fl    MCH 26.2 (L) 26.5 - 33.0 pg    MCHC 30.1 (L) 31.5 - 36.5 g/dL    RDW 18.3 (H) 10.0 - 15.0 %    Platelet Count 190 150 - 450 10e9/L   Basic metabolic panel   Result Value Ref Range    Sodium 134 133 - 144 mmol/L    Potassium 3.7 3.4 - 5.3 mmol/L    Chloride 95 94 - 109 mmol/L    Carbon Dioxide 32 20 - 32 mmol/L    Anion Gap 7 3 - 14 mmol/L    Glucose 212 (H) 70 - 99 mg/dL    Urea Nitrogen 58 (H) 7 - 30 mg/dL    Creatinine 1.73 (H) 0.66 - 1.25 mg/dL    GFR Estimate 36 (L) >60 mL/min/[1.73_m2]    GFR Estimate If Black 42 (L) >60 mL/min/[1.73_m2]    Calcium 8.3 (L) 8.5 - 10.1 mg/dL   CRP inflammation   Result Value Ref Range    CRP Inflammation 137.0 (H) 0.0 - 8.0 mg/L   Glucose by meter   Result Value Ref Range    Glucose 228 (H) 70 - 99 mg/dL   Glucose by meter   Result Value Ref Range    Glucose 379 (H) 70 - 99 mg/dL     *Note: Due to a large number of results and/or encounters for the requested time period, some results have not been displayed. A complete set of results can be found in Results Review.           Attestation:  I have reviewed today's vital signs, notes, medications, labs and imaging.  Amount of time spent in direct patient care: 40 minutes.     Joseph Miller MD, MD

## 2021-05-12 NOTE — PLAN OF CARE
Remains on 3L oxygen, dyspnea with exertion, O2 saturation 77% upon ambulating from chair to bed, quickly returned to 92% at rest. Other vital signs stable. Denies pain. Tolerating diet. Blood sugars continue to be elevated. Ambulated to chair and back with assist x 2 and walker. Tolerating sitting up in chair for short period of time.

## 2021-05-12 NOTE — PROGRESS NOTES
Patient's HS blood glucose 378 after sliding scale dose. Telemed MD Cannon notified per facility policy.

## 2021-05-13 NOTE — PROGRESS NOTES
Care Management Discharge Note    Discharge Date: 05/13/21     Discharge Disposition: Assisted Living with Home Care    Discharge Services: RN, PT, OT    Discharge DME: Oxygen    Discharge Transportation: agency-[facility]    Private pay costs discussed: transportation costs    PAS Confirmation Code:  N/A  Patient/family educated on Medicare website which has current facility and service quality ratings: yes    Education Provided on the Discharge Plan: yes    Persons Notified of Discharge Plans: Pt and Son Jae  Patient/Family in Agreement with the Plan: yes    Handoff Referral Completed: Yes    Additional Information:  CM spoke with the Pt and son Jae today to discuss the plan for discharge.     Pt was reluctant but was agreeable to admit back to the [facility].     Son Jae reported that he was in the process of moving the Pt's belongings into the apartment this morning. Jae stated he was in full support of the discharge plan and reported no concerns today. He did request transport to be arranged later this afternoon~1500 to allow more time to set up his apartment. Son will also transfer Pt's home O2 to his apartment and notify his DME provider.     Per request of [facility] and son Jae -referral was placed back with [facility] for continued RN services  (wound cares) and PT/OT    Plan: Discharge to [facility]  *They utilize [facility]  #[phone] Fax# [phone]    [facility] via wheelchair with O2 at 1500    Plan to also resume [facility] services in [place]   #[phone] Fax# [phone]  -RN, PT/OT    JESUS Ballard

## 2021-05-13 NOTE — PLAN OF CARE
Occupational Therapy Discharge Summary    Reason for therapy discharge:    Discharged to [facility]    Progress towards therapy goal(s). See goals on Care Plan in [facility] electronic health record for goal details.  Goals partially met.  Barriers to achieving goals:   discharge from facility and declines therapy on attempts.    Therapy recommendation(s):    Attempt home therapy to increase independence with ADLs and decrease caregiver burden

## 2021-05-13 NOTE — PLAN OF CARE
Physical Therapy Discharge Summary    Reason for therapy discharge:    Discharged to long term care facility.    Progress towards therapy goal(s). See goals on Care Plan in [facility] electronic health record for goal details.  Goals partially met.  Barriers to achieving goals:   discharge from facility.    Therapy recommendation(s):    Continued therapy is recommended.  Rationale/Recommendations:  Home PT to maintain and progress strength and mobility pending patient's participation levels.

## 2021-05-13 NOTE — PLAN OF CARE
Patient is alert and able to answer questions appropriately. Declined to eat supper in recliner. Continues to have crackles in posterior lower lobes. Edema to BLE. On 3L of oxygen via nasal cannula and saturating at 94%.     Notified Pascual Weber at 2250 concerning patient's blood sugar being 424 after bedtime insulin.   - No new orders per Pascual Weber d/t insulin being recently adjusted.

## 2021-05-13 NOTE — PLAN OF CARE
[facility] DISCHARGE NOTE    Patient discharged to assisted living at 3:22 PM via wheel chair. Accompanied by staff. Discharge instructions reviewed with caregiver, opportunity offered to ask questions. Prescriptions sent with patient to fill . All belongings sent with patient.    Vital signs stable. Remains on 3L oxygen. Discharged to [facility], report called to Tyler ROSALES. Transported via [facility] wheelchair transport.     Rebecca Matthews RN

## 2021-05-13 NOTE — PROGRESS NOTES
Patient alert and oriented x2. Edema to BLE consistent with previous assessment. Scabbed areas to Left shin and Left toe CHRISTINE. Mepilex in place to back and right shin. Blood glucose 341 at 0200.

## 2021-05-13 NOTE — DISCHARGE SUMMARY
[place] Hospitalist Discharge Summary    Alvaro Horton MRN# [id number]   Age: 80 year old YOB: 1940     Date of Admission:  5/9/2021  Date of Discharge::  5/13/2021  Admitting Physician:  Herminia Wasserman DO  Discharge Physician:  Tutu Zavala MD  Primary Physician: Be Carreno       Home clinic: [facility]               Discharge Diagnosis:   Principle diagnosis: Acute Hypoxemic Respiratory Failure due to COPD exacerbation plus CHF plus left lower lobe pneumonia    Secondary diagnoses:  COPD with exacerbation  Acute on chronic diastolic heart failure  Left lower lobe pneumonia  Elevated troponin secondary to demand ischemia  Known CAD  Moderate pulmonary hypertension  BEBETO on CKD  Hypokalemia  Iron deficiency anemia  Recent GI bleed presumed upper  Type 2 diabetes mellitus  Dementia with behavioral disturbance/delusions  Suspect post Covid encephalopathy  Hypertension  Paroxysmal A. fib  Multiple healing skin wounds, right lower leg, left toe, mid back, buttocks  Discharge Instructions:   For the hypoxemic respiratory failure  -Some of this was likely due to COPD.  -Prednisone 20 mg daily x5 more days  - possible pneumonia, left lower lobe ; cefdinir 300 mg twice daily x4 more days  -Possible CHF exacerbation; increase Lasix to 60 mg twice daily but watch creatinine levels and weights  -O2 at 3 L continuous, may titrate down to his baseline of 2 if O2 sats persistently above 92    For the acute kidney injury on top of chronic kidney disease  -Creatinine had jumped up to 1.78, 1.73 on discharge.  Would need to watch this with the increase Lasix dose.  -Also may need to consider stopping Metformin if creatinine continues to rise    For diabetes  -Blood sugars were significantly elevated once prednisone was started.  -Prior A1c was 5.3  -Would increase NPH to 15 units twice daily but once the prednisone is stopped consider dropping it back down to his baseline of  12 units twice daily    Oxygen Documentation:   I certify that this patient, Abiagil Lovell has been under my care (or a nurse practitioner or physican's assistant working with me). This is the face-to-face encounter for oxygen medical necessity.      Abigail Lovell is now in a chronic stable state and continues to require supplemental oxygen. Patient has continued oxygen desaturation due to COPD J44.9.    Alternative treatment(s) tried or considered and deemed clinically infective for treatment of COPD J44.9 include nebulizers, inhalers and steroids.  If portability is ordered, is the patient mobile within the home? yes    **Patients who qualify for home O2 coverage under the CMS guidelines require ABG tests or O2 sat readings obtained closest to, but no earlier than 2 days prior to the discharge, as evidence of the need for home oxygen therapy. Testing must be performed while patient is in the chronic stable state. See notes for O2 sats.**      Follow up with primary care provider in 7 days        Procedures:       Results for orders placed or performed during the hospital encounter of 21   XR Chest Port 1 View    Narrative    CHEST ONE VIEW PORTABLE   2021 11:22 AM     HISTORY: Acute hypoxemic respiratory failure.  History of oxygen  dependent COPD. Evaluate for acute cardiopulmonary process including  focal consolidation and/or pleural effusion.    COMPARISON: 3/4/2021      Impression    IMPRESSION: Small bilateral pleural effusions. Left basilar airspace  opacity may represent atelectasis or pneumonia. No pneumothorax.    ENID JASON MD   Echocardiogram Complete    Narrative    [id number]  [id number]  [id number]  [id number]^KEKE^FERNANDA^[facility]  Echocardiography Laboratory  [address] [place] [address]     Name: ABIGAIL LOVELL  MRN: [id number]  : 1940  Study Date: 05/10/2021 09:49 AM  Age: 80 yrs  Gender: Male  Patient Location:  [facility]  Reason For Study: CHF  Ordering Physician: FERNANDA DAVIES  Referring Physician: Be Carreno  Performed By: Janette Turner     BSA: 1.9 m2  Height: 68 in  Weight: 172 lb  HR: 82  BP: 148/93 mmHg  ______________________________________________________________________________  Procedure  Complete Portable Echo Adult. Optison (NDC #[id number]) given intravenously.  ______________________________________________________________________________  Interpretation Summary     Left ventricular systolic function is normal.  The visual ejection fraction is estimated at 55-60%.  The right ventricular systolic function is severely reduced.  The right ventricle is severely dilated.  Right ventricular systolic pressure is elevated, consistent with mild  pulmonary hypertension.  Compared to 11/20, there is no significant change. RV size and function are  significantly impaired. The study was technically difficult.  ______________________________________________________________________________  Left Ventricle  The left ventricle is normal in size. There is concentric remodeling present.  Left ventricular systolic function is normal. The visual ejection fraction is  estimated at 55-60%. Left ventricular diastolic function is indeterminate.  Flattened septum consistent with right ventricular volume overload.     Right Ventricle  The right ventricle is severely dilated. The right ventricular systolic  function is severely reduced.     Atria  The left atrium is moderately dilated. The right atrium is severely dilated.     Mitral Valve  There is mild (1+) mitral regurgitation.     Tricuspid Valve  There is mild (1+) tricuspid regurgitation. The right ventricular systolic  pressure is approximated at 36.3 mmHg plus the right atrial pressure. Right  ventricular systolic pressure is elevated, consistent with mild pulmonary  hypertension.     Aortic Valve  There is mild trileaflet aortic sclerosis. No aortic stenosis is  present.     Pulmonic Valve  The pulmonic valve is not well visualized.     Vessels  The aortic root is not well visualized. Inferior vena cava not well visualized  for estimation of right atrial pressure.     Pericardium  There is no pericardial effusion.     Rhythm  The rhythm was undetermined.  ______________________________________________________________________________  MMode/2D Measurements & Calculations  IVSd: 1.2 cm     LVIDd: 3.8 cm  LVIDs: 2.3 cm  LVPWd: 1.2 cm  FS: 37.9 %  LV mass(C)d: 152.1 grams  LV mass(C)dI: 79.4 grams/m2  Ao root diam: 3.6 cm  LA dimension: 5.1 cm  LA/Ao: 1.4  LVOT diam: 1.9 cm  LVOT area: 2.8 cm2  LA Volume (BP): 80.0 ml  LA Volume Index (BP): 41.7 ml/m2     LA Volume Indexed (AL/bp): 43.6 ml/m2  RWT: 0.63     Doppler Measurements & Calculations  MV E max dariusz: 106.0 cm/sec  MV A max dariusz: 35.1 cm/sec  MV E/A: 3.0  MV dec time: 0.07 sec  TR max dariusz: 301.3 cm/sec  TR max P.3 mmHg  E/E' av.8  Lateral E/e': 13.5  Medial E/e': 28.0     ______________________________________________________________________________  Report approved by: Neela Valerio 05/10/2021 01:31 PM           *Note: Due to a large number of results and/or encounters for the requested time period, some results have not been displayed. A complete set of results can be found in Results Review.                    Allergies:      Allergies   Allergen Reactions     Prednisone Other (See Comments)     Severe interaction with DM                  Discharge Medications:     Current Discharge Medication List      START taking these medications    Details   aspirin (ASA) 81 MG EC tablet Take 1 tablet (81 mg) by mouth daily  Qty:      Associated Diagnoses: Coronary artery disease involving native coronary artery of native heart without angina pectoris      cefdinir (OMNICEF) 300 MG capsule Take 1 capsule (300 mg) by mouth 2 times daily  Qty: 8 capsule, Refills: 0    Associated Diagnoses: Pneumonia of left lower lobe  due to infectious organism      predniSONE (DELTASONE) 20 MG tablet Take 1 tablet (20 mg) by mouth daily  Qty: 5 tablet, Refills: 0    Associated Diagnoses: Chronic obstructive pulmonary disease, unspecified COPD type (H)         CONTINUE these medications which have CHANGED    Details   furosemide (LASIX) 40 MG tablet Take 1.5 tablets (60 mg) by mouth 2 times daily  Qty: 60 tablet, Refills: 0    Associated Diagnoses: Hypervolemia, unspecified hypervolemia type      insulin NPH-Regular (NOVOLIN 70/30 FLEXPEN) susp Inject 15 Units Subcutaneous 2 times daily (with meals)  Qty: 30 mL, Refills: 0    Associated Diagnoses: Type 2 diabetes mellitus with stage 3a chronic kidney disease, with long-term current use of insulin (H)         CONTINUE these medications which have NOT CHANGED    Details   albuterol (PROAIR HFA/PROVENTIL HFA/VENTOLIN HFA) 108 (90 Base) MCG/ACT inhaler Inhale 2 puffs into the lungs every 6 hours as needed for shortness of breath / dyspnea or wheezing  Qty: 1 Inhaler, Refills: 11    Comments: Pharmacy may dispense brand covered by insurance (Proair, or proventil or ventolin or generic albuterol inhaler)  Associated Diagnoses: Chronic obstructive pulmonary disease, unspecified COPD type (H)      atorvastatin (LIPITOR) 80 MG tablet Take 0.5 tablets (40 mg) by mouth daily  Qty: 90 tablet, Refills: 3    Associated Diagnoses: Hyperlipidemia LDL goal <70      cyanocobalamin (VITAMIN B-12) 500 MCG tablet Take 500 mcg by mouth daily       escitalopram (LEXAPRO) 10 MG tablet Take 10 mg by mouth daily      ferrous sulfate (FEROSUL) 325 (65 Fe) MG tablet Take 325 mg by mouth daily (with breakfast)      finasteride (PROSCAR) 5 MG tablet Take 1 tablet by mouth once daily  Qty: 90 tablet, Refills: 0    Associated Diagnoses: Hypertrophy of prostate without urinary obstruction      metFORMIN (GLUCOPHAGE-XR) 500 MG 24 hr tablet Take 1 tablet (500 mg) by mouth 2 times daily (with meals)  Qty: 60 tablet, Refills: 11     Associated Diagnoses: Type 2 diabetes mellitus with stage 3 chronic kidney disease, with long-term current use of insulin, unspecified whether stage 3a or 3b CKD (H)      metoprolol tartrate (LOPRESSOR) 25 MG tablet Take 0.5 tablets (12.5 mg) by mouth 2 times daily  Qty: 90 tablet, Refills: 3    Associated Diagnoses: Chronic ischemic heart disease; Hypertension goal BP (blood pressure) < 140/90      omeprazole 20 MG tablet Take 20 mg by mouth 2 times daily      tamsulosin (FLOMAX) 0.4 MG capsule Take 1 capsule (0.4 mg) by mouth daily  Qty: 90 capsule, Refills: 3    Associated Diagnoses: Benign non-nodular prostatic hyperplasia with lower urinary tract symptoms      TRELEGY ELLIPTA 100-62.5-25 MCG/INH oral inhaler Inhale 1 puff into the lungs daily      Vitamin D3 (CHOLECALCIFEROL) 125 MCG (5000 UT) tablet Take 1 tablet by mouth daily      alcohol swab prep pads Use to swab area of injection/candi as directed.  Qty: 100 each, Refills: 3    Associated Diagnoses: Type 2 diabetes mellitus with stage 3a chronic kidney disease, with long-term current use of insulin (H)      blood glucose (NO BRAND SPECIFIED) test strip Use to test blood sugar 2 times daily or as directed. To accompany: Blood Glucose Monitor Brands: per insurance.  Qty: 100 strip, Refills: 6    Associated Diagnoses: Type 2 diabetes mellitus with stage 3a chronic kidney disease, with long-term current use of insulin (H)      blood glucose monitoring (NO BRAND SPECIFIED) meter device kit Use to test blood sugar 2 times daily or as directed. Preferred blood glucose meter OR supplies to accompany: Blood Glucose Monitor Brands: per insurance.  Qty: 1 kit, Refills: 0    Associated Diagnoses: Type 2 diabetes mellitus with stage 3a chronic kidney disease, with long-term current use of insulin (H)      Insulin Pen Needle (PEN NEEDLES) 32G X 4 MM MISC 1 pen 2 times daily  Qty: 100 each, Refills: 0    Associated Diagnoses: Type 2 diabetes mellitus with stage 3a  "chronic kidney disease, with long-term current use of insulin (H)      nitroGLYcerin (NITROSTAT) 0.4 MG sublingual tablet Place 1 tablet (0.4 mg) under the tongue every 5 minutes as needed for chest pain (CALL 911 IF NOT IMPROVED AFTER THREE CONSECUTIVE DOSES)  Qty: 25 tablet, Refills: 5    Associated Diagnoses: Chronic ischemic heart disease      order for DME Equipment being ordered: CPAP  AIRSENSE 10  11-15 CM H2O  QUATTRO AIR SIZE MED  SN# [id number]  DN# [id number]      Sharps Container MISC 1 Container as needed  Qty: 1 each, Refills: 0    Associated Diagnoses: Type 2 diabetes mellitus with stage 3a chronic kidney disease, with long-term current use of insulin (H)      thin (NO BRAND SPECIFIED) lancets Use with lanceting device. To accompany: Blood Glucose Monitor Brands: per insurance.  Qty: 100 each, Refills: 6    Associated Diagnoses: Type 2 diabetes mellitus with stage 3a chronic kidney disease, with long-term current use of insulin (H)                   Consultations:   None           Brief History of Presenting Illness:   Per ED provider, \"EMS report patient was hypoxic at home \"82% on room air\"-where he has been living after recent hospitalization and care to nursing home for returning home a week prior . Patient  was placed on 10 L and his oxygen saturations improved to 97%.\" Of note, he is typically on 1-2 L oxygen chronically per chart review, that patient is unable to tell me his recent flow rate. States that he targets an oxygen level of 95%.     Additional history obtained from son who reported to ED provider, \"patient got home a week prior from [place]. Jae reports patient was doing well after he was discharged from department and was home for couple of days and then he suddenly declined yesterday he sustained witnessed falls without significant injury on 4 occasions when he got up to go to the bathroom.  He became weak similar to when he was \"hospitalized in November for COVID-19\".  \"      Patient is " not a reliable historian, has difficulties giving specific history. States he has been not doing well at home and has been in and out of the hospital since his COVID diagnosis. He denies difficulty breathing although he does appear to have mild to moderate increased work of breathing on evaluation. He does admit to a cough. He states he has not had fevers or chills but has noted a cough as well as swelling in his lower extremities though states he has not been wearing his compression stockings. When asked about chest discomfort he denies any at present but states when he laid down to sleep last night he had some discomfort. He is unable to elaborate or indicate if any other symptoms were associated with this.      He denies lightheadedness, dizziness, abdominal pain, nausea, vomiting, diarrhea, changes in urination.             Hospital Course:   Acute on chronic respiratory failure with hypoxemia - due to CHF exacerbation, pneumonia and COPD exacerbation as below   Chronic hypoxemia following COVID infection  5/9/21- Presented with resp rate 20-30, oxygen saturation reportedly 82% per EMS. Chronically on 1-2 L oxygen at home, initially on 4 L in ED. Appeared to be in mild to moderate respiratory distress on admission exam. VBG shows respiratory acidosis with compensation, VS show tachycardia, afebrile. Labs show leukocytosis, [place] at [address]. CXR shows low lung volumes, effusions, possible infiltrate in left lower lobe. Lungs diminished, 3+ bilateral lower extremity edema into the thighs.  Etiology most consistent with pneumonia, as well as possible new onset CHF. COPD may be contributing though no definitive wheeze on admit so will observe for now without initiating steroids.  5/10/21 -- Repeat VBG remains relatively stable, still showing compensated respiratory acidosis.  - Continue O2 therapy, titrate to maintain sats > 91%  - Address pneumonia and CHF as below  5/12/2021 -- down to 3LNC at rest, but still  needing up to 5L with/after activity.        Community Acquired Pneumonia  Leukocytosis, elevated lactic - mild sepsis, resolved  5/9/21-5/10/21 - Presented with leukocytosis with left shift (WBC 16.1, ANC 14.6) and lactic acid 2.4. Patient initially afebrile, but fever later developed after admission. Lactic normalized to 1.1 without any intervention, suspect may be related to respiratory effort. Meets SIRS criteria for mild sepsis. UA negative. CXR shows low lung volumes, effusions, possible infiltrate in left lower lobe. Initial concern for community-acquired pneumonia, started on ceftriaxone, added on azithromycin.  Procalcitonin (0.52) and CRP (200) elevated, supports diagnosis of pneumonia  WBC 16.1 -> 13.8  - Scheduled duonebs 4 times daily, prn albuterol   5/12/2021 -- WBC and CRP improving.  Continue antibiotics.     - Blood cultures pending, obtained following initiation of ceftriaxone     Acute exacerbation of diastolic heart failure   Pulmonary hypertension   5/9/21-5/10/21 - Admission weight 174 lbs, not significantly changed from baseline. Admit BNP 14809 (previously 696). Lungs diminished. Lower extremity edema to the thighs bilaterally. CXR shows low lung volume, small pleural effusions, possible left lower lobe consolidation. Last Echo 11/2020 EF 60-65%, no wall motion changes, decreased RV systolic function with elevated RV pressure. Required diuresis during last hospitalization, currently on furosemide 40 mg twice daily. Appears to be decompensation of heart failure, not previously formally diagnosed though has required diuresis during previous hospitalization. May be post-COVID related, will monitor troponins although no signs of ACS at present.  Weight 174 ->173  I/O: -10  Echo 5/10/2021 with EF 55-60%, right ventricular systolic function severely reduced with severe dilation of right ventricle. Right ventricular systolic pressure elevation consistent with mild pulmonary hypertension. No  significant change in function from echo Nov 2020.   - Received 40 mg IV lasix x 3 doses starting 5/9. Will start lasix 40 mg bid 5/10.  - Hold home oral furosemide 40 mg BID  - Strict I and O's, daily weights  5/11/2021 -- creatinine up slightly as below, weight down dramatically but unclear how accurate this is.  OK to slow lasix to prior to admission 40 mg twice daily oral.     5/12/2021 -- no change.       Elevated Troponin - suspect due to strain from heart failure/pneumonia, does not look like ACS  Coronary Artery Disease s/p CABG 2009  Moderate Pulmonary Hypertension  5/9/21-5/10/21 - History of CABG in 2009 with LIMA to LAD and SVG to OM1 and 2. Follows with cardiology, last seen 3/2020. Last Echo 11/2020 EF 60-65%, no wall motion changes, decreased RV systolic function with elevated RV pressure.  Prior to admission cardiac medications include Lipitor 40 mg daily and metoprolol 12.5 mg bid. Not on antiplatelet meds.   Troponin 0.113 --> 0.145. Admit ECG without acute ischemic changes. Vague description of chest discomfort evening prior to admission, unable to fully describe. No reported ongoing chest pain or anginal type symptoms on admission. Low suspicion for ACS at this point, suspect elevated in the setting of likely CHF exacerbation.   Troponin trend 0.113 -> 0.145 -> 0.213 -> 0.204 -> 0.238  Continues to deny chest pain. Still suspect demand ischemia.  - Continue home statin, beta blocker   - Aspirin 81 mg started 5/9/21 5/11/2021 -- remains pain free, troponin down to 0.1.      COPD with exacerbation     Asthma, Baker's Lung  5/9/21-5/10/21 - Lungs diminished, no definitive wheeze. PFTs in 04/2018 revealed severe airflow obstruction.   - Continue home Trelegy Ellipta (formulary substitute with Breo and Incruse), prn albuterol   - scheduled duonebs  - defer steroid for now  5/11/2021 -- initially thought no exacerbation, but slow to wean oxygen and patient is very tight on exam today - duonebs every  4 hours and will start on prednisone 20 mg daily (try lower dose given history of markedly elevated glucose on prednisone).   5/12/2021 -- much better air movement today, less wheezy, continue prednisone for now.        Falls  Generalized weakness  Noted to have several witness falls at home prior to arrival. No clear injury from this.  - PT/OT/care transitions consulted, likely TCU on discharge.        CHRONIC KIDNEY DISEASE with BEBETO  Admit creatinine 1.56 (baseline 1.39 - 1.6), GFR 41 (baseline 38 - 41). Stable compared to recent baseline.  Creatinine 1.56 -> 1.64  5/11/2021 -- creatinine up to 1.78 - back to home lasix as above.    5/12/2021 -- stable.       Hypokalemia  5/11/2021 -- ? Due to diuresis, started on replacement.    5/12/2021 -- normalized.      Iron Deficiency Anemia  Recent GI bleed, presumed upper  Hgb 10.1. Recent Hgb baseline 8-9, improved. Hospitalized 2/26/21 - 3/6/21 for GI bleed, presumed upper, no definitive source on EGD. At that time recommended apixaban to be stopped indefinitely, aspirin held, consider restarting ~2 weeks after discharge.  Hgb 10.1 -> 8.7  - continue iron  - Aspirin restarted 5/9/21 5/12/2021 -- hemoglobin stable at 8.4      Diabetes Mellitus, Type II  5/9/21 -- Chronic. Last a1C 5.7% on 3/5/2021. Managed prior to admission with metformin 500 mg bid and NPH insulin 12 U bid.  - Continue home NPH 12 units BID  - Hold metformin due to lactic acid elevation, consider resuming tomorrow if still improving  - Moderate SSI  - Hypo/hyperglycemia protocol with blood glucose monitoring  - Consistent carbohydrate diet  5/11/2021 -- better today.    5/12/2021 -- elevated with starting prednisone - increased NPH to 15 twice daily and sliding scale insulin to high dose.        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 Son feels impairment started around the time of COVID diagnosis. Previously on  olanzapine and mirtazapine which were stopped in TCU. Managed prior to admission with Lexapro 10 mg daily.   - continue home escitalopram  - may benefit from neurology/neuropsych evaluation as outpatient if this persists      Hypertension  Chronic. Blood pressures stable so far. continue prior to admission metoprolol 12.5 mg twice daily, lasix as above      Paroxsymal Atrial fibrillation  Transient following CABG in 2009. ZioPatch 3/2020 did not show atrial fibrillation. ECG 8/2020 showed NSR. During admission 11/2020 for COVID 19 atrial fibrillation seen, apixaban started at that time though consider of short term given fall risk. CHADsVAC of 6. Apixaban stopped as above following GI bleed.   - Continue home metoprolol  - No anticoagulation due to recent GI bleed (although aspirin started 5/9/21)     History of CVA  Hyperlipidemia  Occurred at time of CABG in 2009. Managed prior to admission with Lipitor 40 mg daily.  - continue home statin  - restarted aspirin 5/9     Benign Prostatic Hyperplasia  Chronic. Managed prior to admission with finasteride 5 mg daily and tamsulosin 0.4 mg daily.  - continue home finasteride, tamsulosin     Malnutrition:    - Level of malnutrition: Non-Severe   - Based on: weight loss, reduced intake, mild (or greater) muscle loss  - see nutrition consult for details.          COVID 19 Screen negative 5/9/21  History of prior COVID-19 infection  History of COVID-19 infection diagnosed 11/26/20 with COVID 19, hospitalized and discharged on 2 LPM of oxygen. PCR negative 5/9/21.     Multiple healing wounds  Intertrigo  Multiple wounds on lower extremities, signs of skin breakdown, appears to be improving, wound care being done at home with home health nurse. Intertrigo noted near groin.  - wound care consulted and recommended:  Right lower leg: Cleanse with wound cleanser, dry, cover with Mepilex border lite 3x3 and single layer size E spandagrip.  Change every 3-4 days.  Left toe: open to  "air  Mid back: Cleanse with wound cleanser, dry, cover with Mepilex border, change every 3-4 days.  Turn side to side in bed, pillow behind back when in chair.   Buttocks: Cleanse with wound cleanser, dry, cover with Mepilex Sacral dressing though in an upside down position.  Following application, apply 3M no-sting skin barrier to edges and allow to try for better retention of bandage.  Change every 2-3 days.  If this cannot be retained for at least 2 days then discontinue and apply a SMALL amount of triad paste to the open areas only and Aquaphor to skin around the open areas. Do this 3 times daily after cleansing area with wound cleanser taking care to not rub aggressively      Heels floating in bed,  Single layer size E spandagrip to left LE also for edema control                  Discharge Exam:   OBJECTIVE:   /58 (BP Location: Left arm)   Pulse 72   Temp 97.3  F (36.3  C) (Oral)   Resp 20   Ht 1.727 m (5' 8\")   Wt 80.2 kg (176 lb 12.9 oz)   SpO2 95%   BMI 26.88 kg/m      GENERAL APPEARANCE:  Slow, Ox2     RESP:clear, no wheezes      CV: regular rate and rhythm,  No  murmur , edema: 2+      Abdomen: soft, nontender, no liver or spleen enlargement, no masses, BSs normal   Skin: no cyanosis, pallor, or jaundice            Pending Tests at Discharge:     Unresulted Labs Ordered in the Past 30 Days of this Admission     Date and Time Order Name Status Description    5/9/2021 1057 Blood culture Preliminary     5/9/2021 1057 Blood culture Preliminary                    Discharge Disposition:   Discharged to AL, memory unit       Attestation:  Amount of time performed on this discharge : 45  minutes.    Tutu Zavala MD MD      "

## 2021-05-14 NOTE — PROGRESS NOTES
Clinic Care Coordination Contact    Clinic Care Coordination Contact  OUTREACH    Referral Information:  Referral Source: IP Handoff    Primary Diagnosis: Cognitive Impairment    Chief Complaint   Patient presents with     Clinic Care Coordination - Post Hospital     Pneumonia        [place] Utilization:   Clinic Utilization  No PCP office visit in Past Year: No    Utilization    Last refreshed: 5/14/2021  6:40 AM: Hospital Admissions 5           Last refreshed: 5/14/2021  6:40 AM: ED Visits 7           Last refreshed: 5/14/2021  6:40 AM: No Show Count (past year) 1              Current as of: 5/14/2021  6:40 AM              Clinical Concerns:  Pt was hospitalized at [facility] from 5/9/21 to 5/13/21 for pneumonia.    aJe Sujatha (children) - CTC 11/16/18    SW CC outreach to pt's son for hospital follow up. Confirmed pt is at [facility] now. No questions at this time. Pt is no longer going to see [facility] for primary care due to [employer] insurance. Sees provider at facility.     CC updated care team.     Medication Management:  No questions.     Post-discharge medication reconciliation status: Discharge medications reviewed and reconciled.  Continue medications without change.    Functional Status:  Dependent ADLs: Ambulation-cane, Incontinence, Toileting  Dependent IADLs: Cleaning, Cooking, Laundry, Shopping, Meal Preparation, Medication Management, Money Management, Transportation, Incontinence    Living Situation:  Current living arrangement: Other  Type of residence: Assisted living (Memory care at [facility])    Lifestyle & Psychosocial Needs:    Social Needs     Financial resource strain: Not hard at all     Food insecurity     Worry: Never true     Inability: Never true     Transportation needs     Medical: No     Non-medical: No        Transportation means: Medical transport  Chemical Dependency Status: No Current Concerns  Informal Support system: Children, Family     Socioeconomic History      Marital status:      Spouse name: Not on file     Number of children: 3     Years of education: Not on file     Highest education level: Not on file   Occupational History     Occupation:      Employer: RETIRED     Occupation:      Tobacco Use     Smoking status: Never Smoker     Smokeless tobacco: Never Used   Substance and Sexual Activity     Alcohol use: No     Alcohol/week: 0.0 standard drinks     Drug use: No     Sexual activity: Not Currently       Care Coordinator has reviewed patient's Social Determinants of Health (SDoH) on this date. Upon review, changes were not made.      Resources and Interventions:  Current Resources:   Skilled Home Care Services: Skilled Nursing  Community Resources: Financial/Insurance, DME, Home Care  Supplies used at home: Incontinence Supplies, Diabetic Supplies, Oxygen Tubing/Supplies  Equipment Currently Used at Home: cane, straight, grab bar, tub/shower, shower chair, walker, rolling  Employment Status: retired    Advance Care Plan/Directive  Advanced Care Plans/Directives on file: No    Referrals Placed: None     Patient/Caregiver understanding: Pt reports understanding and denies any additional questions or concerns at this times. RYAN CC engaged in AIDET communication during encounter.    Plan: No further outreaches will be made at this time unless a new referral is made or a change in the pt's status occurs.     Patient's son was provided with this writer's contact information and encouraged to call with any questions or concerns.     JESUS Quintana   Social Work Clinic Care Coordinator   [facility]  [phone]  [email or web address]

## 2021-05-24 NOTE — TELEPHONE ENCOUNTER
Reason for Call: PT Order    Order or referral being requested:   PT VO- Resume care   2 x week 3 weeks  1 x week  3 week  1 x for 1 month.  Strength mobility and Balance.    Date needed: as soon as possible    Has the patient been seen by the PCP for this problem? YES    Phone number Patient can be reached at:  Other phone number:  Tasneem Rojas PT  [phone] *    Best Time:  Any Time      Can we leave a detailed message on this number?  YES    Call taken on 5/24/2021 at 9:29 AM by Elsa Cain

## 2021-08-16 ENCOUNTER — TELEPHONE (OUTPATIENT)
Dept: FAMILY MEDICINE | Facility: CLINIC | Age: 81
End: 2021-08-16

## 2021-08-16 NOTE — TELEPHONE ENCOUNTER
Reason for Call:  Death Certificate Work Sheet     Detailed comments: Death Certificate Worksheet was received and Dr. Carreno out of the office. Dr. Ordaz did the Death Certificate.   Form received back signed  8/16/21  Faxed Back & Sent to be scanned to this encounter    Call taken on 8/16/2021 at 9:01 AM by Elsa Cain

## 2021-08-30 NOTE — PROGRESS NOTES
Care Management Discharge Note    Discharge Date: 12/05/20     Discharge Disposition: Skilled Nursing Facilty; pt accepted for cares at [facility] ( Main: [phone] Admissions: [phone] Fax: [phone])    Discharge Services: TCU cares    Discharge DME: Oxygen    Discharge Transportation: [facility] stretcher due to covid status.    Private pay costs discussed: private room/amenity fees and transportation costs    PAS Confirmation Code:   [id number]  Patient/family educated on Medicare website which has current facility and service quality ratings: yes    Education Provided on the Discharge Plan:  Yes  Persons Notified of Discharge Plans: Jae & pt via phone.    Patient/Family in Agreement with the Plan:  Yes,  SW requested that son Jae, call his dad & explain why the pt needs TCU cares & cannot come home.    We discussed that because the pt continues to need PT/OT and Jae being so ill at home, pt will go to TCU until 12-11-20, giving both parties time to heal.  Jae agreed and informed this writer he will call his dad now.    Handoff Referral Completed: Yes    Additional Information:  Pt to discharge to [facility] ( Main: [phone] Admissions: [phone] Fax: [phone]) TCU today via [facility] stretcher transport at 330 PM    JESUS Escalona         Please forward [facility] to [facility] [place].

## 2023-04-10 NOTE — PLAN OF CARE
Occupational Therapy Discharge Summary    Reason for therapy discharge:    Discharged to transitional care facility.    Progress towards therapy goal(s). See goals on Care Plan in [facility] electronic health record for goal details.  Goals partially met.  Barriers to achieving goals:   discharge from facility.    Therapy recommendation(s):    Continued therapy is recommended.  Rationale/Recommendations:  TCU to increase independence with ADLs and functional mobility.       Detail Level: Simple

## 2024-01-05 NOTE — PROGRESS NOTES
Subjective     Alvaro Horton is a 79 year old male who presents to clinic today for the following health issues:  Chief Complaint   Patient presents with     Derm Problem     Spots on hands - bilateral x 6 weeks     HPI   Spots on bilateral hands       Duration: approx 6 weeks.    Description (location/character/radiation): Red, purple round and flat lesions on both hands. He states that he has had two friends that  of Melanoma and he is concerned it may be cancer.     Accompanying signs and symptoms: Denies itching, flaking, growing in size, pain or asymmetric boarders.    History (similar episodes/previous evaluation): None    Precipitating or alleviating factors: None    Therapies tried and outcome: None      has a past medical history of Calculus of kidney, COPD (chronic obstructive pulmonary disease) (H), Depression, Diabetes (H), Heart disease, Left hand weakness (2009), PNEUMONIA, ORGANISM NOS (2008), and Sleep apnea.  .  -------------------------------------    Patient Active Problem List   Diagnosis     Chronic ischemic heart disease     Moderate persistent asthma     PSORIASIS     Hypertrophy of prostate without urinary obstruction     OVERACTIVE BLADDER     BAKERS LUNG     Hypertension goal BP (blood pressure) < 140/90     Esophageal reflux     Sensorineural hearing loss, bilateral     Obstructive sleep apnea     Benign paroxysmal vertigo     CVA (cerebral vascular accident) (H)     Atrial fibrillation (H)     Memory loss     CTS (carpal tunnel syndrome)     Dyslexia     Advanced care planning/counseling discussion     Hyperlipidemia LDL goal <70     COPD (chronic obstructive pulmonary disease) (H)     Chronic kidney disease, stage 2 (mild)     Health Care Home     Altered mental status     Type 2 diabetes mellitus with diabetic chronic kidney disease (H)     Encephalopathy     Abnormal blood-gas measurement     Acute calculous cholecystitis     Past Surgical History:   Procedure  "Laterality Date     C ANESTH,DX ARTHROSCOPIC PROC KNEE JOINT  1994, 1998     Left     CARDIAC SURGERY  11/09    CABG x2 - LIMA-D1, SVG to OM1, OM2.     GENITOURINARY SURGERY  1990    ESWL and percutaneous nephrolithotomy     LAPAROSCOPIC CHOLECYSTECTOMY N/A 3/4/2019    Procedure: LAPAROSCOPIC CHOLECYSTECTOMY;  Surgeon: Burt Mendieta MD;  Location: [facility] OR     ORTHOPEDIC SURGERY  8/12    right CTR     SURGICAL HISTORY OF -   1998    Hernia repair     SURGICAL HISTORY OF -   2004    NormalColonoscopy      SURGICAL HISTORY OF -   2006    Normal Colonoscopy       Social History     Tobacco Use     Smoking status: Never Smoker     Smokeless tobacco: Never Used   Substance Use Topics     Alcohol use: No     Alcohol/week: 0.0 oz     Family History   Problem Relation Age of Onset     C.A.D. Father         40s     Hypertension Father      C.A.D. Paternal Grandfather      Heart Disease Paternal Grandfather      Diabetes Brother      C.A.D. Brother      Heart Disease Brother      Hypertension Brother      Gastrointestinal Disease Son         Crohn's disease     Gastrointestinal Disease Other         2 grandaughters with Crohn's disease     Cancer - colorectal No family hx of            -------------------------------------  Reviewed and updated as needed this visit by Provider         Review of Systems    ROS: 10 point ROS neg other than the symptoms noted above in the HPI.        Objective    /74 (BP Location: Right arm, Patient Position: Sitting, Cuff Size: Adult Regular)   Pulse 96   Temp 97.9  F (36.6  C) (Tympanic)   Resp 16   Ht 1.727 m (5' 8\")   Wt 87.2 kg (192 lb 3.2 oz)   BMI 29.22 kg/m    Body mass index is 29.22 kg/m .  Physical Exam   GENERAL: healthy, alert and no distress  EYES: Eyes grossly normal to inspection, PERRL and conjunctivae and sclerae normal  HENT: ear canals and TM's normal, nose and mouth without ulcers or lesions  NECK: no adenopathy, no asymmetry, masses, or scars and thyroid normal " to palpation  RESP: decreased breath sounds throughout  CV: regular rate and rhythm, normal S1 S2, no S3 or S4, no murmur, click or rub, no peripheral edema and peripheral pulses strong  ABDOMEN: soft, nontender, no hepatosplenomegaly, no masses and bowel sounds normal  MS: no gross musculoskeletal defects noted, no edema  SKIN: no suspicious lesions or rashes  NEURO: Normal strength and tone, mentation intact and speech normal  PSYCH: mentation appears normal, affect normal/bright    Diagnostic Test Results:  Results for orders placed or performed in visit on 08/06/19   CK total   Result Value Ref Range    CK Total 193 30 - 300 U/L   CBC with platelets and differential   Result Value Ref Range    WBC 8.2 4.0 - 11.0 10e9/L    RBC Count 5.03 4.4 - 5.9 10e12/L    Hemoglobin 14.9 13.3 - 17.7 g/dL    Hematocrit 45.0 40.0 - 53.0 %    MCV 90 78 - 100 fl    MCH 29.6 26.5 - 33.0 pg    MCHC 33.1 31.5 - 36.5 g/dL    RDW 13.8 10.0 - 15.0 %    Platelet Count 183 150 - 450 10e9/L    % Neutrophils 56.1 %    % Lymphocytes 34.6 %    % Monocytes 7.8 %    % Eosinophils 0.9 %    % Basophils 0.6 %    Absolute Neutrophil 4.6 1.6 - 8.3 10e9/L    Absolute Lymphocytes 2.8 0.8 - 5.3 10e9/L    Absolute Monocytes 0.6 0.0 - 1.3 10e9/L    Absolute Eosinophils 0.1 0.0 - 0.7 10e9/L    Absolute Basophils 0.1 0.0 - 0.2 10e9/L    Diff Method Automated Method            Assessment & Plan     Alvaro was seen today for derm problem.    Diagnoses and all orders for this visit:    Anemia, unspecified type  -     CK total  -     CBC with platelets and differential    Contusion of upper extremity, unspecified laterality, initial encounter  -     CK total  -     CBC with platelets and differential      Previous history of anemia.  Labs rechecked today to look for any platelet or hemoglobin abnormality  Suspect this is most likely due to trauma from his small dog and thinning of the skin.  Recheck with primary care provider with no resolution consider  "biopsy    BMI:   Estimated body mass index is 29.22 kg/m  as calculated from the following:    Height as of this encounter: 1.727 m (5' 8\").    Weight as of this encounter: 87.2 kg (192 lb 3.2 oz).           Regular exercise  Call or return to the clinic with any worsening of symptoms or no resolution. Patient/Parent verbalized understanding and is in agreement. Medication side effects reviewed.   Current Outpatient Medications   Medication Sig Dispense Refill     albuterol (PROAIR HFA/PROVENTIL HFA/VENTOLIN HFA) 108 (90 Base) MCG/ACT Inhaler Inhale 2 puffs into the lungs every 6 hours as needed for shortness of breath / dyspnea or wheezing 1 Inhaler 1     aspirin (ASA) 325 MG EC tablet Take 325 mg by mouth daily       atorvastatin (LIPITOR) 80 MG tablet TAKE 1 TABLET BY MOUTH ONCE DAILY 90 tablet 3     blood glucose (NO BRAND SPECIFIED) lancets standard Use to test blood sugar 3 times daily. Accu Chek Brand 300 each 1     budesonide (PULMICORT) 0.5 MG/2ML neb solution INHALE ONE VIAL VIA NEBULIZER TWO TIMES A  mL 11     cholecalciferol 2000 UNITS tablet Take 1 tablet by mouth daily. 90 tablet 3     cyanocobalamin (BL VITAMIN B-12) 500 MCG TABS Take 500 mcg by mouth daily. (Patient taking differently: Take 2,500 mcg by mouth daily ) 90 tablet 4     finasteride (PROSCAR) 5 MG tablet Take 1 tablet (5 mg) by mouth daily 90 tablet 1     fish oil-omega-3 fatty acids (FISH OIL) 1000 MG capsule Take 1 capsule by mouth 2 times daily. 180 capsule 3     furosemide (LASIX) 20 MG tablet Take 20 mg by mouth daily       insulin aspart (NOVOLOG FLEXPEN) 100 UNIT/ML pen INJECT 11 UNITS SUBCUTANEOUSLY DAILY BEFORE BREAKFAST, 11 UNITS DAILY BEFORE LUNCH, AND 11 UNITS DAILY BEFORE DINNER 15 mL 11     insulin detemir (LEVEMIR FLEXTOUCH) 100 UNIT/ML pen Inject 20 Units Subcutaneous At Bedtime 15 mL 11     Insulin Pen Needle (PEN NEEDLES) 31G X 6 MM MISC 1 Units 3 times daily For novolog flexpen 90 each 0     insulin " "syringe-needle U-100 (BD INSULIN SYRINGE ULTRAFINE) 31G X 5/16\" 1 ML Use one syringe 4 times daily or as directed. 100 each prn     lisinopril (PRINIVIL/ZESTRIL) 10 MG tablet Take 1 tablet (10 mg) by mouth daily 30 tablet 11     metFORMIN (GLUCOPHAGE) 1000 MG tablet TAKE 1 TABLET BY MOUTH TWICE DAILY WITH MEALS 180 tablet 3     metoprolol tartrate (LOPRESSOR) 25 MG tablet TAKE 1 TABLET BY MOUTH TWICE DAILY 180 tablet 1     nitroGLYcerin (NITROSTAT) 0.4 MG sublingual tablet Place 1 tablet (0.4 mg) under the tongue every 5 minutes as needed for chest pain (CALL 911 IF NOT IMPROVED AFTER THREE CONSECUTIVE DOSES) 25 tablet 0     order for DME Equipment being ordered: CPAP  AIRSENSE 10  11-15 CM H2O  QUATTRO AIR SIZE MED  SN# [id number]  DN# [id number]       ranitidine (RANITIDINE 150 MAX STRENGTH) 150 MG tablet Take 1 tablet (150 mg) by mouth 2 times daily 180 tablet 1     tamsulosin (FLOMAX) 0.4 MG capsule Take 1 capsule (0.4 mg) by mouth daily (Needs follow-up appointment for this medication) 90 capsule 3     blood glucose (NO BRAND SPECIFIED) test strip Use to test blood sugar 3 times daily Accu Chek Brand (Patient not taking: Reported on 6/6/2019) 300 strip 1     blood glucose monitoring (NO BRAND SPECIFIED) meter device kit Use to test blood sugar 3 times daily. Accu Chek Brand (Patient not taking: Reported on 6/6/2019) 1 kit 0     BROVANA 15 MCG/2ML NEBU neb solution INHALE ONE VIAL VIA NEBULIZER BY MOUTH TWO TIMES A DAY (Patient not taking: Reported on 8/6/2019) 120 mL 3     Chart documentation with Dragon Voice recognition Software. Although reviewed after completion, some words and grammatical errors may remain.    See Patient Instructions    Return in about 2 weeks (around 8/20/2019) for Ongoing bruising.    THOMPSON Lozano [facility]        " BMP/CBC/PT/PTT/INR/Type and Screen/Urinalysis/UCG/EKG

## (undated) DEVICE — SU VICRYL 0 UR-6 27" J603H

## (undated) DEVICE — ESU ENDO SCISSORS 5MM CVD 5DCS

## (undated) DEVICE — ESU CORD MONOPOLAR 10'  E0510

## (undated) DEVICE — SYR 20ML LL W/O NDL 302830

## (undated) DEVICE — ADH FLOSEAL W/HUMAN THROMBIN 5ML

## (undated) DEVICE — ESU HOLSTER PLASTIC DISP E2400

## (undated) DEVICE — GOWN XLG DISP 9545

## (undated) DEVICE — SOL NACL 0.9% INJ 1000ML BAG 07983-09

## (undated) DEVICE — ENDO TROCAR BLUNT TIP KII BALLOON 12X100MM C0R47

## (undated) DEVICE — ENDO TROCAR SLEEVE KII ADV FIXATION 05X100MM CFS02

## (undated) DEVICE — ADHESIVE SWIFTSET 0.8ML OCTYL SS6

## (undated) DEVICE — ENDO FLOSEAL APPLICATOR 1500181

## (undated) DEVICE — PREP CHLORAPREP 26ML TINTED ORANGE  260815

## (undated) DEVICE — DECANTER VIAL 2006S

## (undated) DEVICE — SUCTION IRRIGATION STRYKFLOW II W/TIP DISP 250-070-520

## (undated) DEVICE — SU VICRYL 4-0 FS-2 27" J422-H

## (undated) DEVICE — SOL NACL 0.9% IRRIG 1000ML BOTTLE 07138-09

## (undated) DEVICE — Device

## (undated) DEVICE — ENDO TROCAR FIRST ENTRY KII FIOS ADV FIX 05X100MM CFF03

## (undated) DEVICE — DRAPE POUCH INSTRUMENT 3 POCKET 1018L

## (undated) DEVICE — CLIP APPLIER ENDO 5MM M/L LIGAMAX EL5ML

## (undated) DEVICE — ESU PENCIL W/COATED BLADE E2450H

## (undated) DEVICE — ENDO POUCH UNIV RETRIEVAL SYSTEM INZII 10MM CD001

## (undated) DEVICE — GLOVE PROTEXIS W/NEU-THERA 8.0  2D73TE80

## (undated) RX ORDER — GLYCOPYRROLATE 0.2 MG/ML
INJECTION, SOLUTION INTRAMUSCULAR; INTRAVENOUS
Status: DISPENSED
Start: 2019-03-04

## (undated) RX ORDER — ONDANSETRON 2 MG/ML
INJECTION INTRAMUSCULAR; INTRAVENOUS
Status: DISPENSED
Start: 2019-03-04

## (undated) RX ORDER — DEXAMETHASONE SODIUM PHOSPHATE 4 MG/ML
INJECTION, SOLUTION INTRA-ARTICULAR; INTRALESIONAL; INTRAMUSCULAR; INTRAVENOUS; SOFT TISSUE
Status: DISPENSED
Start: 2019-03-04

## (undated) RX ORDER — FENTANYL CITRATE 50 UG/ML
INJECTION, SOLUTION INTRAMUSCULAR; INTRAVENOUS
Status: DISPENSED
Start: 2019-03-04

## (undated) RX ORDER — PROPOFOL 10 MG/ML
INJECTION, EMULSION INTRAVENOUS
Status: DISPENSED
Start: 2019-03-04

## (undated) RX ORDER — LIDOCAINE HYDROCHLORIDE 10 MG/ML
INJECTION, SOLUTION EPIDURAL; INFILTRATION; INTRACAUDAL; PERINEURAL
Status: DISPENSED
Start: 2021-01-01

## (undated) RX ORDER — BUPIVACAINE HYDROCHLORIDE AND EPINEPHRINE 5; 5 MG/ML; UG/ML
INJECTION, SOLUTION EPIDURAL; INTRACAUDAL; PERINEURAL
Status: DISPENSED
Start: 2019-03-04

## (undated) RX ORDER — PROPOFOL 10 MG/ML
INJECTION, EMULSION INTRAVENOUS
Status: DISPENSED
Start: 2021-01-01

## (undated) RX ORDER — LIDOCAINE HYDROCHLORIDE 10 MG/ML
INJECTION, SOLUTION EPIDURAL; INFILTRATION; INTRACAUDAL; PERINEURAL
Status: DISPENSED
Start: 2019-03-04

## (undated) RX ORDER — KETOROLAC TROMETHAMINE 30 MG/ML
INJECTION, SOLUTION INTRAMUSCULAR; INTRAVENOUS
Status: DISPENSED
Start: 2019-03-04